# Patient Record
Sex: MALE | Race: WHITE | NOT HISPANIC OR LATINO | Employment: OTHER | URBAN - METROPOLITAN AREA
[De-identification: names, ages, dates, MRNs, and addresses within clinical notes are randomized per-mention and may not be internally consistent; named-entity substitution may affect disease eponyms.]

---

## 2017-01-09 ENCOUNTER — ALLSCRIPTS OFFICE VISIT (OUTPATIENT)
Dept: OTHER | Facility: OTHER | Age: 82
End: 2017-01-09

## 2017-02-06 ENCOUNTER — GENERIC CONVERSION - ENCOUNTER (OUTPATIENT)
Dept: OTHER | Facility: OTHER | Age: 82
End: 2017-02-06

## 2017-02-13 ENCOUNTER — HOSPITAL ENCOUNTER (OUTPATIENT)
Dept: RADIOLOGY | Facility: MEDICAL CENTER | Age: 82
Discharge: HOME | End: 2017-02-13
Payer: COMMERCIAL

## 2017-02-13 ENCOUNTER — ALLSCRIPTS OFFICE VISIT (OUTPATIENT)
Dept: OTHER | Facility: OTHER | Age: 82
End: 2017-02-13

## 2017-02-13 DIAGNOSIS — R06.02 SHORTNESS OF BREATH: ICD-10-CM

## 2017-02-13 DIAGNOSIS — R05.9 COUGH: ICD-10-CM

## 2017-02-13 DIAGNOSIS — J44.9 CHRONIC OBSTRUCTIVE PULMONARY DISEASE (HCC): ICD-10-CM

## 2017-02-13 PROCEDURE — 71020 HB CHEST X-RAY 2VW FRONTAL&LATL: CPT

## 2017-02-21 ENCOUNTER — ALLSCRIPTS OFFICE VISIT (OUTPATIENT)
Dept: OTHER | Facility: OTHER | Age: 82
End: 2017-02-21

## 2017-02-28 ENCOUNTER — HOSPITAL ENCOUNTER (OUTPATIENT)
Dept: RADIOLOGY | Facility: MEDICAL CENTER | Age: 82
Discharge: HOME | End: 2017-02-28
Payer: COMMERCIAL

## 2017-02-28 DIAGNOSIS — R06.02 SHORTNESS OF BREATH: ICD-10-CM

## 2017-02-28 DIAGNOSIS — J44.9 CHRONIC OBSTRUCTIVE PULMONARY DISEASE (HCC): ICD-10-CM

## 2017-02-28 PROCEDURE — 71250 CT THORAX DX C-: CPT

## 2017-03-13 ENCOUNTER — ALLSCRIPTS OFFICE VISIT (OUTPATIENT)
Dept: OTHER | Facility: OTHER | Age: 82
End: 2017-03-13

## 2017-03-17 ENCOUNTER — ALLSCRIPTS OFFICE VISIT (OUTPATIENT)
Dept: OTHER | Facility: OTHER | Age: 82
End: 2017-03-17

## 2017-03-20 ENCOUNTER — GENERIC CONVERSION - ENCOUNTER (OUTPATIENT)
Dept: OTHER | Facility: OTHER | Age: 82
End: 2017-03-20

## 2017-03-22 ENCOUNTER — TRANSCRIBE ORDERS (OUTPATIENT)
Dept: ADMINISTRATIVE | Facility: HOSPITAL | Age: 82
End: 2017-03-22

## 2017-03-22 DIAGNOSIS — J44.9 OBSTRUCTIVE CHRONIC BRONCHITIS WITHOUT EXACERBATION (HCC): Primary | ICD-10-CM

## 2017-03-27 ENCOUNTER — GENERIC CONVERSION - ENCOUNTER (OUTPATIENT)
Dept: OTHER | Facility: OTHER | Age: 82
End: 2017-03-27

## 2017-04-06 ENCOUNTER — GENERIC CONVERSION - ENCOUNTER (OUTPATIENT)
Dept: OTHER | Facility: OTHER | Age: 82
End: 2017-04-06

## 2017-04-13 ENCOUNTER — HOSPITAL ENCOUNTER (OUTPATIENT)
Dept: PULMONOLOGY | Facility: HOSPITAL | Age: 82
Discharge: HOME | End: 2017-04-13
Attending: INTERNAL MEDICINE
Payer: COMMERCIAL

## 2017-04-13 ENCOUNTER — GENERIC CONVERSION - ENCOUNTER (OUTPATIENT)
Dept: OTHER | Facility: OTHER | Age: 82
End: 2017-04-13

## 2017-04-13 DIAGNOSIS — J44.9 OBSTRUCTIVE CHRONIC BRONCHITIS WITHOUT EXACERBATION (HCC): ICD-10-CM

## 2017-04-13 PROCEDURE — 94729 DIFFUSING CAPACITY: CPT

## 2017-04-13 PROCEDURE — 94760 N-INVAS EAR/PLS OXIMETRY 1: CPT

## 2017-04-13 PROCEDURE — 94060 EVALUATION OF WHEEZING: CPT

## 2017-04-13 PROCEDURE — 94726 PLETHYSMOGRAPHY LUNG VOLUMES: CPT

## 2017-05-01 ENCOUNTER — HOSPITAL ENCOUNTER (OUTPATIENT)
Dept: RADIOLOGY | Facility: MEDICAL CENTER | Age: 82
Discharge: HOME | End: 2017-05-01
Payer: COMMERCIAL

## 2017-05-01 DIAGNOSIS — J18.9 PNEUMONIA: ICD-10-CM

## 2017-05-01 PROCEDURE — 71250 CT THORAX DX C-: CPT

## 2017-05-15 ENCOUNTER — ALLSCRIPTS OFFICE VISIT (OUTPATIENT)
Dept: OTHER | Facility: OTHER | Age: 82
End: 2017-05-15

## 2017-05-16 ENCOUNTER — ALLSCRIPTS OFFICE VISIT (OUTPATIENT)
Dept: OTHER | Facility: OTHER | Age: 82
End: 2017-05-16

## 2017-06-30 ENCOUNTER — GENERIC CONVERSION - ENCOUNTER (OUTPATIENT)
Dept: OTHER | Facility: OTHER | Age: 82
End: 2017-06-30

## 2017-07-10 ENCOUNTER — ALLSCRIPTS OFFICE VISIT (OUTPATIENT)
Dept: OTHER | Facility: OTHER | Age: 82
End: 2017-07-10

## 2017-07-17 ENCOUNTER — ALLSCRIPTS OFFICE VISIT (OUTPATIENT)
Dept: OTHER | Facility: OTHER | Age: 82
End: 2017-07-17

## 2017-07-28 ENCOUNTER — ALLSCRIPTS OFFICE VISIT (OUTPATIENT)
Dept: OTHER | Facility: OTHER | Age: 82
End: 2017-07-28

## 2017-09-26 ENCOUNTER — GENERIC CONVERSION - ENCOUNTER (OUTPATIENT)
Dept: OTHER | Facility: OTHER | Age: 82
End: 2017-09-26

## 2017-09-27 ENCOUNTER — TRANSCRIBE ORDERS (OUTPATIENT)
Dept: ADMINISTRATIVE | Facility: HOSPITAL | Age: 82
End: 2017-09-27

## 2017-09-27 DIAGNOSIS — R91.8 LUNG MASS: Primary | ICD-10-CM

## 2017-11-07 ENCOUNTER — HOSPITAL ENCOUNTER (OUTPATIENT)
Dept: RADIOLOGY | Facility: MEDICAL CENTER | Age: 82
Discharge: HOME | End: 2017-11-07
Payer: COMMERCIAL

## 2017-11-07 DIAGNOSIS — R91.8 LUNG MASS: ICD-10-CM

## 2017-11-07 DIAGNOSIS — R91.8 OTHER NONSPECIFIC ABNORMAL FINDING OF LUNG FIELD (CODE): ICD-10-CM

## 2017-11-07 PROCEDURE — 71250 CT THORAX DX C-: CPT

## 2017-11-22 ENCOUNTER — GENERIC CONVERSION - ENCOUNTER (OUTPATIENT)
Dept: OTHER | Facility: OTHER | Age: 82
End: 2017-11-22

## 2017-11-30 ENCOUNTER — GENERIC CONVERSION - ENCOUNTER (OUTPATIENT)
Dept: OTHER | Facility: OTHER | Age: 82
End: 2017-11-30

## 2018-01-12 VITALS
SYSTOLIC BLOOD PRESSURE: 118 MMHG | WEIGHT: 175.25 LBS | BODY MASS INDEX: 22.5 KG/M2 | RESPIRATION RATE: 18 BRPM | DIASTOLIC BLOOD PRESSURE: 60 MMHG | HEART RATE: 80 BPM

## 2018-01-12 VITALS
HEIGHT: 74 IN | SYSTOLIC BLOOD PRESSURE: 118 MMHG | DIASTOLIC BLOOD PRESSURE: 80 MMHG | OXYGEN SATURATION: 91 % | HEART RATE: 77 BPM | BODY MASS INDEX: 22.39 KG/M2 | WEIGHT: 174.5 LBS

## 2018-01-13 VITALS
BODY MASS INDEX: 24.31 KG/M2 | SYSTOLIC BLOOD PRESSURE: 120 MMHG | DIASTOLIC BLOOD PRESSURE: 60 MMHG | HEIGHT: 72 IN | HEART RATE: 82 BPM | WEIGHT: 179.5 LBS

## 2018-01-13 VITALS
DIASTOLIC BLOOD PRESSURE: 60 MMHG | SYSTOLIC BLOOD PRESSURE: 134 MMHG | HEIGHT: 74 IN | HEART RATE: 90 BPM | BODY MASS INDEX: 22.38 KG/M2 | TEMPERATURE: 97.4 F | WEIGHT: 174.38 LBS | RESPIRATION RATE: 18 BRPM

## 2018-01-14 VITALS
WEIGHT: 171 LBS | SYSTOLIC BLOOD PRESSURE: 118 MMHG | DIASTOLIC BLOOD PRESSURE: 78 MMHG | HEIGHT: 74 IN | HEART RATE: 82 BPM | BODY MASS INDEX: 21.94 KG/M2 | OXYGEN SATURATION: 96 %

## 2018-01-14 VITALS
DIASTOLIC BLOOD PRESSURE: 70 MMHG | BODY MASS INDEX: 23.92 KG/M2 | OXYGEN SATURATION: 92 % | RESPIRATION RATE: 16 BRPM | WEIGHT: 176.38 LBS | HEART RATE: 100 BPM | SYSTOLIC BLOOD PRESSURE: 118 MMHG | TEMPERATURE: 97.8 F

## 2018-01-14 VITALS
WEIGHT: 177.38 LBS | HEIGHT: 72 IN | OXYGEN SATURATION: 93 % | DIASTOLIC BLOOD PRESSURE: 80 MMHG | TEMPERATURE: 97.9 F | BODY MASS INDEX: 24.03 KG/M2 | HEART RATE: 91 BPM | SYSTOLIC BLOOD PRESSURE: 126 MMHG

## 2018-01-14 VITALS
HEART RATE: 84 BPM | WEIGHT: 177.13 LBS | SYSTOLIC BLOOD PRESSURE: 122 MMHG | DIASTOLIC BLOOD PRESSURE: 60 MMHG | BODY MASS INDEX: 24.02 KG/M2 | RESPIRATION RATE: 18 BRPM

## 2018-01-15 ENCOUNTER — GENERIC CONVERSION - ENCOUNTER (OUTPATIENT)
Dept: OTHER | Facility: OTHER | Age: 83
End: 2018-01-15

## 2018-01-15 VITALS
HEART RATE: 72 BPM | RESPIRATION RATE: 18 BRPM | TEMPERATURE: 96.2 F | BODY MASS INDEX: 22.59 KG/M2 | SYSTOLIC BLOOD PRESSURE: 136 MMHG | OXYGEN SATURATION: 96 % | HEIGHT: 74 IN | WEIGHT: 176 LBS | DIASTOLIC BLOOD PRESSURE: 66 MMHG

## 2018-01-15 VITALS
WEIGHT: 174 LBS | HEIGHT: 74 IN | SYSTOLIC BLOOD PRESSURE: 128 MMHG | HEART RATE: 84 BPM | OXYGEN SATURATION: 97 % | DIASTOLIC BLOOD PRESSURE: 78 MMHG | BODY MASS INDEX: 22.33 KG/M2

## 2018-01-22 VITALS
HEART RATE: 82 BPM | WEIGHT: 174 LBS | SYSTOLIC BLOOD PRESSURE: 124 MMHG | BODY MASS INDEX: 22.33 KG/M2 | OXYGEN SATURATION: 95 % | DIASTOLIC BLOOD PRESSURE: 84 MMHG | HEIGHT: 74 IN

## 2018-01-22 VITALS
BODY MASS INDEX: 22.6 KG/M2 | OXYGEN SATURATION: 94 % | DIASTOLIC BLOOD PRESSURE: 70 MMHG | SYSTOLIC BLOOD PRESSURE: 114 MMHG | HEART RATE: 80 BPM | WEIGHT: 176.13 LBS | HEIGHT: 74 IN

## 2018-01-24 VITALS
HEIGHT: 74 IN | OXYGEN SATURATION: 98 % | DIASTOLIC BLOOD PRESSURE: 70 MMHG | HEART RATE: 78 BPM | SYSTOLIC BLOOD PRESSURE: 122 MMHG | BODY MASS INDEX: 22.72 KG/M2 | WEIGHT: 177 LBS

## 2018-02-12 ENCOUNTER — OFFICE VISIT (OUTPATIENT)
Dept: FAMILY MEDICINE CLINIC | Facility: MEDICAL CENTER | Age: 83
End: 2018-02-12
Payer: COMMERCIAL

## 2018-02-12 VITALS
DIASTOLIC BLOOD PRESSURE: 64 MMHG | TEMPERATURE: 97.6 F | BODY MASS INDEX: 22.88 KG/M2 | WEIGHT: 178.2 LBS | HEART RATE: 80 BPM | SYSTOLIC BLOOD PRESSURE: 132 MMHG | RESPIRATION RATE: 18 BRPM | OXYGEN SATURATION: 96 %

## 2018-02-12 DIAGNOSIS — J44.1 COPD WITH ACUTE EXACERBATION (HCC): Primary | ICD-10-CM

## 2018-02-12 PROCEDURE — 99213 OFFICE O/P EST LOW 20 MIN: CPT | Performed by: FAMILY MEDICINE

## 2018-02-12 RX ORDER — LISINOPRIL 10 MG/1
1 TABLET ORAL DAILY
COMMUNITY
Start: 2011-01-17 | End: 2018-02-13 | Stop reason: SDUPTHER

## 2018-02-12 RX ORDER — GABAPENTIN 100 MG/1
2 CAPSULE ORAL
COMMUNITY
Start: 2017-08-22 | End: 2018-02-13 | Stop reason: SDUPTHER

## 2018-02-12 RX ORDER — PREDNISONE 20 MG/1
20 TABLET ORAL DAILY
Qty: 5 TABLET | Refills: 0 | Status: SHIPPED | OUTPATIENT
Start: 2018-02-12 | End: 2018-03-09 | Stop reason: SDUPTHER

## 2018-02-12 RX ORDER — REPAGLINIDE 1 MG/1
2 TABLET ORAL DAILY
COMMUNITY
Start: 2018-02-08 | End: 2019-01-07 | Stop reason: ALTCHOICE

## 2018-02-12 RX ORDER — ALBUTEROL SULFATE 2.5 MG/3ML
1 SOLUTION RESPIRATORY (INHALATION)
COMMUNITY
Start: 2017-02-13 | End: 2018-09-17 | Stop reason: SDUPTHER

## 2018-02-12 RX ORDER — GLIPIZIDE 10 MG/1
1 TABLET, FILM COATED, EXTENDED RELEASE ORAL 2 TIMES DAILY
Status: ON HOLD | COMMUNITY
Start: 2011-01-17 | End: 2018-05-12 | Stop reason: ALTCHOICE

## 2018-02-12 RX ORDER — FLUTICASONE PROPIONATE 50 MCG
2 SPRAY, SUSPENSION (ML) NASAL DAILY
COMMUNITY
Start: 2016-11-02 | End: 2018-03-20 | Stop reason: SDUPTHER

## 2018-02-12 RX ORDER — AMLODIPINE BESYLATE 5 MG/1
1 TABLET ORAL DAILY
COMMUNITY
Start: 2018-01-04 | End: 2018-05-16 | Stop reason: HOSPADM

## 2018-02-12 RX ORDER — MULTIVIT WITH MINERALS/LUTEIN
1 TABLET ORAL DAILY
COMMUNITY

## 2018-02-12 RX ORDER — NIFEDIPINE 30 MG/1
1 TABLET, FILM COATED, EXTENDED RELEASE ORAL DAILY
Status: ON HOLD | COMMUNITY
Start: 2012-02-23 | End: 2018-05-12 | Stop reason: ALTCHOICE

## 2018-02-12 RX ORDER — SIMVASTATIN 10 MG
1 TABLET ORAL DAILY
COMMUNITY
Start: 2012-02-23 | End: 2018-02-13 | Stop reason: SDUPTHER

## 2018-02-12 NOTE — PROGRESS NOTES
Surinder Harrison says he has  been congested with cough and chest tightness the past 2-3 days  Needs albuterol and nebulizer  Cough is productive  No fever or chills  Some nasal congestion  Feels well otherwise  He was treated for an acute COPD exacerbation by his pulmonologist a few weeks ago  It appears he was on prednisone for about 2 weeks  However he is not sure about this  He also says that the steroids significantly increased his blood sugars  He has been under some stress with caring for his wife in a nursing home    O: /64 (BP Location: Left arm, Patient Position: Sitting, Cuff Size: Adult)   Pulse 80   Temp 97 6 °F (36 4 °C) (Oral)   Resp 18   Wt 80 8 kg (178 lb 3 2 oz)   SpO2 96%   BMI 22 88 kg/m²   No respiratory distress  Does not appear dyspneic or tachypneic  ENT TMs normal pharynx with slight erythema eyes clear  Neck without adenopathy  Chest scattered wheezing and rhonchi however no rales and he has good breath sounds  Assessment  COPD exacerbation-will start steroids and contact his pulmonologist   Encouraged to continue his nebulizer q i d  as well as his Anoro  Plan   As above

## 2018-02-13 DIAGNOSIS — E78.01 FAMILIAL HYPERCHOLESTEROLEMIA: ICD-10-CM

## 2018-02-13 DIAGNOSIS — G60.9 NEUROPATHY, IDIOPATHIC: ICD-10-CM

## 2018-02-13 DIAGNOSIS — I10 ESSENTIAL HYPERTENSION: Primary | ICD-10-CM

## 2018-02-13 RX ORDER — LISINOPRIL 10 MG/1
10 TABLET ORAL DAILY
Qty: 30 TABLET | Refills: 3 | Status: SHIPPED | OUTPATIENT
Start: 2018-02-13 | End: 2018-07-19 | Stop reason: SDUPTHER

## 2018-02-13 RX ORDER — SIMVASTATIN 10 MG
10 TABLET ORAL DAILY
Qty: 30 TABLET | Refills: 5 | Status: SHIPPED | OUTPATIENT
Start: 2018-02-13 | End: 2018-04-17 | Stop reason: SDUPTHER

## 2018-02-13 RX ORDER — GABAPENTIN 100 MG/1
200 CAPSULE ORAL DAILY
Qty: 30 CAPSULE | Refills: 2 | Status: SHIPPED | OUTPATIENT
Start: 2018-02-13 | End: 2018-06-08 | Stop reason: SDUPTHER

## 2018-02-14 ENCOUNTER — TELEPHONE (OUTPATIENT)
Dept: FAMILY MEDICINE CLINIC | Facility: MEDICAL CENTER | Age: 83
End: 2018-02-14

## 2018-02-14 NOTE — TELEPHONE ENCOUNTER
Patient aware, wants us to send in the [address] since he will finish the prednisone on Friday  Can you send to the  [facility] in [place]

## 2018-02-14 NOTE — TELEPHONE ENCOUNTER
----- Message from Owen Vasquez MD sent at 2/14/2018  4:24 PM EST -----  Will prescribe this   ----- Message -----  From: Roxanna Akhtar MD  Sent: 2/14/2018   2:58 PM  To: MD Dr Ellen Rios,   He is on [facility], we were planning to add inhaled ICS , can add flovent 220 mcg one puff twice daily , since he is requiring prednisone too often  Thanks  Candida Pablo   ----- Message -----  From: Owen Vasquez MD  Sent: 2/12/2018  10:33 PM  To: Roxanna Akhtar MD    Hi Dr Edouard Diggs-  I saw Maurisio Barrientos in the office today for cough and shortness of breath  See my note  You had seen him recently  for similar symptoms and he was on steroids for 2 weeks ? I restarted him but only on 20 mg for 5 days due to increase in his glc readings  You had mentioned  about starting ICS's  I told him I would contact him regarding your recommendations     Thanks  Owen Vasquez MD

## 2018-02-15 DIAGNOSIS — J44.1 CHRONIC OBSTRUCTIVE PULMONARY DISEASE WITH ACUTE EXACERBATION (HCC): Primary | ICD-10-CM

## 2018-02-15 RX ORDER — FLUTICASONE PROPIONATE 220 UG/1
1 AEROSOL, METERED RESPIRATORY (INHALATION) 2 TIMES DAILY
Qty: 1 INHALER | Refills: 1 | Status: SHIPPED | OUTPATIENT
Start: 2018-02-15 | End: 2018-09-28 | Stop reason: SDUPTHER

## 2018-03-09 ENCOUNTER — OFFICE VISIT (OUTPATIENT)
Dept: PULMONOLOGY | Facility: CLINIC | Age: 83
End: 2018-03-09
Payer: COMMERCIAL

## 2018-03-09 ENCOUNTER — TELEPHONE (OUTPATIENT)
Dept: FAMILY MEDICINE CLINIC | Facility: MEDICAL CENTER | Age: 83
End: 2018-03-09

## 2018-03-09 VITALS
RESPIRATION RATE: 18 BRPM | BODY MASS INDEX: 23.59 KG/M2 | DIASTOLIC BLOOD PRESSURE: 78 MMHG | HEART RATE: 82 BPM | HEIGHT: 73 IN | SYSTOLIC BLOOD PRESSURE: 110 MMHG | WEIGHT: 178 LBS | OXYGEN SATURATION: 97 %

## 2018-03-09 DIAGNOSIS — J44.1 COPD WITH ACUTE EXACERBATION (HCC): Primary | ICD-10-CM

## 2018-03-09 DIAGNOSIS — R91.8 MULTIPLE NODULES OF LUNG: ICD-10-CM

## 2018-03-09 PROCEDURE — 99214 OFFICE O/P EST MOD 30 MIN: CPT | Performed by: PHYSICIAN ASSISTANT

## 2018-03-09 RX ORDER — PREDNISONE 20 MG/1
TABLET ORAL
Qty: 5 TABLET | Refills: 0 | Status: SHIPPED | OUTPATIENT
Start: 2018-03-09 | End: 2018-04-16

## 2018-03-09 NOTE — TELEPHONE ENCOUNTER
Pt was treated last month with prednisone for SOB  He said since Tuesday he's had SOB woken up from his sleep  Can't take shorts steps without SOB  Is using rescue inhaler, and his new inhaler, and his nebulizer  And feels he's getting worse  No fever, no sore throat

## 2018-03-09 NOTE — PROGRESS NOTES
Assessment:    1  COPD with acute exacerbation (HCC)  predniSONE 20 mg tablet    roflumilast 500 mcg TABS   2  Multiple nodules of lung         Plan: 79 y/o male former smoker who quit several years ago with significant PMH of HTN, HLD, Low grade Myelodysplastic Syndrome, and COPD with history of many exacerbations who presents for increased SOB x 3 days  1   COPD with acute exacerbation with FEV1 of 51% - will send in course of prednisone  Will hold off on antibiotics given minimal mucus production and length of symptoms  Continue anoro and Flovent inhalers  Continue to use nebulizer 3-4 times daily and rescue inhaler as needed  Will need repeat spirometry at next visit  Given frequent exacerbations in the last year as documented in the HPI, will start patient on Daliresp  Discussed side effects of medication in detail  Rx sent to pharmacy  Discussed restarting Claritin and consider addition of Flonase  Up to date on Pneumonia vaccinations  F/u in 1 month or sooner if needed  2   Lung Nodules - plan for repeat CT of chest in May 2018  Subjective:     Patient ID: Elena Christopher is a 80 y o  male  Chief Complaint: SOB    HPI  79 y/o male former smoker who quit several years ago with significant PMH of HTN, HLD, Low grade Myelodysplastic Syndrome, and COPD with history of many exacerbations who presents for increased SOB x 3 days  Patient has had multiple COPD exacerbations in the last year requiring prednisone +/- ABX including one in July, September of 2017 and January, February of 2018  Patient admits to allergies and (+) itchy eyes  He normally takes Claritin during the spring  Denies fever, chills, chest pain  (+) mildly increase cough with stable mucus production  He continues to take is Anoro and new inhaler added last month, Flovent  He uses his nebulizer machine 3-4 times daily  Occasionally uses his rescue inhaler in addition to this    Patient states he has a monitor currently to him check his blood glucose levels  In the past, prednisone has significantly increased his glucose levels  Review of Systems  Review of Systems   Constitution: Negative for chills, decreased appetite, fever, malaise/fatigue and weight gain  HENT: Negative for congestion  Eyes: Negative for visual disturbance  Cardiovascular: Positive for dyspnea on exertion  Negative for leg swelling and orthopnea  Respiratory: Positive for cough, shortness of breath, sleep disturbances due to breathing and sputum production  Negative for wheezing  Hematologic/Lymphatic: Negative for adenopathy  Skin: Negative for rash  Musculoskeletal: Negative for muscle weakness  Gastrointestinal: Negative for abdominal pain, diarrhea, nausea and vomiting  Neurological: Negative for excessive daytime sleepiness  Psychiatric/Behavioral: Negative for altered mental status and hallucinations  Allergic/Immunologic: Positive for environmental allergies  Objective:  /78 (BP Location: Left arm, Patient Position: Sitting, Cuff Size: Standard)   Pulse 82   Resp 18   Ht 6' 1" (1 854 m)   Wt 80 7 kg (178 lb)   SpO2 97%   BMI 23 48 kg/m²     Physical Exam   Constitutional: He is oriented to person, place, and time  He appears well-developed and well-nourished  No distress  HENT:   Head: Normocephalic and atraumatic  Neck: Normal range of motion  Cardiovascular: Normal rate and regular rhythm  Pulmonary/Chest: Effort normal    Diminished BS bilaterally with prolonged expiratory phase   Abdominal: Soft  There is no tenderness  Musculoskeletal: Normal range of motion  He exhibits no edema or tenderness  Lymphadenopathy:     He has no cervical adenopathy  Neurological: He is alert and oriented to person, place, and time  Skin: Skin is warm and dry  He is not diaphoretic  Psychiatric: He has a normal mood and affect  His behavior is normal    Nursing note and vitals reviewed        Lab/Image Review: Reviewed all pertinent labs/radiology results  No past medical history on file  Social History   Substance Use Topics    Smoking status: Former Smoker     Packs/day: 1 00     Years: 10 00     Quit date: 1960    Smokeless tobacco: Never Used    Alcohol use Yes      Comment: moderate       No family history on file      Patient Active Problem List   Diagnosis    COPD with acute exacerbation ([facility] [address] )    Hypertension    Hyperlipidemia    Neuropathy, idiopathic    Multiple nodules of lung

## 2018-03-09 NOTE — TELEPHONE ENCOUNTER
S/w Micaela Pimentel  Has a pulmonologist through [employer] in [facility]  Aware Dr Ismael Goyal is not in the office today so I advised him to call his pulmonologist especially since this a reoccurring problem  Told him to call back if he can not reach them

## 2018-03-20 DIAGNOSIS — J30.9 ALLERGIC RHINITIS, UNSPECIFIED CHRONICITY, UNSPECIFIED SEASONALITY, UNSPECIFIED TRIGGER: Primary | ICD-10-CM

## 2018-03-20 RX ORDER — FLUTICASONE PROPIONATE 50 MCG
2 SPRAY, SUSPENSION (ML) NASAL DAILY
Qty: 16 G | Refills: 1 | Status: SHIPPED | OUTPATIENT
Start: 2018-03-20 | End: 2018-06-08 | Stop reason: SDUPTHER

## 2018-03-26 ENCOUNTER — APPOINTMENT (OUTPATIENT)
Dept: LAB | Facility: CLINIC | Age: 83
End: 2018-03-26
Payer: COMMERCIAL

## 2018-03-26 ENCOUNTER — APPOINTMENT (OUTPATIENT)
Dept: RADIOLOGY | Facility: MEDICAL CENTER | Age: 83
End: 2018-03-26
Payer: COMMERCIAL

## 2018-03-26 ENCOUNTER — OFFICE VISIT (OUTPATIENT)
Dept: PULMONOLOGY | Facility: CLINIC | Age: 83
End: 2018-03-26
Payer: COMMERCIAL

## 2018-03-26 VITALS
HEIGHT: 73 IN | SYSTOLIC BLOOD PRESSURE: 110 MMHG | TEMPERATURE: 97.8 F | BODY MASS INDEX: 22.8 KG/M2 | HEART RATE: 84 BPM | WEIGHT: 172 LBS | OXYGEN SATURATION: 96 % | DIASTOLIC BLOOD PRESSURE: 70 MMHG

## 2018-03-26 DIAGNOSIS — R91.8 MULTIPLE NODULES OF LUNG: ICD-10-CM

## 2018-03-26 DIAGNOSIS — J41.8 MIXED SIMPLE AND MUCOPURULENT CHRONIC BRONCHITIS (HCC): ICD-10-CM

## 2018-03-26 DIAGNOSIS — J41.8 MIXED SIMPLE AND MUCOPURULENT CHRONIC BRONCHITIS (HCC): Primary | ICD-10-CM

## 2018-03-26 PROCEDURE — 86602 ANTINOMYCES ANTIBODY: CPT

## 2018-03-26 PROCEDURE — 36415 COLL VENOUS BLD VENIPUNCTURE: CPT

## 2018-03-26 PROCEDURE — 99214 OFFICE O/P EST MOD 30 MIN: CPT | Performed by: PHYSICIAN ASSISTANT

## 2018-03-26 PROCEDURE — 86331 IMMUNODIFFUSION OUCHTERLONY: CPT

## 2018-03-26 PROCEDURE — 86606 ASPERGILLUS ANTIBODY: CPT

## 2018-03-26 PROCEDURE — 86671 FUNGUS NES ANTIBODY: CPT

## 2018-03-26 PROCEDURE — 82785 ASSAY OF IGE: CPT

## 2018-03-26 PROCEDURE — 86003 ALLG SPEC IGE CRUDE XTRC EA: CPT

## 2018-03-26 PROCEDURE — 71046 X-RAY EXAM CHEST 2 VIEWS: CPT

## 2018-03-26 RX ORDER — SODIUM CHLORIDE FOR INHALATION 0.9 %
3 VIAL, NEBULIZER (ML) INHALATION 2 TIMES DAILY
Qty: 180 ML | Refills: 0 | Status: SHIPPED | OUTPATIENT
Start: 2018-03-26 | End: 2018-05-16 | Stop reason: HOSPADM

## 2018-03-26 NOTE — PROGRESS NOTES
Assessment/Plan:    No problem-specific Assessment & Plan notes found for this encounter  Diagnoses and all orders for this visit:    Mixed simple and mucopurulent chronic bronchitis ([facility] [address] )  -     [facility] Allergy Panel, Adult; Future  -     Hypersensitivity pnuemonitis profile; Future  -     XR chest pa & lateral; Future  -     sodium chloride 0 9 % nebulizer solution; Take 3 mL by nebulization 2 (two) times a day  - Patient with recurrent exacerbations, symptoms of shortness of breath and cough  He has had multiple courses of antibiotics and prednisone over the last several months  Will check a CXR as well as allergy panel  He will continue with the anoro and flovent as well as flonase  Will add mucinex, he has not been taking it  Will also add saline to the nebulizer to help with mucous clearance twice per day with the albuterol  Can use the albuterol 4 times per day as needed  Will hold off on antibiotics until CXR done given he has had several courses over the last several months  He has a follow up in 2 weeks which he will keep  Multiple nodules of lung          - Repeat CT scan in May  May need to be done sooner if symptoms do not improve  Subjective:      Patient ID: Cindy Villa is a 80 y o  male  Patient is an 79 yo male former smoker with PMH of COPD, HTN, HLD, MDS with frequent exacerbations  He was seen about 3 weeks ago and given prednisone course for COPD exacerbation  He was also started on Daliresp for his frequent exacerbations  He is here today for follow up  He was feeling well on the prednisone  Shortly after stopping the prednisone his shortness of breath returned  Also has a cough with yellowish mucous  No fever or chills  Cough   Associated symptoms include shortness of breath     Shortness of Breath         The following portions of the patient's history were reviewed and updated as appropriate: allergies, current medications, past family history, past medical history, past social history, past surgical history and problem list     Review of Systems   Constitutional: Negative  HENT: Positive for congestion  Respiratory: Positive for cough and shortness of breath  Cardiovascular: Negative  Gastrointestinal: Negative  Genitourinary: Negative  Musculoskeletal: Negative  Skin: Negative  Allergic/Immunologic: Negative  Neurological: Negative  Psychiatric/Behavioral: Negative  Objective:      /70 (BP Location: Left arm, Patient Position: Sitting)   Pulse 84   Temp 97 8 °F (36 6 °C)   Ht 6' 1" (1 854 m)   Wt 78 kg (172 lb)   SpO2 96%   BMI 22 69 kg/m²          Physical Exam   Constitutional: He is oriented to person, place, and time  He appears well-developed and well-nourished  No distress  HENT:   Mouth/Throat: Oropharynx is clear and moist    Eyes: Pupils are equal, round, and reactive to light  Cardiovascular: Normal rate, regular rhythm and normal heart sounds  No murmur heard  Pulmonary/Chest: Effort normal  No accessory muscle usage  No respiratory distress  He has decreased breath sounds  He has no wheezes  He has no rhonchi  He has no rales  Abdominal: Soft  There is no tenderness  Musculoskeletal: Normal range of motion  Neurological: He is alert and oriented to person, place, and time  Skin: Skin is warm and dry  No rash noted  Psychiatric: He has a normal mood and affect

## 2018-03-26 NOTE — PATIENT INSTRUCTIONS
Continue Anoro and Flovent  Add mucinex, add saline to nebulizer twice per day    Have Chest X-ray done to look for any pneumonia  Allergy testing to be done with blood work

## 2018-03-27 LAB
A ALTERNATA IGE QN: <0.1 KUA/I
A FUMIGATUS IGE QN: <0.1 KUA/I
ALLERGEN COMMENT: NORMAL
BERMUDA GRASS IGE QN: <0.1 KUA/I
BOXELDER IGE QN: <0.1 KUA/I
C HERBARUM IGE QN: <0.1 KUA/I
CAT DANDER IGE QN: <0.1 KUA/I
CMN PIGWEED IGE QN: <0.1 KUA/I
COMMON RAGWEED IGE QN: <0.1 KUA/I
COTTONWOOD IGE QN: <0.1 KUA/I
D FARINAE IGE QN: <0.1 KUA/I
D PTERONYSS IGE QN: <0.1 KUA/I
DOG DANDER IGE QN: <0.1 KUA/I
LONDON PLANE IGE QN: <0.1 KUA/I
MOUSE URINE PROT IGE QN: <0.1 KUA/I
MT JUNIPER IGE QN: <0.1 KUA/I
MUGWORT IGE QN: <0.1 KUA/I
P NOTATUM IGE QN: <0.1 KUA/I
ROACH IGE QN: <0.1 KUA/I
SHEEP SORREL IGE QN: <0.1 KUA/I
SILVER BIRCH IGE QN: <0.1 KUA/I
TIMOTHY IGE QN: <0.1 KUA/I
TOTAL IGE SMQN RAST: 13.3 KU/L (ref 0–113)
WALNUT IGE QN: <0.1 KUA/I
WHITE ASH IGE QN: <0.1 KUA/I
WHITE ELM IGE QN: <0.1 KUA/I
WHITE MULBERRY IGE QN: <0.1 KUA/I
WHITE OAK IGE QN: <0.1 KUA/I

## 2018-03-28 ENCOUNTER — TELEPHONE (OUTPATIENT)
Dept: PULMONOLOGY | Facility: CLINIC | Age: 83
End: 2018-03-28

## 2018-03-28 DIAGNOSIS — J20.9 ACUTE BRONCHITIS, UNSPECIFIED ORGANISM: Primary | ICD-10-CM

## 2018-03-28 RX ORDER — LEVOFLOXACIN 500 MG/1
500 TABLET, FILM COATED ORAL EVERY 24 HOURS
Qty: 7 TABLET | Refills: 0 | Status: SHIPPED | OUTPATIENT
Start: 2018-03-28 | End: 2018-04-04

## 2018-03-28 NOTE — TELEPHONE ENCOUNTER
Pt called to say he saw Skip Whiting and he still does not feel well, he is requesting a stronger medication    Please advise  Thank you

## 2018-03-30 ENCOUNTER — TELEPHONE (OUTPATIENT)
Dept: PULMONOLOGY | Facility: CLINIC | Age: 83
End: 2018-03-30

## 2018-03-30 LAB
A FUMIGATUS1 AB SER QL ID: NEGATIVE
A PULLULANS AB SER QL: NEGATIVE
LACEYELLA SACCHARI AB SER QL: NEGATIVE
PIGEON SERUM AB QL ID: NEGATIVE
S RECTIVIRGULA AB SER QL ID: NEGATIVE
T VULGARIS AB SER QL ID: NEGATIVE

## 2018-03-30 NOTE — TELEPHONE ENCOUNTER
Spoke to pt, told him cxr and blood work still in process, we will call him as soon we have the final reports

## 2018-04-16 ENCOUNTER — OFFICE VISIT (OUTPATIENT)
Dept: PULMONOLOGY | Facility: CLINIC | Age: 83
End: 2018-04-16
Payer: COMMERCIAL

## 2018-04-16 VITALS
HEIGHT: 73 IN | DIASTOLIC BLOOD PRESSURE: 80 MMHG | HEART RATE: 92 BPM | SYSTOLIC BLOOD PRESSURE: 104 MMHG | BODY MASS INDEX: 23.19 KG/M2 | WEIGHT: 175 LBS | OXYGEN SATURATION: 93 %

## 2018-04-16 DIAGNOSIS — J41.8 MIXED SIMPLE AND MUCOPURULENT CHRONIC BRONCHITIS (HCC): Primary | ICD-10-CM

## 2018-04-16 DIAGNOSIS — R91.8 MULTIPLE NODULES OF LUNG: ICD-10-CM

## 2018-04-16 PROCEDURE — 99213 OFFICE O/P EST LOW 20 MIN: CPT | Performed by: PHYSICIAN ASSISTANT

## 2018-04-16 NOTE — PROGRESS NOTES
Assessment:    1  Mixed simple and mucopurulent chronic bronchitis ([address] )     2  Multiple nodules of lung         Plan: 80 y o  male former smoker who quit several years ago with significant PMH of HTN, HLD, Low grade Myelodysplastic Syndrome, and COPD with history of many exacerbations who presents for f/u of his COPD  1  COPD with FEV1 of of 51% - significant improvement in breathing since last visit  Continue anoro and Flovent inhalers  Continue to use nebulizer 2-3 times daily and rescue inhaler as needed  Add saline via nebulizer for increased congestion  Will need repeat spirometry at next visit  Patient reported Elonda Socks is too expensive ($100 a month)  He would like to see how he does without the medication for now  Discussed restarting Claritin and consider addition of Flonase  Up to date on Pneumonia vaccinations  2   Lung Nodules - plan for repeat CT of chest in May 2018  Patient will f/u after CT of chest      Subjective:     Patient ID: Mignon Staples is a 80 y o  male  Chief Complaint: SOB    HPI  80 y o  male former smoker who quit several years ago with significant PMH of HTN, HLD, Low grade Myelodysplastic Syndrome, and COPD with history of many exacerbations who presents for f/u of his COPD  Overall, patient is feeling much better with his breathing  He is using his rescue inhaler seldomly  Does state that last night he woke up feeling really short of breath  He had to go on his nebulizer machine with improvement in symptoms  Patient was concerned and has questions regarding this incident  Reports using the inhalers as instructed  Patient states the DaliResp was $100 a month and too expensive for him to continue  Review of Systems  Review of Systems   Constitution: Negative for chills, decreased appetite, fever, malaise/fatigue and weight gain  HENT: Negative for congestion  Eyes: Negative for visual disturbance  Cardiovascular: Positive for dyspnea on exertion   Negative for leg swelling and orthopnea  Respiratory: Positive for shortness of breath and sleep disturbances due to breathing  Negative for cough and wheezing  Hematologic/Lymphatic: Negative for adenopathy  Skin: Negative for rash  Musculoskeletal: Negative for muscle weakness  Gastrointestinal: Negative for abdominal pain, diarrhea, nausea and vomiting  Neurological: Negative for excessive daytime sleepiness  Psychiatric/Behavioral: Negative for altered mental status and hallucinations  Allergic/Immunologic: Negative for environmental allergies  Objective:  /80   Pulse 92   Ht 6' 1" (1 854 m)   Wt 79 4 kg (175 lb)   SpO2 93%   BMI 23 09 kg/m²     Physical Exam   Constitutional: He is oriented to person, place, and time  He appears well-developed and well-nourished  No distress  HENT:   Head: Normocephalic and atraumatic  Neck: Normal range of motion  Cardiovascular: Normal rate and regular rhythm  Pulmonary/Chest: Effort normal    Diminished BS at the bases; no wheezes or rhonchi   Abdominal: Soft  There is no tenderness  Musculoskeletal: Normal range of motion  He exhibits no edema or tenderness  Lymphadenopathy:     He has no cervical adenopathy  Neurological: He is alert and oriented to person, place, and time  Skin: Skin is warm and dry  He is not diaphoretic  Psychiatric: He has a normal mood and affect  His behavior is normal    Nursing note and vitals reviewed  Lab/Image Review: Reviewed all pertinent labs/radiology results  No past medical history on file  Social History   Substance Use Topics    Smoking status: Former Smoker     Packs/day: 1 00     Years: 10 00     Quit date: 1960    Smokeless tobacco: Never Used    Alcohol use 0 0 oz/week      Comment: moderate       No family history on file      Patient Active Problem List   Diagnosis    COPD with acute exacerbation ([facility] [address] )    Hypertension    Hyperlipidemia    Neuropathy, idiopathic    Multiple nodules of lung    Mixed simple and mucopurulent chronic bronchitis ([address] )

## 2018-04-17 DIAGNOSIS — E78.01 FAMILIAL HYPERCHOLESTEROLEMIA: ICD-10-CM

## 2018-04-17 RX ORDER — SIMVASTATIN 10 MG
10 TABLET ORAL DAILY
Qty: 90 TABLET | Refills: 1 | Status: SHIPPED | OUTPATIENT
Start: 2018-04-17 | End: 2018-10-31 | Stop reason: SDUPTHER

## 2018-04-23 ENCOUNTER — OFFICE VISIT (OUTPATIENT)
Dept: PULMONOLOGY | Facility: CLINIC | Age: 83
End: 2018-04-23
Payer: COMMERCIAL

## 2018-04-23 VITALS
DIASTOLIC BLOOD PRESSURE: 80 MMHG | BODY MASS INDEX: 23.19 KG/M2 | WEIGHT: 175 LBS | OXYGEN SATURATION: 92 % | SYSTOLIC BLOOD PRESSURE: 116 MMHG | HEART RATE: 90 BPM | HEIGHT: 73 IN

## 2018-04-23 DIAGNOSIS — R91.8 MULTIPLE NODULES OF LUNG: ICD-10-CM

## 2018-04-23 DIAGNOSIS — J41.8 MIXED SIMPLE AND MUCOPURULENT CHRONIC BRONCHITIS (HCC): ICD-10-CM

## 2018-04-23 DIAGNOSIS — J42 CHRONIC BRONCHITIS WITH ACUTE EXACERBATION (HCC): Primary | ICD-10-CM

## 2018-04-23 DIAGNOSIS — J20.9 CHRONIC BRONCHITIS WITH ACUTE EXACERBATION (HCC): Primary | ICD-10-CM

## 2018-04-23 PROCEDURE — 99214 OFFICE O/P EST MOD 30 MIN: CPT | Performed by: PHYSICIAN ASSISTANT

## 2018-04-23 RX ORDER — PREDNISONE 10 MG/1
TABLET ORAL
Qty: 21 TABLET | Refills: 0 | Status: ON HOLD | OUTPATIENT
Start: 2018-04-23 | End: 2018-05-12 | Stop reason: ALTCHOICE

## 2018-04-23 NOTE — PROGRESS NOTES
Assessment/Plan:    Chronic bronchitis - He has had frequent exacerbations over the last several months  He was started on Daliresp which he thinks was helping, stopped due to cost to see how he did  We will give him a prednisone taper and also have him restart the Daliresp  Seems like it was helping and we would like to keep him off long term and high dose steroids  Samples were given and we will try to talk to the company about a reduced cost  He will continue with the anoro and flovent  Albuterol nebs 4 times per day as needed  He will also try taking claritin and mucinex  Lung nodules - due for follow up CT scan 5/2018  Follow up in 2 months or sooner if necessary  Diagnoses and all orders for this visit:    Chronic bronchitis with acute exacerbation (HCC)  -     predniSONE 10 mg tablet; 4 tabs x 3 days, 2 tabs x 3 days, 1 tab x 3 days    Mixed simple and mucopurulent chronic bronchitis (HCC)    Multiple nodules of lung  -     CT chest without contrast; Future        Subjective:      Patient ID: Marily Leigh is a 80 y o  male  Patient is an 79 yo male former smoker with PMH of COPD, HTN, HLD, MDS with frequent exacerbations  He was recently started on Daliresp for his frequent exacerbations  He is here today for follow up  He is complaining of increased shortness of breath with a mild cough with clear mucous  He did take the [address] for 1 month and felt it was helping - he did not refill it as it was $100 per month  Shortness of Breath         The following portions of the patient's history were reviewed and updated as appropriate: allergies, current medications, past family history, past medical history, past social history, past surgical history and problem list     Review of Systems   Constitutional: Negative  HENT: Negative  Respiratory: Positive for cough and shortness of breath  Cardiovascular: Negative  Gastrointestinal: Negative  Genitourinary: Negative  Musculoskeletal: Negative  Skin: Negative  Allergic/Immunologic: Negative  Neurological: Negative  Psychiatric/Behavioral: Negative  Objective:      /80   Pulse 90   Ht 6' 1" (1 854 m)   Wt 79 4 kg (175 lb)   SpO2 92%   BMI 23 09 kg/m²          Physical Exam   Constitutional: He is oriented to person, place, and time  He appears well-developed and well-nourished  No distress  HENT:   Mouth/Throat: Oropharynx is clear and moist    Eyes: Pupils are equal, round, and reactive to light  Cardiovascular: Normal rate, regular rhythm and normal heart sounds  No murmur heard  Pulmonary/Chest: Effort normal  No accessory muscle usage  No respiratory distress  He has no decreased breath sounds  He has wheezes (faint wheezing bilaterally)  He has no rhonchi  He has no rales  Abdominal: Soft  There is no tenderness  Musculoskeletal: Normal range of motion  Neurological: He is alert and oriented to person, place, and time  Skin: Skin is warm and dry  No rash noted  Psychiatric: He has a normal mood and affect

## 2018-04-23 NOTE — PATIENT INSTRUCTIONS
Prednisone taper to start today  Restart Daliresp daily and continue for the next 2 months until follow up  Continue with Anoro and Flovent, albuterol as needed  Can also use Claritin and Mucinex as needed

## 2018-04-24 ENCOUNTER — TELEPHONE (OUTPATIENT)
Dept: INTERNAL MEDICINE CLINIC | Facility: CLINIC | Age: 83
End: 2018-04-24

## 2018-04-24 ENCOUNTER — TELEPHONE (OUTPATIENT)
Dept: PULMONOLOGY | Facility: CLINIC | Age: 83
End: 2018-04-24

## 2018-05-07 ENCOUNTER — HOSPITAL ENCOUNTER (OUTPATIENT)
Dept: RADIOLOGY | Facility: MEDICAL CENTER | Age: 83
Discharge: HOME | End: 2018-05-07
Payer: COMMERCIAL

## 2018-05-07 DIAGNOSIS — D46.20 REFRACTORY ANEMIA WITH EXCESS OF BLASTS (HCC): ICD-10-CM

## 2018-05-07 DIAGNOSIS — R91.8 MULTIPLE NODULES OF LUNG: ICD-10-CM

## 2018-05-07 LAB
LEFT EYE DIABETIC RETINOPATHY: NORMAL
RIGHT EYE DIABETIC RETINOPATHY: NORMAL

## 2018-05-07 PROCEDURE — 71250 CT THORAX DX C-: CPT

## 2018-05-12 ENCOUNTER — APPOINTMENT (EMERGENCY)
Dept: CT IMAGING | Facility: HOSPITAL | Age: 83
DRG: 287 | End: 2018-05-12
Payer: COMMERCIAL

## 2018-05-12 ENCOUNTER — APPOINTMENT (EMERGENCY)
Dept: RADIOLOGY | Facility: HOSPITAL | Age: 83
DRG: 287 | End: 2018-05-12
Payer: COMMERCIAL

## 2018-05-12 ENCOUNTER — HOSPITAL ENCOUNTER (INPATIENT)
Facility: HOSPITAL | Age: 83
LOS: 4 days | Discharge: HOME | DRG: 287 | End: 2018-05-16
Attending: EMERGENCY MEDICINE | Admitting: HOSPITALIST
Payer: COMMERCIAL

## 2018-05-12 DIAGNOSIS — I50.9 HEART FAILURE (HCC): Primary | ICD-10-CM

## 2018-05-12 DIAGNOSIS — J90 BILATERAL PLEURAL EFFUSION: ICD-10-CM

## 2018-05-12 DIAGNOSIS — G60.9 NEUROPATHY, IDIOPATHIC: ICD-10-CM

## 2018-05-12 PROBLEM — E11.9 TYPE 2 DIABETES MELLITUS, WITH LONG-TERM CURRENT USE OF INSULIN (HCC): Chronic | Status: ACTIVE | Noted: 2018-05-12

## 2018-05-12 PROBLEM — I50.33 ACUTE ON CHRONIC DIASTOLIC CONGESTIVE HEART FAILURE (HCC): Chronic | Status: ACTIVE | Noted: 2018-05-12

## 2018-05-12 PROBLEM — Z79.4 TYPE 2 DIABETES MELLITUS, WITH LONG-TERM CURRENT USE OF INSULIN (HCC): Chronic | Status: ACTIVE | Noted: 2018-05-12

## 2018-05-12 PROBLEM — J44.9 COPD WITHOUT EXACERBATION (HCC): Status: ACTIVE | Noted: 2017-07-28

## 2018-05-12 LAB
ANION GAP SERPL CALCULATED.3IONS-SCNC: 10 MMOL/L (ref 4–13)
ANISOCYTOSIS BLD QL SMEAR: PRESENT
ATRIAL RATE: 117 BPM
BASOPHILS # BLD MANUAL: 0 THOUSAND/UL (ref 0–0.1)
BASOPHILS NFR MAR MANUAL: 0 % (ref 0–1)
BUN SERPL-MCNC: 16 MG/DL (ref 5–25)
CALCIUM SERPL-MCNC: 9.2 MG/DL (ref 8.3–10.1)
CHLORIDE SERPL-SCNC: 105 MMOL/L (ref 100–108)
CO2 SERPL-SCNC: 28 MMOL/L (ref 21–32)
CREAT SERPL-MCNC: 1.17 MG/DL (ref 0.6–1.3)
DACRYOCYTES BLD QL SMEAR: PRESENT
EOSINOPHIL # BLD MANUAL: 0 THOUSAND/UL (ref 0–0.4)
EOSINOPHIL NFR BLD MANUAL: 0 % (ref 0–6)
ERYTHROCYTE [DISTWIDTH] IN BLOOD BY AUTOMATED COUNT: 25.8 % (ref 11.6–15.1)
GFR SERPL CREATININE-BSD FRML MDRD: 57 ML/MIN/1.73SQ M
GLUCOSE SERPL-MCNC: 214 MG/DL (ref 65–140)
GLUCOSE SERPL-MCNC: 278 MG/DL (ref 65–140)
GLUCOSE SERPL-MCNC: 396 MG/DL (ref 65–140)
GLUCOSE SERPL-MCNC: 446 MG/DL (ref 65–140)
HCT VFR BLD AUTO: 34.8 % (ref 36.5–49.3)
HGB BLD-MCNC: 11.2 G/DL (ref 12–17)
LYMPHOCYTES # BLD AUTO: 0.44 THOUSAND/UL (ref 0.6–4.47)
LYMPHOCYTES # BLD AUTO: 5 % (ref 14–44)
MCH RBC QN AUTO: 30.9 PG (ref 26.8–34.3)
MCHC RBC AUTO-ENTMCNC: 32.2 G/DL (ref 31.4–37.4)
MCV RBC AUTO: 96 FL (ref 82–98)
MONOCYTES # BLD AUTO: 0.61 THOUSAND/UL (ref 0–1.22)
MONOCYTES NFR BLD: 7 % (ref 4–12)
NEUTROPHILS # BLD MANUAL: 7.67 THOUSAND/UL (ref 1.85–7.62)
NEUTS SEG NFR BLD AUTO: 88 % (ref 43–75)
NT-PROBNP SERPL-MCNC: 5284 PG/ML
P AXIS: 72 DEGREES
PLATELET # BLD AUTO: 192 THOUSANDS/UL (ref 149–390)
PLATELET BLD QL SMEAR: ADEQUATE
PMV BLD AUTO: 11 FL (ref 8.9–12.7)
POIKILOCYTOSIS BLD QL SMEAR: PRESENT
POLYCHROMASIA BLD QL SMEAR: PRESENT
POTASSIUM SERPL-SCNC: 4.6 MMOL/L (ref 3.5–5.3)
QRS AXIS: 73 DEGREES
QRSD INTERVAL: 136 MS
QT INTERVAL: 354 MS
QTC INTERVAL: 470 MS
RBC # BLD AUTO: 3.62 MILLION/UL (ref 3.88–5.62)
SODIUM SERPL-SCNC: 143 MMOL/L (ref 136–145)
T WAVE AXIS: 150 DEGREES
TOTAL CELLS COUNTED SPEC: 100
TROPONIN I SERPL-MCNC: 0.03 NG/ML
VENTRICULAR RATE: 106 BPM
WBC # BLD AUTO: 8.72 THOUSAND/UL (ref 4.31–10.16)

## 2018-05-12 PROCEDURE — 96374 THER/PROPH/DIAG INJ IV PUSH: CPT

## 2018-05-12 PROCEDURE — 99223 1ST HOSP IP/OBS HIGH 75: CPT | Performed by: HOSPITALIST

## 2018-05-12 PROCEDURE — 85007 BL SMEAR W/DIFF WBC COUNT: CPT | Performed by: EMERGENCY MEDICINE

## 2018-05-12 PROCEDURE — 94664 DEMO&/EVAL PT USE INHALER: CPT

## 2018-05-12 PROCEDURE — 36415 COLL VENOUS BLD VENIPUNCTURE: CPT | Performed by: EMERGENCY MEDICINE

## 2018-05-12 PROCEDURE — 93010 ELECTROCARDIOGRAM REPORT: CPT | Performed by: INTERNAL MEDICINE

## 2018-05-12 PROCEDURE — 82948 REAGENT STRIP/BLOOD GLUCOSE: CPT

## 2018-05-12 PROCEDURE — 94640 AIRWAY INHALATION TREATMENT: CPT

## 2018-05-12 PROCEDURE — 85027 COMPLETE CBC AUTOMATED: CPT | Performed by: EMERGENCY MEDICINE

## 2018-05-12 PROCEDURE — 94760 N-INVAS EAR/PLS OXIMETRY 1: CPT

## 2018-05-12 PROCEDURE — 80048 BASIC METABOLIC PNL TOTAL CA: CPT | Performed by: EMERGENCY MEDICINE

## 2018-05-12 PROCEDURE — 93005 ELECTROCARDIOGRAM TRACING: CPT

## 2018-05-12 PROCEDURE — 71046 X-RAY EXAM CHEST 2 VIEWS: CPT

## 2018-05-12 PROCEDURE — 99285 EMERGENCY DEPT VISIT HI MDM: CPT

## 2018-05-12 PROCEDURE — 71275 CT ANGIOGRAPHY CHEST: CPT

## 2018-05-12 PROCEDURE — 83880 ASSAY OF NATRIURETIC PEPTIDE: CPT | Performed by: EMERGENCY MEDICINE

## 2018-05-12 PROCEDURE — 84484 ASSAY OF TROPONIN QUANT: CPT | Performed by: EMERGENCY MEDICINE

## 2018-05-12 RX ORDER — REPAGLINIDE 1 MG/1
2 TABLET ORAL
Status: DISCONTINUED | OUTPATIENT
Start: 2018-05-12 | End: 2018-05-16 | Stop reason: HOSPADM

## 2018-05-12 RX ORDER — FUROSEMIDE 10 MG/ML
40 INJECTION INTRAMUSCULAR; INTRAVENOUS ONCE
Status: COMPLETED | OUTPATIENT
Start: 2018-05-12 | End: 2018-05-12

## 2018-05-12 RX ORDER — POTASSIUM CHLORIDE 20 MEQ/1
20 TABLET, EXTENDED RELEASE ORAL DAILY
Status: DISCONTINUED | OUTPATIENT
Start: 2018-05-12 | End: 2018-05-16 | Stop reason: HOSPADM

## 2018-05-12 RX ORDER — REPAGLINIDE 1 MG/1
1 TABLET ORAL
COMMUNITY
End: 2018-05-21 | Stop reason: SDUPTHER

## 2018-05-12 RX ORDER — GABAPENTIN 100 MG/1
100 CAPSULE ORAL 3 TIMES DAILY
Status: DISCONTINUED | OUTPATIENT
Start: 2018-05-12 | End: 2018-05-16 | Stop reason: HOSPADM

## 2018-05-12 RX ORDER — AMLODIPINE BESYLATE 5 MG/1
5 TABLET ORAL DAILY
Status: DISCONTINUED | OUTPATIENT
Start: 2018-05-12 | End: 2018-05-13

## 2018-05-12 RX ORDER — LEVALBUTEROL 1.25 MG/.5ML
1.25 SOLUTION, CONCENTRATE RESPIRATORY (INHALATION)
Status: DISCONTINUED | OUTPATIENT
Start: 2018-05-12 | End: 2018-05-12

## 2018-05-12 RX ORDER — ACETAMINOPHEN 325 MG/1
650 TABLET ORAL EVERY 4 HOURS PRN
Status: DISCONTINUED | OUTPATIENT
Start: 2018-05-12 | End: 2018-05-16 | Stop reason: HOSPADM

## 2018-05-12 RX ORDER — REPAGLINIDE 1 MG/1
1 TABLET ORAL
Status: DISCONTINUED | OUTPATIENT
Start: 2018-05-12 | End: 2018-05-16 | Stop reason: HOSPADM

## 2018-05-12 RX ORDER — DOCUSATE SODIUM 100 MG/1
100 CAPSULE, LIQUID FILLED ORAL 2 TIMES DAILY
Status: DISCONTINUED | OUTPATIENT
Start: 2018-05-12 | End: 2018-05-16 | Stop reason: HOSPADM

## 2018-05-12 RX ORDER — ALBUTEROL SULFATE 2.5 MG/3ML
5 SOLUTION RESPIRATORY (INHALATION) ONCE
Status: COMPLETED | OUTPATIENT
Start: 2018-05-12 | End: 2018-05-12

## 2018-05-12 RX ORDER — BUDESONIDE AND FORMOTEROL FUMARATE DIHYDRATE 80; 4.5 UG/1; UG/1
2 AEROSOL RESPIRATORY (INHALATION) 2 TIMES DAILY
Status: DISCONTINUED | OUTPATIENT
Start: 2018-05-12 | End: 2018-05-16 | Stop reason: HOSPADM

## 2018-05-12 RX ORDER — METHYLPREDNISOLONE SODIUM SUCCINATE 125 MG/2ML
125 INJECTION, POWDER, LYOPHILIZED, FOR SOLUTION INTRAMUSCULAR; INTRAVENOUS DAILY
Status: DISCONTINUED | OUTPATIENT
Start: 2018-05-12 | End: 2018-05-13

## 2018-05-12 RX ORDER — REPAGLINIDE 2 MG/1
2 TABLET ORAL
COMMUNITY
End: 2021-06-21

## 2018-05-12 RX ORDER — ASCORBIC ACID 500 MG
1000 TABLET ORAL DAILY
Status: DISCONTINUED | OUTPATIENT
Start: 2018-05-12 | End: 2018-05-16 | Stop reason: HOSPADM

## 2018-05-12 RX ORDER — FLUTICASONE PROPIONATE 50 MCG
2 SPRAY, SUSPENSION (ML) NASAL DAILY
Status: DISCONTINUED | OUTPATIENT
Start: 2018-05-12 | End: 2018-05-16 | Stop reason: HOSPADM

## 2018-05-12 RX ORDER — FUROSEMIDE 10 MG/ML
40 INJECTION INTRAMUSCULAR; INTRAVENOUS 2 TIMES DAILY
Status: DISCONTINUED | OUTPATIENT
Start: 2018-05-12 | End: 2018-05-13

## 2018-05-12 RX ORDER — LISINOPRIL 10 MG/1
10 TABLET ORAL DAILY
Status: DISCONTINUED | OUTPATIENT
Start: 2018-05-12 | End: 2018-05-16 | Stop reason: HOSPADM

## 2018-05-12 RX ORDER — REPAGLINIDE 1 MG/1
3 TABLET ORAL
Status: DISCONTINUED | OUTPATIENT
Start: 2018-05-13 | End: 2018-05-16 | Stop reason: HOSPADM

## 2018-05-12 RX ORDER — PRAVASTATIN SODIUM 20 MG
20 TABLET ORAL
Status: DISCONTINUED | OUTPATIENT
Start: 2018-05-12 | End: 2018-05-16 | Stop reason: HOSPADM

## 2018-05-12 RX ORDER — LEVALBUTEROL 1.25 MG/.5ML
1.25 SOLUTION, CONCENTRATE RESPIRATORY (INHALATION)
Status: DISCONTINUED | OUTPATIENT
Start: 2018-05-13 | End: 2018-05-16 | Stop reason: HOSPADM

## 2018-05-12 RX ADMIN — OXYCODONE HYDROCHLORIDE AND ACETAMINOPHEN 1000 MG: 500 TABLET ORAL at 14:31

## 2018-05-12 RX ADMIN — GABAPENTIN 100 MG: 100 CAPSULE ORAL at 21:59

## 2018-05-12 RX ADMIN — FLUTICASONE PROPIONATE 2 SPRAY: 50 SPRAY, METERED NASAL at 14:33

## 2018-05-12 RX ADMIN — INSULIN HUMAN 8 UNITS: 100 INJECTION, SOLUTION PARENTERAL at 22:02

## 2018-05-12 RX ADMIN — GABAPENTIN 100 MG: 100 CAPSULE ORAL at 16:43

## 2018-05-12 RX ADMIN — FUROSEMIDE 40 MG: 10 INJECTION, SOLUTION INTRAVENOUS at 16:43

## 2018-05-12 RX ADMIN — IPRATROPIUM BROMIDE 0.5 MG: 0.5 SOLUTION RESPIRATORY (INHALATION) at 08:30

## 2018-05-12 RX ADMIN — Medication 1 TABLET: at 14:31

## 2018-05-12 RX ADMIN — ALBUTEROL SULFATE 5 MG: 2.5 SOLUTION RESPIRATORY (INHALATION) at 08:31

## 2018-05-12 RX ADMIN — AMLODIPINE BESYLATE 5 MG: 5 TABLET ORAL at 14:31

## 2018-05-12 RX ADMIN — INSULIN LISPRO 4 UNITS: 100 INJECTION, SOLUTION INTRAVENOUS; SUBCUTANEOUS at 14:29

## 2018-05-12 RX ADMIN — POTASSIUM CHLORIDE 20 MEQ: 1500 TABLET, EXTENDED RELEASE ORAL at 14:31

## 2018-05-12 RX ADMIN — FUROSEMIDE 40 MG: 10 INJECTION, SOLUTION INTRAMUSCULAR; INTRAVENOUS at 10:59

## 2018-05-12 RX ADMIN — BUDESONIDE AND FORMOTEROL FUMARATE DIHYDRATE 2 PUFF: 80; 4.5 AEROSOL RESPIRATORY (INHALATION) at 18:41

## 2018-05-12 RX ADMIN — DOCUSATE SODIUM 100 MG: 100 CAPSULE, LIQUID FILLED ORAL at 14:30

## 2018-05-12 RX ADMIN — IPRATROPIUM BROMIDE 0.5 MG: 0.5 SOLUTION RESPIRATORY (INHALATION) at 14:13

## 2018-05-12 RX ADMIN — REPAGLINIDE 2 MG: 1 TABLET ORAL at 18:38

## 2018-05-12 RX ADMIN — LEVALBUTEROL HYDROCHLORIDE 1.25 MG: 1.25 SOLUTION, CONCENTRATE RESPIRATORY (INHALATION) at 14:12

## 2018-05-12 RX ADMIN — INSULIN LISPRO 6 UNITS: 100 INJECTION, SOLUTION INTRAVENOUS; SUBCUTANEOUS at 16:47

## 2018-05-12 RX ADMIN — METHYLPREDNISOLONE SODIUM SUCCINATE 125 MG: 125 INJECTION, POWDER, FOR SOLUTION INTRAMUSCULAR; INTRAVENOUS at 09:34

## 2018-05-12 RX ADMIN — IOHEXOL 85 ML: 350 INJECTION, SOLUTION INTRAVENOUS at 09:20

## 2018-05-12 RX ADMIN — LISINOPRIL 10 MG: 10 TABLET ORAL at 14:31

## 2018-05-12 RX ADMIN — PRAVASTATIN SODIUM 20 MG: 20 TABLET ORAL at 16:43

## 2018-05-12 NOTE — PLAN OF CARE
CARDIOVASCULAR - ADULT     Maintains optimal cardiac output and hemodynamic stability Progressing     Absence of cardiac dysrhythmias or at baseline rhythm Progressing        Nutrition/Hydration-ADULT     Nutrient/Hydration intake appropriate for improving, restoring or maintaining nutritional needs Progressing        Potential for Falls     Patient will remain free of falls Progressing        RESPIRATORY - ADULT     Achieves optimal ventilation and oxygenation Progressing

## 2018-05-12 NOTE — ASSESSMENT & PLAN NOTE
Acute Congestive heart failure exacerbation  Diminished air entry bilaterally, no wheezes or rales were heard  S1-S2 heard no murmurs rubs or gallops  JVD present  No evidence lower extremity edema  CTA chest on admission:    No evidence for acute pulmonary embolism  Increasing mild to moderate bilateral dependent pleural effusions  Chronic scarring, bronchiectasis and peribronchial thickening in the dependent aspects of both lower lobes   Superimposed reticular interstitial opacities which may reflect component of interstitial fluid and subsegmental atelectasis      Small subsegmental focus of consolidation seen in the right upper lobe adjacent to the posterior segmental bronchus   See above for further discussion   Recommend follow-up    Plan  Admit medical floor  Telemetry  Vital signs monitoring  Input/output  Control blood pressure  Cardiology consult  Daily weights  Lasix 40 mg b i d  IV  Echocardiogram

## 2018-05-12 NOTE — ASSESSMENT & PLAN NOTE
Blood glucose monitoring  Insulin sliding scale  Hold metformin, glipizide  Continue repaglinide  Continue home dose Levemir 18 units q h s

## 2018-05-12 NOTE — ASSESSMENT & PLAN NOTE
BP on admission-controlled, 124/55  Continue blood pressure monitoring  Continue meds  P r n  labetalol if systolic greater than 312

## 2018-05-12 NOTE — H&P
H&P- Monique Diez 1933, 80 y o  male MRN: [id number]    Unit/Bed#: [facility] Encounter: [id number]    Primary Care Provider: Junior Padilla MD   Date and time admitted to hospital: 5/12/2018  7:58 AM        Acute on chronic diastolic congestive heart failure (HCC)   Assessment & Plan    Acute Congestive heart failure exacerbation  Diminished air entry bilaterally, no wheezes or rales were heard  S1-S2 heard no murmurs rubs or gallops  JVD present  No evidence lower extremity edema  Labs reviewed:  ProBNP 5284, troponin 0 03, H&H 11 2/34 8, close to baseline  CTA chest on admission:    No evidence for acute pulmonary embolism  Increasing mild to moderate bilateral dependent pleural effusions  Chronic scarring, bronchiectasis and peribronchial thickening in the dependent aspects of both lower lobes   Superimposed reticular interstitial opacities which may reflect component of interstitial fluid and subsegmental atelectasis  Small subsegmental focus of consolidation seen in the right upper lobe adjacent to the posterior segmental bronchus   See above for further discussion   Recommend follow-up    Plan  Admit medical floor  Telemetry  Vital signs monitoring  Input/output  Control blood pressure  Cardiology consult  Daily weights  Lasix 40 mg b i d  IV  Echocardiogram          Type 2 diabetes mellitus, with long-term current use of insulin ([facility])   Assessment & Plan    Blood glucose monitoring  Insulin sliding scale  Hold metformin, glipizide  Continue repaglinide  Continue home dose Levemir 18 units q h s          Multiple nodules of lung   Assessment & Plan    Chronic  Stable  Follow-up pulmonology        Neuropathy, idiopathic   Assessment & Plan    Chronic neuropathic pain secondary to diabetes mellitus  Continue gabapentin        Hyperlipidemia   Assessment & Plan    Stable  Lipid panel  Continue simvastatin 10 mg at night        Essential hypertension   Assessment & Plan    BP on admission-controlled, 124/55  Continue blood pressure monitoring  Continue meds  P r n  labetalol if systolic greater than 532        COPD without exacerbation ([facility])   Assessment & Plan    No active wheezing chest, Diminished air entry bilaterally  Improved with albuterol inhaler as on admission, oxygen and prednisone    Plan  Pulse oximetry  Respiratory protocol  Atrovent Proventil nebulization as scheduled  Solu-Medrol 60mg q 6h  Pulmonary consult-bilateral pleural effusions,? Possible thoracocentesis   No antibiotics for now                    VTE Prophylaxis: Enoxaparin (Lovenox)  / sequential compression device   Code Status: full code  POLST: POLST form is not discussed and not completed at this time  Discussion with family:   Yes  Anticipated Length of Stay:  Patient will be admitted on an Inpatient basis with an anticipated length of stay of > 2 midnights  Justification for Hospital Stay:  Medical management of acute Congestive heart failure exacerbation  Total Time for Visit, including Counseling / Coordination of Care: 45 minutes  Greater than 50% of this total time spent on direct patient counseling and coordination of care  Chief Complaint:  Shortness of breath     History of Present Illness:    Oumar Seo is a 80 y o  male who presents with  past medical history of shortness of breath which started early this morning, unable to breathe, unable to walk with severe distress breathing  Patient reported sleeping on 2 pillows at night comfortably, able to ascend 13 stairs  Review of systems negative for chest pain, palpitations, fever, orthopnea, paroxysmal nocturnal dyspnea, productive cough, ill contacts, recent travel  Past medical history significant for hypertension, diabetes, hyperlipidemia, chronic obstructive pulmonary disease secondary to occupational exposure with residual pulmonary nodules  Review of Systems:    Review of Systems   Constitutional: Positive for activity change and fatigue   Negative for appetite change, chills and fever  HENT: Negative for sore throat and voice change  Eyes: Negative  Respiratory: Positive for shortness of breath  Negative for apnea, cough, choking, chest tightness, wheezing and stridor  Gastrointestinal: Negative  Endocrine: Negative  Genitourinary: Positive for frequency  Negative for decreased urine volume, difficulty urinating and dysuria  Musculoskeletal: Negative  Allergic/Immunologic: Negative  Neurological: Negative  Hematological: Negative  Psychiatric/Behavioral: Negative  Past Medical and Surgical History:     Past Medical History:   Diagnosis Date    COPD (chronic obstructive pulmonary disease) ([facility] )     Diabetes mellitus ([facility] )     Type 2       Past Surgical History:   Procedure Laterality Date    APPENDECTOMY      CHOLECYSTECTOMY      JOINT REPLACEMENT         Meds/Allergies:    Prior to Admission medications    Medication Sig Start Date End Date Taking?  Authorizing Provider   albuterol (2 5 mg/3 mL) 0 083 % nebulizer solution Inhale 1 each 2/13/17  Yes Historical Provider, MD   amLODIPine (NORVASC) 5 mg tablet Take 1 tablet by mouth daily 1/4/18  Yes Historical Provider, MD   Ascorbic Acid (VITAMIN C) 1000 MG tablet Take 1 tablet by mouth daily   Yes Historical Provider, MD   Cinnamon 500 MG TABS Take 2 tablets by mouth daily   Yes Historical Provider, MD   fluticasone (FLONASE) 50 mcg/act nasal spray 2 sprays into each nostril daily 3/20/18  Yes Maurice Dudley MD   fluticasone (FLOVENT HFA) 220 mcg/act inhaler Inhale 1 puff 2 (two) times a day 2/15/18  Yes Maurice Dudley MD   gabapentin (NEURONTIN) 100 mg capsule Take 2 capsules (200 mg total) by mouth daily 2/13/18  Yes Maurice Dudley MD   insulin detemir (LEVEMIR) 100 units/mL subcutaneous injection Inject 24 Units under the skin   6/16/14  Yes Historical Provider, MD   lisinopril (ZESTRIL) 10 mg tablet Take 1 tablet (10 mg total) by mouth daily 2/13/18  Yes Maurice Dudley MD   metFORMIN (GLUCOPHAGE) 1000 MG tablet Take 1 tablet by mouth 2 (two) times a day 12  Yes Historical Provider, MD   Multiple Vitamins-Minerals (OCUVITE ADULT 50+ PO) Take 1 tablet by mouth daily   Yes Historical Provider, MD   repaglinide (PRANDIN) 1 mg tablet  18  Yes Historical Provider, MD   simvastatin (ZOCOR) 10 mg tablet Take 1 tablet (10 mg total) by mouth daily 18  Yes Dayanna Allen MD   Umeclidinium-Vilanterol [facility] ELLIPTA) 62 5-25 MCG/INH AEPB Inhale 1 puff daily 5/15/17  Yes Historical Provider, MD   VENTOLIN  (90 Base) MCG/ACT inhaler  18  Yes Historical Provider, MD   glipiZIDE (GLUCOTROL XL) 10 mg 24 hr tablet Take 1 tablet by mouth 2 (two) times a day 11   Historical Provider, MD   NIFEdipine ER (ADALAT CC) 30 MG 24 hr tablet Take 1 tablet by mouth daily 12   Historical Provider, MD   predniSONE 10 mg tablet 4 tabs x 3 days, 2 tabs x 3 days, 1 tab x 3 days 18   Lisa Babcock PA-C   roflumilast 500 mcg TABS Take 1 tablet (500 mcg total) by mouth daily 3/9/18   Avis Guaman PA-C   sodium chloride 0 9 % nebulizer solution Take 3 mL by nebulization 2 (two) times a day 3/26/18   Lisa Babcock PA-C     I have reviewed home medications with patient personally      Allergies: No Known Allergies    Social History:     Marital Status: /Civil Union   Occupation: retired  Patient Pre-hospital Living Situation: lives with family  Patient Pre-hospital Level of Mobility:   Ambulates without assistance:  Patient Pre-hospital Diet Restrictions:   NonePatient Pre-hospital Diet Restrictions:  Substance Use History:   History   Alcohol Use    0 0 oz/week     Comment: SOcially      History   Smoking Status    Former Smoker    Packs/day:     Years: 10 00    Quit date:    Smokeless Tobacco    Never Used     History   Drug Use No       Family History:  Family history of diabetes, mother  at 80, father  at 67    Physical Exam:     Vitals: Blood Pressure: 124/58 (05/12/18 1147)  Pulse: 100 (05/12/18 1147)  Temperature: 97 7 °F (36 5 °C) (05/12/18 0811)  Temp Source: Oral (05/12/18 8477)  Respirations: 20 (05/12/18 1033)  Height: 6' 1" (185 4 cm) (05/12/18 5201)  Weight - Scale: 78 2 kg (172 lb 6 4 oz) (05/12/18 0811)  SpO2: 96 % (05/12/18 1147)    Physical Exam   Constitutional: He is oriented to person, place, and time  No distress  HENT:   Head: Normocephalic  Mouth/Throat: Oropharynx is clear and moist    Eyes: Conjunctivae and EOM are normal  Pupils are equal, round, and reactive to light  Neck: Normal range of motion  No JVD present  Cardiovascular: Normal rate and intact distal pulses  Exam reveals no gallop and no friction rub  No murmur heard  Pulmonary/Chest: Effort normal  No respiratory distress  He has no wheezes  He has no rales  Diminished breath sounds bilaterally   Abdominal: Soft  Bowel sounds are normal  He exhibits no distension  There is no tenderness  There is no rebound and no guarding  Musculoskeletal: Normal range of motion  He exhibits no edema, tenderness or deformity  Neurological: He is alert and oriented to person, place, and time  No cranial nerve deficit  Coordination normal    Skin: Skin is warm  No erythema  Psychiatric: He has a normal mood and affect  His behavior is normal            Additional Data:     Lab Results: I have personally reviewed pertinent reports  Results from last 7 days  Lab Units 05/12/18  0819   WBC Thousand/uL 8 72   HEMOGLOBIN g/dL 11 2*   HEMATOCRIT % 34 8*   PLATELETS Thousands/uL 192   LYMPHO PCT % 5*   MONO PCT MAN % 7   EOSINO PCT MANUAL % 0       Results from last 7 days  Lab Units 05/12/18  0819   SODIUM mmol/L 143   POTASSIUM mmol/L 4 6   CHLORIDE mmol/L 105   CO2 mmol/L 28   BUN mg/dL 16   CREATININE mg/dL 1 17   CALCIUM mg/dL 9 2   GLUCOSE RANDOM mg/dL 214*                   Imaging: I have personally reviewed pertinent reports        CTA ED chest PE study Final Result by Franki Heard MD (05/12 5659)      No evidence for acute pulmonary embolism  Increasing mild to moderate bilateral dependent pleural effusions  Chronic scarring, bronchiectasis and peribronchial thickening in the dependent aspects of both lower lobes  Superimposed reticular interstitial opacities which may reflect component of interstitial fluid and subsegmental atelectasis  Small subsegmental focus of consolidation seen in the right upper lobe adjacent to the posterior segmental bronchus  See above for further discussion  Recommend follow-up  Workstation performed: [id number]         XR chest 2 views   Final Result by Amol Jensen DO (05/12 0832)      Streaky left basilar atelectasis and possible trace posterior pleural effusions  No evolving infiltrates  Workstation performed: [id number]             EKG, Pathology, and Other Studies Reviewed on Admission:   · EKG: ordered    Allscripts / Epic Records Reviewed: Yes     ** Please Note: This note has been constructed using a voice recognition system   **

## 2018-05-12 NOTE — ED PROVIDER NOTES
History  Chief Complaint   Patient presents with    Shortness of Breath     States he has COPD and has been feeling more SOB over the past few days  Reports being on prednisone taper end of April        History provided by:  Patient   used: No    Shortness of Breath   Associated symptoms: no abdominal pain, no chest pain, no cough, no diaphoresis, no fever, no headaches, no neck pain, no rash, no sore throat, no vomiting and no wheezing      Patient is a 29-year-old male presenting to emergency department with shortness of breath  Chronic but worse the last few days  Mild cough  No bloody sputum  No chest pain  No nausea or vomiting  No fevers  No diaphoresis  No chills  No abdominal pain  No nausea vomiting diarrhea  No history of blood clots  MDM cardiac workup, given breathing treatment, re-evaluate, differential includes ACS, heart failure, PE, COPD exacerbation, pneumonia        Prior to Admission Medications   Prescriptions Last Dose Informant Patient Reported? Taking?    Ascorbic Acid (VITAMIN C) 1000 MG tablet 2018 at Unknown time Self Yes Yes   Sig: Take 1 tablet by mouth daily   Cinnamon 500 MG TABS 2018 at Unknown time Self Yes Yes   Sig: Take 2 tablets by mouth daily   Multiple Vitamins-Minerals (OCUVITE ADULT 50+ PO) 2018 at Unknown time Self Yes Yes   Sig: Take 1 tablet by mouth daily   Umeclidinium-Vilanterol (ANORO ELLIPTA) 62 5-25 MCG/INH AEPB 2018 at Unknown time Self Yes Yes   Sig: Inhale 1 puff daily   VENTOLIN  (90 Base) MCG/ACT inhaler  Self Yes Yes   albuterol (2 5 mg/3 mL) 0 083 % nebulizer solution  Self Yes Yes   Sig: Inhale 1 each   amLODIPine (NORVASC) 5 mg tablet 2018 at Unknown time Self Yes Yes   Sig: Take 1 tablet by mouth daily   fluticasone (FLONASE) 50 mcg/act nasal spray 2018 at Unknown time  No Yes   Si sprays into each nostril daily   fluticasone (FLOVENT HFA) 220 mcg/act inhaler 2018 at Unknown time Self No Yes   Sig: Inhale 1 puff 2 (two) times a day   gabapentin (NEURONTIN) 100 mg capsule 5/11/2018 at Unknown time Self No Yes   Sig: Take 2 capsules (200 mg total) by mouth daily   Patient taking differently: Take 200 mg by mouth daily at bedtime     insulin detemir (LEVEMIR) 100 units/mL subcutaneous injection 5/11/2018 at Unknown time Self Yes Yes   Sig: Inject 24 Units under the skin daily at bedtime     lisinopril (ZESTRIL) 10 mg tablet 5/11/2018 at Unknown time Self No Yes   Sig: Take 1 tablet (10 mg total) by mouth daily   metFORMIN (GLUCOPHAGE) 1000 MG tablet 5/11/2018 at Unknown time Self Yes Yes   Sig: Take 1 tablet by mouth 2 (two) times a day   repaglinide (PRANDIN) 1 mg tablet 5/11/2018 at Unknown time Self Yes Yes   Sig: Take 3 mg by mouth daily     repaglinide (PRANDIN) 1 mg tablet   Yes Yes   Sig: Take 1 mg by mouth daily before lunch   repaglinide (PRANDIN) 2 mg tablet   Yes Yes   Sig: Take 2 mg by mouth daily before dinner   roflumilast 500 mcg TABS 5/11/2018 at Unknown time  No Yes   Sig: Take 1 tablet (500 mcg total) by mouth daily   simvastatin (ZOCOR) 10 mg tablet 5/11/2018 at Unknown time  No Yes   Sig: Take 1 tablet (10 mg total) by mouth daily   sodium chloride 0 9 % nebulizer solution   No No   Sig: Take 3 mL by nebulization 2 (two) times a day      Facility-Administered Medications: None       Past Medical History:   Diagnosis Date    COPD [facility]chronic obstructive pulmonary disease) ([facility] [address] )     Diabetes mellitus ([address] )     Type 2    Hypertension        Past Surgical History:   Procedure Laterality Date    APPENDECTOMY      CHOLECYSTECTOMY      CHOLECYSTECTOMY      HERNIA REPAIR      JOINT REPLACEMENT Left     meniscus repair       History reviewed  No pertinent family history  I have reviewed and agree with the history as documented      Social History   Substance Use Topics    Smoking status: Former Smoker     Packs/day: 1 00     Years: 10 00     Quit date: 1960    Smokeless tobacco: Never Used    Alcohol use 0 0 oz/week      Comment: SOcially         Review of Systems   Constitutional: Negative for chills, diaphoresis and fever  HENT: Negative for congestion and sore throat  Respiratory: Positive for shortness of breath  Negative for cough, wheezing and stridor  Cardiovascular: Negative for chest pain, palpitations and leg swelling  Gastrointestinal: Negative for abdominal pain, blood in stool, diarrhea, nausea and vomiting  Genitourinary: Negative for dysuria, frequency and urgency  Musculoskeletal: Negative for neck pain and neck stiffness  Skin: Negative for pallor and rash  Neurological: Negative for dizziness, syncope, weakness, light-headedness and headaches  All other systems reviewed and are negative  Physical Exam  ED Triage Vitals   Temperature Pulse Respirations Blood Pressure SpO2   05/12/18 0811 05/12/18 0811 05/12/18 0811 05/12/18 0811 05/12/18 0815   97 7 °F (36 5 °C) (!) 111 20 166/96 96 %      Temp Source Heart Rate Source Patient Position - Orthostatic VS BP Location FiO2 (%)   05/12/18 0811 05/12/18 0811 05/12/18 0830 05/12/18 0811 --   Oral Monitor Lying Right arm       Pain Score       05/12/18 0811       No Pain           Orthostatic Vital Signs  Vitals:    05/12/18 1033 05/12/18 1045 05/12/18 1147 05/12/18 1220   BP: 155/71 155/71 124/58 114/57   Pulse: 96 94 100 101   Patient Position - Orthostatic VS: Lying  Lying Lying       Physical Exam   Constitutional: He is oriented to person, place, and time  He appears well-developed and well-nourished  No distress  HENT:   Head: Normocephalic and atraumatic  Eyes: EOM are normal    Neck: Normal range of motion  Neck supple  Cardiovascular:   Tachycardic and regular   Pulmonary/Chest:   Decreased air movement   Abdominal: Soft  He exhibits no distension  There is no tenderness  Musculoskeletal: Normal range of motion  He exhibits no edema, tenderness or deformity     Neurological: He is alert and oriented to person, place, and time  Skin: Skin is warm  Capillary refill takes less than 2 seconds  No rash noted  He is not diaphoretic  No erythema  No pallor         ED Medications  Medications    EMS REPLENISHMENT MED (not administered)   methylPREDNISolone sodium succinate (Solu-MEDROL) injection 125 mg (125 mg Intravenous Given 5/12/18 0934)   amLODIPine (NORVASC) tablet 5 mg (5 mg Oral Given 5/12/18 1431)   ascorbic acid (VITAMIN C) tablet 1,000 mg (1,000 mg Oral Given 5/12/18 1431)   fluticasone (FLONASE) 50 mcg/act nasal spray 2 spray (2 sprays Nasal Given 5/12/18 1433)   gabapentin (NEURONTIN) capsule 100 mg (not administered)   lisinopril (ZESTRIL) tablet 10 mg (10 mg Oral Given 5/12/18 1431)   multivitamin-minerals (CENTRUM) tablet 1 tablet (1 tablet Oral Given 5/12/18 1431)   roflumilast tablet 500 mcg (500 mcg Oral Not Given 5/12/18 1409)   pravastatin (PRAVACHOL) tablet 20 mg (not administered)   budesonide-formoterol (SYMBICORT) 80-4 5 MCG/ACT inhaler 2 puff (not administered)   repaglinide (PRANDIN) tablet 3 mg (not administered)   potassium chloride (K-DUR,KLOR-CON) CR tablet 20 mEq (20 mEq Oral Given 5/12/18 1431)   furosemide (LASIX) injection 40 mg (40 mg Intravenous Not Given 5/12/18 1432)   enoxaparin (LOVENOX) subcutaneous injection 40 mg (40 mg Subcutaneous Not Given 5/12/18 1432)   acetaminophen (TYLENOL) tablet 650 mg (not administered)   docusate sodium (COLACE) capsule 100 mg (100 mg Oral Given 5/12/18 1430)   insulin lispro (HumaLOG) 100 units/mL subcutaneous injection 1-6 Units (4 Units Subcutaneous Given 5/12/18 1429)   levalbuterol (XOPENEX) inhalation solution 1 25 mg (1 25 mg Nebulization Given 5/12/18 1412)     And   ipratropium (ATROVENT) 0 02 % inhalation solution 0 5 mg (0 5 mg Nebulization Given 5/12/18 1413)   repaglinide (PRANDIN) tablet 1 mg (1 mg Oral Not Given 5/12/18 1433)   repaglinide (PRANDIN) tablet 2 mg (not administered)   albuterol inhalation solution 5 mg (5 mg Nebulization Given 5/12/18 0831)   ipratropium (ATROVENT) 0 02 % inhalation solution 0 5 mg (0 5 mg Nebulization Given 5/12/18 0830)   iohexol (OMNIPAQUE) 350 MG/ML injection (MULTI-DOSE) 85 mL (85 mL Intravenous Given 5/12/18 0920)   furosemide (LASIX) injection 40 mg (40 mg Intravenous Given 5/12/18 1059)       Diagnostic Studies  Results Reviewed     Procedure Component Value Units Date/Time    B-type natriuretic peptide [[id number]]  (Abnormal) Collected:  05/12/18 0819    Lab Status:  Final result Specimen:  Blood from Arm, Left Updated:  05/12/18 0906     NT-proBNP 5,284 (H) pg/mL     CBC and differential [[id number]]  (Abnormal) Collected:  05/12/18 0819    Lab Status:  Final result Specimen:  Blood from Arm, Left Updated:  05/12/18 0902     WBC 8 72 Thousand/uL      RBC 3 62 (L) Million/uL      Hemoglobin 11 2 (L) g/dL      Hematocrit 34 8 (L) %      MCV 96 fL      MCH 30 9 pg      MCHC 32 2 g/dL      RDW 25 8 (H) %      MPV 11 0 fL      Platelets 992 Thousands/uL     Troponin I [[id number]]  (Normal) Collected:  05/12/18 0819    Lab Status:  Final result Specimen:  Blood from Arm, Left Updated:  05/12/18 0844     Troponin I 0 03 ng/mL     Narrative:         [employer] Chemistry analyzer 99% cutoff is > 0 04 ng/mL in network labs    o cTnI 99% cutoff is useful only when applied to patients in the clinical setting of myocardial ischemia  o cTnI 99% cutoff should be interpreted in the context of clinical history, ECG findings and possibly cardiac imaging to establish correct diagnosis  o cTnI 99% cutoff may be suggestive but clearly not indicative of a coronary event without the clinical setting of myocardial ischemia      Basic metabolic panel [[id number]]  (Abnormal) Collected:  05/12/18 0819    Lab Status:  Final result Specimen:  Blood from Arm, Left Updated:  05/12/18 0839     Sodium 143 mmol/L      Potassium 4 6 mmol/L      Chloride 105 mmol/L      CO2 28 mmol/L      Anion Gap 10 mmol/L      BUN 16 mg/dL Creatinine 1 17 mg/dL      Glucose 214 (H) mg/dL      Calcium 9 2 mg/dL      eGFR 57 ml/min/1 73sq m     Narrative:         National Kidney Disease Education Program recommendations are as follows:  GFR calculation is accurate only with a steady state creatinine  Chronic Kidney disease less than 60 ml/min/1 73 sq  meters  Kidney failure less than 15 ml/min/1 73 sq  meters  CTA ED chest PE study   Final Result by Dana Proctor MD (05/12 9694)      No evidence for acute pulmonary embolism  Increasing mild to moderate bilateral dependent pleural effusions  Chronic scarring, bronchiectasis and peribronchial thickening in the dependent aspects of both lower lobes  Superimposed reticular interstitial opacities which may reflect component of interstitial fluid and subsegmental atelectasis  Small subsegmental focus of consolidation seen in the right upper lobe adjacent to the posterior segmental bronchus  See above for further discussion  Recommend follow-up  Workstation performed: [id number]         XR chest 2 views   Final Result by Rema Stewart DO (05/12 9990)      Streaky left basilar atelectasis and possible trace posterior pleural effusions  No evolving infiltrates              Workstation performed: [id number]                    Procedures  Procedures       Phone Contacts  ED Phone Contact    ED Course  ED Course as of May 12 1505   Sat May 12, 2018   0149 ECG showsRate of 106, LBBB, sinus, white QRS, nonspecific EKG, LBBB is new but last EKG is from 5 years ago, independently by me                                MDM  CritCare Time    Disposition  Final diagnoses:   Heart failure ([address] )     Time reflects when diagnosis was documented in both MDM as applicable and the Disposition within this note     Time User Action Codes Description Comment    5/12/2018 10:20 AM Mable Brooks [I50 9] Heart failure ([address] )     5/12/2018 12:22 PM Winston Kapoor [J90] Bilateral pleural effusion       ED Disposition     ED Disposition Condition Comment    Admit  Case was discussed with medicine and the patient's admission status was agreed to be Admission Status: inpatient status to the service of Dr Thera Homans   Follow-up Information    None       Current Discharge Medication List      CONTINUE these medications which have NOT CHANGED    Details   albuterol (2 5 mg/3 mL) 0 083 % nebulizer solution Inhale 1 each      amLODIPine (NORVASC) 5 mg tablet Take 1 tablet by mouth daily      Ascorbic Acid (VITAMIN C) 1000 MG tablet Take 1 tablet by mouth daily      Cinnamon 500 MG TABS Take 2 tablets by mouth daily      fluticasone (FLONASE) 50 mcg/act nasal spray 2 sprays into each nostril daily  Qty: 16 g, Refills: 1    Associated Diagnoses:  Allergic rhinitis, unspecified chronicity, unspecified seasonality, unspecified trigger      fluticasone (FLOVENT HFA) 220 mcg/act inhaler Inhale 1 puff 2 (two) times a day  Qty: 1 Inhaler, Refills: 1    Associated Diagnoses: Chronic obstructive pulmonary disease with acute exacerbation (HCC)      gabapentin (NEURONTIN) 100 mg capsule Take 2 capsules (200 mg total) by mouth daily  Qty: 30 capsule, Refills: 2    Associated Diagnoses: Neuropathy, idiopathic      insulin detemir (LEVEMIR) 100 units/mL subcutaneous injection Inject 24 Units under the skin daily at bedtime        lisinopril (ZESTRIL) 10 mg tablet Take 1 tablet (10 mg total) by mouth daily  Qty: 30 tablet, Refills: 3    Associated Diagnoses: Essential hypertension      metFORMIN (GLUCOPHAGE) 1000 MG tablet Take 1 tablet by mouth 2 (two) times a day      Multiple Vitamins-Minerals (OCUVITE ADULT 50+ PO) Take 1 tablet by mouth daily      !! repaglinide (PRANDIN) 1 mg tablet Take 3 mg by mouth daily        !! repaglinide (PRANDIN) 1 mg tablet Take 1 mg by mouth daily before lunch      !! repaglinide (PRANDIN) 2 mg tablet Take 2 mg by mouth daily before dinner      roflumilast 500 mcg TABS Take 1 tablet (500 mcg total) by mouth daily  Qty: 30 tablet, Refills: 3    Associated Diagnoses: COPD with acute exacerbation (HCC)      simvastatin (ZOCOR) 10 mg tablet Take 1 tablet (10 mg total) by mouth daily  Qty: 90 tablet, Refills: 1    Associated Diagnoses: Familial hypercholesterolemia      Umeclidinium-Vilanterol (ANORO ELLIPTA) 62 5-25 MCG/INH AEPB Inhale 1 puff daily      VENTOLIN  (90 Base) MCG/ACT inhaler       sodium chloride 0 9 % nebulizer solution Take 3 mL by nebulization 2 (two) times a day  Qty: 180 mL, Refills: 0    Associated Diagnoses: Mixed simple and mucopurulent chronic bronchitis (HCC)       ! ! - Potential duplicate medications found  Please discuss with provider  No discharge procedures on file      ED Provider  Electronically Signed by           Dayanara Mauro MD  05/12/18 6742

## 2018-05-12 NOTE — ED NOTES
Provider issued admission orders - explained to patient and family he was being admitted for CHF - Denies further questions at this time   Pending SLIM eval for admit      Vesta Cohen RN  05/12/18 3318

## 2018-05-12 NOTE — ASSESSMENT & PLAN NOTE
No active wheezing chest, Diminished air entry bilaterally  Improved with albuterol inhaler as on admission, oxygen and prednisone    Plan  Pulse oximetry  Respiratory protocol  Atrovent Proventil nebulization as scheduled  Solu-Medrol 60mg q 6h  Pulmonary consult-bilateral pleural effusions,?   Possible thoracocentesis   No antibiotics for now

## 2018-05-13 ENCOUNTER — APPOINTMENT (INPATIENT)
Dept: NON INVASIVE DIAGNOSTICS | Facility: HOSPITAL | Age: 83
DRG: 287 | End: 2018-05-13
Payer: COMMERCIAL

## 2018-05-13 LAB
ALBUMIN SERPL BCP-MCNC: 3.4 G/DL (ref 3.5–5)
ALP SERPL-CCNC: 41 U/L (ref 46–116)
ALT SERPL W P-5'-P-CCNC: 31 U/L (ref 12–78)
ANION GAP SERPL CALCULATED.3IONS-SCNC: 7 MMOL/L (ref 4–13)
AST SERPL W P-5'-P-CCNC: 11 U/L (ref 5–45)
BASOPHILS # BLD AUTO: 0.01 THOUSANDS/ΜL (ref 0–0.1)
BASOPHILS NFR BLD AUTO: 0 % (ref 0–1)
BILIRUB SERPL-MCNC: 0.6 MG/DL (ref 0.2–1)
BUN SERPL-MCNC: 25 MG/DL (ref 5–25)
CALCIUM SERPL-MCNC: 8.6 MG/DL (ref 8.3–10.1)
CHLORIDE SERPL-SCNC: 102 MMOL/L (ref 100–108)
CHOLEST SERPL-MCNC: 96 MG/DL (ref 50–200)
CO2 SERPL-SCNC: 29 MMOL/L (ref 21–32)
CREAT SERPL-MCNC: 1.32 MG/DL (ref 0.6–1.3)
EOSINOPHIL # BLD AUTO: 0 THOUSAND/ΜL (ref 0–0.61)
EOSINOPHIL NFR BLD AUTO: 0 % (ref 0–6)
ERYTHROCYTE [DISTWIDTH] IN BLOOD BY AUTOMATED COUNT: 25.2 % (ref 11.6–15.1)
GFR SERPL CREATININE-BSD FRML MDRD: 49 ML/MIN/1.73SQ M
GLUCOSE SERPL-MCNC: 174 MG/DL (ref 65–140)
GLUCOSE SERPL-MCNC: 212 MG/DL (ref 65–140)
GLUCOSE SERPL-MCNC: 221 MG/DL (ref 65–140)
GLUCOSE SERPL-MCNC: 241 MG/DL (ref 65–140)
GLUCOSE SERPL-MCNC: 255 MG/DL (ref 65–140)
HCT VFR BLD AUTO: 30.1 % (ref 36.5–49.3)
HDLC SERPL-MCNC: 50 MG/DL (ref 40–60)
HGB BLD-MCNC: 9.9 G/DL (ref 12–17)
LDLC SERPL CALC-MCNC: 41 MG/DL (ref 0–100)
LYMPHOCYTES # BLD AUTO: 0.56 THOUSANDS/ΜL (ref 0.6–4.47)
LYMPHOCYTES NFR BLD AUTO: 8 % (ref 14–44)
MAGNESIUM SERPL-MCNC: 1.4 MG/DL (ref 1.6–2.6)
MCH RBC QN AUTO: 30.8 PG (ref 26.8–34.3)
MCHC RBC AUTO-ENTMCNC: 32.9 G/DL (ref 31.4–37.4)
MCV RBC AUTO: 94 FL (ref 82–98)
MONOCYTES # BLD AUTO: 1.21 THOUSAND/ΜL (ref 0.17–1.22)
MONOCYTES NFR BLD AUTO: 17 % (ref 4–12)
NEUTROPHILS # BLD AUTO: 5.51 THOUSANDS/ΜL (ref 1.85–7.62)
NEUTS SEG NFR BLD AUTO: 75 % (ref 43–75)
NONHDLC SERPL-MCNC: 46 MG/DL
PLATELET # BLD AUTO: 195 THOUSANDS/UL (ref 149–390)
PMV BLD AUTO: 12.2 FL (ref 8.9–12.7)
POTASSIUM SERPL-SCNC: 4 MMOL/L (ref 3.5–5.3)
PROT SERPL-MCNC: 5.9 G/DL (ref 6.4–8.2)
RBC # BLD AUTO: 3.21 MILLION/UL (ref 3.88–5.62)
SODIUM SERPL-SCNC: 138 MMOL/L (ref 136–145)
TRIGL SERPL-MCNC: 24 MG/DL
WBC # BLD AUTO: 7.29 THOUSAND/UL (ref 4.31–10.16)

## 2018-05-13 PROCEDURE — 94664 DEMO&/EVAL PT USE INHALER: CPT

## 2018-05-13 PROCEDURE — 93306 TTE W/DOPPLER COMPLETE: CPT | Performed by: INTERNAL MEDICINE

## 2018-05-13 PROCEDURE — 82948 REAGENT STRIP/BLOOD GLUCOSE: CPT

## 2018-05-13 PROCEDURE — 99232 SBSQ HOSP IP/OBS MODERATE 35: CPT | Performed by: HOSPITALIST

## 2018-05-13 PROCEDURE — G8988 SELF CARE GOAL STATUS: HCPCS

## 2018-05-13 PROCEDURE — G8989 SELF CARE D/C STATUS: HCPCS

## 2018-05-13 PROCEDURE — 80053 COMPREHEN METABOLIC PANEL: CPT | Performed by: HOSPITALIST

## 2018-05-13 PROCEDURE — 99222 1ST HOSP IP/OBS MODERATE 55: CPT | Performed by: INTERNAL MEDICINE

## 2018-05-13 PROCEDURE — 97162 PT EVAL MOD COMPLEX 30 MIN: CPT

## 2018-05-13 PROCEDURE — G8979 MOBILITY GOAL STATUS: HCPCS

## 2018-05-13 PROCEDURE — 97165 OT EVAL LOW COMPLEX 30 MIN: CPT

## 2018-05-13 PROCEDURE — G8978 MOBILITY CURRENT STATUS: HCPCS

## 2018-05-13 PROCEDURE — 93306 TTE W/DOPPLER COMPLETE: CPT

## 2018-05-13 PROCEDURE — G8980 MOBILITY D/C STATUS: HCPCS

## 2018-05-13 PROCEDURE — 94760 N-INVAS EAR/PLS OXIMETRY 1: CPT

## 2018-05-13 PROCEDURE — 85025 COMPLETE CBC W/AUTO DIFF WBC: CPT | Performed by: HOSPITALIST

## 2018-05-13 PROCEDURE — 94640 AIRWAY INHALATION TREATMENT: CPT

## 2018-05-13 PROCEDURE — G8987 SELF CARE CURRENT STATUS: HCPCS

## 2018-05-13 PROCEDURE — 83735 ASSAY OF MAGNESIUM: CPT | Performed by: HOSPITALIST

## 2018-05-13 PROCEDURE — 80061 LIPID PANEL: CPT | Performed by: HOSPITALIST

## 2018-05-13 RX ORDER — FUROSEMIDE 40 MG/1
40 TABLET ORAL DAILY
Status: DISCONTINUED | OUTPATIENT
Start: 2018-05-14 | End: 2018-05-16 | Stop reason: HOSPADM

## 2018-05-13 RX ORDER — FUROSEMIDE 40 MG/1
40 TABLET ORAL DAILY
Status: DISCONTINUED | OUTPATIENT
Start: 2018-05-13 | End: 2018-05-13

## 2018-05-13 RX ADMIN — GABAPENTIN 100 MG: 100 CAPSULE ORAL at 08:49

## 2018-05-13 RX ADMIN — REPAGLINIDE 3 MG: 1 TABLET ORAL at 06:17

## 2018-05-13 RX ADMIN — GABAPENTIN 100 MG: 100 CAPSULE ORAL at 17:21

## 2018-05-13 RX ADMIN — FLUTICASONE PROPIONATE 2 SPRAY: 50 SPRAY, METERED NASAL at 08:50

## 2018-05-13 RX ADMIN — IPRATROPIUM BROMIDE 0.5 MG: 0.5 SOLUTION RESPIRATORY (INHALATION) at 19:53

## 2018-05-13 RX ADMIN — DOCUSATE SODIUM 100 MG: 100 CAPSULE, LIQUID FILLED ORAL at 08:51

## 2018-05-13 RX ADMIN — Medication 400 MG: at 17:21

## 2018-05-13 RX ADMIN — BUDESONIDE AND FORMOTEROL FUMARATE DIHYDRATE 2 PUFF: 80; 4.5 AEROSOL RESPIRATORY (INHALATION) at 08:50

## 2018-05-13 RX ADMIN — REPAGLINIDE 2 MG: 1 TABLET ORAL at 18:09

## 2018-05-13 RX ADMIN — PRAVASTATIN SODIUM 20 MG: 20 TABLET ORAL at 17:21

## 2018-05-13 RX ADMIN — OXYCODONE HYDROCHLORIDE AND ACETAMINOPHEN 1000 MG: 500 TABLET ORAL at 08:49

## 2018-05-13 RX ADMIN — LEVALBUTEROL HYDROCHLORIDE 1.25 MG: 1.25 SOLUTION, CONCENTRATE RESPIRATORY (INHALATION) at 19:53

## 2018-05-13 RX ADMIN — BUDESONIDE AND FORMOTEROL FUMARATE DIHYDRATE 2 PUFF: 80; 4.5 AEROSOL RESPIRATORY (INHALATION) at 17:22

## 2018-05-13 RX ADMIN — REPAGLINIDE 1 MG: 1 TABLET ORAL at 11:55

## 2018-05-13 RX ADMIN — ENOXAPARIN SODIUM 40 MG: 40 INJECTION SUBCUTANEOUS at 08:49

## 2018-05-13 RX ADMIN — POTASSIUM CHLORIDE 20 MEQ: 1500 TABLET, EXTENDED RELEASE ORAL at 08:49

## 2018-05-13 RX ADMIN — GABAPENTIN 100 MG: 100 CAPSULE ORAL at 21:49

## 2018-05-13 RX ADMIN — IPRATROPIUM BROMIDE 0.5 MG: 0.5 SOLUTION RESPIRATORY (INHALATION) at 13:06

## 2018-05-13 RX ADMIN — Medication 1 TABLET: at 08:49

## 2018-05-13 RX ADMIN — Medication 400 MG: at 09:07

## 2018-05-13 RX ADMIN — LEVALBUTEROL HYDROCHLORIDE 1.25 MG: 1.25 SOLUTION, CONCENTRATE RESPIRATORY (INHALATION) at 13:06

## 2018-05-13 RX ADMIN — DOCUSATE SODIUM 100 MG: 100 CAPSULE, LIQUID FILLED ORAL at 17:21

## 2018-05-13 NOTE — ASSESSMENT & PLAN NOTE
No active wheezing chest, Diminished air entry bilaterally  Improved with albuterol inhaler as on admission, oxygen and prednisone    Plan  Pulse oximetry  Respiratory protocol  Atrovent Proventil nebulization as scheduled  Symbicort  No antibiotics for now

## 2018-05-13 NOTE — CONSULTS
Consultation - Cardiology   Amira Morataya 80 y o  male MRN: [id number]  Unit/Bed#: -01 Encounter: [id number]  05/13/18  10:49 AM      Assessment:  Principal Problem:    Acute on chronic diastolic congestive heart failure ([facility] )  Active Problems:    COPD without exacerbation ([facility] )    Essential hypertension    Hyperlipidemia    Neuropathy, idiopathic    Multiple nodules of lung    Type 2 diabetes mellitus, with long-term current use of insulin [facility])    Chief Complaint   Patient presents with    Shortness of Breath     States he has COPD and has been feeling more SOB over the past few days  Reports being on prednisone taper end of April      Admitting diagnosis:  SOB (shortness of breath) [R06 02]  Heart failure ([facility] ) [I50 9]    Plan:    Acute congestive heart failure:  New diagnosis, check echocardiogram to evaluate LV systolic function  Okay to change Lasix to till-40 mg daily, ambulate patient with pulse oximetry today  He did have some recent flu-like symptoms  Hypertension:  Controlled    Dyslipidemia:  Continue statin    Left bundle branch block:  New since 2013, outpatient nuclear stress test will suffice, await ECHO    ADDENDUM: [facility] shows a cardiomyopathy  Will start low dose Metoprolol in place of Amlodipine  Progressive uptitration of beta blocker and ACEi as tolerated and if renal function remains stable  Ischemic evaluation with coronary angiography when renal function stabilizes  History of Present Illness   Physician Requesting Consult: Anna Pierre MD   Reason for Consult / Principal Problem:  Congestive heart failure  HPI: Amira Morataya is a 80y o  year old without any prior cardiac history  Denies any CAD, mi, stents, bypass, valvular heart disease or arrhythmias  He does have a left bundle branch block but does not recall being told  Has not had any cardiac testing except for ECGs the last 1 I could find was from 2013      He has a history of hypertension, diabetes, dyslipidemia, COPD which he attributes to occupational exposure, were 10 pack years of smoking, since February has had on and off symptoms of bronchitis, most recently treated with steroids for about 10 days-last dose on May 5th-1 week ago  Even after that, he felt reasonably okay till 2 days ago when he started feeling more short of breath  Does admit to slightly more salt intake  Has had 2 restaurant meals-sausagee, soup  He also regularly has lunch with his wife who is at an assisted living facility  He is not on diuretic at baseline  He was admitted with symptoms of shortness of breath, no orthopnea, no edema, CT showing bilateral pleural effusions that have increased from the prior, proBNP elevated at 6000  Has been given a few dose of IV Lasix with diuresis of almost 2 L with subjective resolution of symptoms  On exam, there is no evidence of volume overload    Inpatient consult to Cardiology  Consult performed by: Brynn Tristan ordered by: Shashi Medrano          Review of Systems:  feels well  Review of Systems   Constitutional: Negative  HENT: Negative  Eyes: Negative  Respiratory: Positive for cough and shortness of breath  Negative for apnea, choking, chest tightness, wheezing and stridor  Cardiovascular: Negative  Gastrointestinal: Negative  Endocrine: Negative  Genitourinary: Negative  Musculoskeletal: Negative  Allergic/Immunologic: Negative  Neurological: Negative  Hematological: Negative  Psychiatric/Behavioral: Negative          Historical Information   Past Medical History:   Diagnosis Date    COPD (chronic obstructive pulmonary disease) ([facility] )     Diabetes mellitus ([facility] )     Type 2    Hypertension      Past Surgical History:   Procedure Laterality Date    APPENDECTOMY      CHOLECYSTECTOMY      CHOLECYSTECTOMY      HERNIA REPAIR      JOINT REPLACEMENT Left     meniscus repair     History   Alcohol Use    0 0 oz/week     Comment: SOcially      History   Drug Use No     History   Smoking Status    Former Smoker    Packs/day: 1 00    Years: 10 00    Quit date: 1960   Smokeless Tobacco    Never Used     Family History: History reviewed  No pertinent family history    No family history of premature CAD or Sudden Cardiac Death    Meds/Allergies     Current Facility-Administered Medications:      EMS REPLENISHMENT MED, , Does not apply, Once, Pasquale Nickerson MD    acetaminophen (TYLENOL) tablet 650 mg, 650 mg, Oral, Q4H PRN, Tracy Lugo MD    amLODIPine (NORVASC) tablet 5 mg, 5 mg, Oral, Daily, Tracy Lugo MD, Stopped at 05/13/18 0849    ascorbic acid (VITAMIN C) tablet 1,000 mg, 1,000 mg, Oral, Daily, Tracy Lugo MD, 1,000 mg at 05/13/18 0849    budesonide-formoterol (SYMBICORT) 80-4 5 MCG/ACT inhaler 2 puff, 2 puff, Inhalation, BID, Tracy Lugo MD, 2 puff at 05/13/18 0850    docusate sodium (COLACE) capsule 100 mg, 100 mg, Oral, BID, Tracy Lugo MD, 100 mg at 05/13/18 0851    enoxaparin (LOVENOX) subcutaneous injection 40 mg, 40 mg, Subcutaneous, Daily, Tracy Lugo MD, 40 mg at 05/13/18 0849    fluticasone (FLONASE) 50 mcg/act nasal spray 2 spray, 2 spray, Nasal, Daily, Tracy Lugo MD, 2 spray at 05/13/18 0850    furosemide (LASIX) injection 40 mg, 40 mg, Intravenous, BID, Tracy Lugo MD, Stopped at 05/13/18 0851    gabapentin (NEURONTIN) capsule 100 mg, 100 mg, Oral, TID, Tracy Lugo MD, 100 mg at 05/13/18 0849    insulin lispro (HumaLOG) 100 units/mL subcutaneous injection 1-6 Units, 1-6 Units, Subcutaneous, 4x Daily (AC & HS), 1 Units at 05/13/18 0852 **AND** Fingerstick Glucose (POCT), , , 4x Daily AC and at bedtime, Benedict Vazquez PA-C    [facility]) inhalation solution 1 25 mg, 1 25 mg, Nebulization, TID **AND** ipratropium (ATROVENT) 0 02 % inhalation solution 0 5 mg, 0 5 mg, Nebulization, TID, Tracy Lugo MD    lisinopril (ZESTRIL) tablet 10 mg, 10 mg, Oral, Daily, Jasmin Oswald MD, Stopped at 05/13/18 0849    magnesium oxide (MAG-OX) tablet 400 mg, 400 mg, Oral, BID, Jasmin Oswald MD, 400 mg at 05/13/18 0907    multivitamin-minerals (CENTRUM) tablet 1 tablet, 1 tablet, Oral, Daily, Jasmin Oswald MD, 1 tablet at 05/13/18 0849    potassium chloride (K-DUR,KLOR-CON) CR tablet 20 mEq, 20 mEq, Oral, Daily, Jasmin Oswald MD, 20 mEq at 05/13/18 0849    pravastatin (PRAVACHOL) tablet 20 mg, 20 mg, Oral, Daily With Louisa Gillette MD, 20 mg at 05/12/18 1643    repaglinide (PRANDIN) tablet 1 mg, 1 mg, Oral, Daily Before Lunch, Jasmin Oswald MD    repaglinide (PRANDIN) tablet 2 mg, 2 mg, Oral, Before Louisa Gillette MD, 2 mg at 05/12/18 1838    repaglinide (PRANDIN) tablet 3 mg, 3 mg, Oral, Daily Before Breakfast, Jasmin Oswald MD, 3 mg at 05/13/18 0617    roflumilast tablet 500 mcg, 500 mcg, Oral, Daily, Jasmin Oswald MD, 500 mcg at 05/12/18 1839  No Known Allergies    Objective   Vitals: Blood pressure 100/58, pulse 74, temperature 98 6 °F (37 °C), temperature source Oral, resp  rate 16, height 6' 1" (1 854 m), weight 77 4 kg (170 lb 10 2 oz), SpO2 96 %  , Body mass index is 22 51 kg/m² , Orthostatic Blood Pressures      Most Recent Value   Blood Pressure  100/58 filed at 05/13/2018 1451   Patient Position - Orthostatic VS  Lying filed at 05/13/2018 0704            Intake/Output Summary (Last 24 hours) at 05/13/18 1049  Last data filed at 05/12/18 2306   Gross per 24 hour   Intake              720 ml   Output             2975 ml   Net            -2255 ml       Weight (last 2 days)     Date/Time   Weight    05/13/18 0600  77 4 (170 64)    05/12/18 1220  78 6 (173 28)    05/12/18 0811  78 2 (172 4)              Invasive Devices     Peripheral Intravenous Line            Peripheral IV 05/12/18 Left Antecubital 1 day                    Physical Exam:  GEN: Alessandra Deleon appears well, alert and oriented x 3, pleasant and cooperative   HEENT: pupils equal, round, and reactive to light; extraocular muscles intact  NECK: supple, no carotid bruits or JVD  HEART/Cardiovascular: regular rhythm, normal S1 and S2, no murmurs, clicks, gallops or rubs   LUNGS/Respiratory: clear to auscultation bilaterally; no wheezes, rales, or rhonchi   ABDOMEN/GI: normal bowel sounds, soft, no tenderness, no distention  EXTREMITIES/Musculoskeletal: peripheral pulses normal; no clubbing, cyanosis, or edema  NEURO: no focal findings   SKIN: normal without suspicious lesions on exposed skin    Lab Results:   Admission on 05/12/2018   Component Date Value    WBC 05/12/2018 8 72     RBC 05/12/2018 3 62*    Hemoglobin 05/12/2018 11 2*    Hematocrit 05/12/2018 34 8*    MCV 05/12/2018 96     MCH 05/12/2018 30 9     MCHC 05/12/2018 32 2     RDW 05/12/2018 25 8*    MPV 05/12/2018 11 0     Platelets 72/07/0984 192     Sodium 05/12/2018 143     Potassium 05/12/2018 4 6     Chloride 05/12/2018 105     CO2 05/12/2018 28     Anion Gap 05/12/2018 10     BUN 05/12/2018 16     Creatinine 05/12/2018 1 17     Glucose 05/12/2018 214*    Calcium 05/12/2018 9 2     eGFR 05/12/2018 57     Troponin I 05/12/2018 0 03     NT-proBNP 05/12/2018 5284*    Segmented % 05/12/2018 88*    Lymphocytes % 05/12/2018 5*    Monocytes % 05/12/2018 7     Eosinophils % 05/12/2018 0     Basophils % 05/12/2018 0     Absolute Neutrophils 05/12/2018 7 67*    Lymphocytes Absolute 05/12/2018 0 44*    Monocytes Absolute 05/12/2018 0 61     Eosinophils Absolute 05/12/2018 0 00     Basophils Absolute 05/12/2018 0 00     Total Counted 05/12/2018 100     Anisocytosis 05/12/2018 Present     Poikilocytes 05/12/2018 Present     Polychromasia 05/12/2018 Present     Tear Drop Cells 05/12/2018 Present     Platelet Estimate 61/55/8390 Adequate     POC Glucose 05/12/2018 278*    Ventricular Rate 05/12/2018 106     Atrial Rate 05/12/2018 117     QRSD Interval 05/12/2018 136     QT Interval 05/12/2018 354  QTC Interval 05/12/2018 470     P Axis 05/12/2018 72     QRS Axis 05/12/2018 73     T Wave Axis 05/12/2018 150     POC Glucose 05/12/2018 396*    Sodium 05/13/2018 138     Potassium 05/13/2018 4 0     Chloride 05/13/2018 102     CO2 05/13/2018 29     Anion Gap 05/13/2018 7     BUN 05/13/2018 25     Creatinine 05/13/2018 1 32*    Glucose 05/13/2018 255*    Calcium 05/13/2018 8 6     AST 05/13/2018 11     ALT 05/13/2018 31     Alkaline Phosphatase 05/13/2018 41*    Total Protein 05/13/2018 5 9*    Albumin 05/13/2018 3 4*    Total Bilirubin 05/13/2018 0 60     eGFR 05/13/2018 49     Magnesium 05/13/2018 1 4*    POC Glucose 05/12/2018 446*    WBC 05/13/2018 7 29     RBC 05/13/2018 3 21*    Hemoglobin 05/13/2018 9 9*    Hematocrit 05/13/2018 30 1*    MCV 05/13/2018 94     MCH 05/13/2018 30 8     MCHC 05/13/2018 32 9     RDW 05/13/2018 25 2*    MPV 05/13/2018 12 2     Platelets 69/72/6849 195     Neutrophils Relative 05/13/2018 75     Lymphocytes Relative 05/13/2018 8*    Monocytes Relative 05/13/2018 17*    Eosinophils Relative 05/13/2018 0     Basophils Relative 05/13/2018 0     Neutrophils Absolute 05/13/2018 5 51     Lymphocytes Absolute 05/13/2018 0 56*    Monocytes Absolute 05/13/2018 1 21     Eosinophils Absolute 05/13/2018 0 00     Basophils Absolute 05/13/2018 0 01     Cholesterol 05/13/2018 96     Triglycerides 05/13/2018 24     HDL, Direct 05/13/2018 50     LDL Calculated 05/13/2018 41     Non-HDL-Chol (CHOL-HDL) 05/13/2018 46     POC Glucose 05/13/2018 174*         Imaging: I have personally reviewed pertinent reports  EKG:  No events    Counseling / Coordination of Care  Total floor / unit time spent today 45 minutes  Greater than 50% of total time was spent with the patient and / or family counseling and / or coordination of care         We will continue to follow with the patient throughout their hospitalization   Thank you for allowing us to participate in their care     Evonne Albert MD

## 2018-05-13 NOTE — ASSESSMENT & PLAN NOTE
BP on admission-controlled  Continue blood pressure monitoring  Continue meds  P r n  labetalol if systolic greater than 308

## 2018-05-13 NOTE — PROGRESS NOTES
Progress Note - Juan Askew 1933, 80 y o  male MRN: [id number]    Unit/Bed#: -01 Encounter: [id number]    Primary Care Provider: Michael Tan MD   Date and time admitted to hospital: 5/12/2018  7:58 AM        * Acute on chronic diastolic congestive heart failure ([facility] )   Assessment & Plan    Acute Congestive heart failure exacerbation  Improvement in air entry bilaterally in chest  S1-S2 heard no murmurs rubs or gallops  JVD present-improved  No evidence lower extremity edema  CTA chest on admission:    No evidence for acute pulmonary embolism  Increasing mild to moderate bilateral dependent pleural effusions  Chronic scarring, bronchiectasis and peribronchial thickening in the dependent aspects of both lower lobes   Superimposed reticular interstitial opacities which may reflect component of interstitial fluid and subsegmental atelectasis  Small subsegmental focus of consolidation seen in the right upper lobe adjacent to the posterior segmental bronchus   See above for further discussion   Recommend follow-up    Plan  Admit medical floor  Telemetry  Vital signs monitoring  Input/output  Control blood pressure  Cardiology consult  Daily weights  Lasix 40 mg daily, ambulatory pulse oximetry  Echocardiogram-pending          Heart failure ([facility] )   Assessment & Plan    See above        Type 2 diabetes mellitus, with long-term current use of insulin (HCC)   Assessment & Plan    Blood glucose monitoring  Insulin sliding scale  Hold metformin, glipizide  Continue repaglinide  Continue home dose Levemir 18 units q h s          Multiple nodules of lung   Assessment & Plan    Chronic  Stable  Follow-up pulmonology        Neuropathy, idiopathic   Assessment & Plan    Chronic neuropathic pain secondary to diabetes mellitus  Continue gabapentin        Hyperlipidemia   Assessment & Plan    Stable  Lipid panel  Continue simvastatin 10 mg at night        Essential hypertension   Assessment & Plan    BP on admission-controlled  Continue blood pressure monitoring  Continue meds  P r n  labetalol if systolic greater than 433        COPD without exacerbation (HCC)   Assessment & Plan    No active wheezing chest, Diminished air entry bilaterally  Improved with albuterol inhaler as on admission, oxygen and prednisone    Plan  Pulse oximetry  Respiratory protocol  Atrovent Proventil nebulization as scheduled  Symbicort  No antibiotics for now              VTE Pharmacologic Prophylaxis:   Pharmacologic: Enoxaparin (Lovenox)  Mechanical VTE Prophylaxis in Place: Yes    Patient Centered Rounds: I have performed bedside rounds with nursing staff today  Discussions with Specialists or Other Care Team Provider:  Yes    Education and Discussions with Family / Patient:  Yes    Time Spent for Care: 45 minutes  More than 50% of total time spent on counseling and coordination of care as described above  Current Length of Stay: 1 day(s)    Current Patient Status: Inpatient   Certification Statement: The patient will continue to require additional inpatient hospital stay due to Medical management of Congestive heart failure exacerbation    Discharge Plan:  Home after echocardiogram tomorrow  Code Status: Level 1 - Full Code      Subjective:   Improvement in breathing  Review of systems negative otherwise    Objective:     Vitals:   Temp (24hrs), Av 3 °F (36 8 °C), Min:97 6 °F (36 4 °C), Max:98 8 °F (37 1 °C)    HR:  [] 74  Resp:  [16-20] 16  BP: ()/(54-66) 100/58  SpO2:  [95 %-97 %] 96 %  Body mass index is 22 51 kg/m²  Input and Output Summary (last 24 hours): Intake/Output Summary (Last 24 hours) at 18 1224  Last data filed at 18 2306   Gross per 24 hour   Intake              720 ml   Output             2975 ml   Net            -2255 ml       Physical Exam:     Physical Exam   Constitutional: He is oriented to person, place, and time  No distress     HENT:   Head: Normocephalic and atraumatic  Mouth/Throat: Oropharynx is clear and moist    Eyes: EOM are normal  Pupils are equal, round, and reactive to light  Neck: Normal range of motion  JVD present  Cardiovascular: Normal rate and normal heart sounds  Exam reveals no gallop and no friction rub  No murmur heard  Pulmonary/Chest: Effort normal and breath sounds normal  No respiratory distress  He has no wheezes  He has no rales  Abdominal: Soft  Bowel sounds are normal  He exhibits no distension  There is no tenderness  There is no rebound and no guarding  Musculoskeletal: Normal range of motion  He exhibits no edema, tenderness or deformity  Neurological: He is alert and oriented to person, place, and time  No cranial nerve deficit  Skin: Skin is warm  No erythema  Psychiatric: He has a normal mood and affect  His behavior is normal          Additional Data:     Labs:      Results from last 7 days  Lab Units 05/13/18  0530   WBC Thousand/uL 7 29   HEMOGLOBIN g/dL 9 9*   HEMATOCRIT % 30 1*   PLATELETS Thousands/uL 195   NEUTROS PCT % 75   LYMPHS PCT % 8*   MONOS PCT % 17*   EOS PCT % 0       Results from last 7 days  Lab Units 05/13/18  0101   SODIUM mmol/L 138   POTASSIUM mmol/L 4 0   CHLORIDE mmol/L 102   CO2 mmol/L 29   BUN mg/dL 25   CREATININE mg/dL 1 32*   CALCIUM mg/dL 8 6   TOTAL PROTEIN g/dL 5 9*   BILIRUBIN TOTAL mg/dL 0 60   ALK PHOS U/L 41*   ALT U/L 31   AST U/L 11   GLUCOSE RANDOM mg/dL 255*           Results from last 7 days  Lab Units 05/13/18  1058 05/13/18  0731 05/12/18  2059 05/12/18  1617 05/12/18  1311   POC GLUCOSE mg/dl 221* 174* 446* 396* 278*             * I Have Reviewed All Lab Data Listed Above  * Additional Pertinent Lab Tests Reviewed:  Josh 66 Admission Reviewed    Imaging:    Imaging Reports Reviewed Today  Recent Cultures (last 7 days):           Last 24 Hours Medication List:     Current Facility-Administered Medications:  EMS replenish medication  Does not apply Once David Decker MD   acetaminophen 650 mg Oral Q4H PRN Jonathan Love MD   amLODIPine 5 mg Oral Daily Jonathan Love MD   vitamin C 1,000 mg Oral Daily Jonathan Love MD   budesonide-formoterol 2 puff Inhalation BID Jonathan Love MD   docusate sodium 100 mg Oral BID Jonathan Love MD   enoxaparin 40 mg Subcutaneous Daily Jonathan Love MD   fluticasone 2 spray Nasal Daily Jonathan Love MD   [START ON 5/14/2018] furosemide 40 mg Oral Daily New Rendon MD   gabapentin 100 mg Oral TID Jonathan Love MD   insulin lispro 1-6 Units Subcutaneous 4x Daily (AC & HS) Cristian Lassiter PA-C   levalbuterol 1 25 mg Nebulization TID Jonathan Love MD   And       ipratropium 0 5 mg Nebulization TID Jonathan Love MD   lisinopril 10 mg Oral Daily Jonathan Love MD   magnesium oxide 400 mg Oral BID Jonathan Love MD   multivitamin-minerals 1 tablet Oral Daily Jonathan Love MD   potassium chloride 20 mEq Oral Daily Jonathan Love MD   pravastatin 20 mg Oral Daily With Pavithra Marsh MD   repaglinide 1 mg Oral Daily Before Lunch Jonathan Love MD   repaglinide 2 mg Oral Before Pavithra Marsh MD   repaglinide 3 mg Oral Daily Before Breakfast Jonathan Love MD   roflumilast 500 mcg Oral Daily Jonathan Love MD        Today, Patient Was Seen By: Jonathan Love MD    ** Please Note: Dictation voice to text software may have been used in the creation of this document   **

## 2018-05-13 NOTE — OCCUPATIONAL THERAPY NOTE
[address] Evaluation     Patient Name: Erendira Gonzalez  MXFGE'O Date: 5/13/2018  Problem List  Patient Active Problem List   Diagnosis    COPD without exacerbation ([facility] )    Essential hypertension    Hyperlipidemia    Neuropathy, idiopathic    Multiple nodules of lung    Mixed simple and mucopurulent chronic bronchitis (HCC)    Acute on chronic diastolic congestive heart failure (HCC)    Type 2 diabetes mellitus, with long-term current use of insulin (HCC)     Past Medical History  Past Medical History:   Diagnosis Date    COPD (chronic obstructive pulmonary disease) ([facility] )     Diabetes mellitus ([facility] )     Type 2    Hypertension      Past Surgical History  Past Surgical History:   Procedure Laterality Date    APPENDECTOMY      CHOLECYSTECTOMY      CHOLECYSTECTOMY      HERNIA REPAIR      JOINT REPLACEMENT Left     meniscus repair      05/13/18 1017   Note Type   Note type Eval only   Restrictions/Precautions   Weight Bearing Precautions Per Order No   Other Precautions Telemetry   Pain Assessment   Pain Assessment No/denies pain   Home Living   Type of Home House   Home Layout Two level;Bed/bath upstairs; Other (Comment)  (3 KITA)   Bathroom Shower/Tub Walk-in shower   Bathroom Toilet Standard   Bathroom Equipment Other (Comment)  (no DME or grab bars)   Bathroom Accessibility Other (Comment)  (up flght of stairs)   [address] [facility]; Other (Comment)  (pt did not use; pt reports wife has lots of canes)   Additional Comments Pt reports living in [facility] w/ 3 KITA w/ son  Pt reports that his wife is in assisted living because she can't manage stairs   Pt added that he is thinking about adding full bath on first floor so that wife can come home   Prior Function   Level of [place] Independent with ADLs and functional mobility   Lives With Son;Other (Comment)  (pt reports wife in assisted living because of stairs)   Receives Help From 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   Pt added that his last project was [employer] in 2001   Intrinsic Gratification Pt reports enjoying active lifestyle   ADL   Eating Assistance 7  [address] Unable to assess   LB Bathing Assistance Unable to assess   UB Dressing Assistance 7  Independent    [address]  7  Independent   Bed Mobility   Supine to Sit Unable to assess   Sit to Supine Unable to assess   Additional Comments Pt seated OOB in chair upon arrival and on stairs in office w/ PT, [place] post eval   Transfers   Sit to Stand 7  Independent   Stand to Sit 7  Independent   Functional Mobility   Functional Mobility 7  Independent   Additional items (no AD)   Balance   Static Sitting Normal   Dynamic Sitting Good   Static Standing Good   Activity Tolerance   Activity Tolerance Patient tolerated treatment well   Medical Staff Made Aware spoke to Rina RICE 39 spoke to RNMary   RUE Assessment   RUE Assessment WFL   RUE Strength   RUE Overall Strength Within Functional Limits - able to perform ADL tasks with strength   LUE Assessment   LUE Assessment WFL   LUE Strength   LUE Overall Strength Within Functional Limits - able to perform ADL tasks with strength   Hand Function   Fine Motor Coordination Functional   Sensation   Light Touch Partial deficits in the LLE;Partial deficits in the RLE   Sharp/Dull Not tested   Vision-Basic Assessment   Current Vision (glasses, but not all the time)   Cognition   Overall Cognitive Status [facility]   Arousal/Participation Alert; Cooperative   Attention Within functional limits   Orientation Level Oriented X4   Memory Within functional limits   Following Commands Follows all commands and directions without difficulty  (hard of hearing)   Comments Identified pt by full name and birthdate  Pt able to provide social history and participate in conversation w/ increased time and good eye contact due to hard of hearing   Assessment   Assessment Pt is an 79yo male [facility] on 5/12/2018  Pt presents w/ acute on chronic diastolic congestive heart failure and significant PMH impacting his occupational performance including DM, COPD  Pt reports living w / son in [facility] w/ 3 KITA PTA  Pt reports I w/ ADL/ IADL including driving  Pt added that his wife is at assisted living because she can't manage stairs at home  Pt reports that he is considering adding full bath on first floor so she can some home  Upon evaluation, pt completed ADL and functional mobility independently  Pt demonstrated B UE AROM and strength WFL to complete ADL  From an OT perspective, pt can return home to [facility] when medically stable for discharge from acute care  No OT needs at this time   D/C OT   Goals   Patient Goals none stated   Recommendation   OT Discharge Recommendation Home independent   Barthel Index   Feeding 10   Bathing 5   Grooming Score 5   Dressing Score 10   Bladder Score 10   Bowels Score 10   Toilet Use Score 10   Transfers (Bed/Chair) Score 15   Mobility (Level Surface) Score 15   Stairs Score 10   Barthel Index Score 100   Modified Stefania Scale   Modified Stefania Scale 1   Pamela Rodriguez, OT

## 2018-05-13 NOTE — PROGRESS NOTES
Patient ambulated at a decent pace lasting approximately one minute and spanned about 275 feet  Patients pulse at rest before ambulation:  95    02:  97    Patients pulse during exercise:  100    02:  95    Patient did not complain of any shortness of breath or exhaustion  Showed no S/S of distress

## 2018-05-13 NOTE — PHYSICAL THERAPY NOTE
PHYSICAL THERAPY EVALUATION  NAME:  Suzanna Shaffer  DATE: 05/13/18    AGE:   80 y o  Mrn:   [id number]  ADMIT DX:  SOB (shortness of breath) [R06 02]  Heart failure (HCC) [I50 9]    Past Medical History:   Diagnosis Date    [facility]     Diabetes mellitus ([facility] )     Type 2    Hypertension      Length Of Stay: 1  Performed at least 2 patient identifiers during session: Name and Birthday    PHYSICAL THERAPY EVALUATION :    05/13/18 1007   Note Type   Note type Eval only   Pain Assessment   Pain Assessment No/denies pain   Home Living   Type of [address] Multi-level;Bed/bath upstairs   Bathroom Shower/Tub Tub/shower unit   Home Equipment Other (Comment)  ("my wife has 50 canes")   Prior Function   Level of [place] Independent with ADLs and functional mobility   Lives With Son  (spouse currently in [facility]Z)   Falls in the last 6 months 1 to 4   Restrictions/Precautions   Weight Bearing Precautions Per Order No   Other Precautions Impulsive; Fall Risk   General   Family/Caregiver Present No   Cognition   Overall Cognitive Status WFL   Arousal/Participation Alert   Following Commands Follows one step commands with increased time or repetition   RUE Strength   RUE Overall Strength Within Functional Limits - able to perform ADL tasks with strength   LUE Strength   LUE Overall Strength Within Functional Limits - able to perform ADL tasks with strength   Strength RLE   R Hip Flexion 4/5   R Knee Extension 4+/5   R Ankle Dorsiflexion 4+/5   Strength LLE   L Hip Flexion 4/5   L Knee Extension 4+/5   L Ankle Dorsiflexion 4+/5   Coordination   Movements are Fluid and Coordinated 1   Sensation X  ([place]; vision WFL)   Light Touch   RLE Light Touch Grossly intact  (however pt reports having neuropathy)   LLE Light Touch Grossly intact  (however pt reports having neuropathy)   Proprioception   RLE Proprioception Grossly intact  (great toe)   LLE Proprioception Grossly Intact  (gret toe)   Transfers   Sit to Stand 7  Independent   Additional items Assist x 1   Stand to Sit 7  Independent   Additional items Assist x 1   Ambulation/Elevation   Gait pattern WNL   Gait Assistance 7  Independent   Additional items Assist x 1   Assistive Device None   Distance 100'x2   Stair Management Assistance 6  Modified independent   Additional items Assist x 1;Verbal cues   Stair Management Technique One rail R;Foreward   Number of Stairs 12   Balance   Static Sitting Normal   Static Standing Good   Ambulatory Fair +   Higher level balance (DGI= 23/24)   Endurance Deficit   Endurance Deficit No   Activity Tolerance   Activity Tolerance Patient tolerated treatment well   Medical Staff Made Aware spoke to [place] OT   Nurse Made Aware spoke to RN   Assessment   Prognosis Good   Problem List Decreased endurance; Impaired hearing   Barriers to Discharge None   Goals   Patient Goals to go home   Treatment Day 0   Plan   Treatment/Interventions OT; Spoke to nursing  (DC from IPPT)   PT Frequency Other (Comment)  (DC from IPPT services)   Recommendation   Recommendation Home with family support   Equipment Recommended Other (Comment)  (none)   Barthel Index   Feeding 10   Bathing 5   Grooming Score 5   Dressing Score 10   Bladder Score 10   Bowels Score 10   Toilet Use Score 10   Transfers (Bed/Chair) Score 15   Mobility (Level Surface) Score 15   Stairs Score 10   Barthel Index Score 100   (Please find full objective findings from PT assessment regarding body systems outlined above)  Assessment: Pt is a 80 y o  male seen for PT evaluation s/p admit to [address] [place] [address] on 5/12/2018 w/ Acute on chronic diastolic congestive heart failure ([facility] )  Order placed for PT  Prior to admission, pt was independent w/ all functional mobility w/ out device, lived in multi-level home and lived with son (spouse is in skilled nursing)   Upon evaluation: Pt requires no physical assistance for transfers and ambulation with out device and performed flight of steps w/o A  Pt's clinical presentation is currently evolving given the functional mobility deficits above, especially decreased endurance, coupled with fall risks including hx of falls, and combined with medical complications of tachycardia, abnormal H&H and impulsivity during admission  From PT/mobility standpoint, recommendation at time of d/c would be home with family support in order to maximize pt's functional independence and consistency w/ mobility in order to facilitate return to PLOF  No further IPPT indicated at this time  The following objective measures were performed on IE: Barthel Index 100/100; Modified [place] 1 (no significant disability); Dynamic Gait Index: 23/24 (minimal to no risk for falls)  Comorbidities affecting pt's physical performance at time of assessment include: DM, COPD and joint replacement  Personal factors affecting pt at time of IE include: steps to enter environment, multi-level environment and recent fall(s)    Shannon Tompkins, PT, DPT

## 2018-05-13 NOTE — CASE MANAGEMENT
Initial Clinical Review    Admission: Date/Time/Statement: 5/12/18 @ 1021     Orders Placed This Encounter   Procedures    Inpatient Admission (expected length of stay for this patient is greater than two midnights)     Standing Status:   Standing     Number of Occurrences:   1     Order Specific Question:   Admitting Physician     Answer:   Esther Sánchez     Order Specific Question:   Level of Care     Answer:   Med Surg [16]     Order Specific Question:   Estimated length of stay     Answer:   More than 2 Midnights     Order Specific Question:   Certification     Answer:   I certify that inpatient services are medically necessary for this patient for a duration of greater than two midnights  See H&P and MD Progress Notes for additional information about the patient's course of treatment  79 yo male c/o SOB  EKG shows new LBBB  CT chest shows increasing bilateral pleural effusions  Cardiology and Pulmonology consulted  Pt being treated with IV Solumedrol, Lasix and nebulizer tx  Creatinine has increased to 1 32 and eGFR has decreased to 49 since admission  ED: Date/Time/Mode of Arrival:   ED Arrival Information     Expected Arrival Acuity Means of Arrival Escorted By Service Admission Type    5/12/2018 08:03 5/12/2018 07:57 Urgent Ambulance [facility] General Medicine Urgent    Arrival Complaint    Shortness of Breath          Chief Complaint:   Chief Complaint   Patient presents with    Shortness of Breath     States he has COPD and has been feeling more SOB over the past few days  Reports being on prednisone taper end of April        History of Illness: Genoveva Means is a 80 y o  male who presents with  past medical history of shortness of breath which started early this morning, unable to breathe, unable to walk with severe distress breathing      Past medical history significant for hypertension, diabetes, hyperlipidemia, chronic obstructive pulmonary disease secondary to occupational exposure with residual pulmonary nodules  ED Vital Signs:   ED Triage Vitals   Temperature Pulse Respirations Blood Pressure SpO2   05/12/18 0811 05/12/18 0811 05/12/18 0811 05/12/18 0811 05/12/18 0815   97 7 °F (36 5 °C) (!) 111 20 166/96 96 %      Temp Source Heart Rate Source Patient Position - Orthostatic VS BP Location FiO2 (%)   05/12/18 0811 05/12/18 0811 05/12/18 0830 05/12/18 0811 --   Oral Monitor Lying Right arm       Pain Score       05/12/18 0811       No Pain        Wt Readings from Last 1 Encounters:   05/13/18 77 4 kg (170 lb 10 2 oz)       Vital Signs (abnormal):   Date/Time  Temp  Pulse  Resp  BP  MAP (mmHg)  SpO2  O2 Device  Patient Position - Orthostatic VS   05/13/18 0903  --  --  --  100/58  --  --  --  --   05/13/18 0704  98 6 °F (37 °C)  74  16  98/54  --  96 %  None (Room air)  Lying   05/12/18 2311  98 8 °F (37 1 °C)  78  18  100/54  74  97 %  None (Room air)  Lying   05/12/18 1523  97 6 °F (36 4 °C)  101  20  120/66  --  95 %  None (Room air)  Lying   05/12/18 1220  98 4 °F (36 9 °C)  101  20  114/57  --  94 %  None (Room air)  Lying   05/12/18 1147  --  100  --  124/58  --  96 %  None (Room air)  Lying   05/12/18 1033  --  96  20  155/71  --  96 %  None (Room air)  Lying   05/12/18 0930  --  102  --  140/80  --  97 %  None (Room air)  Lying   05/12/18 0830  --   106  --  --  --  96 %  None (Room air)  Lying   05/12/18 0815  --   112  --  --  --  96 %  None (Room air)  --         Abnormal Labs/Diagnostic Test Results:   EKG  Sinus tachycardia  Left bundle branch block  Abnormal ECG  When compared with ECG of 19-OCT-2013 14:05,  Vent  rate has increased BY  35 BPM  Left bundle branch block is now Present    H/H 11 2/34 8  Glucose 214  CT chest  No evidence for acute pulmonary embolism        Increasing mild to moderate bilateral dependent pleural effusions        Chronic scarring, bronchiectasis and peribronchial thickening in the dependent aspects of both lower lobes    Superimposed reticular interstitial opacities which may reflect component of interstitial fluid and subsegmental atelectasis  Small subsegmental focus of consolidation seen in the right upper lobe adjacent to the posterior segmental bronchus  CXR  Streaky left basilar atelectasis and possible trace posterior pleural effusions   No evolving infiltrates  ED Treatment:   Medication Administration from 05/12/2018 0753 to 05/12/2018 1204       Date/Time Order Dose Route Action Comments     05/12/2018 0831 albuterol inhalation solution 5 mg 5 mg Nebulization Given      05/12/2018 0830 ipratropium (ATROVENT) 0 02 % inhalation solution 0 5 mg 0 5 mg Nebulization Given      05/12/2018 0934 methylPREDNISolone sodium succinate (Solu-MEDROL) injection 125 mg 125 mg Intravenous Given      05/12/2018 0920 iohexol (OMNIPAQUE) 350 MG/ML injection (MULTI-DOSE) 85 mL 85 mL Intravenous Given      05/12/2018 1059 furosemide (LASIX) injection 40 mg 40 mg Intravenous Given           Past Medical/Surgical History:    Active Ambulatory Problems     Diagnosis Date Noted    COPD without exacerbation ([facility] ) 07/28/2017    Essential hypertension 08/27/2012    Hyperlipidemia 05/06/2012    Neuropathy, idiopathic 08/22/2017    Multiple nodules of lung 03/09/2018    Mixed simple and mucopurulent chronic bronchitis ([facility] ) 03/26/2018     Resolved Ambulatory Problems     Diagnosis Date Noted    No Resolved Ambulatory Problems     Past Medical History:   Diagnosis Date    COPD (chronic obstructive pulmonary disease) ([facility] )     Diabetes mellitus ([facility] )     Hypertension        Admitting Diagnosis: SOB (shortness of breath) [R06 02]  Heart failure ([place] [address] ) [I50 9]    Age/Sex: 80 y o  male    Assessment/Plan:   Acute on chronic diastolic congestive heart failure (HCC)   Assessment & Plan     Acute Congestive heart failure exacerbation  Diminished air entry bilaterally, no wheezes or rales were heard  S1-S2 heard no murmurs rubs or gallops  JVD present  No evidence lower extremity edema  Labs reviewed:  ProBNP 5284, troponin 0 03, H&H 11 2/34 8, close to baseline  CTA chest on admission: see above in diagnostic results   Plan  Admit medical floor  Telemetry  Vital signs monitoring  Input/output  Control blood pressure  Cardiology consult  Daily weights  Lasix 40 mg b i d  IV  Echocardiogram             Type 2 diabetes mellitus, with long-term current use of insulin (HCC)   Assessment & Plan     Blood glucose monitoring  Insulin sliding scale  Hold metformin, glipizide  Continue repaglinide  Continue home dose Levemir 18 units q h s           Multiple nodules of lung   Assessment & Plan     Chronic  Stable  Follow-up pulmonology          Neuropathy, idiopathic   Assessment & Plan     Chronic neuropathic pain secondary to diabetes mellitus  Continue gabapentin          Hyperlipidemia   Assessment & Plan     Stable  Lipid panel  Continue simvastatin 10 mg at night          Essential hypertension   Assessment & Plan     BP on admission-controlled, 124/55  Continue blood pressure monitoring  Continue meds  P r n  labetalol if systolic greater than 979          COPD without exacerbation (HCC)   Assessment & Plan     No active wheezing chest, Diminished air entry bilaterally  Improved with albuterol inhaler as on admission, oxygen and prednisone     Plan  Pulse oximetry  Respiratory protocol  Atrovent Proventil nebulization as scheduled  Solu-Medrol 60mg q 6h  Pulmonary consult-bilateral pleural effusions,? Possible thoracocentesis   No antibiotics for now                  VTE Prophylaxis: Enoxaparin (Lovenox)  / sequential compression device   Code Status: full code  Anticipated Length of Stay:  Patient will be admitted on an Inpatient basis with an anticipated length of stay of > 2 midnights     Justification for Hospital Stay:  Medical management of acute Congestive heart failure exacerbation      Admission Orders:  Scheduled Meds:   Current Facility-Administered Medications:  EMS replenish medication  Does not apply Once Mable MD Todd   acetaminophen 650 mg Oral Q4H PRN Evita Small MD   amLODIPine 5 mg Oral Daily Evita Small MD   vitamin C 1,000 mg Oral Daily Evita Small MD   budesonide-formoterol 2 puff Inhalation BID Evita Small MD   docusate sodium 100 mg Oral BID Evita Small MD   enoxaparin 40 mg Subcutaneous Daily Evita Small MD   fluticasone 2 spray Nasal Daily Evita Smlal MD   [START ON 5/14/2018] furosemide 40 mg Oral Daily Jairon Hussein MD   gabapentin 100 mg Oral TID Evita Small MD   insulin lispro 1-6 Units Subcutaneous 4x Daily (AC & HS) Nadir Sawyer PA-C   levalbuterol 1 25 mg Nebulization TID Evita Small MD   And       ipratropium 0 5 mg Nebulization TID Evita Small MD   lisinopril 10 mg Oral Daily Evita Small MD   magnesium oxide 400 mg Oral BID Evita Small MD   multivitamin-minerals 1 tablet Oral Daily Evita Small MD   potassium chloride 20 mEq Oral Daily Evita Small MD   pravastatin 20 mg Oral Daily With Rufina Harper MD   repaglinide 1 mg Oral Daily Before Lunch Evita Small MD   repaglinide 2 mg Oral Before Rufina Harper MD   repaglinide 3 mg Oral Daily Before Breakfast Evita Small MD   roflumilast 500 mcg Oral Daily Evita Small MD     Continuous Infusions:    PRN Meds: acetaminophen    POC glucose qac/hs SSI coverage  Cardiac diet  Up as mita  ECHO  Telemetry  SCD's   Consult cardiology  Daily weights, I/O  Labs 5/13 H/H 9 9/30 1, BUN 25, Creat 1 32, glucose 255    Thank you,  [facility] in the [facility] by Gilberto Franco for 2017  Network Utilization Review Department  Phone: [phone]; Fax [phone]  ATTENTION: The Network Utilization Review Department is now centralized for our 7 Facilities  Make a note that we have a new phone and fax numbers for our Department   Please call with any questions or concerns to [phone] and carefully follow the prompts so that you are directed to the right person  All voicemails are confidential  Fax any determinations, approvals, denials, and requests for initial or continue stay review clinical to [phone]  Due to HIGH CALL volume, it would be easier if you could please send faxed requests to expedite your requests and in part, help us provide discharge notifications faster

## 2018-05-13 NOTE — ASSESSMENT & PLAN NOTE
Acute Congestive heart failure exacerbation  Improvement in air entry bilaterally in chest  S1-S2 heard no murmurs rubs or gallops  JVD present-improved  No evidence lower extremity edema  CTA chest on admission:    No evidence for acute pulmonary embolism  Increasing mild to moderate bilateral dependent pleural effusions  Chronic scarring, bronchiectasis and peribronchial thickening in the dependent aspects of both lower lobes   Superimposed reticular interstitial opacities which may reflect component of interstitial fluid and subsegmental atelectasis      Small subsegmental focus of consolidation seen in the right upper lobe adjacent to the posterior segmental bronchus   See above for further discussion   Recommend follow-up    Plan  Admit medical floor  Telemetry  Vital signs monitoring  Input/output  Control blood pressure  Cardiology consult  Daily weights  Lasix 40 mg daily, ambulatory pulse oximetry  Echocardiogram-pending

## 2018-05-14 PROBLEM — I50.41 ACUTE COMBINED SYSTOLIC AND DIASTOLIC CONGESTIVE HEART FAILURE (HCC): Status: ACTIVE | Noted: 2018-05-12

## 2018-05-14 LAB
ANION GAP SERPL CALCULATED.3IONS-SCNC: 9 MMOL/L (ref 4–13)
BUN SERPL-MCNC: 24 MG/DL (ref 5–25)
CALCIUM SERPL-MCNC: 8.9 MG/DL (ref 8.3–10.1)
CHLORIDE SERPL-SCNC: 103 MMOL/L (ref 100–108)
CO2 SERPL-SCNC: 27 MMOL/L (ref 21–32)
CREAT SERPL-MCNC: 1.24 MG/DL (ref 0.6–1.3)
GFR SERPL CREATININE-BSD FRML MDRD: 53 ML/MIN/1.73SQ M
GLUCOSE SERPL-MCNC: 131 MG/DL (ref 65–140)
GLUCOSE SERPL-MCNC: 193 MG/DL (ref 65–140)
GLUCOSE SERPL-MCNC: 258 MG/DL (ref 65–140)
GLUCOSE SERPL-MCNC: 293 MG/DL (ref 65–140)
GLUCOSE SERPL-MCNC: 307 MG/DL (ref 65–140)
POTASSIUM SERPL-SCNC: 4.6 MMOL/L (ref 3.5–5.3)
SODIUM SERPL-SCNC: 139 MMOL/L (ref 136–145)

## 2018-05-14 PROCEDURE — 94640 AIRWAY INHALATION TREATMENT: CPT

## 2018-05-14 PROCEDURE — 94760 N-INVAS EAR/PLS OXIMETRY 1: CPT

## 2018-05-14 PROCEDURE — 80048 BASIC METABOLIC PNL TOTAL CA: CPT | Performed by: INTERNAL MEDICINE

## 2018-05-14 PROCEDURE — 82948 REAGENT STRIP/BLOOD GLUCOSE: CPT

## 2018-05-14 PROCEDURE — 99232 SBSQ HOSP IP/OBS MODERATE 35: CPT | Performed by: HOSPITALIST

## 2018-05-14 PROCEDURE — 99232 SBSQ HOSP IP/OBS MODERATE 35: CPT | Performed by: INTERNAL MEDICINE

## 2018-05-14 RX ADMIN — POTASSIUM CHLORIDE 20 MEQ: 1500 TABLET, EXTENDED RELEASE ORAL at 08:21

## 2018-05-14 RX ADMIN — LISINOPRIL 10 MG: 10 TABLET ORAL at 08:21

## 2018-05-14 RX ADMIN — BUDESONIDE AND FORMOTEROL FUMARATE DIHYDRATE 2 PUFF: 80; 4.5 AEROSOL RESPIRATORY (INHALATION) at 19:18

## 2018-05-14 RX ADMIN — LEVALBUTEROL HYDROCHLORIDE 1.25 MG: 1.25 SOLUTION, CONCENTRATE RESPIRATORY (INHALATION) at 07:55

## 2018-05-14 RX ADMIN — REPAGLINIDE 1 MG: 1 TABLET ORAL at 11:38

## 2018-05-14 RX ADMIN — ENOXAPARIN SODIUM 40 MG: 40 INJECTION SUBCUTANEOUS at 08:22

## 2018-05-14 RX ADMIN — GABAPENTIN 100 MG: 100 CAPSULE ORAL at 08:21

## 2018-05-14 RX ADMIN — DOCUSATE SODIUM 100 MG: 100 CAPSULE, LIQUID FILLED ORAL at 08:21

## 2018-05-14 RX ADMIN — IPRATROPIUM BROMIDE 0.5 MG: 0.5 SOLUTION RESPIRATORY (INHALATION) at 07:55

## 2018-05-14 RX ADMIN — FLUTICASONE PROPIONATE 2 SPRAY: 50 SPRAY, METERED NASAL at 08:33

## 2018-05-14 RX ADMIN — REPAGLINIDE 3 MG: 1 TABLET ORAL at 08:34

## 2018-05-14 RX ADMIN — METOPROLOL TARTRATE 12.5 MG: 25 TABLET ORAL at 08:21

## 2018-05-14 RX ADMIN — PRAVASTATIN SODIUM 20 MG: 20 TABLET ORAL at 16:43

## 2018-05-14 RX ADMIN — OXYCODONE HYDROCHLORIDE AND ACETAMINOPHEN 1000 MG: 500 TABLET ORAL at 08:21

## 2018-05-14 RX ADMIN — LEVALBUTEROL HYDROCHLORIDE 1.25 MG: 1.25 SOLUTION, CONCENTRATE RESPIRATORY (INHALATION) at 13:12

## 2018-05-14 RX ADMIN — Medication 400 MG: at 19:14

## 2018-05-14 RX ADMIN — IPRATROPIUM BROMIDE 0.5 MG: 0.5 SOLUTION RESPIRATORY (INHALATION) at 13:12

## 2018-05-14 RX ADMIN — LEVALBUTEROL HYDROCHLORIDE 1.25 MG: 1.25 SOLUTION, CONCENTRATE RESPIRATORY (INHALATION) at 19:24

## 2018-05-14 RX ADMIN — GABAPENTIN 100 MG: 100 CAPSULE ORAL at 16:43

## 2018-05-14 RX ADMIN — Medication 1 TABLET: at 08:21

## 2018-05-14 RX ADMIN — METOPROLOL TARTRATE 12.5 MG: 25 TABLET ORAL at 20:35

## 2018-05-14 RX ADMIN — Medication 400 MG: at 08:21

## 2018-05-14 RX ADMIN — GABAPENTIN 100 MG: 100 CAPSULE ORAL at 20:34

## 2018-05-14 RX ADMIN — REPAGLINIDE 2 MG: 1 TABLET ORAL at 18:22

## 2018-05-14 RX ADMIN — BUDESONIDE AND FORMOTEROL FUMARATE DIHYDRATE 2 PUFF: 80; 4.5 AEROSOL RESPIRATORY (INHALATION) at 08:21

## 2018-05-14 RX ADMIN — FUROSEMIDE 40 MG: 40 TABLET ORAL at 08:21

## 2018-05-14 RX ADMIN — IPRATROPIUM BROMIDE 0.5 MG: 0.5 SOLUTION RESPIRATORY (INHALATION) at 19:24

## 2018-05-14 RX ADMIN — DOCUSATE SODIUM 100 MG: 100 CAPSULE, LIQUID FILLED ORAL at 19:14

## 2018-05-14 NOTE — PROGRESS NOTES
Progress Note - Mary Beth Delgado 1933, 80 y o  male MRN: [id number]    Unit/Bed#: -01 Encounter: [id number]    Primary Care Provider: Tami Curiel MD   Date and time admitted to hospital: 5/12/2018  7:58 AM        * Acute combined systolic and diastolic congestive heart failure ([address] )   Assessment & Plan    Acute Congestive heart failure exacerbation  Improvement in air entry bilaterally in chest  S1-S2 heard no murmurs rubs or gallops  JVD present-improved  No evidence lower extremity edema  CTA chest on admission:    No evidence for acute pulmonary embolism  Increasing mild to moderate bilateral dependent pleural effusions  Chronic scarring, bronchiectasis and peribronchial thickening in the dependent aspects of both lower lobes   Superimposed reticular interstitial opacities which may reflect component of interstitial fluid and subsegmental atelectasis  Small subsegmental focus of consolidation seen in the right upper lobe adjacent to the posterior segmental bronchus   See above for further discussion   Recommend follow-up    Plan  Admit medical floor  Telemetry  Vital signs monitoring  Input/output  Control blood pressure  Cardiology consult- recommending cardiac cath  Daily weights  Lasix 40 mg daily, ambulatory pulse oximetry    Echocardiogram-completed; findings below:   SUMMARY     LEFT VENTRICLE:  Systolic function was moderately reduced by visual assessment  Ejection fraction was estimated to be 37 %  There was moderate diffuse hypokinesis  Doppler parameters were consistent with restrictive physiology, indicative of decreased left ventricular diastolic compliance and/or increased left atrial pressure      RIGHT VENTRICLE:  The ventricle was mildly dilated  Systolic function was mildly reduced      MITRAL VALVE:  There was mild regurgitation      TRICUSPID VALVE:  There was trace regurgitation  Estimated peak PA pressure was 40 mmHg    The findings suggest mild pulmonary hypertension      IVC, HEPATIC VEINS:  The inferior vena cava was dilated  Heart failure ([address] )   Assessment & Plan    See above        Type 2 diabetes mellitus, with long-term current use of insulin (HCC)   Assessment & Plan    Blood glucose monitoring  Insulin sliding scale  Hold metformin, glipizide  Continue repaglinide  Continue home dose Levemir 18 units q h s  Multiple nodules of lung   Assessment & Plan    Chronic  Stable  Follow-up pulmonology        Neuropathy, idiopathic   Assessment & Plan    Chronic neuropathic pain secondary to diabetes mellitus  Continue gabapentin        Hyperlipidemia   Assessment & Plan    Stable  Lipid panel  Continue simvastatin 10 mg at night        Essential hypertension   Assessment & Plan    BP on admission-controlled  Continue blood pressure monitoring  Continue meds  P r n  labetalol if systolic greater than 911        COPD without exacerbation (HCC)   Assessment & Plan    No active wheezing chest, Diminished air entry bilaterally  Improved with albuterol inhaler as on admission, oxygen and prednisone    Plan  Pulse oximetry  Respiratory protocol  Atrovent Proventil nebulization as scheduled  Symbicort  No antibiotics for now              VTE Pharmacologic Prophylaxis:   Pharmacologic: Enoxaparin (Lovenox)  Mechanical VTE Prophylaxis in Place: Yes    Patient Centered Rounds: I have performed bedside rounds with nursing staff today  Discussions with Specialists or Other Care Team Provider: yes, cardiology    Education and Discussions with Family / Patient:yes    Time Spent for Care: 45 minutes  More than 50% of total time spent on counseling and coordination of care as described above      Current Length of Stay: 2 day(s)    Current Patient Status: Inpatient   Certification Statement: The patient will continue to require additional inpatient hospital stay due to med mgt    Discharge Plan: home when medically stable    Code Status: Level 1 - Full Code      Subjective:   Patient says he feels fine today  Review of systems negative for chest pain, shortness of breath, changes in bowel urinary habits, lower extremity edema, fever  Review of systems negative otherwise    Objective:     Vitals:   Temp (24hrs), Av 2 °F (36 8 °C), Min:98 °F (36 7 °C), Max:98 4 °F (36 9 °C)    HR:  [89-96] 96  Resp:  [16-18] 18  BP: (105-125)/(56-72) 125/72  SpO2:  [96 %-97 %] 96 %  Body mass index is 21 44 kg/m²  Input and Output Summary (last 24 hours): Intake/Output Summary (Last 24 hours) at 18 1050  Last data filed at 18 0636   Gross per 24 hour   Intake                0 ml   Output             1550 ml   Net            -1550 ml       Physical Exam:     Physical Exam   Constitutional: He is oriented to person, place, and time  No distress  HENT:   Head: Normocephalic and atraumatic  Mouth/Throat: Oropharynx is clear and moist    Eyes: EOM are normal  Pupils are equal, round, and reactive to light  Neck: Normal range of motion  No JVD present  Cardiovascular: Normal rate  Exam reveals no gallop and no friction rub  No murmur heard  Pulmonary/Chest: Effort normal and breath sounds normal  No respiratory distress  He has no wheezes  He has no rales  Abdominal: Soft  Bowel sounds are normal  He exhibits no distension  There is no tenderness  There is no rebound and no guarding  Musculoskeletal: Normal range of motion  He exhibits no edema  Neurological: He is alert and oriented to person, place, and time  No cranial nerve deficit  Coordination normal    Skin: Skin is warm  No erythema  Psychiatric: He has a normal mood and affect   His behavior is normal  Judgment and thought content normal          Additional Data:     Labs:      Results from last 7 days  Lab Units 18  0530   WBC Thousand/uL 7 29   HEMOGLOBIN g/dL 9 9*   HEMATOCRIT % 30 1*   PLATELETS Thousands/uL 195   NEUTROS PCT % 75   LYMPHS PCT % 8*   MONOS PCT % 17*   EOS PCT % 0       Results from last 7 days  Lab Units 05/13/18  0101   SODIUM mmol/L 138   POTASSIUM mmol/L 4 0   CHLORIDE mmol/L 102   CO2 mmol/L 29   BUN mg/dL 25   CREATININE mg/dL 1 32*   CALCIUM mg/dL 8 6   TOTAL PROTEIN g/dL 5 9*   BILIRUBIN TOTAL mg/dL 0 60   ALK PHOS U/L 41*   ALT U/L 31   AST U/L 11   GLUCOSE RANDOM mg/dL 255*           Results from last 7 days  Lab Units 05/14/18  0728 05/13/18  2142 05/13/18  1629 05/13/18  1058 05/13/18  0731 05/12/18  2059 05/12/18  1617 05/12/18  1311   POC GLUCOSE mg/dl 258* 212* 241* 221* 174* 446* 396* 278*             * I Have Reviewed All Lab Data Listed Above  * Additional Pertinent Lab Tests Reviewed:  Josh 66 Admission Reviewed    Imaging:    Imaging Reports Reviewed Today     Recent Cultures (last 7 days):           Last 24 Hours Medication List:     Current Facility-Administered Medications:  EMS replenish medication  Does not apply Once Mable Brooks MD   acetaminophen 650 mg Oral Q4H PRN Valentin Jewell MD   vitamin C 1,000 mg Oral Daily Valentin Jewell MD   budesonide-formoterol 2 puff Inhalation BID Valentin Jewell MD   docusate sodium 100 mg Oral BID Valentin Jewell MD   enoxaparin 40 mg Subcutaneous Daily Valentin Jewell MD   fluticasone 2 spray Nasal Daily Valentin Jewell MD   furosemide 40 mg Oral Daily Ismael Caldwell MD   gabapentin 100 mg Oral TID Valentin Jewell MD   insulin lispro 1-6 Units Subcutaneous 4x Daily (AC & HS) Valente Morgan PA-C   levalbuterol 1 25 mg Nebulization TID Valentin Jewell MD   And       ipratropium 0 5 mg Nebulization TID Valentin Jewell MD   lisinopril 10 mg Oral Daily Valentin Jewell MD   magnesium oxide 400 mg Oral BID Valentin Jewell MD   metoprolol tartrate 12 5 mg Oral Q12H [facility] Ismael Caldwell MD   multivitamin-minerals 1 tablet Oral Daily Valentin Jewell MD   potassium chloride 20 mEq Oral Daily Valentin Jewell MD   pravastatin 20 mg Oral Daily With Ninfa Guevara MD   repaglinide 1 mg Oral Daily Before Lunch Anna Pierre MD   repaglinide 2 mg Oral Before Marlola Jackson MD   repaglinide 3 mg Oral Daily Before Breakfast Anna Pierre MD   roflumilast 500 mcg Oral Daily Anna Pierre MD        Today, Patient Was Seen By: Anna Pierre MD    ** Please Note: Dictation voice to text software may have been used in the creation of this document   **

## 2018-05-14 NOTE — ASSESSMENT & PLAN NOTE
Acute Congestive heart failure exacerbation  Improvement in air entry bilaterally in chest  S1-S2 heard no murmurs rubs or gallops  JVD present-improved  No evidence lower extremity edema  CTA chest on admission:    No evidence for acute pulmonary embolism  Increasing mild to moderate bilateral dependent pleural effusions  Chronic scarring, bronchiectasis and peribronchial thickening in the dependent aspects of both lower lobes   Superimposed reticular interstitial opacities which may reflect component of interstitial fluid and subsegmental atelectasis  Small subsegmental focus of consolidation seen in the right upper lobe adjacent to the posterior segmental bronchus   See above for further discussion   Recommend follow-up    Plan  Admit medical floor  Telemetry  Vital signs monitoring  Input/output  Control blood pressure  Cardiology consult- recommending cardiac cath  Daily weights  Lasix 40 mg daily, ambulatory pulse oximetry    Echocardiogram-completed; findings below:   SUMMARY     LEFT VENTRICLE:  Systolic function was moderately reduced by visual assessment  Ejection fraction was estimated to be 37 %  There was moderate diffuse hypokinesis  Doppler parameters were consistent with restrictive physiology, indicative of decreased left ventricular diastolic compliance and/or increased left atrial pressure      RIGHT VENTRICLE:  The ventricle was mildly dilated  Systolic function was mildly reduced      MITRAL VALVE:  There was mild regurgitation      TRICUSPID VALVE:  There was trace regurgitation  Estimated peak PA pressure was 40 mmHg  The findings suggest mild pulmonary hypertension      IVC, HEPATIC VEINS:  The inferior vena cava was dilated

## 2018-05-14 NOTE — ASSESSMENT & PLAN NOTE
BP on admission-controlled  Continue blood pressure monitoring  Continue meds  P r n  labetalol if systolic greater than 995

## 2018-05-14 NOTE — PLAN OF CARE
Problem: Potential for Falls  Goal: Patient will remain free of falls  INTERVENTIONS:  - Assess patient frequently for physical needs  -  Identify cognitive and physical deficits and behaviors that affect risk of falls  -  [place] fall precautions as indicated by assessment   - Educate patient/family on patient safety including physical limitations  - Instruct patient to call for assistance with activity based on assessment  - Modify environment to reduce risk of injury  - Consider OT/PT consult to assist with strengthening/mobility   Outcome: Progressing      Problem: Nutrition/Hydration-ADULT  Goal: Nutrient/Hydration intake appropriate for improving, restoring or maintaining nutritional needs  Monitor and assess patient's nutrition/hydration status for malnutrition (ex- brittle hair, bruises, dry skin, pale skin and conjunctiva, muscle wasting, smooth red tongue, and disorientation)  Collaborate with interdisciplinary team and initiate plan and interventions as ordered  Monitor patient's weight and dietary intake as ordered or per policy  Utilize nutrition screening tool and intervene per policy  Determine patient's food preferences and provide high-protein, high-caloric foods as appropriate       INTERVENTIONS:  - Monitor oral intake, urinary output, labs, and treatment plans  - Assess nutrition and hydration status and recommend course of action  - Evaluate amount of meals eaten  - Assist patient with eating if necessary   - Allow adequate time for meals  - Recommend/ encourage appropriate diets, oral nutritional supplements, and vitamin/mineral supplements  - Order, calculate, and assess calorie counts as needed  - Recommend, monitor, and adjust tube feedings and TPN/PPN based on assessed needs  - Assess need for intravenous fluids  - Provide specific nutrition/hydration education as appropriate  - Include patient/family/caregiver in decisions related to nutrition   Outcome: Progressing      Problem: CARDIOVASCULAR - ADULT  Goal: Maintains optimal cardiac output and hemodynamic stability  INTERVENTIONS:  - Monitor I/O, vital signs and rhythm  - Monitor for S/S and trends of decreased cardiac output i e  bleeding, hypotension  - Administer and titrate ordered vasoactive medications to optimize hemodynamic stability  - Assess quality of pulses, skin color and temperature  - Assess for signs of decreased coronary artery perfusion - ex   Angina  - Instruct patient to report change in severity of symptoms   Outcome: Progressing    Goal: Absence of cardiac dysrhythmias or at baseline rhythm  INTERVENTIONS:  - Continuous cardiac monitoring, monitor vital signs, obtain 12 lead EKG if indicated  - Administer antiarrhythmic and heart rate control medications as ordered  - Monitor electrolytes and administer replacement therapy as ordered   Outcome: Progressing      Problem: RESPIRATORY - ADULT  Goal: Achieves optimal ventilation and oxygenation  INTERVENTIONS:  - Assess for changes in respiratory status  - Assess for changes in mentation and behavior  - Position to facilitate oxygenation and minimize respiratory effort  - Oxygen administration by appropriate delivery method based on oxygen saturation (per order) or ABGs  - Initiate smoking cessation education as indicated  - Encourage broncho-pulmonary hygiene including cough, deep breathe, Incentive Spirometry  - Assess the need for suctioning and aspirate as needed  - Assess and instruct to report SOB or any respiratory difficulty  - Respiratory Therapy support as indicated   Outcome: Progressing

## 2018-05-14 NOTE — PROGRESS NOTES
Progress Note - Cardiology   Mary Beth Delgado 80 y o  male MRN: [id number]  Unit/Bed#: -01 Encounter: [id number]    Assessment:  Principal Problem:    Acute combined systolic and diastolic congestive heart failure (HCC)  Active Problems:    COPD without exacerbation ([facility] [address] )    Essential hypertension    Hyperlipidemia    Neuropathy, idiopathic    Multiple nodules of lung    Type 2 diabetes mellitus, with long-term current use of insulin (HCC)    Heart failure ([facility] [address] )    80year old man with Acute combined systolic and diastolic CHF  His volume status is improved, he is changed to PO diuretics  Evaluation of new diagnosis of cardiomyopathy discussed with patient  Given drop in LV function, cardiac catheterization was recommended  Given he is independent, active and otherwise quite functional despite 85 years, this is very reasonable  I discussed the risks of renal dysfunction with the patient, particularly given recent contrast load with CTA  Plan:  Check BMP today  If creatinine stable, will set up for cardiac catheterization tomorrow  Continue beta blocker  Will add ACEi after cardiac cath if renal function stable  Continue PO lasix  Blood pressure currently controlled - hold off on Norvasc with beta blocker and plans to start ACEi  Subjective/Objective     Subjective:  No events overnight  Currently feels well, denies dyspnea  Feels pretty much back to his baseline  Echo results discussed with patient  He is typically quite active - golfs three times a week, does gardening, etc  Independent at home      Objective:    Vitals: /72 (BP Location: Right arm)   Pulse 96   Temp 98 4 °F (36 9 °C) (Oral)   Resp 18   Ht 6' 1" (1 854 m)   Wt 73 7 kg (162 lb 7 7 oz)   SpO2 96%   BMI 21 44 kg/m²   Vitals:    05/13/18 0600 05/14/18 0600   Weight: 77 4 kg (170 lb 10 2 oz) 73 7 kg (162 lb 7 7 oz)     Orthostatic Blood Pressures      Most Recent Value   Blood Pressure  125/72 filed at 05/14/2018 4675   Patient Position - Orthostatic VS  Sitting filed at 05/14/2018 0727          Intake/Output Summary (Last 24 hours) at 05/14/18 1025  Last data filed at 05/14/18 0636   Gross per 24 hour   Intake                0 ml   Output             1550 ml   Net            -1550 ml     Physical Exam:  General appearance: alert and in no acute distress  Head: Normocephalic, without obvious abnormality, atraumatic  Neck: no carotid bruit, no JVD and supple, symmetrical, trachea midline  Lungs: Slightly decreased bilateral bases  No significant rales  Heart: S1, S2 normal and no S3 or S4  No murmurs  Abdomen: soft, non-tender; bowel sounds normal; no masses,  no organomegaly  Extremities: extremities normal, atraumatic, no cyanosis or edema  Pulses: 2+ and symmetric bilaterally  Skin: Skin color, texture, turgor normal  No rashes or lesions  Neurologic: Grossly normal  Alert and oriented      Medications:    Current Facility-Administered Medications:      EMS REPLENISHMENT MED, , Does not apply, Once, Mable MD Todd    acetaminophen (TYLENOL) tablet 650 mg, 650 mg, Oral, Q4H PRN, Edgard Prince MD    ascorbic acid (VITAMIN C) tablet 1,000 mg, 1,000 mg, Oral, Daily, Edgard Prince MD, 1,000 mg at 05/14/18 0821    budesonide-formoterol (SYMBICORT) 80-4 5 MCG/ACT inhaler 2 puff, 2 puff, Inhalation, BID, Edgard Prince MD, 2 puff at 05/14/18 0821    docusate sodium (COLACE) capsule 100 mg, 100 mg, Oral, BID, Edgard Prince MD, 100 mg at 05/14/18 0821    enoxaparin (LOVENOX) subcutaneous injection 40 mg, 40 mg, Subcutaneous, Daily, Edgard Prince MD, 40 mg at 05/14/18 0822    fluticasone (FLONASE) 50 mcg/act nasal spray 2 spray, 2 spray, Nasal, Daily, Edgard Prince MD, 2 spray at 05/14/18 0833    furosemide (LASIX) tablet 40 mg, 40 mg, Oral, Daily, Shelley Shipman MD, 40 mg at 05/14/18 5454    gabapentin (NEURONTIN) capsule 100 mg, 100 mg, Oral, TID, Edgard Prince MD, 100 mg at 05/14/18 0821    insulin lispro (HumaLOG) 100 units/mL subcutaneous injection 1-6 Units, 1-6 Units, Subcutaneous, 4x Daily (AC & HS), 3 Units at 05/14/18 0833 **AND** Fingerstick Glucose (POCT), , , 4x Daily AC and at bedtime, Nadir Sawyer PA-C    levalbuterol [facility]) inhalation solution 1 25 mg, 1 25 mg, Nebulization, TID, 1 25 mg at 05/14/18 0755 **AND** ipratropium (ATROVENT) 0 02 % inhalation solution 0 5 mg, 0 5 mg, Nebulization, TID, Evita Small MD, 0 5 mg at 05/14/18 0755    lisinopril (ZESTRIL) tablet 10 mg, 10 mg, Oral, Daily, Evita Small MD, 10 mg at 05/14/18 0821    magnesium oxide (MAG-OX) tablet 400 mg, 400 mg, Oral, BID, Evita Small MD, 400 mg at 05/14/18 0821    metoprolol tartrate (LOPRESSOR) partial tablet 12 5 mg, 12 5 mg, Oral, Q12H [address], Jairon Hussein MD, 12 5 mg at 05/14/18 4986    multivitamin-minerals (CENTRUM) tablet 1 tablet, 1 tablet, Oral, Daily, Evita Small MD, 1 tablet at 05/14/18 0821    potassium chloride (K-DUR,KLOR-CON) CR tablet 20 mEq, 20 mEq, Oral, Daily, Evita Small MD, 20 mEq at 05/14/18 5929    pravastatin (PRAVACHOL) tablet 20 mg, 20 mg, Oral, Daily With Cristiane Gunn MD, 20 mg at 05/13/18 1721    repaglinide (PRANDIN) tablet 1 mg, 1 mg, Oral, Daily Before Lunch, Evita Small MD, 1 mg at 05/13/18 1155    repaglinide (PRANDIN) tablet 2 mg, 2 mg, Oral, Before Cristiane Gunn MD, 2 mg at 05/13/18 1809    repaglinide (PRANDIN) tablet 3 mg, 3 mg, Oral, Daily Before Breakfast, Evita Small MD, 3 mg at 05/14/18 0834    roflumilast tablet 500 mcg, 500 mcg, Oral, Daily, Evita Small MD, 500 mcg at 05/13/18 1724    Lab Results:    Results from last 7 days  Lab Units 05/12/18  0819   TROPONIN I ng/mL 0 03       Results from last 7 days  Lab Units 05/13/18  0530 05/12/18  0819   WBC Thousand/uL 7 29 8 72   HEMOGLOBIN g/dL 9 9* 11 2*   HEMATOCRIT % 30 1* 34 8*   PLATELETS Thousands/uL 195 192       Results from last 7 days  Lab Units 05/13/18  0530   CHOLESTEROL mg/dL 96   TRIGLYCERIDES mg/dL 24   HDL mg/dL 50       Results from last 7 days  Lab Units 05/13/18  0101 05/12/18  0819   SODIUM mmol/L 138 143   POTASSIUM mmol/L 4 0 4 6   CHLORIDE mmol/L 102 105   CO2 mmol/L 29 28   BUN mg/dL 25 16   CREATININE mg/dL 1 32* 1 17   CALCIUM mg/dL 8 6 9 2   TOTAL PROTEIN g/dL 5 9*  --    BILIRUBIN TOTAL mg/dL 0 60  --    ALK PHOS U/L 41*  --    ALT U/L 31  --    AST U/L 11  --    GLUCOSE RANDOM mg/dL 255* 214*           Results from last 7 days  Lab Units 05/13/18  0101   MAGNESIUM mg/dL 1 4*       Telemetry: Personally reviewed  No significant arrhythmias  LBBB  Echo:  LEFT VENTRICLE:  Systolic function was moderately reduced by visual assessment  Ejection fraction was estimated to be 37 %  There was moderate diffuse hypokinesis  Doppler parameters were consistent with restrictive physiology, indicative of decreased left ventricular diastolic compliance and/or increased left atrial pressure      RIGHT VENTRICLE:  The ventricle was mildly dilated  Systolic function was mildly reduced      MITRAL VALVE:  There was mild regurgitation      TRICUSPID VALVE:  There was trace regurgitation  Estimated peak PA pressure was 40 mmHg    The findings suggest mild pulmonary hypertension      IVC, HEPATIC VEINS:  The inferior vena cava was dilated

## 2018-05-15 ENCOUNTER — APPOINTMENT (INPATIENT)
Dept: NON INVASIVE DIAGNOSTICS | Facility: HOSPITAL | Age: 83
DRG: 287 | End: 2018-05-15
Attending: INTERNAL MEDICINE
Payer: COMMERCIAL

## 2018-05-15 PROBLEM — I50.41 ACUTE COMBINED SYSTOLIC AND DIASTOLIC CONGESTIVE HEART FAILURE (HCC): Status: RESOLVED | Noted: 2018-05-12 | Resolved: 2018-05-15

## 2018-05-15 LAB
ALBUMIN SERPL BCP-MCNC: 3.5 G/DL (ref 3.5–5)
ALP SERPL-CCNC: 41 U/L (ref 46–116)
ALT SERPL W P-5'-P-CCNC: 40 U/L (ref 12–78)
ANION GAP SERPL CALCULATED.3IONS-SCNC: 6 MMOL/L (ref 4–13)
AST SERPL W P-5'-P-CCNC: 17 U/L (ref 5–45)
BASOPHILS # BLD AUTO: 0.07 THOUSANDS/ΜL (ref 0–0.1)
BASOPHILS NFR BLD AUTO: 1 % (ref 0–1)
BILIRUB SERPL-MCNC: 0.6 MG/DL (ref 0.2–1)
BUN SERPL-MCNC: 25 MG/DL (ref 5–25)
CALCIUM SERPL-MCNC: 8.5 MG/DL (ref 8.3–10.1)
CHLORIDE SERPL-SCNC: 106 MMOL/L (ref 100–108)
CO2 SERPL-SCNC: 29 MMOL/L (ref 21–32)
CREAT SERPL-MCNC: 1.17 MG/DL (ref 0.6–1.3)
EOSINOPHIL # BLD AUTO: 0.21 THOUSAND/ΜL (ref 0–0.61)
EOSINOPHIL NFR BLD AUTO: 3 % (ref 0–6)
ERYTHROCYTE [DISTWIDTH] IN BLOOD BY AUTOMATED COUNT: 25.7 % (ref 11.6–15.1)
GFR SERPL CREATININE-BSD FRML MDRD: 57 ML/MIN/1.73SQ M
GLUCOSE SERPL-MCNC: 171 MG/DL (ref 65–140)
GLUCOSE SERPL-MCNC: 196 MG/DL (ref 65–140)
GLUCOSE SERPL-MCNC: 202 MG/DL (ref 65–140)
GLUCOSE SERPL-MCNC: 224 MG/DL (ref 65–140)
GLUCOSE SERPL-MCNC: 249 MG/DL (ref 65–140)
HCT VFR BLD AUTO: 32.4 % (ref 36.5–49.3)
HGB BLD-MCNC: 10.3 G/DL (ref 12–17)
LYMPHOCYTES # BLD AUTO: 0.97 THOUSANDS/ΜL (ref 0.6–4.47)
LYMPHOCYTES NFR BLD AUTO: 14 % (ref 14–44)
MCH RBC QN AUTO: 30.5 PG (ref 26.8–34.3)
MCHC RBC AUTO-ENTMCNC: 31.8 G/DL (ref 31.4–37.4)
MCV RBC AUTO: 96 FL (ref 82–98)
MONOCYTES # BLD AUTO: 1 THOUSAND/ΜL (ref 0.17–1.22)
MONOCYTES NFR BLD AUTO: 15 % (ref 4–12)
NEUTROPHILS # BLD AUTO: 4.56 THOUSANDS/ΜL (ref 1.85–7.62)
NEUTS SEG NFR BLD AUTO: 67 % (ref 43–75)
PLATELET # BLD AUTO: 172 THOUSANDS/UL (ref 149–390)
PMV BLD AUTO: 12.1 FL (ref 8.9–12.7)
POTASSIUM SERPL-SCNC: 4.7 MMOL/L (ref 3.5–5.3)
PROT SERPL-MCNC: 6 G/DL (ref 6.4–8.2)
RBC # BLD AUTO: 3.38 MILLION/UL (ref 3.88–5.62)
SODIUM SERPL-SCNC: 141 MMOL/L (ref 136–145)
WBC # BLD AUTO: 6.81 THOUSAND/UL (ref 4.31–10.16)

## 2018-05-15 PROCEDURE — 82948 REAGENT STRIP/BLOOD GLUCOSE: CPT

## 2018-05-15 PROCEDURE — 93454 CORONARY ARTERY ANGIO S&I: CPT | Performed by: INTERNAL MEDICINE

## 2018-05-15 PROCEDURE — C1769 GUIDE WIRE: HCPCS | Performed by: INTERNAL MEDICINE

## 2018-05-15 PROCEDURE — B2111ZZ FLUOROSCOPY OF MULTIPLE CORONARY ARTERIES USING LOW OSMOLAR CONTRAST: ICD-10-PCS | Performed by: INTERNAL MEDICINE

## 2018-05-15 PROCEDURE — 99239 HOSP IP/OBS DSCHRG MGMT >30: CPT | Performed by: PHYSICIAN ASSISTANT

## 2018-05-15 PROCEDURE — 94640 AIRWAY INHALATION TREATMENT: CPT

## 2018-05-15 PROCEDURE — C1894 INTRO/SHEATH, NON-LASER: HCPCS | Performed by: INTERNAL MEDICINE

## 2018-05-15 PROCEDURE — 94760 N-INVAS EAR/PLS OXIMETRY 1: CPT

## 2018-05-15 PROCEDURE — 80053 COMPREHEN METABOLIC PANEL: CPT | Performed by: HOSPITALIST

## 2018-05-15 PROCEDURE — 85025 COMPLETE CBC W/AUTO DIFF WBC: CPT | Performed by: HOSPITALIST

## 2018-05-15 PROCEDURE — 99232 SBSQ HOSP IP/OBS MODERATE 35: CPT | Performed by: INTERNAL MEDICINE

## 2018-05-15 RX ORDER — ACETAMINOPHEN 325 MG/1
650 TABLET ORAL EVERY 6 HOURS PRN
Qty: 30 TABLET | Refills: 0 | Status: SHIPPED | OUTPATIENT
Start: 2018-05-15

## 2018-05-15 RX ORDER — VERAPAMIL HCL 2.5 MG/ML
AMPUL (ML) INTRAVENOUS CODE/TRAUMA/SEDATION MEDICATION
Status: COMPLETED | OUTPATIENT
Start: 2018-05-15 | End: 2018-05-15

## 2018-05-15 RX ORDER — ASPIRIN 81 MG/1
TABLET, CHEWABLE ORAL CODE/TRAUMA/SEDATION MEDICATION
Status: COMPLETED | OUTPATIENT
Start: 2018-05-15 | End: 2018-05-15

## 2018-05-15 RX ORDER — NITROGLYCERIN 20 MG/100ML
INJECTION INTRAVENOUS CODE/TRAUMA/SEDATION MEDICATION
Status: COMPLETED | OUTPATIENT
Start: 2018-05-15 | End: 2018-05-15

## 2018-05-15 RX ORDER — FENTANYL CITRATE 50 UG/ML
INJECTION, SOLUTION INTRAMUSCULAR; INTRAVENOUS CODE/TRAUMA/SEDATION MEDICATION
Status: COMPLETED | OUTPATIENT
Start: 2018-05-15 | End: 2018-05-15

## 2018-05-15 RX ORDER — SODIUM CHLORIDE 9 MG/ML
50 INJECTION, SOLUTION INTRAVENOUS CONTINUOUS
Status: DISPENSED | OUTPATIENT
Start: 2018-05-15 | End: 2018-05-15

## 2018-05-15 RX ORDER — MIDAZOLAM HYDROCHLORIDE 1 MG/ML
INJECTION INTRAMUSCULAR; INTRAVENOUS CODE/TRAUMA/SEDATION MEDICATION
Status: COMPLETED | OUTPATIENT
Start: 2018-05-15 | End: 2018-05-15

## 2018-05-15 RX ORDER — LIDOCAINE HYDROCHLORIDE 10 MG/ML
INJECTION, SOLUTION INFILTRATION; PERINEURAL CODE/TRAUMA/SEDATION MEDICATION
Status: COMPLETED | OUTPATIENT
Start: 2018-05-15 | End: 2018-05-15

## 2018-05-15 RX ORDER — HEPARIN SODIUM 1000 [USP'U]/ML
INJECTION, SOLUTION INTRAVENOUS; SUBCUTANEOUS CODE/TRAUMA/SEDATION MEDICATION
Status: COMPLETED | OUTPATIENT
Start: 2018-05-15 | End: 2018-05-15

## 2018-05-15 RX ORDER — FUROSEMIDE 40 MG/1
40 TABLET ORAL DAILY
Qty: 30 TABLET | Refills: 0 | Status: SHIPPED | OUTPATIENT
Start: 2018-05-16 | End: 2018-06-11 | Stop reason: SDUPTHER

## 2018-05-15 RX ORDER — POTASSIUM CHLORIDE 20 MEQ/1
20 TABLET, EXTENDED RELEASE ORAL DAILY
Qty: 30 TABLET | Refills: 0 | Status: SHIPPED | OUTPATIENT
Start: 2018-05-16 | End: 2018-06-11 | Stop reason: SDUPTHER

## 2018-05-15 RX ADMIN — FENTANYL CITRATE 50 MCG: 50 INJECTION, SOLUTION INTRAMUSCULAR; INTRAVENOUS at 11:18

## 2018-05-15 RX ADMIN — ASPIRIN 324 MG: 81 TABLET, CHEWABLE ORAL at 11:13

## 2018-05-15 RX ADMIN — HEPARIN SODIUM 4000 UNITS: 1000 INJECTION INTRAVENOUS; SUBCUTANEOUS at 11:28

## 2018-05-15 RX ADMIN — LISINOPRIL 10 MG: 10 TABLET ORAL at 08:38

## 2018-05-15 RX ADMIN — FUROSEMIDE 40 MG: 40 TABLET ORAL at 08:37

## 2018-05-15 RX ADMIN — BUDESONIDE AND FORMOTEROL FUMARATE DIHYDRATE 2 PUFF: 80; 4.5 AEROSOL RESPIRATORY (INHALATION) at 08:38

## 2018-05-15 RX ADMIN — METOPROLOL TARTRATE 12.5 MG: 25 TABLET ORAL at 21:15

## 2018-05-15 RX ADMIN — BUDESONIDE AND FORMOTEROL FUMARATE DIHYDRATE 2 PUFF: 80; 4.5 AEROSOL RESPIRATORY (INHALATION) at 17:08

## 2018-05-15 RX ADMIN — IOHEXOL 70 ML: 350 INJECTION, SOLUTION INTRAVENOUS at 11:35

## 2018-05-15 RX ADMIN — METOPROLOL TARTRATE 12.5 MG: 25 TABLET ORAL at 08:38

## 2018-05-15 RX ADMIN — POTASSIUM CHLORIDE 20 MEQ: 1500 TABLET, EXTENDED RELEASE ORAL at 08:37

## 2018-05-15 RX ADMIN — IPRATROPIUM BROMIDE 0.5 MG: 0.5 SOLUTION RESPIRATORY (INHALATION) at 19:06

## 2018-05-15 RX ADMIN — REPAGLINIDE 1 MG: 1 TABLET ORAL at 12:43

## 2018-05-15 RX ADMIN — GABAPENTIN 100 MG: 100 CAPSULE ORAL at 21:15

## 2018-05-15 RX ADMIN — FLUTICASONE PROPIONATE 2 SPRAY: 50 SPRAY, METERED NASAL at 08:38

## 2018-05-15 RX ADMIN — GABAPENTIN 100 MG: 100 CAPSULE ORAL at 08:37

## 2018-05-15 RX ADMIN — LIDOCAINE HYDROCHLORIDE 0.1 ML: 10 INJECTION, SOLUTION INFILTRATION; PERINEURAL at 11:27

## 2018-05-15 RX ADMIN — Medication 400 MG: at 17:06

## 2018-05-15 RX ADMIN — VERAPAMIL HYDROCHLORIDE 1.25 MG: 2.5 INJECTION, SOLUTION INTRAVENOUS at 11:28

## 2018-05-15 RX ADMIN — PRAVASTATIN SODIUM 20 MG: 20 TABLET ORAL at 16:59

## 2018-05-15 RX ADMIN — IPRATROPIUM BROMIDE 0.5 MG: 0.5 SOLUTION RESPIRATORY (INHALATION) at 13:15

## 2018-05-15 RX ADMIN — GABAPENTIN 100 MG: 100 CAPSULE ORAL at 16:59

## 2018-05-15 RX ADMIN — LEVALBUTEROL HYDROCHLORIDE 1.25 MG: 1.25 SOLUTION, CONCENTRATE RESPIRATORY (INHALATION) at 07:04

## 2018-05-15 RX ADMIN — IPRATROPIUM BROMIDE 0.5 MG: 0.5 SOLUTION RESPIRATORY (INHALATION) at 07:04

## 2018-05-15 RX ADMIN — MIDAZOLAM HYDROCHLORIDE 2 MG: 1 INJECTION, SOLUTION INTRAMUSCULAR; INTRAVENOUS at 11:18

## 2018-05-15 RX ADMIN — LEVALBUTEROL HYDROCHLORIDE 1.25 MG: 1.25 SOLUTION, CONCENTRATE RESPIRATORY (INHALATION) at 13:15

## 2018-05-15 RX ADMIN — LEVALBUTEROL HYDROCHLORIDE 1.25 MG: 1.25 SOLUTION, CONCENTRATE RESPIRATORY (INHALATION) at 19:06

## 2018-05-15 RX ADMIN — NITROGLYCERIN 200 MCG: 20 INJECTION INTRAVENOUS at 11:27

## 2018-05-15 RX ADMIN — REPAGLINIDE 2 MG: 1 TABLET ORAL at 17:00

## 2018-05-15 NOTE — DISCHARGE INSTRUCTIONS
Take your medications as directed, and keep your follow up appointments  Adhere to a heart healthy lifestyle, maintaining a low sodium diet  Daily weight and record  If your weight increases 2-3 lbs in one day, or 5 lbs in 2 days, you are short of breath or have lower extremity swelling, please call the heart failure team at  [facility] at [phone]  Please follow-up with your family doctor in 1 week    ==============    1  Please see the post cardiac catheterization/ angioseal closure device instructions  No heavy lifting, greater than 10 lbs  or strenuous  activity for 48 hrs  See the post cardiac catheterization/ angioseal closure device instructions  2 Remove band aid tomorrow  Shower and wash area- wrist gently with soap and water- beginning tomorrow  Rinse and pat dry  Apply new water seal band aid  Repeat this process for 5 days  No powders, creams lotions or antibiotic ointments  for 5 days  No tub baths, hot tubs or swimming for 5 days       3 No driving for 2 days

## 2018-05-15 NOTE — DISCHARGE INSTR - OTHER ORDERS
[employer] [facility] [address] [address], [address]   Office: Stefany Marques Service: [phone]    Toll-Free: [phone]

## 2018-05-15 NOTE — PROGRESS NOTES
Pt is asleep, no s/s of distress  Pt has no complaints at this time, denies any chest pain  Safety measures are in place  Pt has been NPO since midnight for possible cath in the morning  Will continue to monitor

## 2018-05-15 NOTE — PLAN OF CARE
Problem: DISCHARGE PLANNING - CARE MANAGEMENT  Goal: Discharge to post-acute care or home with appropriate resources  INTERVENTIONS:  - Conduct assessment to determine patient/family and health care team treatment goals, and need for post-acute services based on payer coverage, community resources, and patient preferences, and barriers to discharge  - Address psychosocial, clinical, and financial barriers to discharge as identified in assessment in conjunction with the patient/family and health care team  - Arrange appropriate level of post-acute services according to patients   needs and preference and payer coverage in collaboration with the physician and health care team  - Communicate with and update the patient/family, physician, and health care team regarding progress on the discharge plan  - Arrange appropriate transportation to post-acute venues  Outcome: Completed Date Met: 05/15/18  LOS 3  Not a bundle; Not a readmission  CM reviewed discharge planning process including the following: identifying caregivers at home, preference for d/c planning needs, [facility] to [facility] program, availability of treatment team to discuss questions or concerns patient and/or family may have regarding diagnosis, plan of care, old or new medications and discharge planning   CM will continue to follow for care coordination and update assessment as necessary  CM name and role reviewed and Discharge Checklist provided  Encouraged patient and caregiver to review prior to discharge  05/15/18 3458   Patient Information   Mental Status Alert   Primary Caregiver Self   Support System Immediate family   Legal Information   Advance Directives Living will;Power of  for health care; Power of  for finance   Advance Directives Status Copy on chart   Health Care Proxy Appointed Yes - [address] Proxy section   Activities of Daily Living Prior to Admission   Functional Status Independent   Assistive Device No device needed   Living Arrangement House;Lives alone   Ambulation Independent   Income Information   Income Source Pension/[employer]   Means of Transportation   Means of Transport to Appts: Drive Self      98/98/22 1426   Discharge [address] Children;Family members   Type of Current Residence Private residence   [address] No   Discharge Communications   Discharge planning discussed with: daughter/pt   Freedom of Choice Yes   IMM Given (Date): 05/15/18   IMM Given to: Patient   Family notified: daughter   Does the patient need discharge transport arranged? No   Contacts   Patient Contacts daughter   Realtionship to Patient: Family   Contact Method In Person   Reason/Outcome Discharge Planning;Emergency Contact; Continuity of Care

## 2018-05-15 NOTE — PROGRESS NOTES
Progress Note - Cardiology   Mary Beth Delgado 80 y o  male MRN: [id number]  Unit/Bed#: -01 Encounter: [id number]    Assessment:  Principal Problem:    Acute combined systolic and diastolic congestive heart failure (HCC)  Active Problems:    COPD without exacerbation ([facility] [address] )    Essential hypertension    Hyperlipidemia    Neuropathy, idiopathic    Multiple nodules of lung    Type 2 diabetes mellitus, with long-term current use of insulin ([facility])    Heart failure ([facility] )    51-year-old man with new diagnosis of systolic and diastolic congestive heart failure  Initially volume overloaded, now improved after IV and p o  diuretics  Cardiac catheterization today without significant CAD  Nonischemic cardiomyopathy  Ejection fraction is between 35 and 40%  Started on goal-directed medical therapy  Plan:    Continue beta-blocker, ACE-inhibitor  On 40 mg of oral Lasix daily, potassium supplementation  Withhold Norvasc given the addition of the above  We will arrange for outpatient follow-up  Okay for discharge later today from a cardiac standpoint  Outpatient monitoring of electrolytes  Was previously on lipid lowering therapy as an outpatient  Can readdress as outpatient given normal cardiac cath  Subjective/Objective     Subjective:     No events overnight  Overall feels well  Renal function continued to improve  Status post cardiac catheterization today via right radial approach  No CAD identified      Objective:    Vitals: /66 (BP Location: Left arm)   Pulse 83   Temp 97 6 °F (36 4 °C) (Oral)   Resp 20   Ht 6' 1" (1 854 m)   Wt 72 7 kg (160 lb 3 2 oz)   SpO2 97%   BMI 21 14 kg/m²   Vitals:    05/14/18 0600 05/15/18 0510   Weight: 73 7 kg (162 lb 7 7 oz) 72 7 kg (160 lb 3 2 oz)     Orthostatic Blood Pressures      Most Recent Value   Blood Pressure  111/66 filed at 05/15/2018 1154   Patient Position - Orthostatic VS  Lying filed at 05/15/2018 1154            Intake/Output Summary (Last 24 hours) at 05/15/18 1207  Last data filed at 05/15/18 1156   Gross per 24 hour   Intake             1810 ml   Output             1525 ml   Net              285 ml       Physical Exam:  General appearance: alert and in no acute distress  Head: Normocephalic, without obvious abnormality, atraumatic  Neck: no carotid bruit, no JVD and supple, symmetrical, trachea midline  Lungs: clear to auscultation bilaterally  Normal air entry  Normal effort  Heart: S1, S2 normal and no S3 or S4  No murmurs  Abdomen: soft, non-tender; bowel sounds normal; no masses,  no organomegaly  Ext: No edema  R radial cath site with hemoband on  Pulses: 2+ and symmetric bilaterally  Skin: Skin color, texture, turgor normal  No rashes or lesions  Neurologic: Grossly normal  Alert and oriented      Medications:    Current Facility-Administered Medications:      EMS REPLENISHMENT MED, , Does not apply, Once, Mable MD Todd    acetaminophen (TYLENOL) tablet 650 mg, 650 mg, Oral, Q4H PRN, Marily Brice MD    ascorbic acid (VITAMIN C) tablet 1,000 mg, 1,000 mg, Oral, Daily, Marily Brice MD, Stopped at 05/15/18 0840    budesonide-formoterol (SYMBICORT) 80-4 5 MCG/ACT inhaler 2 puff, 2 puff, Inhalation, BID, Marily Brice MD, 2 puff at 05/15/18 0838    docusate sodium (COLACE) capsule 100 mg, 100 mg, Oral, BID, Marily Brice MD, Stopped at 05/15/18 0840    enoxaparin (LOVENOX) subcutaneous injection 40 mg, 40 mg, Subcutaneous, Daily, Marily Brice MD, Stopped at 05/15/18 0837    fluticasone (FLONASE) 50 mcg/act nasal spray 2 spray, 2 spray, Nasal, Daily, Marily Brice MD, 2 spray at 05/15/18 0838    furosemide (LASIX) tablet 40 mg, 40 mg, Oral, Daily, Jackie Ray MD, 40 mg at 05/15/18 0837    gabapentin (NEURONTIN) capsule 100 mg, 100 mg, Oral, TID, Marily Brice MD, 100 mg at 05/15/18 0837    insulin lispro (HumaLOG) 100 units/mL subcutaneous injection 1-6 Units, 1-6 Units, Subcutaneous, 4x Daily (AC & HS), Stopped at 05/15/18 0836 **AND** Fingerstick Glucose (POCT), , , 4x Daily AC and at bedtime, Wolf Cotto PA-C    levalbuterol [facility]) inhalation solution 1 25 mg, 1 25 mg, Nebulization, TID, 1 25 mg at 05/15/18 0704 **AND** ipratropium (ATROVENT) 0 02 % inhalation solution 0 5 mg, 0 5 mg, Nebulization, TID, Chris Frankel MD, 0 5 mg at 05/15/18 0704    lisinopril (ZESTRIL) tablet 10 mg, 10 mg, Oral, Daily, Chris Frankel MD, 10 mg at 05/15/18 0838    magnesium oxide (MAG-OX) tablet 400 mg, 400 mg, Oral, BID, Chris Frankel MD, Stopped at 05/15/18 0837    metoprolol tartrate (LOPRESSOR) partial tablet 12 5 mg, 12 5 mg, Oral, Q12H [facility] HOME, Catracho Sandoval MD, 12 5 mg at 05/15/18 0838    multivitamin-minerals (CENTRUM) tablet 1 tablet, 1 tablet, Oral, Daily, Chris Frankel MD, Stopped at 05/15/18 0836    potassium chloride (K-DUR,KLOR-CON) CR tablet 20 mEq, 20 mEq, Oral, Daily, Chris Frankel MD, 20 mEq at 05/15/18 0837    pravastatin (PRAVACHOL) tablet 20 mg, 20 mg, Oral, Daily With Zonia Fernandes MD, 20 mg at 05/14/18 1643    repaglinide (PRANDIN) tablet 1 mg, 1 mg, Oral, Daily Before Lunch, Chris Frankel MD, 1 mg at 05/14/18 1138    repaglinide (PRANDIN) tablet 2 mg, 2 mg, Oral, Before Zonia Fernandes MD, 2 mg at 05/14/18 1822    repaglinide (PRANDIN) tablet 3 mg, 3 mg, Oral, Daily Before Breakfast, Chris Frankel MD, Stopped at 05/15/18 0836    roflumilast tablet 500 mcg, 500 mcg, Oral, Daily, Chris Frankel MD, 500 mcg at 05/14/18 1914    sodium chloride 0 9 % infusion, 50 mL/hr, Intravenous, Continuous, Martha Schmidt MD, Last Rate: 50 mL/hr at 05/15/18 1156, 50 mL/hr at 05/15/18 1156    Lab Results:    Results from last 7 days  Lab Units 05/12/18  0819   TROPONIN I ng/mL 0 03       Results from last 7 days  Lab Units 05/15/18  0437 05/13/18  0530 05/12/18  0819   WBC Thousand/uL 6 81 7 29 8 72   HEMOGLOBIN g/dL 10 3* 9 9* 11 2*   HEMATOCRIT % 32 4* 30 1* 34 8*   PLATELETS Thousands/uL 172 195 192       Results from last 7 days  Lab Units 05/13/18  0530   CHOLESTEROL mg/dL 96   TRIGLYCERIDES mg/dL 24   HDL mg/dL 50       Results from last 7 days  Lab Units 05/15/18  0437 05/14/18  1217 05/13/18  0101   SODIUM mmol/L 141 139 138   POTASSIUM mmol/L 4 7 4 6 4 0   CHLORIDE mmol/L 106 103 102   CO2 mmol/L 29 27 29   BUN mg/dL 25 24 25   CREATININE mg/dL 1 17 1 24 1 32*   CALCIUM mg/dL 8 5 8 9 8 6   TOTAL PROTEIN g/dL 6 0*  --  5 9*   BILIRUBIN TOTAL mg/dL 0 60  --  0 60   ALK PHOS U/L 41*  --  41*   ALT U/L 40  --  31   AST U/L 17  --  11   GLUCOSE RANDOM mg/dL 171* 193* 255*           Results from last 7 days  Lab Units 05/13/18  0101   MAGNESIUM mg/dL 1 4*       Telemetry: Personally reviewed  No significant arrhythmias    Echo:  LEFT VENTRICLE:  Systolic function was moderately reduced by visual assessment  Ejection fraction was estimated to be 37 %  There was moderate diffuse hypokinesis  Doppler parameters were consistent with restrictive physiology, indicative of decreased left ventricular diastolic compliance and/or increased left atrial pressure      RIGHT VENTRICLE:  The ventricle was mildly dilated  Systolic function was mildly reduced      MITRAL VALVE:  There was mild regurgitation      TRICUSPID VALVE:  There was trace regurgitation  Estimated peak PA pressure was 40 mmHg  The findings suggest mild pulmonary hypertension      IVC, HEPATIC VEINS:  The inferior vena cava was dilated      Cath: No significant CAD

## 2018-05-15 NOTE — DISCHARGE SUMMARY
Discharge Summary - [address] [facility] Internal Medicine / Hospitalists  Raquel Reyes 80 y o  male MRN: [id number]    [facility] CATH LAB Room / Bed: /-01 Encounter: [id number]      Admitting Provider: Carlos Greene MD  Discharge Provider: Damaso Fox PA-C  Admission Date: 5/12/2018     Discharge Date: 5/15/18  Primary Care Physician at Discharge: Laura Valentino [phone]    Primary Discharge Diagnosis  Principal Problem (Resolved):    Acute combined systolic and diastolic congestive heart failure ([address] )  Active Problems:    COPD without exacerbation ([address] )    Essential hypertension    Hyperlipidemia    Neuropathy, idiopathic    Multiple nodules of lung    Type 2 diabetes mellitus, with long-term current use of insulin ([facility] [address] )    Heart failure ([address] )      Other Problems Addressed  Patient Active Problem List    Diagnosis Date Noted    Heart failure ([facility] [address] )     Type 2 diabetes mellitus, with long-term current use of insulin ([facility] [address] ) 05/12/2018    Mixed simple and mucopurulent chronic bronchitis ([address] ) 03/26/2018    Multiple nodules of lung 03/09/2018    Neuropathy, idiopathic 08/22/2017    COPD without exacerbation ([address] ) 07/28/2017    Essential hypertension 08/27/2012    Hyperlipidemia 05/06/2012       Consulting Providers   Dr Leonila Galindo - Cardio    Diagnostic Procedures Performed  CTA chest PE study -  No evidence for acute pulmonary embolism  Increasing mild to moderate bilateral dependent pleural effusions  Chronic scarring, bronchiectasis and peribronchial thickening in the dependent aspects of both lower lobes   Superimposed reticular interstitial opacities which may reflect component of interstitial fluid and subsegmental atelectasis  Small subsegmental focus of consolidation seen in the right upper lobe adjacent to the posterior segmental bronchus   See above for further discussion   Recommend follow-up      CXR - Streaky left basilar atelectasis and possible trace posterior pleural effusions   No evolving infiltrates  Echo - LEFT VENTRICLE:  Systolic function was moderately reduced by visual assessment  Ejection fraction was estimated to be 37 %  There was moderate diffuse hypokinesis  Doppler parameters were consistent with restrictive physiology, indicative of decreased left ventricular diastolic compliance and/or increased left atrial pressure      RIGHT VENTRICLE:  The ventricle was mildly dilated  Systolic function was mildly reduced      MITRAL VALVE:  There was mild regurgitation      TRICUSPID VALVE:  There was trace regurgitation  Estimated peak PA pressure was 40 mmHg  The findings suggest mild pulmonary hypertension  Treatments   IV Lasix    Procedures   Cardiac cath  CORONARY VESSELS:   --  Left main: Normal   --  LAD: The vessel was normal sized  There was mild plaque  There were no significant lesions  The diagonal branches were also free of significant disease  --  Circumflex: The vessel was normal sized and gave rise to one OM branch  There was minor plaque  There were no significant lesions  --  Ramus intermedius: The vessel was normal sized  There was minor non-obstructive plaque  --  RCA: Dominant, giving rise to the PDA and two posterolateral branches  The were no significant lesions      Other Pertinent Test Results    Results from last 7 days  Lab Units 05/15/18  0437   WBC Thousand/uL 6 81   HEMOGLOBIN g/dL 10 3*   HEMATOCRIT % 32 4*   PLATELETS Thousands/uL 172       Results from last 7 days  Lab Units 05/15/18  0437   SODIUM mmol/L 141   POTASSIUM mmol/L 4 7   CHLORIDE mmol/L 106   CO2 mmol/L 29   BUN mg/dL 25   CREATININE mg/dL 1 17   CALCIUM mg/dL 8 5   TOTAL PROTEIN g/dL 6 0*   BILIRUBIN TOTAL mg/dL 0 60   ALK PHOS U/L 41*   ALT U/L 40   AST U/L 17   GLUCOSE RANDOM mg/dL 171*       Results from last 7 days  Lab Units 05/12/18  0819   TROPONIN I ng/mL 0 03     No results found for: Myron Ambrose, [facility] , SPUTUMCULTUR      Presenting Problem/History of Present Illness  Acute combined systolic and diastolic congestive heart failure [facility])    HOSPITAL COURSE:  Chaim Vaz is a 80 y o  male who [facility] with  shortness of breath which started early this morning  He has a history of hypertension, diabetes, dyslipidemia, COPD which he attributes to occupational exposure, were 10 pack years of smoking, since February has had on and off symptoms of bronchitis, most recently treated with steroids for about 10 days-last dose on May 5th-1 week ago  Even after that, he felt reasonably okay till 2 days ago when he started feeling more short of breath  Does admit to slightly more salt intake  Has had 2 restaurant meals-sausagee, soup  He also regularly has lunch with his wife who is at an assisted living facility  He is not on diuretic at baseline  Upon presentation in the ER the patient had a CT showing bilateral pleural effusions and had a elevated proBNP of the approximately 6000  Was admitted for a new onset of congestive heart failure treated with IV Lasix and to be seen in conjunction with Cardiology    1  Acute onset of systolic congestive heart failure-the patient was treated with IV Lasix and diuresed nicely  His weight on discharge was 72 7 kg approximate 6 kg weight loss  He is being recommended to continue 40 mg p o  Lasix daily along with potassium supplementation  He is to follow up with Dr Prasanna Castillo cardiology on discharge  He is been instructed on the importance of following a low-salt diet and weighing himself daily    2  Nonischemic cardiomyopathy-it was noted that the patient's ejection fraction was 37%  He proceeded to get a cardiac catheterization without any intervention required  He is to continue metoprolol and ACE-inhibitor    3  Essential hypertension-his medications were adjusted this admission  He is no longer on a calcium channel blocker    He should continue on an ACE-inhibitor beta-blocker and new start of Lasix  4   Diabetes type 2 with neuropathy-the patient may continue his outpatient regimen  Continue Neurontin    5  COPD-no exacerbation-continue outpatient regimen  Follow up with pulmonology as previously planned  6   Lung nodules-previously known  Follow up outpatient        PHYSICAL EXAM:  Vitals:   Temp:  [97 6 °F (36 4 °C)-98 1 °F (36 7 °C)] 97 6 °F (36 4 °C)  HR:  [74-88] 84  Resp:  [16-20] 18  BP: (109-126)/(59-71) 122/69    General Appearance:    Alert, cooperative, no distress   Head:    Normocephalic, without obvious abnormality, atraumatic   Eyes:    Conjunctiva/corneas clear, EOM's intact      Neck:   Supple, no adenopathy, no JVD   Back:     Symmetric, no curvature, ROM normal, no CVA tenderness   Lungs:     Coarse with faint bilateral basilar crackles that cleared with repeated inspiration, no wheezes appreciated   Heart:    Regular rate and rhythm, S1 and S2 normal, no murmur, rub   or gallop   Abdomen:     Soft, non-tender, bowel sounds decreased   Extremities:   Extremities normal, atraumatic, no cyanosis or edema   Psych:   Normal Affect   Neurologic:   CNII-XII intact  Normal strength, sensation and reflexes       Throughout       Discharge Disposition: Home      Allergies  No Known Allergies    Diet restrictions: low salt, diabetic  Activity restrictions: as tolerated  Discharge Condition: good      Outpatient Follow-Up  Bryan Stokes MD   Cardiology  Multidisciplinary [id number] 500 [address] [place] [address] 105     Next Steps: Follow up on 5/21/2018      Instructions: 10:20 am appointment  Please arrive 15 minutes prior and bring updated medication list        Hussain Bills MD  PCP - 0 [facility] [phone] [facility] [address], [address] [place] [address] [address]     Next Steps:  Follow up in 1 week(s)      Instructions: 1 week Code Status: Level 1 - Full Code  Advance Directive and Living Will: Received  Power of :    POLST:      Discharge Statement   I spent 37 minutes discharging the patient  This time was spent on the day of discharge  Greater than 50% of total time was spent with the patient and / or family counseling and / or coordination of care  Discharge Medications:  See after visit summary for reconciled discharge medications provided to patient and family        This note has been constructed using a voice recognition system

## 2018-05-15 NOTE — SOCIAL WORK
LOS 3   Not a bundle; Not a readmission  CM reviewed discharge planning process including the following: identifying caregivers at home, preference for d/c planning needs, [facility] Meds to Bed program, availability of treatment team to discuss questions or concerns patient and/or family may have regarding diagnosis, plan of care, old or new medications and discharge planning   CM will continue to follow for care coordination and update assessment as necessary  CM name and role reviewed and Discharge Checklist provided  Encouraged patient and caregiver to review prior to discharge  Cm provided contact information for Meals on Wheels as the daughter was considering possibly arranging this service for pt         05/15/18 1425   Patient Information   Mental Status Alert   Primary Caregiver Self   Support System Immediate family   Legal Information   Advance Directives Living will;Power of  for health care; Power of  for finance   Advance Directives Status Copy on chart   Health Care Proxy Appointed Yes - [address] Proxy section   Activities of Daily Living Prior to Admission   Functional Status Independent   Assistive Device No device needed   Living Arrangement House;Lives alone   Ambulation [address] of Transport to [facility]: Drive Self      71/18/43 1426   Discharge [address] Children;Family members   Type of Current Residence Private residence   [address] No   Discharge Communications   Discharge planning discussed with: daughter/pt   Freedom of Choice Yes   IMM Given (Date): 05/15/18   IMM Given to: Patient   Family notified: daughter   Does the patient need discharge transport arranged?  No   Contacts   Patient Contacts daughter   Realtionship to Patient: Family   Contact Method In Person   Reason/Outcome Discharge Planning;Emergency Contact; Continuity of Care

## 2018-05-16 VITALS
BODY MASS INDEX: 22.85 KG/M2 | SYSTOLIC BLOOD PRESSURE: 148 MMHG | RESPIRATION RATE: 18 BRPM | TEMPERATURE: 97.5 F | OXYGEN SATURATION: 96 % | DIASTOLIC BLOOD PRESSURE: 70 MMHG | HEIGHT: 73 IN | HEART RATE: 91 BPM | WEIGHT: 172.4 LBS

## 2018-05-16 LAB
ANION GAP SERPL CALCULATED.3IONS-SCNC: 9 MMOL/L (ref 4–13)
BASOPHILS # BLD AUTO: 0.06 THOUSANDS/ΜL (ref 0–0.1)
BASOPHILS NFR BLD AUTO: 1 % (ref 0–1)
BUN SERPL-MCNC: 22 MG/DL (ref 5–25)
CALCIUM SERPL-MCNC: 8.6 MG/DL (ref 8.3–10.1)
CHLORIDE SERPL-SCNC: 105 MMOL/L (ref 100–108)
CO2 SERPL-SCNC: 26 MMOL/L (ref 21–32)
CREAT SERPL-MCNC: 1.13 MG/DL (ref 0.6–1.3)
EOSINOPHIL # BLD AUTO: 0.29 THOUSAND/ΜL (ref 0–0.61)
EOSINOPHIL NFR BLD AUTO: 5 % (ref 0–6)
ERYTHROCYTE [DISTWIDTH] IN BLOOD BY AUTOMATED COUNT: 25.5 % (ref 11.6–15.1)
GFR SERPL CREATININE-BSD FRML MDRD: 59 ML/MIN/1.73SQ M
GLUCOSE SERPL-MCNC: 163 MG/DL (ref 65–140)
GLUCOSE SERPL-MCNC: 169 MG/DL (ref 65–140)
GLUCOSE SERPL-MCNC: 313 MG/DL (ref 65–140)
HCT VFR BLD AUTO: 32.7 % (ref 36.5–49.3)
HGB BLD-MCNC: 10.4 G/DL (ref 12–17)
LYMPHOCYTES # BLD AUTO: 1.09 THOUSANDS/ΜL (ref 0.6–4.47)
LYMPHOCYTES NFR BLD AUTO: 17 % (ref 14–44)
MCH RBC QN AUTO: 30.4 PG (ref 26.8–34.3)
MCHC RBC AUTO-ENTMCNC: 31.8 G/DL (ref 31.4–37.4)
MCV RBC AUTO: 96 FL (ref 82–98)
MONOCYTES # BLD AUTO: 0.7 THOUSAND/ΜL (ref 0.17–1.22)
MONOCYTES NFR BLD AUTO: 11 % (ref 4–12)
NEUTROPHILS # BLD AUTO: 4.18 THOUSANDS/ΜL (ref 1.85–7.62)
NEUTS SEG NFR BLD AUTO: 66 % (ref 43–75)
PLATELET # BLD AUTO: 166 THOUSANDS/UL (ref 149–390)
PMV BLD AUTO: 11.2 FL (ref 8.9–12.7)
POTASSIUM SERPL-SCNC: 4.4 MMOL/L (ref 3.5–5.3)
RBC # BLD AUTO: 3.42 MILLION/UL (ref 3.88–5.62)
SODIUM SERPL-SCNC: 140 MMOL/L (ref 136–145)
WBC # BLD AUTO: 6.32 THOUSAND/UL (ref 4.31–10.16)

## 2018-05-16 PROCEDURE — 80048 BASIC METABOLIC PNL TOTAL CA: CPT | Performed by: INTERNAL MEDICINE

## 2018-05-16 PROCEDURE — 99239 HOSP IP/OBS DSCHRG MGMT >30: CPT | Performed by: PHYSICIAN ASSISTANT

## 2018-05-16 PROCEDURE — 94640 AIRWAY INHALATION TREATMENT: CPT

## 2018-05-16 PROCEDURE — 85025 COMPLETE CBC W/AUTO DIFF WBC: CPT | Performed by: PHYSICIAN ASSISTANT

## 2018-05-16 PROCEDURE — 94760 N-INVAS EAR/PLS OXIMETRY 1: CPT

## 2018-05-16 PROCEDURE — 82948 REAGENT STRIP/BLOOD GLUCOSE: CPT

## 2018-05-16 RX ADMIN — REPAGLINIDE 3 MG: 1 TABLET ORAL at 08:06

## 2018-05-16 RX ADMIN — Medication 400 MG: at 08:04

## 2018-05-16 RX ADMIN — FLUTICASONE PROPIONATE 2 SPRAY: 50 SPRAY, METERED NASAL at 08:06

## 2018-05-16 RX ADMIN — GABAPENTIN 100 MG: 100 CAPSULE ORAL at 08:04

## 2018-05-16 RX ADMIN — ENOXAPARIN SODIUM 40 MG: 40 INJECTION SUBCUTANEOUS at 08:05

## 2018-05-16 RX ADMIN — LISINOPRIL 10 MG: 10 TABLET ORAL at 08:04

## 2018-05-16 RX ADMIN — LEVALBUTEROL HYDROCHLORIDE 1.25 MG: 1.25 SOLUTION, CONCENTRATE RESPIRATORY (INHALATION) at 07:36

## 2018-05-16 RX ADMIN — POTASSIUM CHLORIDE 20 MEQ: 1500 TABLET, EXTENDED RELEASE ORAL at 08:04

## 2018-05-16 RX ADMIN — Medication 1 TABLET: at 08:04

## 2018-05-16 RX ADMIN — FUROSEMIDE 40 MG: 40 TABLET ORAL at 08:04

## 2018-05-16 RX ADMIN — IPRATROPIUM BROMIDE 0.5 MG: 0.5 SOLUTION RESPIRATORY (INHALATION) at 07:36

## 2018-05-16 RX ADMIN — DOCUSATE SODIUM 100 MG: 100 CAPSULE, LIQUID FILLED ORAL at 08:04

## 2018-05-16 RX ADMIN — ACETAMINOPHEN 650 MG: 325 TABLET ORAL at 03:39

## 2018-05-16 RX ADMIN — METOPROLOL TARTRATE 12.5 MG: 25 TABLET ORAL at 08:04

## 2018-05-16 RX ADMIN — OXYCODONE HYDROCHLORIDE AND ACETAMINOPHEN 1000 MG: 500 TABLET ORAL at 08:04

## 2018-05-16 RX ADMIN — BUDESONIDE AND FORMOTEROL FUMARATE DIHYDRATE 2 PUFF: 80; 4.5 AEROSOL RESPIRATORY (INHALATION) at 08:06

## 2018-05-16 NOTE — PROGRESS NOTES
Patient had small amount of oozing at right radial cath site  Added air to hemoband  Pressure maintained and oozing stopped  SLIM informed  Daughter requested discharge be held until the morning, she is nervous about him going home tonight  SLIM notified; discharge cancelled till AM  Will continue to monitor   Rudy John RN

## 2018-05-16 NOTE — DISCHARGE SUMMARY
Discharge Summary - 126 [facility] / Hospitalists  Karina Parsons 80 y o  male MRN: [id number]  [facility] 2 / Bed: /-01 Encounter: [id number]        Admitting Provider: Marily Brice MD  Discharge Provider: Jo Rosas PA-C  Admission Date: 5/12/2018     Discharge Date:     Discharge held until 5/16/18 secondary to small amount of oozing at R radial cath site  Air added to hemoband, and oozing stopped  Patient remained stable overnight, and was able to be discharged  The rest of the hospital course and discharge instructions remain the same  Primary Care Physician at Discharge: Evelyn Phipps -[phone]     Primary Discharge Diagnosis  Principal Problem (Resolved):    Acute combined systolic and diastolic congestive heart failure ([facility] )  Active Problems:    COPD without exacerbation ([facility] )    Essential hypertension    Hyperlipidemia    Neuropathy, idiopathic    Multiple nodules of lung    Type 2 diabetes mellitus, with long-term current use of insulin ([facility])    Heart failure ([facility] )        Other Problems Addressed       Patient Active Problem List     Diagnosis Date Noted    Heart failure ([facility] )      Type 2 diabetes mellitus, with long-term current use of insulin ([facility] ) 05/12/2018    Mixed simple and mucopurulent chronic bronchitis ([facility] ) 03/26/2018    Multiple nodules of lung 03/09/2018    Neuropathy, idiopathic 08/22/2017    COPD without exacerbation ([facility] ) 07/28/2017    Essential hypertension 08/27/2012    Hyperlipidemia 05/06/2012         Consulting Providers   Dr Bridgett Rodriguez - Cardio     Diagnostic Procedures Performed  CTA chest PE study -  No evidence for acute pulmonary embolism  Increasing mild to moderate bilateral dependent pleural effusions    Chronic scarring, bronchiectasis and peribronchial thickening in the dependent aspects of both lower lobes   Superimposed reticular interstitial opacities which may reflect component of interstitial fluid and subsegmental atelectasis  Small subsegmental focus of consolidation seen in the right upper lobe adjacent to the posterior segmental bronchus   See above for further discussion   Recommend follow-up      CXR - Streaky left basilar atelectasis and possible trace posterior pleural effusions   No evolving infiltrates      Echo - LEFT VENTRICLE:  Systolic function was moderately reduced by visual assessment  Ejection fraction was estimated to be 37 %  There was moderate diffuse hypokinesis  Doppler parameters were consistent with restrictive physiology, indicative of decreased left ventricular diastolic compliance and/or increased left atrial pressure      RIGHT VENTRICLE:  The ventricle was mildly dilated  Systolic function was mildly reduced      MITRAL VALVE:  There was mild regurgitation      TRICUSPID VALVE:  There was trace regurgitation  Estimated peak PA pressure was 40 mmHg  The findings suggest mild pulmonary hypertension      Treatments   IV Lasix     Procedures   Cardiac cath  CORONARY VESSELS:   --  Left main: Normal   --  LAD: The vessel was normal sized  There was mild plaque  There were no significant lesions  The diagonal branches were also free of significant disease  --  Circumflex: The vessel was normal sized and gave rise to one OM branch  There was minor plaque  There were no significant lesions  --  Ramus intermedius: The vessel was normal sized  There was minor non-obstructive plaque  --  RCA: Dominant, giving rise to the PDA and two posterolateral branches   The were no significant lesions      Other Pertinent Test Results     Results from last 7 days  Lab Units 05/15/18  0437   WBC Thousand/uL 6 81   HEMOGLOBIN g/dL 10 3*   HEMATOCRIT % 32 4*   PLATELETS Thousands/uL 172         Results from last 7 days  Lab Units 05/15/18  0437   SODIUM mmol/L 141   POTASSIUM mmol/L 4 7   CHLORIDE mmol/L 106   CO2 mmol/L 29   BUN mg/dL 25   CREATININE mg/dL 1 17   CALCIUM mg/dL 8 5   TOTAL PROTEIN g/dL 6 0*   BILIRUBIN TOTAL mg/dL 0 60   ALK PHOS U/L 41*   ALT U/L 40   AST U/L 17   GLUCOSE RANDOM mg/dL 171*         Results from last 7 days  Lab Units 05/12/18  0819   TROPONIN I ng/mL 0 03      No results found for: Maritza Homes, SPUTUMCULTUR        Presenting Problem/History of Present Illness  Acute combined systolic and diastolic congestive heart failure [facility])     HOSPITAL COURSE:  Cory John a 80 y  o  male who presented to [facility] on 5/12/18 with  shortness of breath which started early this morning  He has a history of hypertension, diabetes, dyslipidemia, COPD which he attributes to occupational exposure, were 10 pack years of smoking, since February has had on and off symptoms of bronchitis, most recently treated with steroids for about 10 days-last dose on May 5th-1 week ago  Ab Perdomo after that, he felt reasonably okay till 2 days ago when he started feeling more short of breath   Does admit to slightly more salt intake   Has had 2 restaurant meals-sausagee, soup   He also regularly has lunch with his wife who is at an assisted living facility  Charline Henley is not on diuretic at baseline      Upon presentation in the ER the patient had a CT showing bilateral pleural effusions and had a elevated proBNP of the approximately 6000  Was admitted for a new onset of congestive heart failure treated with IV Lasix and to be seen in conjunction with Cardiology     1  Acute onset of systolic congestive heart failure-the patient was treated with IV Lasix and diuresed nicely  His weight on discharge was 72 7 kg approximate 6 kg weight loss  He is being recommended to continue 40 mg p o  Lasix daily along with potassium supplementation  He is to follow up with Dr Ronni Yu cardiology on discharge  He is been instructed on the importance of following a low-salt diet and weighing himself daily     2  Nonischemic cardiomyopathy-it was noted that the patient's ejection fraction was 37%    He proceeded to get a cardiac catheterization without any intervention required  He is to continue metoprolol and ACE-inhibitor     3   Essential hypertension-his medications were adjusted this admission  He is no longer on a calcium channel blocker  He should continue on an ACE-inhibitor beta-blocker and new start of Lasix      4   Diabetes type 2 with neuropathy-the patient may continue his outpatient regimen  Continue Neurontin     5  COPD-no exacerbation-continue outpatient regimen  Follow up with pulmonology as previously planned      6   Lung nodules-previously known  Follow up outpatient           PHYSICAL EXAM:  Vitals:   Temp:  [97 6 °F (36 4 °C)-98 1 °F (36 7 °C)] 97 6 °F (36 4 °C)  HR:  [74-88] 84  Resp:  [16-20] 18  BP: (109-126)/(59-71) 122/69     General Appearance:    Alert, cooperative, no distress   Head:    Normocephalic, without obvious abnormality, atraumatic   Eyes:    Conjunctiva/corneas clear, EOM's intact      Neck:   Supple, no adenopathy, no JVD   Back:     Symmetric, no curvature, ROM normal, no CVA tenderness   Lungs:     Coarse but no crackles or wheezes appreciated   Heart:    Regular rate and rhythm, S1 and S2 normal, no murmur, rub   or gallop   Abdomen:     Soft, non-tender, bowel sounds active x4   Extremities:   Extremities normal, atraumatic, no cyanosis or edema   Psych:   Normal Affect   Neurologic:   CNII-XII intact  Normal strength, sensation and reflexes  throughout         Discharge Disposition: Home        Allergies  No Known Allergies           Diet restrictions: low salt, diabetic  Activity restrictions: as tolerated  Discharge Condition: good        Outpatient Follow-Up  Bryan Stokes MD    Cardiology  Multidisciplinary [id number] [place] [address]       Next Steps: Follow up on 5/21/2018      Instructions: 10:20 am appointment   Please arrive 15 minutes prior and bring updated medication list        Hussain Bills MD  PCP - General Family Medicine [phone] [facility]  [address]       Next Steps: Follow up in 1 week(s)      Instructions: 1 week               Code Status: Level 1 - Full Code  Advance Directive and Living Will: Received  Power of :    POLST:       Discharge Statement   I spent 37 minutes discharging the patient  This time was spent on the day of discharge   Greater than 50% of total time was spent with the patient and / or family counseling and / or coordination of care      Discharge Medications:  See after visit summary for reconciled discharge medications provided to patient and family        This note has been constructed using a voice recognition system

## 2018-05-17 ENCOUNTER — TRANSITIONAL CARE MANAGEMENT (OUTPATIENT)
Dept: FAMILY MEDICINE CLINIC | Facility: MEDICAL CENTER | Age: 83
End: 2018-05-17

## 2018-05-17 NOTE — CASE MANAGEMENT
Notification of Discharge  This is a Notification of Discharge from our facility [address]  Please be advised that this patient has been discharge from our facility  Below you will find the admission and discharge date and time including the patients disposition  PRESENTATION DATE: 5/12/2018  7:58 AM  IP ADMISSION DATE: 5/12/18 1021  DISCHARGE DATE: 5/16/2018  2:12 PM  DISPOSITION: [address] [place] [address] in the [facility] by Gilberto Franco for 2017  Network Utilization Review Department  Phone: [phone]; Fax [phone]  ATTENTION: The Network Utilization Review Department is now centralized for our 7 Facilities  Make a note that we have a new phone and fax numbers for our Department  Please call with any questions or concerns to [phone] and carefully follow the prompts so that you are directed to the right person  All voicemails are confidential  Fax any determinations, approvals, denials, and requests for initial or continue stay review clinical to [phone]  Due to HIGH CALL volume, it would be easier if you could please send faxed requests to expedite your requests and in part, help us provide discharge notifications faster        Reference #[id number]

## 2018-05-21 ENCOUNTER — TELEPHONE (OUTPATIENT)
Dept: FAMILY MEDICINE CLINIC | Facility: MEDICAL CENTER | Age: 83
End: 2018-05-21

## 2018-05-21 ENCOUNTER — OFFICE VISIT (OUTPATIENT)
Dept: CARDIOLOGY CLINIC | Facility: CLINIC | Age: 83
End: 2018-05-21
Payer: COMMERCIAL

## 2018-05-21 VITALS
DIASTOLIC BLOOD PRESSURE: 72 MMHG | HEIGHT: 73 IN | SYSTOLIC BLOOD PRESSURE: 118 MMHG | WEIGHT: 161.2 LBS | BODY MASS INDEX: 21.36 KG/M2 | OXYGEN SATURATION: 98 % | HEART RATE: 80 BPM

## 2018-05-21 DIAGNOSIS — I50.22 CHRONIC SYSTOLIC HEART FAILURE (HCC): ICD-10-CM

## 2018-05-21 DIAGNOSIS — E78.49 OTHER HYPERLIPIDEMIA: ICD-10-CM

## 2018-05-21 DIAGNOSIS — I10 ESSENTIAL HYPERTENSION: Primary | Chronic | ICD-10-CM

## 2018-05-21 DIAGNOSIS — I42.0 DILATED CARDIOMYOPATHY (HCC): ICD-10-CM

## 2018-05-21 DIAGNOSIS — I50.22 CHRONIC SYSTOLIC CONGESTIVE HEART FAILURE (HCC): ICD-10-CM

## 2018-05-21 PROCEDURE — 99214 OFFICE O/P EST MOD 30 MIN: CPT | Performed by: INTERNAL MEDICINE

## 2018-05-21 PROCEDURE — 1111F DSCHRG MED/CURRENT MED MERGE: CPT | Performed by: INTERNAL MEDICINE

## 2018-05-21 RX ORDER — METOPROLOL SUCCINATE 25 MG/1
50 TABLET, EXTENDED RELEASE ORAL DAILY
Qty: 60 TABLET | Refills: 0 | Status: SHIPPED | OUTPATIENT
Start: 2018-05-21 | End: 2018-05-22 | Stop reason: SDUPTHER

## 2018-05-21 NOTE — PROGRESS NOTES
Cardiology Follow Up    Juan Askew  1933  [id number]  HEART & VASCULAR Gerardo Goldman  [facility]  [address] [place] [address] [address]    No diagnosis found  I had the pleasure of seeing Juan Askew for a follow up visit  Interval History: none    History of the presenting illness, Discussion/Summary and My Plan are as follows:      66-year-old without any prior cardiac history-including CAD, mi, bypass, stents or valvular heart disease or family history of cardiomyopathy or coronary artery disease  He has developed a new left bundle-branch block since 2013  He was recently admitted-May 2018-acute congestive heart failure, with diagnosis of a new cardiomyopathy on echocardiogram with an ejection fraction of 35-40%, underwent coronary angiography without any obstructive CAD  He is on low doses of beta-blocker and ACE-inhibitor at 12 5 twice daily of metoprolol and 10 mg of lisinopril as well as Lasix 40 mg daily with which he has felt significantly better  He denies any orthopnea or dyspnea with exertion or edema  He does admit to fatigue but feels much better than in the hospital     Plan:    Cardiomyopathy and chronic systolic congestive heart failure:  Nonischemic, no alcohol use or chemotherapy  No family history  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 [employer]    He is also on metformin which he will discuss about with his endocrinologist     Will obtain a baseline BMP and proBNP    Plan was reviewed with the patient as well as his daughter    Follow-up in 3 months     Patient Active Problem List   Diagnosis    COPD without exacerbation ([place] [address] )    Essential hypertension    Hyperlipidemia    Neuropathy, idiopathic    Multiple nodules of lung    Mixed simple and mucopurulent chronic bronchitis (HCC)    Type 2 diabetes mellitus, with long-term current use of insulin ([place] [address] )    Heart failure ([place] [address] )     Past Medical History:   Diagnosis Date    COPD (chronic obstructive pulmonary disease) ([place] [address] )     Diabetes mellitus ([place] [address] )     Type 2    Hypertension      Social History     Social History    Marital status: /Civil Union     Spouse name: N/A    Number of children: 2    Years of education: N/A     Occupational History    restired      Social History Main Topics    Smoking status: Former Smoker     Packs/day: 1 00     Years: 10 00     Quit date: 1960    Smokeless tobacco: Never Used    Alcohol use 0 0 oz/week      Comment: SOcially     Drug use: No    Sexual activity: Not on file     Other Topics Concern    Not on file     Social History Narrative    No narrative on file      Family History   Problem Relation Age of Onset    Heart attack Neg Hx     Stroke Neg Hx     Aneurysm Neg Hx     Clotting disorder Neg Hx     Arrhythmia Neg Hx     Hypertension Neg Hx     Hyperlipidemia Neg Hx      pt unsure      Past Surgical History:   Procedure Laterality Date    APPENDECTOMY      CHOLECYSTECTOMY      CHOLECYSTECTOMY      HERNIA REPAIR      JOINT REPLACEMENT Left     meniscus repair       Current Outpatient Prescriptions:     acetaminophen (TYLENOL) 325 mg tablet, Take 2 tablets (650 mg total) by mouth every 6 (six) hours as needed for mild pain, Disp: 30 tablet, Rfl: 0    albuterol (2 5 mg/3 mL) 0 083 % nebulizer solution, Inhale 1 each, Disp: , Rfl:     Ascorbic Acid (VITAMIN C) 1000 MG tablet, Take 1 tablet by mouth daily, Disp: , Rfl:     Cinnamon 500 MG TABS, Take 2 tablets by mouth daily, Disp: , Rfl:     fluticasone (FLONASE) 50 mcg/act nasal spray, 2 sprays into each nostril daily, Disp: 16 g, Rfl: 1    fluticasone (FLOVENT HFA) 220 mcg/act inhaler, Inhale 1 puff 2 (two) times a day, Disp: 1 Inhaler, Rfl: 1    furosemide (LASIX) 40 mg tablet, Take 1 tablet (40 mg total) by mouth daily, Disp: 30 tablet, Rfl: 0    gabapentin (NEURONTIN) 100 mg capsule, Take 2 capsules (200 mg total) by mouth daily (Patient taking differently: Take 200 mg by mouth daily at bedtime  ), Disp: 30 capsule, Rfl: 2    insulin detemir (LEVEMIR) 100 units/mL subcutaneous injection, Inject 18 Units under the skin daily at bedtime  , Disp: , Rfl:     lisinopril (ZESTRIL) 10 mg tablet, Take 1 tablet (10 mg total) by mouth daily, Disp: 30 tablet, Rfl: 3    metFORMIN (GLUCOPHAGE) 1000 MG tablet, Take 1 tablet by mouth 2 (two) times a day, Disp: , Rfl:     metoprolol tartrate (LOPRESSOR) 25 mg tablet, Take 0 5 tablets (12 5 mg total) by mouth every 12 (twelve) hours, Disp: 60 tablet, Rfl: 0    Multiple Vitamins-Minerals (OCUVITE ADULT 50+ PO), Take 1 tablet by mouth daily, Disp: , Rfl:     potassium chloride (K-DUR,KLOR-CON) 20 mEq tablet, Take 1 tablet (20 mEq total) by mouth daily, Disp: 30 tablet, Rfl: 0    repaglinide (PRANDIN) 1 mg tablet, Take 3 mg by mouth daily  , Disp: , Rfl:     repaglinide (PRANDIN) 2 mg tablet, Take 2 mg by mouth daily before dinner, Disp: , Rfl:     roflumilast 500 mcg TABS, Take 1 tablet (500 mcg total) by mouth daily, Disp: 30 tablet, Rfl: 3    simvastatin (ZOCOR) 10 mg tablet, Take 1 tablet (10 mg total) by mouth daily, Disp: 90 tablet, Rfl: 1    Umeclidinium-Vilanterol (ANORO ELLIPTA) 62 5-25 MCG/INH AEPB, Inhale 1 puff daily, Disp: , Rfl:     VENTOLIN  (90 Base) MCG/ACT inhaler, , Disp: , Rfl:   No Known Allergies    Labs:not applicable  Imaging: Xr Chest 2 Views    Result Date: 5/12/2018  Narrative: CHEST INDICATION:   Shortness of breath  COMPARISON:  3/26/2018, CT chest 5/7/2018 EXAM PERFORMED/VIEWS:  XR CHEST PA & LATERAL FINDINGS: Cardiomediastinal silhouette appears unremarkable  Minimal streaky left basilar atelectasis  No evolving infiltrates  Possible trace posterior pleural effusions  No pneumothorax  Osseous structures appear within normal limits for patient age  Impression: Streaky left basilar atelectasis and possible trace posterior pleural effusions  No evolving infiltrates  Workstation performed: [id number]     Ct Chest Without Contrast    Result Date: 5/9/2018  Narrative: CT CHEST WITHOUT IV CONTRAST INDICATION:   R91 8: Other nonspecific abnormal finding of lung field  COMPARISON: 11/7/2017, 5/1/2017 and 2/20/2017  TECHNIQUE: CT examination of the chest was performed without intravenous contrast   Axial, sagittal, and coronal 2D reformatted images were created from the source data and submitted for interpretation  Radiation dose length product (DLP) for this visit:  344 mGy-cm   This examination, like all CT scans performed in the [facility], was performed utilizing techniques to minimize radiation dose exposure, including the use of iterative reconstruction and automated exposure control  FINDINGS: LUNGS:  No change in 3 mm right upper lobe nodule laterally on series 2 image 23, and 3 mm left upper lobe nodules on series 3 images 26 and 35  Persistent mild reticulonodular densities at the bilateral lung bases suggestive of chronic infiltrates, atelectasis or scarring  There is new small density with apparent air bronchograms in the perihilar right upper lobe along the major fissure, measuring up to 2 3 cm on series 3 image 29  This is felt to likely represent post infectious/inflammatory consolidation  There is no tracheal or endobronchial lesion  PLEURA:  There has been interval development of small bilateral pleural effusions   HEART/GREAT VESSELS:  Atherosclerotic changes thoracic aorta and coronary arteries  MEDIASTINUM AND TANMAY:  Unremarkable  CHEST WALL AND LOWER NECK:   Unremarkable  VISUALIZED STRUCTURES IN THE UPPER ABDOMEN:  Status post cholecystectomy  OSSEOUS STRUCTURES:  No acute fracture or destructive osseous lesion  Impression: New right upper lobe perihilar density, felt to likely represent post infectious/inflammatory consolidation, however follow-up in 3 months is recommended to ensure resolution  Persistent minimal bibasilar reticulonodular densities, likely chronic infiltrates and/or atelectasis or scarring  Stable tiny bilateral upper lobe nodules  New small bilateral pleural effusions noted  Workstation performed: [id number]     [employer] Chest Pe Study    Result Date: 5/12/2018  Narrative: CTA - CHEST WITH IV CONTRAST - PULMONARY ANGIOGRAM INDICATION:   sob/tachy  COPD  COMPARISON: May 7, 2018  TECHNIQUE: CTA examination of the chest was performed using angiographic technique according to a protocol specifically tailored to evaluate for pulmonary embolism  Axial, sagittal, and coronal 2D reformatted images were created from the source data and  submitted for interpretation  In addition, coronal 3D MIP postprocessing was performed on the acquisition scanner  Radiation dose length product (DLP) for this visit:  383 mGy-cm   This examination, like all CT scans performed in the [facility], was performed utilizing techniques to minimize radiation dose exposure, including the use of iterative reconstruction and automated exposure control  IV Contrast:  85 mL of iohexol (OMNIPAQUE)  FINDINGS: PULMONARY ARTERIAL TREE:  No pulmonary embolus is seen  LUNGS:  Mostly linear reticular intralobular septal thickening in both lung bases  There is associated mild bibasilar bronchiectasis and peribronchial thickening, similar to prior study and likely at least in part sequela of prior infection and scarring    Since the recent prior study there is increasing linear opacities, likely a combination of subsegmental atelectasis and interstitial fluid accumulation  In the right upper lobe adjacent to the posterior segmental bronchus there is focal opacification which appears to have a component of volume loss in concave bowing of the adjacent fissure suggesting this could represent subsegmental atelectasis  It is not significant change compared to the recent study but new compared to prior in November  Follow-up suggested in 3-6 months to ensure resolution  Stable 3 mm noncalcified nodule lingular, image 5/37  Chronic, decreased compared to 2017  PLEURA:  Increasing mild to moderate bilateral dependent pleural effusions  HEART/AORTA:  Unremarkable for patient's age  MEDIASTINUM AND TANMAY:  Unremarkable  CHEST WALL AND LOWER NECK:   Unremarkable  VISUALIZED STRUCTURES IN THE UPPER ABDOMEN:  Unremarkable  Prior cholecystectomy  OSSEOUS STRUCTURES:  No acute fracture or destructive osseous lesion  Impression: No evidence for acute pulmonary embolism  Increasing mild to moderate bilateral dependent pleural effusions  Chronic scarring, bronchiectasis and peribronchial thickening in the dependent aspects of both lower lobes  Superimposed reticular interstitial opacities which may reflect component of interstitial fluid and subsegmental atelectasis  Small subsegmental focus of consolidation seen in the right upper lobe adjacent to the posterior segmental bronchus  See above for further discussion  Recommend follow-up  Workstation performed: [id number]       Review of Systems:  Review of Systems   Constitutional: Negative  HENT: Negative  Eyes: Negative  Respiratory: Negative  Cardiovascular: Negative  Gastrointestinal: Negative  Endocrine: Negative  Genitourinary: Negative  Musculoskeletal: Negative  Skin: Negative  Allergic/Immunologic: Negative  Neurological: Negative  Hematological: Negative  Psychiatric/Behavioral: Negative          Physical Exam:  Physical Exam   Constitutional: He appears well-developed and well-nourished  No distress  HENT:   Head: Normocephalic and atraumatic  Eyes: Conjunctivae and EOM are normal  Pupils are equal, round, and reactive to light  Right eye exhibits no discharge  Left eye exhibits no discharge  Neck: Normal range of motion  Neck supple  No JVD present  No tracheal deviation present  No thyromegaly present  Cardiovascular: Normal rate, normal heart sounds and intact distal pulses  Exam reveals no friction rub  No murmur heard  Pulmonary/Chest: Effort normal and breath sounds normal  No stridor  No respiratory distress  He has no wheezes  He has no rales  Abdominal: Soft  Bowel sounds are normal  He exhibits no distension  There is no tenderness  There is no rebound  Musculoskeletal: Normal range of motion  He exhibits no edema or deformity  Skin: Skin is warm and dry  No rash noted  He is not diaphoretic  No erythema  No pallor

## 2018-05-22 DIAGNOSIS — I42.0 DILATED CARDIOMYOPATHY (HCC): ICD-10-CM

## 2018-05-22 RX ORDER — METOPROLOL SUCCINATE 25 MG/1
50 TABLET, EXTENDED RELEASE ORAL DAILY
Qty: 60 TABLET | Refills: 10 | Status: SHIPPED | OUTPATIENT
Start: 2018-05-22 | End: 2018-06-11 | Stop reason: SDUPTHER

## 2018-05-22 NOTE — TELEPHONE ENCOUNTER
I/we did not change any of his diabetic medications-Sussy  For any clarifications regarding his diabetic medications, he should call his primary care physician  I only changed his current metoprolol tartrate to metoprolol succinate -50 mg daily

## 2018-05-22 NOTE — TELEPHONE ENCOUNTER
Dr Martyn Dandy script was change  On your note from yesterday,Medication Repaglinid 2MG qd  He was on 6mg QD with a 1mg pill?  patient is very confused about his notes from yesterdays visit

## 2018-05-23 ENCOUNTER — DOCUMENTATION (OUTPATIENT)
Dept: CARDIOLOGY CLINIC | Facility: CLINIC | Age: 83
End: 2018-05-23

## 2018-05-23 LAB
BNP SERPL-MCNC: 428 PG/ML
BUN SERPL-MCNC: 22 MG/DL (ref 7–25)
BUN/CREAT SERPL: 18 (CALC) (ref 6–22)
CALCIUM SERPL-MCNC: 9.4 MG/DL (ref 8.6–10.3)
CHLORIDE SERPL-SCNC: 104 MMOL/L (ref 98–110)
CO2 SERPL-SCNC: 27 MMOL/L (ref 20–31)
CREAT SERPL-MCNC: 1.23 MG/DL (ref 0.7–1.11)
GLUCOSE SERPL-MCNC: 131 MG/DL (ref 65–99)
POTASSIUM SERPL-SCNC: 4.8 MMOL/L (ref 3.5–5.3)
SL AMB EGFR AFRICAN AMERICAN: 62 ML/MIN/1.73M2
SL AMB EGFR NON AFRICAN AMERICAN: 53 ML/MIN/1.73M2
SODIUM SERPL-SCNC: 141 MMOL/L (ref 135–146)

## 2018-05-24 ENCOUNTER — OFFICE VISIT (OUTPATIENT)
Dept: FAMILY MEDICINE CLINIC | Facility: MEDICAL CENTER | Age: 83
End: 2018-05-24
Payer: COMMERCIAL

## 2018-05-24 VITALS
RESPIRATION RATE: 16 BRPM | WEIGHT: 161.2 LBS | HEART RATE: 80 BPM | SYSTOLIC BLOOD PRESSURE: 100 MMHG | DIASTOLIC BLOOD PRESSURE: 60 MMHG | BODY MASS INDEX: 21.27 KG/M2

## 2018-05-24 DIAGNOSIS — I10 ESSENTIAL HYPERTENSION: Chronic | ICD-10-CM

## 2018-05-24 DIAGNOSIS — I42.0 DILATED CARDIOMYOPATHY (HCC): Primary | ICD-10-CM

## 2018-05-24 DIAGNOSIS — I50.33 ACUTE ON CHRONIC DIASTOLIC HEART FAILURE (HCC): ICD-10-CM

## 2018-05-24 PROCEDURE — 99495 TRANSJ CARE MGMT MOD F2F 14D: CPT | Performed by: FAMILY MEDICINE

## 2018-05-24 NOTE — PROGRESS NOTES
Virginia Mejias is here for f/u of his hospitalization  He was hospitalized for shortness of breath  It was felt to be due to congestive heart failure and cardiomyopathy  Cardiac catheterization showed normal coronary arteries  See Cardiology notes  He has had several episodes of shortness of breath this past winter which was felt to be due due to COPD exacerbations  May have been due to viral infections related to this  He is feeling much better  He was diuresed with Lasix  Beta-blocker was added  Daughter reports he fell 2 weeks ago  Was leaning forward and fell forward and hit his head  Right side of head  He says his ears feel closed  He got new hearing aids which he had adjusted today  O: /60 (Cuff Size: Standard)   Pulse 80   Resp 16   Wt 73 1 kg (161 lb 3 2 oz)   BMI 21 27 kg/m²   Neck no adenopathy thyromegaly bruits  Chest clear with good breath sounds  Cardiac regular rate rhythm  Extremities no edema healing catheterization site right wrist    Assessment  1  CHF-much improved  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

## 2018-06-08 DIAGNOSIS — G60.9 NEUROPATHY, IDIOPATHIC: ICD-10-CM

## 2018-06-09 RX ORDER — GABAPENTIN 100 MG/1
200 CAPSULE ORAL DAILY
Qty: 30 CAPSULE | Refills: 0 | Status: SHIPPED | OUTPATIENT
Start: 2018-06-09 | End: 2018-06-21 | Stop reason: SDUPTHER

## 2018-06-09 RX ORDER — FLUTICASONE PROPIONATE 50 MCG
2 SPRAY, SUSPENSION (ML) NASAL DAILY
Qty: 16 G | Refills: 0 | Status: SHIPPED | OUTPATIENT
Start: 2018-06-09 | End: 2018-07-13 | Stop reason: SDUPTHER

## 2018-06-11 DIAGNOSIS — I42.0 DILATED CARDIOMYOPATHY (HCC): ICD-10-CM

## 2018-06-11 DIAGNOSIS — I50.9 HEART FAILURE, UNSPECIFIED HF CHRONICITY, UNSPECIFIED HEART FAILURE TYPE (HCC): ICD-10-CM

## 2018-06-13 ENCOUNTER — TELEPHONE (OUTPATIENT)
Dept: CARDIOLOGY CLINIC | Facility: CLINIC | Age: 83
End: 2018-06-13

## 2018-06-13 RX ORDER — METOPROLOL SUCCINATE 50 MG/1
50 TABLET, EXTENDED RELEASE ORAL DAILY
Qty: 90 TABLET | Refills: 3 | Status: SHIPPED | OUTPATIENT
Start: 2018-06-13 | End: 2018-06-14 | Stop reason: SDUPTHER

## 2018-06-13 RX ORDER — FUROSEMIDE 40 MG/1
40 TABLET ORAL DAILY
Qty: 90 TABLET | Refills: 3 | Status: SHIPPED | OUTPATIENT
Start: 2018-06-13 | End: 2018-06-14 | Stop reason: SDUPTHER

## 2018-06-13 RX ORDER — POTASSIUM CHLORIDE 20 MEQ/1
20 TABLET, EXTENDED RELEASE ORAL DAILY
Qty: 90 TABLET | Refills: 3 | Status: SHIPPED | OUTPATIENT
Start: 2018-06-13 | End: 2018-06-14 | Stop reason: SDUPTHER

## 2018-06-14 RX ORDER — POTASSIUM CHLORIDE 20 MEQ/1
20 TABLET, EXTENDED RELEASE ORAL DAILY
Qty: 90 TABLET | Refills: 3 | Status: SHIPPED | OUTPATIENT
Start: 2018-06-14 | End: 2018-11-15 | Stop reason: HOSPADM

## 2018-06-14 RX ORDER — FUROSEMIDE 40 MG/1
40 TABLET ORAL DAILY
Qty: 90 TABLET | Refills: 3 | Status: SHIPPED | OUTPATIENT
Start: 2018-06-14 | End: 2018-11-28 | Stop reason: DRUGHIGH

## 2018-06-14 RX ORDER — METOPROLOL SUCCINATE 50 MG/1
50 TABLET, EXTENDED RELEASE ORAL DAILY
Qty: 90 TABLET | Refills: 3 | Status: SHIPPED | OUTPATIENT
Start: 2018-06-14 | End: 2018-08-03 | Stop reason: SDUPTHER

## 2018-06-14 NOTE — TELEPHONE ENCOUNTER
I created an addendum and resend the prior order to dr Ruano Holding for completion, I also contacted the patient to advise to be completed today an apologized for the inconvenience

## 2018-06-18 ENCOUNTER — OFFICE VISIT (OUTPATIENT)
Dept: PULMONOLOGY | Facility: CLINIC | Age: 83
End: 2018-06-18
Payer: COMMERCIAL

## 2018-06-18 VITALS
WEIGHT: 170 LBS | BODY MASS INDEX: 22.53 KG/M2 | DIASTOLIC BLOOD PRESSURE: 88 MMHG | HEIGHT: 73 IN | OXYGEN SATURATION: 98 % | SYSTOLIC BLOOD PRESSURE: 118 MMHG | HEART RATE: 88 BPM

## 2018-06-18 DIAGNOSIS — J41.8 MIXED SIMPLE AND MUCOPURULENT CHRONIC BRONCHITIS (HCC): Primary | ICD-10-CM

## 2018-06-18 DIAGNOSIS — R91.8 MULTIPLE NODULES OF LUNG: ICD-10-CM

## 2018-06-18 DIAGNOSIS — I50.22 CHRONIC SYSTOLIC CONGESTIVE HEART FAILURE (HCC): ICD-10-CM

## 2018-06-18 DIAGNOSIS — R93.89 ABNORMAL CHEST CT: ICD-10-CM

## 2018-06-18 PROCEDURE — 99214 OFFICE O/P EST MOD 30 MIN: CPT | Performed by: PHYSICIAN ASSISTANT

## 2018-06-18 NOTE — PROGRESS NOTES
Assessment:    1  Mixed simple and mucopurulent chronic bronchitis (HCC)  glycopyrrolate-formoterol (BEVESPI AEROSPHERE) 9-4 8 MCG/ACT inhaler   2  Chronic systolic congestive heart failure ([address] )     3  Multiple nodules of lung     4  Abnormal chest CT       CT of chest performed on 5/12/18 PE protocol revealed no PE, increased pleural effusions, chronic scarring bronchiectasis and peribronchial thickening, superimposed reticular interstitial opacities, small subsegmental focus of consolidation in the RUL stable from CT 2 weeks prior though new from November 2017  CT 5/7/18 showed stable tiny nodules, new RUL perihilar density persistent reticulonodular opacities  Plan:     Recent admission for new CHF with elevated BNP over 5k and pleural effusions and interstitial fluid  Feeling much improved though slightly more short of breath over the last 2 days  No LE edema, no cough  He will let us know if any increased symptoms over the next 1-2 days, can consider repeating CXR and BNP  He is on lasix and is following with cardiology  Anoro is too expensive for patient - will switch him to [facility] - use demonstrated to patient  He will also continue with the Daliresp - he has been taking it every other day due to cost  We will follow up with the company to see if any financial assistance is available  Continue albuterol 4 times per day as needed, continue flovent  He was advised to stay indoors in air conditioning on days like today that are very hot and humid  Will need repeat CT scan in 3 months - nodules are stable in size, does have a new RUL density which needs to be followed  Follow up in 4 weeks or sooner if necessary  Subjective:     Patient ID: Pamela Huynh is a 80 y o  male  Chief Complaint:  Patient is an 79 yo male former smoker with PMH of COPD, HTN, HLD, MDS with frequent exacerbations  He is here today for follow up  He was recently hospitalized in May for new acute CHF   He is currently feeling much improved, was unable to walk even a couple steps prior to admission  He has noticed a slight increase in his SOB over the last 2 days  No cough, no LE edema, not limited in his activities  He has been taking the Daliresp every other day due to the cost  Also feels the Anoro has been too expensive  The following portions of the patient's history were reviewed in this encounter and updated as appropriate:   Review of Systems   Constitutional: Negative  HENT: Negative  Respiratory: Positive for shortness of breath  Negative for cough  Cardiovascular: Negative  Gastrointestinal: Negative  Genitourinary: Negative  Musculoskeletal: Negative  Skin: Negative  Allergic/Immunologic: Negative  Neurological: Negative  Psychiatric/Behavioral: Negative  Objective:    Physical Exam   Constitutional: He is oriented to person, place, and time  He appears well-developed and well-nourished  No distress  HENT:   Mouth/Throat: Oropharynx is clear and moist    Eyes: Pupils are equal, round, and reactive to light  Cardiovascular: Normal rate and regular rhythm  No murmur heard  Pulmonary/Chest: Effort normal and breath sounds normal  No respiratory distress  He has no decreased breath sounds  He has no wheezes  He has no rhonchi  He has no rales  Abdominal: Soft  Musculoskeletal: Normal range of motion  Neurological: He is alert and oriented to person, place, and time  Skin: Skin is warm and dry  Psychiatric: He has a normal mood and affect   His behavior is normal  Judgment and thought content normal

## 2018-06-18 NOTE — PATIENT INSTRUCTIONS
Complete the Anoro inhaler you have at home  Then start the Bevespi samples, 2 puffs twice per day  Continue Flovent  Albuterol nebulizer 4 times per day as needed  Continue Daliresp

## 2018-06-21 DIAGNOSIS — G60.9 NEUROPATHY, IDIOPATHIC: ICD-10-CM

## 2018-06-21 RX ORDER — GABAPENTIN 100 MG/1
200 CAPSULE ORAL DAILY
Qty: 180 CAPSULE | Refills: 1 | Status: SHIPPED | OUTPATIENT
Start: 2018-06-21 | End: 2018-11-09 | Stop reason: SDUPTHER

## 2018-07-13 DIAGNOSIS — G60.9 NEUROPATHY, IDIOPATHIC: ICD-10-CM

## 2018-07-14 RX ORDER — FLUTICASONE PROPIONATE 50 MCG
2 SPRAY, SUSPENSION (ML) NASAL DAILY
Qty: 16 G | Refills: 0 | Status: SHIPPED | OUTPATIENT
Start: 2018-07-14 | End: 2018-09-10 | Stop reason: SDUPTHER

## 2018-07-19 DIAGNOSIS — I10 ESSENTIAL HYPERTENSION: ICD-10-CM

## 2018-07-20 RX ORDER — LISINOPRIL 10 MG/1
10 TABLET ORAL DAILY
Qty: 90 TABLET | Refills: 1 | Status: SHIPPED | OUTPATIENT
Start: 2018-07-20 | End: 2018-09-07 | Stop reason: SDUPTHER

## 2018-07-30 ENCOUNTER — OFFICE VISIT (OUTPATIENT)
Dept: PULMONOLOGY | Facility: CLINIC | Age: 83
End: 2018-07-30
Payer: COMMERCIAL

## 2018-07-30 VITALS
HEART RATE: 92 BPM | WEIGHT: 168 LBS | SYSTOLIC BLOOD PRESSURE: 118 MMHG | OXYGEN SATURATION: 97 % | RESPIRATION RATE: 16 BRPM | BODY MASS INDEX: 22.26 KG/M2 | DIASTOLIC BLOOD PRESSURE: 72 MMHG | HEIGHT: 73 IN

## 2018-07-30 DIAGNOSIS — I50.22 CHRONIC SYSTOLIC CONGESTIVE HEART FAILURE (HCC): ICD-10-CM

## 2018-07-30 DIAGNOSIS — J41.8 MIXED SIMPLE AND MUCOPURULENT CHRONIC BRONCHITIS (HCC): Primary | ICD-10-CM

## 2018-07-30 DIAGNOSIS — R91.8 MULTIPLE NODULES OF LUNG: ICD-10-CM

## 2018-07-30 PROCEDURE — 99213 OFFICE O/P EST LOW 20 MIN: CPT | Performed by: PHYSICIAN ASSISTANT

## 2018-07-30 NOTE — PROGRESS NOTES
Assessment:    1  Mixed simple and mucopurulent chronic bronchitis (HCC)  umeclidinium-vilanterol (ANORO ELLIPTA) 62 5-25 MCG/INH inhaler   2  Multiple nodules of lung     3  Chronic systolic congestive heart failure ([address] )           Plan:       Patient overall doing well  Still with some dyspnea on exertion although able to complete his daily activities  We will switch him back to Anoro given it is the same cost as the bevespi but only once per day which he prefers  Continue with Daliresp as it seems to be helping to prevent further exacerbations  He has been taking it every other day  Continue albuterol 4 times per day as needed  He has been using it 3 times per day  Continue flovent  He needs a repeat CT scan in August to follow up right upper lobe consolidation which was seen on CT scan done in May  Follow up in about 6 weeks once CT scan done  Subjective:     Patient ID: Mazin Reyez is a 80 y o  male  Chief Complaint:  Patient is an 79 yo male former smoker with PMH of COPD, HTN, HLD, MDS with frequent exacerbations  He was hospitalized in May for acute CHF  He was switched to Nicholas Lenz last visit due to cost of Anoro, had been taking the Daliresp every other day  He is here today for follow up  He is still having some shortness of breath with exertion though overall improved from his hospitalization in May  He was switched to bevespi but is having trouble remembering to use it twice a day  It is the same tier as Anoro so he would prefer to just go back on the Anoro  He has been taking the Daliresp every other day given it's very expensive  Shortness of Breath     Fatigue   Associated symptoms include fatigue  Review of Systems   Constitutional: Positive for fatigue  HENT: Negative  Respiratory: Positive for shortness of breath  Cardiovascular: Negative  Gastrointestinal: Negative  Genitourinary: Negative  Musculoskeletal: Negative  Skin: Negative  Allergic/Immunologic: Negative  Neurological: Negative  Psychiatric/Behavioral: Negative  Objective:    Physical Exam   Constitutional: He is oriented to person, place, and time  He appears well-developed and well-nourished  No distress  HENT:   Mouth/Throat: Oropharynx is clear and moist    Eyes: Pupils are equal, round, and reactive to light  Cardiovascular: Normal rate and regular rhythm  No murmur heard  Pulmonary/Chest: Effort normal  No respiratory distress  He has no decreased breath sounds  He has no wheezes  He has no rhonchi  He has rales in the right lower field and the left lower field  Abdominal: Soft  Musculoskeletal: Normal range of motion  Neurological: He is alert and oriented to person, place, and time  Skin: Skin is warm and dry  Psychiatric: He has a normal mood and affect   His behavior is normal  Judgment and thought content normal

## 2018-08-03 ENCOUNTER — OFFICE VISIT (OUTPATIENT)
Dept: CARDIOLOGY CLINIC | Facility: CLINIC | Age: 83
End: 2018-08-03
Payer: COMMERCIAL

## 2018-08-03 VITALS
HEART RATE: 100 BPM | HEIGHT: 73 IN | OXYGEN SATURATION: 95 % | BODY MASS INDEX: 22.69 KG/M2 | DIASTOLIC BLOOD PRESSURE: 82 MMHG | WEIGHT: 171.2 LBS | SYSTOLIC BLOOD PRESSURE: 120 MMHG

## 2018-08-03 DIAGNOSIS — E78.49 OTHER HYPERLIPIDEMIA: ICD-10-CM

## 2018-08-03 DIAGNOSIS — I42.0 DILATED CARDIOMYOPATHY (HCC): ICD-10-CM

## 2018-08-03 DIAGNOSIS — I10 ESSENTIAL HYPERTENSION: Primary | Chronic | ICD-10-CM

## 2018-08-03 DIAGNOSIS — I50.32 CHRONIC DIASTOLIC HEART FAILURE (HCC): ICD-10-CM

## 2018-08-03 PROCEDURE — 99214 OFFICE O/P EST MOD 30 MIN: CPT | Performed by: INTERNAL MEDICINE

## 2018-08-03 RX ORDER — METOPROLOL SUCCINATE 50 MG/1
100 TABLET, EXTENDED RELEASE ORAL DAILY
Qty: 90 TABLET | Refills: 0
Start: 2018-08-03 | End: 2018-10-15 | Stop reason: SDUPTHER

## 2018-08-03 NOTE — PROGRESS NOTES
Cardiology Follow Up    Alessandra Deleon  1933  [id number]  HEART & VASCULAR [facility]onia King  [facility]  [address] [place] [address] [address]    No diagnosis found  I had the pleasure of seeing Alessandra Deleon for a follow up visit  Interval History: none    History of the presenting illness, Discussion/Summary and My Plan are as follows:      27-year-old without any prior cardiac history-including CAD, MI, bypass, stents or valvular heart disease or family history of cardiomyopathy or coronary artery disease but with a left bundle-branch block since 2013  He was admitted-May 2018-acute congestive heart failure, with diagnosis of a new cardiomyopathy on echocardiogram with an ejection fraction of 35-40%, underwent coronary angiography without any obstructive CAD  He is on medium doses of beta-blocker and ACE-inhibitor  as well as Lasix 40 mg daily with which he has felt significantly better  He denies any orthopnea or dyspnea with exertion or edema  He does admit to fatigue but is playing golf without any symptoms  Exam demonstrates NO e/o CHF  Plan:    Cardiomyopathy and chronic systolic congestive heart failure:  Nonischemic, no alcohol use or chemotherapy  No family history  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-was  treated for symptoms of bronchitis for few months prior to May, some of which could have been from the CHF itself , negative coronary angiography, increase metoprolol XL to 100 mg daily  Continue lisinopril at the same dose  Continue Lasix 40 mg daily  Heart failure instructions were reviewed  No e/o Vol Ol on Exam  BNP around 500 - can consider his BL now  Also has COPD  Recheck Echo    Hypertension:  Controlled    Dyslipidemia:  On low-dose statin  He is also diabetic  Diabetes: Followed by an endocrinologist at [place] [address]       Follow-up in 6 months     Patient Active Problem List   Diagnosis    COPD without exacerbation ([place] [address] )    Essential hypertension    Hyperlipidemia    Neuropathy, idiopathic    Multiple nodules of lung    Mixed simple and mucopurulent chronic bronchitis (HCC)    Type 2 diabetes mellitus, with long-term current use of insulin (HCC)    Heart failure (HCC)    Dilated cardiomyopathy (HCC)    Chronic systolic congestive heart failure (HCC)    Abnormal chest CT     Past Medical History:   Diagnosis Date    COPD (chronic obstructive pulmonary disease) ([place] [address] )     Diabetes mellitus ([place] [address] )     Type 2    Hypertension     Left inguinal hernia     Malignant melanoma of skin ([place] [address] )      Social History     Social History    Marital status: /Civil Union     Spouse name: N/A    Number of children: 2    Years of education: N/A     Occupational History    restired      Social History Main Topics    Smoking status: Former Smoker     Packs/day: 1 00     Years: 10 00     Quit date: 1960    Smokeless tobacco: Never Used    Alcohol use 0 0 oz/week      Comment: SOcially     Drug use: No    Sexual activity: Not on file     Other Topics Concern    Not on file     Social History Narrative    Caffeine use, active          Family History   Problem Relation Age of Onset    Diabetes Mother     Heart attack Neg Hx     Stroke Neg Hx     Aneurysm Neg Hx     Clotting disorder Neg Hx     Arrhythmia Neg Hx     Hypertension Neg Hx     Hyperlipidemia Neg Hx         pt unsure      Past Surgical History:   Procedure Laterality Date    APPENDECTOMY      CATARACT EXTRACTION, BILATERAL      CHOLECYSTECTOMY      CHOLECYSTECTOMY      COLONOSCOPY  2014    Fiberoptic    HERNIA REPAIR      JOINT REPLACEMENT Left     meniscus repair    KNEE ARTHROSCOPY      Therapeutic       Current Outpatient Prescriptions:     acetaminophen (TYLENOL) 325 mg tablet, Take 2 tablets (650 mg total) by mouth every 6 (six) hours as needed for mild pain, Disp: 30 tablet, Rfl: 0    albuterol (2 5 mg/3 mL) 0 083 % nebulizer solution, Inhale 1 each, Disp: , Rfl:     Ascorbic Acid (VITAMIN C) 1000 MG tablet, Take 1 tablet by mouth daily, Disp: , Rfl:     Cinnamon 500 MG TABS, Take 1 tablet by mouth daily  , Disp: , Rfl:     fluticasone (FLONASE) 50 mcg/act nasal spray, 2 sprays into each nostril daily, Disp: 16 g, Rfl: 0    fluticasone (FLOVENT HFA) 220 mcg/act inhaler, Inhale 1 puff 2 (two) times a day, Disp: 1 Inhaler, Rfl: 1    furosemide (LASIX) 40 mg tablet, Take 1 tablet (40 mg total) by mouth daily, Disp: 90 tablet, Rfl: 3    gabapentin (NEURONTIN) 100 mg capsule, Take 2 capsules (200 mg total) by mouth daily, Disp: 180 capsule, Rfl: 1    insulin detemir (LEVEMIR) 100 units/mL subcutaneous injection, Inject 18 Units under the skin daily at bedtime  , Disp: , Rfl:     lisinopril (ZESTRIL) 10 mg tablet, Take 1 tablet (10 mg total) by mouth daily, Disp: 90 tablet, Rfl: 1    metoprolol succinate (TOPROL-XL) 50 mg 24 hr tablet, Take 1 tablet (50 mg total) by mouth daily, Disp: 90 tablet, Rfl: 3    Multiple Vitamins-Minerals (OCUVITE ADULT 50+ PO), Take 1 tablet by mouth daily, Disp: , Rfl:     ONE TOUCH ULTRA TEST test strip, , Disp: , Rfl:     potassium chloride (K-DUR,KLOR-CON) 20 mEq tablet, Take 1 tablet (20 mEq total) by mouth daily, Disp: 90 tablet, Rfl: 3    repaglinide (PRANDIN) 1 mg tablet, Take 1 mg by mouth daily 3 tablets in the morning, one tablet in the afternoon, two tablets in the evening , Disp: , Rfl:     repaglinide (PRANDIN) 2 mg tablet, Take 2 mg by mouth daily before dinner, Disp: , Rfl:     roflumilast 500 mcg TABS, Take 1 tablet (500 mcg total) by mouth daily, Disp: 30 tablet, Rfl: 3    simvastatin (ZOCOR) 10 mg tablet, Take 1 tablet (10 mg total) by mouth daily, Disp: 90 tablet, Rfl: 1    umeclidinium-vilanterol (ANORO ELLIPTA) 62 5-25 MCG/INH inhaler, Inhale 1 puff daily, Disp: 1 Inhaler, Rfl: 4    VENTOLIN  (90 Base) MCG/ACT inhaler, , Disp: , Rfl:   No Known Allergies    Labs:not applicable  Imaging: Xr Chest 2 Views    Result Date: 5/12/2018  Narrative: CHEST INDICATION:   Shortness of breath  COMPARISON:  3/26/2018, CT chest 5/7/2018 EXAM PERFORMED/VIEWS:  XR CHEST PA & LATERAL FINDINGS: Cardiomediastinal silhouette appears unremarkable  Minimal streaky left basilar atelectasis  No evolving infiltrates  Possible trace posterior pleural effusions  No pneumothorax  Osseous structures appear within normal limits for patient age  Impression: Streaky left basilar atelectasis and possible trace posterior pleural effusions  No evolving infiltrates  Workstation performed: [id number]     Ct Chest Without Contrast    Result Date: 5/9/2018  Narrative: CT CHEST WITHOUT IV CONTRAST INDICATION:   R91 8: Other nonspecific abnormal finding of lung field  COMPARISON: 11/7/2017, 5/1/2017 and 2/20/2017  TECHNIQUE: CT examination of the chest was performed without intravenous contrast   Axial, sagittal, and coronal 2D reformatted images were created from the source data and submitted for interpretation  Radiation dose length product (DLP) for this visit:  344 mGy-cm   This examination, like all CT scans performed in the [facility], was performed utilizing techniques to minimize radiation dose exposure, including the use of iterative reconstruction and automated exposure control  FINDINGS: LUNGS:  No change in 3 mm right upper lobe nodule laterally on series 2 image 23, and 3 mm left upper lobe nodules on series 3 images 26 and 35  Persistent mild reticulonodular densities at the bilateral lung bases suggestive of chronic infiltrates, atelectasis or scarring  There is new small density with apparent air bronchograms in the perihilar right upper lobe along the major fissure, measuring up to 2 3 cm on series 3 image 29  This is felt to likely represent post infectious/inflammatory consolidation  There is no tracheal or endobronchial lesion   PLEURA:  There has been interval development of small bilateral pleural effusions  HEART/GREAT VESSELS:  Atherosclerotic changes thoracic aorta and coronary arteries  MEDIASTINUM AND TANMAY:  Unremarkable  CHEST WALL AND LOWER NECK:   Unremarkable  VISUALIZED STRUCTURES IN THE UPPER ABDOMEN:  Status post cholecystectomy  OSSEOUS STRUCTURES:  No acute fracture or destructive osseous lesion  Impression: New right upper lobe perihilar density, felt to likely represent post infectious/inflammatory consolidation, however follow-up in 3 months is recommended to ensure resolution  Persistent minimal bibasilar reticulonodular densities, likely chronic infiltrates and/or atelectasis or scarring  Stable tiny bilateral upper lobe nodules  New small bilateral pleural effusions noted  Workstation performed: [id number]     Ezra Toya Chest Pe Study    Result Date: 5/12/2018  Narrative: CTA - CHEST WITH IV CONTRAST - PULMONARY ANGIOGRAM INDICATION:   sob/tachy  COPD  COMPARISON: May 7, 2018  TECHNIQUE: CTA examination of the chest was performed using angiographic technique according to a protocol specifically tailored to evaluate for pulmonary embolism  Axial, sagittal, and coronal 2D reformatted images were created from the source data and  submitted for interpretation  In addition, coronal 3D MIP postprocessing was performed on the acquisition scanner  Radiation dose length product (DLP) for this visit:  383 mGy-cm   This examination, like all CT scans performed in the [facility], was performed utilizing techniques to minimize radiation dose exposure, including the use of iterative reconstruction and automated exposure control  IV Contrast:  85 mL of iohexol (OMNIPAQUE)  FINDINGS: PULMONARY ARTERIAL TREE:  No pulmonary embolus is seen  LUNGS:  Mostly linear reticular intralobular septal thickening in both lung bases    There is associated mild bibasilar bronchiectasis and peribronchial thickening, similar to prior study and likely at least in part sequela of prior infection and scarring  Since the recent prior study there is increasing linear opacities, likely a combination of subsegmental atelectasis and interstitial fluid accumulation  In the right upper lobe adjacent to the posterior segmental bronchus there is focal opacification which appears to have a component of volume loss in concave bowing of the adjacent fissure suggesting this could represent subsegmental atelectasis  It is not significant change compared to the recent study but new compared to prior in November  Follow-up suggested in 3-6 months to ensure resolution  Stable 3 mm noncalcified nodule lingular, image 5/37  Chronic, decreased compared to 2017  PLEURA:  Increasing mild to moderate bilateral dependent pleural effusions  HEART/AORTA:  Unremarkable for patient's age  MEDIASTINUM AND TANMAY:  Unremarkable  CHEST WALL AND LOWER NECK:   Unremarkable  VISUALIZED STRUCTURES IN THE UPPER ABDOMEN:  Unremarkable  Prior cholecystectomy  OSSEOUS STRUCTURES:  No acute fracture or destructive osseous lesion  Impression: No evidence for acute pulmonary embolism  Increasing mild to moderate bilateral dependent pleural effusions  Chronic scarring, bronchiectasis and peribronchial thickening in the dependent aspects of both lower lobes  Superimposed reticular interstitial opacities which may reflect component of interstitial fluid and subsegmental atelectasis  Small subsegmental focus of consolidation seen in the right upper lobe adjacent to the posterior segmental bronchus  See above for further discussion  Recommend follow-up  Workstation performed: [id number]       Review of Systems:  Review of Systems   Constitutional: Negative  HENT: Negative  Eyes: Negative  Respiratory: Positive for shortness of breath  Negative for cough, choking, chest tightness, wheezing and stridor  Cardiovascular: Negative  Gastrointestinal: Negative  Endocrine: Negative  Genitourinary: Negative  Musculoskeletal: Negative  Skin: Negative  Allergic/Immunologic: Negative  Neurological: Negative  Hematological: Negative  Psychiatric/Behavioral: Negative  Physical Exam:  Physical Exam   Constitutional: He appears well-developed and well-nourished  No distress  HENT:   Head: Normocephalic and atraumatic  Eyes: Conjunctivae and EOM are normal  Pupils are equal, round, and reactive to light  Right eye exhibits no discharge  Left eye exhibits no discharge  Neck: Normal range of motion  Neck supple  No JVD present  No tracheal deviation present  No thyromegaly present  Cardiovascular: Normal rate, normal heart sounds and intact distal pulses  Exam reveals no friction rub  No murmur heard  Pulmonary/Chest: Effort normal and breath sounds normal  No stridor  No respiratory distress  He has no wheezes  He has no rales  Abdominal: Soft  Bowel sounds are normal  He exhibits no distension  There is no tenderness  There is no rebound  Musculoskeletal: Normal range of motion  He exhibits no edema or deformity  Skin: Skin is warm and dry  No rash noted  He is not diaphoretic  No erythema  No pallor

## 2018-08-13 ENCOUNTER — OFFICE VISIT (OUTPATIENT)
Dept: FAMILY MEDICINE CLINIC | Facility: MEDICAL CENTER | Age: 83
End: 2018-08-13
Payer: COMMERCIAL

## 2018-08-13 VITALS
WEIGHT: 171.2 LBS | DIASTOLIC BLOOD PRESSURE: 78 MMHG | HEART RATE: 73 BPM | OXYGEN SATURATION: 98 % | BODY MASS INDEX: 22.9 KG/M2 | SYSTOLIC BLOOD PRESSURE: 118 MMHG

## 2018-08-13 DIAGNOSIS — J44.9 COPD WITHOUT EXACERBATION (HCC): ICD-10-CM

## 2018-08-13 DIAGNOSIS — I10 ESSENTIAL HYPERTENSION: Chronic | ICD-10-CM

## 2018-08-13 DIAGNOSIS — G60.9 NEUROPATHY, IDIOPATHIC: Primary | ICD-10-CM

## 2018-08-13 PROCEDURE — 99214 OFFICE O/P EST MOD 30 MIN: CPT | Performed by: FAMILY MEDICINE

## 2018-08-13 NOTE — PROGRESS NOTES
Cindy Lawson says he is doing well overall  He notices more shortness of breath but he does his yardwork, plays 18 hloes of golf, and gardens without shortness of breath and not using his albuterol inhaler  He saw cardiology Dr Matt Aldana  Advised to increase his metoprolol  That has helped his fatigue  He saw endo crinology Dr Franky Esquivel  Had continuous glc monitor  His Prandin was increased  He says he is having problems with urinating  He says he gets occ difficulty with urination  Weaker stream  Can always empty his bladder  He still has  numbness in his feet  Taking 200 milligrams gabapentin the morning   He continues to visit his wife in the nursing home daily  O: /78 (BP Location: Left arm, Patient Position: Sitting, Cuff Size: Adult)   Pulse 73   Wt 77 7 kg (171 lb 3 2 oz)   SpO2 98%   BMI 22 90 kg/m²   Physical Exam   Cardiovascular: Normal rate, regular rhythm, normal heart sounds and intact distal pulses  Pulmonary/Chest: Effort normal and breath sounds normal    Bibasilar rales  Abdominal: Soft  He exhibits no mass  There is no tenderness  Assessment  1  COPD-doing very well   Immunizations up to date  Suggested Shingrix  2  CHF-improved with increased metoprolol dose  3  HTN-controlled  4  Diabetes-per endo  Foot exam today  5  Dyslipidemia=controlled  6  Urinary symptoms-BPH-he does not consider them bothersome enough to treat  7  Diabetic neuropathy-diabetic foot exam is normal   Will increase his gabapentin dose to add 100 milligrams in the evening  Plan  AS above  Recheck 3 months

## 2018-09-06 ENCOUNTER — HOSPITAL ENCOUNTER (OUTPATIENT)
Dept: NON INVASIVE DIAGNOSTICS | Facility: MEDICAL CENTER | Age: 83
Discharge: HOME | End: 2018-09-06
Payer: COMMERCIAL

## 2018-09-06 DIAGNOSIS — I42.0 DILATED CARDIOMYOPATHY (HCC): ICD-10-CM

## 2018-09-06 PROCEDURE — 93306 TTE W/DOPPLER COMPLETE: CPT

## 2018-09-06 PROCEDURE — 93306 TTE W/DOPPLER COMPLETE: CPT | Performed by: INTERNAL MEDICINE

## 2018-09-07 DIAGNOSIS — I42.9 CARDIOMYOPATHY, UNSPECIFIED TYPE (HCC): Primary | ICD-10-CM

## 2018-09-07 DIAGNOSIS — I10 ESSENTIAL HYPERTENSION: ICD-10-CM

## 2018-09-07 RX ORDER — LISINOPRIL 10 MG/1
20 TABLET ORAL DAILY
Qty: 90 TABLET | Refills: 0 | Status: SHIPPED | OUTPATIENT
Start: 2018-09-07 | End: 2018-11-07 | Stop reason: SDUPTHER

## 2018-09-07 NOTE — PROGRESS NOTES
For his persistent cardiomyopathy with further decrease in ejection fraction  Based on echo from September 2018, will increase his lisinopril further to 20 mg and check a BMP in 1 week  He will also be scheduled for an earlier appointment

## 2018-09-10 ENCOUNTER — HOSPITAL ENCOUNTER (OUTPATIENT)
Dept: RADIOLOGY | Facility: MEDICAL CENTER | Age: 83
Discharge: HOME | End: 2018-09-10
Payer: COMMERCIAL

## 2018-09-10 ENCOUNTER — TELEPHONE (OUTPATIENT)
Dept: CARDIOLOGY CLINIC | Facility: CLINIC | Age: 83
End: 2018-09-10

## 2018-09-10 DIAGNOSIS — R93.89 ABNORMAL CHEST CT: ICD-10-CM

## 2018-09-10 DIAGNOSIS — G60.9 NEUROPATHY, IDIOPATHIC: ICD-10-CM

## 2018-09-10 PROCEDURE — 71250 CT THORAX DX C-: CPT

## 2018-09-10 RX ORDER — FLUTICASONE PROPIONATE 50 MCG
2 SPRAY, SUSPENSION (ML) NASAL DAILY
Qty: 16 G | Refills: 0 | Status: SHIPPED | OUTPATIENT
Start: 2018-09-10 | End: 2018-11-09 | Stop reason: SDUPTHER

## 2018-09-10 NOTE — TELEPHONE ENCOUNTER
Spoke with pt, gave pt instructions to increase lisinopril from 10 mg to 20 mg daily and to get bmp done in one week per Dr De Dios Check  Will schedule appt for 2 months f/u  Bmp script in mail

## 2018-09-10 NOTE — TELEPHONE ENCOUNTER
----- Message from Jackie Ray MD sent at 9/7/2018  5:41 PM EDT -----  Increase lisinopril to 20 mg, check BMP in 1 week, please have him follow-up with me in the next 2 months

## 2018-09-17 DIAGNOSIS — R06.02 SOB (SHORTNESS OF BREATH): Primary | ICD-10-CM

## 2018-09-17 RX ORDER — ALBUTEROL SULFATE 2.5 MG/3ML
2.5 SOLUTION RESPIRATORY (INHALATION) EVERY 6 HOURS PRN
Qty: 1080 ML | Refills: 3 | Status: SHIPPED | OUTPATIENT
Start: 2018-09-17 | End: 2019-12-10 | Stop reason: SDUPTHER

## 2018-09-18 LAB
BUN SERPL-MCNC: 27 MG/DL (ref 7–25)
BUN/CREAT SERPL: 22 (CALC) (ref 6–22)
CALCIUM SERPL-MCNC: 9.1 MG/DL (ref 8.6–10.3)
CHLORIDE SERPL-SCNC: 101 MMOL/L (ref 98–110)
CO2 SERPL-SCNC: 31 MMOL/L (ref 20–32)
CREAT SERPL-MCNC: 1.21 MG/DL (ref 0.7–1.11)
GLUCOSE SERPL-MCNC: 215 MG/DL (ref 65–99)
POTASSIUM SERPL-SCNC: 4.1 MMOL/L (ref 3.5–5.3)
SL AMB EGFR AFRICAN AMERICAN: 63 ML/MIN/1.73M2
SL AMB EGFR NON AFRICAN AMERICAN: 54 ML/MIN/1.73M2
SODIUM SERPL-SCNC: 138 MMOL/L (ref 135–146)

## 2018-09-28 ENCOUNTER — OFFICE VISIT (OUTPATIENT)
Dept: PULMONOLOGY | Facility: CLINIC | Age: 83
End: 2018-09-28
Payer: COMMERCIAL

## 2018-09-28 VITALS
HEART RATE: 72 BPM | DIASTOLIC BLOOD PRESSURE: 80 MMHG | HEIGHT: 73 IN | SYSTOLIC BLOOD PRESSURE: 140 MMHG | BODY MASS INDEX: 22.66 KG/M2 | OXYGEN SATURATION: 98 % | WEIGHT: 171 LBS

## 2018-09-28 DIAGNOSIS — J44.9 OBSTRUCTIVE CHRONIC BRONCHITIS WITHOUT EXACERBATION (HCC): Primary | ICD-10-CM

## 2018-09-28 DIAGNOSIS — J44.1 CHRONIC OBSTRUCTIVE PULMONARY DISEASE WITH ACUTE EXACERBATION (HCC): ICD-10-CM

## 2018-09-28 DIAGNOSIS — R06.02 SOB (SHORTNESS OF BREATH): ICD-10-CM

## 2018-09-28 DIAGNOSIS — R91.8 MULTIPLE NODULES OF LUNG: ICD-10-CM

## 2018-09-28 PROCEDURE — 99214 OFFICE O/P EST MOD 30 MIN: CPT | Performed by: INTERNAL MEDICINE

## 2018-09-28 RX ORDER — FLUTICASONE PROPIONATE 220 UG/1
1 AEROSOL, METERED RESPIRATORY (INHALATION) 2 TIMES DAILY
Qty: 1 INHALER | Refills: 5 | Status: SHIPPED | OUTPATIENT
Start: 2018-09-28 | End: 2019-10-25 | Stop reason: SDUPTHER

## 2018-10-01 ENCOUNTER — TELEPHONE (OUTPATIENT)
Dept: PULMONOLOGY | Facility: CLINIC | Age: 83
End: 2018-10-01

## 2018-10-01 DIAGNOSIS — R05.9 COUGH: Primary | ICD-10-CM

## 2018-10-01 RX ORDER — MONTELUKAST SODIUM 10 MG/1
10 TABLET ORAL
Qty: 30 TABLET | Refills: 3 | Status: SHIPPED | OUTPATIENT
Start: 2018-10-01 | End: 2019-02-21 | Stop reason: SDUPTHER

## 2018-10-01 NOTE — PROGRESS NOTES
Assessment/Plan:   Diagnoses and all orders for this visit:    Obstructive chronic bronchitis without exacerbation ([address] )    Chronic obstructive pulmonary disease with acute exacerbation (HCC)  -     fluticasone (FLOVENT HFA) 220 mcg/act inhaler; Inhale 1 puff 2 (two) times a day    SOB (shortness of breath)    Multiple nodules of lung      Moderate amount of COPD, FEV1 of 51% with no bronchodilator response, moderately decreased DLCO, CT of the chest with evidence of emphysema  Currently patient is doing well on anora one puff daily to continue  Patient has been having this chronic cough with history of allergies, discussed with the patient he would benefit from taking an inhaled corticosteroid with the Flovent 220 µg one puff ce daily  Rinse mouth after use  He does have albuterol via the nebulizer to be used 4 times daily as needed only  Continue with daliresp 500 mg his taking only 1 tablet every other day  This has really reduced his exacerbations and the need for prednisone  Vaccinations up-to-date  Lung nodules currently stable  With CT of the chest for lung cancer screening follow-up of the lung nodules  Follow-up in 3 months or when necessary earlier as needed  Return in about 3 months (around 12/28/2018)  All questions are answered to the patient's satisfaction and understanding  He verbalizes understanding  He is encouraged to call with any further questions or concerns  Portions of the record may have been created with voice recognition software  Occasional wrong word or "sound a like" substitutions may have occurred due to the inherent limitations of voice recognition software  Read the chart carefully and recognize, using context, where substitutions have occurred      Electronically Signed by Leslie Concepcion MD    ______________________________________________________________________    Chief Complaint:   Chief Complaint   Patient presents with    Bronchitis     fup       Patient ID: Hemant Martinez is a 80 y o  y o  male has a past medical history of COPD (chronic obstructive pulmonary disease) ([facility] [address] ); Diabetes mellitus ([facility] [address] ); Hypertension; Left inguinal hernia; and Malignant melanoma of skin ([facility] )  9/28/2018  Patient presents today for follow-up visit  Patient is an 81 yo male former smoker with PMH of COPD, HTN, HLD, MDS with frequent exacerbations  He was hospitalized in May for acute CHF  He is currently on Anoro had been taking the Daliresp every other day        Review of Systems   Constitutional: Negative for activity change, appetite change, chills, diaphoresis, fatigue, fever and unexpected weight change  HENT: Negative for congestion, dental problem, drooling, nosebleeds, postnasal drip, rhinorrhea, sinus pressure, sore throat and voice change  Eyes: Negative for discharge, itching and visual disturbance  Respiratory: Positive for cough (clear sputum, no hemoptysis) and shortness of breath  Negative for wheezing  Cardiovascular: Negative for chest pain, palpitations and leg swelling  Endocrine: Negative for cold intolerance and heat intolerance  Allergic/Immunologic: Negative for food allergies and immunocompromised state  Neurological: Negative for dizziness, facial asymmetry, speech difficulty and weakness  Hematological: Negative for adenopathy  Psychiatric/Behavioral: Negative for agitation, confusion and sleep disturbance  The patient is not nervous/anxious  Smoking history: He reports that he quit smoking about 58 years ago  He has a 10 00 pack-year smoking history   He has never used smokeless tobacco     The following portions of the patient's history were reviewed and updated as appropriate: allergies, current medications, past family history, past medical history, past social history, past surgical history and problem list     Immunization History   Administered Date(s) Administered    H1N1, All Formulations 01/26/2010    Influenza Split High Dose Preservative Free IM 10/18/2013, 10/05/2015, 09/30/2016, 10/20/2017    Influenza TIV (IM) 10/13/2011, 10/01/2014    Pneumococcal Conjugate 13-Valent 11/22/2017    Pneumococcal Polysaccharide PPV23 01/01/1998, 10/19/2005    Zoster 10/01/2010     Current Outpatient Prescriptions   Medication Sig Dispense Refill    acetaminophen (TYLENOL) 325 mg tablet Take 2 tablets (650 mg total) by mouth every 6 (six) hours as needed for mild pain 30 tablet 0    albuterol (2 5 mg/3 mL) 0 083 % nebulizer solution Take 1 vial (2 5 mg total) by nebulization every 6 (six) hours as needed for wheezing or shortness of breath 1080 mL 3    Ascorbic Acid (VITAMIN C) 1000 MG tablet Take 1 tablet by mouth daily      Cinnamon 500 MG TABS Take 1 tablet by mouth daily        furosemide (LASIX) 40 mg tablet Take 1 tablet (40 mg total) by mouth daily 90 tablet 3    gabapentin (NEURONTIN) 100 mg capsule Take 2 capsules (200 mg total) by mouth daily 180 capsule 1    insulin detemir (LEVEMIR) 100 units/mL subcutaneous injection Inject 18 Units under the skin daily at bedtime        lisinopril (ZESTRIL) 10 mg tablet Take 2 tablets (20 mg total) by mouth daily 90 tablet 0    metoprolol succinate (TOPROL-XL) 50 mg 24 hr tablet Take 2 tablets (100 mg total) by mouth daily 90 tablet 0    Multiple Vitamins-Minerals (OCUVITE ADULT 50+ PO) Take 1 tablet by mouth daily      ONE TOUCH ULTRA TEST test strip       potassium chloride (K-DUR,KLOR-CON) 20 mEq tablet Take 1 tablet (20 mEq total) by mouth daily 90 tablet 3    repaglinide (PRANDIN) 2 mg tablet Take 2 mg by mouth daily before dinner      roflumilast 500 mcg TABS Take 1 tablet (500 mcg total) by mouth daily 30 tablet 3    simvastatin (ZOCOR) 10 mg tablet Take 1 tablet (10 mg total) by mouth daily 90 tablet 1    umeclidinium-vilanterol (ANORO ELLIPTA) 62 5-25 MCG/INH inhaler Inhale 1 puff daily 1 Inhaler 4    VENTOLIN  (90 Base) MCG/ACT inhaler       fluticasone (FLONASE) 50 mcg/act nasal spray 2 sprays into each nostril daily 16 g 0    fluticasone (FLOVENT HFA) 220 mcg/act inhaler Inhale 1 puff 2 (two) times a day 1 Inhaler 5    repaglinide (PRANDIN) 1 mg tablet Take 1 mg by mouth daily 4 tablets in the morning, one tablet in the afternoon, two tablets in the evening        No current facility-administered medications for this visit  Allergies: Patient has no known allergies  Objective:  Vitals:    09/28/18 0826   BP: 140/80   Pulse: 72   SpO2: 98%   Weight: 77 6 kg (171 lb)   Height: 6' 0 5" (1 842 m)   Oxygen Therapy  SpO2: 98 %    Wt Readings from Last 3 Encounters:   09/28/18 77 6 kg (171 lb)   08/13/18 77 7 kg (171 lb 3 2 oz)   08/03/18 77 7 kg (171 lb 3 2 oz)     Body mass index is 22 87 kg/m²  Physical Exam   Constitutional: He is oriented to person, place, and time  He appears well-developed and well-nourished  HENT:   Head: Normocephalic and atraumatic  Eyes: Pupils are equal, round, and reactive to light  Conjunctivae are normal    Neck: Normal range of motion  Neck supple  No JVD present  No thyromegaly present  Cardiovascular: Normal rate, regular rhythm and normal heart sounds  Exam reveals no gallop and no friction rub  No murmur heard  Pulmonary/Chest: Effort normal and breath sounds normal  No respiratory distress  He has no wheezes  He has no rales  He exhibits no tenderness  Abdominal: Soft  Bowel sounds are normal    Musculoskeletal: Normal range of motion  He exhibits no edema, tenderness or deformity  Lymphadenopathy:     He has no cervical adenopathy  Neurological: He is alert and oriented to person, place, and time  Skin: Skin is warm and dry  Psychiatric: He has a normal mood and affect  Nursing note and vitals reviewed  Ct Chest Without Contrast    Result Date: 9/12/2018  Narrative: CT CHEST WITHOUT IV CONTRAST INDICATION:   R93 8: Abnormal findings on diagnostic imaging of other specified body structures   COMPARISON: CT chest 5/12/2018 TECHNIQUE: CT examination of the chest was performed without intravenous contrast   Axial, sagittal, and coronal 2D reformatted images were created from the source data and submitted for interpretation  Radiation dose length product (DLP) for this visit:  269 mGy-cm   This examination, like all CT scans performed in the [facility], was performed utilizing techniques to minimize radiation dose exposure, including the use of iterative reconstruction and automated exposure control  FINDINGS: LUNGS:  Stable 3 mm lingular nodule  Previously seen consolidation posterior to the segmental bronchus of the right upper lobe is reidentified and less conspicuous and has the appearance of atelectasis or scarring  Bibasilar scarring  PLEURA:  Unremarkable  HEART/GREAT VESSELS:  Unremarkable for patient's age  MEDIASTINUM AND TANMAY:  Unremarkable  CHEST WALL AND LOWER NECK:   Unremarkable  VISUALIZED STRUCTURES IN THE UPPER ABDOMEN:  Minimal perihepatic ascites anteriorly  Status post cholecystectomy  Visualized portions of the upper abdomen are otherwise unremarkable  OSSEOUS STRUCTURES:  No acute fracture or destructive osseous lesion  Impression: Stable 3 mm lingular nodule  Previously seen abnormality in the right upper lobe adjacent to the posterior segmental bronchus has the appearance of atelectasis or scarring on the current exam  Minimal new perihepatic ascites anteriorly   Workstation performed: [id number]

## 2018-10-04 ENCOUNTER — TELEPHONE (OUTPATIENT)
Dept: PULMONOLOGY | Facility: CLINIC | Age: 83
End: 2018-10-04

## 2018-10-15 DIAGNOSIS — I42.0 DILATED CARDIOMYOPATHY (HCC): ICD-10-CM

## 2018-10-15 RX ORDER — METOPROLOL SUCCINATE 50 MG/1
100 TABLET, EXTENDED RELEASE ORAL DAILY
Qty: 180 TABLET | Refills: 3 | Status: SHIPPED | OUTPATIENT
Start: 2018-10-15 | End: 2019-01-07 | Stop reason: SDUPTHER

## 2018-10-15 NOTE — TELEPHONE ENCOUNTER
5: 55pm  pt needs a refill of metoprolol 2 tablets daily to be sent to pharmacy  pt normally gets a 90 day supply  I called daughter and advised working on refill today due to message received after hours

## 2018-10-17 ENCOUNTER — OFFICE VISIT (OUTPATIENT)
Dept: FAMILY MEDICINE CLINIC | Facility: MEDICAL CENTER | Age: 83
End: 2018-10-17
Payer: COMMERCIAL

## 2018-10-17 VITALS
OXYGEN SATURATION: 98 % | WEIGHT: 172 LBS | BODY MASS INDEX: 23.01 KG/M2 | DIASTOLIC BLOOD PRESSURE: 82 MMHG | HEART RATE: 78 BPM | SYSTOLIC BLOOD PRESSURE: 120 MMHG

## 2018-10-17 DIAGNOSIS — M19.90 ARTHRITIS: Primary | ICD-10-CM

## 2018-10-17 PROCEDURE — 99213 OFFICE O/P EST LOW 20 MIN: CPT | Performed by: FAMILY MEDICINE

## 2018-10-17 NOTE — PROGRESS NOTES
Clari Summers says he complains of pain in his left low back and upper left thigh  Aggravated by get up shantanu from sitting  No pain with sitting  Doesn't wake from sleep  Gets cramps in his feet  No numbness  No known trauma  He was using a leaf blower several days ago  Has been cutting grass  No falls  He injured his left leg a few months ago  Banged leg on something  Seems redder  Wife recently passed away  O: /82 (BP Location: Left arm, Patient Position: Sitting, Cuff Size: Adult)   Pulse 78   Wt 78 kg (172 lb)   SpO2 98%   BMI 23 01 kg/m²   Back no tenderness or spasm  LE's normal motor, reflexes  Skin-healing abrasion type lesion anterior lower leg    Assessment]  1  Acute low back pain  2  Lesion left leg-healing  Reassured  Plan  Advised to avoid aggravating activities  Heat  May use ibuprofen 400 mg 3 2-3 times daily for the next week    Call with progress if no better

## 2018-10-22 PROBLEM — M19.90 ARTHRITIS: Status: ACTIVE | Noted: 2018-10-22

## 2018-10-31 DIAGNOSIS — E78.01 FAMILIAL HYPERCHOLESTEROLEMIA: ICD-10-CM

## 2018-11-01 RX ORDER — SIMVASTATIN 10 MG
10 TABLET ORAL DAILY
Qty: 90 TABLET | Refills: 1 | Status: SHIPPED | OUTPATIENT
Start: 2018-11-01 | End: 2019-04-08 | Stop reason: SDUPTHER

## 2018-11-07 ENCOUNTER — OFFICE VISIT (OUTPATIENT)
Dept: FAMILY MEDICINE CLINIC | Facility: MEDICAL CENTER | Age: 83
End: 2018-11-07
Payer: COMMERCIAL

## 2018-11-07 ENCOUNTER — APPOINTMENT (OUTPATIENT)
Dept: RADIOLOGY | Facility: MEDICAL CENTER | Age: 83
End: 2018-11-07
Payer: COMMERCIAL

## 2018-11-07 VITALS
WEIGHT: 179 LBS | BODY MASS INDEX: 23.94 KG/M2 | HEART RATE: 76 BPM | DIASTOLIC BLOOD PRESSURE: 70 MMHG | RESPIRATION RATE: 16 BRPM | SYSTOLIC BLOOD PRESSURE: 110 MMHG

## 2018-11-07 DIAGNOSIS — I50.32 CHRONIC DIASTOLIC HEART FAILURE (HCC): ICD-10-CM

## 2018-11-07 DIAGNOSIS — M54.32 SCIATICA OF LEFT SIDE: ICD-10-CM

## 2018-11-07 DIAGNOSIS — M54.32 SCIATICA OF LEFT SIDE: Primary | ICD-10-CM

## 2018-11-07 DIAGNOSIS — I10 ESSENTIAL HYPERTENSION: ICD-10-CM

## 2018-11-07 PROCEDURE — 73502 X-RAY EXAM HIP UNI 2-3 VIEWS: CPT

## 2018-11-07 PROCEDURE — 72110 X-RAY EXAM L-2 SPINE 4/>VWS: CPT

## 2018-11-07 PROCEDURE — 71046 X-RAY EXAM CHEST 2 VIEWS: CPT

## 2018-11-07 PROCEDURE — 1160F RVW MEDS BY RX/DR IN RCRD: CPT | Performed by: FAMILY MEDICINE

## 2018-11-07 PROCEDURE — 99214 OFFICE O/P EST MOD 30 MIN: CPT | Performed by: FAMILY MEDICINE

## 2018-11-07 RX ORDER — PREDNISONE 10 MG/1
TABLET ORAL
Qty: 27 TABLET | Refills: 0 | Status: SHIPPED | OUTPATIENT
Start: 2018-11-07 | End: 2018-11-15 | Stop reason: HOSPADM

## 2018-11-07 RX ORDER — PEN NEEDLE, DIABETIC 32GX 5/32"
NEEDLE, DISPOSABLE MISCELLANEOUS
COMMUNITY
Start: 2018-10-26

## 2018-11-07 RX ORDER — INSULIN ASPART 100 [IU]/ML
4 INJECTION, SOLUTION INTRAVENOUS; SUBCUTANEOUS
COMMUNITY
Start: 2018-10-26

## 2018-11-07 RX ORDER — LISINOPRIL 10 MG/1
10 TABLET ORAL DAILY
Qty: 90 TABLET | Refills: 3 | Status: SHIPPED | OUTPATIENT
Start: 2018-11-07 | End: 2018-11-15 | Stop reason: HOSPADM

## 2018-11-07 NOTE — PROGRESS NOTES
Heidi Corey is here for f/u of back pain  See 10/17  He still ahs pain in his left lower back   Pain radiates down his left thigh  NO paresthesias  NO pain at rest  but hurts to get up  Gets better with wallking  Wakes from sleep when he rolls over on his left side  Took Advil but didn't help   He says he hasn't been doing too much due to his asthma  Been feeling more short of breath  He forgets to use his albuterol inhaler  He is due to see his cardiologist next week  He notices some swelling in his left foot recently  O: /70 (Cuff Size: Standard)   Pulse 76   Resp 16   Wt 81 2 kg (179 lb)   BMI 23 94 kg/m²   No distress although sounds slightly wheezy  Neck no adenopathy thyromegaly  Chest bibasilar rales especially on the right  Cardiac regular rate rhythm  Back no spinal tenderness  Extremities positive straight leg raising at 35° on the left  Slight left lateral hip tenderness  Reflexes are 1+ and symmetrical    Assessment  1  Sciatica left lower extremity-will check x-rays  Trial of steroids  May need referral     2   Shortness of breath-likely his COPD exacerbation however will check a chest x-ray in light of his history of CHF  3  Diabetes-monitor his glucose with prednisone    Plan  Check x-rays of the lumbar spine and hips  Rx for prednisone

## 2018-11-09 ENCOUNTER — TELEPHONE (OUTPATIENT)
Dept: FAMILY MEDICINE CLINIC | Facility: MEDICAL CENTER | Age: 83
End: 2018-11-09

## 2018-11-09 DIAGNOSIS — G60.9 NEUROPATHY, IDIOPATHIC: ICD-10-CM

## 2018-11-09 RX ORDER — FLUTICASONE PROPIONATE 50 MCG
2 SPRAY, SUSPENSION (ML) NASAL DAILY
Qty: 16 G | Refills: 0 | Status: SHIPPED | OUTPATIENT
Start: 2018-11-09 | End: 2018-12-17 | Stop reason: SDUPTHER

## 2018-11-09 RX ORDER — GABAPENTIN 100 MG/1
200 CAPSULE ORAL DAILY
Qty: 180 CAPSULE | Refills: 0 | Status: SHIPPED | OUTPATIENT
Start: 2018-11-09 | End: 2018-12-21 | Stop reason: SDUPTHER

## 2018-11-09 NOTE — TELEPHONE ENCOUNTER
Patient called the office requesting x-ray results patient stated he was suppose to get a call back Wednesday, but never got a call  Apologized and advised to patient Dr Wandy Clemons is out of office and will be back Monday      Call back [phone]    Routed to Clinical  Unsure if results have been reviewed from Dr Wandy Clemons

## 2018-11-09 NOTE — TELEPHONE ENCOUNTER
Let him know I had not  for gotten about him and was going to be getting back to him through the staff this afternoon  X-rays of his back and hips are actually pretty good  Very little degenerative changes  The chest x-ray shows a very small pleural effusion which is fluid around the lung  Not very concerned and we will see what Cardiology says when he sees them next week  How is his  Pain doing with the prednisone?

## 2018-11-12 ENCOUNTER — TELEPHONE (OUTPATIENT)
Dept: CARDIOLOGY CLINIC | Facility: CLINIC | Age: 83
End: 2018-11-12

## 2018-11-12 ENCOUNTER — APPOINTMENT (EMERGENCY)
Dept: RADIOLOGY | Facility: HOSPITAL | Age: 83
DRG: 293 | End: 2018-11-12
Payer: COMMERCIAL

## 2018-11-12 ENCOUNTER — HOSPITAL ENCOUNTER (INPATIENT)
Facility: HOSPITAL | Age: 83
LOS: 3 days | Discharge: HOME | DRG: 293 | End: 2018-11-15
Attending: EMERGENCY MEDICINE | Admitting: INTERNAL MEDICINE
Payer: COMMERCIAL

## 2018-11-12 ENCOUNTER — TELEPHONE (OUTPATIENT)
Dept: PULMONOLOGY | Facility: CLINIC | Age: 83
End: 2018-11-12

## 2018-11-12 DIAGNOSIS — I50.20 SYSTOLIC CONGESTIVE HEART FAILURE, UNSPECIFIED HF CHRONICITY (HCC): Primary | ICD-10-CM

## 2018-11-12 DIAGNOSIS — R60.9 EDEMA, UNSPECIFIED TYPE: ICD-10-CM

## 2018-11-12 DIAGNOSIS — I50.43 ACUTE ON CHRONIC COMBINED SYSTOLIC AND DIASTOLIC CONGESTIVE HEART FAILURE (HCC): ICD-10-CM

## 2018-11-12 DIAGNOSIS — R06.02 SHORTNESS OF BREATH AT REST: ICD-10-CM

## 2018-11-12 PROBLEM — M79.89 LEG SWELLING: Status: ACTIVE | Noted: 2018-11-12

## 2018-11-12 PROBLEM — N17.9 AKI (ACUTE KIDNEY INJURY) (HCC): Status: ACTIVE | Noted: 2018-11-12

## 2018-11-12 LAB
ALBUMIN SERPL BCP-MCNC: 3.5 G/DL (ref 3.5–5)
ALP SERPL-CCNC: 108 U/L (ref 46–116)
ALT SERPL W P-5'-P-CCNC: 84 U/L (ref 12–78)
ANION GAP SERPL CALCULATED.3IONS-SCNC: 7 MMOL/L (ref 4–13)
AST SERPL W P-5'-P-CCNC: 43 U/L (ref 5–45)
BACTERIA UR QL AUTO: ABNORMAL /HPF
BASOPHILS # BLD AUTO: 0.01 THOUSANDS/ΜL (ref 0–0.1)
BASOPHILS NFR BLD AUTO: 0 % (ref 0–1)
BILIRUB SERPL-MCNC: 1.8 MG/DL (ref 0.2–1)
BILIRUB UR QL STRIP: NEGATIVE
BUN SERPL-MCNC: 37 MG/DL (ref 5–25)
CALCIUM SERPL-MCNC: 9.1 MG/DL (ref 8.3–10.1)
CHLORIDE SERPL-SCNC: 98 MMOL/L (ref 100–108)
CLARITY UR: CLEAR
CO2 SERPL-SCNC: 29 MMOL/L (ref 21–32)
COLOR UR: YELLOW
CREAT SERPL-MCNC: 1.32 MG/DL (ref 0.6–1.3)
EOSINOPHIL # BLD AUTO: 0 THOUSAND/ΜL (ref 0–0.61)
EOSINOPHIL NFR BLD AUTO: 0 % (ref 0–6)
ERYTHROCYTE [DISTWIDTH] IN BLOOD BY AUTOMATED COUNT: 24 % (ref 11.6–15.1)
GFR SERPL CREATININE-BSD FRML MDRD: 49 ML/MIN/1.73SQ M
GLUCOSE SERPL-MCNC: 395 MG/DL (ref 65–140)
GLUCOSE UR STRIP-MCNC: ABNORMAL MG/DL
HCT VFR BLD AUTO: 36.1 % (ref 36.5–49.3)
HGB BLD-MCNC: 11.8 G/DL (ref 12–17)
HGB UR QL STRIP.AUTO: ABNORMAL
IMM GRANULOCYTES # BLD AUTO: 0.06 THOUSAND/UL (ref 0–0.2)
IMM GRANULOCYTES NFR BLD AUTO: 1 % (ref 0–2)
KETONES UR STRIP-MCNC: NEGATIVE MG/DL
LEUKOCYTE ESTERASE UR QL STRIP: NEGATIVE
LYMPHOCYTES # BLD AUTO: 0.72 THOUSANDS/ΜL (ref 0.6–4.47)
LYMPHOCYTES NFR BLD AUTO: 13 % (ref 14–44)
MCH RBC QN AUTO: 30.8 PG (ref 26.8–34.3)
MCHC RBC AUTO-ENTMCNC: 32.7 G/DL (ref 31.4–37.4)
MCV RBC AUTO: 94 FL (ref 82–98)
MONOCYTES # BLD AUTO: 0.96 THOUSAND/ΜL (ref 0.17–1.22)
MONOCYTES NFR BLD AUTO: 17 % (ref 4–12)
NEUTROPHILS # BLD AUTO: 3.96 THOUSANDS/ΜL (ref 1.85–7.62)
NEUTS SEG NFR BLD AUTO: 69 % (ref 43–75)
NITRITE UR QL STRIP: NEGATIVE
NON-SQ EPI CELLS URNS QL MICRO: ABNORMAL /HPF
NRBC BLD AUTO-RTO: 1 /100 WBCS
NT-PROBNP SERPL-MCNC: ABNORMAL PG/ML
PH UR STRIP.AUTO: 6 [PH] (ref 4.5–8)
PLATELET # BLD AUTO: 106 THOUSANDS/UL (ref 149–390)
POTASSIUM SERPL-SCNC: 4.5 MMOL/L (ref 3.5–5.3)
PROT SERPL-MCNC: 6.3 G/DL (ref 6.4–8.2)
PROT UR STRIP-MCNC: NEGATIVE MG/DL
RBC # BLD AUTO: 3.83 MILLION/UL (ref 3.88–5.62)
RBC #/AREA URNS AUTO: ABNORMAL /HPF
SODIUM SERPL-SCNC: 134 MMOL/L (ref 136–145)
SP GR UR STRIP.AUTO: 1.01 (ref 1–1.03)
UROBILINOGEN UR QL STRIP.AUTO: 1 E.U./DL
WBC # BLD AUTO: 5.71 THOUSAND/UL (ref 4.31–10.16)
WBC #/AREA URNS AUTO: ABNORMAL /HPF

## 2018-11-12 PROCEDURE — 80053 COMPREHEN METABOLIC PANEL: CPT | Performed by: EMERGENCY MEDICINE

## 2018-11-12 PROCEDURE — 83880 ASSAY OF NATRIURETIC PEPTIDE: CPT | Performed by: EMERGENCY MEDICINE

## 2018-11-12 PROCEDURE — 81001 URINALYSIS AUTO W/SCOPE: CPT

## 2018-11-12 PROCEDURE — 85025 COMPLETE CBC W/AUTO DIFF WBC: CPT | Performed by: EMERGENCY MEDICINE

## 2018-11-12 PROCEDURE — 71045 X-RAY EXAM CHEST 1 VIEW: CPT

## 2018-11-12 PROCEDURE — 82948 REAGENT STRIP/BLOOD GLUCOSE: CPT

## 2018-11-12 PROCEDURE — 36415 COLL VENOUS BLD VENIPUNCTURE: CPT | Performed by: EMERGENCY MEDICINE

## 2018-11-12 PROCEDURE — 99285 EMERGENCY DEPT VISIT HI MDM: CPT

## 2018-11-12 PROCEDURE — 99222 1ST HOSP IP/OBS MODERATE 55: CPT | Performed by: PHYSICIAN ASSISTANT

## 2018-11-12 RX ORDER — FUROSEMIDE 10 MG/ML
40 INJECTION INTRAMUSCULAR; INTRAVENOUS ONCE
Status: COMPLETED | OUTPATIENT
Start: 2018-11-12 | End: 2018-11-12

## 2018-11-12 RX ADMIN — FUROSEMIDE 40 MG: 10 INJECTION, SOLUTION INTRAMUSCULAR; INTRAVENOUS at 21:18

## 2018-11-12 NOTE — TELEPHONE ENCOUNTER
Pt's daughter called, asking if you will review chest x ray from 11/7 ordered by PCP  She is worried about fluid around heart and that he may go into CHF      Daughter Lacey Dose -[phone]

## 2018-11-13 PROBLEM — I50.43 ACUTE ON CHRONIC COMBINED SYSTOLIC AND DIASTOLIC CONGESTIVE HEART FAILURE (HCC): Status: ACTIVE | Noted: 2018-11-12

## 2018-11-13 LAB
ANION GAP SERPL CALCULATED.3IONS-SCNC: 8 MMOL/L (ref 4–13)
ATRIAL RATE: 56 BPM
BASOPHILS # BLD AUTO: 0.01 THOUSANDS/ΜL (ref 0–0.1)
BASOPHILS NFR BLD AUTO: 0 % (ref 0–1)
BUN SERPL-MCNC: 38 MG/DL (ref 5–25)
CALCIUM SERPL-MCNC: 9.3 MG/DL (ref 8.3–10.1)
CHLORIDE SERPL-SCNC: 99 MMOL/L (ref 100–108)
CO2 SERPL-SCNC: 30 MMOL/L (ref 21–32)
CREAT SERPL-MCNC: 1.28 MG/DL (ref 0.6–1.3)
EOSINOPHIL # BLD AUTO: 0.03 THOUSAND/ΜL (ref 0–0.61)
EOSINOPHIL NFR BLD AUTO: 0 % (ref 0–6)
ERYTHROCYTE [DISTWIDTH] IN BLOOD BY AUTOMATED COUNT: 23.9 % (ref 11.6–15.1)
GFR SERPL CREATININE-BSD FRML MDRD: 51 ML/MIN/1.73SQ M
GLUCOSE SERPL-MCNC: 105 MG/DL (ref 65–140)
GLUCOSE SERPL-MCNC: 182 MG/DL (ref 65–140)
GLUCOSE SERPL-MCNC: 206 MG/DL (ref 65–140)
GLUCOSE SERPL-MCNC: 229 MG/DL (ref 65–140)
GLUCOSE SERPL-MCNC: 244 MG/DL (ref 65–140)
GLUCOSE SERPL-MCNC: 311 MG/DL (ref 65–140)
HCT VFR BLD AUTO: 35 % (ref 36.5–49.3)
HGB BLD-MCNC: 11.5 G/DL (ref 12–17)
IMM GRANULOCYTES # BLD AUTO: 0.06 THOUSAND/UL (ref 0–0.2)
IMM GRANULOCYTES NFR BLD AUTO: 1 % (ref 0–2)
LYMPHOCYTES # BLD AUTO: 0.58 THOUSANDS/ΜL (ref 0.6–4.47)
LYMPHOCYTES NFR BLD AUTO: 8 % (ref 14–44)
MCH RBC QN AUTO: 30.8 PG (ref 26.8–34.3)
MCHC RBC AUTO-ENTMCNC: 32.9 G/DL (ref 31.4–37.4)
MCV RBC AUTO: 94 FL (ref 82–98)
MONOCYTES # BLD AUTO: 0.98 THOUSAND/ΜL (ref 0.17–1.22)
MONOCYTES NFR BLD AUTO: 13 % (ref 4–12)
NEUTROPHILS # BLD AUTO: 5.68 THOUSANDS/ΜL (ref 1.85–7.62)
NEUTS SEG NFR BLD AUTO: 78 % (ref 43–75)
NRBC BLD AUTO-RTO: 1 /100 WBCS
P AXIS: 67 DEGREES
PLATELET # BLD AUTO: 101 THOUSANDS/UL (ref 149–390)
PLATELET # BLD AUTO: 107 THOUSANDS/UL (ref 149–390)
POTASSIUM SERPL-SCNC: 4.1 MMOL/L (ref 3.5–5.3)
PR INTERVAL: 266 MS
PROCALCITONIN SERPL-MCNC: 0.05 NG/ML
QRS AXIS: -57 DEGREES
QRSD INTERVAL: 144 MS
QT INTERVAL: 488 MS
QTC INTERVAL: 470 MS
RBC # BLD AUTO: 3.73 MILLION/UL (ref 3.88–5.62)
SODIUM SERPL-SCNC: 137 MMOL/L (ref 136–145)
T WAVE AXIS: 197 DEGREES
VENTRICULAR RATE: 56 BPM
WBC # BLD AUTO: 7.34 THOUSAND/UL (ref 4.31–10.16)

## 2018-11-13 PROCEDURE — 80048 BASIC METABOLIC PNL TOTAL CA: CPT | Performed by: INTERNAL MEDICINE

## 2018-11-13 PROCEDURE — 93010 ELECTROCARDIOGRAM REPORT: CPT | Performed by: INTERNAL MEDICINE

## 2018-11-13 PROCEDURE — 85049 AUTOMATED PLATELET COUNT: CPT | Performed by: PHYSICIAN ASSISTANT

## 2018-11-13 PROCEDURE — 82948 REAGENT STRIP/BLOOD GLUCOSE: CPT

## 2018-11-13 PROCEDURE — 85025 COMPLETE CBC W/AUTO DIFF WBC: CPT | Performed by: PHYSICIAN ASSISTANT

## 2018-11-13 PROCEDURE — 94760 N-INVAS EAR/PLS OXIMETRY 1: CPT

## 2018-11-13 PROCEDURE — 84145 PROCALCITONIN (PCT): CPT | Performed by: PHYSICIAN ASSISTANT

## 2018-11-13 PROCEDURE — 94640 AIRWAY INHALATION TREATMENT: CPT

## 2018-11-13 PROCEDURE — 94664 DEMO&/EVAL PT USE INHALER: CPT

## 2018-11-13 PROCEDURE — 99232 SBSQ HOSP IP/OBS MODERATE 35: CPT | Performed by: INTERNAL MEDICINE

## 2018-11-13 PROCEDURE — 93005 ELECTROCARDIOGRAM TRACING: CPT

## 2018-11-13 RX ORDER — PREDNISONE 10 MG/1
10 TABLET ORAL DAILY
Status: DISCONTINUED | OUTPATIENT
Start: 2018-11-16 | End: 2018-11-13

## 2018-11-13 RX ORDER — FUROSEMIDE 10 MG/ML
40 INJECTION INTRAMUSCULAR; INTRAVENOUS 2 TIMES DAILY
Status: DISCONTINUED | OUTPATIENT
Start: 2018-11-13 | End: 2018-11-15

## 2018-11-13 RX ORDER — PREDNISONE 1 MG/1
1 TABLET ORAL 2 TIMES DAILY WITH MEALS
Status: DISCONTINUED | OUTPATIENT
Start: 2018-11-13 | End: 2018-11-13

## 2018-11-13 RX ORDER — SODIUM CHLORIDE FOR INHALATION 0.9 %
3 VIAL, NEBULIZER (ML) INHALATION
Status: DISCONTINUED | OUTPATIENT
Start: 2018-11-13 | End: 2018-11-15 | Stop reason: HOSPADM

## 2018-11-13 RX ORDER — POTASSIUM CHLORIDE 20 MEQ/1
20 TABLET, EXTENDED RELEASE ORAL DAILY
Status: DISCONTINUED | OUTPATIENT
Start: 2018-11-13 | End: 2018-11-15

## 2018-11-13 RX ORDER — FLUTICASONE PROPIONATE 220 UG/1
1 AEROSOL, METERED RESPIRATORY (INHALATION) 2 TIMES DAILY
Status: DISCONTINUED | OUTPATIENT
Start: 2018-11-13 | End: 2018-11-15 | Stop reason: HOSPADM

## 2018-11-13 RX ORDER — HEPARIN SODIUM 5000 [USP'U]/ML
5000 INJECTION, SOLUTION INTRAVENOUS; SUBCUTANEOUS EVERY 8 HOURS SCHEDULED
Status: DISCONTINUED | OUTPATIENT
Start: 2018-11-13 | End: 2018-11-15 | Stop reason: HOSPADM

## 2018-11-13 RX ORDER — METOPROLOL SUCCINATE 50 MG/1
50 TABLET, EXTENDED RELEASE ORAL DAILY
Status: DISCONTINUED | OUTPATIENT
Start: 2018-11-14 | End: 2018-11-13

## 2018-11-13 RX ORDER — MONTELUKAST SODIUM 10 MG/1
10 TABLET ORAL
Status: DISCONTINUED | OUTPATIENT
Start: 2018-11-13 | End: 2018-11-15 | Stop reason: HOSPADM

## 2018-11-13 RX ORDER — PRAVASTATIN SODIUM 20 MG
20 TABLET ORAL
Status: DISCONTINUED | OUTPATIENT
Start: 2018-11-13 | End: 2018-11-15 | Stop reason: HOSPADM

## 2018-11-13 RX ORDER — FLUTICASONE PROPIONATE 50 MCG
2 SPRAY, SUSPENSION (ML) NASAL DAILY
Status: DISCONTINUED | OUTPATIENT
Start: 2018-11-13 | End: 2018-11-15 | Stop reason: HOSPADM

## 2018-11-13 RX ORDER — LEVALBUTEROL 1.25 MG/.5ML
1.25 SOLUTION, CONCENTRATE RESPIRATORY (INHALATION)
Status: DISCONTINUED | OUTPATIENT
Start: 2018-11-13 | End: 2018-11-15 | Stop reason: HOSPADM

## 2018-11-13 RX ORDER — PREDNISONE 10 MG/1
10 TABLET ORAL 2 TIMES DAILY WITH MEALS
Status: DISCONTINUED | OUTPATIENT
Start: 2018-11-13 | End: 2018-11-13

## 2018-11-13 RX ORDER — PREDNISONE 10 MG/1
10 TABLET ORAL DAILY
Status: COMPLETED | OUTPATIENT
Start: 2018-11-14 | End: 2018-11-14

## 2018-11-13 RX ORDER — GABAPENTIN 100 MG/1
200 CAPSULE ORAL DAILY
Status: DISCONTINUED | OUTPATIENT
Start: 2018-11-13 | End: 2018-11-15 | Stop reason: HOSPADM

## 2018-11-13 RX ORDER — METOPROLOL SUCCINATE 100 MG/1
100 TABLET, EXTENDED RELEASE ORAL DAILY
Status: DISCONTINUED | OUTPATIENT
Start: 2018-11-14 | End: 2018-11-15 | Stop reason: HOSPADM

## 2018-11-13 RX ORDER — METOPROLOL SUCCINATE 100 MG/1
100 TABLET, EXTENDED RELEASE ORAL DAILY
Status: DISCONTINUED | OUTPATIENT
Start: 2018-11-13 | End: 2018-11-13

## 2018-11-13 RX ADMIN — ISODIUM CHLORIDE 3 ML: 0.03 SOLUTION RESPIRATORY (INHALATION) at 13:53

## 2018-11-13 RX ADMIN — INSULIN DETEMIR 18 UNITS: 100 INJECTION, SOLUTION SUBCUTANEOUS at 00:54

## 2018-11-13 RX ADMIN — MONTELUKAST SODIUM 10 MG: 10 TABLET, FILM COATED ORAL at 00:54

## 2018-11-13 RX ADMIN — HEPARIN SODIUM 5000 UNITS: 5000 INJECTION, SOLUTION INTRAVENOUS; SUBCUTANEOUS at 14:42

## 2018-11-13 RX ADMIN — PREDNISONE 10 MG: 10 TABLET ORAL at 17:55

## 2018-11-13 RX ADMIN — INSULIN LISPRO 2 UNITS: 100 INJECTION, SOLUTION INTRAVENOUS; SUBCUTANEOUS at 21:34

## 2018-11-13 RX ADMIN — METOPROLOL SUCCINATE 100 MG: 100 TABLET, FILM COATED, EXTENDED RELEASE ORAL at 09:07

## 2018-11-13 RX ADMIN — PREDNISONE 10 MG: 10 TABLET ORAL at 09:07

## 2018-11-13 RX ADMIN — ISODIUM CHLORIDE 3 ML: 0.03 SOLUTION RESPIRATORY (INHALATION) at 19:30

## 2018-11-13 RX ADMIN — POTASSIUM CHLORIDE 20 MEQ: 1500 TABLET, EXTENDED RELEASE ORAL at 09:07

## 2018-11-13 RX ADMIN — FLUTICASONE PROPIONATE 2 SPRAY: 50 SPRAY, METERED NASAL at 09:09

## 2018-11-13 RX ADMIN — INSULIN LISPRO 1 UNITS: 100 INJECTION, SOLUTION INTRAVENOUS; SUBCUTANEOUS at 17:58

## 2018-11-13 RX ADMIN — INSULIN LISPRO 3 UNITS: 100 INJECTION, SOLUTION INTRAVENOUS; SUBCUTANEOUS at 00:55

## 2018-11-13 RX ADMIN — INSULIN LISPRO 4 UNITS: 100 INJECTION, SOLUTION INTRAVENOUS; SUBCUTANEOUS at 09:08

## 2018-11-13 RX ADMIN — GABAPENTIN 200 MG: 100 CAPSULE ORAL at 00:54

## 2018-11-13 RX ADMIN — FLUTICASONE PROPIONATE 1 PUFF: 220 AEROSOL, METERED RESPIRATORY (INHALATION) at 09:09

## 2018-11-13 RX ADMIN — FUROSEMIDE 40 MG: 10 INJECTION, SOLUTION INTRAMUSCULAR; INTRAVENOUS at 09:08

## 2018-11-13 RX ADMIN — HEPARIN SODIUM 5000 UNITS: 5000 INJECTION, SOLUTION INTRAVENOUS; SUBCUTANEOUS at 00:54

## 2018-11-13 RX ADMIN — LEVALBUTEROL HYDROCHLORIDE 1.25 MG: 1.25 SOLUTION, CONCENTRATE RESPIRATORY (INHALATION) at 07:43

## 2018-11-13 RX ADMIN — HEPARIN SODIUM 5000 UNITS: 5000 INJECTION, SOLUTION INTRAVENOUS; SUBCUTANEOUS at 21:30

## 2018-11-13 RX ADMIN — MONTELUKAST SODIUM 10 MG: 10 TABLET, FILM COATED ORAL at 21:29

## 2018-11-13 RX ADMIN — FUROSEMIDE 40 MG: 10 INJECTION, SOLUTION INTRAMUSCULAR; INTRAVENOUS at 17:54

## 2018-11-13 RX ADMIN — INSULIN LISPRO 3 UNITS: 100 INJECTION, SOLUTION INTRAVENOUS; SUBCUTANEOUS at 12:45

## 2018-11-13 RX ADMIN — PRAVASTATIN SODIUM 20 MG: 20 TABLET ORAL at 17:54

## 2018-11-13 RX ADMIN — GABAPENTIN 200 MG: 100 CAPSULE ORAL at 21:29

## 2018-11-13 RX ADMIN — HEPARIN SODIUM 5000 UNITS: 5000 INJECTION, SOLUTION INTRAVENOUS; SUBCUTANEOUS at 05:41

## 2018-11-13 RX ADMIN — INSULIN DETEMIR 18 UNITS: 100 INJECTION, SOLUTION SUBCUTANEOUS at 21:29

## 2018-11-13 RX ADMIN — FLUTICASONE PROPIONATE 1 PUFF: 220 AEROSOL, METERED RESPIRATORY (INHALATION) at 17:59

## 2018-11-13 RX ADMIN — ISODIUM CHLORIDE 3 ML: 0.03 SOLUTION RESPIRATORY (INHALATION) at 07:43

## 2018-11-13 RX ADMIN — LEVALBUTEROL HYDROCHLORIDE 1.25 MG: 1.25 SOLUTION, CONCENTRATE RESPIRATORY (INHALATION) at 19:30

## 2018-11-13 RX ADMIN — PREDNISONE 10 MG: 10 TABLET ORAL at 00:54

## 2018-11-13 RX ADMIN — LEVALBUTEROL HYDROCHLORIDE 1.25 MG: 1.25 SOLUTION, CONCENTRATE RESPIRATORY (INHALATION) at 13:53

## 2018-11-13 NOTE — UTILIZATION REVIEW
[address]  Utilization Review Department  Phone: [phone]; Fax [phone]  [email or web address]  ATTENTION: Please call with any questions or concerns to [phone]  and carefully listen to the prompts so that you are directed to the right person  Send all requests for admission clinical reviews, approved or denied determinations and any other requests to fax [phone]  All voicemails are confidential   Initial Clinical Review    Admission: Date/Time/Statement: inpatient admission 11/12/18 @ 2116     Orders Placed This Encounter   Procedures    Inpatient Admission (expected length of stay for this patient is greater than two midnights)     Standing Status:   Standing     Number of Occurrences:   1     Order Specific Question:   Admitting Physician     Answer:   Nilda Santana     Order Specific Question:   Level of Care     Answer:   Med Surg [16]     Order Specific Question:   Bed request comments     Answer:   Tele     Order Specific Question:   Estimated length of stay     Answer:   More than 2 Midnights     Order Specific Question:   Certification     Answer:   I certify that inpatient services are medically necessary for this patient for a duration of greater than two midnights  See H&P and MD Progress Notes for additional information about the patient's course of treatment  ED: Date/Time/Mode of Arrival:   ED Arrival Information     Expected Arrival Acuity Means of Arrival Escorted By Service Admission Type    - 11/12/2018 18:41 Emergent Walk-In Family Member Hospitalist Emergency    Arrival Complaint    LEG SWELLING        Chief Complaint:   Chief Complaint   Patient presents with    Edema     Pt reports b/l LE edema and SOB for about 1 week  Sent by PCP to R/O CHF   Altered Mental Status     Daughter also reports change in mental status  History of Illness: 80 y o   Male presents on advise of PCP with PMHx of COPD, DM, HTN, CHF who presents with increased LE edema  Patient states he has noticed worsening shortness of breath for the past 3 weeks  He shares activities include golfs and gardens but has been unable to do so due to his dyspnea  He also reports noticing worsening LE edema in the past week  He says he did get a CXR done which his PCP said showed some "fluid around the heart" but that he felt he would be ok until his Cardiology appointment, denies any change in medications at that time  He also reports a cough productive of clear mucous for the past week  He denies orthopnea but says he always sleeps somewhat "propped up" with two pillows      ED Vital Signs:   ED Triage Vitals [11/12/18 1850]   Temperature Pulse Respirations Blood Pressure SpO2   98 3 °F (36 8 °C) 71 16 130/74 98 %      Temp Source Heart Rate Source Patient Position - Orthostatic VS BP Location FiO2 (%)   Oral Monitor Lying Right arm --      Pain Score       No Pain        Wt Readings from Last 1 Encounters:   11/13/18 77 6 kg (171 lb 1 2 oz)       Vital Signs (abnormal): none    Abnormal Labs/Diagnostic Test Results: 11/12/2018   Sodium 134     Chloride 98     BUN 37     Creatinine 1 32     Glucose 395     ALT 84     Total Protein 6 3     Total Bilirubin 1 80     NT-proBNP 16,999   RBC 3 83     Hemoglobin 11 8     Hematocrit 36 1     RDW 24 0     Platelets 769     Urinalysis glucose 1/2%; blood trace; RBC 0-1;   XR chest: Mild vascular congestion    11/13/2018   WBC 7 34    RBC 3 73     Hemoglobin 11 5     Hematocrit 35 0     Platelets 608     Chloride 99, BUN 38, glucose 206, platelet 243,     ED Treatment:   Medication Administration from 11/12/2018 1841 to 11/12/2018 2213       Date/Time Order Dose Route Action Action by Comments     11/12/2018 2118 furosemide (LASIX) injection 40 mg 40 mg Intravenous Given Tara De Guzman RN      11/12/2018 2133 nitroglycerin (NITRO-BID) 2 % TD ointment 1 inch 1 inch Topical Not Given Tara De Guzman RN PA-C Ronnie Bettencourt gave RN verbal order to discontinue Nitro paste          Past Medical/Surgical History: Active Ambulatory Problems     Diagnosis Date Noted    COPD without exacerbation ([place] [address] ) 07/28/2017    Essential hypertension 08/27/2012    Hyperlipidemia 05/06/2012    Neuropathy, idiopathic 08/22/2017    Multiple nodules of lung 03/09/2018    Mixed simple and mucopurulent chronic bronchitis ([place] [address] ) 03/26/2018    Type 2 diabetes mellitus, with long-term current use of insulin ([place] [address] ) 05/12/2018    Heart failure ([place] [address] )     Dilated cardiomyopathy ([place] [address] ) 05/21/2018    Chronic systolic congestive heart failure ([place] [address] ) 05/21/2018    Abnormal chest CT 06/18/2018    Arthritis 10/22/2018       Past Medical History:   Diagnosis Date    COPD (chronic obstructive pulmonary disease) ([place] [address] )     Diabetes mellitus ([place] [address] )     Hypertension     Left inguinal hernia     Malignant melanoma of skin ([place] [address] )        Admitting Diagnosis: Leg swelling [M79 89]  Shortness of breath at rest [R06 02]  Edema, unspecified type [F93 4]  Systolic congestive heart failure, unspecified HF chronicity ([place] [address] ) [I50 20]    Age/Sex: 80 y o  male    Assessment/Plan: 11/12/2018 attending MD;  Leg swelling   Assessment & Plan     · Likely 2/2 acute CHF exacerbation  ? BNP 17,000 with increasing LE swelling  ? CXR appears somewhat stable compared to CXR 11/7  ? CBC without leukocytosis, afebrile  · Reported SOB to PCP on 11/7, was thought to be d/t COPD  ? CXR obtained at that time did show some mild pulmonary edema  ? No medication changes were made as pt had Cardiology appointment scheduled for next Monday  · Was admitted May 2018 for CHF exacerbation   ? New cardiomyopathy seen on echo, cath negative for obstructive CAD  · Repeat Echo Sep 2018 with EF 20%  ?  Maintained on 40 mg PO Lasix daily  · Given extra 40 mg IV Lasix in ED with improvement of SOB  · Will monitor daily weights, I/o's  · Continue IV diuresis,  Monitor on tele  · Consult Cardiology · Follows with Dr Claude London   SUZY (acute kidney injury) [facility])   Assessment & Plan     · Baseline Cr approx 1 1, presents with Cr 1 32  ? Suspect 2/2 volume overload  ? Continue IV diuresis  · Monitor BMP  · If worsening renal function, consider Nephrology consult    Chronic systolic congestive heart failure ([facility] [address] )   Assessment & Plan     · See primary plan    COPD without exacerbation ([facility] [address] )   Assessment & Plan     · Respiratory protocol, wheezes heard on my exam  ? No improvement of SOB despite PO prednisone for low back pain  ? Will obtain procalcitonin, consider abx if positive   · Continue home IH regimen   Essential hypertension   Assessment & Plan     · Controlled, continue Toprol  · Resume ACEI upon improvement of renal function   Type 2 diabetes mellitus, with long-term current use of insulin (HCC)   Assessment & Plan             Lab Results   Component Value Date     HGBA1C 7 6 (H) 07/23/2013     No results for input(s): POCGLU in the last 72 hours  Blood Sugar Average: Last 72 hrs:  ·  on arrival, likely steroid induced hyperglycemia  ?  (on PO prednisone taper for back pain)  · Continue Levemir 18 U hs with short acting 4 units at breakfast  · Add SSI coverage ac and hs  · Adjust accordingly   Hyperlipidemia   Assessment & Plan     · Continue statin       Admission Orders:  Scheduled Meds:   Current Facility-Administered Medications:  fluticasone 2 spray Nasal Daily   fluticasone 1 puff Inhalation BID   furosemide 40 mg Intravenous BID   gabapentin 200 mg Oral Daily   heparin (porcine) 5,000 Units Subcutaneous Q8H [address]   insulin detemir 18 Units Subcutaneous HS   insulin lispro 1-5 Units Subcutaneous HS   insulin lispro 1-6 Units Subcutaneous TID With Meals   insulin lispro 4 Units Subcutaneous Daily With Breakfast   levalbuterol 1 25 mg Nebulization TID   metoprolol succinate 100 mg Oral Daily   montelukast 10 mg Oral HS   potassium chloride 20 mEq Oral Daily   pravastatin 20 mg Oral Daily With TrustID [START ON 11/16/2018] predniSONE 10 mg Oral Daily   predniSONE 10 mg Oral BID With Meals   sodium chloride 3 mL Nebulization TID   umeclidinium-vilanterol 1 puff Inhalation Daily     Continuous Infusions:    PRN Meds:   48 hr telemetry  Peripheral IV  Daily weight  I/O  Vitals routine  Bmp,   Heparin SC &platelet  Inpatient to Cardiology  scd  Cardiac diet 2 gm NA

## 2018-11-13 NOTE — ASSESSMENT & PLAN NOTE
· Leg swelling 2/2 acute CHF exacerbation  · BNP 17,000 with increasing LE swelling  · CXR appears somewhat stable compared to CXR 11/7  · CBC without leukocytosis, afebrile  · Reported SOB to PCP on 11/7, was thought to be d/t COPD  · CXR obtained at that time did show some mild pulmonary edema  · No medication changes were made as pt had Cardiology appointment scheduled for next Monday  · Was admitted May 2018 for CHF exacerbation   · New cardiomyopathy seen on echo, cath negative for obstructive CAD  · Repeat Echo Sep 2018 with EF 20%  · Maintained on 40 mg PO Lasix daily  · Continue IV Lasix 40 mg b i d    · Will monitor daily weights, I/o's  · Monitor on tele  · Cardiology consultation pending  · Follows with Dr Herb Skinner

## 2018-11-13 NOTE — ASSESSMENT & PLAN NOTE
· Likely 2/2 acute CHF exacerbation  · BNP 17,000 with increasing LE swelling  · CXR appears somewhat stable compared to CXR 11/7  · CBC without leukocytosis, afebrile  · Reported SOB to PCP on 11/7, was thought to be d/t COPD  · CXR obtained at that time did show some mild pulmonary edema  · No medication changes were made as pt had Cardiology appointment scheduled for next Monday  · Was admitted May 2018 for CHF exacerbation   · New cardiomyopathy seen on echo, cath negative for obstructive CAD  · Repeat Echo Sep 2018 with EF 20%  · Maintained on 40 mg PO Lasix daily  · Given extra 40 mg IV Lasix in ED with improvement of SOB  · Will monitor daily weights, I/o's  · Continue IV diuresis,  Monitor on tele  · Consult Cardiology   · Follows with Dr Argenis Leone

## 2018-11-13 NOTE — H&P
H&P- Pamela Huynh 1933, 80 y o  male MRN: [id number]    Unit/Bed#: -01 Encounter: [id number]    Primary Care Provider: Clarissa Corcoran MD   Date and time admitted to hospital: 11/12/2018  6:48 PM    * Leg swelling   Assessment & Plan    · Likely 2/2 acute CHF exacerbation  · BNP 17,000 with increasing LE swelling  · CXR appears somewhat stable compared to CXR 11/7  · CBC without leukocytosis, afebrile  · Reported SOB to PCP on 11/7, was thought to be d/t COPD  · CXR obtained at that time did show some mild pulmonary edema  · No medication changes were made as pt had Cardiology appointment scheduled for next Monday  · Was admitted May 2018 for CHF exacerbation   · New cardiomyopathy seen on echo, cath negative for obstructive CAD  · Repeat Echo Sep 2018 with EF 20%  · Maintained on 40 mg PO Lasix daily  · Given extra 40 mg IV Lasix in ED with improvement of SOB  · Will monitor daily weights, I/o's  · Continue IV diuresis,  Monitor on tele  · Consult Cardiology   · Follows with Dr Bambi Berman     SUZY (acute kidney injury) [facility])   Assessment & Plan    · Baseline Cr approx 1 1, presents with Cr 1 32  · Suspect 2/2 volume overload  · Continue IV diuresis  · Monitor BMP  · If worsening renal function, consider Nephrology consult      Chronic systolic congestive heart failure ([facility] )   Assessment & Plan    · See primary plan      COPD without exacerbation ([facility] )   Assessment & Plan    · Respiratory protocol, wheezes heard on my exam  · No improvement of SOB despite PO prednisone for low back pain  · Will obtain procalcitonin, consider abx if positive   · Continue home IH regimen     Essential hypertension   Assessment & Plan    · Controlled, continue Toprol  · Resume ACEI upon improvement of renal function     Type 2 diabetes mellitus, with long-term current use of insulin ([facility] )   Assessment & Plan    Lab Results   Component Value Date    HGBA1C 7 6 (H) 07/23/2013       No results for input(s): POCGLU in the last 72 hours  Blood Sugar Average: Last 72 hrs:  ·  on arrival, likely steroid induced hyperglycemia  · (on PO prednisone taper for back pain)  · Continue Levemir 18 U hs with short acting 4 units at breakfast  · Add SSI coverage ac and hs  · Adjust accordingly       Hyperlipidemia   Assessment & Plan    · Continue statin       VTE Prophylaxis: Heparin  / sequential compression device   Code Status: FULL  POLST: POLST form is not discussed and not completed at this time  Discussion with family: none    Anticipated Length of Stay:  Patient will be admitted on an Inpatient basis with an anticipated length of stay of  > 2 midnights  Justification for Hospital Stay: per plan above    Total Time for Visit, including Counseling / Coordination of Care: 30 minutes  Greater than 50% of this total time spent on direct patient counseling and coordination of care  Chief Complaint:   SOB, LE edema    History of Present Illness:    Chaim Vaz is a 80 y o  male with PMHx of COPD, DM, HTN, CHF who presents with increased LE edema  Patient states he has noticed worsening shortness of breath for the past 3 weeks  He says he usually golfs and gardens but has been unable to do so due to his dyspnea  He also reports noticing worsening LE edema in the past week  He says he did get a CXR done which his PCP said showed some "fluid around the heart" but that she felt he would be ok until his Cardiology appointment, denies any change in medications at that time  He also reports a cough productive of clear mucous for the past week  He denies orthopnea but says he always sleeps somewhat "propped up" with two pillows  He denies fever or chills  Denies added salt in his diet recently or increased consumption of packaged foods  Denies exposure to sick contacts  Reports compliance with his medications and inhalers  He denies any warmth, erythema or tenderness of his LE  He denies HA or dizziness    Denies CP, n/v, paraesthesias of his UE's  Denies abdominal pain or diarrhea  Denies urinary symptoms  He notes that his swelling became more significant after he started taking prednisone for his sciatica  Review of Systems:    Review of Systems   Constitutional: Positive for appetite change  HENT: Negative  Eyes: Negative  Respiratory: Positive for cough and shortness of breath  Cardiovascular: Positive for leg swelling  Gastrointestinal: Negative  Endocrine: Negative  Genitourinary: Negative  Musculoskeletal: Positive for back pain  Skin: Negative  Allergic/Immunologic: Negative  Neurological: Negative  Hematological: Negative  Psychiatric/Behavioral: Negative  Past Medical and Surgical History:     Past Medical History:   Diagnosis Date    [facility]     Diabetes mellitus ([facility] )     Type 2    Hypertension     Left inguinal hernia     Malignant melanoma of skin ([facility] )        Past Surgical History:   Procedure Laterality Date    APPENDECTOMY      CATARACT EXTRACTION, BILATERAL      CHOLECYSTECTOMY      CHOLECYSTECTOMY      COLONOSCOPY  2014    Fiberoptic    HERNIA REPAIR      JOINT REPLACEMENT Left     meniscus repair    KNEE ARTHROSCOPY      Therapeutic       Meds/Allergies:    Prior to Admission medications    Medication Sig Start Date End Date Taking?  Authorizing Provider   acetaminophen (TYLENOL) 325 mg tablet Take 2 tablets (650 mg total) by mouth every 6 (six) hours as needed for mild pain 5/15/18  Yes Milena Owen PA-C   albuterol (2 5 mg/3 mL) 0 083 % nebulizer solution Take 1 vial (2 5 mg total) by nebulization every 6 (six) hours as needed for wheezing or shortness of breath 9/17/18  Yes Annie Huerta MD   Ascorbic Acid (VITAMIN C) 1000 MG tablet Take 1 tablet by mouth daily   Yes Historical Provider, MD   Cinnamon 500 MG TABS Take 1 tablet by mouth daily     Yes Historical Provider, MD   fluticasone (FLONASE) 50 mcg/act nasal spray 2 sprays into each nostril daily 11/9/18  Yes Dayanna Allen MD   fluticasone (FLOVENT HFA) 220 mcg/act inhaler Inhale 1 puff 2 (two) times a day 9/28/18  Yes Vandana Fountain MD   furosemide (LASIX) 40 mg tablet Take 1 tablet (40 mg total) by mouth daily 6/14/18  Yes Delvin Fang MD   gabapentin (NEURONTIN) 100 mg capsule Take 2 capsules (200 mg total) by mouth daily 11/9/18  Yes Dayanna Allen MD   insulin detemir (LEVEMIR) 100 units/mL subcutaneous injection Inject 18 Units under the skin daily at bedtime   6/16/14  Yes Historical Provider, MD   lisinopril (ZESTRIL) 10 mg tablet Take 1 tablet (10 mg total) by mouth daily 11/7/18  Yes Evonne Albert MD   metoprolol succinate (TOPROL-XL) 50 mg 24 hr tablet Take 2 tablets (100 mg total) by mouth daily 10/15/18  Yes Blade Gray MD   montelukast (SINGULAIR) 10 mg tablet Take 1 tablet (10 mg total) by mouth daily at bedtime 10/1/18  Yes DANIELLA SueC   Multiple Vitamins-Minerals (OCUVITE ADULT 50+ PO) Take 1 tablet by mouth daily   Yes Historical Provider, MD   NOVOLOG FLEXPEN 100 units/mL injection pen 4 Units daily with breakfast   10/26/18  Yes Historical Provider, MD   ONE TOUCH ULTRA TEST test strip  6/25/18  Yes Historical Provider, MD Denise Prost LANCETS FINE [facility]  10/29/18  Yes Historical Provider, MD   potassium chloride (K-DUR,KLOR-CON) 20 mEq tablet Take 1 tablet (20 mEq total) by mouth daily 6/14/18  Yes Delvin Fang MD   predniSONE 10 mg tablet Take 2 tabs bid for 3 days, then 1 1/2 tabs bid for 3 days, then 1 tab bid for 2 days, then 1 tab daily for 2 days   11/7/18  Yes Dayanna Allen MD   repaglinide (PRANDIN) 1 mg tablet Take 1 mg by mouth daily 4 tablets in the morning, one tablet in the afternoon, two tablets in the evening  2/8/18  Yes Historical Provider, MD   repaglinide (PRANDIN) 2 mg tablet Take 2 mg by mouth daily before dinner   Yes Historical Provider, MD   roflumilast 500 mcg TABS Take 1 tablet (500 mcg total) by mouth daily 3/9/18  Yes Clayton Quintana PA-C   simvastatin (ZOCOR) 10 mg tablet Take 1 tablet (10 mg total) by mouth daily 11/1/18  Yes Donell Yuan MD   umeclidinium-vilanterol [facility] ELLIPTA) 62 5-25 MCG/INH inhaler Inhale 1 puff daily 7/30/18  Yes Jaxson Phoenix   VENTOLIN  (18 Base) MCG/ACT inhaler  1/17/18  Yes Historical Provider, MD   BD PEN NEEDLE DON U/F 32G X 4 MM MISC  10/26/18   Historical Provider, MD     I have reviewed home medications with patient personally  Allergies: No Known Allergies    Social History:     Marital Status: /Civil Union   Occupation: retired  Patient Pre-hospital Living Situation: not discussed  Patient Pre-hospital Level of Mobility: not discussed  Patient Pre-hospital Diet Restrictions: none  Substance Use History:   History   Alcohol Use    0 0 oz/week     Comment: SOcially      History   Smoking Status    Former Smoker    Packs/day: 1 00    Years: 10 00    Quit date: 1960   Smokeless Tobacco    Never Used     History   Drug Use No       Family History:    non-contributory    Physical Exam:     Vitals:   Blood Pressure: 122/72 (11/12/18 2213)  Pulse: 64 (11/12/18 2213)  Temperature: 97 6 °F (36 4 °C) (11/12/18 2213)  Temp Source: Oral (11/12/18 2213)  Respirations: 16 (11/12/18 2213)  Height: 6' 1" (185 4 cm) (11/12/18 2213)  Weight - Scale: 81 9 kg (180 lb 8 9 oz) (11/12/18 2213)  SpO2: 95 % (11/12/18 2213)    Physical Exam   Constitutional: He is oriented to person, place, and time  He appears well-developed and well-nourished  No distress  HENT:   Mouth/Throat: Oropharynx is clear and moist    Cardiovascular: Normal rate, regular rhythm, normal heart sounds and intact distal pulses  Exam reveals no gallop and no friction rub  No murmur heard  Pulmonary/Chest: Effort normal  He has wheezes (bilateral, expiratory)  He has no rales  He exhibits no tenderness  Abdominal: Soft   Bowel sounds are normal  He exhibits no distension and no mass  There is no tenderness  There is no rebound and no guarding  Musculoskeletal: He exhibits edema (2-3+ pitting edema of b/l LE's, symmetric, no warmth/erythema/tenderness)  He exhibits no tenderness  Neurological: He is alert and oriented to person, place, and time  Skin: Skin is warm and dry  No rash noted  He is not diaphoretic  No erythema  No pallor  Psychiatric: He has a normal mood and affect  His behavior is normal    Nursing note and vitals reviewed  Additional Data:     Lab Results: I have personally reviewed pertinent reports  Results from last 7 days  Lab Units 11/12/18 1941   WBC Thousand/uL 5 71   HEMOGLOBIN g/dL 11 8*   HEMATOCRIT % 36 1*   PLATELETS Thousands/uL 106*   NEUTROS ABS Thousands/µL 3 96   NEUTROS PCT % 69   LYMPHS PCT % 13*   MONOS PCT % 17*   EOS PCT % 0       Results from last 7 days  Lab Units 11/12/18 1941   POTASSIUM mmol/L 4 5   CHLORIDE mmol/L 98*   CO2 mmol/L 29   BUN mg/dL 37*   CREATININE mg/dL 1 32*   ANION GAP mmol/L 7   CALCIUM mg/dL 9 1   ALBUMIN g/dL 3 5   TOTAL BILIRUBIN mg/dL 1 80*   ALK PHOS U/L 108   ALT U/L 84*   AST U/L 43                       Imaging: I have personally reviewed pertinent reports  XR chest 1 view portable    (Results Pending)       [facility] Yes     ** Please Note: This note has been constructed using a voice recognition system   **

## 2018-11-13 NOTE — PROGRESS NOTES
Progress Note - Adrian Martinez 1933, 80 y o  male MRN: [id number]    Unit/Bed#: -01 Encounter: [id number]    Primary Care Provider: Rj Knight MD   Date and time admitted to hospital: 11/12/2018  6:48 PM        * Acute on chronic combined systolic and diastolic congestive heart failure (HCC)   Assessment & Plan    · Leg swelling 2/2 acute CHF exacerbation  · BNP 17,000 with increasing LE swelling  · CXR appears somewhat stable compared to CXR 11/7  · CBC without leukocytosis, afebrile  · Reported SOB to PCP on 11/7, was thought to be d/t COPD  · CXR obtained at that time did show some mild pulmonary edema  · No medication changes were made as pt had Cardiology appointment scheduled for next Monday  · Was admitted May 2018 for CHF exacerbation   · New cardiomyopathy seen on echo, cath negative for obstructive CAD  · Repeat Echo Sep 2018 with EF 20%  · Maintained on 40 mg PO Lasix daily  · Continue IV Lasix 40 mg b i d    · Will monitor daily weights, I/o's  · Monitor on tele  · Cardiology consultation pending  · Follows with Dr Dee Maharaj     Type 2 diabetes mellitus, with long-term current use of insulin [facility])   Assessment & Plan    Lab Results   Component Value Date    HGBA1C 7 6 (H) 07/23/2013       Recent Labs      11/12/18   2331  11/13/18   0705  11/13/18   1046   POCGLU  311*  105  229*       Blood Sugar Average: Last 72 hrs:  ·  on arrival, likely steroid induced hyperglycemia  · (on PO prednisone taper for back pain)  · Continue Levemir 18 U hs  · Add NovoLog 3 units with each meal  · Add SSI coverage ac and hs  · Adjust accordingly  (P) 215     Essential hypertension   Assessment & Plan    · Controlled, continue Toprol  · Resume ACEI upon improvement of renal function     Hyperlipidemia   Assessment & Plan    · Continue statin     COPD without exacerbation (HCC)   Assessment & Plan    · Respiratory protocol, wheezes heard on my exam  · No improvement of SOB despite PO prednisone for low back pain  · Will obtain procalcitonin, consider abx if positive   · Continue home IH regimen     SUZY (acute kidney injury) ([facility] )   Assessment & Plan    · Baseline Cr approx 1 1, presents with Cr 1 32  · Suspect 2/2 volume overload  · Continue IV diuresis  · Monitor BMP     Chronic systolic congestive heart failure (HCC)   Assessment & Plan    · See primary plan        VTE Pharmacologic Prophylaxis:   Pharmacologic: Heparin  Mechanical VTE Prophylaxis in Place: Yes    Patient Centered Rounds: I have performed bedside rounds with nursing staff today  Discussions with Specialists or Other Care Team Provider:  Nursing    Education and Discussions with Family / Patient:  Patient    Time Spent for Care: 20 minutes  More than 50% of total time spent on counseling and coordination of care as described above  Current Length of Stay: 1 day(s)    Current Patient Status: Inpatient   Certification Statement: The patient will continue to require additional inpatient hospital stay due to IV diuretics    Discharge Plan: To be decided on clinical course    Code Status: Level 1 - Full Code      Subjective:   Feeling better today  Breathing better  Objective:     Vitals:   Temp (24hrs), Av 9 °F (36 6 °C), Min:97 6 °F (36 4 °C), Max:98 3 °F (36 8 °C)    Temp:  [97 6 °F (36 4 °C)-98 3 °F (36 8 °C)] 97 8 °F (36 6 °C)  HR:  [56-71] 64  Resp:  [15-18] 18  BP: (110-130)/(65-74) 110/69  SpO2:  [95 %-98 %] 97 %  Body mass index is 22 57 kg/m²  Input and Output Summary (last 24 hours): Intake/Output Summary (Last 24 hours) at 18 1429  Last data filed at 18 1201   Gross per 24 hour   Intake              300 ml   Output             3150 ml   Net            -2850 ml       Physical Exam:     Physical Exam     Gen -Patient comfortable   Neck- Supple   No thyromegaly or lymphadenopathy  Lungs-few rales at base with occasional wheeze  Heart S1-S2, regular rate and rhythm, no murmurs  Abdomen-soft nontender, no organomegaly  Bowel sounds present  Extremities-no cyanosi,  Clubbing; pitting edema bilateral legs  Skin- no rash  Neuro-nonfocal         Additional Data:     Labs:      Results from last 7 days  Lab Units 11/13/18  1135 11/13/18  0503   WBC Thousand/uL  --  7 34   HEMOGLOBIN g/dL  --  11 5*   HEMATOCRIT %  --  35 0*   PLATELETS Thousands/uL 107* 101*   NEUTROS PCT %  --  78*   LYMPHS PCT %  --  8*   MONOS PCT %  --  13*   EOS PCT %  --  0       Results from last 7 days  Lab Units 11/13/18  1135 11/12/18  1941   POTASSIUM mmol/L 4 1 4 5   CHLORIDE mmol/L 99* 98*   CO2 mmol/L 30 29   BUN mg/dL 38* 37*   CREATININE mg/dL 1 28 1 32*   ANION GAP mmol/L 8 7   CALCIUM mg/dL 9 3 9 1   ALBUMIN g/dL  --  3 5   TOTAL BILIRUBIN mg/dL  --  1 80*   ALK PHOS U/L  --  108   ALT U/L  --  84*   AST U/L  --  43           Results from last 7 days  Lab Units 11/13/18  1046 11/13/18  0705 11/12/18  2331   POC GLUCOSE mg/dl 229* 105 311*           Results from last 7 days  Lab Units 11/13/18  0503   PROCALCITONIN ng/ml 0 05           * I Have Reviewed All Lab Data Listed Above  * Additional Pertinent Lab Tests Reviewed:  All Labs Within Last 24 Hours Reviewed    Imaging:    Imaging Reports Reviewed Today Include:   Imaging Personally Reviewed by Myself Includes:      Recent Cultures (last 7 days):           Last 24 Hours Medication List:     Current Facility-Administered Medications:  fluticasone 2 spray Nasal Daily Lorenza Starring, PA-WOODY   fluticasone 1 puff Inhalation BID Lorenza Starring, PA-C   furosemide 40 mg Intravenous BID Lorenza Starring, PA-WOODY   gabapentin 200 mg Oral Daily Lorenza Starjusto, PA-C   heparin (porcine) 5,000 Units Subcutaneous Q8H [facility] Yuki Douglas PA-C   insulin detemir 18 Units Subcutaneous HS Yuki Burleson PA-C   insulin lispro 1-5 Units Subcutaneous HS Yuki Burleson PA-C   insulin lispro 1-6 Units Subcutaneous TID With Meals Yuki Burleson PA-C   insulin lispro 3 Units Subcutaneous TID With Meals Vidhya Henderson MD   levalbuterol 1 25 mg Nebulization TID Caron Ramos MD   metoprolol succinate 100 mg Oral Daily Yuki Burleson PA-C   montelukast 10 mg Oral HS Yuki Burleson PA-C   potassium chloride 20 mEq Oral Daily Stephanie Cabrera PA-C   pravastatin 20 mg Oral Daily With Dulc eMaria Potter PA-C   [START ON 11/16/2018] predniSONE 10 mg Oral Daily Yuki Neil PA-C   predniSONE 10 mg Oral BID With Meals Stephanie Cabrera PA-C   sodium chloride 3 mL Nebulization TID Caron Ramos MD   umeclidinium-vilanterol 1 puff Inhalation Daily Stephanie Cabrera PA-C        Today, Patient Was Seen By: Vick Alvarado MD    ** Please Note: Dictation voice to text software may have been used in the creation of this document   **

## 2018-11-13 NOTE — RESPIRATORY THERAPY NOTE
RT Protocol Note  Andreina Pod 80 y o  male MRN: [id number]  Unit/Bed#: -01 Encounter: [id number]    Assessment    Principal Problem:    Leg swelling  Active Problems:    COPD without exacerbation ([facility] )    Essential hypertension    Hyperlipidemia    Type 2 diabetes mellitus, with long-term current use of insulin (HCC)    Chronic systolic congestive heart failure (HCC)    SUZY (acute kidney injury) [facility])      Home Pulmonary Medications:     11/13/18 0102   Respiratory Protocol   Protocol Initiated? Yes   Protocol Selection Respiratory   Language Barrier? No   Medical & Social History Reviewed? Yes   Diagnostic Studies Reviewed? Yes   Physical Assessment Performed? Yes   Respiratory Plan Home Bronchodilator Patient pathway;Mild Distress pathway   Respiratory Assessment   Assessment Type Assess only   General Appearance Alert; Awake   Respiratory Pattern Normal   Chest Assessment Chest expansion symmetrical   Bilateral Breath Sounds Diminished   Resp Comments pt assessed per protocol  pt has hx of copd  pt states that he takes TID ALB neb txs at home  will order Xop/Nss TID to keep home regime   no home O2  will continue to monitor    Additional Assessments   SpO2 97 %          Past Medical History:   Diagnosis Date    COPD (chronic obstructive pulmonary disease) ([place] [address] )     Diabetes mellitus ([place] [address] )     Type 2    Hypertension     Left inguinal hernia     Malignant melanoma of skin ([place] [address] )      Social History     Social History    Marital status: /Civil Union     Spouse name: N/A    Number of children: 2    Years of education: N/A     Occupational History    restired      Social History Main Topics    Smoking status: Former Smoker     Packs/day: 1 00     Years: 10 00     Quit date: 1960    Smokeless tobacco: Never Used    Alcohol use 0 0 oz/week      Comment: SOcially     Drug use: No    Sexual activity: Not Asked     Other Topics Concern    None     Social History Narrative    Caffeine use, active           Subjective         Objective    Physical Exam:   Assessment Type: Assess only  General Appearance: Alert, Awake  Respiratory Pattern: Normal  Chest Assessment: Chest expansion symmetrical  Bilateral Breath Sounds: Diminished    Vitals:  Blood pressure 122/72, pulse 64, temperature 97 6 °F (36 4 °C), temperature source Oral, resp  rate 16, height 6' 1" (1 854 m), weight 81 9 kg (180 lb 8 9 oz), SpO2 97 %  Imaging and other studies: I have personally reviewed pertinent reports  Plan    Respiratory Plan: Home Bronchodilator Patient pathway, Mild Distress pathway        Resp Comments: pt assessed per protocol  pt has hx of copd  pt states that he takes TID ALB neb txs at home  will order Xop/Nss TID to keep home regime   no home O2  will continue to monitor

## 2018-11-13 NOTE — ED PROVIDER NOTES
History  Chief Complaint   Patient presents with    Edema     Pt reports b/l LE edema and SOB for about 1 week  Sent by PCP to R/O CHF   Altered Mental Status     Daughter also reports change in mental status  Patient is an 49-year-old male that presents for bilateral lower leg edema and shortness of breath  Patient was started on prednisone due to sciatica on Wednesday  Family states that his legs are significantly swollen today compared to yesterday and that she is having shortness of breath  Family is also concerned because his blood sugar level is greater than 400  I explained to him this is likely due to his prednisone  He states that he has been drinking and urinating significantly more over the past week  He is also concerned due to a metallic taste in his mouth  He states that his urine is normal just increased volume  Patient denies recent illness, fever, problems lying flat at night, and          History provided by:  Patient   used: No        Prior to Admission Medications   Prescriptions Last Dose Informant Patient Reported? Taking?    Ascorbic Acid (VITAMIN C) 1000 MG tablet  Self Yes Yes   Sig: Take 1 tablet by mouth daily   BD PEN NEEDLE DON U/F 32G X 4 MM MISC   Yes No   Cinnamon 500 MG TABS  Self Yes Yes   Sig: Take 1 tablet by mouth daily     Multiple Vitamins-Minerals (OCUVITE ADULT 50+ PO)  Self Yes Yes   Sig: Take 1 tablet by mouth daily   NOVOLOG FLEXPEN 100 units/mL injection pen   Yes Yes   Si Units daily with breakfast     ONE TOUCH ULTRA TEST test strip  Self Yes Yes   ONETOUCH DELICA LANCETS FINE MISC   Yes Yes   VENTOLIN  (90 Base) MCG/ACT inhaler  Self Yes Yes   acetaminophen (TYLENOL) 325 mg tablet  Self No Yes   Sig: Take 2 tablets (650 mg total) by mouth every 6 (six) hours as needed for mild pain   albuterol (2 5 mg/3 mL) 0 083 % nebulizer solution   No Yes   Sig: Take 1 vial (2 5 mg total) by nebulization every 6 (six) hours as needed for wheezing or shortness of breath   fluticasone (FLONASE) 50 mcg/act nasal spray   No Yes   Si sprays into each nostril daily   fluticasone (FLOVENT HFA) 220 mcg/act inhaler   No Yes   Sig: Inhale 1 puff 2 (two) times a day   furosemide (LASIX) 40 mg tablet  Self No Yes   Sig: Take 1 tablet (40 mg total) by mouth daily   gabapentin (NEURONTIN) 100 mg capsule   No Yes   Sig: Take 2 capsules (200 mg total) by mouth daily   insulin detemir (LEVEMIR) 100 units/mL subcutaneous injection  Self Yes Yes   Sig: Inject 18 Units under the skin daily at bedtime     lisinopril (ZESTRIL) 10 mg tablet   No Yes   Sig: Take 1 tablet (10 mg total) by mouth daily   metoprolol succinate (TOPROL-XL) 50 mg 24 hr tablet   No Yes   Sig: Take 2 tablets (100 mg total) by mouth daily   montelukast (SINGULAIR) 10 mg tablet   No Yes   Sig: Take 1 tablet (10 mg total) by mouth daily at bedtime   potassium chloride (K-DUR,KLOR-CON) 20 mEq tablet  Self No Yes   Sig: Take 1 tablet (20 mEq total) by mouth daily   predniSONE 10 mg tablet   No Yes   Sig: Take 2 tabs bid for 3 days, then 1 1/2 tabs bid for 3 days, then 1 tab bid for 2 days, then 1 tab daily for 2 days     repaglinide (PRANDIN) 1 mg tablet  Self Yes Yes   Sig: Take 1 mg by mouth daily 4 tablets in the morning, one tablet in the afternoon, two tablets in the evening    repaglinide (PRANDIN) 2 mg tablet  Self Yes Yes   Sig: Take 2 mg by mouth daily before dinner   roflumilast 500 mcg TABS  Self No Yes   Sig: Take 1 tablet (500 mcg total) by mouth daily   simvastatin (ZOCOR) 10 mg tablet   No Yes   Sig: Take 1 tablet (10 mg total) by mouth daily   umeclidinium-vilanterol (ANORO ELLIPTA) 62 5-25 MCG/INH inhaler   No Yes   Sig: Inhale 1 puff daily      Facility-Administered Medications: None       Past Medical History:   Diagnosis Date    COPD (chronic obstructive pulmonary disease) (HCC)     Diabetes mellitus (HCC)     Type 2    Hypertension     Left inguinal hernia     Malignant melanoma of skin (HCC)        Past Surgical History:   Procedure Laterality Date    APPENDECTOMY      CATARACT EXTRACTION, BILATERAL      CHOLECYSTECTOMY      CHOLECYSTECTOMY      COLONOSCOPY  2014    Fiberoptic    HERNIA REPAIR      JOINT REPLACEMENT Left     meniscus repair    KNEE ARTHROSCOPY      Therapeutic       Family History   Problem Relation Age of Onset    Diabetes Mother     Heart attack Neg Hx     Stroke Neg Hx     Aneurysm Neg Hx     Clotting disorder Neg Hx     Arrhythmia Neg Hx     Hypertension Neg Hx     Hyperlipidemia Neg Hx         pt unsure      I have reviewed and agree with the history as documented  Social History   Substance Use Topics    Smoking status: Former Smoker     Packs/day: 1 00     Years: 10 00     Quit date: 1960    Smokeless tobacco: Never Used    Alcohol use 0 0 oz/week      Comment: SOcially         Review of Systems    Physical Exam  Physical Exam    Vital Signs  ED Triage Vitals [11/12/18 1850]   Temperature Pulse Respirations Blood Pressure SpO2   98 3 °F (36 8 °C) 71 16 130/74 98 %      Temp Source Heart Rate Source Patient Position - Orthostatic VS BP Location FiO2 (%)   Oral Monitor Lying Right arm --      Pain Score       No Pain           Vitals:    11/12/18 1850   BP: 130/74   Pulse: 71   Patient Position - Orthostatic VS: Lying       Visual Acuity      ED Medications  Medications   furosemide (LASIX) injection 40 mg (not administered)   nitroglycerin (NITRO-BID) 2 % TD ointment 1 inch (not administered)       Diagnostic Studies  Results Reviewed     Procedure Component Value Units Date/Time    Urine Microscopic [[id number]] Collected:  11/12/18 2059    Lab Status:   In process Specimen:  Urine from Urine, Other Updated:  11/12/18 2111    ED Urine Macroscopic [[id number]]  (Abnormal) Collected:  11/12/18 2059    Lab Status:  Final result Specimen:  Urine Updated:  11/12/18 2104     Color, UA Yellow     Clarity, UA Clear     pH, UA 6 0 Leukocytes, UA Negative     Nitrite, UA Negative     Protein, UA Negative mg/dl      Glucose,  (1/2%) (A) mg/dl      Ketones, UA Negative mg/dl      Urobilinogen, UA 1 0 E U /dl      Bilirubin, UA Negative     Blood, UA Trace (A)     Specific [place], UA 1 015    Narrative:       CLINITEK RESULT    B-type natriuretic peptide [[id number]]  (Abnormal) Collected:  11/12/18 1941    Lab Status:  Final result Specimen:  Blood from Arm, Right Updated:  11/12/18 2015     NT-proBNP 16,999 (H) pg/mL     Comprehensive metabolic panel [[id number]]  (Abnormal) Collected:  11/12/18 1941    Lab Status:  Final result Specimen:  Blood from Arm, Right Updated:  11/12/18 2009     Sodium 134 (L) mmol/L      Potassium 4 5 mmol/L      Chloride 98 (L) mmol/L      CO2 29 mmol/L      ANION GAP 7 mmol/L      BUN 37 (H) mg/dL      Creatinine 1 32 (H) mg/dL      Glucose 395 (H) mg/dL      Calcium 9 1 mg/dL      AST 43 U/L      ALT 84 (H) U/L      Alkaline Phosphatase 108 U/L      Total Protein 6 3 (L) g/dL      Albumin 3 5 g/dL      Total Bilirubin 1 80 (H) mg/dL      eGFR 49 ml/min/1 73sq m     Narrative:         National Kid[employer] Disease Education Program recommendations are as follows:  GFR calculation is accurate only with a steady state creatinine  Chronic Kidney disease less than 60 ml/min/1 73 sq  meters  Kidney failure less than 15 ml/min/1 73 sq  meters      CBC and differential [[id number]]  (Abnormal) Collected:  11/12/18 1941    Lab Status:  Final result Specimen:  Blood from Arm, Right Updated:  11/12/18 1957     WBC 5 71 Thousand/uL      RBC 3 83 (L) Million/uL      Hemoglobin 11 8 (L) g/dL      Hematocrit 36 1 (L) %      MCV 94 fL      MCH 30 8 pg      MCHC 32 7 g/dL      RDW 24 0 (H) %      Platelets 868 (L) Thousands/uL      nRBC 1 /100 WBCs      Neutrophils Relative 69 %      Immat GRANS % 1 %      Lymphocytes Relative 13 (L) %      Monocytes Relative 17 (H) %      Eosinophils Relative 0 %      Basophils Relative 0 % Neutrophils Absolute 3 96 Thousands/µL      Immature Grans Absolute 0 06 Thousand/uL      Lymphocytes Absolute 0 72 Thousands/µL      Monocytes Absolute 0 96 Thousand/µL      Eosinophils Absolute 0 00 Thousand/µL      Basophils Absolute 0 01 Thousands/µL                  XR chest 1 view portable    (Results Pending)              Procedures  Procedures       Phone Contacts  ED Phone Contact    ED Course  ED Course as of Nov 12 2116 Mon Nov 12, 2018   2040 Labs discussed with patient and family   Requesting admission due to history of CHF                                MDM  Number of Diagnoses or Management Options  Edema, unspecified type: new and requires workup  Shortness of breath at rest: new and requires workup  Systolic congestive heart failure, unspecified HF chronicity ([facility] ): new and requires workup     Amount and/or Complexity of Data Reviewed  Clinical lab tests: ordered and reviewed  Tests in the radiology section of CPT®: ordered and reviewed  Tests in the medicine section of CPT®: ordered and reviewed  Discussion of test results with the performing providers: yes  Decide to obtain previous medical records or to obtain history from someone other than the patient: yes  Obtain history from someone other than the patient: yes  Review and summarize past medical records: yes  Discuss the patient with other providers: yes  Independent visualization of images, tracings, or specimens: yes      CritCare Time    Disposition  Final diagnoses:   Systolic congestive heart failure, unspecified HF chronicity ([facility] )   Edema, unspecified type   Shortness of breath at rest     Time reflects when diagnosis was documented in both MDM as applicable and the Disposition within this note     Time User Action Codes Description Comment    11/12/2018  9:13 PM Red Cardona [F23 95] Systolic congestive heart failure, unspecified HF chronicity ([facility] )     11/12/2018  9:13 PM Red Cardona [R60 9] Edema, unspecified type 11/12/2018  9:14 PM Isatami US Add [R06 02] Shortness of breath at rest       ED Disposition     ED Disposition Condition Comment    Admit  Case was discussed with Lawlorraine Gunn and the patient's admission status was agreed to be Admission Status: inpatient status to the service of [facility]  Follow-up Information    None         Patient's Medications   Discharge Prescriptions    No medications on file     No discharge procedures on file      ED Provider  Electronically Signed by           Koki Melo PA-C  11/12/18 8821

## 2018-11-13 NOTE — ASSESSMENT & PLAN NOTE
Lab Results   Component Value Date    HGBA1C 7 6 (H) 07/23/2013       No results for input(s): POCGLU in the last 72 hours      Blood Sugar Average: Last 72 hrs:  ·  on arrival, likely steroid induced hyperglycemia  · (on PO prednisone taper for back pain)  · Continue Levemir 18 U hs with short acting 4 units at breakfast  · Add SSI coverage ac and hs  · Adjust accordingly

## 2018-11-13 NOTE — TELEPHONE ENCOUNTER
That is to help with his chronic cough and allergies  If he feels it helps then he should continue it

## 2018-11-13 NOTE — ASSESSMENT & PLAN NOTE
· Respiratory protocol, wheezes heard on my exam  · No improvement of SOB despite PO prednisone for low back pain  · Will obtain procalcitonin, consider abx if positive   · Continue home IH regimen

## 2018-11-13 NOTE — CONSULTS
Consultation - Cardiology Team One  Oumar Seo 80 y o  male MRN: [id number]  Unit/Bed#: -01 Encounter: [id number]    Inpatient consult to Cardiology  Consult performed by: Malaika Rodriguez  Consult ordered by: Franki Panchal        Physician Requesting Consult: Alejandra Nowak MD     Reason for Consult / Principal Problem: CHF      Assessment/ Plan:    Acute on chronic combined systolic and diastolic CHF: Recent exacerbation likely due to dietary indiscretions  Got 2 doses of IV lasix with good diuretic response, is now negative 2 8L  He is still volume overloaded on exam with pitting edema and fine basilar rales   - Continue Lasix 40mg IV BID   - Strict I/Os, daily weights  - consult nutritionist for low Na diet teaching  Will likely need to go home on higher dose diuretics and need close cardiology follow up    Non-ischemic cardiomyopathy: LVEF 20% on echo 9/2018; prior 5/18 with LVEF 37%; at that time he had cardiac cath without obstructive disease  Has been taking metoprolol and lisinopril; reports compliance; unclear why there would have been a drop in EF  Will continue to hold ACE and if Cr remains stable plan to start Entresto; needs ACE held for 72 hours prior  HTN: BP control adequate; holding ACE in setting of SUZY    DM Type II: with recent hyperglycemia in setting of steroid use  Defer management  to primary team      History of Present Illness      HPI: Oumar Seo is a 80y o  year old male who has a history of nonischemic cardiomyopathy, chronic combined systolic and diastolic heart failure, COPD, hypertension, diabetes mellitus type 2  He follows with cardiologist Dr Elise Sauceda  Pt with known hx of nonischemic cardiomyopathy with prior LVEF approximately 37% who has been experiencing worsening exertional shortness of breath, weight gain and lower extremity edema over the past 3 weeks    He had a chest x-ray done on 11/07 by his PCP that showed a small left pleural effusion; no medication changes were made at that time patient had follow-up scheduled with Dr Essence Vines next week  However in the meantime was started on oral steroids for sciatica by his PCP and developed some significant hyperglycemia with blood glucose levels in the 400 range, he contacted his endocrinologist Dr Darrian Perez, who recommended he come to the hospital for treatment  On presentation to the ED patient also noted his shortness of breath  On presentation is afebrile, normotensive with SpO2 90% on room air  Chest x-ray showed small bilateral pleural effusions with mild vascular congestion  Labs are significant for an NT proBNP of 16,999; creatinine of 1 32; blood glucose 395    Patient was admitted for an acute exacerbation of his systolic and diastolic heart failure, started on intravenous Lasix 40 mg IV b i d ; he reports a large amount of urine output  Cardiology consultation is requested for further management  Patient was diagnosed with nonischemic cardiomyopathy 5/2018 when he presented with acute systolic heart failure, showed LVEF 37%, he underwent cardiac catheterization without any obstructive disease  Was diuresed and discharged on Lasix 40 mg daily, metoprolol and lisinopril  It was thought his cardiomyopathy was possibly viral as he had been fighting bronchitis for a few weeks prior to this  Since discharge in 5/2018 he had been doing well  Compliant with medications; no hospitalizations for acute CHF  He was following a low Na diet and restricting his fluids  Had gone back to golfing  Last cardiology follow-up 8/2018 patient had been doing well, no medication adjustments were made that time  For the past few weeks he has been experiencing mild exertional shortness of breath, increased fatigue, increased lower extremity edema and about a 10 lb weight gain in the past 2 weeks    He does admit that he has been more liberal with his salt intake and has noted an increase in his oral fluid intake, previously he had been trying to restrict herself to 64 oz per day    He is not experiencing any chest pain, palpitations, dizziness lightheadedness or presyncope  He does note that his symptoms are mild and he was planning on addressing that with his primary cardiologist at his scheduled follow-up next week; states his main reason for coming to the emergency department was at the direction of his endocrinologist for his hyperglycemia  EKG reviewed personally:   11/13/2018  Sinus rhythm with 1st degree AV block/LBBB    Telemetry reviewed personally:   Sinus rhythm, LBBB, intermittent bradycardia    Review of Systems   Constitution: Negative for decreased appetite, fever and weakness  Cardiovascular: Positive for dyspnea on exertion and leg swelling  Negative for chest pain, near-syncope, orthopnea and palpitations  Respiratory: Negative for cough, shortness of breath and wheezing  Gastrointestinal: Negative for nausea and vomiting  Genitourinary: Negative for dysuria  Neurological: Negative for dizziness and light-headedness  Psychiatric/Behavioral: Negative for altered mental status  All other systems reviewed and are negative       Historical Information   Past Medical History:   Diagnosis Date    [facility]     Diabetes mellitus ([facility] )     Type 2    Hypertension     Left inguinal hernia     Malignant melanoma of skin (HCC)      Past Surgical History:   Procedure Laterality Date    APPENDECTOMY      CATARACT EXTRACTION, BILATERAL      CHOLECYSTECTOMY      CHOLECYSTECTOMY      COLONOSCOPY  2014    Fiberoptic    HERNIA REPAIR      JOINT REPLACEMENT Left     meniscus repair    KNEE ARTHROSCOPY      Therapeutic     History   Alcohol Use    0 0 oz/week     Comment: SOcially      History   Drug Use No     History   Smoking Status    Former Smoker    Packs/day: 1 00    Years: 10 00    Quit date: 1960   Smokeless Tobacco  Never Used     Family History:   Family History   Problem Relation Age of Onset    Diabetes Mother     Heart attack Neg Hx     Stroke Neg Hx     Aneurysm Neg Hx     Clotting disorder Neg Hx     Arrhythmia Neg Hx     Hypertension Neg Hx     Hyperlipidemia Neg Hx         pt unsure      Meds/Allergies   current meds:   Current Facility-Administered Medications   Medication Dose Route Frequency    fluticasone (FLONASE) 50 mcg/act nasal spray 2 spray  2 spray Nasal Daily    fluticasone (FLOVENT HFA) 220 mcg/act inhaler 1 puff  1 puff Inhalation BID    furosemide (LASIX) injection 40 mg  40 mg Intravenous BID    gabapentin (NEURONTIN) capsule 200 mg  200 mg Oral Daily    heparin (porcine) subcutaneous injection 5,000 Units  5,000 Units Subcutaneous Q8H [address]    insulin detemir (LEVEMIR) subcutaneous injection 18 Units  18 Units Subcutaneous HS    insulin lispro (HumaLOG) 100 units/mL subcutaneous injection 1-5 Units  1-5 Units Subcutaneous HS    insulin lispro (HumaLOG) 100 units/mL subcutaneous injection 1-6 Units  1-6 Units Subcutaneous TID With Meals    insulin lispro (HumaLOG) 100 units/mL subcutaneous injection 4 Units  4 Units Subcutaneous Daily With Breakfast    levalbuterol (XOPENEX) inhalation solution 1 25 mg  1 25 mg Nebulization TID    metoprolol succinate (TOPROL-XL) 24 hr tablet 100 mg  100 mg Oral Daily    montelukast (SINGULAIR) tablet 10 mg  10 mg Oral HS    potassium chloride (K-DUR,KLOR-CON) CR tablet 20 mEq  20 mEq Oral Daily    pravastatin (PRAVACHOL) tablet 20 mg  20 mg Oral Daily With Dinner    [START ON 11/16/2018] predniSONE tablet 10 mg  10 mg Oral Daily    predniSONE tablet 10 mg  10 mg Oral BID With Meals    sodium chloride 0 9 % inhalation solution 3 mL  3 mL Nebulization TID    umeclidinium-vilanterol (ANORO ELLIPTA) 62 5-25 MCG/INH inhaler 1 puff  1 puff Inhalation Daily     No Known Allergies    Objective   Vitals: Blood pressure 110/69, pulse 64, temperature 97 8 °F (36 6 °C), temperature source Oral, resp  rate 18, height 6' 1" (1 854 m), weight 77 6 kg (171 lb 1 2 oz), SpO2 98 %  ,     Body mass index is 22 57 kg/m²  ,     Systolic (16VMQ), LXM:034 , Min:110 , SXZ:401     Diastolic (83FTC), JHS:22, Min:65, Max:74      Intake/Output Summary (Last 24 hours) at 11/13/18 1141  Last data filed at 11/13/18 1101   Gross per 24 hour   Intake              300 ml   Output             2500 ml   Net            -2200 ml     Weight (last 2 days)     Date/Time   Weight    11/13/18 0600  77 6 (171 08)    11/12/18 2213  81 9 (180 56)            Invasive Devices     Peripheral Intravenous Line            Peripheral IV 11/12/18 Right Antecubital less than 1 day              Physical Exam   Constitutional: He is oriented to person, place, and time  No distress  Pt sitting up in chair in NAD, alert and cooperative   HENT:   Head: Normocephalic and atraumatic  Cardiovascular: Normal rate, regular rhythm, S1 normal and S2 normal     No murmur heard  Mild to mod +2 pitting LE edema  Extremities are warm   Pulmonary/Chest: Effort normal  He has wheezes  He has rales  Fine bibasilar rales, b/l upper lobe exp wheezes   Musculoskeletal: He exhibits edema  Neurological: He is alert and oriented to person, place, and time  Skin: He is not diaphoretic  Psychiatric: He has a normal mood and affect  His behavior is normal    Nursing note and vitals reviewed      LABORATORY RESULTS:      CBC with diff:   Results from last 7 days  Lab Units 11/13/18  0503 11/12/18 1941   WBC Thousand/uL 7 34 5 71   HEMOGLOBIN g/dL 11 5* 11 8*   HEMATOCRIT % 35 0* 36 1*   MCV fL 94 94   PLATELETS Thousands/uL 101* 106*   MCH pg 30 8 30 8   MCHC g/dL 32 9 32 7   RDW % 23 9* 24 0*   NRBC AUTO /100 WBCs 1 1     CMP:  Results from last 7 days  Lab Units 11/12/18 1941   POTASSIUM mmol/L 4 5   CHLORIDE mmol/L 98*   CO2 mmol/L 29   BUN mg/dL 37*   CREATININE mg/dL 1 32*   CALCIUM mg/dL 9 1   AST U/L 43   ALT U/L 84*   ALK PHOS U/L 108   EGFR ml/min/1 73sq m 49     BMP:  Results from last 7 days  Lab Units 18  1941   POTASSIUM mmol/L 4 5   CHLORIDE mmol/L 98*   CO2 mmol/L 29   BUN mg/dL 37*   CREATININE mg/dL 1 32*   CALCIUM mg/dL 9 1     Lab Results   Component Value Date    NTBNP 16,999 (H) 2018    NTBNP 5,284 (H) 2018         Lipid Profile:   Lab Results   Component Value Date    CHOL 114 (L) 2017    CHOL 93 (L) 2016     Lab Results   Component Value Date    HDL 50 2018    HDL 38 (L) 2017    HDL 36 (L) 2016     Lab Results   Component Value Date    LDLCALC 41 2018     Lab Results   Component Value Date    TRIG 24 2018    TRIG 69 2017    TRIG 47 2016     Cardiac testing:   Results for orders placed during the hospital encounter of 18   Echo complete with contrast if indicated    Narrative [address]  [phone]    Transthoracic Echocardiogram  2D, M-mode, Doppler, and Color Doppler    Study date:  06-Sep-2018    Patient: Harini Nur  MR number: [id number]  Account number: [de-identified]  : 1933  Age: 80 years  Gender: Male  Status: Outpatient  Location: [place] [address]   Height: 70 in  Weight: 170 7 lb  BP: 118/ 78 mmHg    Indications: Cardiomyopathy    Diagnoses: I42 9 - Cardiomyopathy, unspecified    Sonographer:  Mohamud Dewitt  Primary Physician:  Donell Yuan MD  Referring Physician:  Zeeshan Keita MD  Group:  [facility]  Interpreting Physician:  Johnny Bond MD    SUMMARY    LEFT VENTRICLE:  Size was at the upper limits of normal   Systolic function was severely reduced  Ejection fraction was estimated to be 20 %  There was severe diffuse hypokinesis  Features were consistent with a pseudonormal left ventricular filling pattern, with concomitant abnormal relaxation and increased filling pressure (grade 2 diastolic dysfunction)      RIGHT VENTRICLE:  The ventricle was dilated  Systolic function was reduced  LEFT ATRIUM:  The atrium was mildly to moderately dilated  RIGHT ATRIUM:  The atrium was moderately dilated  MITRAL VALVE:  There was mild regurgitation  TRICUSPID VALVE:  There was moderate regurgitation  Pulmonary artery systolic pressure was moderately increased  IVC, HEPATIC VEINS:  The inferior vena cava was dilated  Respirophasic changes were blunted (less than 50% variation)  HISTORY: PRIOR HISTORY: Congestive heart failure, chronic obstructive pulmonary disease, hypertension, hyperlipidemia, diabetes, dilated cardiomyopathy    PROCEDURE: The study was performed in the [address]  This was a routine study  The transthoracic approach was used  The study included complete 2D imaging, M-mode, complete spectral Doppler, and color Doppler  Images were obtained  from the parasternal, apical, subcostal, and suprasternal notch acoustic windows  This was a technically difficult study  LEFT VENTRICLE: Size was at the upper limits of normal  Systolic function was severely reduced  Ejection fraction was estimated to be 20 %  There was severe diffuse hypokinesis  Wall thickness was normal  DOPPLER: Features were consistent  with a pseudonormal left ventricular filling pattern, with concomitant abnormal relaxation and increased filling pressure (grade 2 diastolic dysfunction)  RIGHT VENTRICLE: The ventricle was dilated  Systolic function was reduced  LEFT ATRIUM: The atrium was mildly to moderately dilated  RIGHT ATRIUM: The atrium was moderately dilated  MITRAL VALVE: Valve structure was normal  There was normal leaflet separation  DOPPLER: The transmitral velocity was within the normal range  There was no evidence for stenosis  There was mild regurgitation  AORTIC VALVE: The valve was trileaflet  Leaflets exhibited mildly increased thickness and lower normal cuspal separation   DOPPLER: Transaortic velocity was within the normal range  There was no evidence for stenosis  There was no  significant regurgitation  TRICUSPID VALVE: The valve structure was normal  There was normal leaflet separation  DOPPLER: The transtricuspid velocity was within the normal range  There was no evidence for stenosis  There was moderate regurgitation  Pulmonary artery  systolic pressure was moderately increased  PULMONIC VALVE: Leaflets exhibited normal thickness, no calcification, and normal cuspal separation  DOPPLER: The transpulmonic velocity was within the normal range  There was no significant regurgitation  PERICARDIUM: There was no pericardial effusion  The pericardium was normal in appearance  AORTA: The root exhibited normal size  SYSTEMIC VEINS: IVC: The inferior vena cava was dilated  Respirophasic changes were blunted (less than 50% variation)  SYSTEM MEASUREMENT TABLES    2D  %FS: 7 93 %  Ao Diam: 2 9 cm  EDV(Teich): 147 52 ml  EF Biplane: 32 22 %  EF(Teich): 17 4 %  ESV(Teich): 121 85 ml  IVSd: 1 14 cm  LA Area: 27 15 cm2  LA Diam: 4 67 cm  LVEDV MOD A2C: 187 3 ml  LVEDV MOD A4C: 151 1 ml  LVEDV MOD BP: 172 17 ml  LVEF MOD A2C: 33 8 %  LVEF MOD A4C: 28 69 %  LVESV MOD A2C: 123 99 ml  LVESV MOD A4C: 107 74 ml  LVESV MOD BP: 116 69 ml  LVIDd: 5 5 cm  LVIDs: 5 07 cm  LVLd A2C: 9 45 cm  LVLd A4C: 9 cm  LVLs A2C: 8 43 cm  LVLs A4C: 8 61 cm  LVOT Diam: 2 28 cm  LVPWd: 0 91 cm  RA Area: 33 07 cm2  RV Diam : 4 81 cm  SI(Teich): 13 16 ml/m2  SV MOD A2C: 63 31 ml  SV MOD A4C: 43 36 ml  SV(Cube): 36 57 ml  SV(Teich): 25 67 ml    CW  TR MaxP 5 mmHg  TR Vmax: 2 83 m/s    MM  TAPSE: 0 94 cm    PW  AVC: 396 54 ms  E': 0 02 m/s    [employer] Accredited Echocardiography Laboratory    Prepared and electronically signed by    Woody Barrera MD  Signed 06-Sep-2018 16:01:42       Imaging: I have personally reviewed pertinent reports      Xr Chest 1 View Portable    Result Date: 2018  Narrative: CHEST INDICATION:   CHF, SOB  COMPARISON:  Chest radiographs November 7, 2018 EXAM PERFORMED/VIEWS:  XR CHEST PORTABLE  AP semierect FINDINGS: The heart is enlarged  Atherosclerotic changes in the aorta  Lungs are hyperinflated  Small bilateral pleural effusions  Kiara and pulmonary vessels are prominent  Stable scarring/atelectasis left lower lobe  Osseous structures appear within normal limits for patient age  Impression: Mild vascular congestion  Workstation performed: [id number]     Xr Chest Pa & Lateral    Result Date: 11/7/2018  Narrative: CHEST INDICATION:   J32 99: Chronic diastolic (congestive) heart failure  COMPARISON:  Chest x-ray 5/12/2018 EXAM PERFORMED/VIEWS:  XR CHEST PA & LATERAL FINDINGS: Cardiomediastinal silhouette appears unremarkable  There is mild left basilar opacity compatible with trace effusion  No focal infiltrate  No pneumothorax  Osseous structures appear within normal limits for patient age  Impression: Trace left pleural effusion  Workstation performed: [id number]     Xr Spine Lumbar Minimum 4 Views Non Injury    Result Date: 11/7/2018  Narrative: LUMBAR SPINE INDICATION:   M54 32: Sciatica, left side  COMPARISON:  None VIEWS:  XR SPINE LUMBAR MINIMUM 4 VIEWS NON INJURY FINDINGS: There is no evidence of acute fracture or destructive osseous lesion  Alignment is unremarkable  No significant lumbar degenerative change noted  The pedicles appear intact  There are severe atherosclerotic calcification of the abdominal aorta  There are right upper quadrant surgical clips from prior cholecystectomy  Impression: No significant lumbar spine pathology seen  Prominent aortic atherosclerotic calcification  Workstation performed: [id number]     Xr Hip/pelv 2-3 Vws Left If Performed    Result Date: 11/7/2018  Narrative: LEFT HIP INDICATION:   M54 32: Sciatica, left side   COMPARISON:  None VIEWS:  XR HIP/PELV 2-3 VWS LEFT  W PELVIS IF PERFORMED FINDINGS: There is no acute fracture or dislocation  There is only slight degenerative change suggested at the hip joints with some subtle narrowing of the joint, but no significant productive or erosive changes at the joint margins  No lytic or blastic osseous lesions  No acute soft tissue abnormality visible  Suture material visible in the left inguinal area suggesting prior hernia surgery  The visualized lumbar spine is unremarkable  Impression: Overall, no significant pathology seen  Workstation performed: [id number]     Assessment  Principal Problem:    Leg swelling  Active Problems:    COPD without exacerbation ([facility])    Essential hypertension    Hyperlipidemia    Type 2 diabetes mellitus, with long-term current use of insulin ([facility])    Chronic systolic congestive heart failure ([facility] )    SUZY (acute kidney injury) ([facility] )    Thank you for allowing us to participate in this patient's care  This pt will follow up with Dr Argenis Leone once discharged  Counseling / Coordination of Care  Total floor / unit time spent today 45 minutes  Greater than 50% of total time was spent with the patient and / or family counseling and / or coordination of care  A description of the counseling / coordination of care: Review of history, current assessment, development of a plan  Code Status: Level 1 - Full Code    ** Please Note: Dragon 360 Dictation voice to text software may have been used in the creation of this document   **

## 2018-11-13 NOTE — ASSESSMENT & PLAN NOTE
Lab Results   Component Value Date    HGBA1C 7 6 (H) 07/23/2013       Recent Labs      11/12/18   2331  11/13/18   0705  11/13/18   1046   POCGLU  311*  105  229*       Blood Sugar Average: Last 72 hrs:  ·  on arrival, likely steroid induced hyperglycemia  · (on PO prednisone taper for back pain)  · Continue Levemir 18 U hs  · Add NovoLog 3 units with each meal  · Add SSI coverage ac and hs  · Adjust accordingly  (P) 215

## 2018-11-13 NOTE — ASSESSMENT & PLAN NOTE
· Baseline Cr approx 1 1, presents with Cr 1 32  · Suspect 2/2 volume overload  · Continue IV diuresis  · Monitor BMP  · If worsening renal function, consider Nephrology consult

## 2018-11-13 NOTE — SOCIAL WORK
LOS 1   NOT A BUNDLE;  NOT A READMISSION  Patient identified as high risk for readmission (HRR)  PCP follow-up appointment information provided and transportation arrangements verified with patient and/or caregiver  AVS reviewed for appointment documentation prior to discharge  OP Care Coordination Team notified to support transitions of care  CM name and role reviewed  Discharge Checklist reviewed and CM will continue to monitor for progress toward discharge goals in nursing and provider rounds  CM reviewed discharge planning process including the following: identifying caregivers at home, preference for d/c planning needs, [facility] Meds to Bed program, availability of treatment team to discuss questions or concerns patient and/or family may have regarding diagnosis, plan of care, old or new medications and discharge planning   CM will continue to follow for care coordination and update assessment as necessary

## 2018-11-13 NOTE — ASSESSMENT & PLAN NOTE
· Baseline Cr approx 1 1, presents with Cr 1 32  · Suspect 2/2 volume overload  · Continue IV diuresis  · Monitor BMP

## 2018-11-14 LAB
ANION GAP SERPL CALCULATED.3IONS-SCNC: 8 MMOL/L (ref 4–13)
BUN SERPL-MCNC: 37 MG/DL (ref 5–25)
CALCIUM SERPL-MCNC: 9.3 MG/DL (ref 8.3–10.1)
CHLORIDE SERPL-SCNC: 97 MMOL/L (ref 100–108)
CO2 SERPL-SCNC: 33 MMOL/L (ref 21–32)
CREAT SERPL-MCNC: 1.17 MG/DL (ref 0.6–1.3)
ERYTHROCYTE [DISTWIDTH] IN BLOOD BY AUTOMATED COUNT: 24.2 % (ref 11.6–15.1)
GFR SERPL CREATININE-BSD FRML MDRD: 57 ML/MIN/1.73SQ M
GLUCOSE SERPL-MCNC: 153 MG/DL (ref 65–140)
GLUCOSE SERPL-MCNC: 196 MG/DL (ref 65–140)
GLUCOSE SERPL-MCNC: 227 MG/DL (ref 65–140)
GLUCOSE SERPL-MCNC: 228 MG/DL (ref 65–140)
GLUCOSE SERPL-MCNC: 98 MG/DL (ref 65–140)
HCT VFR BLD AUTO: 36.6 % (ref 36.5–49.3)
HGB BLD-MCNC: 12.3 G/DL (ref 12–17)
MAGNESIUM SERPL-MCNC: 1.7 MG/DL (ref 1.6–2.6)
MCH RBC QN AUTO: 30.8 PG (ref 26.8–34.3)
MCHC RBC AUTO-ENTMCNC: 33.6 G/DL (ref 31.4–37.4)
MCV RBC AUTO: 92 FL (ref 82–98)
PLATELET # BLD AUTO: 109 THOUSANDS/UL (ref 149–390)
POTASSIUM SERPL-SCNC: 3.8 MMOL/L (ref 3.5–5.3)
RBC # BLD AUTO: 4 MILLION/UL (ref 3.88–5.62)
SODIUM SERPL-SCNC: 138 MMOL/L (ref 136–145)
WBC # BLD AUTO: 6.97 THOUSAND/UL (ref 4.31–10.16)

## 2018-11-14 PROCEDURE — 80048 BASIC METABOLIC PNL TOTAL CA: CPT | Performed by: PHYSICIAN ASSISTANT

## 2018-11-14 PROCEDURE — 82948 REAGENT STRIP/BLOOD GLUCOSE: CPT

## 2018-11-14 PROCEDURE — 83735 ASSAY OF MAGNESIUM: CPT | Performed by: INTERNAL MEDICINE

## 2018-11-14 PROCEDURE — 99232 SBSQ HOSP IP/OBS MODERATE 35: CPT | Performed by: INTERNAL MEDICINE

## 2018-11-14 PROCEDURE — 94760 N-INVAS EAR/PLS OXIMETRY 1: CPT

## 2018-11-14 PROCEDURE — 94640 AIRWAY INHALATION TREATMENT: CPT

## 2018-11-14 PROCEDURE — 85027 COMPLETE CBC AUTOMATED: CPT | Performed by: INTERNAL MEDICINE

## 2018-11-14 RX ADMIN — PREDNISONE 10 MG: 10 TABLET ORAL at 08:19

## 2018-11-14 RX ADMIN — ISODIUM CHLORIDE 3 ML: 0.03 SOLUTION RESPIRATORY (INHALATION) at 13:32

## 2018-11-14 RX ADMIN — HEPARIN SODIUM 5000 UNITS: 5000 INJECTION, SOLUTION INTRAVENOUS; SUBCUTANEOUS at 12:58

## 2018-11-14 RX ADMIN — INSULIN LISPRO 1 UNITS: 100 INJECTION, SOLUTION INTRAVENOUS; SUBCUTANEOUS at 22:01

## 2018-11-14 RX ADMIN — ISODIUM CHLORIDE 3 ML: 0.03 SOLUTION RESPIRATORY (INHALATION) at 19:19

## 2018-11-14 RX ADMIN — HEPARIN SODIUM 5000 UNITS: 5000 INJECTION, SOLUTION INTRAVENOUS; SUBCUTANEOUS at 22:00

## 2018-11-14 RX ADMIN — POTASSIUM CHLORIDE 20 MEQ: 1500 TABLET, EXTENDED RELEASE ORAL at 08:19

## 2018-11-14 RX ADMIN — MONTELUKAST SODIUM 10 MG: 10 TABLET, FILM COATED ORAL at 22:00

## 2018-11-14 RX ADMIN — LEVALBUTEROL HYDROCHLORIDE 1.25 MG: 1.25 SOLUTION, CONCENTRATE RESPIRATORY (INHALATION) at 13:32

## 2018-11-14 RX ADMIN — INSULIN LISPRO 2 UNITS: 100 INJECTION, SOLUTION INTRAVENOUS; SUBCUTANEOUS at 12:55

## 2018-11-14 RX ADMIN — INSULIN LISPRO 2 UNITS: 100 INJECTION, SOLUTION INTRAVENOUS; SUBCUTANEOUS at 17:55

## 2018-11-14 RX ADMIN — FLUTICASONE PROPIONATE 2 SPRAY: 50 SPRAY, METERED NASAL at 08:19

## 2018-11-14 RX ADMIN — FLUTICASONE PROPIONATE 1 PUFF: 220 AEROSOL, METERED RESPIRATORY (INHALATION) at 08:19

## 2018-11-14 RX ADMIN — GABAPENTIN 200 MG: 100 CAPSULE ORAL at 22:00

## 2018-11-14 RX ADMIN — FLUTICASONE PROPIONATE 1 PUFF: 220 AEROSOL, METERED RESPIRATORY (INHALATION) at 17:13

## 2018-11-14 RX ADMIN — METOPROLOL SUCCINATE 100 MG: 100 TABLET, FILM COATED, EXTENDED RELEASE ORAL at 08:19

## 2018-11-14 RX ADMIN — ISODIUM CHLORIDE 3 ML: 0.03 SOLUTION RESPIRATORY (INHALATION) at 07:11

## 2018-11-14 RX ADMIN — LEVALBUTEROL HYDROCHLORIDE 1.25 MG: 1.25 SOLUTION, CONCENTRATE RESPIRATORY (INHALATION) at 07:11

## 2018-11-14 RX ADMIN — LEVALBUTEROL HYDROCHLORIDE 1.25 MG: 1.25 SOLUTION, CONCENTRATE RESPIRATORY (INHALATION) at 19:19

## 2018-11-14 RX ADMIN — PRAVASTATIN SODIUM 20 MG: 20 TABLET ORAL at 17:13

## 2018-11-14 RX ADMIN — FUROSEMIDE 40 MG: 10 INJECTION, SOLUTION INTRAMUSCULAR; INTRAVENOUS at 17:13

## 2018-11-14 RX ADMIN — INSULIN DETEMIR 18 UNITS: 100 INJECTION, SOLUTION SUBCUTANEOUS at 22:09

## 2018-11-14 RX ADMIN — FUROSEMIDE 40 MG: 10 INJECTION, SOLUTION INTRAMUSCULAR; INTRAVENOUS at 08:19

## 2018-11-14 NOTE — PROGRESS NOTES
Cardiology Progress Note - Keyana Felix 80 y o  male MRN: [id number]    Unit/Bed#: -01 Encounter: [id number]      Assessment:  Principal Problem:    Acute on chronic combined systolic and diastolic congestive heart failure (HCC)  Active Problems:    COPD without exacerbation ([facility] )    Essential hypertension    Hyperlipidemia    Type 2 diabetes mellitus, with long-term current use of insulin ([facility])    SUZY (acute kidney injury) ([facility] )      Plan:    1  Acute on chronic systolic and diastolic CHF - A lot of this appeared to be brought on by his steroid treatment for his sciatica  With this came volume overloaded, and hyperglycemia  However I do suspect that dietary indiscretions could have been playing a role as well  He has had a good response to IV Lasix, and is improved from a volume standpoint  Continue IV Lasix today and likely transition back to oral tomorrow  Creatinine is improving, and we will continue to follow urine output and daily labs      2  Nonischemic cardiomyopathy - His ejection fraction dropped on his echo an echocardiogram, to about 25%  Plan at this point is to titrate his medical therapy  We are going to switch from lisinopril to [facility], and we have been holding lisinopril  He will be able to start and Entresto with tomorrow evening's dose  Continue Toprol 100 mg daily, and then we could also consider spironolactone 25 mg daily by discharge  Close outpatient follow-up, as he does have an appointment next Monday with Dr De Dios Check          Subjective:   Patient seen and examined  No significant events overnight  Patient feeling better  Less volume overloaded, responded well to IV Lasix  Objective:     Vitals: Blood pressure 135/72, pulse 84, temperature 97 6 °F (36 4 °C), temperature source Oral, resp  rate 20, height 6' 1" (1 854 m), weight 73 2 kg (161 lb 6 oz), SpO2 95 %  , Body mass index is 21 29 kg/m² , Orthostatic Blood Pressures      Most Recent Value   Blood Pressure 135/72 filed at 11/14/2018 0801   Patient Position - Orthostatic VS  Lying filed at 11/14/2018 0801            Intake/Output Summary (Last 24 hours) at 11/14/18 1047  Last data filed at 11/14/18 0801   Gross per 24 hour   Intake              420 ml   Output             5025 ml   Net            -4605 ml       No significant arrhythmias seen on telemetry review  Sinus rhythm with first-degree AV block        Physical Exam:    GEN: Stella Baez appears well, alert and oriented x 3, pleasant and cooperative   HEENT: pupils equal, round, and reactive to light; extraocular muscles intact  NECK: supple, no carotid bruits   HEART: regular rhythm, normal S1 and S2, no murmurs, clicks, gallops or rubs   LUNGS: clear to auscultation bilaterally; no wheezes, rales, or rhonchi   ABDOMEN: normal bowel sounds, soft, no tenderness, no distention  EXTREMITIES: peripheral pulses normal; no clubbing, cyanosis, or edema  NEURO: no focal findings   SKIN: normal without suspicious lesions on exposed skin    Medications:      Current Facility-Administered Medications:     fluticasone (FLONASE) 50 mcg/act nasal spray 2 spray, 2 spray, Nasal, Daily, Ydaira Merles, PA-C, 2 spray at 11/14/18 0819    fluticasone (FLOVENT HFA) 220 mcg/act inhaler 1 puff, 1 puff, Inhalation, BID, Yadira Merles, PA-C, 1 puff at 11/14/18 0819    furosemide (LASIX) injection 40 mg, 40 mg, Intravenous, BID, Yadira Mercarla, PA-C, 40 mg at 11/14/18 3388    gabapentin (NEURONTIN) capsule 200 mg, 200 mg, Oral, Daily, Yadira Merles, PA-C, 200 mg at 11/13/18 2129    heparin (porcine) subcutaneous injection 5,000 Units, 5,000 Units, Subcutaneous, Q8H [facility], 5,000 Units at 11/13/18 2130 **AND** Platelet count, , , Once, Yadirakandace Azul, PA-C    insulin detemir (LEVEMIR) subcutaneous injection 18 Units, 18 Units, Subcutaneous, HS, Yadirakandace Azul PA-C, 18 Units at 11/13/18 2129    insulin lispro (HumaLOG) 100 units/mL subcutaneous injection 1-5 Units, 1-5 Units, Subcutaneous, HS, Kavya Harden PA-C, 2 Units at 11/13/18 2134    insulin lispro (HumaLOG) 100 units/mL subcutaneous injection 1-6 Units, 1-6 Units, Subcutaneous, TID With Meals, 1 Units at 11/13/18 1758 **AND** Fingerstick Glucose (POCT), , , TID AC, Yuki Burleson PA-C    insulin lispro (HumaLOG) 100 units/mL subcutaneous injection 3 Units, 3 Units, Subcutaneous, TID With Meals, Vidhya Henderson MD, 3 Units at 11/14/18 0820    levalbuterol [facility]) inhalation solution 1 25 mg, 1 25 mg, Nebulization, TID, Al Collier MD, 1 25 mg at 11/14/18 1888    metoprolol succinate (TOPROL-XL) 24 hr tablet 100 mg, 100 mg, Oral, Daily, Ravindra Abreu MD, 100 mg at 11/14/18 0819    montelukast (SINGULAIR) tablet 10 mg, 10 mg, Oral, HS, Kavya Harden PA-C, 10 mg at 11/13/18 2129    potassium chloride (K-DUR,KLOR-CON) CR tablet 20 mEq, 20 mEq, Oral, Daily, Kavya Harden PA-C, 20 mEq at 11/14/18 8744    pravastatin (PRAVACHOL) tablet 20 mg, 20 mg, Oral, Daily With Dinner, Kavya Harden PA-C, 20 mg at 11/13/18 1754    sodium chloride 0 9 % inhalation solution 3 mL, 3 mL, Nebulization, TID, Al Collier MD, 3 mL at 11/14/18 0711    umeclidinium-vilanterol (ANORO ELLIPTA) 62 5-25 MCG/INH inhaler 1 puff, 1 puff, Inhalation, Daily, Kavya Harden PA-C     Labs & Results:          Results from last 7 days  Lab Units 11/14/18  0455 11/13/18  1135 11/13/18  0503 11/12/18  1941   WBC Thousand/uL 6 97  --  7 34 5 71   HEMOGLOBIN g/dL 12 3  --  11 5* 11 8*   HEMATOCRIT % 36 6  --  35 0* 36 1*   PLATELETS Thousands/uL 109* 107* 101* 106*           Results from last 7 days  Lab Units 11/14/18  0455 11/13/18  1135 11/12/18  1941   POTASSIUM mmol/L 3 8 4 1 4 5   CHLORIDE mmol/L 97* 99* 98*   CO2 mmol/L 33* 30 29   BUN mg/dL 37* 38* 37*   CREATININE mg/dL 1 17 1 28 1 32*   CALCIUM mg/dL 9 3 9 3 9 1   ALK PHOS U/L  --   --  108   ALT U/L  --   --  84*   AST U/L  --   --  43 Results from last 7 days  Lab Units 11/14/18  0455   MAGNESIUM mg/dL 1 7       EKG personally reviewed by Diaz Bullock MD   Sinus rhythm, first-degree AV block and left bundle branch block  ECHO(9/2018):  LEFT VENTRICLE:  Size was at the upper limits of normal   Systolic function was severely reduced  Ejection fraction was estimated to be 25 %  There was severe diffuse hypokinesis  Features were consistent with a pseudonormal left ventricular filling pattern, with concomitant abnormal relaxation and increased filling pressure (grade 2 diastolic dysfunction)      RIGHT VENTRICLE:  The ventricle was dilated  Systolic function was reduced      LEFT ATRIUM:  The atrium was mildly to moderately dilated      RIGHT ATRIUM:  The atrium was moderately dilated      MITRAL VALVE:  There was mild regurgitation      TRICUSPID VALVE:  There was moderate regurgitation  Pulmonary artery systolic pressure was moderately increased          Counseling / Coordination of Care  Total floor / unit time spent today 25 minutes  Greater than 50% of total time was spent with the patient and / or family counseling and / or coordination of care

## 2018-11-14 NOTE — ASSESSMENT & PLAN NOTE
· Leg swelling 2/2 acute CHF exacerbation  · BNP 17,000 with increasing LE swelling  · CXR appears somewhat stable compared to CXR 11/7  · CBC without leukocytosis, afebrile  · Reported SOB to PCP on 11/7, was thought to be d/t COPD  · CXR obtained at that time did show some mild pulmonary edema  · No medication changes were made as pt had Cardiology appointment scheduled for next Monday  · Was admitted May 2018 for CHF exacerbation   · New cardiomyopathy seen on echo, cath negative for obstructive CAD  · Repeat Echo Sep 2018 with EF 20%  · Maintained on 40 mg PO Lasix daily  · Continue IV Lasix 40 mg b i d    · Will monitor daily weights, I/o's  · Monitor on tele  · Cardiology follow-up ongoing  · Follows with Dr Dimitry Floyd

## 2018-11-14 NOTE — SOCIAL WORK
LOS 2   NOT A BUNDLE;  NOT A READMISSION  Cm received script for Dean Santo to find the co pay cost through pt's insurance  Cm sent script to [facility] requesting they notify me with the OOP cost   Cm will continue to follow to assist with needs

## 2018-11-14 NOTE — ASSESSMENT & PLAN NOTE
Lab Results   Component Value Date    HGBA1C 7 6 (H) 07/23/2013       Recent Labs      11/13/18   1548  11/13/18   2056  11/14/18   0806  11/14/18   1132   POCGLU  182*  244*  98  227*       Blood Sugar Average: Last 72 hrs:  ·  on arrival, likely steroid induced hyperglycemia  · (on PO prednisone taper for back pain)  · Continue Levemir 18 U hs  ·  NovoLog 3 units with each meal  · Add SSI coverage ac and hs  · Adjust accordingly  (P) 046 [id number]

## 2018-11-14 NOTE — PROGRESS NOTES
Progress Note - Mignon Staples 1933, 80 y o  male MRN: [id number]    Unit/Bed#: -01 Encounter: [id number]    Primary Care Provider: Jenniffer Iqbal MD   Date and time admitted to hospital: 11/12/2018  6:48 PM        * Acute on chronic combined systolic and diastolic congestive heart failure (HCC)   Assessment & Plan    · Leg swelling 2/2 acute CHF exacerbation  · BNP 17,000 with increasing LE swelling  · CXR appears somewhat stable compared to CXR 11/7  · CBC without leukocytosis, afebrile  · Reported SOB to PCP on 11/7, was thought to be d/t COPD  · CXR obtained at that time did show some mild pulmonary edema  · No medication changes were made as pt had Cardiology appointment scheduled for next Monday  · Was admitted May 2018 for CHF exacerbation   · New cardiomyopathy seen on echo, cath negative for obstructive CAD  · Repeat Echo Sep 2018 with EF 20%  · Maintained on 40 mg PO Lasix daily  · Continue IV Lasix 40 mg b i d    · Will monitor daily weights, I/o's  · Monitor on tele  · Cardiology follow-up ongoing  · Follows with Dr Kelley Rai     Type 2 diabetes mellitus, with long-term current use of insulin [facility])   Assessment & Plan    Lab Results   Component Value Date    HGBA1C 7 6 (H) 07/23/2013       Recent Labs      11/13/18   1548  11/13/18   2056  11/14/18   0806  11/14/18   1132   POCGLU  182*  244*  98  227*       Blood Sugar Average: Last 72 hrs:  ·  on arrival, likely steroid induced hyperglycemia  · (on PO prednisone taper for back pain)  · Continue Levemir 18 U hs  ·  NovoLog 3 units with each meal  · Add SSI coverage ac and hs  · Adjust accordingly  (P) 201 [id number]     Essential hypertension   Assessment & Plan    · Controlled, continue Toprol  · Resume ACEI upon improvement of renal function     Hyperlipidemia   Assessment & Plan    · Continue statin     COPD without exacerbation (HCC)   Assessment & Plan    · Respiratory protocol, wheezes heard on my exam  · No improvement of SOB despite PO prednisone for low back pain  · Will obtain procalcitonin, consider abx if positive   · Continue home IH regimen     SUZY (acute kidney injury) ([facility] )   Assessment & Plan    · Baseline Cr approx 1 1, presents with Cr 1 32  · Suspect 2/2 volume overload  · Continue IV diuresis  · Monitor BMP       VTE Pharmacologic Prophylaxis:   Pharmacologic: Heparin  Mechanical VTE Prophylaxis in Place: Yes    Patient Centered Rounds: I have performed bedside rounds with nursing staff today  Discussions with Specialists or Other Care Team Provider:  Nursing    Education and Discussions with Family / Patient:  Patient    Time Spent for Care: 20 minutes  More than 50% of total time spent on counseling and coordination of care as described above  Current Length of Stay: 2 day(s)    Current Patient Status: Inpatient   Certification Statement: The patient will continue to require additional inpatient hospital stay due to IV diuretics    Discharge Plan:  Next 24 hours    Code Status: Level 1 - Full Code      Subjective:   Feeling better today  Leg swelling improved    Objective:     Vitals:   Temp (24hrs), Av 8 °F (36 6 °C), Min:97 6 °F (36 4 °C), Max:98 °F (36 7 °C)    Temp:  [97 6 °F (36 4 °C)-98 °F (36 7 °C)] 97 6 °F (36 4 °C)  HR:  [67-84] 84  Resp:  [16-20] 20  BP: (117-135)/(58-78) 135/72  SpO2:  [95 %-97 %] 96 %  Body mass index is 21 29 kg/m²  Input and Output Summary (last 24 hours): Intake/Output Summary (Last 24 hours) at 18 1342  Last data filed at 18 1101   Gross per 24 hour   Intake              420 ml   Output             4675 ml   Net            -4255 ml       Physical Exam:     Physical Exam     Gen -Patient comfortable at rest  Neck- Supple  No thyromegaly or lymphadenopathy  Lungs-Clear bilaterally without any wheeze or rales   Heart S1-S2, regular rate and rhythm, no murmurs  Abdomen-soft nontender, no organomegaly   Bowel sounds present  Extremities-no cyanosi, Clubbing; pitting edema of the legs  Skin- no rash  Neuro-nonfocal       Additional Data:     Labs:      Results from last 7 days  Lab Units 11/14/18  0455  11/13/18  0503   WBC Thousand/uL 6 97  --  7 34   HEMOGLOBIN g/dL 12 3  --  11 5*   HEMATOCRIT % 36 6  --  35 0*   PLATELETS Thousands/uL 109*  < > 101*   NEUTROS PCT %  --   --  78*   LYMPHS PCT %  --   --  8*   MONOS PCT %  --   --  13*   EOS PCT %  --   --  0   < > = values in this interval not displayed  Results from last 7 days  Lab Units 11/14/18  0455  11/12/18  1941   POTASSIUM mmol/L 3 8  < > 4 5   CHLORIDE mmol/L 97*  < > 98*   CO2 mmol/L 33*  < > 29   BUN mg/dL 37*  < > 37*   CREATININE mg/dL 1 17  < > 1 32*   ANION GAP mmol/L 8  < > 7   CALCIUM mg/dL 9 3  < > 9 1   ALBUMIN g/dL  --   --  3 5   TOTAL BILIRUBIN mg/dL  --   --  1 80*   ALK PHOS U/L  --   --  108   ALT U/L  --   --  84*   AST U/L  --   --  43   < > = values in this interval not displayed  Results from last 7 days  Lab Units 11/14/18  1132 11/14/18  0806 11/13/18  2056 11/13/18  1548 11/13/18  1046 11/13/18  0705 11/12/18  2331   POC GLUCOSE mg/dl 227* 98 244* 182* 229* 105 311*           Results from last 7 days  Lab Units 11/13/18  0503   PROCALCITONIN ng/ml 0 05           * I Have Reviewed All Lab Data Listed Above    * Additional Pertinent Lab Tests Reviewed: No New Labs Available For Today    Imaging:    Imaging Reports Reviewed Today Include:   Imaging Personally Reviewed by Myself Includes:      Recent Cultures (last 7 days):           Last 24 Hours Medication List:     Current Facility-Administered Medications:  fluticasone 2 spray Nasal Daily Lee Suh PA-C   fluticasone 1 puff Inhalation BID Lee Suh PA-C   furosemide 40 mg Intravenous BID Lee Suh PA-C   gabapentin 200 mg Oral Daily Yuki Bulreson PA-C   heparin (porcine) 5,000 Units Subcutaneous Q8H [address] Yuki Burleson, ANDRZEJ   insulin detemir 18 Units Subcutaneous  Yuki MORGAN Rafa Leslie PA-C   insulin lispro 1-5 Units Subcutaneous HS Yuki Burleson PA-C   insulin lispro 1-6 Units Subcutaneous TID With Meals Pepin ANDRZEJ Leonard   insulin lispro 3 Units Subcutaneous TID With Meals Vidhya Henderson MD   levalbuterol 1 25 mg Nebulization TID Renny Park MD   metoprolol succinate 100 mg Oral Daily Reina Friday, MD   montelukast 10 mg Oral HS Yuki Burleson PA-C   potassium chloride 20 mEq Oral Daily Pepin ANDRZEJ Leonard   pravastatin 20 mg Oral Daily With [place] MediaANDRZEJ   sodium chloride 3 mL Nebulization TID Renny Park MD   umeclidinium-vilanterol 1 puff Inhalation Daily Pepinty Leonard PA-C        Today, Patient Was Seen By: Thomas Ingram MD    ** Please Note: Dictation voice to text software may have been used in the creation of this document   **

## 2018-11-15 VITALS
WEIGHT: 151.9 LBS | RESPIRATION RATE: 18 BRPM | HEIGHT: 73 IN | OXYGEN SATURATION: 95 % | HEART RATE: 78 BPM | DIASTOLIC BLOOD PRESSURE: 76 MMHG | SYSTOLIC BLOOD PRESSURE: 124 MMHG | BODY MASS INDEX: 20.13 KG/M2 | TEMPERATURE: 97.6 F

## 2018-11-15 LAB
ANION GAP SERPL CALCULATED.3IONS-SCNC: 7 MMOL/L (ref 4–13)
BUN SERPL-MCNC: 38 MG/DL (ref 5–25)
CALCIUM SERPL-MCNC: 9.6 MG/DL (ref 8.3–10.1)
CHLORIDE SERPL-SCNC: 98 MMOL/L (ref 100–108)
CO2 SERPL-SCNC: 34 MMOL/L (ref 21–32)
CREAT SERPL-MCNC: 1.19 MG/DL (ref 0.6–1.3)
ERYTHROCYTE [DISTWIDTH] IN BLOOD BY AUTOMATED COUNT: 24.1 % (ref 11.6–15.1)
GFR SERPL CREATININE-BSD FRML MDRD: 55 ML/MIN/1.73SQ M
GLUCOSE SERPL-MCNC: 103 MG/DL (ref 65–140)
GLUCOSE SERPL-MCNC: 273 MG/DL (ref 65–140)
GLUCOSE SERPL-MCNC: 70 MG/DL (ref 65–140)
HCT VFR BLD AUTO: 41.7 % (ref 36.5–49.3)
HGB BLD-MCNC: 13.7 G/DL (ref 12–17)
MCH RBC QN AUTO: 30.4 PG (ref 26.8–34.3)
MCHC RBC AUTO-ENTMCNC: 32.9 G/DL (ref 31.4–37.4)
MCV RBC AUTO: 93 FL (ref 82–98)
PLATELET # BLD AUTO: 110 THOUSANDS/UL (ref 149–390)
POTASSIUM SERPL-SCNC: 3.6 MMOL/L (ref 3.5–5.3)
RBC # BLD AUTO: 4.51 MILLION/UL (ref 3.88–5.62)
SODIUM SERPL-SCNC: 139 MMOL/L (ref 136–145)
WBC # BLD AUTO: 6.38 THOUSAND/UL (ref 4.31–10.16)

## 2018-11-15 PROCEDURE — 99232 SBSQ HOSP IP/OBS MODERATE 35: CPT | Performed by: INTERNAL MEDICINE

## 2018-11-15 PROCEDURE — 99239 HOSP IP/OBS DSCHRG MGMT >30: CPT | Performed by: INTERNAL MEDICINE

## 2018-11-15 PROCEDURE — 94640 AIRWAY INHALATION TREATMENT: CPT

## 2018-11-15 PROCEDURE — 80048 BASIC METABOLIC PNL TOTAL CA: CPT | Performed by: INTERNAL MEDICINE

## 2018-11-15 PROCEDURE — 94760 N-INVAS EAR/PLS OXIMETRY 1: CPT

## 2018-11-15 PROCEDURE — 82948 REAGENT STRIP/BLOOD GLUCOSE: CPT

## 2018-11-15 PROCEDURE — 85027 COMPLETE CBC AUTOMATED: CPT | Performed by: INTERNAL MEDICINE

## 2018-11-15 RX ORDER — SPIRONOLACTONE 25 MG/1
25 TABLET ORAL DAILY
Qty: 30 TABLET | Refills: 0 | Status: SHIPPED | OUTPATIENT
Start: 2018-11-16 | End: 2018-12-13 | Stop reason: SDUPTHER

## 2018-11-15 RX ORDER — SPIRONOLACTONE 25 MG/1
25 TABLET ORAL DAILY
Status: DISCONTINUED | OUTPATIENT
Start: 2018-11-16 | End: 2018-11-15 | Stop reason: HOSPADM

## 2018-11-15 RX ORDER — FUROSEMIDE 40 MG/1
40 TABLET ORAL DAILY
Status: DISCONTINUED | OUTPATIENT
Start: 2018-11-16 | End: 2018-11-15 | Stop reason: HOSPADM

## 2018-11-15 RX ADMIN — FUROSEMIDE 40 MG: 10 INJECTION, SOLUTION INTRAMUSCULAR; INTRAVENOUS at 08:13

## 2018-11-15 RX ADMIN — ISODIUM CHLORIDE 3 ML: 0.03 SOLUTION RESPIRATORY (INHALATION) at 07:56

## 2018-11-15 RX ADMIN — POTASSIUM CHLORIDE 20 MEQ: 1500 TABLET, EXTENDED RELEASE ORAL at 08:13

## 2018-11-15 RX ADMIN — FLUTICASONE PROPIONATE 2 SPRAY: 50 SPRAY, METERED NASAL at 08:13

## 2018-11-15 RX ADMIN — LEVALBUTEROL HYDROCHLORIDE 1.25 MG: 1.25 SOLUTION, CONCENTRATE RESPIRATORY (INHALATION) at 07:56

## 2018-11-15 RX ADMIN — FLUTICASONE PROPIONATE 1 PUFF: 220 AEROSOL, METERED RESPIRATORY (INHALATION) at 08:13

## 2018-11-15 RX ADMIN — METOPROLOL SUCCINATE 100 MG: 100 TABLET, FILM COATED, EXTENDED RELEASE ORAL at 08:13

## 2018-11-15 NOTE — SOCIAL WORK
LOS 3   NOT A BUNDLE;  NOT A READMISSION  Cm met with pt  CM reviewed discharge planning process including the following: identifying caregivers at home, preference for d/c planning needs, [facility] Meds to Bed program, availability of treatment team to discuss questions or concerns patient and/or family may have regarding diagnosis, plan of care, old or new medications and discharge planning   CM will continue to follow for care coordination and update assessment as necessary  CM name and role reviewed  Discharge Checklist reviewed and CM will continue to monitor for progress toward discharge goals in nursing and provider rounds  Patient identified as high risk for readmission (HRR)  PCP follow-up appointment information provided and transportation arrangements verified with patient and/or caregiver  AVS reviewed for appointment documentation prior to discharge  OP Care Coordination Team notified to support transitions of care

## 2018-11-15 NOTE — TELEPHONE ENCOUNTER
I see that he was admitted immediately the next day, for increased blood sugars but also being treated for extra fluid    I will follow along, please make sure he has a follow-up appointment within a week after discharge

## 2018-11-15 NOTE — PROGRESS NOTES
Cardiology Progress Note - Monique Diez 80 y o  male MRN: [id number]    Unit/Bed#: -01 Encounter: [id number]      Assessment:  Principal Problem:    Acute on chronic combined systolic and diastolic congestive heart failure (HCC)  Active Problems:    COPD without exacerbation ([facility] )    Essential hypertension    Hyperlipidemia    Type 2 diabetes mellitus, with long-term current use of insulin (HCC)    SUZY (acute kidney injury) ([facility] )      Plan:    1  Acute on chronic systolic and diastolic CHF - A lot of this appeared to be brought on by his steroid treatment for his sciatica  With this came volume overloaded, and hyperglycemia  However I do suspect that dietary indiscretions could have been playing a role as well  He has had a good response to IV Lasix, and is improved from a volume standpoint  Creatinine has improved and is stable  Suggest going back to his regular Lasix 40 mg daily, as we are adding Entresto and spironolactone       2  Nonischemic cardiomyopathy - His ejection fraction dropped on his echo an echocardiogram, to about 25%  Plan at this point is to titrate his medical therapy  We are going to switch from lisinopril to Rob Caroli, and we have been holding lisinopril  He will be able to start and Entresto with tonight's evening's dose  Continue Toprol 100 mg daily  We will also add spironolactone 25 mg daily, and therefore keep off of potassium supplementation  Check BMP in about a week  He already has an appointment Monday with Dr Hannah Montana          Subjective:   Patient seen and examined  No significant events overnight  Patient feeling better  Less volume overloaded, responded well to IV Lasix  Objective:     Vitals: Blood pressure 124/76, pulse 78, temperature 97 6 °F (36 4 °C), temperature source Oral, resp  rate 18, height 6' 1" (1 854 m), weight 68 9 kg (151 lb 14 4 oz), SpO2 95 %  , Body mass index is 20 04 kg/m² ,   Orthostatic Blood Pressures      Most Recent Value   Blood Pressure  124/76 filed at 11/15/2018 0700   Patient Position - Orthostatic VS  Lying filed at 11/15/2018 0700            Intake/Output Summary (Last 24 hours) at 11/15/18 0942  Last data filed at 11/15/18 0941   Gross per 24 hour   Intake              240 ml   Output             5075 ml   Net            -4835 ml       No significant arrhythmias seen on telemetry review  Sinus rhythm with first-degree AV block  Physical Exam:    GEN: Pleasant Oyster appears well, alert and oriented x 3, pleasant and cooperative   HEENT: pupils equal, round, and reactive to light; extraocular muscles intact  NECK: supple, no carotid bruits  Improved JVP  HEART: regular rhythm, distant S1 and S2, no murmurs, clicks, gallops or rubs   LUNGS:  Diminished bilaterally; no wheezes, rales, or rhonchi   ABDOMEN: normal bowel sounds, soft, no tenderness, no distention  EXTREMITIES: peripheral pulses normal; no clubbing, cyanosis  Mild edema    NEURO: no focal findings   SKIN: normal without suspicious lesions on exposed skin      Medications:      Current Facility-Administered Medications:     fluticasone (FLONASE) 50 mcg/act nasal spray 2 spray, 2 spray, Nasal, Daily, Renita Segura PA-C, 2 spray at 11/15/18 0813    fluticasone (FLOVENT HFA) 220 mcg/act inhaler 1 puff, 1 puff, Inhalation, BID, Renita Segura PA-C, 1 puff at 11/15/18 0813    furosemide (LASIX) injection 40 mg, 40 mg, Intravenous, BID, Renita Segura PA-C, 40 mg at 11/15/18 0813    gabapentin (NEURONTIN) capsule 200 mg, 200 mg, Oral, Daily, Brie Burleson PA-C, 200 mg at 11/14/18 2200    heparin (porcine) subcutaneous injection 5,000 Units, 5,000 Units, Subcutaneous, Q8H [facility], 5,000 Units at 11/14/18 2200 **AND** Platelet count, , , Once, Renita Segura PA-C    insulin detemir (LEVEMIR) subcutaneous injection 18 Units, 18 Units, Subcutaneous, HS, Renita Segura PA-C, 18 Units at 11/14/18 2209    insulin lispro (HumaLOG) 100 units/mL subcutaneous injection 1-5 Units, 1-5 Units, Subcutaneous, HS, Shayy Silveira PA-C, 1 Units at 11/14/18 2201    insulin lispro (HumaLOG) 100 units/mL subcutaneous injection 1-6 Units, 1-6 Units, Subcutaneous, TID With Meals, 2 Units at 11/14/18 1755 **AND** Fingerstick Glucose (POCT), , , TID AC, Yuki Burleson PA-C    insulin lispro (HumaLOG) 100 units/mL subcutaneous injection 3 Units, 3 Units, Subcutaneous, TID With Meals, Vidhya Henderson MD, 3 Units at 11/14/18 1754    levalbuterol (Berenda Ales) inhalation solution 1 25 mg, 1 25 mg, Nebulization, TID, Maxime Rios MD, 1 25 mg at 11/15/18 0756    metoprolol succinate (TOPROL-XL) 24 hr tablet 100 mg, 100 mg, Oral, Daily, rBian Riddle MD, 100 mg at 11/15/18 0813    montelukast (SINGULAIR) tablet 10 mg, 10 mg, Oral, HS, Yuki Burleson PA-C, 10 mg at 11/14/18 2200    potassium chloride (K-DUR,KLOR-CON) CR tablet 20 mEq, 20 mEq, Oral, Daily, Shayy Silveira PA-C, 20 mEq at 11/15/18 0813    pravastatin (PRAVACHOL) tablet 20 mg, 20 mg, Oral, Daily With Dinner, Shayy Silveira PA-C, 20 mg at 11/14/18 1713    sodium chloride 0 9 % inhalation solution 3 mL, 3 mL, Nebulization, TID, Maxime Rios MD, 3 mL at 11/15/18 0756    umeclidinium-vilanterol (ANORO ELLIPTA) 62 5-25 MCG/INH inhaler 1 puff, 1 puff, Inhalation, Daily, Shayy Silveira PA-C, 1 puff at 11/15/18 0814     Labs & Results:          Results from last 7 days  Lab Units 11/15/18  0531 11/14/18  0455 11/13/18  1135 11/13/18  0503   WBC Thousand/uL 6 38 6 97  --  7 34   HEMOGLOBIN g/dL 13 7 12 3  --  11 5*   HEMATOCRIT % 41 7 36 6  --  35 0*   PLATELETS Thousands/uL 110* 109* 107* 101*           Results from last 7 days  Lab Units 11/15/18  0531 11/14/18  0455 11/13/18  1135 11/12/18  1941   POTASSIUM mmol/L 3 6 3 8 4 1 4 5   CHLORIDE mmol/L 98* 97* 99* 98*   CO2 mmol/L 34* 33* 30 29   BUN mg/dL 38* 37* 38* 37*   CREATININE mg/dL 1 19 1 17 1 28 1 32*   CALCIUM mg/dL 9 6 9 3 9 3 9  1   ALK PHOS U/L  --   --   --  108   ALT U/L  --   --   --  84*   AST U/L  --   --   --  43           Results from last 7 days  Lab Units 11/14/18  0455   MAGNESIUM mg/dL 1 7       EKG personally reviewed by Ravindra Abreu MD   Sinus rhythm, first-degree AV block and left bundle branch block  ECHO(9/2018):  LEFT VENTRICLE:  Size was at the upper limits of normal   Systolic function was severely reduced  Ejection fraction was estimated to be 25 %  There was severe diffuse hypokinesis  Features were consistent with a pseudonormal left ventricular filling pattern, with concomitant abnormal relaxation and increased filling pressure (grade 2 diastolic dysfunction)      RIGHT VENTRICLE:  The ventricle was dilated  Systolic function was reduced      LEFT ATRIUM:  The atrium was mildly to moderately dilated      RIGHT ATRIUM:  The atrium was moderately dilated      MITRAL VALVE:  There was mild regurgitation      TRICUSPID VALVE:  There was moderate regurgitation  Pulmonary artery systolic pressure was moderately increased          Counseling / Coordination of Care  Total floor / unit time spent today 25 minutes  Greater than 50% of total time was spent with the patient and / or family counseling and / or coordination of care

## 2018-11-15 NOTE — DISCHARGE SUMMARY
Discharge Summary - [facility] Internal Medicine    Patient Information: Juan Askew 80 y o  male MRN: [id number]  Unit/Bed#: -01 Encounter: [id number]    Discharging Physician / Practitioner: Isiah Rivera MD  PCP: Michael Tan MD  Admission Date: 11/12/2018  Discharge Date: 11/15/18    Disposition:     Home    Reason for Admission:  Shortness of breath and lower extremity edema    Discharge Diagnoses:     Principal Problem:    Acute on chronic combined systolic and diastolic congestive heart failure ([facility] )  Active Problems:    Type 2 diabetes mellitus, with long-term current use of insulin ([facility])    Essential hypertension    COPD without exacerbation ([facility] )    Hyperlipidemia    SUZY (acute kidney injury) ([place] [address] )  Resolved Problems:    * No resolved hospital problems  *      Consultations During Hospital Stay:  · Cardiology    Procedures Performed:     · Chest x-ray showed mild pulmonary congestion    Hospital Course:     Juan Askew is a 80 y o  male patient who originally presented to the hospital on 11/12/2018 due to shortness of breath and lower extremity swelling  Patient was recently suffering from sciatica and was prescribed prednisone  Since taking prednisone he was noted to have elevated blood sugars and lower extremity swelling  He also felt more short of breath than normal   He was seen by endocrinologist and was subsequently sent to the emergency department for further evaluation  While inpatient he completed course of prednisone  He was seen by Cardiology and was treated with IV Lasix 40 mg b i d  His symptoms have resolved and continues to improve  Cardiology recommended starting patient on Entresto, which has been initiated and patient tolerating well  He was also to discontinue taking lisinopril and potassium supplements  In addition he was also started on spironolactone  He has schedule appointment with Cardiology on Monday 11/18/2018     He is being discharged home in stable condition  Condition at Discharge: good     Discharge Day Visit / Exam:     Subjective:  Feeling better today and anxious to go home  Vitals: Blood Pressure: 124/76 (11/15/18 0700)  Pulse: 78 (11/15/18 0700)  Temperature: 97 6 °F (36 4 °C) (11/15/18 0700)  Temp Source: Oral (11/15/18 0700)  Respirations: 18 (11/15/18 0700)  Height: 6' 1" (185 4 cm) (11/12/18 2213)  Weight - Scale: 68 9 kg (151 lb 14 4 oz) (11/15/18 0613)  SpO2: 95 % (11/15/18 0800)  Exam:   Physical Exam     Gen -Patient comfortable at rest  Neck- Supple  No thyromegaly or lymphadenopathy  Lungs-Clear bilaterally without any wheeze or rales   Heart S1-S2, regular rate and rhythm, no murmurs  Abdomen-soft nontender, no organomegaly  Bowel sounds present  Extremities-no cyanosi,  clubbing ; trace edema lower extremities  Skin- no rash  Neuro-nonfocal       Discussion with Family:     Discharge instructions/Information to patient and family:   See after visit summary for information provided to patient and family  Provisions for Follow-Up Care:  See after visit summary for information related to follow-up care and any pertinent home health orders  Planned Readmission: no     Discharge Statement:  I spent 35 minutes discharging the patient  This time was spent on the day of discharge  I had direct contact with the patient on the day of discharge  Greater than 50% of the total time was spent examining patient, answering all patient questions, arranging and discussing plan of care with patient as well as directly providing post-discharge instructions  Additional time then spent on discharge activities  Discharge Medications:  See after visit summary for reconciled discharge medications provided to patient and family        ** Please Note: This note has been constructed using a voice recognition system **

## 2018-11-16 ENCOUNTER — TRANSITIONAL CARE MANAGEMENT (OUTPATIENT)
Dept: FAMILY MEDICINE CLINIC | Facility: MEDICAL CENTER | Age: 83
End: 2018-11-16

## 2018-11-19 ENCOUNTER — OFFICE VISIT (OUTPATIENT)
Dept: FAMILY MEDICINE CLINIC | Facility: MEDICAL CENTER | Age: 83
End: 2018-11-19
Payer: COMMERCIAL

## 2018-11-19 ENCOUNTER — OFFICE VISIT (OUTPATIENT)
Dept: CARDIOLOGY CLINIC | Facility: CLINIC | Age: 83
End: 2018-11-19
Payer: COMMERCIAL

## 2018-11-19 VITALS
DIASTOLIC BLOOD PRESSURE: 60 MMHG | HEART RATE: 84 BPM | SYSTOLIC BLOOD PRESSURE: 110 MMHG | WEIGHT: 155.5 LBS | HEIGHT: 73 IN | BODY MASS INDEX: 20.61 KG/M2 | OXYGEN SATURATION: 97 %

## 2018-11-19 VITALS
DIASTOLIC BLOOD PRESSURE: 54 MMHG | SYSTOLIC BLOOD PRESSURE: 90 MMHG | WEIGHT: 156.6 LBS | HEART RATE: 72 BPM | RESPIRATION RATE: 16 BRPM | BODY MASS INDEX: 20.66 KG/M2

## 2018-11-19 DIAGNOSIS — E78.49 OTHER HYPERLIPIDEMIA: ICD-10-CM

## 2018-11-19 DIAGNOSIS — H91.93 BILATERAL HEARING LOSS, UNSPECIFIED HEARING LOSS TYPE: ICD-10-CM

## 2018-11-19 DIAGNOSIS — I50.43 ACUTE ON CHRONIC COMBINED SYSTOLIC AND DIASTOLIC CONGESTIVE HEART FAILURE (HCC): Primary | ICD-10-CM

## 2018-11-19 DIAGNOSIS — E11.42 TYPE 2 DIABETES MELLITUS WITH DIABETIC POLYNEUROPATHY, WITH LONG-TERM CURRENT USE OF INSULIN (HCC): Chronic | ICD-10-CM

## 2018-11-19 DIAGNOSIS — Z79.4 TYPE 2 DIABETES MELLITUS WITH DIABETIC POLYNEUROPATHY, WITH LONG-TERM CURRENT USE OF INSULIN (HCC): Chronic | ICD-10-CM

## 2018-11-19 DIAGNOSIS — I50.43 ACUTE ON CHRONIC COMBINED SYSTOLIC AND DIASTOLIC CONGESTIVE HEART FAILURE (HCC): ICD-10-CM

## 2018-11-19 DIAGNOSIS — J44.9 COPD WITHOUT EXACERBATION (HCC): ICD-10-CM

## 2018-11-19 DIAGNOSIS — H93.13 TINNITUS OF BOTH EARS: Primary | ICD-10-CM

## 2018-11-19 DIAGNOSIS — I50.22 CHRONIC SYSTOLIC CONGESTIVE HEART FAILURE (HCC): ICD-10-CM

## 2018-11-19 DIAGNOSIS — I42.0 DILATED CARDIOMYOPATHY (HCC): ICD-10-CM

## 2018-11-19 PROCEDURE — 1111F DSCHRG MED/CURRENT MED MERGE: CPT | Performed by: FAMILY MEDICINE

## 2018-11-19 PROCEDURE — 99496 TRANSJ CARE MGMT HIGH F2F 7D: CPT | Performed by: FAMILY MEDICINE

## 2018-11-19 PROCEDURE — 99214 OFFICE O/P EST MOD 30 MIN: CPT | Performed by: INTERNAL MEDICINE

## 2018-11-19 NOTE — PROGRESS NOTES
Sree is here for MARY  He was hospitalized for congestive heart failure  He was diuresed and did very well  He is due to see Cardiology today  He complains of ringing in his ears  Has had for quite a while  No associated headaches  Heariing is no good  He has had several hearing aids over the years but does not wear them according to his son  He says his back pain is better  He has been exercising more often  Doing his bike for about 15 minutes  He is concerned about his glucose gong up at lunchtime  He sees Endocrinology next week  He is having problems with urination  He has a slow stream and trouble getting started  He was referred to Urology during hospitalization  TCM Call (since 10/20/2018)     Date and time call was made  11/16/2018 10:45 AM    Patient was hospitialized at  [address] [place] [address]    Date of Admission  11/12/18    Date of discharge  11/12/18    Diagnosis  CHF    Disposition  Home    Current Symptoms  None      TCM Call (since 10/20/2018)     Should patient be enrolled in anticoag monitoring? No    Scheduled for follow up? Yes    Patients specialists  Cardiologist    Did you obtain your prescribed medications  Yes    Do you need help managing your prescriptions or medications  No    Is transportation to your appointment needed  No    I have advised the patient to call PCP with any new or worsening symptoms  Gerri Conn LPN    Living Arrangements  Children    Support System  Family                  O: BP 90/54 (Cuff Size: Large)   Pulse 72   Resp 16   Wt 71 kg (156 lb 9 6 oz)   BMI 20 66 kg/m²   No distress  Neck no adenopathy thyromegaly bruits  Chest is clear with good breath sounds  Cardiac regular rate without murmur  Extremities no edema  Ears both TMs and canals are normal    Assessment  1  Congestive heart lmukvys-shlfxmxf-egakaz up with Cardiology  Encouraged to restrict sodium intake  2   Back pain-has improved    Could consider increase in gabapentin dose if necessary  3  Tinnitus-this is particularly bothersome to the patient  Will check a Lyme titer at his request   Referral to ENT  4   Urinary symptoms-will be seeing Urology; most likely prostatic  5  Diabetes-due to see Endocrinology next week  We discussed appropriate morning diet as well as addition of protein    Plan  Urology referral    ENT referral as above  Recheck 3 months

## 2018-11-19 NOTE — PROGRESS NOTES
Cardiology Follow Up    Dominga Perez  1933  [id number]  [address] [facility]  [address] [place] [address] [address]    1  Acute on chronic combined systolic and diastolic congestive heart failure ([address] )     2  Dilated cardiomyopathy ([facility] )     3  Other hyperlipidemia         I had the pleasure of seeing Dominga Perez for a follow up visit  Interval History: none    History of the presenting illness, Discussion/Summary and My Plan are as follows:      80-year-old without any prior cardiac history-including CAD, MI, bypass, stents or valvular heart disease or family history of cardiomyopathy or coronary artery disease but with a left bundle-branch block since 2013  He was admitted-May 2018-acute congestive heart failure, with diagnosis of a new cardiomyopathy on echocardiogram with an ejection fraction of 35-40%, underwent coronary angiography without any obstructive CAD  He is on medium doses of beta-blocker and ACE-inhibitor  as well as Lasix 40 mg daily with which he has felt significantly better  He denies any orthopnea or dyspnea with exertion or edema  He does admit to fatigue but is playing golf without any symptoms  Exam demonstrates NO e/o CHF  Plan:    Cardiomyopathy and chronic systolic congestive heart failure:  Nonischemic, no alcohol use or chemotherapy  No family history  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-was  treated for symptoms of bronchitis for few months prior to May, negative coronary angiography,   Continue lisinopril at the same dose  Continue Lasix 40 mg daily  Heart failure instructions were reviewed    Check BMP  Now on an excellent medical regimen including a beta-blocker, Entresto and Aldactone, recheck echo in 3 months  No e/o Vol Ol on Exam  Also has COPD  Broached the subject of an ICD or Lifevest - would like to hold off - reasonable considering age    Hypertension: Controlled    Dyslipidemia:  On low-dose statin  He is also diabetic  Diabetes: Followed by an endocrinologist at [place] [address]       Follow-up in 6 months     Patient Active Problem List   Diagnosis    COPD without exacerbation ([place] [address] )    Essential hypertension    Hyperlipidemia    Neuropathy, idiopathic    Multiple nodules of lung    Mixed simple and mucopurulent chronic bronchitis (HCC)    Type 2 diabetes mellitus, with long-term current use of insulin ([facility])    Heart failure (HCC)    Dilated cardiomyopathy (HCC)    Chronic systolic congestive heart failure ([facility])    Abnormal chest CT    Arthritis    Acute on chronic combined systolic and diastolic congestive heart failure ([place] [address] )    SUZY (acute kidney injury) ([place] [address] )     Past Medical History:   Diagnosis Date    COPD (chronic obstructive pulmonary disease) ([place] [address] )     Diabetes mellitus ([place] [address] )     Type 2    Essential hypertension     Heart failure ([place] [address] )     Left inguinal hernia     Malignant melanoma of skin ([place] [address] )      Social History     Social History    Marital status: /Civil Union     Spouse name: N/A    Number of children: 2    Years of education: N/A     Occupational History    restired      Social History Main Topics    Smoking status: Former Smoker     Packs/day: 1 00     Years: 10 00     Quit date: 1960    Smokeless tobacco: Never Used    Alcohol use 0 0 oz/week      Comment: SOcially     Drug use: No    Sexual activity: Not on file     Other Topics Concern    Not on file     Social History Narrative    Caffeine use, active          Family History   Problem Relation Age of Onset    Diabetes Mother     Heart attack Neg Hx     Stroke Neg Hx     Aneurysm Neg Hx     Clotting disorder Neg Hx     Arrhythmia Neg Hx     Hypertension Neg Hx     Hyperlipidemia Neg Hx         pt unsure      Past Surgical History:   Procedure Laterality Date    APPENDECTOMY      CATARACT EXTRACTION, BILATERAL      CHOLECYSTECTOMY      CHOLECYSTECTOMY      COLONOSCOPY  2014    Fiberoptic    HERNIA REPAIR      JOINT REPLACEMENT Left     meniscus repair    KNEE ARTHROSCOPY      Therapeutic       Current Outpatient Prescriptions:     acetaminophen (TYLENOL) 325 mg tablet, Take 2 tablets (650 mg total) by mouth every 6 (six) hours as needed for mild pain, Disp: 30 tablet, Rfl: 0    albuterol (2 5 mg/3 mL) 0 083 % nebulizer solution, Take 1 vial (2 5 mg total) by nebulization every 6 (six) hours as needed for wheezing or shortness of breath, Disp: 1080 mL, Rfl: 3    Ascorbic Acid (VITAMIN C) 1000 MG tablet, Take 1 tablet by mouth daily, Disp: , Rfl:     BD PEN NEEDLE DON U/F 32G X 4 MM MISC, , Disp: , Rfl:     Cinnamon 500 MG TABS, Take 1 tablet by mouth daily  , Disp: , Rfl:     fluticasone (FLONASE) 50 mcg/act nasal spray, 2 sprays into each nostril daily, Disp: 16 g, Rfl: 0    fluticasone (FLOVENT HFA) 220 mcg/act inhaler, Inhale 1 puff 2 (two) times a day, Disp: 1 Inhaler, Rfl: 5    furosemide (LASIX) 40 mg tablet, Take 1 tablet (40 mg total) by mouth daily, Disp: 90 tablet, Rfl: 3    gabapentin (NEURONTIN) 100 mg capsule, Take 2 capsules (200 mg total) by mouth daily, Disp: 180 capsule, Rfl: 0    insulin detemir (LEVEMIR) 100 units/mL subcutaneous injection, Inject 18 Units under the skin daily at bedtime  , Disp: , Rfl:     metoprolol succinate (TOPROL-XL) 50 mg 24 hr tablet, Take 2 tablets (100 mg total) by mouth daily, Disp: 180 tablet, Rfl: 3    montelukast (SINGULAIR) 10 mg tablet, Take 1 tablet (10 mg total) by mouth daily at bedtime, Disp: 30 tablet, Rfl: 3    Multiple Vitamins-Minerals (OCUVITE ADULT 50+ PO), Take 1 tablet by mouth daily, Disp: , Rfl:     NOVOLOG FLEXPEN 100 units/mL injection pen, 4 Units daily with breakfast  , Disp: , Rfl:     ONE TOUCH ULTRA TEST test strip, , Disp: , Rfl:     ONETOUCH DELICA LANCETS FINE MISC, , Disp: , Rfl:     roflumilast 500 mcg TABS, Take 1 tablet (500 mcg total) by mouth daily, Disp: 30 tablet, Rfl: 3    sacubitril-valsartan (ENTRESTO) 24-26 MG TABS, Take 1 tablet by mouth 2 (two) times a day, Disp: 30 tablet, Rfl: 0    simvastatin (ZOCOR) 10 mg tablet, Take 1 tablet (10 mg total) by mouth daily, Disp: 90 tablet, Rfl: 1    spironolactone (ALDACTONE) 25 mg tablet, Take 1 tablet (25 mg total) by mouth daily, Disp: 30 tablet, Rfl: 0    umeclidinium-vilanterol (ANORO ELLIPTA) 62 5-25 MCG/INH inhaler, Inhale 1 puff daily, Disp: 1 Inhaler, Rfl: 4    VENTOLIN  (90 Base) MCG/ACT inhaler, , Disp: , Rfl:     repaglinide (PRANDIN) 1 mg tablet, Take 1 mg by mouth daily 4 tablets in the morning, one tablet in the afternoon, two tablets in the evening , Disp: , Rfl:     repaglinide (PRANDIN) 2 mg tablet, Take 2 mg by mouth daily before dinner, Disp: , Rfl:   No Known Allergies    Labs:not applicable  Imaging: Xr Chest 2 Views    Result Date: 5/12/2018  Narrative: CHEST INDICATION:   Shortness of breath  COMPARISON:  3/26/2018, CT chest 5/7/2018 EXAM PERFORMED/VIEWS:  XR CHEST PA & LATERAL FINDINGS: Cardiomediastinal silhouette appears unremarkable  Minimal streaky left basilar atelectasis  No evolving infiltrates  Possible trace posterior pleural effusions  No pneumothorax  Osseous structures appear within normal limits for patient age  Impression: Streaky left basilar atelectasis and possible trace posterior pleural effusions  No evolving infiltrates  Workstation performed: [id number]     Ct Chest Without Contrast    Result Date: 5/9/2018  Narrative: CT CHEST WITHOUT IV CONTRAST INDICATION:   R91 8: Other nonspecific abnormal finding of lung field  COMPARISON: 11/7/2017, 5/1/2017 and 2/20/2017  TECHNIQUE: CT examination of the chest was performed without intravenous contrast   Axial, sagittal, and coronal 2D reformatted images were created from the source data and submitted for interpretation  Radiation dose length product (DLP) for this visit:  344 mGy-cm     This examination, like all CT scans performed in the [facility], was performed utilizing techniques to minimize radiation dose exposure, including the use of iterative reconstruction and automated exposure control  FINDINGS: LUNGS:  No change in 3 mm right upper lobe nodule laterally on series 2 image 23, and 3 mm left upper lobe nodules on series 3 images 26 and 35  Persistent mild reticulonodular densities at the bilateral lung bases suggestive of chronic infiltrates, atelectasis or scarring  There is new small density with apparent air bronchograms in the perihilar right upper lobe along the major fissure, measuring up to 2 3 cm on series 3 image 29  This is felt to likely represent post infectious/inflammatory consolidation  There is no tracheal or endobronchial lesion  PLEURA:  There has been interval development of small bilateral pleural effusions  HEART/GREAT VESSELS:  Atherosclerotic changes thoracic aorta and coronary arteries  MEDIASTINUM AND TANMAY:  Unremarkable  CHEST WALL AND LOWER NECK:   Unremarkable  VISUALIZED STRUCTURES IN THE UPPER ABDOMEN:  Status post cholecystectomy  OSSEOUS STRUCTURES:  No acute fracture or destructive osseous lesion  Impression: New right upper lobe perihilar density, felt to likely represent post infectious/inflammatory consolidation, however follow-up in 3 months is recommended to ensure resolution  Persistent minimal bibasilar reticulonodular densities, likely chronic infiltrates and/or atelectasis or scarring  Stable tiny bilateral upper lobe nodules  New small bilateral pleural effusions noted  Workstation performed: [id number]     Marlyn Timmons Chest Pe Study    Result Date: 5/12/2018  Narrative: CTA - CHEST WITH IV CONTRAST - PULMONARY ANGIOGRAM INDICATION:   sob/tachy  COPD  COMPARISON: May 7, 2018   TECHNIQUE: CTA examination of the chest was performed using angiographic technique according to a protocol specifically tailored to evaluate for pulmonary embolism  Axial, sagittal, and coronal 2D reformatted images were created from the source data and  submitted for interpretation  In addition, coronal 3D MIP postprocessing was performed on the acquisition scanner  Radiation dose length product (DLP) for this visit:  383 mGy-cm   This examination, like all CT scans performed in the [place], was performed utilizing techniques to minimize radiation dose exposure, including the use of iterative reconstruction and automated exposure control  IV Contrast:  85 mL of iohexol (OMNIPAQUE)  FINDINGS: PULMONARY ARTERIAL TREE:  No pulmonary embolus is seen  LUNGS:  Mostly linear reticular intralobular septal thickening in both lung bases  There is associated mild bibasilar bronchiectasis and peribronchial thickening, similar to prior study and likely at least in part sequela of prior infection and scarring  Since the recent prior study there is increasing linear opacities, likely a combination of subsegmental atelectasis and interstitial fluid accumulation  In the right upper lobe adjacent to the posterior segmental bronchus there is focal opacification which appears to have a component of volume loss in concave bowing of the adjacent fissure suggesting this could represent subsegmental atelectasis  It is not significant change compared to the recent study but new compared to prior in November  Follow-up suggested in 3-6 months to ensure resolution  Stable 3 mm noncalcified nodule lingular, image 5/37  Chronic, decreased compared to 2017  PLEURA:  Increasing mild to moderate bilateral dependent pleural effusions  HEART/AORTA:  Unremarkable for patient's age  MEDIASTINUM AND TANMAY:  Unremarkable  CHEST WALL AND LOWER NECK:   Unremarkable  VISUALIZED STRUCTURES IN THE UPPER ABDOMEN:  Unremarkable  Prior cholecystectomy  OSSEOUS STRUCTURES:  No acute fracture or destructive osseous lesion  Impression: No evidence for acute pulmonary embolism  Increasing mild to moderate bilateral dependent pleural effusions  Chronic scarring, bronchiectasis and peribronchial thickening in the dependent aspects of both lower lobes  Superimposed reticular interstitial opacities which may reflect component of interstitial fluid and subsegmental atelectasis  Small subsegmental focus of consolidation seen in the right upper lobe adjacent to the posterior segmental bronchus  See above for further discussion  Recommend follow-up  Workstation performed: [id number]       Review of Systems:  Review of Systems   Constitutional: Negative  HENT: Negative  Eyes: Negative  Respiratory: Positive for shortness of breath  Negative for cough, choking, chest tightness, wheezing and stridor  Cardiovascular: Negative  Gastrointestinal: Negative  Endocrine: Negative  Genitourinary: Negative  Musculoskeletal: Negative  Skin: Negative  Allergic/Immunologic: Negative  Neurological: Negative  Hematological: Negative  Psychiatric/Behavioral: Negative  Physical Exam:  Physical Exam   Constitutional: He appears well-developed and well-nourished  No distress  HENT:   Head: Normocephalic and atraumatic  Eyes: Pupils are equal, round, and reactive to light  Conjunctivae and EOM are normal  Right eye exhibits no discharge  Left eye exhibits no discharge  Neck: Normal range of motion  Neck supple  No JVD present  No tracheal deviation present  No thyromegaly present  Cardiovascular: Normal rate, normal heart sounds and intact distal pulses  Exam reveals no friction rub  No murmur heard  Pulmonary/Chest: Effort normal and breath sounds normal  No stridor  No respiratory distress  He has no wheezes  He has no rales  Abdominal: Soft  Bowel sounds are normal  He exhibits no distension  There is no tenderness  There is no rebound  Musculoskeletal: Normal range of motion  He exhibits no edema or deformity  Skin: Skin is warm and dry   No rash noted  He is not diaphoretic  No erythema  No pallor

## 2018-11-21 ENCOUNTER — TRANSCRIBE ORDERS (OUTPATIENT)
Dept: ADMINISTRATIVE | Facility: HOSPITAL | Age: 83
End: 2018-11-21

## 2018-11-21 DIAGNOSIS — M54.10 RADICULOPATHY, UNSPECIFIED SPINAL REGION: Primary | ICD-10-CM

## 2018-11-26 ENCOUNTER — TELEPHONE (OUTPATIENT)
Dept: CARDIOLOGY CLINIC | Facility: CLINIC | Age: 83
End: 2018-11-26

## 2018-11-26 NOTE — TELEPHONE ENCOUNTER
Patient called he is concerned that he is losing weight  Since he has left the hospital he has lost a total of 7lbs  I did explain that the water pill was doing its job but he wanted to make you aware  He did have a BMP on Friday  We are waiting for the results

## 2018-11-27 ENCOUNTER — TELEPHONE (OUTPATIENT)
Dept: UROLOGY | Facility: AMBULATORY SURGERY CENTER | Age: 83
End: 2018-11-27

## 2018-11-27 ENCOUNTER — PATIENT OUTREACH (OUTPATIENT)
Dept: FAMILY MEDICINE CLINIC | Facility: MEDICAL CENTER | Age: 83
End: 2018-11-27

## 2018-11-27 NOTE — PROGRESS NOTES
Recent d/c 11/15/18 s/p CHF  Reports doing good  Weighing self daily  Reports notified cardiology of recent 7 pound weight loss yesterday, but reports weight this morning was the same as yesterday  Denies complaints of shortness of breath, edema, increased weakness, fatigue  Complains of feeling lightheaded at times with change in position  Discussed safety/fall precautions  Discussed signs and symptoms of CHF and weight changes to monitor for and report  Denies any falls  Has cane at home if needed  Complains of pain to low back with occasional radiation to left leg  Follows with Dr Dalton Josue ([address]) and has MRI scheduled at [facility] 11/29/18  Exercising on bike  Appetite good  Following low sodium/diabetic diet  Discussed food choices and label reading  Has follow up with endocrine 11/30/18  Checking blood sugars three times daily, reports typically 120-130's, however this morning was 180  Keeping log and will discuss at endocrine appt  Lives with son  Good support system  Denies any current home or outpatient services  Patient drives, however son or daughter will transport if needed  All prescriptions filled and taking as prescribed  Denies any needs or concerns  Provided my contact information and encouraged to call if needed

## 2018-11-27 NOTE — TELEPHONE ENCOUNTER
I received the blood work from [employer] and sent it to the [facility] team to place in patient chart    Some of the out of range valves go as follows:    Glucose   189 H  Urea nitrogen (BUN)  37 H  Creatinine  1 18 H  eGFR NON-AFR [employer]   56 L  BUN/ CREATININE RATIO 31 H  Potassium 4 4 normal     All the other results were within normal limits    Just was not sure how long it would take for labs to be put in chart so I am giving you all out of range info        Thanks

## 2018-11-27 NOTE — TELEPHONE ENCOUNTER
Reason for appointment/Complaint/Diagnosis : urinary retention  Insurance: aet medicare  History of Cancer? no                 If yes, what kind? n/a  Previous urologist? no               Records requested/where? Epic  Outside testing/where? no    Location Preference for office visit?  [facility]

## 2018-11-28 ENCOUNTER — OFFICE VISIT (OUTPATIENT)
Dept: FAMILY MEDICINE CLINIC | Facility: MEDICAL CENTER | Age: 83
End: 2018-11-28
Payer: COMMERCIAL

## 2018-11-28 ENCOUNTER — TELEPHONE (OUTPATIENT)
Dept: CARDIOLOGY CLINIC | Facility: CLINIC | Age: 83
End: 2018-11-28

## 2018-11-28 VITALS
WEIGHT: 148.38 LBS | RESPIRATION RATE: 16 BRPM | SYSTOLIC BLOOD PRESSURE: 100 MMHG | BODY MASS INDEX: 19.58 KG/M2 | DIASTOLIC BLOOD PRESSURE: 60 MMHG | HEART RATE: 70 BPM

## 2018-11-28 DIAGNOSIS — I50.22 CHRONIC SYSTOLIC CONGESTIVE HEART FAILURE (HCC): ICD-10-CM

## 2018-11-28 DIAGNOSIS — I10 ESSENTIAL HYPERTENSION: Chronic | ICD-10-CM

## 2018-11-28 DIAGNOSIS — Z79.4 TYPE 2 DIABETES MELLITUS WITH DIABETIC POLYNEUROPATHY, WITH LONG-TERM CURRENT USE OF INSULIN (HCC): Primary | ICD-10-CM

## 2018-11-28 DIAGNOSIS — E11.42 TYPE 2 DIABETES MELLITUS WITH DIABETIC POLYNEUROPATHY, WITH LONG-TERM CURRENT USE OF INSULIN (HCC): Primary | ICD-10-CM

## 2018-11-28 PROBLEM — R20.2 PARESTHESIA OF BOTH FEET: Status: ACTIVE | Noted: 2017-08-22

## 2018-11-28 LAB — SL AMB POCT GLUCOSE BLD: 181

## 2018-11-28 PROCEDURE — 82948 REAGENT STRIP/BLOOD GLUCOSE: CPT | Performed by: FAMILY MEDICINE

## 2018-11-28 PROCEDURE — 99214 OFFICE O/P EST MOD 30 MIN: CPT | Performed by: FAMILY MEDICINE

## 2018-11-28 PROCEDURE — 3074F SYST BP LT 130 MM HG: CPT | Performed by: FAMILY MEDICINE

## 2018-11-28 PROCEDURE — 3078F DIAST BP <80 MM HG: CPT | Performed by: FAMILY MEDICINE

## 2018-11-28 RX ORDER — FUROSEMIDE 20 MG/1
20 TABLET ORAL DAILY
Qty: 30 TABLET | Refills: 0 | Status: SHIPPED | OUTPATIENT
Start: 2018-11-28 | End: 2018-12-24 | Stop reason: SDUPTHER

## 2018-11-28 NOTE — TELEPHONE ENCOUNTER
----- Message from Marvin Handley MD sent at 11/28/2018  9:30 AM EST -----  Blood work was good, pl let the pt know

## 2018-11-28 NOTE — PROGRESS NOTES
Clari Summers was asked to come in today after a phone call from his dentist   He had been gone there for a cleaning and told that he did feel well  They took his blood pressure and found it was 90/60  Pulse was 83  He is accompanied by his daughter  Clari Summers says the past few days that he has been feeling a little bit dizzy but mostly with no energy  He admits that he has not been eating and drinking enough  He worries that eating too much is going to cause his blood sugar go up    Denies chest mirian, shortness of breath  He ate breakfast and lunch today  Daughter does not think his caloric intake is enough however  He is taking Lasix 40 milligrams daily as per Cardiology  Checkup on 11/19 was good  His back pain is worsened  In fact he made an appointment with a local chiropractor who ordered an MRI which is scheduled for tomorrow  He sees Endocrinology this week  He does due to see Urology for his prostatic symptoms in January  O: /60   Pulse 70   Resp 16   Wt 67 3 kg (148 lb 6 oz)   BMI 19 58 kg/m²    BP by me sitting is 90/60; standing 84/60  Weight is down 7 pounds since 11/19  His mucous membranes are dry  He is in no respiratory distress  Neck no adenopathy thyromegaly  Cardiac regular rate rhythm without murmur  Chest he has a few scattered wheezing but no rales    Assessment  1  Hypotension-discussed case with his cardiologist Doe Ray  Symptoms likely due to dehydration both due to diuresis as well as his inadequate intake  He advised to cut his Lasix down to 20 milligrams daily  Daughter will monitor his fluid and caloric intake  2   Weight loss-likely due to the CHF as well as above factors however need to keep in mind possibility of underlying malignancy  Weight is down over 20 pounds since August   Previous history of myelodysplastic syndrome  CBCs have been normal except for low platelets  3   Sciatica-he will proceed with MRI  Also need to consider the above  4  Prostatic symptoms-will be following up with Urology  5  Diabetes--will discuss his nutritional needs with Endocrinology this week    Plan  As above  Recheck 1 week  Addendum  Discussed with daughter later in the day  Advised him to increase his gabapentin to control his back pain  Apparently MRI was canceled  Will try to reschedule

## 2018-11-28 NOTE — TELEPHONE ENCOUNTER
Spoke with pt, let pt know blood work was good  Pt wanted you to know for the last 3 days he has maintain the same weight

## 2018-11-29 ENCOUNTER — TELEPHONE (OUTPATIENT)
Dept: FAMILY MEDICINE CLINIC | Facility: MEDICAL CENTER | Age: 83
End: 2018-11-29

## 2018-11-29 NOTE — TELEPHONE ENCOUNTER
I informed his daughter, Roberta Frankel, that we would now need to order this test and get a prior authorization before test can be approved for scheduling  I have a staff message up for Dr Faye Islas so she can place order and then we can proceed  The central scheduling team does not see any current openings until next Wednesday at this time  Will await Dr Erin Arias reply and then go forward

## 2018-11-29 NOTE — TELEPHONE ENCOUNTER
----- Message from Ca Grimaldo MD sent at 11/28/2018 10:21 PM EST -----  Glenna Mccray had an MRI scheduled at [facility] by a another physician for today Thursday  This was canceled by the facility  Daughter  asks if there is any possibility get an MRI done today at [facility]  He has persistent left lower extremity and left buttock pain

## 2018-11-30 DIAGNOSIS — M54.17 LUMBOSACRAL RADICULOPATHY: ICD-10-CM

## 2018-11-30 DIAGNOSIS — M54.32 SCIATICA OF LEFT SIDE: Primary | ICD-10-CM

## 2018-11-30 NOTE — TELEPHONE ENCOUNTER
S/w Gustavo  The MRI was denied by insurance , states he has to do PT first   Asking if Dr Ginger Palacio will consider ordering  Aware I will be talking to Dr Ginger Palacio today   Sandra's  contact number is [phone]

## 2018-11-30 NOTE — TELEPHONE ENCOUNTER
Ok to order per Dr Marcell Eisenmenger, however the order is already in [facility] from the ordering physician   Will check to see if we should order or use the existing one

## 2018-11-30 NOTE — TELEPHONE ENCOUNTER
Discussed with Dr Shelly Frazier to order PT  Has f/u appt next week  Will address this and may need to re order and try another prior auth ( appeal)  Sanrda aware   Order placed

## 2018-12-03 ENCOUNTER — EVALUATION (OUTPATIENT)
Dept: PHYSICAL THERAPY | Facility: MEDICAL CENTER | Age: 83
End: 2018-12-03
Payer: COMMERCIAL

## 2018-12-03 ENCOUNTER — PATIENT OUTREACH (OUTPATIENT)
Dept: FAMILY MEDICINE CLINIC | Facility: MEDICAL CENTER | Age: 83
End: 2018-12-03

## 2018-12-03 DIAGNOSIS — M54.32 SCIATICA OF LEFT SIDE: Primary | ICD-10-CM

## 2018-12-03 DIAGNOSIS — M54.17 LUMBOSACRAL RADICULOPATHY: ICD-10-CM

## 2018-12-03 DIAGNOSIS — I50.43 ACUTE ON CHRONIC COMBINED SYSTOLIC AND DIASTOLIC CONGESTIVE HEART FAILURE (HCC): ICD-10-CM

## 2018-12-03 PROCEDURE — 97162 PT EVAL MOD COMPLEX 30 MIN: CPT | Performed by: PHYSICAL THERAPIST

## 2018-12-03 PROCEDURE — G8990 OTHER PT/OT CURRENT STATUS: HCPCS | Performed by: PHYSICAL THERAPIST

## 2018-12-03 PROCEDURE — G8991 OTHER PT/OT GOAL STATUS: HCPCS | Performed by: PHYSICAL THERAPIST

## 2018-12-03 PROCEDURE — 97110 THERAPEUTIC EXERCISES: CPT | Performed by: PHYSICAL THERAPIST

## 2018-12-03 NOTE — PROGRESS NOTES
PT Evaluation     Today's date: 12/3/2018  Patient name: Priscila Green  : 1933  MRN: [id number]  Referring provider: Hilda Wyman MD  Dx:   Encounter Diagnosis     ICD-10-CM    1  Sciatica of left side M54 32 Ambulatory referral to Physical Therapy   2  Lumbosacral radiculopathy M54 17 Ambulatory referral to Physical Therapy   3  Acute on chronic combined systolic and diastolic congestive heart failure (HCC) I50 43                   Assessment  Assessment details: Pt is an alert and oriented 81 yo male, referred to out-pt PT with dx of L sided sciatica  Pt states insidious onset of L sided LBP with pain "shooting" down lat L leg into lat shin  Pt states pain is worse with sit-stand transfers and standing when brushing his teeth or washing dishes  Pt has PMH of DM, HTN, COPD, and cardiomyopathy  Pt states he woke up with pain one morning without injury  Pt saw PCP who prescribed steroids, however due to DM states he was hospitalized for his blood sugar  Pt states sitting is less painful  Pt states he uses excedrin and ices as needed  Upon examination, pt demonstrates pain, tissue tenderness, decreased trunk strength and ROM, gait deficits  Pt will benefit from skilled PT to address the deficits listed above  Thank you for your referral   Impairments: abnormal gait, abnormal or restricted ROM, activity intolerance, impaired balance, impaired physical strength, lacks appropriate home exercise program and pain with function  Understanding of Dx/Px/POC: good   Prognosis: good    Goals  ST: Decrease pain by 25% in 4 weeks with all functional activities  2: Improved ROM by 25% in 4 weeks to assist with all functional activities  3: Improve strength by 1/2 grade in 4 weeks to assist with all functional activities  LT:  Decrease pain to 0-1/10 with all functional activities in 4-6 weeks  2: Improved ROM to WFL's in 4-6 weeks to assist with all functional activities  3:  Increase strength to WFL's in 4-6 weeks to assist with all functional activities    Plan  Patient would benefit from: skilled physical therapy  Planned modality interventions: cryotherapy, unattended electrical stimulation and thermotherapy: hydrocollator packs  Planned therapy interventions: manual therapy, joint mobilization, abdominal trunk stabilization, patient education, strengthening, stretching, therapeutic activities, therapeutic exercise, neuromuscular re-education, home exercise program and therapeutic training  Frequency: 2x week  Duration in visits: 8  Duration in weeks: 4  Plan of Care beginning date: 12/3/2018  Plan of Care expiration date: 2018  Treatment plan discussed with: patient        Subjective Evaluation    Quality of life: good    Pain  Current pain ratin  At best pain ratin  Quality: sharp, knife-like and dull ache  Aggravating factors: walking and standing    Social Support  Steps to enter house: yes  3  Stairs in house: yes   14  Lives in: multiple-level home  Lives with: adult children      Diagnostic Tests  No diagnostic tests performed  Treatments  Current treatment: physical therapy  Patient Goals  Patient goals for therapy: decreased pain, increased motion, independence with ADLs/IADLs and increased strength          Objective     Special Questions  Positive for bladder dysfunction and cardiac problem  Negative for night pain, disturbed sleep, bowel dysfunction, saddle (S4) numbness, history of cancer and history of trauma    Postural Observations    Additional Postural Observation Details  Tall thin male, decreased lordosis with b/l multifidus atrophy present    L ilium elevated in standing    Palpation     Additional Palpation Details  Pain/tenderness L SIJ     Neurological Testing     Sensation     Lumbar   Left   Intact: light touch    Right   Intact: light touch    Reflexes   Left   Patellar (L4): normal (2+)  Achilles (S1): normal (2+)    Right   Patellar (L4): normal (2+)  Achilles (S1): normal (2+)    Active Range of Motion     Additional Active Range of Motion Details  Trunk flex: 50% and painful            Ext: 0% and painful           R SB: 25% and pain in L SIJ reported           L SB: 50% and non-painful    Strength/Myotome Testing     Left Hip   Planes of Motion   Flexion: 3+  Extension: 4-  Abduction: 4-  Adduction: 4-    Right Hip   Planes of Motion   Flexion: 3+  Extension: 4-  Abduction: 4-  Adduction: 4-    Left Knee   Flexion: 4-  Extension: 4-    Left Ankle/Foot   Plantar flexion: 4-  Inversion: 4-    Tests     Additional Tests Details  (+) hamstring tightness L>R present  (+) sup-long sit test for L ant pelvic rot      Flowsheet Rows      Most Recent Value   PT/OT G-Codes   Current Score  54   Projected Score  71   FOTO information reviewed  Yes   Assessment Type  Evaluation   G code set  Other PT/OT Primary   Other PT Primary Current Status ()  CJ   Other PT Primary Goal Status ()  CJ          Precautions: DM, HTN, cardiomyopathy, COPD    Daily Treatment Diary     Manual  12/3            MET for L ant pelvic rot X                                                                    Exercise Diary  12/3            Hip add iso 5"x5            bridges 5"x5            DLS SLR [id number]            clamshells nv            TA ball isos nv            sidestepping GTB nv            Standing hip ext nv                                                                                                                                                                                         Modalities  12/3            CP prn

## 2018-12-05 ENCOUNTER — OFFICE VISIT (OUTPATIENT)
Dept: FAMILY MEDICINE CLINIC | Facility: MEDICAL CENTER | Age: 83
End: 2018-12-05
Payer: COMMERCIAL

## 2018-12-05 VITALS
DIASTOLIC BLOOD PRESSURE: 58 MMHG | SYSTOLIC BLOOD PRESSURE: 100 MMHG | RESPIRATION RATE: 16 BRPM | HEART RATE: 88 BPM | BODY MASS INDEX: 20.12 KG/M2 | WEIGHT: 152.5 LBS

## 2018-12-05 DIAGNOSIS — M54.32 SCIATICA OF LEFT SIDE: ICD-10-CM

## 2018-12-05 DIAGNOSIS — I42.0 DILATED CARDIOMYOPATHY (HCC): ICD-10-CM

## 2018-12-05 DIAGNOSIS — I10 ESSENTIAL HYPERTENSION: Primary | ICD-10-CM

## 2018-12-05 DIAGNOSIS — E11.42 TYPE 2 DIABETES MELLITUS WITH DIABETIC POLYNEUROPATHY, WITH LONG-TERM CURRENT USE OF INSULIN (HCC): ICD-10-CM

## 2018-12-05 DIAGNOSIS — I50.43 ACUTE ON CHRONIC COMBINED SYSTOLIC AND DIASTOLIC CONGESTIVE HEART FAILURE (HCC): ICD-10-CM

## 2018-12-05 DIAGNOSIS — Z79.4 TYPE 2 DIABETES MELLITUS WITH DIABETIC POLYNEUROPATHY, WITH LONG-TERM CURRENT USE OF INSULIN (HCC): ICD-10-CM

## 2018-12-05 PROCEDURE — 3078F DIAST BP <80 MM HG: CPT | Performed by: FAMILY MEDICINE

## 2018-12-05 PROCEDURE — 3074F SYST BP LT 130 MM HG: CPT | Performed by: FAMILY MEDICINE

## 2018-12-05 PROCEDURE — 99214 OFFICE O/P EST MOD 30 MIN: CPT | Performed by: FAMILY MEDICINE

## 2018-12-05 NOTE — PROGRESS NOTES
Dante Rogers is here for followup  He was seen here on 11/28 for hypotension and weakness  After discussion with his cardiologist his  Lasix was reduced to 20 mg  He has not had any further dizzy episodes since  He said he is feeling better overall  He saw endo  Dr Jacek Francis   He was given a new meter which he thinks works better  He called Dr Jacek Francis who adjusted his insulin dose  Fasting glc 107, 125  He notes that he is eating better  He has a f/u appt  He was started with PT for his back  Says its helping already  His gabapentin was increased to 200 mg tid  which he thinks is also helping  He said his back pain is reduced from  approximately an 8/10  to a 3 or 4  He reports that he is able to do more  He said that he did put up [employer] decorations for several hours yesterday  We reviewed his med list   He is taking Entresto, metoprolol, spironolactone, Lasix, and simvastatin from Cardiology  O: /58   Pulse 88   Resp 16   Wt 69 2 kg (152 lb 8 oz)   BMI 20 12 kg/m²   Blood pressure by me is 102/62  His weight is up 4 pounds  He looks well does not appear dyspneic  Neck no adenopathy thyromegaly bruits  Chest no rales  Occasional wheeze  Cardiac regular rate rhythm without murmur  Extremities no edema    Assessment  1  Cardiomyopathy/chronic systolic congestive heart failure-hypotension has improved with Lasix dose reduction with no evidence for failure at this time  Will continue to monitor his weight and symptoms  2  Weight loss-has gained several pounds in the last week  I think this is largely due to his improved appetite  Will continue to monitor to rule out underlying malignancy  3  Diabetes-per Endocrinology  4   Sciatica-improved with initiation of PT as well as titration of his gabapentin dose  Will continue to monitor  I cautioned him  about overdoing it  Plan  As above  Continue P T  Continue gabapentin  Revisit in 5 days for weight check    Otherwise recheck with me in a month  Will need cardiology follow-up

## 2018-12-07 ENCOUNTER — OFFICE VISIT (OUTPATIENT)
Dept: PHYSICAL THERAPY | Facility: MEDICAL CENTER | Age: 83
End: 2018-12-07
Payer: COMMERCIAL

## 2018-12-07 DIAGNOSIS — M54.17 LUMBOSACRAL RADICULOPATHY: ICD-10-CM

## 2018-12-07 DIAGNOSIS — M54.32 SCIATICA OF LEFT SIDE: Primary | ICD-10-CM

## 2018-12-07 DIAGNOSIS — I50.43 ACUTE ON CHRONIC COMBINED SYSTOLIC AND DIASTOLIC CONGESTIVE HEART FAILURE (HCC): ICD-10-CM

## 2018-12-07 PROCEDURE — 97110 THERAPEUTIC EXERCISES: CPT | Performed by: PHYSICAL THERAPIST

## 2018-12-07 PROCEDURE — 97140 MANUAL THERAPY 1/> REGIONS: CPT | Performed by: PHYSICAL THERAPIST

## 2018-12-07 NOTE — PROGRESS NOTES
Daily Note     Today's date: 2018  Patient name: Alexis Hansen  : 1933  MRN: [id number]  Referring provider: Kaamljit Oneill MD  Dx:   Encounter Diagnosis     ICD-10-CM    1  Sciatica of left side M54 32    2  Lumbosacral radiculopathy M54 17    3  Acute on chronic combined systolic and diastolic congestive heart failure (HCC) I50 43                   Subjective: Pt states he cont to have L sided SIJ pain with standing at sink, however states he feels it hasn't occurred as often or as severe as it was prior to IE  Pt states he performs HEP 1x/day as instructed  Objective: See treatment diary below  Precautions: DM, HTN, cardiomyopathy, COPD     Daily Treatment Diary      Manual  12/3  12/7                   MET for L ant pelvic rot X  X                                                                                                                         Exercise Diary  12/3  12/7                   Hip add iso 5"x5  5"x20                   bridges 5"x5  5"x20                   DLS SLR H7GA  [id number]                   clamshells nv  5"x20                   TA ball isos nv                     sidestepping GTB nv  GTB x4laps                   Standing hip ext nv  2x10                    TB hip abd    GTB 5"x20                                                                                                                                                                                                                                                                                                                         Modalities  12/3                     CP prn                                                     Assessment: Tolerated treatment well  Patient demonstrated fatigue post treatment and would benefit from continued PT   (+) sup-long sit test today for slight L ant pelvic rot, which corrected post MET    Progressed DLS ex as charted and pt given updated HEP for new ex initiated today       Plan: Continue per plan of care

## 2018-12-10 ENCOUNTER — OFFICE VISIT (OUTPATIENT)
Dept: FAMILY MEDICINE CLINIC | Facility: MEDICAL CENTER | Age: 83
End: 2018-12-10
Payer: COMMERCIAL

## 2018-12-10 ENCOUNTER — OFFICE VISIT (OUTPATIENT)
Dept: PHYSICAL THERAPY | Facility: MEDICAL CENTER | Age: 83
End: 2018-12-10
Payer: COMMERCIAL

## 2018-12-10 ENCOUNTER — TELEPHONE (OUTPATIENT)
Dept: PULMONOLOGY | Facility: CLINIC | Age: 83
End: 2018-12-10

## 2018-12-10 VITALS
BODY MASS INDEX: 20.05 KG/M2 | TEMPERATURE: 97.8 F | HEART RATE: 74 BPM | WEIGHT: 152 LBS | DIASTOLIC BLOOD PRESSURE: 64 MMHG | SYSTOLIC BLOOD PRESSURE: 104 MMHG | OXYGEN SATURATION: 97 %

## 2018-12-10 DIAGNOSIS — J44.1 COPD WITH ACUTE EXACERBATION (HCC): ICD-10-CM

## 2018-12-10 DIAGNOSIS — M54.17 LUMBOSACRAL RADICULOPATHY: ICD-10-CM

## 2018-12-10 DIAGNOSIS — M54.32 SCIATICA OF LEFT SIDE: Primary | ICD-10-CM

## 2018-12-10 DIAGNOSIS — L03.012 CELLULITIS OF FINGER OF LEFT HAND: Primary | ICD-10-CM

## 2018-12-10 PROCEDURE — 1160F RVW MEDS BY RX/DR IN RCRD: CPT | Performed by: FAMILY MEDICINE

## 2018-12-10 PROCEDURE — 97110 THERAPEUTIC EXERCISES: CPT | Performed by: PHYSICAL THERAPIST

## 2018-12-10 PROCEDURE — 99213 OFFICE O/P EST LOW 20 MIN: CPT | Performed by: FAMILY MEDICINE

## 2018-12-10 RX ORDER — CEPHALEXIN 500 MG/1
500 CAPSULE ORAL EVERY 6 HOURS SCHEDULED
Qty: 40 CAPSULE | Refills: 0 | Status: SHIPPED | OUTPATIENT
Start: 2018-12-10 | End: 2018-12-20

## 2018-12-10 NOTE — PROGRESS NOTES
Shelby Miller complains of pain in his left 5th finger for the past several weeks  The last several days it has been throbbing  He thinks that he injured it while working outside  No fever or chills  O: /64 (BP Location: Left arm, Patient Position: Sitting, Cuff Size: Adult)   Pulse 74   Temp 97 8 °F (36 6 °C)   Wt 68 9 kg (152 lb)   SpO2 97%   BMI 20 05 kg/m²   Left 5th finger is slightly swollen and tender over the distal phalanx  Only minimal erythema  No streaks  There is a scab  Tender to touch  His weight is unchanged today    Assessment  Soft tissue infection left 5th finger    Plan  Rx for Keflex  Advised frequent soaks    Call with progress in 2 days or sooner if necessary

## 2018-12-10 NOTE — PROGRESS NOTES
Daily Note     Today's date: 12/10/2018  Patient name: Zina Vlilagomez  : 1933  MRN: [id number]  Referring provider: Luke Mazariegos MD  Dx:   Encounter Diagnosis     ICD-10-CM    1  Sciatica of left side M54 32    2  Lumbosacral radiculopathy M54 17                   Subjective: Pt states min L sided LBP at present time, stating he is much better since IE  Objective: See treatment diary below  Precautions: DM, HTN, cardiomyopathy, COPD     Daily Treatment Diary      Manual  12/3  12/7                   MET for L ant pelvic rot X  X                                                                                                                         Exercise Diary  12/3  12/7 12/10                 Hip add iso 5"x5  5"x20  5"x20                 bridges 5"x5  5"x20  5"x20                 DLS SLR K1ED  [id number]  2x10ea                 clamshells nv  5"x20  GTB 5"x20ea                 TA ball isos nv    5"x20                 sidestepping GTB nv  GTB x4laps  GTB x4laps                 Standing hip ext nv  2x10  2x10ea                  TB hip abd    GTB 5"x20  GTB 5"20                                                                                                                                                                                                                                                                                                                       Modalities  12/3                     CP prn                                                     Assessment: Tolerated treatment well  Patient demonstrated fatigue post treatment and would benefit from continued PT   (-) sup-long sit test today  Progressed DLS ex as charted  Plan: Continue per plan of care

## 2018-12-13 DIAGNOSIS — I50.43 ACUTE ON CHRONIC COMBINED SYSTOLIC AND DIASTOLIC CONGESTIVE HEART FAILURE (HCC): ICD-10-CM

## 2018-12-13 RX ORDER — SPIRONOLACTONE 25 MG/1
25 TABLET ORAL DAILY
Qty: 30 TABLET | Refills: 11 | Status: SHIPPED | OUTPATIENT
Start: 2018-12-13 | End: 2019-01-07 | Stop reason: SDUPTHER

## 2018-12-14 ENCOUNTER — OFFICE VISIT (OUTPATIENT)
Dept: PHYSICAL THERAPY | Facility: MEDICAL CENTER | Age: 83
End: 2018-12-14
Payer: COMMERCIAL

## 2018-12-14 ENCOUNTER — TELEPHONE (OUTPATIENT)
Dept: FAMILY MEDICINE CLINIC | Facility: MEDICAL CENTER | Age: 83
End: 2018-12-14

## 2018-12-14 DIAGNOSIS — M54.17 LUMBOSACRAL RADICULOPATHY: ICD-10-CM

## 2018-12-14 DIAGNOSIS — M54.32 SCIATICA OF LEFT SIDE: Primary | ICD-10-CM

## 2018-12-14 PROCEDURE — 97140 MANUAL THERAPY 1/> REGIONS: CPT | Performed by: PHYSICAL THERAPIST

## 2018-12-14 PROCEDURE — 97110 THERAPEUTIC EXERCISES: CPT | Performed by: PHYSICAL THERAPIST

## 2018-12-14 NOTE — PROGRESS NOTES
Daily Note     Today's date: 2018  Patient name: Priscila Green  : 1933  MRN: [id number]  Referring provider: Hilda Wyman MD  Dx:   Encounter Diagnosis     ICD-10-CM    1  Sciatica of left side M54 32    2  Lumbosacral radiculopathy M54 17                   Subjective: Pt states he has had increased LBP over the past 2 days, and was very difficult sitting watching a basketball game in [address] seats  Pt states most pain/stiffness with sit-stand transfers  Objective: See treatment diary below  Precautions: DM, HTN, cardiomyopathy, COPD     Daily Treatment Diary      Manual  12/3  12/7  12/14                 MET for L ant pelvic rot X  X                    MET for L post pelvic rot      X                                                                                               Exercise Diary  12/3  12/7 12/10  12/14               Hip add iso 5"x5  5"x20  5"x20  5"x20               bridges 5"x5  5"x20  5"x20  5"x20               DLS SLR X3WI  [id number]  2x10ea  2x10ea               clamshells nv  5"x20  GTB 5"x20ea  GTB  5"x20               TA ball isos nv    5"x20  5"x20               sidestepping GTB nv  GTB x4laps  GTB x4laps  GTB 5"x20               Standing hip ext nv  2x10  2x10ea  2x10ea                TB hip abd    GTB 5"x20  GTB 5"20  GTB 5"x20                functional squats        x20                                                                                                                                                                                                                                                                                             Modalities  12/3                     CP prn                                                     Assessment: Tolerated treatment well  Patient demonstrated fatigue post treatment and would benefit from continued PT   (+) sup-long sit test today for L post pelvic rot today  Improved pelvic alignment post MET        Plan: Continue per plan of care

## 2018-12-17 ENCOUNTER — APPOINTMENT (OUTPATIENT)
Dept: RADIOLOGY | Facility: MEDICAL CENTER | Age: 83
End: 2018-12-17
Payer: COMMERCIAL

## 2018-12-17 ENCOUNTER — OFFICE VISIT (OUTPATIENT)
Dept: FAMILY MEDICINE CLINIC | Facility: MEDICAL CENTER | Age: 83
End: 2018-12-17
Payer: COMMERCIAL

## 2018-12-17 ENCOUNTER — OFFICE VISIT (OUTPATIENT)
Dept: OBGYN CLINIC | Facility: MEDICAL CENTER | Age: 83
End: 2018-12-17
Payer: COMMERCIAL

## 2018-12-17 ENCOUNTER — OFFICE VISIT (OUTPATIENT)
Dept: PHYSICAL THERAPY | Facility: MEDICAL CENTER | Age: 83
End: 2018-12-17
Payer: COMMERCIAL

## 2018-12-17 VITALS
WEIGHT: 154.4 LBS | HEIGHT: 73 IN | HEART RATE: 73 BPM | BODY MASS INDEX: 20.46 KG/M2 | DIASTOLIC BLOOD PRESSURE: 62 MMHG | SYSTOLIC BLOOD PRESSURE: 107 MMHG

## 2018-12-17 VITALS
DIASTOLIC BLOOD PRESSURE: 68 MMHG | BODY MASS INDEX: 20.45 KG/M2 | OXYGEN SATURATION: 98 % | SYSTOLIC BLOOD PRESSURE: 110 MMHG | HEART RATE: 85 BPM | WEIGHT: 155 LBS

## 2018-12-17 DIAGNOSIS — G60.9 NEUROPATHY, IDIOPATHIC: ICD-10-CM

## 2018-12-17 DIAGNOSIS — M79.645 FINGER PAIN, LEFT: ICD-10-CM

## 2018-12-17 DIAGNOSIS — M54.32 SCIATICA OF LEFT SIDE: Primary | ICD-10-CM

## 2018-12-17 DIAGNOSIS — I50.43 ACUTE ON CHRONIC COMBINED SYSTOLIC AND DIASTOLIC CONGESTIVE HEART FAILURE (HCC): ICD-10-CM

## 2018-12-17 DIAGNOSIS — M79.645 FINGER PAIN, LEFT: Primary | ICD-10-CM

## 2018-12-17 DIAGNOSIS — M54.17 LUMBOSACRAL RADICULOPATHY: ICD-10-CM

## 2018-12-17 PROCEDURE — 1036F TOBACCO NON-USER: CPT | Performed by: FAMILY MEDICINE

## 2018-12-17 PROCEDURE — 4040F PNEUMOC VAC/ADMIN/RCVD: CPT | Performed by: ORTHOPAEDIC SURGERY

## 2018-12-17 PROCEDURE — 73130 X-RAY EXAM OF HAND: CPT

## 2018-12-17 PROCEDURE — 99214 OFFICE O/P EST MOD 30 MIN: CPT | Performed by: FAMILY MEDICINE

## 2018-12-17 PROCEDURE — 97140 MANUAL THERAPY 1/> REGIONS: CPT | Performed by: PHYSICAL THERAPIST

## 2018-12-17 PROCEDURE — 99203 OFFICE O/P NEW LOW 30 MIN: CPT | Performed by: ORTHOPAEDIC SURGERY

## 2018-12-17 PROCEDURE — 97110 THERAPEUTIC EXERCISES: CPT | Performed by: PHYSICAL THERAPIST

## 2018-12-17 RX ORDER — FLUTICASONE PROPIONATE 50 MCG
2 SPRAY, SUSPENSION (ML) NASAL DAILY
Qty: 16 G | Refills: 5 | Status: SHIPPED | OUTPATIENT
Start: 2018-12-17 | End: 2019-07-15 | Stop reason: SDUPTHER

## 2018-12-17 NOTE — PROGRESS NOTES
Dayron Rodriguez says that his left finger tip still hurts  It actually had gotten better but now hurts again  He has been soaking it and he finished  his antibiotic  He continues to go to physical therapy and his sciatica is improving  He still gets pain when he gets up in the morning however this gets better after awhile  He saw Physical therapy today and they gave him some recommendations on exercises to do before he gets out of bed  He saw Endocrinology  No changes  He said that his breathing is very good  He feels much better overall  O: /68 (BP Location: Left arm, Patient Position: Sitting, Cuff Size: Adult)   Pulse 85   Wt 70 3 kg (155 lb)   SpO2 98%   BMI 20 45 kg/m²   Weight is up 3 lb  Chest some upper airway noise however no rales  Cardiac regular rate rhythm  Extremities-left hand 5th finger  shows tenderness and paleness  and swelling at the tip  There is some associated scab  Assessment  1  Left 5th finger pain and swelling-question foreign body  Will check x-ray and refer to Hand Orthopedics  2  CHF-remains stable  Weight gain likely related to his improved appetite  No evidence for failure on exam   3  Sciatica-continue PT      Plan  Check x-ray of the left hand  Referral to Orthopedics

## 2018-12-17 NOTE — PROGRESS NOTES
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  A few weeks ago, he pulled some "skin" off his fingertip and the pain started  He was initially seen by his PCP on 12/10/2018 and was placed on keflex and diagnosed with cellulitis  He has been soaking it and completed antibiotics  Initially the finger tip felt better after the antibiotics, but began to hurt again  He denies fever or chills  Denies any discharge          PAST MEDICAL HISTORY:  Past Medical History:   Diagnosis Date    COPD (chronic obstructive pulmonary disease) ([facility] )     Diabetes mellitus ([facility] )     Type 2    Essential hypertension     Heart failure (HCC)     Left inguinal hernia     Lung nodule     Malignant melanoma of skin ([facility] )        PAST SURGICAL HISTORY:  Past Surgical History:   Procedure Laterality Date    APPENDECTOMY      CATARACT EXTRACTION, BILATERAL      CHOLECYSTECTOMY      CHOLECYSTECTOMY      COLONOSCOPY  2014    Fiberoptic    HERNIA REPAIR      JOINT REPLACEMENT Left     meniscus repair    KNEE ARTHROSCOPY      Therapeutic       FAMILY HISTORY:  Family History   Problem Relation Age of Onset    Diabetes Mother     Heart attack Neg Hx     Stroke Neg Hx     Aneurysm Neg Hx     Clotting disorder Neg Hx     Arrhythmia Neg Hx     Hypertension Neg Hx     Hyperlipidemia Neg Hx         pt unsure        SOCIAL HISTORY:  Social History   Substance Use Topics    Smoking status: Former Smoker     Packs/day: 1 00     Years: 10 00     Quit date: 1960    Smokeless tobacco: Never Used    Alcohol use 0 0 oz/week      Comment: SOcially        MEDICATIONS:    Current Outpatient Prescriptions:     acetaminophen (TYLENOL) 325 mg tablet, Take 2 tablets (650 mg total) by mouth every 6 (six) hours as needed for mild pain, Disp: 30 tablet, Rfl: 0    albuterol (2 5 mg/3 mL) 0 083 % nebulizer solution, Take 1 vial (2 5 mg total) by nebulization every 6 (six) hours as needed for wheezing or shortness of breath, Disp: 1080 mL, Rfl: 3    Ascorbic Acid (VITAMIN C) 1000 MG tablet, Take 1 tablet by mouth daily, Disp: , Rfl:     BD PEN NEEDLE DON U/F 32G X 4 MM MISC, , Disp: , Rfl:     cephalexin (KEFLEX) 500 mg capsule, Take 1 capsule (500 mg total) by mouth every 6 (six) hours for 10 days, Disp: 40 capsule, Rfl: 0    Cinnamon 500 MG TABS, Take 1 tablet by mouth daily  , Disp: , Rfl:     fluticasone (FLONASE) 50 mcg/act nasal spray, 2 sprays into each nostril daily, Disp: 16 g, Rfl: 5    fluticasone (FLOVENT HFA) 220 mcg/act inhaler, Inhale 1 puff 2 (two) times a day, Disp: 1 Inhaler, Rfl: 5    furosemide (LASIX) 20 mg tablet, Take 1 tablet (20 mg total) by mouth daily, Disp: 30 tablet, Rfl: 0    gabapentin (NEURONTIN) 100 mg capsule, Take 2 capsules (200 mg total) by mouth daily, Disp: 180 capsule, Rfl: 0    insulin detemir (LEVEMIR) 100 units/mL subcutaneous injection, Inject 15 Units under the skin daily at bedtime  , Disp: , Rfl:     metoprolol succinate (TOPROL-XL) 50 mg 24 hr tablet, Take 2 tablets (100 mg total) by mouth daily, Disp: 180 tablet, Rfl: 3    montelukast (SINGULAIR) 10 mg tablet, Take 1 tablet (10 mg total) by mouth daily at bedtime, Disp: 30 tablet, Rfl: 3    Multiple Vitamins-Minerals (OCUVITE ADULT 50+ PO), Take 1 tablet by mouth daily, Disp: , Rfl:     NOVOLOG FLEXPEN 100 units/mL injection pen, Inject 6 Units under the skin daily with breakfast  , Disp: , Rfl:     ONE TOUCH ULTRA TEST test strip, , Disp: , Rfl:     ONETOUCH DELICA LANCETS FINE MISC, , Disp: , Rfl:     repaglinide (PRANDIN) 1 mg tablet, Take 2 mg by mouth daily two tablets in the afternoon, two tablets in the evening , Disp: , Rfl:     repaglinide (PRANDIN) 2 mg tablet, Take 2 mg by mouth daily before dinner, Disp: , Rfl:     roflumilast (DALIRESP) 500 mcg tablet, Take 1 tablet (500 mcg total) by mouth daily, Disp: 30 tablet, Rfl: 5    sacubitril-valsartan (ENTRESTO) 24-26 MG TABS, Take 1 tablet by mouth 2 (two) times a day, Disp: 30 tablet, Rfl: 11    simvastatin (ZOCOR) 10 mg tablet, Take 1 tablet (10 mg total) by mouth daily, Disp: 90 tablet, Rfl: 1    spironolactone (ALDACTONE) 25 mg tablet, Take 1 tablet (25 mg total) by mouth daily, Disp: 30 tablet, Rfl: 11    umeclidinium-vilanterol (ANORO ELLIPTA) 62 5-25 MCG/INH inhaler, Inhale 1 puff daily, Disp: 1 Inhaler, Rfl: 4    VENTOLIN  (90 Base) MCG/ACT inhaler, , Disp: , Rfl:     ALLERGIES:  No Known Allergies    REVIEW OF SYSTEMS:  Review of Systems   Constitutional: Negative for chills, fever and unexpected weight change  HENT: Negative for hearing loss, nosebleeds and sore throat  Eyes: Negative for pain, redness and visual disturbance  Respiratory: Negative for cough, shortness of breath and wheezing  Cardiovascular: Negative for chest pain, palpitations and leg swelling  Gastrointestinal: Negative for abdominal pain, nausea and vomiting  Endocrine: Negative for polydipsia and polyuria  Genitourinary: Negative for dysuria and hematuria  Musculoskeletal: Negative for arthralgias, joint swelling and myalgias  Skin: Negative for rash and wound  Neurological: Negative for dizziness, numbness and headaches  Psychiatric/Behavioral: Negative for decreased concentration and suicidal ideas  The patient is not nervous/anxious          VITALS:  Vitals:    12/17/18 1323   BP: 107/62   Pulse: 73       LABS:  HgA1c:   Lab Results   Component Value Date    HGBA1C 7 6 (H) 07/23/2013     BMP:   Lab Results Component Value Date    CALCIUM 9 6 11/15/2018     05/08/2017    K 3 6 11/15/2018    CO2 34 (H) 11/15/2018    CL 98 (L) 11/15/2018    BUN 38 (H) 11/15/2018    CREATININE 1 19 11/15/2018       _____________________________________________________  PHYSICAL EXAMINATION:  General: well developed and well nourished, alert, oriented times 3 and appears comfortable  Psychiatric: Normal  HEENT: Trachea Midline, No torticollis  Pulmonary: No audible wheezing or respiratory distress   Skin: No Masses, No Erythema, No Fluctuation, No Ulcerations  Neurovascular: Sensation Intact to the Median, Ulnar, Radial Nerve, Motor Intact to the Median, Ulnar, Radial Nerve and Pulses Intact    MUSCULOSKELETAL EXAMINATION:  Left small finger  Nail is missing  No remnant of the nail present  No signs of infection  No drainage  Fingertip is firm on palpation, which is consistent with some irritation  Full range of motion  Minimally tender to palpation at the finger tip  A small scab is present on the radial side of the finger tip       ___________________________________________________  STUDIES REVIEWED:  Images obtained today of the left hand  3 views demonstrate no foreign body  Arthritic changes to the small finger DIP joint  No fractures  PROCEDURES PERFORMED:  Procedures  No Procedures performed today    _____________________________________________________  ASSESSMENT/PLAN:    Left small finger callous with central lesion - possible hang nail deformity  * May use a fine file to remove the hard skin callous  * Patient may have pulled off a remnant of the nail  * No foreign body noted on xrays or palpated  * May continue soaking finger  We will re-check finger in a few weeks to see if there is any improvement  No signs of infection at this time  Follow Up:  Return for 2-3 weeks        To Do Next Visit:  Re-evaluation of current issue    Scribe Attestation    I,:   Godwin Vargas PA-C am acting as a scribe while in the presence of the attending physician :        I,:   Cami Torres MD personally performed the services described in this documentation    as scribed in my presence :

## 2018-12-19 ENCOUNTER — OFFICE VISIT (OUTPATIENT)
Dept: PHYSICAL THERAPY | Facility: MEDICAL CENTER | Age: 83
End: 2018-12-19
Payer: COMMERCIAL

## 2018-12-19 DIAGNOSIS — M54.32 SCIATICA OF LEFT SIDE: Primary | ICD-10-CM

## 2018-12-19 DIAGNOSIS — M54.17 LUMBOSACRAL RADICULOPATHY: ICD-10-CM

## 2018-12-19 PROCEDURE — G8978 MOBILITY CURRENT STATUS: HCPCS | Performed by: PHYSICAL THERAPIST

## 2018-12-19 PROCEDURE — G8979 MOBILITY GOAL STATUS: HCPCS | Performed by: PHYSICAL THERAPIST

## 2018-12-19 PROCEDURE — 97110 THERAPEUTIC EXERCISES: CPT | Performed by: PHYSICAL THERAPIST

## 2018-12-19 NOTE — PROGRESS NOTES
Daily Note     Today's date: 2018  Patient name: Priscila Green  : 1933  MRN: [id number]  Referring provider: Hilda Wyman MD  Dx:   Encounter Diagnosis     ICD-10-CM    1  Sciatica of left side M54 32    2  Lumbosacral radiculopathy M54 17                   Subjective: Pt states he has been feeling sig improvements in L LBP and decreased pain with initial WB in the morning since addition of piriformis stretching last visit  Objective: See treatment diary below  Precautions: DM, HTN, cardiomyopathy, COPD     Daily Treatment Diary      Manual  12/3  12/7  12/14  12/17               MET for L ant pelvic rot X  X                    MET for L post pelvic rot      X  X                MFR L piriformis and gluteal region        x10'                L LE distraction        x3'                                             Exercise Diary  12/3  12/7 12/10  12/14  12/17  12/19           Hip add iso 5"x5  5"x20  5"x20  5"x20  5"x20  5"x20           bridges 5"x5  5"x20  5"x20  5"x20  5"x20  5"x20           DLS SLR A2JE  [id number]  2x10ea  2x10ea    2x10ea           clamshells nv  5"x20  GTB 5"x20ea  GTB  5"x20    GTB 5"x20ea           TA ball isos nv    5"x20  5"x20    5"x20           sidestepping GTB nv  GTB x4laps  GTB x4laps  GTB 5"x20    GTB x4laps           Standing hip ext nv  2x10  2x10ea  2x10ea    2x10ea            TB hip abd    GTB 5"x20  GTB 5"20  GTB 5"x20    GTB 5"x20            functional squats        x20                piriformis stretch          20"x4  20"x4            hip ER stretch          20"x4 20"x4                                                                                                                                                                                                                                         Modalities  12/3  12/17                   CP prn  x10'                                                   Assessment: Tolerated treatment well   Patient demonstrated fatigue post treatment and would benefit from continued PT   (-) sup-long sit test assessed today  Progressing DLS ex as charted  Min pain reported throughout treatment today,        Plan: Continue per plan of care

## 2018-12-21 ENCOUNTER — TELEPHONE (OUTPATIENT)
Dept: FAMILY MEDICINE CLINIC | Facility: MEDICAL CENTER | Age: 83
End: 2018-12-21

## 2018-12-21 DIAGNOSIS — G60.9 NEUROPATHY, IDIOPATHIC: ICD-10-CM

## 2018-12-21 NOTE — TELEPHONE ENCOUNTER
Patient called the office requesting a refill for Gabapentin to be sent to [facility]  Patient has three days worth of medicine left  Patient was just seen 12/17/18 and was advised from Dr Estefani Khanna to take two pills three times a day, did not see that in office notes  Patient is aware provider is out of office  Routed to Clinical to advise

## 2018-12-24 DIAGNOSIS — I50.22 CHRONIC SYSTOLIC CONGESTIVE HEART FAILURE (HCC): ICD-10-CM

## 2018-12-24 RX ORDER — FUROSEMIDE 20 MG/1
20 TABLET ORAL DAILY
Qty: 90 TABLET | Refills: 2 | Status: SHIPPED | OUTPATIENT
Start: 2018-12-24 | End: 2019-02-25 | Stop reason: SDUPTHER

## 2018-12-24 RX ORDER — GABAPENTIN 100 MG/1
200 CAPSULE ORAL 3 TIMES DAILY
Qty: 540 CAPSULE | Refills: 1 | OUTPATIENT
Start: 2018-12-24 | End: 2019-04-04 | Stop reason: SDUPTHER

## 2018-12-26 ENCOUNTER — OFFICE VISIT (OUTPATIENT)
Dept: PHYSICAL THERAPY | Facility: MEDICAL CENTER | Age: 83
End: 2018-12-26
Payer: COMMERCIAL

## 2018-12-26 DIAGNOSIS — M54.17 LUMBOSACRAL RADICULOPATHY: ICD-10-CM

## 2018-12-26 DIAGNOSIS — M54.32 SCIATICA OF LEFT SIDE: Primary | ICD-10-CM

## 2018-12-26 PROCEDURE — 97140 MANUAL THERAPY 1/> REGIONS: CPT | Performed by: PHYSICAL THERAPIST

## 2018-12-26 PROCEDURE — 97110 THERAPEUTIC EXERCISES: CPT | Performed by: PHYSICAL THERAPIST

## 2018-12-26 NOTE — PROGRESS NOTES
Daily Note     Today's date: 2018  Patient name: Dilip Bauman  : 1933  MRN: [id number]  Referring provider: Kaelyn Castillo MD  Dx:   Encounter Diagnosis     ICD-10-CM    1  Sciatica of left side M54 32    2  Lumbosacral radiculopathy M54 17                   Subjective:   Observed pt wince in pain upon sit-stand and take his first step while in the waiting room into treatment room    Pt states overall pain sx's have been improving    Objective: See treatment diary below  Precautions: DM, HTN, cardiomyopathy, COPD     Daily Treatment Diary      Manual  12/3  12/7  12/14  12/17  12/26             MET for L ant pelvic rot X  X                    MET for L post pelvic rot      X  X                MFR L piriformis and gluteal region        x10'  x10'              L LE distraction        x3'                                             Exercise Diary  12/3  12/7 12/10  12/14  12/17  12/19  12/26         Hip add iso 5"x5  5"x20  5"x20  5"x20  5"x20  5"x20  5"x20         bridges 5"x5  5"x20  5"x20  5"x20  5"x20  5"x20  5"x20         DLS SLR Y9MI  [id number]  2x10ea  2x10ea    2x10ea  2x10ea         clamshells nv  5"x20  GTB 5"x20ea  GTB  5"x20    GTB 5"x20ea  GTB 5"x20         TA ball isos nv    5"x20  5"x20    5"x20  5"x20         sidestepping GTB nv  GTB x4laps  GTB x4laps  GTB 5"x20    GTB x4laps  GTB x4laps         Standing hip ext nv  2x10  2x10ea  2x10ea    2x10ea  2x10ea          TB hip abd    GTB 5"x20  GTB 5"20  GTB 5"x20    GTB 5"x20  GTB 5"x20          functional squats        x20                piriformis stretch          20"x4  20"x4  20"x4          hip ER stretch          20"x4 20"x4  20"x4          s/l hip abd              2x10                                                                                                                                                                                                               Modalities  12/3  12/17                   CP prn  x10'                                                   Assessment: Tolerated treatment well  Patient demonstrated fatigue post treatment and would benefit from continued PT   (-) sup-long sit test assessed again today  Mod soreness in L piriformis and gluteal musculature with MFR, however pt states reduction in hip tightness post     Plan: Continue per plan of care

## 2018-12-28 ENCOUNTER — APPOINTMENT (OUTPATIENT)
Dept: PHYSICAL THERAPY | Facility: MEDICAL CENTER | Age: 83
End: 2018-12-28
Payer: COMMERCIAL

## 2018-12-31 ENCOUNTER — APPOINTMENT (OUTPATIENT)
Dept: PHYSICAL THERAPY | Facility: MEDICAL CENTER | Age: 83
End: 2018-12-31
Payer: COMMERCIAL

## 2019-01-02 ENCOUNTER — TELEPHONE (OUTPATIENT)
Dept: FAMILY MEDICINE CLINIC | Facility: MEDICAL CENTER | Age: 84
End: 2019-01-02

## 2019-01-02 NOTE — TELEPHONE ENCOUNTER
dtr called, wanted to let you know that they saw ent and they are ordering an mri of the brain, and also a lyme  dtr was wondering if you could try to get an mri of the back again, he finished pt and his back is still bothering him, and was hoping he could get done at same time as mri of brain (not scheduled yet)  dtr just wants to to make sure its discussed at his appt with you on Monday 1/7/19

## 2019-01-03 PROBLEM — I50.42 CHRONIC COMBINED SYSTOLIC AND DIASTOLIC CONGESTIVE HEART FAILURE (HCC): Status: ACTIVE | Noted: 2018-05-21

## 2019-01-03 PROBLEM — I50.43 ACUTE ON CHRONIC COMBINED SYSTOLIC AND DIASTOLIC CONGESTIVE HEART FAILURE (HCC): Status: RESOLVED | Noted: 2018-11-12 | Resolved: 2019-01-03

## 2019-01-03 NOTE — TELEPHONE ENCOUNTER
Let her know we can try again and ordered MRI of the LS spine  He has sciatic and persistent lower extremity pain for several months  Also let Andrea Ferrari know I ordered a Lyme test in November which was negative  Need ENT consult

## 2019-01-03 NOTE — PROGRESS NOTES
Heart Failure Follow Up Office Visit Note -     Lexii Mirza   80 y o    male   MRN: [id number]  [address] [phone] [id number]    PCP: Ken Parrish MD  Cardiologist: Dr Raymond Dorantes  Patient resides in [facility]  Diagnoses and all orders for this visit:    Chronic combined systolic and diastolic congestive heart failure (HCC)    Dilated cardiomyopathy ([facility] )    Type 2 diabetes mellitus with diabetic polyneuropathy, with long-term current use of insulin ([facility] )    Other hyperlipidemia    Essential hypertension          Discussion/plan  Chronic combined heart failure  Baseline weight:  151-154Lb  Weighs himself regularly  Baseline creatinine:  1 1--1  3  Repeat BMP,mag monthly x3, firstly within the next week    Educated regarding adherence to a 2 sodium diet and a 1500 ml fluid restriction  --Beta-Blocker: On Toprol 50 mg daily  --ACEi, ARB or ARNi:  On Entresto 24/26 BID  --Aldosterone Receptor Blocker: On spironolactone 25 mg daily  --Diuretic:  On Lasix 20 mg daily  --ICD:  The patient declined a life vest   We discussed this again today and he again declined  --Labs:  BMP and magnesium monthly x3    Dilated cardiomyopathy, ejection fraction nonischemic,EF 20% August 2018, worsening-down from 35 to 40% May 2018  Angiography ( 5/18) disclosed no obstructive CAD  Possible viral induced, as on his initial presentation he presented with several weeks a lower respiratory infection  A repeat echocardiogram has been ordered, scheduled for 2/21/19    Chronic left bundle branch block since 2013    Hypertension  Today's /60; at PCPs this am   On loop diuretic, spironolactone, Toprol  mg/d and Entresto 24/26 BID  Asymptomatic    Hyperlipidemia, on low-dose statin-simvastatin 10    Diabetes mellitus type 2   Followed by an endocrinologist at [facility] will follow up with Dr Cristian Galindo, after his echocardiogram at the end of February  He should call me in the interim he has any problems  I gave him my card        HPI:   63-year-old without any prior cardiac history-including CAD, MI, bypass, stents or valvular heart disease or family history of cardiomyopathy or coronary artery disease but with a left bundle-branch block since 2013  He was admitted-May 2018-acute congestive heart failure, with diagnosis of a new cardiomyopathy on echocardiogram with an ejection fraction of 37%; he underwent coronary angiography  (5/15) without any obstructive CAD  He was hospitalized November 12 through November 15th, 2018 at [facility] with decompensated systolic heart failure  His Ef was found to be worsening now 20%, with severe, diffuse hypokinesis and RV dysfunction, mild MR, moderate TR  He was diuresed  His heart failure medications were adjusted; was placed on Entresto, lasix and aldactone; lisinopril was discontinued  Discharge weight was 151 lb; creatinine was 1 1  He saw Dr Cristian Galindo in hospital follow-up  He was doing well  He declined a Life Vest, after discussion  An echocardiogram was requested in 3 months which is due February 2019  He returns today for cardiology follow-up  He has accompanied by his son, with whom resides    Interval HX  He saw [facility] last week- ringing in the ears, [facility]  He had a scope to evaluate hoarseness  An MRI of the head is anticipated to evaluate hearing loss  He has been having some back problems- going to PT  Anticipated MRI to evaluate that    He denies chest pain, shortness of breath, palpitations, or syncope  He is feeling quite good and wonders if he can golf if, by chance, the sun returns and warms up    The eat at home mostly however occasionally eats out    He tries to watch his sodium intake, keeping it on the low side         Cardiac catheterization May 2018   CORONARY VESSELS:     --  Left main: Normal   --  LAD: The vessel was normal sized  There was mild plaque  There were no significant lesions  The diagonal branches were also free of significant disease  --  Circumflex: The vessel was normal sized and gave rise to one OM branch  There was minor plaque  There were no significant lesions  --  Ramus intermedius: The vessel was normal sized  There was minor non-obstructive plaque  --  RCA: Dominant, giving rise to the PDA and two posterolateral branches  The were no significant lesions      NOTE: Right radial access was employed      Prepared and signed by  Nora Marie MD  Signed 05/15/2018 11:46:32      Past Medical History:   Diagnosis Date    [facility]     Diabetes mellitus ([facility] )     Type 2    Essential hypertension     Heart failure ([facility] )     Left inguinal hernia     Lung nodule     Malignant melanoma of skin ([facility] )        Review of Systems   Constitution: Negative for chills and weakness  HENT: Positive for hearing loss and hoarse voice  Cardiovascular: Negative for chest pain, claudication, cyanosis, dyspnea on exertion, irregular heartbeat, leg swelling, near-syncope, orthopnea, palpitations, paroxysmal nocturnal dyspnea and syncope  Some dizziness/lightheadedness with position changes  Improves with slower changing of positions   Respiratory: Negative for cough and shortness of breath  Gastrointestinal: Negative for heartburn and nausea  Neurological: Negative for dizziness, focal weakness, headaches and light-headedness  All other systems reviewed and are negative  No Known Allergies        Current Outpatient Prescriptions:     acetaminophen (TYLENOL) 325 mg tablet, Take 2 tablets (650 mg total) by mouth every 6 (six) hours as needed for mild pain, Disp: 30 tablet, Rfl: 0    albuterol (2 5 mg/3 mL) 0 083 % nebulizer solution, Take 1 vial (2 5 mg total) by nebulization every 6 (six) hours as needed for wheezing or shortness of breath, Disp: 1080 mL, Rfl: 3    ANORO ELLIPTA 62 5-25 MCG/INH inhaler, INHALE ONE PUFF BY MOUTH ONCE DAILY , Disp: 60 each, Rfl: 3    Ascorbic Acid (VITAMIN C) 1000 MG tablet, Take 1 tablet by mouth daily, Disp: , Rfl:     BD PEN NEEDLE DON U/F 32G X 4 MM MISC, , Disp: , Rfl:     Cinnamon 500 MG TABS, Take 1 tablet by mouth daily  , Disp: , Rfl:     fluticasone (FLONASE) 50 mcg/act nasal spray, 2 sprays into each nostril daily, Disp: 16 g, Rfl: 5    fluticasone (FLOVENT HFA) 220 mcg/act inhaler, Inhale 1 puff 2 (two) times a day, Disp: 1 Inhaler, Rfl: 5    furosemide (LASIX) 20 mg tablet, Take 1 tablet (20 mg total) by mouth daily, Disp: 90 tablet, Rfl: 2    gabapentin (NEURONTIN) 100 mg capsule, Take 2 capsules (200 mg total) by mouth 3 (three) times a day, Disp: 540 capsule, Rfl: 1    insulin detemir (LEVEMIR) 100 units/mL subcutaneous injection, Inject 15 Units under the skin daily at bedtime  , Disp: , Rfl:     metoprolol succinate (TOPROL-XL) 50 mg 24 hr tablet, Take 2 tablets (100 mg total) by mouth daily, Disp: 180 tablet, Rfl: 3    montelukast (SINGULAIR) 10 mg tablet, Take 1 tablet (10 mg total) by mouth daily at bedtime, Disp: 30 tablet, Rfl: 3    Multiple Vitamins-Minerals (OCUVITE ADULT 50+ PO), Take 1 tablet by mouth daily, Disp: , Rfl:     NOVOLOG FLEXPEN 100 units/mL injection pen, Inject 6 Units under the skin daily with breakfast  , Disp: , Rfl:     ONE TOUCH ULTRA TEST test strip, , Disp: , Rfl:     ONETOUCH DELICA LANCETS FINE MISC, , Disp: , Rfl:     repaglinide (PRANDIN) 2 mg tablet, Take 2 mg by mouth daily before dinner, Disp: , Rfl:     roflumilast (DALIRESP) 500 mcg tablet, Take 1 tablet (500 mcg total) by mouth daily, Disp: 30 tablet, Rfl: 5    sacubitril-valsartan (ENTRESTO) 24-26 MG TABS, Take 1 tablet by mouth 2 (two) times a day, Disp: 30 tablet, Rfl: 11    simvastatin (ZOCOR) 10 mg tablet, Take 1 tablet (10 mg total) by mouth daily, Disp: 90 tablet, Rfl: 1    spironolactone (ALDACTONE) 25 mg tablet, Take 1 tablet (25 mg total) by mouth daily, Disp: 30 tablet, Rfl: 11    VENTOLIN  (90 Base) MCG/ACT inhaler, Inhale 4 (four) times a day  , Disp: , Rfl:     Social History     Social History    Marital status: /Civil Union     Spouse name: N/A    Number of children: 2    Years of education: N/A     Occupational History    restired      Social History Main Topics    Smoking status: Former Smoker     Packs/day: 1 00     Years: 10 00     Quit date: 1960    Smokeless tobacco: Never Used    Alcohol use 0 0 oz/week      Comment: SOcially     Drug use: No    Sexual activity: Not on file     Other Topics Concern    Not on file     Social History Narrative    Caffeine use, active           Family History   Problem Relation Age of Onset    Diabetes Mother     Heart attack Neg Hx     Stroke Neg Hx     Aneurysm Neg Hx     Clotting disorder Neg Hx     Arrhythmia Neg Hx     Hypertension Neg Hx     Hyperlipidemia Neg Hx         pt unsure        Physical Exam   Constitutional: He is oriented to person, place, and time  No distress  HENT:   Head: Normocephalic and atraumatic  Eyes: Conjunctivae and EOM are normal    Neck: Normal range of motion  Neck supple  Cardiovascular: Normal rate, regular rhythm, normal heart sounds and intact distal pulses  Pulmonary/Chest: Effort normal and breath sounds normal    Abdominal: Soft  Bowel sounds are normal    Musculoskeletal: Normal range of motion  Neurological: He is alert and oriented to person, place, and time  Skin: Skin is warm and dry  Psychiatric: He has a normal mood and affect  Nursing note and vitals reviewed  Vitals: Blood pressure 100/60, pulse 80, height 6' 1" (1 854 m), weight 72 4 kg (159 lb 9 6 oz), SpO2 97 %     Wt Readings from Last 3 Encounters:   01/07/19 72 4 kg (159 lb 9 6 oz)   01/07/19 69 9 kg (154 lb)   12/17/18 70 kg (154 lb 6 4 oz)         Labs & Results:  Lab Results   Component Value Date    WBC 6 38 11/15/2018    HGB 13 7 11/15/2018    HCT 41 7 11/15/2018    MCV 93 11/15/2018     (L) 11/15/2018     B-Type Natriuretic Peptide   Date Value Ref Range Status   2018 428 (H) <100 pg/mL Final     Comment:        BNP levels increase with age in the general  population with the highest values seen in  individuals greater than 76years of age  Reference: KANU Roy Holes  Cardiol  2002; 05:[id number]  No components found for: CHEM    Results for orders placed during the hospital encounter of 18   Echo complete with contrast if indicated    Narrative [facility], [address] [facility]  [phone]    Transthoracic Echocardiogram  2D, M-mode, Doppler, and Color Doppler    Study date:  06-Sep-2018    Patient: Carlos A Briceño  MR number: [id number]  Account number: [de-identified]  : 1933  Age: 80 years  Gender: Male  Status: Outpatient  Location: [place] [address]   Height: 70 in  Weight: 170 7 lb  BP: 118/ 78 mmHg    Indications: Cardiomyopathy    Diagnoses: I42 9 - Cardiomyopathy, unspecified    Sonographer:  Lars Link  Primary Physician:  Malena Klein MD  Referring Physician:  Harriett Pinedo MD  Group:  [facility]  Interpreting Physician:  Nelson Fofana MD    SUMMARY    LEFT VENTRICLE:  Size was at the upper limits of normal   Systolic function was severely reduced  Ejection fraction was estimated to be 20 %  There was severe diffuse hypokinesis  Features were consistent with a pseudonormal left ventricular filling pattern, with concomitant abnormal relaxation and increased filling pressure (grade 2 diastolic dysfunction)  RIGHT VENTRICLE:  The ventricle was dilated  Systolic function was reduced  LEFT ATRIUM:  The atrium was mildly to moderately dilated  RIGHT ATRIUM:  The atrium was moderately dilated  MITRAL VALVE:  There was mild regurgitation      TRICUSPID VALVE:  There was moderate regurgitation  Pulmonary artery systolic pressure was moderately increased  IVC, HEPATIC VEINS:  The inferior vena cava was dilated  Respirophasic changes were blunted (less than 50% variation)  HISTORY: PRIOR HISTORY: Congestive heart failure, chronic obstructive pulmonary disease, hypertension, hyperlipidemia, diabetes, dilated cardiomyopathy    PROCEDURE: The study was performed in the [address]  This was a routine study  The transthoracic approach was used  The study included complete 2D imaging, M-mode, complete spectral Doppler, and color Doppler  Images were obtained  from the parasternal, apical, subcostal, and suprasternal notch acoustic windows  This was a technically difficult study  LEFT VENTRICLE: Size was at the upper limits of normal  Systolic function was severely reduced  Ejection fraction was estimated to be 20 %  There was severe diffuse hypokinesis  Wall thickness was normal  DOPPLER: Features were consistent  with a pseudonormal left ventricular filling pattern, with concomitant abnormal relaxation and increased filling pressure (grade 2 diastolic dysfunction)  RIGHT VENTRICLE: The ventricle was dilated  Systolic function was reduced  LEFT ATRIUM: The atrium was mildly to moderately dilated  RIGHT ATRIUM: The atrium was moderately dilated  MITRAL VALVE: Valve structure was normal  There was normal leaflet separation  DOPPLER: The transmitral velocity was within the normal range  There was no evidence for stenosis  There was mild regurgitation  AORTIC VALVE: The valve was trileaflet  Leaflets exhibited mildly increased thickness and lower normal cuspal separation  DOPPLER: Transaortic velocity was within the normal range  There was no evidence for stenosis  There was no  significant regurgitation  TRICUSPID VALVE: The valve structure was normal  There was normal leaflet separation  DOPPLER: The transtricuspid velocity was within the normal range   There was no evidence for stenosis  There was moderate regurgitation  Pulmonary artery  systolic pressure was moderately increased  PULMONIC VALVE: Leaflets exhibited normal thickness, no calcification, and normal cuspal separation  DOPPLER: The transpulmonic velocity was within the normal range  There was no significant regurgitation  PERICARDIUM: There was no pericardial effusion  The pericardium was normal in appearance  AORTA: The root exhibited normal size  SYSTEMIC VEINS: IVC: The inferior vena cava was dilated  Respirophasic changes were blunted (less than 50% variation)      SYSTEM MEASUREMENT TABLES    2D  %FS: 7 93 %  Ao Diam: 2 9 cm  EDV(Teich): 147 52 ml  EF Biplane: 32 22 %  EF(Teich): 17 4 %  ESV(Teich): 121 85 ml  IVSd: 1 14 cm  LA Area: 27 15 cm2  LA Diam: 4 67 cm  LVEDV MOD A2C: 187 3 ml  LVEDV MOD A4C: 151 1 ml  LVEDV MOD BP: 172 17 ml  LVEF MOD A2C: 33 8 %  LVEF MOD A4C: 28 69 %  LVESV MOD A2C: 123 99 ml  LVESV MOD A4C: 107 74 ml  LVESV MOD BP: 116 69 ml  LVIDd: 5 5 cm  LVIDs: 5 07 cm  LVLd A2C: 9 45 cm  LVLd A4C: 9 cm  LVLs A2C: 8 43 cm  LVLs A4C: 8 61 cm  LVOT Diam: 2 28 cm  LVPWd: 0 91 cm  RA Area: 33 07 cm2  RV Diam : 4 81 cm  SI(Teich): 13 16 ml/m2  SV MOD A2C: 63 31 ml  SV MOD A4C: 43 36 ml  SV(Cube): 36 57 ml  SV(Teich): 25 67 ml    CW  TR MaxP 5 mmHg  TR Vmax: 2 83 m/s    MM  TAPSE: 0 94 cm    PW  AVC: 396 54 ms  E': 0 02 m/s    [employer] Accredited Echocardiography Laboratory    Prepared and electronically signed by    Miranda Rausch MD  Signed 06-Sep-2018 16:01:42

## 2019-01-03 NOTE — TELEPHONE ENCOUNTER
DANILO: I spoke with Bernadette Winslow and she is aware  We can give him an order at his appointment on Monday and Bernadette Winslow is going to try and schedule them together since she will be taking him to the appointment  Unfortunately he did get a repeat Lyme done that the ENT ordered because when she called here the  said it looked like he never had it drawn since it was still active in his chart  It was done as both you and I found the result  I will call [facility] LUCERO for their consult report

## 2019-01-07 ENCOUNTER — TELEPHONE (OUTPATIENT)
Dept: PULMONOLOGY | Facility: CLINIC | Age: 84
End: 2019-01-07

## 2019-01-07 ENCOUNTER — OFFICE VISIT (OUTPATIENT)
Dept: CARDIOLOGY CLINIC | Facility: CLINIC | Age: 84
End: 2019-01-07
Payer: COMMERCIAL

## 2019-01-07 ENCOUNTER — OFFICE VISIT (OUTPATIENT)
Dept: FAMILY MEDICINE CLINIC | Facility: MEDICAL CENTER | Age: 84
End: 2019-01-07
Payer: COMMERCIAL

## 2019-01-07 VITALS
HEIGHT: 73 IN | HEART RATE: 80 BPM | SYSTOLIC BLOOD PRESSURE: 100 MMHG | OXYGEN SATURATION: 97 % | DIASTOLIC BLOOD PRESSURE: 60 MMHG | WEIGHT: 159.6 LBS | BODY MASS INDEX: 21.15 KG/M2

## 2019-01-07 VITALS
HEIGHT: 73 IN | TEMPERATURE: 97.9 F | HEART RATE: 85 BPM | SYSTOLIC BLOOD PRESSURE: 116 MMHG | WEIGHT: 154 LBS | BODY MASS INDEX: 20.41 KG/M2 | DIASTOLIC BLOOD PRESSURE: 64 MMHG

## 2019-01-07 DIAGNOSIS — E11.42 TYPE 2 DIABETES MELLITUS WITH DIABETIC POLYNEUROPATHY, WITH LONG-TERM CURRENT USE OF INSULIN (HCC): Chronic | ICD-10-CM

## 2019-01-07 DIAGNOSIS — H93.13 TINNITUS OF BOTH EARS: ICD-10-CM

## 2019-01-07 DIAGNOSIS — I42.0 DILATED CARDIOMYOPATHY (HCC): ICD-10-CM

## 2019-01-07 DIAGNOSIS — E78.49 OTHER HYPERLIPIDEMIA: ICD-10-CM

## 2019-01-07 DIAGNOSIS — I50.43 ACUTE ON CHRONIC COMBINED SYSTOLIC AND DIASTOLIC CONGESTIVE HEART FAILURE (HCC): ICD-10-CM

## 2019-01-07 DIAGNOSIS — I50.42 CHRONIC COMBINED SYSTOLIC AND DIASTOLIC CONGESTIVE HEART FAILURE (HCC): Primary | ICD-10-CM

## 2019-01-07 DIAGNOSIS — I10 ESSENTIAL HYPERTENSION: ICD-10-CM

## 2019-01-07 DIAGNOSIS — M54.17 LUMBOSACRAL RADICULOPATHY: Primary | ICD-10-CM

## 2019-01-07 DIAGNOSIS — I50.32 CHRONIC DIASTOLIC HEART FAILURE (HCC): ICD-10-CM

## 2019-01-07 DIAGNOSIS — J41.8 MIXED SIMPLE AND MUCOPURULENT CHRONIC BRONCHITIS (HCC): ICD-10-CM

## 2019-01-07 DIAGNOSIS — J44.1 COPD WITH ACUTE EXACERBATION (HCC): ICD-10-CM

## 2019-01-07 DIAGNOSIS — Z79.4 TYPE 2 DIABETES MELLITUS WITH DIABETIC POLYNEUROPATHY, WITH LONG-TERM CURRENT USE OF INSULIN (HCC): Chronic | ICD-10-CM

## 2019-01-07 PROCEDURE — 99214 OFFICE O/P EST MOD 30 MIN: CPT | Performed by: FAMILY MEDICINE

## 2019-01-07 PROCEDURE — 99214 OFFICE O/P EST MOD 30 MIN: CPT | Performed by: NURSE PRACTITIONER

## 2019-01-07 RX ORDER — METOPROLOL SUCCINATE 50 MG/1
100 TABLET, EXTENDED RELEASE ORAL DAILY
Qty: 180 TABLET | Refills: 0 | Status: SHIPPED | OUTPATIENT
Start: 2019-01-07 | End: 2019-04-08 | Stop reason: SDUPTHER

## 2019-01-07 RX ORDER — SPIRONOLACTONE 25 MG/1
25 TABLET ORAL DAILY
Qty: 90 TABLET | Refills: 0 | Status: SHIPPED | OUTPATIENT
Start: 2019-01-07 | End: 2019-04-08 | Stop reason: SDUPTHER

## 2019-01-07 RX ORDER — UMECLIDINIUM BROMIDE AND VILANTEROL TRIFENATATE 62.5; 25 UG/1; UG/1
POWDER RESPIRATORY (INHALATION)
Qty: 60 EACH | Refills: 3 | Status: SHIPPED | OUTPATIENT
Start: 2019-01-07 | End: 2019-05-03 | Stop reason: SDUPTHER

## 2019-01-07 RX ORDER — AZITHROMYCIN 250 MG/1
TABLET, FILM COATED ORAL
Qty: 6 TABLET | Refills: 0 | Status: SHIPPED | OUTPATIENT
Start: 2019-01-07 | End: 2019-01-11

## 2019-01-07 NOTE — PROGRESS NOTES
Glenna Mccray says he still has low back pain   Radiates down his left leg  Has ahd for 3 months    Did PT, steroids  He says he has not ahd any shortness of breath  However he has congestion and a cough for the past week  No fever or chills  He is using his Anoro and his Flovent daily as well as his albuterol nebulizer  And Flonase nasal spray  No swelling  Appetite is good  He saw ENT for his tinnitus and his hearing loss as well as for his hoarseness  See consult  Laryngoscopy was done which was normal except for mucous  He was advised to have an MRI to rule out acoustic neuroma  He is also being referred to speech therapy  He otherwise feels well  O: /64 (BP Location: Left arm, Patient Position: Sitting, Cuff Size: Adult)   Pulse 85   Temp 97 9 °F (36 6 °C)   Ht 6' 1" (1 854 m)   Wt 69 9 kg (154 lb)   BMI 20 32 kg/m²    Voice is slightly hoarse and raspy any does sound congested  Neck no adenopathy thyromegaly  Chest a few basilar rales otherwise clear with good breath sounds  Cardiac regular rate without murmur  Extremities no edema    Assessment  1  Congestive heart failure-appears stable  Weight is stable and there is no edema  He will be following up with cardiac nurse at [facility] office  2   COPD exacerbation-encouraged to continue his inhalers  Will give a course of antibiotics  3  Tinnitus/hearing loss-getting an MRI per ENT  4  Hoarseness-laryngoscopy within normal limits  He will start speech therapy  5   Sciatica-no response to conservative treatment  Will check an MRI  Plan  Check MRI of the LS spine  As above

## 2019-01-07 NOTE — PATIENT INSTRUCTIONS
Take your medications as directed, and keep your follow up appointments  Adhere to a heart healthy lifestyle, maintaining a low sodium diet  Daily weight and record  If your weight increases 2-3 lbs in one day, or 5 lbs in 2 days, you are short of breath or have lower extremity swelling, please call the heart failure team at  [facility] at [phone]  DASH Eating Plan   WHAT YOU NEED TO KNOW:   The DASH (Dietary Approaches to Stop Hypertension) Eating Plan is designed to help prevent or lower high blood pressure  It can also help to lower LDL (bad) cholesterol and decrease your risk of heart disease  The plan is low in sodium, sugar, unhealthy fats, and total fat  It is high in potassium, calcium, magnesium, and fiber  These nutrients are added when you eat more fruits, vegetables, and whole grains  DISCHARGE INSTRUCTIONS:   Your sodium limit each day: Your dietitian will tell you how much sodium is safe for you to have each day  People with high blood pressure should have no more than 1,500 to 2,300 mg of sodium in a day  A teaspoon (tsp) of salt has 2,300 mg of sodium  This may seem like a difficult goal, but small changes to the foods you eat can make a big difference  Your healthcare provider or dietitian can help you create a meal plan that follows your sodium limit  How to limit sodium:   · Read food labels  Food labels can help you choose foods that are low in sodium  The amount of sodium is listed in milligrams (mg)  The % Daily Value (DV) column tells you how much of your daily needs are met by 1 serving of the food for each nutrient listed  Choose foods that have less than 5% of the DV of sodium  These foods are considered low in sodium  Foods that have 20% or more of the DV of sodium are considered high in sodium  Avoid foods that have more than 300 mg of sodium in each serving  Choose foods that say low-sodium, reduced-sodium, or no salt added on the food label             · Avoid salt   Do not salt food at the table, and add very little salt to foods during cooking  Use herbs and spices, such as onions, garlic, and salt-free seasonings to add flavor to foods  Try lemon or lime juice or vinegar to give foods a tart flavor  Use hot peppers or a small amount of hot pepper sauce to add a spicy flavor to foods  · Ask about salt substitutes  Ask your healthcare provider if you may use salt substitutes  Some salt substitutes have ingredients that can be harmful if you have certain health conditions  · Choose foods carefully at restaurants  Meals from restaurants, especially fast food restaurants, are often high in sodium  Some restaurants have nutrition information that tells you the amount of sodium in their foods  Ask to have your food prepared with less, or no salt  What you need to know about fats:   · Include healthy fats  Examples are unsaturated fats and omega-3 fatty acids  Unsaturated fats are found in soybean, canola, olive, or sunflower oil, and liquid and soft tub margarines  Omega-3 fatty acids are found in fatty fish, such as salmon, tuna, mackerel, and sardines  It is also found in flaxseed oil and ground flaxseed  · Avoid unhealthy fats  Do not eat unhealthy fats, such as saturated fats and trans fats  Saturated fats are found in foods that contain fat from animals  Examples are fatty meats, whole milk, butter, cream, and other dairy foods  It is also found in shortening, stick margarine, palm oil, and coconut oil  Trans fats are found in fried foods, crackers, chips, and baked goods made with margarine or shortening  Foods to include: With the DASH eating plan, you need to eat a certain number of servings from each food group  This will help you get enough of certain nutrients and limit others  The amount of servings you should eat depends on how many calories you need  Your dietitian can tell you how many calories you need   The number of servings listed next to the food groups below are for people who need about 2,000 calories each day    · Grains:  6 to 8 servings (3 of these servings should be whole-grain foods)    ¨ 1 slice of whole-grain bread     ¨ 1 ounce of dry cereal    ¨ ½ cup of cooked cereal, pasta, or brown rice    · Vegetables and fruits:  4 to 5 servings of fruits and 4 to 5 servings of vegetables    ¨ 1 medium fruit    ¨ ½ cup of frozen, canned (no added salt), or chopped fresh vegetables     ¨ ½ cup of fresh, frozen, dried, or canned fruit (canned in light syrup or fruit juice)    ¨ ½ cup of vegetable or fruit juice    · Dairy:  2 to 3 servings    ¨ 1 cup of nonfat (skim) or 1% milk    ¨ 1½ ounces of fat-free or low-fat cheese    ¨ 6 ounces of nonfat or low-fat yogurt    · Lean meat, poultry, and fish:  6 ounces or less    [employer] (chicken, turkey) with no skin    ¨ Fish (especially fatty fish, such as salmon, fresh tuna, or mackerel)    ¨ Lean beef and pork (loin, round, extra lean hamburger)    ¨ Egg whites and egg substitutes    · Nuts, seeds, and legumes:  4 to 5 servings each week    ¨ ½ cup of cooked beans and peas    ¨ 1½ ounces of unsalted nuts    ¨ 2 tablespoons of peanut butter or seeds    · Sweets and added sugars:  5 or less each week    ¨ 1 tablespoon of sugar, jelly, or jam    ¨ ½ cup of sorbet or gelatin    ¨ 1 cup of lemonade    · Fats:  2 to 3 servings each week    ¨ 1 teaspoon of soft margarine or vegetable oil    ¨ 1 tablespoon of mayonnaise    ¨ 2 tablespoons of salad dressing  Foods to avoid:   · Grains:      [employer], such as doughnuts, pastries, cookies, and biscuits (high in fat and sugar)    ¨ Mixes for cornbread and biscuits, packaged foods, such as bread stuffing, rice and pasta mixes, macaroni and cheese, and instant cereals (high in sodium)    · Fruits and vegetables:      ¨ Regular, canned vegetables (high in sodium)    ¨ Sauerkraut, pickled vegetables, and other foods prepared in brine (high in sodium)    Leon Machado vegetables or vegetables in butter or high-fat sauces    ¨ Fruit in cream or butter sauce (high in fat)    · Dairy:      ¨ Whole milk, 2% milk, and cream (high in fat)    ¨ Regular cheese and processed cheese (high in fat and sodium)    · Meats and protein foods:      ¨ Smoked or cured meat, such as corned beef, granda, ham, hot dogs, and sausage (high in fat and sodium)    ¨ Canned beans and canned meats or spreads, such as potted meats, sardines, anchovies, and imitation seafood (high in sodium)    ¨ Deli or lunch meats, such as bologna, ham, turkey, and roast beef (high in sodium)    ¨ High-fat meat (T-bone steak, regular hamburger, and ribs)    ¨ Whole eggs and egg yolks (high in fat)    · Other:      ¨ Seasonings made with salt, such as garlic salt, celery salt, onion salt, seasoned salt, meat tenderizers, and monosodium glutamate (MSG)    ¨ Miso soup and canned or dried soup mixes (high in sodium)    ¨ Regular soy sauce, barbecue sauce, teriyaki sauce, steak sauce, Worcestershire sauce, and most flavored vinegars (high in sodium)    ¨ Regular condiments, such as mustard, ketchup, and salad dressings (high in sodium)    ¨ Gravy and sauces, such as Tommy or cheese sauces (high in sodium and fat)    ¨ Drinks high in sugar, such as soda or fruit drinks    [employer] foods, such as salted chips, popcorn, pretzels, pork rinds, salted crackers, and salted nuts    ¨ Frozen foods, such as dinners, entrees, vegetables with sauces, and breaded meats (high in sodium)  Other guidelines to follow:   · Maintain a healthy weight  Your risk for heart disease is higher if you are overweight  Your healthcare provider may suggest that you lose weight if you are overweight  You can lose weight by eating fewer calories and foods that have added sugars and fat  The DASH meal plan can help you do this  Decrease calories by eating smaller portions at each meal and fewer snacks   Ask your healthcare provider for more information about how to lose weight  · Exercise regularly  Regular exercise can help you reach or maintain a healthy weight  Regular exercise can also help decrease your blood pressure and improve your cholesterol levels  Get 30 minutes or more of moderate exercise each day of the week  To lose weight, get at least 60 minutes of exercise  Talk to your healthcare provider about the best exercise program for you  · Limit alcohol  Women should limit alcohol to 1 drink a day  Men should limit alcohol to 2 drinks a day  A drink of alcohol is 12 ounces of beer, 5 ounces of wine, or 1½ ounces of liquor  © 2017 [address]  Information is for End User's use only and may not be sold, redistributed or otherwise used for commercial purposes  All illustrations and images included in CareNotes® are the copyrighted property of A D A M , Inc  or Gilberto Franco  The above information is an  only  It is not intended as medical advice for individual conditions or treatments  Talk to your doctor, nurse or pharmacist before following any medical regimen to see if it is safe and effective for you

## 2019-01-08 ENCOUNTER — TRANSCRIBE ORDERS (OUTPATIENT)
Dept: ADMINISTRATIVE | Facility: HOSPITAL | Age: 84
End: 2019-01-08

## 2019-01-08 DIAGNOSIS — H93.19 TINNITUS, UNSPECIFIED LATERALITY: ICD-10-CM

## 2019-01-08 DIAGNOSIS — H91.90 HEARING LOSS, UNSPECIFIED HEARING LOSS TYPE, UNSPECIFIED LATERALITY: Primary | ICD-10-CM

## 2019-01-09 ENCOUNTER — OFFICE VISIT (OUTPATIENT)
Dept: UROLOGY | Facility: CLINIC | Age: 84
End: 2019-01-09
Payer: COMMERCIAL

## 2019-01-09 VITALS
WEIGHT: 158.4 LBS | SYSTOLIC BLOOD PRESSURE: 94 MMHG | BODY MASS INDEX: 20.99 KG/M2 | HEIGHT: 73 IN | DIASTOLIC BLOOD PRESSURE: 50 MMHG

## 2019-01-09 DIAGNOSIS — R33.9 URINARY RETENTION: Primary | ICD-10-CM

## 2019-01-09 LAB — POST-VOID RESIDUAL VOLUME, ML POC: 156 ML

## 2019-01-09 PROCEDURE — 51798 US URINE CAPACITY MEASURE: CPT | Performed by: UROLOGY

## 2019-01-09 PROCEDURE — 99204 OFFICE O/P NEW MOD 45 MIN: CPT | Performed by: UROLOGY

## 2019-01-09 NOTE — PROGRESS NOTES
Referring Physician: Areli Hill MD  A copy of this note was sent to the referring physician  Diagnoses and all orders for this visit:    Urinary retention  -     POCT Measure PVR            Assessment and plan:       1  Obstructive lower urinary tract symptoms  -weak stream, slow stream, daytime urgency nocturia x2       Today, we discussed a stepwise approach in treating lower urinary tract symptoms associated with BPH  Lower urinary tract symptoms (LUTS) can be sub-divided into those that result from failure of the bladder to store urine normally ("storage symptoms"), those that result from difficulties in emptying ("voiding symptoms"), or those that follow micturition ("post-micturition symptoms")  Obstructive symptoms such as hesitancy, weak stream, and pushing to urinate are classified as voiding symptoms  OAB is due to the inability of the bladder to store urine normally  The symptoms of frequency, urgency, nocturia, and urge incontinence are classified as storage symptoms  I discussed the mainstays of therapy for voiding symptoms including alpha blockers, 5-alpha reductase inhibitors, and surgical management including TURP (laser, monopolar, bipolar, button electrode), TUIP, and prostatectomy  I had a candid discussion about the risks of each of these medications and the mechanism of action  I also discussed the use of PD5 inhibitors for LUTS  Discussed with coordinate and his son that I believe his symptoms are combination of his diuretic use as well as underlying BP H  I am concerned about initiating an alpha-blocker which would be the most appropriate medical management for his condition, given concern for polypharmacy and inducing orthostatic hypotension in the setting of his Lasix and his beta blocker    For this reason as an alternative I have suggested pursuing flexible cystoscopy and transrectal ultrasound for consideration of a minimally invasive procedure to improve his urinary flow  He is amenable  Cystoscopy and transrectal ultrasound reschedule near future  He and his son were provided with information about the Uro lift procedure as well    Maverick Nielson MD      Chief Complaint     Urinary retention      History of Present Illness     Vinayak Brambila is a 80 y o  male referred for evaluation of obstructive lower urinary tract symptoms  He has had a longstanding history of difficulty emptying his bladder  He reports a weak stream, slow stream, urinary hesitancy  He also has daytime urgency and bothersome nocturia times 2-3  Patient has not initiated on any medical management for BPH  He does have a number of medical comorbidities including dilated cardiomyopathy, COPD, type 2 diabetes and chronic kidney disease  He is currently taking 2 different diuretics and is also on a fluid restriction diet  He is accompanied by his son today  He is overall well-appearing  Detailed Urologic History     - please refer to HPI    Review of Systems     Review of Systems   Constitutional: Negative for activity change and fatigue  HENT: Negative for congestion  Eyes: Negative for visual disturbance  Respiratory: Negative for shortness of breath and wheezing  Cardiovascular: Negative for chest pain and leg swelling  Gastrointestinal: Negative for abdominal pain  Endocrine: Positive for polyuria  Genitourinary: Positive for decreased urine volume and urgency  Negative for dysuria, flank pain and hematuria  Musculoskeletal: Negative for back pain  Allergic/Immunologic: Negative for immunocompromised state  Neurological: Negative for dizziness and numbness  Psychiatric/Behavioral: Negative for dysphoric mood  All other systems reviewed and are negative  AUA SYMPTOM SCORE      Most Recent Value   AUA SYMPTOM SCORE   How often have you had a sensation of not emptying your bladder completely after you finished urinating?   3   How often have you had to urinate again less than two hours after you finished urinating? 3   How often have you found you stopped and started again several times when you urinate? 5   How often have you found it difficult to postpone urination? 5   How often have you had a weak urinary stream?  3   How often have you had to push or strain to begin urination? 5   How many times did you most typically get up to urinate from the time you went to bed at night until the time you got up in the morning? 3   Quality of Life: If you were to spend the rest of your life with your urinary condition just the way it is now, how would you feel about that?  4   AUA SYMPTOM SCORE  27            Allergies     No Known Allergies    Physical Exam     Physical Exam   Constitutional: He is oriented to person, place, and time  He appears well-developed and well-nourished  No distress  HENT:   Head: Normocephalic and atraumatic  Eyes: EOM are normal    Neck: Normal range of motion  Cardiovascular:   Negative lower extremity edema   Pulmonary/Chest: Effort normal and breath sounds normal    Abdominal: Soft  Genitourinary: Penis normal    Genitourinary Comments: Uncircumcised phallus, rectal exam reveals a 30 g prostate no nodules or induration   Musculoskeletal: Normal range of motion  Neurological: He is alert and oriented to person, place, and time  Skin: Skin is warm  Psychiatric: He has a normal mood and affect   His behavior is normal            Vital Signs  Vitals:    01/09/19 0919   BP: 94/50   Weight: 71 8 kg (158 lb 6 4 oz)   Height: 6' 1" (1 854 m)         Current Medications       Current Outpatient Prescriptions:     acetaminophen (TYLENOL) 325 mg tablet, Take 2 tablets (650 mg total) by mouth every 6 (six) hours as needed for mild pain, Disp: 30 tablet, Rfl: 0    albuterol (2 5 mg/3 mL) 0 083 % nebulizer solution, Take 1 vial (2 5 mg total) by nebulization every 6 (six) hours as needed for wheezing or shortness of breath, Disp: 1080 mL, Rfl: 3    ANORO ELLIPTA 62 5-25 MCG/INH inhaler, INHALE ONE PUFF BY MOUTH ONCE DAILY , Disp: 60 each, Rfl: 3    Ascorbic Acid (VITAMIN C) 1000 MG tablet, Take 1 tablet by mouth daily, Disp: , Rfl:     azithromycin (ZITHROMAX) 250 mg tablet, Take 2 tablets today then 1 tablet daily x 4 days, Disp: 6 tablet, Rfl: 0    BD PEN NEEDLE DON U/F 32G X 4 MM MISC, , Disp: , Rfl:     Cinnamon 500 MG TABS, Take 1 tablet by mouth daily  , Disp: , Rfl:     fluticasone (FLONASE) 50 mcg/act nasal spray, 2 sprays into each nostril daily, Disp: 16 g, Rfl: 5    fluticasone (FLOVENT HFA) 220 mcg/act inhaler, Inhale 1 puff 2 (two) times a day, Disp: 1 Inhaler, Rfl: 5    furosemide (LASIX) 20 mg tablet, Take 1 tablet (20 mg total) by mouth daily, Disp: 90 tablet, Rfl: 2    gabapentin (NEURONTIN) 100 mg capsule, Take 2 capsules (200 mg total) by mouth 3 (three) times a day, Disp: 540 capsule, Rfl: 1    insulin detemir (LEVEMIR) 100 units/mL subcutaneous injection, Inject 15 Units under the skin daily at bedtime  , Disp: , Rfl:     metoprolol succinate (TOPROL-XL) 50 mg 24 hr tablet, Take 2 tablets (100 mg total) by mouth daily, Disp: 180 tablet, Rfl: 0    montelukast (SINGULAIR) 10 mg tablet, Take 1 tablet (10 mg total) by mouth daily at bedtime, Disp: 30 tablet, Rfl: 3    Multiple Vitamins-Minerals (OCUVITE ADULT 50+ PO), Take 1 tablet by mouth daily, Disp: , Rfl:     NOVOLOG FLEXPEN 100 units/mL injection pen, Inject 6 Units under the skin daily with breakfast  , Disp: , Rfl:     ONE TOUCH ULTRA TEST test strip, , Disp: , Rfl:     ONETOUCH DELICA LANCETS FINE MISC, , Disp: , Rfl:     repaglinide (PRANDIN) 2 mg tablet, Take 2 mg by mouth daily before dinner, Disp: , Rfl:     roflumilast (DALIRESP) 500 mcg tablet, Take 1 tablet (500 mcg total) by mouth daily, Disp: 30 tablet, Rfl: 5    sacubitril-valsartan (ENTRESTO) 24-26 MG TABS, Take 1 tablet by mouth 2 (two) times a day, Disp: 180 tablet, Rfl: 0   simvastatin (ZOCOR) 10 mg tablet, Take 1 tablet (10 mg total) by mouth daily, Disp: 90 tablet, Rfl: 1    spironolactone (ALDACTONE) 25 mg tablet, Take 1 tablet (25 mg total) by mouth daily, Disp: 90 tablet, Rfl: 0    VENTOLIN  (90 Base) MCG/ACT inhaler, Inhale 4 (four) times a day  , Disp: , Rfl:       Active Problems     Patient Active Problem List   Diagnosis    COPD without exacerbation ([facility] )    Essential hypertension    Hyperlipidemia    Neuropathy, idiopathic    Multiple nodules of lung    Mixed simple and mucopurulent chronic bronchitis (HCC)    Type 2 diabetes mellitus, with long-term current use of insulin (HCC)    Dilated cardiomyopathy (HCC)    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    Paresthesia of both feet    Finger pain, left    Cough    Urinary retention         Past Medical History     Past Medical History:   Diagnosis Date    COPD (chronic obstructive pulmonary disease) ([facility] )     Diabetes mellitus ([place] [address] )     Type 2    Essential hypertension     Heart failure (HCC)     Left inguinal hernia     Lung nodule     Malignant melanoma of skin ([facility] )          Surgical History     Past Surgical History:   Procedure Laterality Date    APPENDECTOMY      CATARACT EXTRACTION, BILATERAL      CHOLECYSTECTOMY      CHOLECYSTECTOMY      COLONOSCOPY  2014    Fiberoptic    HERNIA REPAIR      JOINT REPLACEMENT Left     meniscus repair    KNEE ARTHROSCOPY      Therapeutic         Family History     Family History   Problem Relation Age of Onset    Diabetes Mother     Heart attack Neg Hx     Stroke Neg Hx     Aneurysm Neg Hx     Clotting disorder Neg Hx     Arrhythmia Neg Hx     Hypertension Neg Hx     Hyperlipidemia Neg Hx         pt unsure          Social History     Social History     History   Smoking Status    Former Smoker    Packs/day: 1 00    Years: 10 00    Quit date: 1960   Smokeless Tobacco    Never Used         Pertinent Lab Values     Lab Results   Component Value Date    CREATININE 1 19 11/15/2018       No results found for: PSA    @RESULTRCNT(1H])@      Pertinent Imaging      - n/a    Portions of the record may have been created with voice recognition software   Occasional wrong word or "sound a like" substitutions may have occurred due to the inherent limitations of voice recognition software   Read the chart carefully and recognize, using context, where substitutions have occurred

## 2019-01-11 ENCOUNTER — PATIENT OUTREACH (OUTPATIENT)
Dept: FAMILY MEDICINE CLINIC | Facility: MEDICAL CENTER | Age: 84
End: 2019-01-11

## 2019-01-11 NOTE — PROGRESS NOTES
Follow up call  Patient reports doing good  Denies complaints of shortness of breath, edema, weakness, fatigue  Weighing self daily  Discussed signs and symptoms of heart failure and weight changes to monitor for and report  Complains of pain to low back  Reports has MRI of lower back scheduled 1/25/19  Appetite good  Following low sodium diet  All prescriptions filled and taking as prescribed  Has follow up appts scheduled with PCP and specialists  Denies any needs or concerns  Verified patient has my contact information and encouraged to call if needed

## 2019-01-21 ENCOUNTER — TELEPHONE (OUTPATIENT)
Dept: FAMILY MEDICINE CLINIC | Facility: MEDICAL CENTER | Age: 84
End: 2019-01-21

## 2019-01-21 DIAGNOSIS — J06.9 UPPER RESPIRATORY TRACT INFECTION, UNSPECIFIED TYPE: Primary | ICD-10-CM

## 2019-01-21 RX ORDER — AZITHROMYCIN 250 MG/1
TABLET, FILM COATED ORAL
Qty: 6 TABLET | Refills: 0 | Status: SHIPPED | OUTPATIENT
Start: 2019-01-21 | End: 2019-01-25

## 2019-01-21 NOTE — TELEPHONE ENCOUNTER
Sore throat and cough since Friday  Feels fine, no fever, no SOB or wheezing  not taking any otc's except tylenol  wants z-jensen but doesn't want to come in with the cold weather   Uses [employer]

## 2019-01-21 NOTE — TELEPHONE ENCOUNTER
Patient's daughter Finn Hdez called the office to see if any medicine has been sent yet  Advised will send a note to Dr Gardenia Essex as she is currently with patients  Finn Hdez will like a call once approved

## 2019-01-21 NOTE — TELEPHONE ENCOUNTER
Will send in a prescription however if this is viral antibiotics are not going to make any difference  We do have to be careful as he was just on them a few weeks ago

## 2019-01-24 ENCOUNTER — OFFICE VISIT (OUTPATIENT)
Dept: PULMONOLOGY | Facility: CLINIC | Age: 84
End: 2019-01-24
Payer: COMMERCIAL

## 2019-01-24 VITALS
HEART RATE: 74 BPM | SYSTOLIC BLOOD PRESSURE: 104 MMHG | OXYGEN SATURATION: 93 % | WEIGHT: 153 LBS | DIASTOLIC BLOOD PRESSURE: 80 MMHG | BODY MASS INDEX: 20.28 KG/M2 | HEIGHT: 73 IN

## 2019-01-24 DIAGNOSIS — J41.0 SIMPLE CHRONIC BRONCHITIS (HCC): Primary | ICD-10-CM

## 2019-01-24 DIAGNOSIS — J30.9 ALLERGIC RHINITIS, UNSPECIFIED SEASONALITY, UNSPECIFIED TRIGGER: ICD-10-CM

## 2019-01-24 PROCEDURE — 99214 OFFICE O/P EST MOD 30 MIN: CPT | Performed by: PHYSICIAN ASSISTANT

## 2019-01-24 NOTE — PROGRESS NOTES
Assessment:    1  Simple chronic bronchitis ([address] )     2  Allergic rhinitis, unspecified seasonality, unspecified trigger           Plan:     Patient is here today for follow-up  Overall he is stable with his breathing  Has not had any exacerbations since starting the Daliresp, tolerating it well  He will continue with Anoro, Flovent, Flonase and Singulair  Continue Daliresp daily  Last chest CT was September 2018 which showed a stable 3 mm lingular nodule, stable appearance of atelectasis or scarring in the right upper lobe  He will follow up with us in 6 months or sooner if necessary  Subjective:     Patient ID: Gaby Walker is a 80 y o  male  Chief Complaint:  Patient is an 81 yo male former smoker with PMH of COPD, HTN, HLD, MDS  He was having several exacerbations until we added Daliresp  He is here today for follow-up  He is overall stable with his breathing, he is using the Anoro and Flovent as well as Flonase and Singulair  He has not had any further exacerbations since being on the Daliresp, tolerating it well  The following portions of the patient's history were reviewed in this encounter and updated as appropriate:   Review of Systems   Constitutional: Negative  HENT: Positive for postnasal drip  Respiratory: Positive for shortness of breath  Cardiovascular: Negative  Gastrointestinal: Negative  Genitourinary: Negative  Musculoskeletal: Negative  Skin: Negative  Allergic/Immunologic: Negative  Neurological: Negative  Psychiatric/Behavioral: Negative  Objective:  /80   Pulse 74   Ht 6' 1" (1 854 m)   Wt 69 4 kg (153 lb)   SpO2 93%   BMI 20 19 kg/m²   Physical Exam   Constitutional: He is oriented to person, place, and time  He appears well-developed and well-nourished  No distress  HENT:   Mouth/Throat: Oropharynx is clear and moist    Eyes: Pupils are equal, round, and reactive to light     Cardiovascular: Normal rate and regular rhythm  No murmur heard  Pulmonary/Chest: Effort normal  No respiratory distress  He has no decreased breath sounds  He has no wheezes  He has no rhonchi  He has no rales  Abdominal: Soft  Musculoskeletal: Normal range of motion  Neurological: He is alert and oriented to person, place, and time  Skin: Skin is warm and dry  Psychiatric: He has a normal mood and affect   His behavior is normal  Judgment and thought content normal

## 2019-01-25 ENCOUNTER — HOSPITAL ENCOUNTER (OUTPATIENT)
Dept: MRI IMAGING | Facility: HOSPITAL | Age: 84
Discharge: HOME | End: 2019-01-25
Payer: COMMERCIAL

## 2019-01-25 DIAGNOSIS — H91.90 HEARING LOSS, UNSPECIFIED HEARING LOSS TYPE, UNSPECIFIED LATERALITY: ICD-10-CM

## 2019-01-25 DIAGNOSIS — M54.17 LUMBOSACRAL RADICULOPATHY: ICD-10-CM

## 2019-01-25 DIAGNOSIS — H93.19 TINNITUS, UNSPECIFIED LATERALITY: ICD-10-CM

## 2019-01-25 PROCEDURE — 72148 MRI LUMBAR SPINE W/O DYE: CPT

## 2019-01-25 PROCEDURE — 70553 MRI BRAIN STEM W/O & W/DYE: CPT

## 2019-01-25 PROCEDURE — A9585 GADOBUTROL INJECTION: HCPCS | Performed by: PHYSICIAN ASSISTANT

## 2019-01-25 RX ADMIN — GADOBUTROL 6 ML: 604.72 INJECTION INTRAVENOUS at 16:11

## 2019-01-28 ENCOUNTER — TELEPHONE (OUTPATIENT)
Dept: FAMILY MEDICINE CLINIC | Facility: MEDICAL CENTER | Age: 84
End: 2019-01-28

## 2019-01-28 NOTE — TELEPHONE ENCOUNTER
He does have several findings on his MRI  degenerative changes,  disc herniation as well as a cyst   He still has pain I recommend referral to Orthopedics   Dr Hayes Lai

## 2019-01-28 NOTE — TELEPHONE ENCOUNTER
DORITAI: He says it only bothers him when he first gets up in the morning and not every day  He is going to ride it out for a while and if wants to see ortho he will contacts us again

## 2019-02-18 ENCOUNTER — TELEPHONE (OUTPATIENT)
Dept: CARDIOLOGY CLINIC | Facility: CLINIC | Age: 84
End: 2019-02-18

## 2019-02-18 NOTE — TELEPHONE ENCOUNTER
Spoke with patient who stated lab work was completed last week at [facility]  since no results in chart I contacted [facility] to have results faxed to us and being faxed per [facility]  Patient echo is scheduled for 02/21/2019 prior to office visit Monday

## 2019-02-19 ENCOUNTER — OFFICE VISIT (OUTPATIENT)
Dept: FAMILY MEDICINE CLINIC | Facility: MEDICAL CENTER | Age: 84
End: 2019-02-19
Payer: COMMERCIAL

## 2019-02-19 VITALS
RESPIRATION RATE: 16 BRPM | HEIGHT: 73 IN | BODY MASS INDEX: 21.07 KG/M2 | DIASTOLIC BLOOD PRESSURE: 70 MMHG | WEIGHT: 159 LBS | HEART RATE: 80 BPM | SYSTOLIC BLOOD PRESSURE: 122 MMHG

## 2019-02-19 DIAGNOSIS — Z79.4 TYPE 2 DIABETES MELLITUS WITH DIABETIC POLYNEUROPATHY, WITH LONG-TERM CURRENT USE OF INSULIN (HCC): Primary | Chronic | ICD-10-CM

## 2019-02-19 DIAGNOSIS — E11.42 TYPE 2 DIABETES MELLITUS WITH DIABETIC POLYNEUROPATHY, WITH LONG-TERM CURRENT USE OF INSULIN (HCC): Primary | Chronic | ICD-10-CM

## 2019-02-19 LAB
CREAT UR-MCNC: 69.8 MG/DL
MICROALBUMIN UR-MCNC: 6.6 MG/L (ref 0–20)
MICROALBUMIN/CREAT 24H UR: 9 MG/G CREATININE (ref 0–30)

## 2019-02-19 PROCEDURE — 99214 OFFICE O/P EST MOD 30 MIN: CPT | Performed by: FAMILY MEDICINE

## 2019-02-19 PROCEDURE — 82043 UR ALBUMIN QUANTITATIVE: CPT | Performed by: FAMILY MEDICINE

## 2019-02-19 PROCEDURE — 82570 ASSAY OF URINE CREATININE: CPT | Performed by: FAMILY MEDICINE

## 2019-02-19 RX ORDER — BETAMETHASONE DIPROPIONATE 0.5 MG/G
CREAM TOPICAL
COMMUNITY
Start: 2019-02-03

## 2019-02-19 NOTE — PROGRESS NOTES
Pierre Taina is here for followup  He still gets pain in his low back but only when he gets up in the morning  His MRI did show lumbar disc disease at several levels as well as right-sided synovial facet cyst   He was offered referral but declined  Still with itching  Has ahd for years  Has been treated by dermatology  Has been tried on multiple topical creams with which she said nothing helps  It really does bother him quite a bit and particularly affects his sleep  Saw endocrinology Dr Elena Wynn yesterday  He increased his Levemir  Did a foot exam  He has had no shortness of breath or chest pain  He is due to see Cardiology soon for follow-up for his CHF  He says his tinnitus is better  Using Flonase daily  Had brain MRI ; no acoustic neuroma    Tomorrow he gets his hearing aids  He saw Urology for his urinary symptoms  Diagnosed with BPH  He is considering getting a Urolift procedure  Otherwise is doing well  O: /70   Pulse 80   Resp 16   Ht 6' 1" (1 854 m)   Wt 72 1 kg (159 lb)   BMI 20 98 kg/m²   Neck no adenopathy thyromegaly bruits  Chest clear with good breath sounds  Cardiac regular rate rhythm without murmur    Assessment  1  Chronic back pain with left sciatica-he seems much improved and only seems to have pain for part of the day  He declines any further treatment at this time  Encouraged him to do the exercises he was taught a PT  2   Dry skin dermatitis-unresponsive to multiple topical agents  Will give him a trial of Vistaril to be used at bedtime what is particularly bothersome  3  Diabetes-per Endocrinology  Due for micral   4   BPH symptoms-will follow up with Urology  5    Hypertension-controlled  6  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-to see Cardiology soon  He does appear relatively stable    Plan  Check micral   Rx for Vistaril   As above  Recheck 3 months      ADDENDUM 03/12/2019 FOR  PREOP CLEARANCE  Patient will be having a cystoscopy with insertion Urolift  on 03/22/2019 by Dr Partha Jaime  Current med list is accurate  He has no known allergies  He has had multiple prior surgeries and has had no problems with prior anesthesia  He has no chest pain or shortness of breath as above  There are no current gastrointestinal or respiratory symptoms  No rash fever chills or diarrhea  He has cardiac clearance for the procedure  He is medically stable and medically cleared for Urology procedure

## 2019-02-20 NOTE — PROGRESS NOTES
Heart Failure Follow Up Office Visit Note -     Milli Bhatia   80 y o    male   MRN: [id number]  [address] [place] [phone]    PCP: Dominic Anton MD  Cardiologist: Dr Willie Edwards  Patient resides in [place]  Impression/plan  Chronic combined heart failure  --Baseline weight:  151-154Lb  Weighs himself regularly  Weights at home stable 151--152 lb  --Baseline creatinine:  1 1--1 3    --Educated regarding adherence to a 2 sodium diet and a 1500 ml fluid restriction  --Beta-Blocker: On Toprol 50 mg daily  --ACEi, ARB or ARNi:  On Entresto 24/26 BID  --Aldosterone Receptor Blocker: On spironolactone 25 mg daily  --Diuretic:  On Lasix 20 mg daily  ------I will change his Lasix to 20 mg daily p r n  ONLY for weight gain of 2-3 lb in 1 day or 5 lb in a week  Dilated cardiomyopathy, now with normalized ejection fraction  Initilaly ejection fraction nonischemic,EF 20% August 2018, and was worsening-down from 35 to 40% May 2018  Angiography ( 5/18) disclosed no obstructive CAD  Possible viral induced, as on his initial presentation he presented with several weeks a lower respiratory infection  --echocardiogram 2/21/19:  His ejection fraction improved, now 55% without regional wall motion abnormalities  Chronic left bundle branch block since 2013  Hypertension  Controlled  On loop diuretic, spironolactone, Toprol  mg/d and Entresto 24/26 BID     --discontinue Lasix daily  He will take 20 mg p r n  Only for weight gain as above  Hyperlipidemia, on low-dose statin-simvastatin 10/d  Diabetes mellitus type 2  Followed by an endocrinologist at [address] [place] [address] Blvd  left bundle branch block since 2013        Summary of recommendations  Change Lasix 20 mg to daily p r n   ONLY for weight gain  I recommend he continue on the current medications, and continue to adhere to a low-sodium diet   He will follow up with Dr Alison Montoya in [place] or [place], in 3-4 months  He is to call me if he has problems before then            HPI:   Mr Cj Jose is an 42-year-old gentleman with [id number] mellitus, hypertension,hyperlipidemia and a chronic LBBB, since 2013 but no previously known CAD  He was admitted May 2018 and diagnosed with acute combined heart failure found to have a new cardiomyopathy, ejection fraction 37%  He underwent coronary angiography  (5/13/18) without any obstructive CAD  He was hospitalized November 12- 15th, 2018 at [facility] with decompensated systolic heart failure  His Ef was found to be worsening, now 20%, with severe, diffuse hypokinesis and RV dysfunction, mild MR, moderate TR  He was diuresed  His heart failure medications were adjusted; he was placed on Entresto, lasix and aldactone; lisinopril was discontinued  Discharge weight was 151 lb; creatinine was 1 1  He saw Dr Yoan Walton in hospital follow-up  He was doing well  He declined a Life Vest, after discussion  An echocardiogram was requested in 3 months which is due February 2019  Interval HX  He saw [facility] last week- ringing in the ears, [facility]  He had a scope to evaluate hoarseness  An MRI of the head is anticipated to evaluate hearing loss  He has been having some back problems- going to PT  Anticipated MRI to evaluate that    1/7/19 He returns today for cardiology follow-up  He has accompanied by his son, with whom resides  He denies chest pain, shortness of breath, palpitations, or syncope  He is feeling quite good and wonders if he can golf if, by chance, the sun returns and warms up  They eat at home mostly however occasionally eats out  He tries to watch his sodium intake, keeping it on the low side     2/25/19 he returns today for cardiology follow-up after his echocardiogram   His ejection fraction has normalized  He denies chest pain or shortness of breath    He has light headed briefly, intermittently so 1 to twice a week usually with position changes  Weights are stable  He is adherent to diet and meds as prescribed            Cardiac catheterization May 2018   CORONARY VESSELS:     --  Left main: Normal   --  LAD: The vessel was normal sized  There was mild plaque  There were no significant lesions  The diagonal branches were also free of significant disease  --  Circumflex: The vessel was normal sized and gave rise to one OM branch  There was minor plaque  There were no significant lesions  --  Ramus intermedius: The vessel was normal sized  There was minor non-obstructive plaque  --  RCA: Dominant, giving rise to the PDA and two posterolateral branches  The were no significant lesions      NOTE: Right radial access was employed      Prepared and signed by  Rhys Gasca MD  Signed 05/15/2018 11:46:32      Past Medical History:   Diagnosis Date    [facility]     Diabetes mellitus [facility] )     Type 2    Essential hypertension     Heart failure ([facility] )     Left inguinal hernia     Lung nodule     Malignant melanoma of skin ([facility] )        Review of Systems   Constitution: Negative for chills  HENT: Positive for hearing loss and hoarse voice  Cardiovascular: Negative for chest pain, claudication, cyanosis, dyspnea on exertion, irregular heartbeat, leg swelling, near-syncope, orthopnea, palpitations, paroxysmal nocturnal dyspnea and syncope  Some dizziness/lightheadedness with position changes  Improves with slower changing of positions   Respiratory: Negative for cough and shortness of breath  Gastrointestinal: Negative for heartburn and nausea  Neurological: Negative for dizziness, focal weakness, headaches, light-headedness and weakness  All other systems reviewed and are negative  No Known Allergies        Current Outpatient Medications:     acetaminophen (TYLENOL) 325 mg tablet, Take 2 tablets (650 mg total) by mouth every 6 (six) hours as needed for mild pain, Disp: 30 tablet, Rfl: 0    albuterol (2 5 mg/3 mL) 0 083 % nebulizer solution, Take 1 vial (2 5 mg total) by nebulization every 6 (six) hours as needed for wheezing or shortness of breath, Disp: 1080 mL, Rfl: 3    ANORO ELLIPTA 62 5-25 MCG/INH inhaler, INHALE ONE PUFF BY MOUTH ONCE DAILY , Disp: 60 each, Rfl: 3    Ascorbic Acid (VITAMIN C) 1000 MG tablet, Take 1 tablet by mouth daily, Disp: , Rfl:     BD PEN NEEDLE DON U/F 32G X 4 MM MISC, , Disp: , Rfl:     betamethasone, augmented, (DIPROLENE-AF) 0 05 % cream, , Disp: , Rfl:     Cinnamon 500 MG TABS, Take 1 tablet by mouth daily  , Disp: , Rfl:     fluticasone (FLONASE) 50 mcg/act nasal spray, 2 sprays into each nostril daily, Disp: 16 g, Rfl: 5    fluticasone (FLOVENT HFA) 220 mcg/act inhaler, Inhale 1 puff 2 (two) times a day, Disp: 1 Inhaler, Rfl: 5    furosemide (LASIX) 20 mg tablet, Take 1 tablet (20 mg total) by mouth daily, Disp: 90 tablet, Rfl: 2    gabapentin (NEURONTIN) 100 mg capsule, Take 2 capsules (200 mg total) by mouth 3 (three) times a day, Disp: 540 capsule, Rfl: 1    insulin detemir (LEVEMIR) 100 units/mL subcutaneous injection, Inject 17 Units under the skin daily at bedtime , Disp: , Rfl:     metoprolol succinate (TOPROL-XL) 50 mg 24 hr tablet, Take 2 tablets (100 mg total) by mouth daily, Disp: 180 tablet, Rfl: 0    montelukast (SINGULAIR) 10 mg tablet, Take 1 tablet (10 mg total) by mouth daily at bedtime, Disp: 30 tablet, Rfl: 3    Multiple Vitamins-Minerals (OCUVITE ADULT 50+ PO), Take 1 tablet by mouth daily, Disp: , Rfl:     NOVOLOG FLEXPEN 100 units/mL injection pen, Inject 6 Units under the skin daily with breakfast , Disp: , Rfl:     ONE TOUCH ULTRA TEST test strip, , Disp: , Rfl:     ONETOUCH DELICA LANCETS FINE MISC, , Disp: , Rfl:     repaglinide (PRANDIN) 2 mg tablet, Take 2 mg by mouth daily before lunch , Disp: , Rfl:     roflumilast (DALIRESP) 500 mcg tablet, Take 1 tablet (500 mcg total) by mouth daily, Disp: 30 tablet, Rfl: 5    sacubitril-valsartan (ENTRESTO) 24-26 MG TABS, Take 1 tablet by mouth 2 (two) times a day, Disp: 180 tablet, Rfl: 0    simvastatin (ZOCOR) 10 mg tablet, Take 1 tablet (10 mg total) by mouth daily, Disp: 90 tablet, Rfl: 1    spironolactone (ALDACTONE) 25 mg tablet, Take 1 tablet (25 mg total) by mouth daily, Disp: 90 tablet, Rfl: 0    VENTOLIN  (90 Base) MCG/ACT inhaler, Inhale 4 (four) times a day  , Disp: , Rfl:     Social History     Socioeconomic History    Marital status:      Spouse name: Not on file    Number of children: 2    Years of education: Not on file    Highest education level: Not on file   Occupational History    Occupation: rtired   Social Needs    Financial resource strain: Not on file    Food insecurity:     Worry: Not on file     Inability: Not on file   Technical Machine needs:     Medical: Not on file     Non-medical: Not on file   Tobacco Use    Smoking status: Former Smoker     Packs/day: 1 00     Years: 10 00     Pack years: 10 00     Last attempt to quit: 1960     Years since quittin 1    Smokeless tobacco: Never Used   Substance and Sexual Activity    Alcohol use:  Yes     Alcohol/week: 0 0 oz     Comment: SOcially     Drug use: No    Sexual activity: Not on file   Lifestyle    Physical activity:     Days per week: Not on file     Minutes per session: Not on file    Stress: Not on file   Relationships    Social connections:     Talks on phone: Not on file     Gets together: Not on file     Attends Ca[employer]ism service: Not on file     Active member of club or organization: Not on file     Attends meetings of clubs or organizations: Not on file     Relationship status: Not on file    Intimate partner violence:     Fear of current or ex partner: Not on file     Emotionally abused: Not on file     Physically abused: Not on file     Forced sexual activity: Not on file   Other Topics Concern    Not on file   Social History Narrative    Caffeine use, active       Family History   Problem Relation Age of Onset    Diabetes Mother     Heart attack Neg Hx     Stroke Neg Hx     Aneurysm Neg Hx     Clotting disorder Neg Hx     Arrhythmia Neg Hx     Hypertension Neg Hx     Hyperlipidemia Neg Hx         pt unsure        Physical Exam   Constitutional: He is oriented to person, place, and time  No distress  HENT:   Head: Normocephalic and atraumatic  Hard of hearing   Eyes: Conjunctivae and EOM are normal    Neck: Normal range of motion  Neck supple  Cardiovascular: Normal rate, regular rhythm, normal heart sounds and intact distal pulses  Pulmonary/Chest: Effort normal and breath sounds normal    Abdominal: Soft  Bowel sounds are normal    Musculoskeletal: Normal range of motion  Neurological: He is alert and oriented to person, place, and time  Skin: Skin is warm and dry  He is not diaphoretic  Psychiatric: He has a normal mood and affect  Nursing note and vitals reviewed  Vitals: There were no vitals taken for this visit  Wt Readings from Last 3 Encounters:   02/19/19 72 1 kg (159 lb)   01/24/19 69 4 kg (153 lb)   01/09/19 71 8 kg (158 lb 6 4 oz)         Labs & Results:  Lab Results   Component Value Date    WBC 6 38 11/15/2018    HGB 13 7 11/15/2018    HCT 41 7 11/15/2018    MCV 93 11/15/2018     (L) 11/15/2018     B-Type Natriuretic Peptide   Date Value Ref Range Status   05/22/2018 428 (H) <100 pg/mL Final     Comment:        BNP levels increase with age in the general  population with the highest values seen in  individuals greater than 76years of age  Reference: J  Am  [facility]  Cardiol  2002; 03:[id number]            No components found for: CHEM    Results for orders placed during the hospital encounter of 09/06/18   Echo complete with contrast if indicated    [facility]  [facility], [address] [place] [address]  [phone]    Transthoracic Echocardiogram  2D, M-mode, Doppler, and Color Doppler    Study date:  06-Sep-2018    Patient: Geoff Murillo  MR number: [id number]  Account number: [de-identified]  : 1933  Age: 80 years  Gender: Male  Status: Outpatient  Location: [place] [address]   Height: 70 in  Weight: 170 7 lb  BP: 118/ 78 mmHg    Indications: Cardiomyopathy    Diagnoses: I42 9 - Cardiomyopathy, unspecified    Sonographer:  Louis Chauhan RDCS  Primary Physician:  Owen Vasquez MD  Referring Physician:  Lord Javier MD  Group:  [facility]  Interpreting Physician:  Omar Mast MD    SUMMARY    LEFT VENTRICLE:  Size was at the upper limits of normal   Systolic function was severely reduced  Ejection fraction was estimated to be 20 %  There was severe diffuse hypokinesis  Features were consistent with a pseudonormal left ventricular filling pattern, with concomitant abnormal relaxation and increased filling pressure (grade 2 diastolic dysfunction)  RIGHT VENTRICLE:  The ventricle was dilated  Systolic function was reduced  LEFT ATRIUM:  The atrium was mildly to moderately dilated  RIGHT ATRIUM:  The atrium was moderately dilated  MITRAL VALVE:  There was mild regurgitation  TRICUSPID VALVE:  There was moderate regurgitation  Pulmonary artery systolic pressure was moderately increased  IVC, HEPATIC VEINS:  The inferior vena cava was dilated  Respirophasic changes were blunted (less than 50% variation)  HISTORY: PRIOR HISTORY: Congestive heart failure, chronic obstructive pulmonary disease, hypertension, hyperlipidemia, diabetes, dilated cardiomyopathy    PROCEDURE: The study was performed in the [place]  This was a routine study  The transthoracic approach was used  The study included complete 2D imaging, M-mode, complete spectral Doppler, and color Doppler  Images were obtained  from the parasternal, apical, subcostal, and suprasternal notch acoustic windows   This was a technically difficult study  LEFT VENTRICLE: Size was at the upper limits of normal  Systolic function was severely reduced  Ejection fraction was estimated to be 20 %  There was severe diffuse hypokinesis  Wall thickness was normal  DOPPLER: Features were consistent  with a pseudonormal left ventricular filling pattern, with concomitant abnormal relaxation and increased filling pressure (grade 2 diastolic dysfunction)  RIGHT VENTRICLE: The ventricle was dilated  Systolic function was reduced  LEFT ATRIUM: The atrium was mildly to moderately dilated  RIGHT ATRIUM: The atrium was moderately dilated  MITRAL VALVE: Valve structure was normal  There was normal leaflet separation  DOPPLER: The transmitral velocity was within the normal range  There was no evidence for stenosis  There was mild regurgitation  AORTIC VALVE: The valve was trileaflet  Leaflets exhibited mildly increased thickness and lower normal cuspal separation  DOPPLER: Transaortic velocity was within the normal range  There was no evidence for stenosis  There was no  significant regurgitation  TRICUSPID VALVE: The valve structure was normal  There was normal leaflet separation  DOPPLER: The transtricuspid velocity was within the normal range  There was no evidence for stenosis  There was moderate regurgitation  Pulmonary artery  systolic pressure was moderately increased  PULMONIC VALVE: Leaflets exhibited normal thickness, no calcification, and normal cuspal separation  DOPPLER: The transpulmonic velocity was within the normal range  There was no significant regurgitation  PERICARDIUM: There was no pericardial effusion  The pericardium was normal in appearance  AORTA: The root exhibited normal size  SYSTEMIC VEINS: IVC: The inferior vena cava was dilated  Respirophasic changes were blunted (less than 50% variation)      SYSTEM MEASUREMENT TABLES    2D  %FS: 7 93 %  Ao Diam: 2 9 cm  EDV(Teich): 147 52 ml  EF Biplane: 32 22 %  EF(Teich): 17 4 %  ESV(Teich): 121 85 ml  IVSd: 1 14 cm  LA Area: 27 15 cm2  LA Diam: 4 67 cm  LVEDV MOD A2C: 187 3 ml  LVEDV MOD A4C: 151 1 ml  LVEDV MOD BP: 172 17 ml  LVEF MOD A2C: 33 8 %  LVEF MOD A4C: 28 69 %  LVESV MOD A2C: 123 99 ml  LVESV MOD A4C: 107 74 ml  LVESV MOD BP: 116 69 ml  LVIDd: 5 5 cm  LVIDs: 5 07 cm  LVLd A2C: 9 45 cm  LVLd A4C: 9 cm  LVLs A2C: 8 43 cm  LVLs A4C: 8 61 cm  LVOT Diam: 2 28 cm  LVPWd: 0 91 cm  RA Area: 33 07 cm2  RV Diam : 4 81 cm  SI(Teich): 13 16 ml/m2  SV MOD A2C: 63 31 ml  SV MOD A4C: 43 36 ml  SV(Cube): 36 57 ml  SV(Teich): 25 67 ml    CW  TR MaxP 5 mmHg  TR Vmax: 2 83 m/s    MM  TAPSE: 0 94 cm    PW  AVC: 396 54 ms  E': 0 02 m/s    [employer] Accredited Echocardiography Laboratory    Prepared and electronically signed by    Irwin Macario MD  Signed 06-Sep-2018 16:01:42

## 2019-02-21 ENCOUNTER — TELEPHONE (OUTPATIENT)
Dept: FAMILY MEDICINE CLINIC | Facility: MEDICAL CENTER | Age: 84
End: 2019-02-21

## 2019-02-21 ENCOUNTER — HOSPITAL ENCOUNTER (OUTPATIENT)
Dept: NON INVASIVE DIAGNOSTICS | Facility: MEDICAL CENTER | Age: 84
Discharge: HOME | End: 2019-02-21
Payer: COMMERCIAL

## 2019-02-21 DIAGNOSIS — I50.43 ACUTE ON CHRONIC COMBINED SYSTOLIC AND DIASTOLIC CONGESTIVE HEART FAILURE (HCC): ICD-10-CM

## 2019-02-21 DIAGNOSIS — L29.9 PRURITUS: Primary | ICD-10-CM

## 2019-02-21 DIAGNOSIS — R05.9 COUGH: ICD-10-CM

## 2019-02-21 PROCEDURE — 93306 TTE W/DOPPLER COMPLETE: CPT

## 2019-02-21 PROCEDURE — 93306 TTE W/DOPPLER COMPLETE: CPT | Performed by: INTERNAL MEDICINE

## 2019-02-21 RX ORDER — HYDROXYZINE PAMOATE 25 MG/1
25 CAPSULE ORAL 3 TIMES DAILY PRN
Qty: 30 CAPSULE | Refills: 0 | Status: SHIPPED | OUTPATIENT
Start: 2019-02-21 | End: 2019-04-25 | Stop reason: SDUPTHER

## 2019-02-21 RX ORDER — MONTELUKAST SODIUM 10 MG/1
10 TABLET ORAL
Qty: 30 TABLET | Refills: 3 | Status: SHIPPED | OUTPATIENT
Start: 2019-02-21 | End: 2019-06-28 | Stop reason: SDUPTHER

## 2019-02-21 NOTE — TELEPHONE ENCOUNTER
Brenda Rahman called and states that the Vistaril that was supposed to be sent to the [facility] in [place] was never sent  Can you please send this for Brenda Rahman  He would like a call back when this is done

## 2019-02-21 NOTE — TELEPHONE ENCOUNTER
I spoke with the pharmacy and the cost is only $9   Giovanni Will is aware and will pay out of pocket for this med

## 2019-02-22 ENCOUNTER — TELEPHONE (OUTPATIENT)
Dept: CARDIOLOGY CLINIC | Facility: CLINIC | Age: 84
End: 2019-02-22

## 2019-02-22 NOTE — TELEPHONE ENCOUNTER
----- Message from Faiza Nowak MD sent at 2/21/2019  4:27 PM EST -----  Echo looks good, heart function looks much improved, ejection fraction has normalized, please let the patient know

## 2019-02-25 ENCOUNTER — OFFICE VISIT (OUTPATIENT)
Dept: CARDIOLOGY CLINIC | Facility: CLINIC | Age: 84
End: 2019-02-25
Payer: COMMERCIAL

## 2019-02-25 VITALS
HEART RATE: 74 BPM | BODY MASS INDEX: 21.23 KG/M2 | HEIGHT: 73 IN | WEIGHT: 160.2 LBS | DIASTOLIC BLOOD PRESSURE: 54 MMHG | OXYGEN SATURATION: 97 % | SYSTOLIC BLOOD PRESSURE: 118 MMHG

## 2019-02-25 DIAGNOSIS — I42.0 DILATED CARDIOMYOPATHY (HCC): Primary | ICD-10-CM

## 2019-02-25 DIAGNOSIS — J44.9 COPD WITHOUT EXACERBATION (HCC): ICD-10-CM

## 2019-02-25 DIAGNOSIS — Z79.4 TYPE 2 DIABETES MELLITUS WITH DIABETIC POLYNEUROPATHY, WITH LONG-TERM CURRENT USE OF INSULIN (HCC): ICD-10-CM

## 2019-02-25 DIAGNOSIS — E78.49 OTHER HYPERLIPIDEMIA: ICD-10-CM

## 2019-02-25 DIAGNOSIS — I10 ESSENTIAL HYPERTENSION: ICD-10-CM

## 2019-02-25 DIAGNOSIS — I50.42 CHRONIC COMBINED SYSTOLIC AND DIASTOLIC CONGESTIVE HEART FAILURE (HCC): ICD-10-CM

## 2019-02-25 DIAGNOSIS — E11.42 TYPE 2 DIABETES MELLITUS WITH DIABETIC POLYNEUROPATHY, WITH LONG-TERM CURRENT USE OF INSULIN (HCC): ICD-10-CM

## 2019-02-25 DIAGNOSIS — I50.22 CHRONIC SYSTOLIC CONGESTIVE HEART FAILURE (HCC): ICD-10-CM

## 2019-02-25 PROCEDURE — 99213 OFFICE O/P EST LOW 20 MIN: CPT | Performed by: INTERNAL MEDICINE

## 2019-02-25 RX ORDER — FUROSEMIDE 20 MG/1
20 TABLET ORAL DAILY PRN
Qty: 90 TABLET | Refills: 2 | Status: SHIPPED | OUTPATIENT
Start: 2019-02-25 | End: 2019-03-12 | Stop reason: ALTCHOICE

## 2019-02-25 NOTE — PATIENT INSTRUCTIONS
Continue current medications  Continue following the DASH diet    Take your medications as directed, and keep your follow up appointments  Adhere to a heart healthy lifestyle, maintaining a low sodium diet  Daily weight and record  If your weight increases 2-3 lbs in one day, or 5 lbs in 2 days, you are short of breath or have lower extremity swelling, please call the heart failure team at  [facility] at [phone]

## 2019-02-25 NOTE — LETTER
February 25, 2019     Owen Vasquez MD  [address] [facility] [address]    Patient: Crystal Fraga   YOB: 1933   Date of Visit: 2/25/2019       Dear Dr Ellen Mendieta: Thank you for referring Ramona Frye to me for evaluation  Below are my notes for this consultation  If you have questions, please do not hesitate to call me  I look forward to following your patient along with you  Sincerely,        ANGY Orlando        CC: MD Raymond Gottlieb, [facility]   2/25/2019 11:04 AM  Sign at close encounter  Heart Failure Follow Up Office Visit Note -     Crystal Fraga   80 y o    male   MRN: [id number]  [address]  [address] [facility] [address] [place] [place] [address]  [phone]    PCP: Owen Vasquez MD  Cardiologist: Dr Ignacio Reddy  Patient resides in [facility]  Impression/plan  Chronic combined heart failure  --Baseline weight:  151-154Lb  Weighs himself regularly  Weights at home stable 151--152 lb  --Baseline creatinine:  1 1--1 3    --Educated regarding adherence to a 2 sodium diet and a 1500 ml fluid restriction  --Beta-Blocker: On Toprol 50 mg daily  --ACEi, ARB or ARNi:  On Entresto 24/26 BID  --Aldosterone Receptor Blocker: On spironolactone 25 mg daily  --Diuretic:  On Lasix 20 mg daily  ------I will change his Lasix to 20 mg daily p r n  ONLY for weight gain of 2-3 lb in 1 day or 5 lb in a week  Dilated cardiomyopathy, now with normalized ejection fraction  Initilaly ejection fraction nonischemic,EF 20% August 2018, and was worsening-down from 35 to 40% May 2018  Angiography ( 5/18) disclosed no obstructive CAD  Possible viral induced, as on his initial presentation he presented with several weeks a lower respiratory infection  --echocardiogram 2/21/19:  His ejection fraction improved, now 55% without regional wall motion abnormalities  Chronic left bundle branch block since 2013  Hypertension  Controlled  On loop diuretic, spironolactone, Toprol  mg/d and Entresto 24/26 BID     --discontinue Lasix daily  He will take 20 mg p r n  Only for weight gain as above  Hyperlipidemia, on low-dose statin-simvastatin 10/d  Diabetes mellitus type 2  Followed by an endocrinologist at [address] [place] [address] Blvd  left bundle branch block since 2013        Summary of recommendations  Change Lasix 20 mg to daily p r n  ONLY for weight gain  I recommend he continue on the current medications, and continue to adhere to a low-sodium diet   He will follow up with Dr Renita Kirkpatrick in [place] or [place], in 3-4 months  He is to call me if he has problems before then            HPI:    Mr Levon Ibarra is an 63-year-old gentleman with R2ihcyqafi mellitus, hypertension,hyperlipidemia and a chronic LBBB, since 2013 but no previously known CAD  He was admitted May 2018 and diagnosed with acute combined heart failure found to have a new cardiomyopathy, ejection fraction 37%  He underwent coronary angiography  (5/13/18) without any obstructive CAD  He was hospitalized November 12- 15th, 2018 at [facility] with decompensated systolic heart failure  His Ef was found to be worsening, now 20%, with severe, diffuse hypokinesis and RV dysfunction, mild MR, moderate TR  He was diuresed  His heart failure medications were adjusted; he was placed on Entresto, lasix and aldactone; lisinopril was discontinued  Discharge weight was 151 lb; creatinine was 1 1  He saw Dr Aura Coulter in hospital follow-up  He was doing well  He declined a Life Vest, after discussion  An echocardiogram was requested in 3 months which is due February 2019  Interval HX  He saw [facility] last week- ringing in the ears, [facility]  He had a scope to evaluate hoarseness  An MRI of the head is anticipated to evaluate hearing loss  He has been having some back problems- going to PT   Anticipated MRI to evaluate that    1/7/19 He returns today for cardiology follow-up  He has accompanied by his son, with whom resides  He denies chest pain, shortness of breath, palpitations, or syncope  He is feeling quite good and wonders if he can golf if, by chance, the sun returns and warms up  They eat at home mostly however occasionally eats out  He tries to watch his sodium intake, keeping it on the low side     2/25/19 he returns today for cardiology follow-up after his echocardiogram   His ejection fraction has normalized  He denies chest pain or shortness of breath  He has light headed briefly, intermittently so 1 to twice a week usually with position changes  Weights are stable  He is adherent to diet and meds as prescribed            Cardiac catheterization May 2018   CORONARY VESSELS:     --  Left main: Normal   --  LAD: The vessel was normal sized  There was mild plaque  There were no significant lesions  The diagonal branches were also free of significant disease  --  Circumflex: The vessel was normal sized and gave rise to one OM branch  There was minor plaque  There were no significant lesions  --  Ramus intermedius: The vessel was normal sized  There was minor non-obstructive plaque  --  RCA: Dominant, giving rise to the PDA and two posterolateral branches  The were no significant lesions      NOTE: Right radial access was employed      Prepared and signed by  Sheryle Hoe, MD  Signed 05/15/2018 11:46:32      Past Medical History:   Diagnosis Date    COPD [facility]chronic obstructive [facility] disease) [facility]     [facility]     Type 2    Essential hypertension     Heart failure ([facility] )     Left inguinal hernia     Lung nodule     Malignant melanoma of skin [facility]        Review of Systems   Constitution: Negative for chills  HENT: Positive for hearing loss and hoarse voice      Cardiovascular: Negative for chest pain, claudication, cyanosis, dyspnea on exertion, irregular heartbeat, leg swelling, near-syncope, orthopnea, palpitations, paroxysmal nocturnal dyspnea and syncope  Some dizziness/lightheadedness with position changes  Improves with slower changing of positions   Respiratory: Negative for cough and shortness of breath  Gastrointestinal: Negative for heartburn and nausea  Neurological: Negative for dizziness, focal weakness, headaches, light-headedness and weakness  All other systems reviewed and are negative  No Known Allergies        Current Outpatient Medications:     acetaminophen (TYLENOL) 325 mg tablet, Take 2 tablets (650 mg total) by mouth every 6 (six) hours as needed for mild pain, Disp: 30 tablet, Rfl: 0    albuterol (2 5 mg/3 mL) 0 083 % nebulizer solution, Take 1 vial (2 5 mg total) by nebulization every 6 (six) hours as needed for wheezing or shortness of breath, Disp: 1080 mL, Rfl: 3    ANORO ELLIPTA 62 5-25 MCG/INH inhaler, INHALE ONE PUFF BY MOUTH ONCE DAILY , Disp: 60 each, Rfl: 3    Ascorbic Acid (VITAMIN C) 1000 MG tablet, Take 1 tablet by mouth daily, Disp: , Rfl:     BD PEN NEEDLE DON U/F 32G X 4 MM MISC, , Disp: , Rfl:     betamethasone, augmented, (DIPROLENE-AF) 0 05 % cream, , Disp: , Rfl:     Cinnamon 500 MG TABS, Take 1 tablet by mouth daily  , Disp: , Rfl:     fluticasone (FLONASE) 50 mcg/act nasal spray, 2 sprays into each nostril daily, Disp: 16 g, Rfl: 5    fluticasone (FLOVENT HFA) 220 mcg/act inhaler, Inhale 1 puff 2 (two) times a day, Disp: 1 Inhaler, Rfl: 5    furosemide (LASIX) 20 mg tablet, Take 1 tablet (20 mg total) by mouth daily, Disp: 90 tablet, Rfl: 2    gabapentin (NEURONTIN) 100 mg capsule, Take 2 capsules (200 mg total) by mouth 3 (three) times a day, Disp: 540 capsule, Rfl: 1    insulin detemir (LEVEMIR) 100 units/mL subcutaneous injection, Inject 17 Units under the skin daily at bedtime , Disp: , Rfl:     metoprolol succinate (TOPROL-XL) 50 mg 24 hr tablet, Take 2 tablets (100 mg total) by mouth daily, Disp: 180 tablet, Rfl: 0    montelukast (SINGULAIR) 10 mg tablet, Take 1 tablet (10 mg total) by mouth daily at bedtime, Disp: 30 tablet, Rfl: 3    Multiple Vitamins-Minerals (OCUVITE ADULT 50+ PO), Take 1 tablet by mouth daily, Disp: , Rfl:     NOVOLOG FLEXPEN 100 units/mL injection pen, Inject 6 Units under the skin daily with breakfast , Disp: , Rfl:     ONE TOUCH ULTRA TEST test strip, , Disp: , Rfl:     ONETOUCH DELICA LANCETS FINE MISC, , Disp: , Rfl:     repaglinide (PRANDIN) 2 mg tablet, Take 2 mg by mouth daily before lunch , Disp: , Rfl:     roflumilast (DALIRESP) 500 mcg tablet, Take 1 tablet (500 mcg total) by mouth daily, Disp: 30 tablet, Rfl: 5    sacubitril-valsartan (ENTRESTO) 24-26 MG TABS, Take 1 tablet by mouth 2 (two) times a day, Disp: 180 tablet, Rfl: 0    simvastatin (ZOCOR) 10 mg tablet, Take 1 tablet (10 mg total) by mouth daily, Disp: 90 tablet, Rfl: 1    spironolactone (ALDACTONE) 25 mg tablet, Take 1 tablet (25 mg total) by mouth daily, Disp: 90 tablet, Rfl: 0    VENTOLIN  (90 Base) MCG/ACT inhaler, Inhale 4 (four) times a day  , Disp: , Rfl:     Social History     Socioeconomic History    Marital status:      Spouse name: Not on file    Number of children: 2    Years of education: Not on file    Highest education level: Not on file   Occupational History    Occupation: rtired   Social Needs    Financial resource strain: Not on file    Food insecurity:     Worry: Not on file     Inability: Not on file   [email or web address] needs:     Medical: Not on file     Non-medical: Not on file   Tobacco Use    Smoking status: Former Smoker     Packs/day: 1 00     Years: 10 00     Pack years: 10 00     Last attempt to quit: 1960     Years since quittin 1    Smokeless tobacco: Never Used   Substance and Sexual Activity    Alcohol use:  Yes     Alcohol/week: 0 0 oz     Comment: SOcially     Drug use: No    Sexual activity: Not on file   Lifestyle    Physical activity:     Days per week: Not on file     Minutes per session: Not on file    Stress: Not on file   Relationships    Social connections:     Talks on phone: Not on file     Gets together: Not on file     Attends Buddhist service: Not on file     Active member of club or organization: Not on file     Attends meetings of clubs or organizations: Not on file     Relationship status: Not on file    Intimate partner violence:     Fear of current or ex partner: Not on file     Emotionally abused: Not on file     Physically abused: Not on file     Forced sexual activity: Not on file   Other Topics Concern    Not on file   Social History Narrative    Caffeine use, active       Family History   Problem Relation Age of Onset    Diabetes Mother     Heart attack Neg Hx     Stroke Neg Hx     Aneurysm Neg Hx     Clotting disorder Neg Hx     Arrhythmia Neg Hx     Hypertension Neg Hx     Hyperlipidemia Neg Hx         pt unsure        Physical Exam   Constitutional: He is oriented to person, place, and time  No distress  HENT:   Head: Normocephalic and atraumatic  Hard of hearing   Eyes: Conjunctivae and EOM are normal    Neck: Normal range of motion  Neck supple  Cardiovascular: Normal rate, regular rhythm, normal heart sounds and intact distal pulses  Pulmonary/Chest: Effort normal and breath sounds normal    Abdominal: Soft  Bowel sounds are normal    Musculoskeletal: Normal range of motion  Neurological: He is alert and oriented to person, place, and time  Skin: Skin is warm and dry  He is not diaphoretic  Psychiatric: He has a normal mood and affect  Nursing note and vitals reviewed  Vitals: There were no vitals taken for this visit     Wt Readings from Last 3 Encounters:   02/19/19 72 1 kg (159 lb)   01/24/19 69 4 kg (153 lb)   01/09/19 71 8 kg (158 lb 6 4 oz)         Labs & Results:  Lab Results   Component Value Date    WBC 6 38 11/15/2018    HGB 13 7 11/15/2018    HCT 41 7 11/15/2018    MCV 93 11/15/2018     (L) 11/15/2018     B-Type Natriuretic Peptide   Date Value Ref Range Status   2018 428 (H) <100 pg/mL Final     Comment:        BNP levels increase with age in the general  population with the highest values seen in  individuals greater than 76years of age  Reference: J  Tova Rodriguez Gregorio  Cardiol  2002; 45:644-805  No components found for: CHEM    Results for orders placed during the hospital encounter of 18   Echo complete with contrast if indicated    Narrative [place] [address], [facility]  [phone]    Transthoracic Echocardiogram  2D, M-mode, Doppler, and Color Doppler    Study date:  06-Sep-2018    Patient: Russell Baldwin  MR number: [id number]  Account number: [de-identified]  : 1933  Age: 80 years  Gender: Male  Status: Outpatient  Location: [place] [address]   Height: 70 in  Weight: 170 7 lb  BP: 118/ 78 mmHg    Indications: Cardiomyopathy    Diagnoses: I42 9 - Cardiomyopathy, unspecified    Sonographer:  Sandy Sanches  Primary Physician:  Usama Wallace MD  Referring Physician:  Miriam Sher MD  Group:  [facility]  Interpreting Physician:  Elizabet Rader MD    SUMMARY    LEFT VENTRICLE:  Size was at the upper limits of normal   Systolic function was severely reduced  Ejection fraction was estimated to be 20 %  There was severe diffuse hypokinesis  Features were consistent with a pseudonormal left ventricular filling pattern, with concomitant abnormal relaxation and increased filling pressure (grade 2 diastolic dysfunction)  RIGHT VENTRICLE:  The ventricle was dilated  Systolic function was reduced  LEFT ATRIUM:  The atrium was mildly to moderately dilated  RIGHT ATRIUM:  The atrium was moderately dilated  MITRAL VALVE:  There was mild regurgitation  TRICUSPID VALVE:  There was moderate regurgitation  Pulmonary artery systolic pressure was moderately increased      IVC, HEPATIC VEINS:  The inferior vena cava was dilated  Respirophasic changes were blunted (less than 50% variation)  HISTORY: PRIOR HISTORY: Congestive heart failure, chronic obstructive pulmonary disease, hypertension, hyperlipidemia, diabetes, dilated cardiomyopathy    PROCEDURE: The study was performed in the [address]  This was a routine study  The transthoracic approach was used  The study included complete 2D imaging, M-mode, complete spectral Doppler, and color Doppler  Images were obtained  from the parasternal, apical, subcostal, and suprasternal notch acoustic windows  This was a technically difficult study  LEFT VENTRICLE: Size was at the upper limits of normal  Systolic function was severely reduced  Ejection fraction was estimated to be 20 %  There was severe diffuse hypokinesis  Wall thickness was normal  DOPPLER: Features were consistent  with a pseudonormal left ventricular filling pattern, with concomitant abnormal relaxation and increased filling pressure (grade 2 diastolic dysfunction)  RIGHT VENTRICLE: The ventricle was dilated  Systolic function was reduced  LEFT ATRIUM: The atrium was mildly to moderately dilated  RIGHT ATRIUM: The atrium was moderately dilated  MITRAL VALVE: Valve structure was normal  There was normal leaflet separation  DOPPLER: The transmitral velocity was within the normal range  There was no evidence for stenosis  There was mild regurgitation  AORTIC VALVE: The valve was trileaflet  Leaflets exhibited mildly increased thickness and lower normal cuspal separation  DOPPLER: Transaortic velocity was within the normal range  There was no evidence for stenosis  There was no  significant regurgitation  TRICUSPID VALVE: The valve structure was normal  There was normal leaflet separation  DOPPLER: The transtricuspid velocity was within the normal range  There was no evidence for stenosis  There was moderate regurgitation   Pulmonary artery  systolic pressure was moderately increased  PULMONIC VALVE: Leaflets exhibited normal thickness, no calcification, and normal cuspal separation  DOPPLER: The transpulmonic velocity was within the normal range  There was no significant regurgitation  PERICARDIUM: There was no pericardial effusion  The pericardium was normal in appearance  AORTA: The root exhibited normal size  SYSTEMIC VEINS: IVC: The inferior vena cava was dilated  Respirophasic changes were blunted (less than 50% variation)      SYSTEM MEASUREMENT TABLES    2D  %FS: 7 93 %  Ao Diam: 2 9 cm  EDV(Teich): 147 52 ml  EF Biplane: 32 22 %  EF(Teich): 17 4 %  ESV(Teich): 121 85 ml  IVSd: 1 14 cm  LA Area: 27 15 cm2  LA Diam: 4 67 cm  LVEDV MOD A2C: 187 3 ml  LVEDV MOD A4C: 151 1 ml  LVEDV MOD BP: 172 17 ml  LVEF MOD A2C: 33 8 %  LVEF MOD A4C: 28 69 %  LVESV MOD A2C: 123 99 ml  LVESV MOD A4C: 107 74 ml  LVESV MOD BP: 116 69 ml  LVIDd: 5 5 cm  LVIDs: 5 07 cm  LVLd A2C: 9 45 cm  LVLd A4C: 9 cm  LVLs A2C: 8 43 cm  LVLs A4C: 8 61 cm  LVOT Diam: 2 28 cm  LVPWd: 0 91 cm  RA Area: 33 07 cm2  RV Diam : 4 81 cm  SI(Teich): 13 16 ml/m2  SV MOD A2C: 63 31 ml  SV MOD A4C: 43 36 ml  SV(Cube): 36 57 ml  SV(Teich): 25 67 ml    CW  TR MaxP 5 mmHg  TR Vmax: 2 83 m/s    MM  TAPSE: 0 94 cm    PW  AVC: 396 54 ms  E': 0 02 m/s    [employer] Accredited Echocardiography Laboratory    Prepared and electronically signed by    Radha Mejía MD  Signed 06-Sep-2018 16:01:42

## 2019-02-27 ENCOUNTER — PROCEDURE VISIT (OUTPATIENT)
Dept: UROLOGY | Facility: CLINIC | Age: 84
End: 2019-02-27
Payer: COMMERCIAL

## 2019-02-27 VITALS
HEART RATE: 68 BPM | HEIGHT: 73 IN | SYSTOLIC BLOOD PRESSURE: 128 MMHG | BODY MASS INDEX: 21.2 KG/M2 | DIASTOLIC BLOOD PRESSURE: 88 MMHG | WEIGHT: 160 LBS

## 2019-02-27 DIAGNOSIS — R39.16 BENIGN PROSTATIC HYPERPLASIA (BPH) WITH STRAINING ON URINATION: ICD-10-CM

## 2019-02-27 DIAGNOSIS — R33.9 URINARY RETENTION: Primary | ICD-10-CM

## 2019-02-27 DIAGNOSIS — N40.1 BENIGN PROSTATIC HYPERPLASIA (BPH) WITH STRAINING ON URINATION: ICD-10-CM

## 2019-02-27 PROCEDURE — 52000 CYSTOURETHROSCOPY: CPT | Performed by: UROLOGY

## 2019-02-27 PROCEDURE — 51741 ELECTRO-UROFLOWMETRY FIRST: CPT | Performed by: UROLOGY

## 2019-02-27 PROCEDURE — 51798 US URINE CAPACITY MEASURE: CPT | Performed by: UROLOGY

## 2019-02-27 PROCEDURE — 76872 US TRANSRECTAL: CPT | Performed by: UROLOGY

## 2019-02-27 PROCEDURE — 99214 OFFICE O/P EST MOD 30 MIN: CPT | Performed by: UROLOGY

## 2019-02-27 NOTE — PROGRESS NOTES
Uroflow Result    Indication:     medically refractory lower urinary tract symptoms       Procedure  [facility]  There asked to void volitionally into the uroflow apparatus  The following findings were noted:    Findings:  Voided Volume:   11  Maximum flow (Qmax):  3 ml/s  Average flow:    3 ml/s  Description of emptying curve:   diminutive and flattened due to low emptied volume       Post Void Residual Measurement:  After voiding to completion the patient was brought to the exam room and positioned supine    Residual urine volume was measured with an ultrasound at:    0 ml

## 2019-02-27 NOTE — PROGRESS NOTES
Office Cystoscopy Procedure Note    Indication:     medically refractory lower urinary tract symptoms     Informed consent   The risks, benefits, complications, treatment options, and expected outcomes were discussed with the patient  The patient concurred with the proposed plan and provided informed consent  Anesthesia  Lidocaine jelly 2%    Antibiotic prophylaxis   None    Procedure  The patient was placed in the supineposition, was prepped and draped in the usual manner using sterile technique, and 2% lidocaine jelly instilled into the urethra  A 17 F flexible cystoscope was then inserted into the urethra and the urethra and bladder carefully examined  The following findings were noted:    Findings:  Urethra:  Normal  Prostate: Moderate lateral lobe hyperplasia and a mildly high bladder neck  Prostatic fossa is rather short  Bladder:  Grade 2 trabeculations  Ureteral orifices:  Normal  Other findings:  None     Specimens: None                 Complications:    None; patient tolerated the procedure well           Disposition: To home after 30 minute observation             Condition: Stable

## 2019-02-27 NOTE — PROGRESS NOTES
Office TRUS    Indication    Prostate volumetrics for surgical planning    Transrectal ultrasonography  The patient was placed in the left lateral decubitus position  After an attentive digital rectal examination, a 7 5 mHz sidefire ultrasound probe was gently inserted into the rectum and biplanar imaging of the prostate was done with the findings noted below  Images were taken of any abnormal findings and also to document prostate size      Bladder  The bladder base appeared normal     Prostate      Ultrasound size measurements:  -Volume:  77 g cm3  - Width 6 0 cm  - Height: 4 3 cm  - Length: 5 7 cm    Ultrasound findings:  -Cysts: None  -Masses: None  -Median lobe: absent

## 2019-02-27 NOTE — PROGRESS NOTES
Referring Physician: Joe Babcock MD  A copy of this note was sent to the referring physician  Diagnoses and all orders for this visit:    Urinary retention  -     Case request operating room: [facility] ; Standing  -     Case request operating room: CYSTOSCOPY WITH INSERTION UROLIFT    Benign prostatic hyperplasia (BPH) with straining on urination  -     Case request operating room: [address]; Standing  -     Case request operating room: CYSTOSCOPY WITH INSERTION UROLIFT    Other orders  -     Diet NPO; Sips with meds; Standing  -     Place sequential compression device; Standing  -     ceFAZolin (ANCEF) 1,000 mg in dextrose 5 % 100 mL IVPB            Assessment and plan:       1  Obstructive lower urinary tract symptoms  -weak stream, slow stream, daytime urgency nocturia x2   - alpha-blocker contraindicated due to frailty and predisposition for falls  Finasteride contraindicated due to prostate volume less than 35 g        We reviewed the options for treating BPH/LUTS which include but are not limited to expectant management, medical therapy, transurethral resection of prostate (TURP)  We also discussed minimally invasive options  At this point, the patient wishes to proceed with Urolift  This is a great option for the patient based on the following criteria:    - cystoscopy revealed lateral lobe hyperplasia, mild, mildly high bladder neck  - gland volume 29 g as measured on transrectal ultrasound  - uroflow with Q max of 3  - PVR with a 0  - normal renal function  - no active urinary tract infection  - PSA:  Not applicable due to age outside of screening criteria  - no documented allergy to nickel    - He has failed the following treatment options:  Tamsulosin which is contraindicated due to previous patients to falls, and finasteride is contraindicated due to the patient's gland volume less than 35 g     The additional morbidity of standard surgical options (ie, TURP) is not necessary given the above criteria  The risks of Urolift include but are not limited to bleeding, infection, reaction to anesthesia such as heart attack, stroke, DVT/PE, hyponatremia, bladder neck contracture, urethral stricture, injury to surrounding structures (ureters, rectum, etc)  We discussed that additional risks of trans urethral resection procedures such as incontinence, retrograde ejaculation, and erectile dysfunction have been only rarely reported with the Uro lift procedure  We did discuss that this is a new were procedure and a permanent implant  We discussed that there may be some long-term implications that her on for seen with this newer technology, such as perhaps complicating treatment for prostate cancer if indicated down the line  Finally, I told him that he may require additional procedures secondary to some of these complications  Informed consent was obtained for the Uro lift procedure  This will be scheduled in the near future to be performed under IV sedation  Will conduit request medical clearance from Dr Lo Johnson MD      Chief Complaint     BPH follow-up    History of Present Illness     Jaya Bradley is a 80 y o  male returns in follow-up for bothersome lower urinary tract symptoms  In brief,He has had a longstanding history of difficulty emptying his bladder  He reports a weak stream, slow stream, urinary hesitancy  He also has daytime urgency and bothersome nocturia times 2-3  Patient has not initiated on any medical management for BPH  He does have a number of medical comorbidities including dilated cardiomyopathy, COPD, type 2 diabetes and chronic kidney disease  He is currently taking 2 different diuretics and is also on a fluid restriction diet  He is accompanied by his son today  He is overall well-appearing    At the prior visit we discussed that standard managed with an alpha-blocker would be contraindicated due to his frailty and predisposition to falls  This has been an issue for him before  Overall he is doing very well medically  He has been weaned off of a diuretic  He presents today for cystoscopy and transrectal ultrasound having previously been counseled by me on the procedures in detail  Detailed Urologic History     - please refer to HPI    Review of Systems     Review of Systems   Constitutional: Negative for activity change and fatigue  HENT: Negative for congestion  Eyes: Negative for visual disturbance  Respiratory: Negative for shortness of breath and wheezing  Cardiovascular: Negative for chest pain and leg swelling  Gastrointestinal: Negative for abdominal pain  Endocrine: Positive for polyuria  Genitourinary: Positive for decreased urine volume, difficulty urinating and urgency  Negative for dysuria, flank pain and hematuria  Musculoskeletal: Negative for back pain  Allergic/Immunologic: Negative for immunocompromised state  Neurological: Negative for dizziness and numbness  Psychiatric/Behavioral: Negative for dysphoric mood  All other systems reviewed and are negative  AUA SYMPTOM SCORE      Most Recent Value   AUA SYMPTOM SCORE   How often have you had a sensation of not emptying your bladder completely after you finished urinating? 3   How often have you had to urinate again less than two hours after you finished urinating? 3   How often have you found you stopped and started again several times when you urinate? 5   How often have you found it difficult to postpone urination? 5   How often have you had a weak urinary stream?  3   How often have you had to push or strain to begin urination? 5   How many times did you most typically get up to urinate from the time you went to bed at night until the time you got up in the morning?   3   Quality of Life: If you were to spend the rest of your life with your urinary condition just the way it is now, how would you feel about that?  4   AUA SYMPTOM SCORE  27            Allergies     No Known Allergies    Physical Exam     Physical Exam   Constitutional: He is oriented to person, place, and time  He appears well-developed and well-nourished  No distress  HENT:   Head: Normocephalic and atraumatic  Eyes: EOM are normal    Neck: Normal range of motion  Cardiovascular:   Negative lower extremity edema   Pulmonary/Chest: Effort normal and breath sounds normal    Abdominal: Soft  Genitourinary: Penis normal    Genitourinary Comments: Uncircumcised phallus, rectal exam reveals a 30 g prostate no nodules or induration   Musculoskeletal: Normal range of motion  Neurological: He is alert and oriented to person, place, and time  Skin: Skin is warm  Psychiatric: He has a normal mood and affect   His behavior is normal            Vital Signs  Vitals:    02/27/19 0848   BP: 128/88   BP Location: Left arm   Patient Position: Sitting   Cuff Size: Adult   Pulse: 68   Weight: 72 6 kg (160 lb)   Height: 6' 1" (1 854 m)         Current Medications       Current Outpatient Medications:     acetaminophen (TYLENOL) 325 mg tablet, Take 2 tablets (650 mg total) by mouth every 6 (six) hours as needed for mild pain, Disp: 30 tablet, Rfl: 0    albuterol (2 5 mg/3 mL) 0 083 % nebulizer solution, Take 1 vial (2 5 mg total) by nebulization every 6 (six) hours as needed for wheezing or shortness of breath, Disp: 1080 mL, Rfl: 3    ANORO ELLIPTA 62 5-25 MCG/INH inhaler, INHALE ONE PUFF BY MOUTH ONCE DAILY , Disp: 60 each, Rfl: 3    Ascorbic Acid (VITAMIN C) 1000 MG tablet, Take 1 tablet by mouth daily, Disp: , Rfl:     BD PEN NEEDLE DON U/F 32G X 4 MM MISC, , Disp: , Rfl:     betamethasone, augmented, (DIPROLENE-AF) 0 05 % cream, , Disp: , Rfl:     Cinnamon 500 MG TABS, Take 1 tablet by mouth daily  , Disp: , Rfl:     fluticasone (FLONASE) 50 mcg/act nasal spray, 2 sprays into each nostril daily, Disp: 16 g, Rfl: 5   fluticasone (FLOVENT HFA) 220 mcg/act inhaler, Inhale 1 puff 2 (two) times a day, Disp: 1 Inhaler, Rfl: 5    furosemide (LASIX) 20 mg tablet, Take 1 tablet (20 mg total) by mouth daily as needed (if weight increases 2-3 lb in 1 day or 5 lb/1 week), Disp: 90 tablet, Rfl: 2    gabapentin (NEURONTIN) 100 mg capsule, Take 2 capsules (200 mg total) by mouth 3 (three) times a day, Disp: 540 capsule, Rfl: 1    hydrOXYzine pamoate (VISTARIL) 25 mg capsule, Take 1 capsule (25 mg total) by mouth 3 (three) times a day as needed for itching, Disp: 30 capsule, Rfl: 0    insulin detemir (LEVEMIR) 100 units/mL subcutaneous injection, Inject 17 Units under the skin daily at bedtime , Disp: , Rfl:     metoprolol succinate (TOPROL-XL) 50 mg 24 hr tablet, Take 2 tablets (100 mg total) by mouth daily, Disp: 180 tablet, Rfl: 0    montelukast (SINGULAIR) 10 mg tablet, Take 1 tablet (10 mg total) by mouth daily at bedtime, Disp: 30 tablet, Rfl: 3    Multiple Vitamins-Minerals (OCUVITE ADULT 50+ PO), Take 1 tablet by mouth daily, Disp: , Rfl:     NOVOLOG FLEXPEN 100 units/mL injection pen, Inject 6 Units under the skin daily with breakfast , Disp: , Rfl:     ONE TOUCH ULTRA TEST test strip, , Disp: , Rfl:     ONETOUCH DELICA LANCETS FINE MISC, , Disp: , Rfl:     repaglinide (PRANDIN) 2 mg tablet, Take 2 mg by mouth daily before lunch , Disp: , Rfl:     roflumilast (DALIRESP) 500 mcg tablet, Take 1 tablet (500 mcg total) by mouth daily, Disp: 30 tablet, Rfl: 5    sacubitril-valsartan (ENTRESTO) 24-26 MG TABS, Take 1 tablet by mouth 2 (two) times a day, Disp: 180 tablet, Rfl: 0    simvastatin (ZOCOR) 10 mg tablet, Take 1 tablet (10 mg total) by mouth daily, Disp: 90 tablet, Rfl: 1    spironolactone (ALDACTONE) 25 mg tablet, Take 1 tablet (25 mg total) by mouth daily, Disp: 90 tablet, Rfl: 0    VENTOLIN  (90 Base) MCG/ACT inhaler, Inhale 4 (four) times a day  , Disp: , Rfl:       Active Problems     Patient Active Problem List   Diagnosis    COPD without exacerbation ([facility] )    Essential hypertension    Hyperlipidemia    Neuropathy, idiopathic    Multiple nodules of lung    Mixed simple and mucopurulent chronic bronchitis (HCC)    Type 2 diabetes mellitus, with long-term current use of insulin (HCC)    Dilated cardiomyopathy (HCC)    Chronic combined systolic and diastolic congestive heart failure (HCC)    Abnormal chest CT    Arthritis    SUZY (acute kidney injury) ([facility] )    Paresthesia of both feet    Finger pain, left    Cough    Urinary retention    Benign prostatic hyperplasia (BPH) with straining on urination         Past Medical History     Past Medical History:   Diagnosis Date    COPD (chronic obstructive pulmonary disease) ([facility] )     Diabetes mellitus (HCC)     Type 2    Essential hypertension     Heart failure (HCC)     Left inguinal hernia     Lung nodule     Malignant melanoma of skin ([facility] )          Surgical History     Past Surgical History:   Procedure Laterality Date    APPENDECTOMY      CATARACT EXTRACTION, BILATERAL      CHOLECYSTECTOMY      CHOLECYSTECTOMY      COLONOSCOPY      Fiberoptic    HERNIA REPAIR      JOINT REPLACEMENT Left     meniscus repair    KNEE ARTHROSCOPY      Therapeutic         Family History     Family History   Problem Relation Age of Onset    Diabetes Mother     Heart attack Neg Hx     Stroke Neg Hx     Aneurysm Neg Hx     Clotting disorder Neg Hx     Arrhythmia Neg Hx     Hypertension Neg Hx     Hyperlipidemia Neg Hx         pt unsure          Social History     Social History     Social History     Tobacco Use   Smoking Status Former Smoker    Packs/day: 1 00    Years: 10 00    Pack years: 10 00    Last attempt to quit: Franki Walker Years since quittin 1   Smokeless Tobacco Never Used         Pertinent Lab Values     Lab Results   Component Value Date    CREATININE 1 19 11/15/2018       No results found for: PSA    @RESULTRCNT(1H])@      Pertinent Imaging      - n/a    Portions of the record may have been created with voice recognition software   Occasional wrong word or "sound a like" substitutions may have occurred due to the inherent limitations of voice recognition software   Read the chart carefully and recognize, using context, where substitutions have occurred

## 2019-02-27 NOTE — H&P (VIEW-ONLY)
Referring Physician: Tish Bernard MD  A copy of this note was sent to the referring physician  Diagnoses and all orders for this visit:    Urinary retention  -     Case request operating room: [facility] ; Standing  -     Case request operating room: CYSTOSCOPY WITH INSERTION UROLIFT    Benign prostatic hyperplasia (BPH) with straining on urination  -     Case request operating room: [address]; Standing  -     Case request operating room: CYSTOSCOPY WITH INSERTION UROLIFT    Other orders  -     Diet NPO; Sips with meds; Standing  -     Place sequential compression device; Standing  -     ceFAZolin (ANCEF) 1,000 mg in dextrose 5 % 100 mL IVPB            Assessment and plan:       1  Obstructive lower urinary tract symptoms  -weak stream, slow stream, daytime urgency nocturia x2   - alpha-blocker contraindicated due to frailty and predisposition for falls  Finasteride contraindicated due to prostate volume less than 35 g        We reviewed the options for treating BPH/LUTS which include but are not limited to expectant management, medical therapy, transurethral resection of prostate (TURP)  We also discussed minimally invasive options  At this point, the patient wishes to proceed with Urolift  This is a great option for the patient based on the following criteria:    - cystoscopy revealed lateral lobe hyperplasia, mild, mildly high bladder neck  - gland volume 29 g as measured on transrectal ultrasound  - uroflow with Q max of 3  - PVR with a 0  - normal renal function  - no active urinary tract infection  - PSA:  Not applicable due to age outside of screening criteria  - no documented allergy to nickel    - He has failed the following treatment options:  Tamsulosin which is contraindicated due to previous patients to falls, and finasteride is contraindicated due to the patient's gland volume less than 35 g     The additional morbidity of standard surgical options (ie, TURP) is not necessary given the above criteria  The risks of Urolift include but are not limited to bleeding, infection, reaction to anesthesia such as heart attack, stroke, DVT/PE, hyponatremia, bladder neck contracture, urethral stricture, injury to surrounding structures (ureters, rectum, etc)  We discussed that additional risks of trans urethral resection procedures such as incontinence, retrograde ejaculation, and erectile dysfunction have been only rarely reported with the Uro lift procedure  We did discuss that this is a new were procedure and a permanent implant  We discussed that there may be some long-term implications that her on for seen with this newer technology, such as perhaps complicating treatment for prostate cancer if indicated down the line  Finally, I told him that he may require additional procedures secondary to some of these complications  Informed consent was obtained for the Uro lift procedure  This will be scheduled in the near future to be performed under IV sedation  Will conduit request medical clearance from Dr Jose Luis Harley MD      Chief Complaint     BPH follow-up    History of Present Illness     Priscila Green is a 80 y o  male returns in follow-up for bothersome lower urinary tract symptoms  In brief,He has had a longstanding history of difficulty emptying his bladder  He reports a weak stream, slow stream, urinary hesitancy  He also has daytime urgency and bothersome nocturia times 2-3  Patient has not initiated on any medical management for BPH  He does have a number of medical comorbidities including dilated cardiomyopathy, COPD, type 2 diabetes and chronic kidney disease  He is currently taking 2 different diuretics and is also on a fluid restriction diet  He is accompanied by his son today  He is overall well-appearing    At the prior visit we discussed that standard managed with an alpha-blocker would be contraindicated due to his frailty and predisposition to falls  This has been an issue for him before  Overall he is doing very well medically  He has been weaned off of a diuretic  He presents today for cystoscopy and transrectal ultrasound having previously been counseled by me on the procedures in detail  Detailed Urologic History     - please refer to HPI    Review of Systems     Review of Systems   Constitutional: Negative for activity change and fatigue  HENT: Negative for congestion  Eyes: Negative for visual disturbance  Respiratory: Negative for shortness of breath and wheezing  Cardiovascular: Negative for chest pain and leg swelling  Gastrointestinal: Negative for abdominal pain  Endocrine: Positive for polyuria  Genitourinary: Positive for decreased urine volume, difficulty urinating and urgency  Negative for dysuria, flank pain and hematuria  Musculoskeletal: Negative for back pain  Allergic/Immunologic: Negative for immunocompromised state  Neurological: Negative for dizziness and numbness  Psychiatric/Behavioral: Negative for dysphoric mood  All other systems reviewed and are negative  AUA SYMPTOM SCORE      Most Recent Value   AUA SYMPTOM SCORE   How often have you had a sensation of not emptying your bladder completely after you finished urinating? 3   How often have you had to urinate again less than two hours after you finished urinating? 3   How often have you found you stopped and started again several times when you urinate? 5   How often have you found it difficult to postpone urination? 5   How often have you had a weak urinary stream?  3   How often have you had to push or strain to begin urination? 5   How many times did you most typically get up to urinate from the time you went to bed at night until the time you got up in the morning?   3   Quality of Life: If you were to spend the rest of your life with your urinary condition just the way it is now, how would you feel about that?  4   AUA SYMPTOM SCORE  27            Allergies     No Known Allergies    Physical Exam     Physical Exam   Constitutional: He is oriented to person, place, and time  He appears well-developed and well-nourished  No distress  HENT:   Head: Normocephalic and atraumatic  Eyes: EOM are normal    Neck: Normal range of motion  Cardiovascular:   Negative lower extremity edema   Pulmonary/Chest: Effort normal and breath sounds normal    Abdominal: Soft  Genitourinary: Penis normal    Genitourinary Comments: Uncircumcised phallus, rectal exam reveals a 30 g prostate no nodules or induration   Musculoskeletal: Normal range of motion  Neurological: He is alert and oriented to person, place, and time  Skin: Skin is warm  Psychiatric: He has a normal mood and affect   His behavior is normal            Vital Signs  Vitals:    02/27/19 0848   BP: 128/88   BP Location: Left arm   Patient Position: Sitting   Cuff Size: Adult   Pulse: 68   Weight: 72 6 kg (160 lb)   Height: 6' 1" (1 854 m)         Current Medications       Current Outpatient Medications:     acetaminophen (TYLENOL) 325 mg tablet, Take 2 tablets (650 mg total) by mouth every 6 (six) hours as needed for mild pain, Disp: 30 tablet, Rfl: 0    albuterol (2 5 mg/3 mL) 0 083 % nebulizer solution, Take 1 vial (2 5 mg total) by nebulization every 6 (six) hours as needed for wheezing or shortness of breath, Disp: 1080 mL, Rfl: 3    ANORO ELLIPTA 62 5-25 MCG/INH inhaler, INHALE ONE PUFF BY MOUTH ONCE DAILY , Disp: 60 each, Rfl: 3    Ascorbic Acid (VITAMIN C) 1000 MG tablet, Take 1 tablet by mouth daily, Disp: , Rfl:     BD PEN NEEDLE DON U/F 32G X 4 MM MISC, , Disp: , Rfl:     betamethasone, augmented, (DIPROLENE-AF) 0 05 % cream, , Disp: , Rfl:     Cinnamon 500 MG TABS, Take 1 tablet by mouth daily  , Disp: , Rfl:     fluticasone (FLONASE) 50 mcg/act nasal spray, 2 sprays into each nostril daily, Disp: 16 g, Rfl: 5   fluticasone (FLOVENT HFA) 220 mcg/act inhaler, Inhale 1 puff 2 (two) times a day, Disp: 1 Inhaler, Rfl: 5    furosemide (LASIX) 20 mg tablet, Take 1 tablet (20 mg total) by mouth daily as needed (if weight increases 2-3 lb in 1 day or 5 lb/1 week), Disp: 90 tablet, Rfl: 2    gabapentin (NEURONTIN) 100 mg capsule, Take 2 capsules (200 mg total) by mouth 3 (three) times a day, Disp: 540 capsule, Rfl: 1    hydrOXYzine pamoate (VISTARIL) 25 mg capsule, Take 1 capsule (25 mg total) by mouth 3 (three) times a day as needed for itching, Disp: 30 capsule, Rfl: 0    insulin detemir (LEVEMIR) 100 units/mL subcutaneous injection, Inject 17 Units under the skin daily at bedtime , Disp: , Rfl:     metoprolol succinate (TOPROL-XL) 50 mg 24 hr tablet, Take 2 tablets (100 mg total) by mouth daily, Disp: 180 tablet, Rfl: 0    montelukast (SINGULAIR) 10 mg tablet, Take 1 tablet (10 mg total) by mouth daily at bedtime, Disp: 30 tablet, Rfl: 3    Multiple Vitamins-Minerals (OCUVITE ADULT 50+ PO), Take 1 tablet by mouth daily, Disp: , Rfl:     NOVOLOG FLEXPEN 100 units/mL injection pen, Inject 6 Units under the skin daily with breakfast , Disp: , Rfl:     ONE TOUCH ULTRA TEST test strip, , Disp: , Rfl:     ONETOUCH DELICA LANCETS FINE MISC, , Disp: , Rfl:     repaglinide (PRANDIN) 2 mg tablet, Take 2 mg by mouth daily before lunch , Disp: , Rfl:     roflumilast (DALIRESP) 500 mcg tablet, Take 1 tablet (500 mcg total) by mouth daily, Disp: 30 tablet, Rfl: 5    sacubitril-valsartan (ENTRESTO) 24-26 MG TABS, Take 1 tablet by mouth 2 (two) times a day, Disp: 180 tablet, Rfl: 0    simvastatin (ZOCOR) 10 mg tablet, Take 1 tablet (10 mg total) by mouth daily, Disp: 90 tablet, Rfl: 1    spironolactone (ALDACTONE) 25 mg tablet, Take 1 tablet (25 mg total) by mouth daily, Disp: 90 tablet, Rfl: 0    VENTOLIN  (90 Base) MCG/ACT inhaler, Inhale 4 (four) times a day  , Disp: , Rfl:       Active Problems     Patient Active Problem List   Diagnosis    COPD without exacerbation ([facility] )    Essential hypertension    Hyperlipidemia    Neuropathy, idiopathic    Multiple nodules of lung    Mixed simple and mucopurulent chronic bronchitis (HCC)    Type 2 diabetes mellitus, with long-term current use of insulin (HCC)    Dilated cardiomyopathy (HCC)    Chronic combined systolic and diastolic congestive heart failure (HCC)    Abnormal chest CT    Arthritis    SUZY (acute kidney injury) ([facility] )    Paresthesia of both feet    Finger pain, left    Cough    Urinary retention    Benign prostatic hyperplasia (BPH) with straining on urination         Past Medical History     Past Medical History:   Diagnosis Date    COPD (chronic obstructive pulmonary disease) ([facility] )     Diabetes mellitus (HCC)     Type 2    Essential hypertension     Heart failure (HCC)     Left inguinal hernia     Lung nodule     Malignant melanoma of skin ([facility] )          Surgical History     Past Surgical History:   Procedure Laterality Date    APPENDECTOMY      CATARACT EXTRACTION, BILATERAL      CHOLECYSTECTOMY      CHOLECYSTECTOMY      COLONOSCOPY      Fiberoptic    HERNIA REPAIR      JOINT REPLACEMENT Left     meniscus repair    KNEE ARTHROSCOPY      Therapeutic         Family History     Family History   Problem Relation Age of Onset    Diabetes Mother     Heart attack Neg Hx     Stroke Neg Hx     Aneurysm Neg Hx     Clotting disorder Neg Hx     Arrhythmia Neg Hx     Hypertension Neg Hx     Hyperlipidemia Neg Hx         pt unsure          Social History     Social History     Social History     Tobacco Use   Smoking Status Former Smoker    Packs/day: 1 00    Years: 10 00    Pack years: 10 00    Last attempt to quit: Summer Bowles Years since quittin 1   Smokeless Tobacco Never Used         Pertinent Lab Values     Lab Results   Component Value Date    CREATININE 1 19 11/15/2018       No results found for: PSA    @RESULTRCNT(1H])@      Pertinent Imaging      - n/a    Portions of the record may have been created with voice recognition software   Occasional wrong word or "sound a like" substitutions may have occurred due to the inherent limitations of voice recognition software   Read the chart carefully and recognize, using context, where substitutions have occurred

## 2019-02-27 NOTE — PROGRESS NOTES
Office Cystoscopy Procedure Note    Indication:     medically refractory lower urinary tract symptoms     Informed consent   The risks, benefits, complications, treatment options, and expected outcomes were discussed with the patient  The patient concurred with the proposed plan and provided informed consent  Anesthesia  Lidocaine jelly 2%    Antibiotic prophylaxis   None    Procedure  The patient was placed in the supineposition, was prepped and draped in the usual manner using sterile technique, and 2% lidocaine jelly instilled into the urethra  A 17 F flexible cystoscope was then inserted into the urethra and the urethra and bladder carefully examined  The following findings were noted:    Findings:  Urethra: To mild bulbar urethral strictures are noted in tandem  I am able to comfortably advance the endoscope without formal dilation  Prostate:  Extensive lateral lobe hyperplasia with 100% kissing of the lateral lobes in the midline  Prostatic fossa is high  There is no obstructing median lobe  There is no significant intravesical intrusion into the lumen of the bladder  Bladder:  Normal  Ureteral orifices:  Normal  Other findings:  None     Specimens: None                 Complications:    None; patient tolerated the procedure well           Disposition: To home after 30 minute observation             Condition: Stable

## 2019-02-27 NOTE — LETTER
February 27, 2019     No Elizabeth MD  [facility] [address]    Patient: Sandra Scott   YOB: 1933   Date of Visit: 2/27/2019       Dear Dr Isamar Galloway: Thank you for referring Milvia Dennis to me for evaluation  Below are my notes for this consultation  If you have questions, please do not hesitate to call me  I look forward to following your patient along with you  Sincerely,        Chani Bills MD        CC: MD Chani Enrique MD  2/27/2019  9:35 AM  Sign at close encounter  Uroflow Result    Indication:     medically refractory lower urinary tract symptoms       Procedure  The patient was brought ambulatory to the commode and instructions provided  There asked to void volitionally into the uroflow apparatus  The following findings were noted:    Findings:  Voided Volume:   11  Maximum flow (Qmax):  3 ml/s  Average flow:    3 ml/s  Description of emptying curve:   diminutive and flattened due to low emptied volume       Post Void Residual Measurement:  After voiding to completion the patient was brought to the exam room and positioned supine  Residual urine volume was measured with an ultrasound at:    0 ml      Chani Bills MD  2/27/2019  9:36 AM  Sign at close encounter  Referring Physician: No Elizabeth MD  A copy of this note was sent to the referring physician         Diagnoses and all orders for this visit:    Urinary retention  -     Case request operating room: [address] [place] [address]; Standing  -     Case request operating room: CYSTOSCOPY WITH INSERTION UROLIFT    Benign prostatic hyperplasia (BPH) with straining on urination  -     Case request operating room: [address] [place] [address]; Standing  -     Case request operating room: [address] [place] [address]    Other orders  -     Diet NPO; Sips with meds; Standing  -     Place sequential compression device; Standing  - ceFAZolin (ANCEF) 1,000 mg in dextrose 5 % 100 mL IVPB            Assessment and plan:       1  Obstructive lower urinary tract symptoms  -weak stream, slow stream, daytime urgency nocturia x2   - alpha-blocker contraindicated due to frailty and predisposition for falls  Finasteride contraindicated due to prostate volume less than 35 g        We reviewed the options for treating BPH/LUTS which include but are not limited to expectant management, medical therapy, transurethral resection of prostate (TURP)  We also discussed minimally invasive options  At this point, the patient wishes to proceed with Urolift  This is a great option for the patient based on the following criteria:    - cystoscopy revealed lateral lobe hyperplasia, mild, mildly high bladder neck  - gland volume 29 g as measured on transrectal ultrasound  - uroflow with Q max of 3  - PVR with a 0  - normal renal function  - no active urinary tract infection  - PSA:  Not applicable due to age outside of screening criteria  - no documented allergy to nickel    - He has failed the following treatment options:  Tamsulosin which is contraindicated due to previous patients to falls, and finasteride is contraindicated due to the patient's gland volume less than 35 g   The additional morbidity of standard surgical options (ie, TURP) is not necessary given the above criteria  The risks of Urolift include but are not limited to bleeding, infection, reaction to anesthesia such as heart attack, stroke, DVT/PE, hyponatremia, bladder neck contracture, urethral stricture, injury to surrounding structures (ureters, rectum, etc)  We discussed that additional risks of trans urethral resection procedures such as incontinence, retrograde ejaculation, and erectile dysfunction have been only rarely reported with the Uro lift procedure  We did discuss that this is a new were procedure and a permanent implant    We discussed that there may be some long-term implications that her on for seen with this newer technology, such as perhaps complicating treatment for prostate cancer if indicated down the line  Finally, I told him that he may require additional procedures secondary to some of these complications  Informed consent was obtained for the Uro lift procedure  This will be scheduled in the near future to be performed under IV sedation  Will conduit request medical clearance from Dr Mayra Mcarthur MD      Chief Complaint     BPH follow-up    History of Present Illness     Vinayak Brambila is a 80 y o  male returns in follow-up for bothersome lower urinary tract symptoms  In brief,He has had a longstanding history of difficulty emptying his bladder  He reports a weak stream, slow stream, urinary hesitancy  He also has daytime urgency and bothersome nocturia times 2-3  Patient has not initiated on any medical management for BPH  He does have a number of medical comorbidities including dilated cardiomyopathy, COPD, type 2 diabetes and chronic kidney disease  He is currently taking 2 different diuretics and is also on a fluid restriction diet  He is accompanied by his son today  He is overall well-appearing  At the prior visit we discussed that standard managed with an alpha-blocker would be contraindicated due to his frailty and predisposition to falls  This has been an issue for him before  Overall he is doing very well medically  He has been weaned off of a diuretic  He presents today for cystoscopy and transrectal ultrasound having previously been counseled by me on the procedures in detail  Detailed Urologic History     - please refer to HPI    Review of Systems     Review of Systems   Constitutional: Negative for activity change and fatigue  HENT: Negative for congestion  Eyes: Negative for visual disturbance  Respiratory: Negative for shortness of breath and wheezing      Cardiovascular: Negative for chest pain and leg swelling  Gastrointestinal: Negative for abdominal pain  Endocrine: Positive for polyuria  Genitourinary: Positive for decreased urine volume, difficulty urinating and urgency  Negative for dysuria, flank pain and hematuria  Musculoskeletal: Negative for back pain  Allergic/Immunologic: Negative for immunocompromised state  Neurological: Negative for dizziness and numbness  Psychiatric/Behavioral: Negative for dysphoric mood  All other systems reviewed and are negative  AUA SYMPTOM SCORE      Most Recent Value   AUA SYMPTOM SCORE   How often have you had a sensation of not emptying your bladder completely after you finished urinating? 3   How often have you had to urinate again less than two hours after you finished urinating? 3   How often have you found you stopped and started again several times when you urinate? 5   How often have you found it difficult to postpone urination? 5   How often have you had a weak urinary stream?  3   How often have you had to push or strain to begin urination? 5   How many times did you most typically get up to urinate from the time you went to bed at night until the time you got up in the morning? 3   Quality of Life: If you were to spend the rest of your life with your urinary condition just the way it is now, how would you feel about that?  4   AUA SYMPTOM SCORE  27            Allergies     No Known Allergies    Physical Exam     Physical Exam   Constitutional: He is oriented to person, place, and time  He appears well-developed and well-nourished  No distress  HENT:   Head: Normocephalic and atraumatic  Eyes: EOM are normal    Neck: Normal range of motion  Cardiovascular:   Negative lower extremity edema   Pulmonary/Chest: Effort normal and breath sounds normal    Abdominal: Soft     Genitourinary: Penis normal    Genitourinary Comments: Uncircumcised phallus, rectal exam reveals a 30 g prostate no nodules or induration   Musculoskeletal: Normal range of motion  Neurological: He is alert and oriented to person, place, and time  Skin: Skin is warm  Psychiatric: He has a normal mood and affect   His behavior is normal            Vital Signs  Vitals:    02/27/19 0848   BP: 128/88   BP Location: Left arm   Patient Position: Sitting   Cuff Size: Adult   Pulse: 68   Weight: 72 6 kg (160 lb)   Height: 6' 1" (1 854 m)         Current Medications       Current Outpatient Medications:     acetaminophen (TYLENOL) 325 mg tablet, Take 2 tablets (650 mg total) by mouth every 6 (six) hours as needed for mild pain, Disp: 30 tablet, Rfl: 0    albuterol (2 5 mg/3 mL) 0 083 % nebulizer solution, Take 1 vial (2 5 mg total) by nebulization every 6 (six) hours as needed for wheezing or shortness of breath, Disp: 1080 mL, Rfl: 3    ANORO ELLIPTA 62 5-25 MCG/INH inhaler, INHALE ONE PUFF BY MOUTH ONCE DAILY , Disp: 60 each, Rfl: 3    Ascorbic Acid (VITAMIN C) 1000 MG tablet, Take 1 tablet by mouth daily, Disp: , Rfl:     BD PEN NEEDLE DON U/F 32G X 4 MM MISC, , Disp: , Rfl:     betamethasone, augmented, (DIPROLENE-AF) 0 05 % cream, , Disp: , Rfl:     Cinnamon 500 MG TABS, Take 1 tablet by mouth daily  , Disp: , Rfl:     fluticasone (FLONASE) 50 mcg/act nasal spray, 2 sprays into each nostril daily, Disp: 16 g, Rfl: 5    fluticasone (FLOVENT HFA) 220 mcg/act inhaler, Inhale 1 puff 2 (two) times a day, Disp: 1 Inhaler, Rfl: 5    furosemide (LASIX) 20 mg tablet, Take 1 tablet (20 mg total) by mouth daily as needed (if weight increases 2-3 lb in 1 day or 5 lb/1 week), Disp: 90 tablet, Rfl: 2    gabapentin (NEURONTIN) 100 mg capsule, Take 2 capsules (200 mg total) by mouth 3 (three) times a day, Disp: 540 capsule, Rfl: 1    hydrOXYzine pamoate (VISTARIL) 25 mg capsule, Take 1 capsule (25 mg total) by mouth 3 (three) times a day as needed for itching, Disp: 30 capsule, Rfl: 0    insulin detemir (LEVEMIR) 100 units/mL subcutaneous injection, Inject 17 Units under the skin daily at bedtime , Disp: , Rfl:     metoprolol succinate (TOPROL-XL) 50 mg 24 hr tablet, Take 2 tablets (100 mg total) by mouth daily, Disp: 180 tablet, Rfl: 0    montelukast (SINGULAIR) 10 mg tablet, Take 1 tablet (10 mg total) by mouth daily at bedtime, Disp: 30 tablet, Rfl: 3    Multiple Vitamins-Minerals (OCUVITE ADULT 50+ PO), Take 1 tablet by mouth daily, Disp: , Rfl:     NOVOLOG FLEXPEN 100 units/mL injection pen, Inject 6 Units under the skin daily with breakfast , Disp: , Rfl:     ONE TOUCH ULTRA TEST test strip, , Disp: , Rfl:     ONETOUCH DELICA LANCETS FINE MISC, , Disp: , Rfl:     repaglinide (PRANDIN) 2 mg tablet, Take 2 mg by mouth daily before lunch , Disp: , Rfl:     roflumilast (DALIRESP) 500 mcg tablet, Take 1 tablet (500 mcg total) by mouth daily, Disp: 30 tablet, Rfl: 5    sacubitril-valsartan (ENTRESTO) 24-26 MG TABS, Take 1 tablet by mouth 2 (two) times a day, Disp: 180 tablet, Rfl: 0    simvastatin (ZOCOR) 10 mg tablet, Take 1 tablet (10 mg total) by mouth daily, Disp: 90 tablet, Rfl: 1    spironolactone (ALDACTONE) 25 mg tablet, Take 1 tablet (25 mg total) by mouth daily, Disp: 90 tablet, Rfl: 0    VENTOLIN  (90 Base) MCG/ACT inhaler, Inhale 4 (four) times a day  , Disp: , Rfl:       Active Problems     Patient Active Problem List   Diagnosis    [facility]    Essential hypertension    Hyperlipidemia    Neuropathy, idiopathic    Multiple nodules of lung    Mixed simple and mucopurulent chronic bronchitis (HCC)    Type 2 diabetes mellitus, with long-term current use of insulin (HCC)    Dilated cardiomyopathy (HCC)    Chronic combined systolic and diastolic congestive heart failure (HCC)    Abnormal chest CT    Arthritis    [facility]ZY [facility] kidney [facility]) [facility]    Paresthesia of both feet    Finger pain, left    Cough    Urinary retention    Benign prostatic hyperplasia (BPH) with straining on urination         Past Medical History Past Medical History:   Diagnosis Date    COPD [facility]chronic obstructive pulmonary disease) ([facility]     Diabetes mellitus (HCC)     Type 2    Essential hypertension     Heart failure (HCC)     Left inguinal hernia     Lung nodule     [facility]          Surgical History     Past Surgical History:   Procedure Laterality Date    APPENDECTOMY      CATARACT EXTRACTION, BILATERAL      CHOLECYSTECTOMY      CHOLECYSTECTOMY      COLONOSCOPY      Fiberoptic    HERNIA REPAIR      JOINT REPLACEMENT Left     meniscus repair    KNEE ARTHROSCOPY      Therapeutic         Family History     Family History   Problem Relation Age of Onset    Diabetes Mother     Heart attack Neg Hx     Stroke Neg Hx     Aneurysm Neg Hx     Clotting disorder Neg Hx     Arrhythmia Neg Hx     Hypertension Neg Hx     Hyperlipidemia Neg Hx         pt unsure          Social History     Social History     Social History     Tobacco Use   Smoking Status Former Smoker    Packs/day: 1 00    Years: 10 00    Pack years: 10 00    Last attempt to quit: Shital Mascorro Years since quittin 1   Smokeless Tobacco Never Used         Pertinent Lab Values     Lab Results   Component Value Date    CREATININE 1 19 11/15/2018       No results found for: PSA    @RESULTRCNT(1H])@      Pertinent Imaging      - n/a    Portions of the record may have been created with voice recognition software   Occasional wrong word or "sound a like" substitutions may have occurred due to the inherent limitations of voice recognition software   Read the chart carefully and recognize, using context, where substitutions have occurred  Rey Clemente MD  2019  9:28 AM  Sign at close encounter  Office TRUS    Indication    Prostate volumetrics for surgical planning    Transrectal ultrasonography  The patient was placed in the left lateral decubitus position   After an attentive digital rectal examination, a 7 5 mHz sidefire ultrasound probe was gently inserted into the rectum and biplanar imaging of the prostate was done with the findings noted below  Images were taken of any abnormal findings and also to document prostate size  Bladder  The bladder base appeared normal     Prostate      Ultrasound size measurements:  -Volume:  29 cm3  - Width 5 3 cm  - Height: 3 0 cm  - Length: 3 4 cm    Ultrasound findings:  -Cysts: None  -Masses: None  -Median lobe: absent        Jag Masterson MD  2/27/2019  9:28 AM  Sign at close encounter  Office Cystoscopy Procedure Note    Indication:     medically refractory lower urinary tract symptoms     Informed consent   The risks, benefits, complications, treatment options, and expected outcomes were discussed with the patient  The patient concurred with the proposed plan and provided informed consent  Anesthesia  Lidocaine jelly 2%    Antibiotic prophylaxis   None    Procedure  The patient was placed in the supineposition, was prepped and draped in the usual manner using sterile technique, and 2% lidocaine jelly instilled into the urethra  A 17 F flexible cystoscope was then inserted into the urethra and the urethra and bladder carefully examined  The following findings were noted:    Findings:  Urethra:  Normal  Prostate: Moderate lateral lobe hyperplasia and a mildly high bladder neck  Prostatic fossa is rather short  Bladder:  Grade 2 trabeculations  Ureteral orifices:  Normal  Other findings:  None     Specimens: None                 Complications:    None; patient tolerated the procedure well           Disposition: To home after 30 minute observation             Condition: Stable

## 2019-02-27 NOTE — LETTER
February 27, 2019     Koby Tinajero MD  [address] [facility] [address]    Patient: Tate Mcfadden   YOB: 1933   Date of Visit: 2/27/2019       Dear Dr Dyan Collier: Thank you for referring Bertharobert Cortes to me for evaluation  Below are my notes for this consultation  If you have questions, please do not hesitate to call me  I look forward to following your patient along with you  Sincerely,        Forrest Lam MD        CC: MD Forrest Mckeon MD  2/27/2019  9:35 AM  Sign at close encounter  Uroflow Result    Indication:     medically refractory lower urinary tract symptoms       Procedure  The patient was brought ambulatory to the commode and instructions provided  There asked to void volitionally into the uroflow apparatus  The following findings were noted:    Findings:  Voided Volume:   11  Maximum flow (Qmax):  3 ml/s  Average flow:    3 ml/s  Description of emptying curve:   diminutive and flattened due to low emptied volume       Post Void Residual Measurement:  After voiding to completion the patient was brought to the exam room and positioned supine  Residual urine volume was measured with an ultrasound at:    0 ml      Forrest Lam MD  2/27/2019  9:36 AM  Sign at close encounter  Referring Physician: Koby Tinajero MD  A copy of this note was sent to the referring physician         Diagnoses and all orders for this visit:    Urinary retention  -     Case request operating room: [address] [place] [address]; Standing  -     Case request operating room: CYSTOSCOPY WITH INSERTION UROLIFT    Benign prostatic hyperplasia (BPH) with straining on urination  -     Case request operating room: [address] [place] [address]; Standing  -     Case request operating room: [address] [place] [address]    Other orders  -     Diet NPO; Sips with meds; Standing  -     Place sequential compression device; Standing  - ceFAZolin (ANCEF) 1,000 mg in dextrose 5 % 100 mL IVPB            Assessment and plan:       1  Obstructive lower urinary tract symptoms  -weak stream, slow stream, daytime urgency nocturia x2   - alpha-blocker contraindicated due to frailty and predisposition for falls  Finasteride contraindicated due to prostate volume less than 35 g        We reviewed the options for treating BPH/LUTS which include but are not limited to expectant management, medical therapy, transurethral resection of prostate (TURP)  We also discussed minimally invasive options  At this point, the patient wishes to proceed with Urolift  This is a great option for the patient based on the following criteria:    - cystoscopy revealed lateral lobe hyperplasia, mild, mildly high bladder neck  - gland volume 29 g as measured on transrectal ultrasound  - uroflow with Q max of 3  - PVR with a 0  - normal renal function  - no active urinary tract infection  - PSA:  Not applicable due to age outside of screening criteria  - no documented allergy to nickel    - He has failed the following treatment options:  Tamsulosin which is contraindicated due to previous patients to falls, and finasteride is contraindicated due to the patient's gland volume less than 35 g   The additional morbidity of standard surgical options (ie, TURP) is not necessary given the above criteria  The risks of Urolift include but are not limited to bleeding, infection, reaction to anesthesia such as heart attack, stroke, DVT/PE, hyponatremia, bladder neck contracture, urethral stricture, injury to surrounding structures (ureters, rectum, etc)  We discussed that additional risks of trans urethral resection procedures such as incontinence, retrograde ejaculation, and erectile dysfunction have been only rarely reported with the Uro lift procedure  We did discuss that this is a new were procedure and a permanent implant    We discussed that there may be some long-term implications that her on for seen with this newer technology, such as perhaps complicating treatment for prostate cancer if indicated down the line  Finally, I told him that he may require additional procedures secondary to some of these complications  Informed consent was obtained for the Uro lift procedure  This will be scheduled in the near future to be performed under IV sedation  Will conduit request medical clearance from Dr Bakari Eli MD      Chief Complaint     BPH follow-up    History of Present Illness     Jackie Jacobo is a 80 y o  male returns in follow-up for bothersome lower urinary tract symptoms  In brief,He has had a longstanding history of difficulty emptying his bladder  He reports a weak stream, slow stream, urinary hesitancy  He also has daytime urgency and bothersome nocturia times 2-3  Patient has not initiated on any medical management for BPH  He does have a number of medical comorbidities including dilated cardiomyopathy, COPD, type 2 diabetes and chronic kidney disease  He is currently taking 2 different diuretics and is also on a fluid restriction diet  He is accompanied by his son today  He is overall well-appearing  At the prior visit we discussed that standard managed with an alpha-blocker would be contraindicated due to his frailty and predisposition to falls  This has been an issue for him before  Overall he is doing very well medically  He has been weaned off of a diuretic  He presents today for cystoscopy and transrectal ultrasound having previously been counseled by me on the procedures in detail  Detailed Urologic History     - please refer to HPI    Review of Systems     Review of Systems   Constitutional: Negative for activity change and fatigue  HENT: Negative for congestion  Eyes: Negative for visual disturbance  Respiratory: Negative for shortness of breath and wheezing      Cardiovascular: Negative for chest pain and leg swelling  Gastrointestinal: Negative for abdominal pain  Endocrine: Positive for polyuria  Genitourinary: Positive for decreased urine volume, difficulty urinating and urgency  Negative for dysuria, flank pain and hematuria  Musculoskeletal: Negative for back pain  Allergic/Immunologic: Negative for immunocompromised state  Neurological: Negative for dizziness and numbness  Psychiatric/Behavioral: Negative for dysphoric mood  All other systems reviewed and are negative  AUA SYMPTOM SCORE      Most Recent Value   AUA SYMPTOM SCORE   How often have you had a sensation of not emptying your bladder completely after you finished urinating? 3   How often have you had to urinate again less than two hours after you finished urinating? 3   How often have you found you stopped and started again several times when you urinate? 5   How often have you found it difficult to postpone urination? 5   How often have you had a weak urinary stream?  3   How often have you had to push or strain to begin urination? 5   How many times did you most typically get up to urinate from the time you went to bed at night until the time you got up in the morning? 3   Quality of Life: If you were to spend the rest of your life with your urinary condition just the way it is now, how would you feel about that?  4   AUA SYMPTOM SCORE  27            Allergies     No Known Allergies    Physical Exam     Physical Exam   Constitutional: He is oriented to person, place, and time  He appears well-developed and well-nourished  No distress  HENT:   Head: Normocephalic and atraumatic  Eyes: EOM are normal    Neck: Normal range of motion  Cardiovascular:   Negative lower extremity edema   Pulmonary/Chest: Effort normal and breath sounds normal    Abdominal: Soft     Genitourinary: Penis normal    Genitourinary Comments: Uncircumcised phallus, rectal exam reveals a 30 g prostate no nodules or induration   Musculoskeletal: Normal range of motion  Neurological: He is alert and oriented to person, place, and time  Skin: Skin is warm  Psychiatric: He has a normal mood and affect   His behavior is normal            Vital Signs  Vitals:    02/27/19 0848   BP: 128/88   BP Location: Left arm   Patient Position: Sitting   Cuff Size: Adult   Pulse: 68   Weight: 72 6 kg (160 lb)   Height: 6' 1" (1 854 m)         Current Medications       Current Outpatient Medications:     acetaminophen (TYLENOL) 325 mg tablet, Take 2 tablets (650 mg total) by mouth every 6 (six) hours as needed for mild pain, Disp: 30 tablet, Rfl: 0    albuterol (2 5 mg/3 mL) 0 083 % nebulizer solution, Take 1 vial (2 5 mg total) by nebulization every 6 (six) hours as needed for wheezing or shortness of breath, Disp: 1080 mL, Rfl: 3    ANORO ELLIPTA 62 5-25 MCG/INH inhaler, INHALE ONE PUFF BY MOUTH ONCE DAILY , Disp: 60 each, Rfl: 3    Ascorbic Acid (VITAMIN C) 1000 MG tablet, Take 1 tablet by mouth daily, Disp: , Rfl:     BD PEN NEEDLE DON U/F 32G X 4 MM MISC, , Disp: , Rfl:     betamethasone, augmented, (DIPROLENE-AF) 0 05 % cream, , Disp: , Rfl:     Cinnamon 500 MG TABS, Take 1 tablet by mouth daily  , Disp: , Rfl:     fluticasone (FLONASE) 50 mcg/act nasal spray, 2 sprays into each nostril daily, Disp: 16 g, Rfl: 5    fluticasone (FLOVENT HFA) 220 mcg/act inhaler, Inhale 1 puff 2 (two) times a day, Disp: 1 Inhaler, Rfl: 5    furosemide (LASIX) 20 mg tablet, Take 1 tablet (20 mg total) by mouth daily as needed (if weight increases 2-3 lb in 1 day or 5 lb/1 week), Disp: 90 tablet, Rfl: 2    gabapentin (NEURONTIN) 100 mg capsule, Take 2 capsules (200 mg total) by mouth 3 (three) times a day, Disp: 540 capsule, Rfl: 1    hydrOXYzine pamoate (VISTARIL) 25 mg capsule, Take 1 capsule (25 mg total) by mouth 3 (three) times a day as needed for itching, Disp: 30 capsule, Rfl: 0    insulin detemir (LEVEMIR) 100 units/mL subcutaneous injection, Inject 17 Units under the skin daily at bedtime , Disp: , Rfl:     metoprolol succinate (TOPROL-XL) 50 mg 24 hr tablet, Take 2 tablets (100 mg total) by mouth daily, Disp: 180 tablet, Rfl: 0    montelukast (SINGULAIR) 10 mg tablet, Take 1 tablet (10 mg total) by mouth daily at bedtime, Disp: 30 tablet, Rfl: 3    Multiple Vitamins-Minerals (OCUVITE ADULT 50+ PO), Take 1 tablet by mouth daily, Disp: , Rfl:     NOVOLOG FLEXPEN 100 units/mL injection pen, Inject 6 Units under the skin daily with breakfast , Disp: , Rfl:     ONE TOUCH ULTRA TEST test strip, , Disp: , Rfl:     ONETOUCH DELICA LANCETS FINE MISC, , Disp: , Rfl:     repaglinide (PRANDIN) 2 mg tablet, Take 2 mg by mouth daily before lunch , Disp: , Rfl:     roflumilast (DALIRESP) 500 mcg tablet, Take 1 tablet (500 mcg total) by mouth daily, Disp: 30 tablet, Rfl: 5    sacubitril-valsartan (ENTRESTO) 24-26 MG TABS, Take 1 tablet by mouth 2 (two) times a day, Disp: 180 tablet, Rfl: 0    simvastatin (ZOCOR) 10 mg tablet, Take 1 tablet (10 mg total) by mouth daily, Disp: 90 tablet, Rfl: 1    spironolactone (ALDACTONE) 25 mg tablet, Take 1 tablet (25 mg total) by mouth daily, Disp: 90 tablet, Rfl: 0    VENTOLIN  (90 Base) MCG/ACT inhaler, Inhale 4 (four) times a day  , Disp: , Rfl:       Active Problems     Patient Active Problem List   Diagnosis    [facility]    Essential hypertension    Hyperlipidemia    Neuropathy, idiopathic    Multiple nodules of lung    Mixed simple and mucopurulent chronic bronchitis (HCC)    Type 2 diabetes mellitus, with long-term current use of insulin (HCC)    Dilated cardiomyopathy (HCC)    Chronic combined systolic and diastolic congestive heart failure (HCC)    Abnormal chest CT    Arthritis    SUZY [facility]acute kidney injury) ([facility] )    Paresthesia of both feet    Finger pain, left    Cough    Urinary retention    Benign prostatic hyperplasia (BPH) with straining on urination         Past Medical History Past Medical History:   Diagnosis Date    COPD (chronic obstructive pulmonary disease) ([facility] )     Diabetes mellitus (HCC)     Type 2    Essential hypertension     Heart failure (HCC)     Left inguinal hernia     Lung nodule     Malignant melanoma of skin ([facility] )          Surgical History     Past Surgical History:   Procedure Laterality Date    APPENDECTOMY      CATARACT EXTRACTION, BILATERAL      CHOLECYSTECTOMY      CHOLECYSTECTOMY      COLONOSCOPY      Fiberoptic    HERNIA REPAIR      JOINT REPLACEMENT Left     meniscus repair    KNEE ARTHROSCOPY      Therapeutic         Family History     Family History   Problem Relation Age of Onset    Diabetes Mother     Heart attack Neg Hx     Stroke Neg Hx     Aneurysm Neg Hx     Clotting disorder Neg Hx     Arrhythmia Neg Hx     Hypertension Neg Hx     Hyperlipidemia Neg Hx         pt unsure          Social History     Social History     Social History     Tobacco Use   Smoking Status Former Smoker    Packs/day: 1 00    Years: 10 00    Pack years: 10 00    Last attempt to quit: Monico Mast Years since quittin 1   Smokeless Tobacco Never Used         Pertinent Lab Values     Lab Results   Component Value Date    CREATININE 1 19 11/15/2018       No results found for: PSA    @RESULTRCNT(1H])@      Pertinent Imaging      - n/a    Portions of the record may have been created with voice recognition software   Occasional wrong word or "sound a like" substitutions may have occurred due to the inherent limitations of voice recognition software   Read the chart carefully and recognize, using context, where substitutions have occurred  Americo Pablo MD  2019  9:28 AM  Sign at close encounter  Office TRUS    Indication    Prostate volumetrics for surgical planning    Transrectal ultrasonography  The patient was placed in the left lateral decubitus position   After an attentive digital rectal examination, a 7 5 mHz sidefire ultrasound probe was gently inserted into the rectum and biplanar imaging of the prostate was done with the findings noted below  Images were taken of any abnormal findings and also to document prostate size  Bladder  The bladder base appeared normal     Prostate      Ultrasound size measurements:  -Volume:  29 cm3  - Width 5 3 cm  - Height: 3 0 cm  - Length: 3 4 cm    Ultrasound findings:  -Cysts: None  -Masses: None  -Median lobe: absent        Rolanda Ramírez MD  2/27/2019  9:28 AM  Sign at close encounter  Office Cystoscopy Procedure Note    Indication:     medically refractory lower urinary tract symptoms     Informed consent   The risks, benefits, complications, treatment options, and expected outcomes were discussed with the patient  The patient concurred with the proposed plan and provided informed consent  Anesthesia  Lidocaine jelly 2%    Antibiotic prophylaxis   None    Procedure  The patient was placed in the supineposition, was prepped and draped in the usual manner using sterile technique, and 2% lidocaine jelly instilled into the urethra  A 17 F flexible cystoscope was then inserted into the urethra and the urethra and bladder carefully examined  The following findings were noted:    Findings:  Urethra:  Normal  Prostate: Moderate lateral lobe hyperplasia and a mildly high bladder neck  Prostatic fossa is rather short  Bladder:  Grade 2 trabeculations  Ureteral orifices:  Normal  Other findings:  None     Specimens: None                 Complications:    None; patient tolerated the procedure well           Disposition: To home after 30 minute observation             Condition: Stable

## 2019-02-27 NOTE — PROGRESS NOTES
Office TRUS    Indication    Prostate volumetrics for surgical planning    Transrectal ultrasonography  The patient was placed in the left lateral decubitus position  After an attentive digital rectal examination, a 7 5 mHz sidefire ultrasound probe was gently inserted into the rectum and biplanar imaging of the prostate was done with the findings noted below  Images were taken of any abnormal findings and also to document prostate size      Bladder  The bladder base appeared normal     Prostate      Ultrasound size measurements:  -Volume:  29 cm3  - Width 5 3 cm  - Height: 3 0 cm  - Length: 3 4 cm    Ultrasound findings:  -Cysts: None  -Masses: None  -Median lobe: absent

## 2019-03-04 ENCOUNTER — TELEPHONE (OUTPATIENT)
Dept: FAMILY MEDICINE CLINIC | Facility: MEDICAL CENTER | Age: 84
End: 2019-03-04

## 2019-03-04 NOTE — TELEPHONE ENCOUNTER
Message left on Marija's voice mail  Keep note open for return call once cardiac clearance has been done so we can forward back to Dr Александр An to addend her note on 2/19

## 2019-03-04 NOTE — TELEPHONE ENCOUNTER
Betty Krishnan from Dr Luis Oden office called, pt is having a URO lift on 3/22/19  They saw your note from 2/19 and were wondering if you needed to see him again? She said clearances are good for 60 days and if you don't need to see him if you could just amend your note from February  If you need to see him we just need to call pt to set up and also let Betty Krishnan from Dr Luis Oden office know ext [phone]

## 2019-03-04 NOTE — TELEPHONE ENCOUNTER
We do not have to bring him back in however he will need to have cardiac clearance    Let me know when this is in and I can amend my note from 2/19

## 2019-03-05 ENCOUNTER — TELEPHONE (OUTPATIENT)
Dept: CARDIOLOGY CLINIC | Facility: CLINIC | Age: 84
End: 2019-03-05

## 2019-03-05 NOTE — TELEPHONE ENCOUNTER
From: Rodriguez Maki   Sent: 3/4/2019  10:11 AM   To: ANGY Velazquez   Subject: cardiac clearance                                 Good morning Sara Del Real from Dr Clinton's office called regarding a cardiac clearance for a Uro Lift Procedure  You saw pt on 2/25  Would you be able to clear him from the last appointment or would you like him to be scheduled with you for a clearance?      Please Advise   Naya Richter

## 2019-03-05 NOTE — TELEPHONE ENCOUNTER
From: ANGY Tovar   Sent: 3/4/2019   5:49 PM   To: Nik Funes   Subject: FW: cardiac clearance                             I can complete the form based on the last OV   ----- Message -----

## 2019-03-05 NOTE — LETTER
Cardiology Pre Operative Clearance      PRE OPERATIVE CARDIAC RISK ASSESSMENT    03/05/19    Dominga Perez  1933  [id number]     Date of Surgery: Pending date    Type of Surgery: Uro Lift Procedure    Surgeon: Liberty Moreno MD    No Cardiac Contraindication for Planned Surgical Procedures    Anticoagulation: NO    Physician Comment: Stable heart failure  Is at moderate CV risk  No further cardiac testing is indicated  See the last cardiac note for further information      Electronically Signed: Kaylee Oseguera DNP, NP

## 2019-03-06 NOTE — TELEPHONE ENCOUNTER
aMrgy Vilchis called back from Dr Erin Smith office and said pt has a note in his chart from cardio stating no contraindications for surgery  It's from yesterday and its under "letter"  So if you could do the amendment

## 2019-03-07 ENCOUNTER — ANESTHESIA EVENT (OUTPATIENT)
Dept: PERIOP | Facility: AMBULARY SURGERY CENTER | Age: 84
End: 2019-03-07
Payer: COMMERCIAL

## 2019-03-12 NOTE — TELEPHONE ENCOUNTER
Will need to amend the note for preop clearance  First I need you to call patient and verify that his current meds have not changed since the 2/19      Ask about any prior problems with anesthesia    Also need to ask if he has had any recent respiratory symptoms or gastrointestinal symptoms such as diarrhea,  cold etc

## 2019-03-12 NOTE — TELEPHONE ENCOUNTER
I went over the medication list with Justen Basurto, removed the Lasix but everything else is up to date  He said he has never had any issues with anesthesia and he feels well currently  Denies any cold symptoms, no cough, sob, or breathing issues  Denies nausea, diarrhea, or constipation

## 2019-03-14 NOTE — PRE-PROCEDURE INSTRUCTIONS
Pre-Surgery Instructions:   Medication Instructions    acetaminophen (TYLENOL) 325 mg tablet Instructed patient per Anesthesia Guidelines   albuterol (2 5 mg/3 mL) 0 083 % nebulizer solution Instructed patient per Anesthesia Guidelines   ANORO ELLIPTA 62 5-25 MCG/INH inhaler Instructed patient per Anesthesia Guidelines   Ascorbic Acid (VITAMIN C) 1000 MG tablet Patient was instructed by Physician and understands   Cinnamon 500 MG TABS Patient was instructed by Physician and understands   fluticasone (FLONASE) 50 mcg/act nasal spray Instructed patient per Anesthesia Guidelines   fluticasone (FLOVENT HFA) 220 mcg/act inhaler Instructed patient per Anesthesia Guidelines   gabapentin (NEURONTIN) 100 mg capsule Instructed patient per Anesthesia Guidelines   insulin detemir (LEVEMIR) 100 units/mL subcutaneous injection Patient was instructed by Physician and understands   metoprolol succinate (TOPROL-XL) 50 mg 24 hr tablet Instructed patient per Anesthesia Guidelines   montelukast (SINGULAIR) 10 mg tablet Instructed patient per Anesthesia Guidelines   Multiple Vitamins-Minerals (OCUVITE ADULT 50+ PO) Patient was instructed by Physician and understands   repaglinide (PRANDIN) 2 mg tablet Patient was instructed by Physician and understands   roflumilast (DALIRESP) 500 mcg tablet Instructed patient per Anesthesia Guidelines   sacubitril-valsartan (ENTRESTO) 24-26 MG TABS Instructed patient per Anesthesia Guidelines   simvastatin (ZOCOR) 10 mg tablet Instructed patient per Anesthesia Guidelines   spironolactone (ALDACTONE) 25 mg tablet Instructed patient per Anesthesia Guidelines   VENTOLIN  (90 Base) MCG/ACT inhaler Instructed patient per Anesthesia Guidelines  Pre op and bathing instructions reviewed

## 2019-03-22 ENCOUNTER — ANESTHESIA (OUTPATIENT)
Dept: PERIOP | Facility: AMBULARY SURGERY CENTER | Age: 84
End: 2019-03-22
Payer: COMMERCIAL

## 2019-03-22 ENCOUNTER — TELEPHONE (OUTPATIENT)
Dept: UROLOGY | Facility: CLINIC | Age: 84
End: 2019-03-22

## 2019-03-22 ENCOUNTER — HOSPITAL ENCOUNTER (OUTPATIENT)
Facility: AMBULARY SURGERY CENTER | Age: 84
Setting detail: OUTPATIENT SURGERY
Discharge: HOME | End: 2019-03-22
Attending: UROLOGY | Admitting: UROLOGY
Payer: COMMERCIAL

## 2019-03-22 VITALS
OXYGEN SATURATION: 98 % | DIASTOLIC BLOOD PRESSURE: 58 MMHG | HEART RATE: 82 BPM | TEMPERATURE: 97.8 F | SYSTOLIC BLOOD PRESSURE: 116 MMHG | RESPIRATION RATE: 18 BRPM | WEIGHT: 152 LBS | BODY MASS INDEX: 20.15 KG/M2 | HEIGHT: 73 IN

## 2019-03-22 DIAGNOSIS — R39.16 BENIGN PROSTATIC HYPERPLASIA (BPH) WITH STRAINING ON URINATION: ICD-10-CM

## 2019-03-22 DIAGNOSIS — N40.1 BENIGN PROSTATIC HYPERPLASIA (BPH) WITH STRAINING ON URINATION: ICD-10-CM

## 2019-03-22 DIAGNOSIS — R33.9 URINARY RETENTION: Primary | ICD-10-CM

## 2019-03-22 LAB
GLUCOSE SERPL-MCNC: 270 MG/DL (ref 65–140)
GLUCOSE SERPL-MCNC: 278 MG/DL (ref 65–140)
GLUCOSE SERPL-MCNC: 292 MG/DL (ref 65–140)
GLUCOSE SERPL-MCNC: 301 MG/DL (ref 65–140)

## 2019-03-22 PROCEDURE — 82948 REAGENT STRIP/BLOOD GLUCOSE: CPT

## 2019-03-22 PROCEDURE — 52441 CYSTO INSJ TRNSPRSTC 1 IMPLT: CPT | Performed by: UROLOGY

## 2019-03-22 PROCEDURE — 52442 CYSTO INS TRNSPRSTC IMPLT EA: CPT | Performed by: UROLOGY

## 2019-03-22 PROCEDURE — L8699 PROSTHETIC IMPLANT NOS: HCPCS | Performed by: UROLOGY

## 2019-03-22 DEVICE — IMPLANT URO PROSTATE UROLIFT: Type: IMPLANTABLE DEVICE | Site: URETHRA | Status: FUNCTIONAL

## 2019-03-22 RX ORDER — CEFAZOLIN SODIUM 1 G/50ML
1000 SOLUTION INTRAVENOUS ONCE
Status: COMPLETED | OUTPATIENT
Start: 2019-03-22 | End: 2019-03-22

## 2019-03-22 RX ORDER — HYDROCODONE BITARTRATE AND ACETAMINOPHEN 5; 325 MG/1; MG/1
1 TABLET ORAL EVERY 6 HOURS PRN
Qty: 5 TABLET | Refills: 0 | Status: SHIPPED | OUTPATIENT
Start: 2019-03-22 | End: 2019-04-01

## 2019-03-22 RX ORDER — PROPOFOL 10 MG/ML
INJECTION, EMULSION INTRAVENOUS CONTINUOUS PRN
Status: DISCONTINUED | OUTPATIENT
Start: 2019-03-22 | End: 2019-03-22 | Stop reason: SURG

## 2019-03-22 RX ORDER — PROPOFOL 10 MG/ML
INJECTION, EMULSION INTRAVENOUS AS NEEDED
Status: DISCONTINUED | OUTPATIENT
Start: 2019-03-22 | End: 2019-03-22 | Stop reason: SURG

## 2019-03-22 RX ORDER — FENTANYL CITRATE/PF 50 MCG/ML
25 SYRINGE (ML) INJECTION
Status: DISCONTINUED | OUTPATIENT
Start: 2019-03-22 | End: 2019-03-22 | Stop reason: HOSPADM

## 2019-03-22 RX ORDER — NAPROXEN 500 MG/1
500 TABLET ORAL 2 TIMES DAILY WITH MEALS
Qty: 10 TABLET | Refills: 0 | Status: SHIPPED | OUTPATIENT
Start: 2019-03-22 | End: 2020-05-06

## 2019-03-22 RX ORDER — MAGNESIUM HYDROXIDE 1200 MG/15ML
LIQUID ORAL AS NEEDED
Status: DISCONTINUED | OUTPATIENT
Start: 2019-03-22 | End: 2019-03-22 | Stop reason: HOSPADM

## 2019-03-22 RX ORDER — DOCUSATE SODIUM 100 MG/1
100 CAPSULE, LIQUID FILLED ORAL 2 TIMES DAILY
Qty: 30 CAPSULE | Refills: 0 | Status: SHIPPED | OUTPATIENT
Start: 2019-03-22 | End: 2020-05-28 | Stop reason: ALTCHOICE

## 2019-03-22 RX ORDER — SODIUM CHLORIDE 9 MG/ML
100 INJECTION, SOLUTION INTRAVENOUS CONTINUOUS
Status: DISCONTINUED | OUTPATIENT
Start: 2019-03-22 | End: 2019-03-22 | Stop reason: HOSPADM

## 2019-03-22 RX ORDER — FENTANYL CITRATE 50 UG/ML
INJECTION, SOLUTION INTRAMUSCULAR; INTRAVENOUS AS NEEDED
Status: DISCONTINUED | OUTPATIENT
Start: 2019-03-22 | End: 2019-03-22 | Stop reason: SURG

## 2019-03-22 RX ORDER — SODIUM CHLORIDE 9 MG/ML
INJECTION, SOLUTION INTRAVENOUS CONTINUOUS PRN
Status: DISCONTINUED | OUTPATIENT
Start: 2019-03-22 | End: 2019-03-22 | Stop reason: SURG

## 2019-03-22 RX ORDER — LIDOCAINE HYDROCHLORIDE 20 MG/ML
JELLY TOPICAL AS NEEDED
Status: DISCONTINUED | OUTPATIENT
Start: 2019-03-22 | End: 2019-03-22 | Stop reason: HOSPADM

## 2019-03-22 RX ORDER — CEPHALEXIN 500 MG/1
500 CAPSULE ORAL EVERY 6 HOURS SCHEDULED
Qty: 3 CAPSULE | Refills: 0 | Status: SHIPPED | OUTPATIENT
Start: 2019-03-22 | End: 2019-03-23

## 2019-03-22 RX ORDER — ONDANSETRON 2 MG/ML
INJECTION INTRAMUSCULAR; INTRAVENOUS AS NEEDED
Status: DISCONTINUED | OUTPATIENT
Start: 2019-03-22 | End: 2019-03-22 | Stop reason: SURG

## 2019-03-22 RX ORDER — LIDOCAINE HYDROCHLORIDE 10 MG/ML
INJECTION, SOLUTION INFILTRATION; PERINEURAL AS NEEDED
Status: DISCONTINUED | OUTPATIENT
Start: 2019-03-22 | End: 2019-03-22 | Stop reason: SURG

## 2019-03-22 RX ORDER — PHENAZOPYRIDINE HYDROCHLORIDE 200 MG/1
200 TABLET, FILM COATED ORAL 3 TIMES DAILY PRN
Qty: 10 TABLET | Refills: 0 | Status: SHIPPED | OUTPATIENT
Start: 2019-03-22 | End: 2019-03-25

## 2019-03-22 RX ORDER — ONDANSETRON 2 MG/ML
4 INJECTION INTRAMUSCULAR; INTRAVENOUS ONCE AS NEEDED
Status: DISCONTINUED | OUTPATIENT
Start: 2019-03-22 | End: 2019-03-22 | Stop reason: HOSPADM

## 2019-03-22 RX ADMIN — PROPOFOL 80 MCG/KG/MIN: 10 INJECTION, EMULSION INTRAVENOUS at 08:19

## 2019-03-22 RX ADMIN — FENTANYL CITRATE 25 MCG: 50 INJECTION, SOLUTION INTRAMUSCULAR; INTRAVENOUS at 08:25

## 2019-03-22 RX ADMIN — ONDANSETRON 4 MG: 2 INJECTION INTRAMUSCULAR; INTRAVENOUS at 08:25

## 2019-03-22 RX ADMIN — PHENYLEPHRINE HYDROCHLORIDE 100 MCG: 10 INJECTION INTRAVENOUS at 08:26

## 2019-03-22 RX ADMIN — PROPOFOL 30 MG: 10 INJECTION, EMULSION INTRAVENOUS at 08:16

## 2019-03-22 RX ADMIN — PROPOFOL 50 MG: 10 INJECTION, EMULSION INTRAVENOUS at 08:27

## 2019-03-22 RX ADMIN — CEFAZOLIN SODIUM 1000 MG: 1 SOLUTION INTRAVENOUS at 08:13

## 2019-03-22 RX ADMIN — FENTANYL CITRATE 25 MCG: 50 INJECTION, SOLUTION INTRAMUSCULAR; INTRAVENOUS at 08:16

## 2019-03-22 RX ADMIN — LIDOCAINE HYDROCHLORIDE ANHYDROUS 50 MG: 10 INJECTION, SOLUTION INFILTRATION at 08:16

## 2019-03-22 RX ADMIN — SODIUM CHLORIDE: 0.9 INJECTION, SOLUTION INTRAVENOUS at 07:32

## 2019-03-22 RX ADMIN — PROPOFOL 30 MG: 10 INJECTION, EMULSION INTRAVENOUS at 08:19

## 2019-03-22 RX ADMIN — FENTANYL CITRATE 25 MCG: 50 INJECTION, SOLUTION INTRAMUSCULAR; INTRAVENOUS at 08:54

## 2019-03-22 RX ADMIN — FENTANYL CITRATE 25 MCG: 50 INJECTION, SOLUTION INTRAMUSCULAR; INTRAVENOUS at 09:00

## 2019-03-22 RX ADMIN — INSULIN HUMAN 5 UNITS: 100 INJECTION, SOLUTION PARENTERAL at 09:05

## 2019-03-22 NOTE — ANESTHESIA POSTPROCEDURE EVALUATION
Post-Op Assessment Note    CV Status:  Stable  Pain Score: 0    Pain management: adequate     Mental Status:  Alert and awake   Hydration Status:  Euvolemic   PONV Controlled:  Controlled   Airway Patency:  Patent   Post Op Vitals Reviewed: Yes      Staff: CRNA           BP   92/48   Temp  97 4   Pulse  91   Resp   22   SpO2   3LFM  100

## 2019-03-22 NOTE — TELEPHONE ENCOUNTER
Jj Sepulveda patient, managed by DR Lesa Shepherd, s/p Urolift Implantation on Friday 3/22/19  Per Dr Lesa Shepherd, patient to be scheduled for void trial moncada removal am, PVR pm on Monday 3/25/19, then follow up as scheduled with advanced practitioner  Will contact patient after discharge today to schedule

## 2019-03-22 NOTE — TELEPHONE ENCOUNTER
Called and spoke to daughter, Franck Colmenares,  Scheduled patient for moncada removal at 8 am on Monday 3/25/19 and PVR at 1:30 pm Monday 3/25/19 at [facility]  Aware to come to outpatient office on second floor, specialty pavilion  Daughter states that Dr Carmelina Menchaca informed them patient to be prescribed antibiotic prior to moncada removal   Reviewed chart and no order in chart for antibiotic  Advised daughter, message will be sent to advanced practitioner to request antibiotic prescription for prior to moncada removal and that office will call her back to confirm it was sent  Pharmacy is [facility] [place] in chart  Patient is staying with daughter this weekend and best contact # is for daughter [phone]

## 2019-03-22 NOTE — OP NOTE
Operative Note     PATIENT:  Shakira Kong (MRN [id number])    DATE OF PROCEDURE:   3/22/2019    PRE-OP DIAGNOSES:   1) BPH with obstruction     POST-OP DIAGNOSES AND OPERATIVE FINDINGS:   1) BPH with obstruction    PROCEDURES:  1) UroLift implantation    SURGEON:   Alem Banuelos MD    No qualified teaching residents available to assist    ANESTHESIA TYPE:  General anesthesia    ESTIMATED BLOOD LOSS:   Minimal    COMPLICATIONS:   None    ANTIBIOTICS:  cefazolin    INTRAOPERATIVE THROMBOEMBOLISM PROPHYLAXIS:  Pneumatic compression stockings      PROCEDURE SUMMARY:    The patient was identified, brought to the operating room, and placed on the table in supine position  After induction of general anesthesia, the patient was placed in dorsal lithotomy position and prepped and draped in the usual sterile fashion  A complete formal timeout was performed  A 20F cystoscope was inserted into the bladder  The cystoscopy bridge was replaced with a UroLift delivery device  The first treatment site was the patient's left side approximately 1 5 cm distal to the bladder neck  The distal tip of the delivery device was then angled laterally approximately 20 degrees at this position to compress the lateral lobe  The trigger was pulled, thereby deploying a needle containing the implant through the prostate  The needle was then retracted, allowing one end of the implant to be delivered to the capsular surface of the prostate  The implant was then tensioned to assure capsular seating and removal of slack monofilament  The device was then angled back toward midline and slowly advanced proximally until cystoscopic verification of the monofilament being centered in the delivery bay  The urethral end piece was then affixed to the monofilament thereby tailoring the size of the implant  Excess filament was then severed  The delivery device was then re-advanced into the bladder   The delivery device was then replaced with cystoscope and bridge and the implant location and opening effect was confirmed cystoscopically  One capsular pull-through was experienced at the left apex  This was managed by repositioning into higher lithotomy and placing an additional implant  The same procedure was then repeated on the right side, and two additional implants were delivered just proximal to the veru montanum, again one on left and one on right side of the prostate, following the same technique  A total of 5 implants were fired and 4 were successfully deployed to create a nice anterior channel  Implants were deployed in the following locations:    1  Right bladder neck  2  Left bladder neck  3  Right apex  4  Left apex      A final cystoscopy was conducted first to inspect the location and state of each implant and second, to confirm the presence of a continuous anterior channel was present through the prostatic urethra with irrigation flow turned off  A moncada catheter was then placed  The patient tolerated the procedure well and was transferred to the recovery room awake alert and in stable condition  IMPLANTS:   Implant Name Type Inv  Item Serial No   Lot No  LRB No  Used   IMPLANT URO PROSTATE UROLIFT - [id number]  IMPLANT URO PROSTATE UROLIFT  [employer] [id number] N/A 5      Plan  Patient will be discharged home with a Moncada catheter  Will return to the office on Monday for removal and returned later that day for a trial of void postvoid residual   Will then see him back as scheduled for post uroflow follow-up to include a repeat AUA symptom score and a uroflow PVR

## 2019-03-22 NOTE — TELEPHONE ENCOUNTER
Called and spoke to daughter, advised prescription for Keflex one tablet every 6 hours, start day of moncada removal for total of 3 doses was sent to [facility] and that he should take first dose one hour prior to scheduled moncada removal on Monday 3/25/19  Verbal understanding given

## 2019-03-22 NOTE — DISCHARGE INSTRUCTIONS
UROLIFT      WHAT YOU NEED TO KNOW:   A UroLift is procedure that is done to open the natural channel within your prostate gland  DISCHARGE INSTRUCTIONS:   Medicines:   · Pain medicine: You may be given a prescription medicine to decrease pain  Do not wait until the pain is severe before you take these medicines:  · Naproxen (prescription-strength NSAID/Ibuprofen type medicine) - for moderate pain  This can be substituted with over the counter Ibuprofen if more convenient, or it this is less expensive  · Pyridium - to minimize pain and discomfort when passing your urine  Will turn your urine orange  This can be substituted with over the counter "AZO" if more convenient, or it this is less expensive  · Norco/Percocet - for severe pain  Can be sedating and constipating  · Colace - to prevent constipation  This is also an over the counter medicine - you can purchase it without a prescription, if more convenient or less expensive    · Antibiotics:  You may be provided with antibiotics at discharge, particularly if you are being sent home with a moncada catheter     This medicine is given to fight or prevent an infection caused by bacteria  Always take your antibiotics exactly as ordered by your healthcare provider  Do not stop taking your medicine unless directed by your healthcare provider  Never save antibiotics or take leftover antibiotics that were given to you for another illness  · Take your medicine as directed  Contact your healthcare provider if you think your medicine is not helping or if you have side effects  Tell him or her if you are allergic to any medicine  Keep a list of the medicines, vitamins, and herbs you take  Include the amounts, and when and why you take them  Bring the list or the pill bottles to follow-up visits  Carry your medicine list with you in case of an emergency  Follow up with your healthcare provider or urologist as directed:   You may need to return to make sure you do not have an infection, or to have your Greenwood catheter removed  Write down your questions so you remember to ask them during your visits  Greenwood catheter care: You may be sent home with a Greenwood catheter  If so you will be provided with instructions to remove it    Bladder control:  After surgery, you may leak urine and have trouble controlling when you urinate  Ask for more information about the following ways to help decrease urine leakage:  · Avoid caffeine:  Caffeine can cause problems with bladder control and increase your need to urinate  · Do pelvic floor muscle exercises:  Pelvic floor muscle exercises may help improve your bladder control, if you leak urine  These exercises are done by tightening and relaxing your pelvic muscles  Ask how to do pelvic floor muscle exercises, and how often to do them  · Limit your liquids:  Drink smaller amounts of liquid throughout the day  Do not drink before bedtime  Ask if you should decrease the amount of liquid you drink each day  This may help you control your bladder  · Wear a pad or adult diapers: These may help to absorb leaking urine and decrease the odor  Activity guidelines:  Ask when it is okay for you to return to work and activities, or to have sex  Contact your healthcare provider or urologist if:   · You have a fever  · You have new or more blood in your urine  · You have trouble starting to urinate, or have a weak stream of urine when you urinate  · You feel like you have a full bladder, even after you urinate  You may also leak urine  · You often wake up during the night to urinate  You may also feel the need to urinate right away  · You feel pain and burning when you urinate  · You feel pain or pressure in your lower abdomen  · Your urine looks cloudy, and smells bad  · You have trouble getting an erection or ejaculating  · You have questions or concerns about your condition or care    Seek care immediately or call 911 if:   · You urinate little or not at all  · You have severe abdominal or back pain  · You are dizzy or confused  · You have abdominal pain, nausea, and vomiting  · Your heartbeat is slower than usual   © 2017 [employer] Information is for End User's use only and may not be sold, redistributed or otherwise used for commercial purposes  All illustrations and images included in CareNotes® are the copyrighted property of [employer]  or Gilberto Franco  The above information is an  only  It is not intended as medical advice for individual conditions or treatments  Talk to your doctor, nurse or pharmacist before following any medical regimen to see if it is safe and effective for you

## 2019-03-22 NOTE — ANESTHESIA PREPROCEDURE EVALUATION
Review of Systems/Medical History      No history of anesthetic complications     Cardiovascular  Hyperlipidemia, Hypertension , CHF ,   Comment: EF 54, normal RV (recovered from late 2018) - seen by cardiology pre-op,  Pulmonary  COPD ,        GI/Hepatic  Negative GI/hepatic ROS          Prostatic disorder,        Endo/Other  Diabetes ,      GYN       Hematology  Negative hematology ROS      Musculoskeletal    Arthritis     Neurology  Negative neurology ROS      Psychology           Physical Exam    Airway    Mallampati score: II  TM Distance: >3 FB  Neck ROM: full     Dental       Cardiovascular      Pulmonary      Other Findings        Anesthesia Plan  ASA Score- 3     Anesthesia Type- IV sedation with anesthesia with ASA Monitors  Additional Monitors:   Airway Plan:     Comment: IV sedation,  standard ASA monitors  Risks and benefits discussed with patient; patient consented and agrees to proceed  I saw and evaluated the patient  If seen with CRNA, we have discussed the anesthetic plan and I am in agreement that the plan is appropriate for the patient  Glucose elevated today - at home, 160s this am  We will check post-op  Given that he has no symptoms, this is a chronic medical issue, and is usually under good control, we will proceed        Plan Factors-    Induction- intravenous  Postoperative Plan-     Informed Consent- Anesthetic plan and risks discussed with patient  I personally reviewed this patient with the CRNA  Discussed and agreed on the Anesthesia Plan with the CRNA  Jasmin Miller

## 2019-03-25 ENCOUNTER — PROCEDURE VISIT (OUTPATIENT)
Dept: UROLOGY | Facility: CLINIC | Age: 84
End: 2019-03-25
Payer: COMMERCIAL

## 2019-03-25 ENCOUNTER — TELEPHONE (OUTPATIENT)
Dept: CARDIOLOGY CLINIC | Facility: CLINIC | Age: 84
End: 2019-03-25

## 2019-03-25 VITALS
DIASTOLIC BLOOD PRESSURE: 68 MMHG | HEART RATE: 78 BPM | SYSTOLIC BLOOD PRESSURE: 118 MMHG | HEIGHT: 73 IN | WEIGHT: 159.4 LBS | BODY MASS INDEX: 21.12 KG/M2

## 2019-03-25 DIAGNOSIS — N40.0 BENIGN PROSTATIC HYPERPLASIA, UNSPECIFIED WHETHER LOWER URINARY TRACT SYMPTOMS PRESENT: Primary | ICD-10-CM

## 2019-03-25 LAB — POST-VOID RESIDUAL VOLUME, ML POC: 88 ML

## 2019-03-25 PROCEDURE — 51798 US URINE CAPACITY MEASURE: CPT

## 2019-03-25 NOTE — PROGRESS NOTES
3/25/2019    Lexii Mirza  1933  [id number]    Diagnosis  Chief Complaint     Benign Prostatic Hypertrophy          Patient presents for void trial managed by DR Quay Brunner, s/p Urolift implantation on Friday 3/22/19  Plan  Follow up as scheduled in 4 weeks for post op uroflow/pvr/aua  Procedure Greewnood removal/voiding trial    Greenwood catheter removed after deflation of an intact balloon  Patient tolerated well  Encouraged patient to hydrate well and return this afternoon for post void residual   He knows he  may return early if uncomfortable and unable to urinate  Patient agrees to this plan  Patient returned this afternoon  Patient states able to void four times at home without difficulty  Patient voided 100 ml while in office  Bladder ultrasound performed and PVR measured 88 ml  Reviewed with patient and daughter normal to have some increased urinary urgency/frequency after urolift implantation, instructed ok to use Ibuprofen NSAIDS as needed to decrease inflammation  Advised to contact office if patient develops fever, chills, difficulty urinating  Daughter concerned with patient's weight today and history of congestive heart failure,  No edema noted in lower extremities  Advised daughter to follow up with primary care regarding this concern      Recent Results (from the past 1 hour(s))   POCT Measure PVR    Collection Time: 03/25/19  1:29 PM   Result Value Ref Range    POST-VOID RESIDUAL VOLUME, ML POC 88 mL         Vitals:    03/25/19 0753   BP: 118/68   Pulse: 78   Weight: 72 3 kg (159 lb 6 4 oz)   Height: 6' 1" (1 854 m)           Iveth Elias RN

## 2019-03-25 NOTE — TELEPHONE ENCOUNTER
Pt seen on 2/25  Had urolift procedure on 3/22  On 3/22, Gordons weight was 154 lbs, and today is 159 lbs  He had a moncada in from 3/22 which was removed today  He had been told to increase fluids  He does not have edema, is not sob  Daughter is concerned about the wt gain  His daily lasix 20 mg was d/c and changed to PRN at ov  Daughter asking if he should take lasix for the next couple of days  Please advise

## 2019-04-03 ENCOUNTER — TELEPHONE (OUTPATIENT)
Dept: FAMILY MEDICINE CLINIC | Facility: MEDICAL CENTER | Age: 84
End: 2019-04-03

## 2019-04-04 DIAGNOSIS — G62.9 NEUROPATHY: Primary | ICD-10-CM

## 2019-04-04 DIAGNOSIS — G60.9 NEUROPATHY, IDIOPATHIC: ICD-10-CM

## 2019-04-04 RX ORDER — GABAPENTIN 300 MG/1
300 CAPSULE ORAL 2 TIMES DAILY
COMMUNITY
End: 2019-04-04 | Stop reason: SDUPTHER

## 2019-04-07 RX ORDER — GABAPENTIN 300 MG/1
300 CAPSULE ORAL 2 TIMES DAILY
Qty: 60 CAPSULE | Refills: 2 | Status: SHIPPED | OUTPATIENT
Start: 2019-04-07 | End: 2019-07-29 | Stop reason: SDUPTHER

## 2019-04-08 DIAGNOSIS — E78.01 FAMILIAL HYPERCHOLESTEROLEMIA: ICD-10-CM

## 2019-04-08 DIAGNOSIS — I42.0 DILATED CARDIOMYOPATHY (HCC): ICD-10-CM

## 2019-04-08 DIAGNOSIS — I50.43 ACUTE ON CHRONIC COMBINED SYSTOLIC AND DIASTOLIC CONGESTIVE HEART FAILURE (HCC): ICD-10-CM

## 2019-04-08 RX ORDER — METOPROLOL SUCCINATE 50 MG/1
100 TABLET, EXTENDED RELEASE ORAL DAILY
Qty: 180 TABLET | Refills: 3 | Status: SHIPPED | OUTPATIENT
Start: 2019-04-08 | End: 2020-02-24 | Stop reason: SDUPTHER

## 2019-04-08 RX ORDER — SPIRONOLACTONE 25 MG/1
25 TABLET ORAL DAILY
Qty: 90 TABLET | Refills: 3 | Status: SHIPPED | OUTPATIENT
Start: 2019-04-08 | End: 2020-02-24 | Stop reason: SDUPTHER

## 2019-04-11 RX ORDER — SIMVASTATIN 10 MG
10 TABLET ORAL DAILY
Qty: 90 TABLET | Refills: 1 | Status: SHIPPED | OUTPATIENT
Start: 2019-04-11 | End: 2019-10-25 | Stop reason: SDUPTHER

## 2019-04-18 ENCOUNTER — OFFICE VISIT (OUTPATIENT)
Dept: UROLOGY | Facility: CLINIC | Age: 84
End: 2019-04-18
Payer: COMMERCIAL

## 2019-04-18 VITALS
HEART RATE: 66 BPM | SYSTOLIC BLOOD PRESSURE: 112 MMHG | HEIGHT: 73 IN | WEIGHT: 161 LBS | BODY MASS INDEX: 21.34 KG/M2 | DIASTOLIC BLOOD PRESSURE: 78 MMHG

## 2019-04-18 DIAGNOSIS — N40.0 BENIGN PROSTATIC HYPERPLASIA, UNSPECIFIED WHETHER LOWER URINARY TRACT SYMPTOMS PRESENT: Primary | ICD-10-CM

## 2019-04-18 LAB — POST-VOID RESIDUAL VOLUME, ML POC: 92 ML

## 2019-04-18 PROCEDURE — 99213 OFFICE O/P EST LOW 20 MIN: CPT | Performed by: PHYSICIAN ASSISTANT

## 2019-04-18 PROCEDURE — 51798 US URINE CAPACITY MEASURE: CPT | Performed by: PHYSICIAN ASSISTANT

## 2019-04-25 ENCOUNTER — OFFICE VISIT (OUTPATIENT)
Dept: FAMILY MEDICINE CLINIC | Facility: MEDICAL CENTER | Age: 84
End: 2019-04-25
Payer: COMMERCIAL

## 2019-04-25 VITALS
WEIGHT: 164 LBS | HEIGHT: 73 IN | DIASTOLIC BLOOD PRESSURE: 70 MMHG | BODY MASS INDEX: 21.74 KG/M2 | SYSTOLIC BLOOD PRESSURE: 130 MMHG | RESPIRATION RATE: 16 BRPM | HEART RATE: 70 BPM

## 2019-04-25 DIAGNOSIS — Z79.4 TYPE 2 DIABETES MELLITUS WITH DIABETIC NEUROPATHY, WITH LONG-TERM CURRENT USE OF INSULIN (HCC): Chronic | ICD-10-CM

## 2019-04-25 DIAGNOSIS — E78.2 MIXED HYPERLIPIDEMIA: ICD-10-CM

## 2019-04-25 DIAGNOSIS — L29.9 PRURITUS: ICD-10-CM

## 2019-04-25 DIAGNOSIS — E11.40 TYPE 2 DIABETES MELLITUS WITH DIABETIC NEUROPATHY, WITH LONG-TERM CURRENT USE OF INSULIN (HCC): Chronic | ICD-10-CM

## 2019-04-25 DIAGNOSIS — I42.0 DILATED CARDIOMYOPATHY (HCC): ICD-10-CM

## 2019-04-25 DIAGNOSIS — I10 ESSENTIAL HYPERTENSION: Chronic | ICD-10-CM

## 2019-04-25 DIAGNOSIS — Z12.11 SCREENING FOR COLON CANCER: Primary | ICD-10-CM

## 2019-04-25 PROCEDURE — 99214 OFFICE O/P EST MOD 30 MIN: CPT | Performed by: FAMILY MEDICINE

## 2019-04-25 RX ORDER — HYDROXYZINE PAMOATE 25 MG/1
25 CAPSULE ORAL 3 TIMES DAILY PRN
Qty: 30 CAPSULE | Refills: 1 | Status: SHIPPED | OUTPATIENT
Start: 2019-04-25 | End: 2019-05-22 | Stop reason: SDUPTHER

## 2019-04-30 LAB
ALBUMIN SERPL-MCNC: 4.2 G/DL (ref 3.6–5.1)
ALBUMIN/GLOB SERPL: 1.8 (CALC) (ref 1–2.5)
ALP SERPL-CCNC: 60 U/L (ref 40–115)
ALT SERPL-CCNC: 20 U/L (ref 9–46)
AST SERPL-CCNC: 14 U/L (ref 10–35)
BASOPHILS # BLD AUTO: 192 CELLS/UL (ref 0–200)
BASOPHILS NFR BLD AUTO: 3 %
BILIRUB SERPL-MCNC: 0.5 MG/DL (ref 0.2–1.2)
BUN SERPL-MCNC: 25 MG/DL (ref 7–25)
BUN/CREAT SERPL: 22 (CALC) (ref 6–22)
CALCIUM SERPL-MCNC: 9.6 MG/DL (ref 8.6–10.3)
CHLORIDE SERPL-SCNC: 104 MMOL/L (ref 98–110)
CO2 SERPL-SCNC: 29 MMOL/L (ref 20–32)
CREAT SERPL-MCNC: 1.13 MG/DL (ref 0.7–1.11)
EOSINOPHIL # BLD AUTO: 858 CELLS/UL (ref 15–500)
EOSINOPHIL NFR BLD AUTO: 13.4 %
ERYTHROCYTE [DISTWIDTH] IN BLOOD BY AUTOMATED COUNT: 21.4 % (ref 11–15)
GLOBULIN SER CALC-MCNC: 2.3 G/DL (CALC) (ref 1.9–3.7)
GLUCOSE SERPL-MCNC: 163 MG/DL (ref 65–99)
HCT VFR BLD AUTO: 34.2 % (ref 38.5–50)
HGB BLD-MCNC: 11.2 G/DL (ref 13.2–17.1)
LYMPHOCYTES # BLD AUTO: 1382 CELLS/UL (ref 850–3900)
LYMPHOCYTES NFR BLD AUTO: 21.6 %
MCH RBC QN AUTO: 30.9 PG (ref 27–33)
MCHC RBC AUTO-ENTMCNC: 32.7 G/DL (ref 32–36)
MCV RBC AUTO: 94.2 FL (ref 80–100)
MONOCYTES # BLD AUTO: 666 CELLS/UL (ref 200–950)
MONOCYTES NFR BLD AUTO: 10.4 %
NEUTROPHILS # BLD AUTO: 3302 CELLS/UL (ref 1500–7800)
NEUTROPHILS NFR BLD AUTO: 51.6 %
PLATELET # BLD AUTO: 204 THOUSAND/UL (ref 140–400)
POTASSIUM SERPL-SCNC: 4.9 MMOL/L (ref 3.5–5.3)
PROT SERPL-MCNC: 6.5 G/DL (ref 6.1–8.1)
RBC # BLD AUTO: 3.63 MILLION/UL (ref 4.2–5.8)
SL AMB EGFR AFRICAN AMERICAN: 68 ML/MIN/1.73M2
SL AMB EGFR NON AFRICAN AMERICAN: 59 ML/MIN/1.73M2
SODIUM SERPL-SCNC: 139 MMOL/L (ref 135–146)
TSH SERPL-ACNC: 2.56 MIU/L (ref 0.4–4.5)
WBC # BLD AUTO: 6.4 THOUSAND/UL (ref 3.8–10.8)

## 2019-05-02 ENCOUNTER — TELEPHONE (OUTPATIENT)
Dept: FAMILY MEDICINE CLINIC | Facility: MEDICAL CENTER | Age: 84
End: 2019-05-02

## 2019-05-02 DIAGNOSIS — I10 ESSENTIAL HYPERTENSION: Primary | Chronic | ICD-10-CM

## 2019-05-03 ENCOUNTER — APPOINTMENT (OUTPATIENT)
Dept: LAB | Facility: MEDICAL CENTER | Age: 84
End: 2019-05-03
Payer: COMMERCIAL

## 2019-05-03 DIAGNOSIS — J41.8 MIXED SIMPLE AND MUCOPURULENT CHRONIC BRONCHITIS (HCC): ICD-10-CM

## 2019-05-03 DIAGNOSIS — Z12.11 SCREENING FOR COLON CANCER: ICD-10-CM

## 2019-05-03 LAB — HEMOCCULT STL QL IA: NEGATIVE

## 2019-05-03 PROCEDURE — G0328 FECAL BLOOD SCRN IMMUNOASSAY: HCPCS

## 2019-05-06 ENCOUNTER — TELEPHONE (OUTPATIENT)
Dept: CARDIOLOGY CLINIC | Facility: CLINIC | Age: 84
End: 2019-05-06

## 2019-05-22 DIAGNOSIS — L29.9 PRURITUS: ICD-10-CM

## 2019-05-22 RX ORDER — HYDROXYZINE PAMOATE 25 MG/1
25 CAPSULE ORAL 3 TIMES DAILY PRN
Qty: 30 CAPSULE | Refills: 5 | Status: SHIPPED | OUTPATIENT
Start: 2019-05-22 | End: 2019-09-04 | Stop reason: SDUPTHER

## 2019-06-04 ENCOUNTER — TELEPHONE (OUTPATIENT)
Dept: FAMILY MEDICINE CLINIC | Facility: MEDICAL CENTER | Age: 84
End: 2019-06-04

## 2019-06-05 DIAGNOSIS — L29.9 PRURITUS: ICD-10-CM

## 2019-06-05 RX ORDER — HYDROXYZINE PAMOATE 25 MG/1
25 CAPSULE ORAL 3 TIMES DAILY PRN
Qty: 17 CAPSULE | Refills: 0 | Status: CANCELLED | OUTPATIENT
Start: 2019-06-05

## 2019-06-10 ENCOUNTER — OFFICE VISIT (OUTPATIENT)
Dept: CARDIOLOGY CLINIC | Facility: CLINIC | Age: 84
End: 2019-06-10
Payer: COMMERCIAL

## 2019-06-10 VITALS
SYSTOLIC BLOOD PRESSURE: 100 MMHG | BODY MASS INDEX: 22.08 KG/M2 | HEART RATE: 63 BPM | HEIGHT: 73 IN | OXYGEN SATURATION: 97 % | WEIGHT: 166.6 LBS | DIASTOLIC BLOOD PRESSURE: 52 MMHG

## 2019-06-10 DIAGNOSIS — I10 ESSENTIAL HYPERTENSION: Primary | Chronic | ICD-10-CM

## 2019-06-10 DIAGNOSIS — E78.49 OTHER HYPERLIPIDEMIA: ICD-10-CM

## 2019-06-10 DIAGNOSIS — I42.0 DILATED CARDIOMYOPATHY (HCC): ICD-10-CM

## 2019-06-10 DIAGNOSIS — I50.42 CHRONIC COMBINED SYSTOLIC AND DIASTOLIC CONGESTIVE HEART FAILURE (HCC): ICD-10-CM

## 2019-06-10 PROCEDURE — 99214 OFFICE O/P EST MOD 30 MIN: CPT | Performed by: INTERNAL MEDICINE

## 2019-06-13 ENCOUNTER — TELEPHONE (OUTPATIENT)
Dept: PULMONOLOGY | Facility: CLINIC | Age: 84
End: 2019-06-13

## 2019-06-13 DIAGNOSIS — J44.1 COPD WITH ACUTE EXACERBATION (HCC): ICD-10-CM

## 2019-06-28 ENCOUNTER — TELEPHONE (OUTPATIENT)
Dept: PULMONOLOGY | Facility: CLINIC | Age: 84
End: 2019-06-28

## 2019-06-28 DIAGNOSIS — R05.9 COUGH: ICD-10-CM

## 2019-06-28 RX ORDER — MONTELUKAST SODIUM 10 MG/1
10 TABLET ORAL
Qty: 30 TABLET | Refills: 3 | Status: SHIPPED | OUTPATIENT
Start: 2019-06-28 | End: 2019-10-28 | Stop reason: SDUPTHER

## 2019-07-15 DIAGNOSIS — G60.9 NEUROPATHY, IDIOPATHIC: ICD-10-CM

## 2019-07-16 RX ORDER — FLUTICASONE PROPIONATE 50 MCG
2 SPRAY, SUSPENSION (ML) NASAL DAILY
Qty: 16 G | Refills: 5 | Status: SHIPPED | OUTPATIENT
Start: 2019-07-16 | End: 2019-09-19 | Stop reason: ALTCHOICE

## 2019-07-29 ENCOUNTER — OFFICE VISIT (OUTPATIENT)
Dept: PULMONOLOGY | Facility: CLINIC | Age: 84
End: 2019-07-29
Payer: COMMERCIAL

## 2019-07-29 VITALS
BODY MASS INDEX: 21.87 KG/M2 | DIASTOLIC BLOOD PRESSURE: 70 MMHG | OXYGEN SATURATION: 100 % | HEIGHT: 73 IN | WEIGHT: 165 LBS | HEART RATE: 64 BPM | SYSTOLIC BLOOD PRESSURE: 100 MMHG

## 2019-07-29 DIAGNOSIS — J41.8 MIXED SIMPLE AND MUCOPURULENT CHRONIC BRONCHITIS (HCC): Primary | ICD-10-CM

## 2019-07-29 DIAGNOSIS — G62.9 NEUROPATHY: ICD-10-CM

## 2019-07-29 DIAGNOSIS — R91.1 LUNG NODULE: ICD-10-CM

## 2019-07-29 PROCEDURE — 99213 OFFICE O/P EST LOW 20 MIN: CPT | Performed by: PHYSICIAN ASSISTANT

## 2019-07-29 RX ORDER — GABAPENTIN 300 MG/1
300 CAPSULE ORAL 2 TIMES DAILY
Qty: 60 CAPSULE | Refills: 2 | Status: SHIPPED | OUTPATIENT
Start: 2019-07-29 | End: 2019-11-21

## 2019-07-29 NOTE — TELEPHONE ENCOUNTER
Patient is completely is out of medicine  Patient will like a call once prescription has been sent to [facility]

## 2019-07-29 NOTE — PROGRESS NOTES
Assessment:    1  Mixed simple and mucopurulent chronic bronchitis (HCC)     2  Lung nodule  CT chest wo contrast         Plan:     Patient is here today for follow-up  He is doing well with his breathing, continues to be without any exacerbations since starting on the Daliresp  He will continue with Anoro, Flovent, Flonase and Singulair  Told him to try adding an over-the-counter antihistamine to help with runny nose  Last chest CT was September 2018 which showed a stable 3 mm lingular nodule, stable scarring in the right upper lobe  Will repeat CT scan September 2019  He will follow up with us in 6 months or sooner if necessary  Subjective:     Patient ID: Griselda Lark is a 80 y o  male  Chief Complaint:  Patient is an 79 yo male former smoker with PMH of COPD, HTN, HLD, MDS  He is here today for follow-up  He is doing well with his breathing  Continues on Anoro and Flovent as well as Flonase and Singulair  He is using his Daliresp on a daily basis  He has not had any further exacerbations since starting the Daliresp  Review of Systems   Constitutional: Negative  HENT: Positive for rhinorrhea  Respiratory: Negative  Cardiovascular: Negative  Gastrointestinal: Negative  Genitourinary: Negative  Musculoskeletal: Negative  Skin: Negative  Allergic/Immunologic: Negative  Neurological: Negative  Psychiatric/Behavioral: Negative  Objective:  /70   Pulse 64   Ht 6' 1" (1 854 m)   Wt 74 8 kg (165 lb)   SpO2 100%   BMI 21 77 kg/m²   Physical Exam   Constitutional: He is oriented to person, place, and time  He appears well-developed and well-nourished  No distress  HENT:   Mouth/Throat: Oropharynx is clear and moist    Eyes: Pupils are equal, round, and reactive to light  Cardiovascular: Normal rate and regular rhythm  Pulmonary/Chest: Effort normal and breath sounds normal  No respiratory distress  He has no decreased breath sounds   He has no wheezes  He has no rhonchi  He has no rales  Abdominal: Soft  Musculoskeletal: Normal range of motion  Neurological: He is alert and oriented to person, place, and time  Skin: Skin is warm and dry  Psychiatric: He has a normal mood and affect   His behavior is normal  Judgment and thought content normal

## 2019-07-31 RX ORDER — GABAPENTIN 300 MG/1
CAPSULE ORAL
Qty: 60 CAPSULE | Refills: 1 | Status: SHIPPED | OUTPATIENT
Start: 2019-07-31 | End: 2019-09-19 | Stop reason: ALTCHOICE

## 2019-08-11 RX ORDER — GABAPENTIN 100 MG/1
200 CAPSULE ORAL 3 TIMES DAILY
Qty: 270 CAPSULE | Refills: 0 | OUTPATIENT
Start: 2019-08-11 | End: 2019-09-19 | Stop reason: SDUPTHER

## 2019-09-04 DIAGNOSIS — J41.8 MIXED SIMPLE AND MUCOPURULENT CHRONIC BRONCHITIS (HCC): ICD-10-CM

## 2019-09-04 DIAGNOSIS — L29.9 PRURITUS: ICD-10-CM

## 2019-09-04 RX ORDER — UMECLIDINIUM BROMIDE AND VILANTEROL TRIFENATATE 62.5; 25 UG/1; UG/1
POWDER RESPIRATORY (INHALATION)
Qty: 60 EACH | Refills: 2 | Status: SHIPPED | OUTPATIENT
Start: 2019-09-04 | End: 2019-12-03 | Stop reason: SDUPTHER

## 2019-09-05 RX ORDER — HYDROXYZINE PAMOATE 25 MG/1
25 CAPSULE ORAL 3 TIMES DAILY PRN
Qty: 30 CAPSULE | Refills: 0 | Status: SHIPPED | OUTPATIENT
Start: 2019-09-05 | End: 2019-11-18

## 2019-09-05 RX ORDER — HYDROXYZINE HYDROCHLORIDE 25 MG/1
TABLET, FILM COATED ORAL
Qty: 30 TABLET | Refills: 5 | Status: SHIPPED | OUTPATIENT
Start: 2019-09-05 | End: 2020-05-28 | Stop reason: ALTCHOICE

## 2019-09-12 ENCOUNTER — HOSPITAL ENCOUNTER (OUTPATIENT)
Dept: RADIOLOGY | Facility: MEDICAL CENTER | Age: 84
Discharge: HOME | End: 2019-09-12
Payer: COMMERCIAL

## 2019-09-12 DIAGNOSIS — R91.1 LUNG NODULE: ICD-10-CM

## 2019-09-12 PROCEDURE — 71250 CT THORAX DX C-: CPT

## 2019-09-13 LAB
ALBUMIN SERPL-MCNC: 4.3 G/DL (ref 3.6–5.1)
ALBUMIN/GLOB SERPL: 2 (CALC) (ref 1–2.5)
ALP SERPL-CCNC: 45 U/L (ref 40–115)
ALT SERPL-CCNC: 12 U/L (ref 9–46)
AST SERPL-CCNC: 13 U/L (ref 10–35)
BASOPHILS # BLD AUTO: 157 CELLS/UL (ref 0–200)
BASOPHILS NFR BLD AUTO: 2.7 %
BILIRUB SERPL-MCNC: 0.6 MG/DL (ref 0.2–1.2)
BUN SERPL-MCNC: 30 MG/DL (ref 7–25)
BUN/CREAT SERPL: 21 (CALC) (ref 6–22)
CALCIUM SERPL-MCNC: 9.3 MG/DL (ref 8.6–10.3)
CHLORIDE SERPL-SCNC: 106 MMOL/L (ref 98–110)
CO2 SERPL-SCNC: 24 MMOL/L (ref 20–32)
CREAT SERPL-MCNC: 1.4 MG/DL (ref 0.7–1.11)
EOSINOPHIL # BLD AUTO: 818 CELLS/UL (ref 15–500)
EOSINOPHIL NFR BLD AUTO: 14.1 %
ERYTHROCYTE [DISTWIDTH] IN BLOOD BY AUTOMATED COUNT: 23.5 % (ref 11–15)
GLOBULIN SER CALC-MCNC: 2.1 G/DL (CALC) (ref 1.9–3.7)
GLUCOSE SERPL-MCNC: 78 MG/DL (ref 65–99)
HCT VFR BLD AUTO: 32 % (ref 38.5–50)
HGB BLD-MCNC: 10 G/DL (ref 13.2–17.1)
LYMPHOCYTES # BLD AUTO: 1044 CELLS/UL (ref 850–3900)
LYMPHOCYTES NFR BLD AUTO: 18 %
MCH RBC QN AUTO: 29.2 PG (ref 27–33)
MCHC RBC AUTO-ENTMCNC: 31.3 G/DL (ref 32–36)
MCV RBC AUTO: 93.3 FL (ref 80–100)
MONOCYTES # BLD AUTO: 696 CELLS/UL (ref 200–950)
MONOCYTES NFR BLD AUTO: 12 %
NEUTROPHILS # BLD AUTO: 3086 CELLS/UL (ref 1500–7800)
NEUTROPHILS NFR BLD AUTO: 53.2 %
PLATELET # BLD AUTO: 158 THOUSAND/UL (ref 140–400)
POTASSIUM SERPL-SCNC: 4.9 MMOL/L (ref 3.5–5.3)
PROT SERPL-MCNC: 6.4 G/DL (ref 6.1–8.1)
RBC # BLD AUTO: 3.43 MILLION/UL (ref 4.2–5.8)
SL AMB EGFR AFRICAN AMERICAN: 52 ML/MIN/1.73M2
SL AMB EGFR NON AFRICAN AMERICAN: 45 ML/MIN/1.73M2
SODIUM SERPL-SCNC: 140 MMOL/L (ref 135–146)
WBC # BLD AUTO: 5.8 THOUSAND/UL (ref 3.8–10.8)

## 2019-09-19 ENCOUNTER — OFFICE VISIT (OUTPATIENT)
Dept: FAMILY MEDICINE CLINIC | Facility: MEDICAL CENTER | Age: 84
End: 2019-09-19
Payer: COMMERCIAL

## 2019-09-19 VITALS
HEART RATE: 68 BPM | DIASTOLIC BLOOD PRESSURE: 60 MMHG | RESPIRATION RATE: 16 BRPM | SYSTOLIC BLOOD PRESSURE: 120 MMHG | WEIGHT: 167.8 LBS | BODY MASS INDEX: 22.14 KG/M2

## 2019-09-19 DIAGNOSIS — I10 ESSENTIAL HYPERTENSION: ICD-10-CM

## 2019-09-19 DIAGNOSIS — D64.9 ANEMIA, UNSPECIFIED TYPE: Primary | ICD-10-CM

## 2019-09-19 DIAGNOSIS — D46.9 MYELODYSPLASIA (MYELODYSPLASTIC SYNDROME) (HCC): ICD-10-CM

## 2019-09-19 DIAGNOSIS — Z00.00 MEDICARE ANNUAL WELLNESS VISIT, SUBSEQUENT: ICD-10-CM

## 2019-09-19 DIAGNOSIS — E78.49 OTHER HYPERLIPIDEMIA: ICD-10-CM

## 2019-09-19 DIAGNOSIS — G60.9 NEUROPATHY, IDIOPATHIC: ICD-10-CM

## 2019-09-19 PROCEDURE — 99214 OFFICE O/P EST MOD 30 MIN: CPT | Performed by: FAMILY MEDICINE

## 2019-09-19 PROCEDURE — 1170F FXNL STATUS ASSESSED: CPT | Performed by: FAMILY MEDICINE

## 2019-09-19 PROCEDURE — G0439 PPPS, SUBSEQ VISIT: HCPCS | Performed by: FAMILY MEDICINE

## 2019-09-19 PROCEDURE — 1125F AMNT PAIN NOTED PAIN PRSNT: CPT | Performed by: FAMILY MEDICINE

## 2019-09-19 RX ORDER — GABAPENTIN 100 MG/1
100 CAPSULE ORAL 3 TIMES DAILY
Qty: 90 CAPSULE | Refills: 1 | Status: SHIPPED | OUTPATIENT
Start: 2019-09-19 | End: 2019-10-24 | Stop reason: SDUPTHER

## 2019-09-19 NOTE — PROGRESS NOTES
Larry Starr is here for 3 month follow-up  He is also here for Medicare wellness  See other note  He saw Cardiology  Exam stable and no changes were made  He saw Endocrinology  His insulin is being adjusted  He saw Hematology in 2016 for his anemia  He was diagnosed with mild dysplastic disorder  However he admits he never went back  He denies fatigue or shortness of breath  He said that he does think that he stays adequately hydrated  He notices some bilateral breast tenderness which only occurs in the morning  He has been doing a lot of painting and work at his house  O: /60 (Cuff Size: Standard)   Pulse 68   Resp 16   Wt 76 1 kg (167 lb 12 8 oz)   BMI 22 14 kg/m²   Physical Exam   Constitutional: He is oriented to person, place, and time  He appears well-developed and well-nourished  HENT:   Head: Normocephalic  Right Ear: External ear normal    Left Ear: External ear normal    Mouth/Throat: Oropharynx is clear and moist    Eyes: Pupils are equal, round, and reactive to light  Conjunctivae and EOM are normal  No scleral icterus  Neck: Normal range of motion  Neck supple  Carotid bruit is not present  No thyromegaly present  Cardiovascular: Normal rate, regular rhythm, normal heart sounds and intact distal pulses  Pulmonary/Chest: Effort normal and breath sounds normal    Abdominal: Soft  Normal aorta and bowel sounds are normal  He exhibits no mass  There is no hepatosplenomegaly  There is no tenderness  No hernia  Musculoskeletal: Normal range of motion  He exhibits no edema  Lymphadenopathy:        Head (right side): No submandibular and no occipital adenopathy present  Head (left side): No submandibular and no occipital adenopathy present  He has no cervical adenopathy  Right: No inguinal and no supraclavicular adenopathy present  Left: No inguinal and no supraclavicular adenopathy present     Neurological: He is alert and oriented to person, place, and time  Skin: No lesion and no rash noted  Psychiatric: He has a normal mood and affect  His behavior is normal    Breast palpate normally with no masses  No axillary adenopathy  BW    09/12/2019    creatinine 1 4  GFR 45  Hemoglobin   10 0 hematocrit 32 0  normal white blood cell count and platelet count    Assessment  1  Hypertension-controlled  2  Diabetes-management per Endocrinology  3  Hyperlipidemia-on simvastatin 10 mg daily  Due for blood work  4  CHF--management per Cardiology  Appears stable  5  Peripheral neuropathy-seems to be improved with use of gabapentin  Verified dose with 100 mg t i d  6  COPD-saw pulmonology; recent CTY scan chest  7  Myelodysplastic syndrome-never went back to Hematology for follow-up  He is a little bit more knee make  Encouraged to do so  8  Mild CKD-encouraged adequate hydration  Avoid NSAIDs  Continue  to monitor    Plan  Check CMP, LP  Hematology referral  Recheck 6 months    8  Bilateral breast pain-likely pectoralis muscle strain    Let me know if persists

## 2019-09-19 NOTE — PROGRESS NOTES
Assessment and Plan:     Problem List Items Addressed This Visit     None           Preventive health issues were discussed with patient, and age appropriate screening tests were ordered as noted in patient's After Visit Summary  Personalized health advice and appropriate referrals for health education or preventive services given if needed, as noted in patient's After Visit Summary       History of Present Illness:     Patient presents for Medicare Annual Wellness visit    Patient Care Team:  Kayleen Toth MD as PCP - General  MD Wilson Alejandro MD Paralee Birks, MD Wonda Rang, MD Lucie Griffith, MD     Problem List:     Patient Active Problem List   Diagnosis    COPD without exacerbation [facility])    Essential hypertension    Hyperlipidemia    Neuropathy, idiopathic    Multiple nodules of lung    Mixed simple and mucopurulent chronic bronchitis ([facility]    Type 2 diabetes mellitus, with long-term current use of insulin ([facility] )    Dilated cardiomyopathy ([facility]    Chronic combined systolic and diastolic congestive heart failure ([facility] )    Abnormal chest CT    Arthritis    SUZY (acute kidney injury) ([facility] )    Paresthesia of both feet    Finger pain, left    Cough    Urinary retention    Benign prostatic hyperplasia (BPH) with straining on urination      Past Medical and Surgical History:     Past Medical History:   Diagnosis Date    COPD (chronic obstructive pulmonary disease) ([facility] )     Diabetes mellitus ([facility] )     Type 2    Essential hypertension     Heart failure ([facility]     Hyperlipidemia     Left inguinal hernia     Lung nodule     Malignant melanoma of skin ([facility] )      Past Surgical History:   Procedure Laterality Date    APPENDECTOMY      CATARACT EXTRACTION, BILATERAL      CHOLECYSTECTOMY      CHOLECYSTECTOMY      COLONOSCOPY  2014    Fiberoptic    HERNIA REPAIR      KNEE ARTHROSCOPY Bilateral     Therapeutic    NM CYSTOURETHRO W/IMPLANT N/A 3/22/2019    Procedure: CYSTOSCOPY WITH INSERTION UROLIFT;  Surgeon: Normajean Mortimer, MD;  [facility];  Service: Urology      Family History:     Family History   Problem Relation Age of Onset    Diabetes Mother     Heart attack Neg Hx     Stroke Neg Hx     Aneurysm Neg Hx     Clotting disorder Neg Hx     Arrhythmia Neg Hx     Hypertension Neg Hx     Hyperlipidemia Neg Hx         pt unsure       Social History:     Social History     Socioeconomic History    Marital status:       Spouse name: Not on file    Number of children: 2    Years of education: Not on file    Highest education level: Not on file   Occupational History    Occupation: rtired   Social Needs    Financial resource strain: Not on file    Food insecurity:     Worry: Not on file     Inability: Not on file   hoohbe needs:     Medical: Not on file     Non-medical: Not on file   Tobacco Use    Smoking status: Former Smoker     Packs/day:  00     Years: 10 00     Pack years: 10 00     Last attempt to quit: 1960     Years since quittin 7    Smokeless tobacco: Never Used   Substance and Sexual Activity    Alcohol use: Not Currently     Alcohol/week: 0 0 standard drinks    Drug use: No    Sexual activity: Not on file   Lifestyle    Physical activity:     Days per week: Not on file     Minutes per session: Not on file    Stress: Not on file   Relationships    Social connections:     Talks on phone: Not on file     Gets together: Not on file     Attends [employer] service: Not on file     Active member of club or organization: Not on file     Attends meetings of clubs or organizations: Not on file     Relationship status: Not on file    Intimate partner violence:     Fear of current or ex partner: Not on file     Emotionally abused: Not on file     Physically abused: Not on file     Forced sexual activity: Not on file   Other Topics Concern    Not on file   Social History Narrative    Caffeine use, active Medications and Allergies:     Current Outpatient Medications   Medication Sig Dispense Refill    acetaminophen (TYLENOL) 325 mg tablet Take 2 tablets (650 mg total) by mouth every 6 (six) hours as needed for mild pain 30 tablet 0    albuterol (2 5 mg/3 mL) 0 083 % nebulizer solution Take 1 vial (2 5 mg total) by nebulization every 6 (six) hours as needed for wheezing or shortness of breath 1080 mL 3    ANORO ELLIPTA 62 5-25 MCG/INH inhaler INHALE ONE PUFF BY MOUTH ONCE DAILY  60 each 2    Ascorbic Acid (VITAMIN C) 1000 MG tablet Take 1 tablet by mouth daily      BD PEN NEEDLE DON U/F 32G X 4 MM MISC       betamethasone, augmented, (DIPROLENE-AF) 0 05 % cream       Cinnamon 500 MG TABS Take 1 tablet by mouth daily        fluticasone (FLOVENT HFA) 220 mcg/act inhaler Inhale 1 puff 2 (two) times a day 1 Inhaler 5    gabapentin (NEURONTIN) 100 mg capsule Take 2 capsules (200 mg total) by mouth 3 (three) times a day 270 capsule 0    gabapentin (NEURONTIN) 300 mg capsule Take 1 capsule (300 mg total) by mouth 2 (two) times a day 60 capsule 2    hydrOXYzine HCL (ATARAX) 25 mg tablet TAKE 1 TABLET BY MOUTH 3 TIMES A DAY AS NEEDED FOR ITCHING 30 tablet 5    hydrOXYzine pamoate (VISTARIL) 25 mg capsule Take 1 capsule (25 mg total) by mouth 3 (three) times a day as needed for itching 30 capsule 0    insulin detemir (LEVEMIR) 100 units/mL subcutaneous injection Inject 18 Units under the skin daily at bedtime       metoprolol succinate (TOPROL-XL) 50 mg 24 hr tablet Take 2 tablets (100 mg total) by mouth daily 180 tablet 3    montelukast (SINGULAIR) 10 mg tablet Take 1 tablet (10 mg total) by mouth daily at bedtime 30 tablet 3    Multiple Vitamins-Minerals (OCUVITE ADULT 50+ PO) Take 1 tablet by mouth daily      NOVOLOG FLEXPEN 100 units/mL injection pen Inject 5 Units under the skin daily with breakfast       ONE TOUCH ULTRA TEST test strip       ONETOUCH DELICA LANCETS FINE MISC       repaglinide (PRANDIN) 2 mg tablet Take 2 mg by mouth daily before lunch       roflumilast (DALIRESP) 500 mcg tablet Take 1 tablet (500 mcg total) by mouth daily 30 tablet 5    sacubitril-valsartan (ENTRESTO) 24-26 MG TABS Take 1 tablet by mouth 2 (two) times a day 180 tablet 3    simvastatin (ZOCOR) 10 mg tablet Take 1 tablet (10 mg total) by mouth daily 90 tablet 1    spironolactone (ALDACTONE) 25 mg tablet Take 1 tablet (25 mg total) by mouth daily 90 tablet 3    VENTOLIN  (90 Base) MCG/ACT inhaler Inhale 4 (four) times a day        docusate sodium (COLACE) 100 mg capsule Take 1 capsule (100 mg total) by mouth 2 (two) times a day for 15 days 30 capsule 0    naproxen (EC NAPROSYN) 500 MG EC tablet Take 1 tablet (500 mg total) by mouth 2 (two) times a day with meals for 5 days 10 tablet 0     No current facility-administered medications for this visit  No Known Allergies   Immunizations:     Immunization History   Administered Date(s) Administered    H1N1, All Formulations 01/26/2010    Influenza Split High Dose Preservative Free IM 10/18/2013, 10/05/2015, 09/30/2016, 10/20/2017    Influenza TIV (IM) 10/13/2011, 10/01/2014    Pneumococcal Conjugate 13-Valent 11/22/2017    Pneumococcal Polysaccharide PPV23 01/01/1998, 10/19/2005    Zoster 10/01/2010      Health Maintenance: There are no preventive care reminders to display for this patient  Topic Date Due    HEPATITIS B VACCINES (1 of 3 - Risk 3-dose series) 03/26/1952    DTaP,Tdap,and Td Vaccines (1 - Tdap) 03/26/1954    INFLUENZA VACCINE  07/01/2019      Medicare Health Risk Assessment:     /60 (Cuff Size: Standard)   Pulse 68   Resp 16   Wt 76 1 kg (167 lb 12 8 oz)   BMI 22 14 kg/m²      Teri Andrews is here for his Subsequent Wellness visit  Health Risk Assessment:   Patient rates overall health as good  Patient feels that their physical health rating is slightly better  Eyesight was rated as same  Hearing was rated as same  Patient feels that their emotional and mental health rating is same  Pain experienced in the last 7 days has been none  Patient states that he has experienced no weight loss or gain in last 6 months  Depression Screening:   PHQ-2 Score: 0      Fall Risk Screening: In the past year, patient has experienced: no history of falling in past year      Home Safety:  Patient does not have trouble with stairs inside or outside of their home  Patient has working smoke alarms and has working carbon monoxide detector  Home safety hazards include: none  Nutrition:   Current diet is Regular, No Added Salt and Diabetic  Avoids carbs  Dietary caclium several daily  Caffeine 1-2 coffee daily  Alcohol occ  Exercises regularly with playing golf and yard work  Medications:   Patient is currently taking over-the-counter supplements  OTC medications include: see medication list  Patient is able to manage medications  Activities of Daily Living (ADLs)/Instrumental Activities of Daily Living (IADLs):   Walk and transfer into and out of bed and chair?: Yes  Dress and groom yourself?: Yes    Bathe or shower yourself?: Yes    Feed yourself? Yes  Do your laundry/housekeeping?: Yes  Manage your money, pay your bills and track your expenses?: Yes  Make your own meals?: Yes    Do your own shopping?: Yes    Previous Hospitalizations:   Any hospitalizations or ED visits within the last 12 months?: No      Advance Care Planning:   Living will: Yes    Durable POA for healthcare:  Yes    Advanced directive: Yes    Advanced directive counseling given: Yes    Five wishes given: No    Patient declined ACP directive: Yes    Provider agrees with end of life decisions: Yes      Comments: Daryl Dennis     PREVENTIVE SCREENINGS      Cardiovascular Screening:    General: Screening Not Indicated and History Lipid Disorder    Due for: Lipid Panel      Diabetes Screening:     General: Screening Not Indicated, History Diabetes and Screening Current      Colorectal Cancer Screening:     General: Screening Not Indicated      Prostate Cancer Screening:    General: Screening Not Indicated      Osteoporosis Screening:    General: Risks and Benefits Discussed and Patient Declines      Abdominal Aortic Aneurysm (AAA) Screening:    Risk factors include: tobacco use        General: Screening Not Indicated      Lung Cancer Screening:     General: Screening Not Indicated and Screening Current      Hepatitis C Screening:    General: Screening Not Indicated      Preventive Screening Comments: Immunizations updated  Advised flu shot  Need to verify if he had Shingrix  Other Counseling Topics:   Calcium and vitamin D intake and regular weightbearing exercise  Fall prevention  Encouraged continued exercise  O: /60 (Cuff Size: Standard)   Pulse 68   Resp 16   Wt 76 1 kg (167 lb 12 8 oz)   BMI 22 14 kg/m²   Physical Exam   Constitutional: He is oriented to person, place, and time  He appears well-developed and well-nourished  HENT:   Head: Normocephalic  Right Ear: External ear normal    Left Ear: External ear normal    Mouth/Throat: Oropharynx is clear and moist    Eyes: Pupils are equal, round, and reactive to light  Conjunctivae and EOM are normal  No scleral icterus  Neck: Normal range of motion  Neck supple  Carotid bruit is not present  No thyromegaly present  Cardiovascular: Normal rate, regular rhythm, normal heart sounds and intact distal pulses  Pulmonary/Chest: Effort normal and breath sounds normal    Abdominal: Soft  Normal aorta and bowel sounds are normal  He exhibits no mass  There is no hepatosplenomegaly  There is no tenderness  No hernia  Genitourinary: Rectal exam shows no mass  Musculoskeletal: Normal range of motion  He exhibits no edema  Lymphadenopathy:        Head (right side): No submandibular and no occipital adenopathy present  Head (left side): No submandibular and no occipital adenopathy present  He has no cervical adenopathy  Right: No inguinal and no supraclavicular adenopathy present  Left: No inguinal and no supraclavicular adenopathy present  Neurological: He is alert and oriented to person, place, and time  Skin: No lesion and no rash noted  Psychiatric: He has a normal mood and affect  His behavior is normal      Assessment  Annual Medicare wellness  For medical problem see other note the    Plan   up-to-date  Will be getting flu shot at pharmacy    As above    Margaret Braxton MD

## 2019-09-20 ENCOUNTER — TELEPHONE (OUTPATIENT)
Dept: FAMILY MEDICINE CLINIC | Facility: MEDICAL CENTER | Age: 84
End: 2019-09-20

## 2019-09-20 NOTE — TELEPHONE ENCOUNTER
Yes, we do have his living will under the Media tab- 5/15/18  Listed under living will/ advanced directive  Spoke with [facility], they have no record of Msisy Brumfield getting any vaccines there

## 2019-09-20 NOTE — TELEPHONE ENCOUNTER
----- Message from Perry Fitch MD sent at 9/20/2019 12:45 AM EDT -----  Need to check chart to see if patient had a living will on it  He thinks he gave it to us  Also need to call [facility] in [place] to see if he was given Shingrix

## 2019-09-26 ENCOUNTER — TELEPHONE (OUTPATIENT)
Dept: HEMATOLOGY ONCOLOGY | Facility: CLINIC | Age: 84
End: 2019-09-26

## 2019-09-26 NOTE — TELEPHONE ENCOUNTER
New Patient Encounter    New Patient Intake Form   Patient Details:  Amira Morataya  1933  [id number]    Background Information:  [address] starts by opening a telephone encounter and gathering the following information   Who is calling to schedule? If not self, relationship to patient? Madhavi   Referring Provider Ladonna Mcqueen   What is the diagnosis? 09/12/2019   When was the diagnosis? anemia   Is patient aware of diagnosis? Yes   Reason for visit? NP DX   Have you had any testing done? If so: when, where? Yes   Are records in [email or web address]? yes   Was the patient told to bring a disk? no   Scheduling Information:   Preferred [place]: Tonio George     Requesting Specific Provider? Dallas Valdes   Are there any dates/time the patient cannot be seen? No wednesdays   Counseling Pre-Screen:  If the patient answers YES to any of the below questions, please route to the appropriate location specific counselor    Have you felt anxious or worried about cancer and the treatment you are receiving? No   Has your diagnosis caused physical, emotional, or financial hardship for you? No   Note: Do not ask the patient about transportation issues/needs  Please notate if the patient brings it up and the counselor will schedule accordingly  Miscellaneous: na   After completing the above information, please route to Financial Counselor and the appropriate Nurse Navigator for review

## 2019-10-03 ENCOUNTER — TELEPHONE (OUTPATIENT)
Dept: FAMILY MEDICINE CLINIC | Facility: MEDICAL CENTER | Age: 84
End: 2019-10-03

## 2019-10-03 NOTE — TELEPHONE ENCOUNTER
Pt called to ask if someone could talk to him about the "new strain" of pneumonia vaccine Dr Kvng Jalloh wanted him to have  He has some information from his pharmacy and wants to know which one is the one she wants him to have    Please call him on his cell - [phone]

## 2019-10-03 NOTE — TELEPHONE ENCOUNTER
Kyle Espinal has both Pneumoncoccal vaccines already  According to the visit note he was to check coverage for Shingrix and let us know so we can print out an order for the vaccine

## 2019-10-04 NOTE — TELEPHONE ENCOUNTER
Patient stated he called his insurance and will need a printed script to go to [facility] in [place]   Advised will send a message to Dr Yolanda Cm and we will provide a call back

## 2019-10-06 DIAGNOSIS — Z23 NEED FOR SHINGLES VACCINE: Primary | ICD-10-CM

## 2019-10-16 LAB
ALBUMIN SERPL-MCNC: 4.3 G/DL (ref 3.6–5.1)
ALBUMIN/GLOB SERPL: 1.9 (CALC) (ref 1–2.5)
ALP SERPL-CCNC: 45 U/L (ref 40–115)
ALT SERPL-CCNC: 13 U/L (ref 9–46)
AST SERPL-CCNC: 13 U/L (ref 10–35)
BILIRUB SERPL-MCNC: 0.8 MG/DL (ref 0.2–1.2)
BUN SERPL-MCNC: 25 MG/DL (ref 7–25)
BUN/CREAT SERPL: 20 (CALC) (ref 6–22)
CALCIUM SERPL-MCNC: 9.2 MG/DL (ref 8.6–10.3)
CHLORIDE SERPL-SCNC: 104 MMOL/L (ref 98–110)
CHOLEST SERPL-MCNC: 132 MG/DL
CHOLEST/HDLC SERPL: 3.1 (CALC)
CO2 SERPL-SCNC: 28 MMOL/L (ref 20–32)
CREAT SERPL-MCNC: 1.23 MG/DL (ref 0.7–1.11)
GLOBULIN SER CALC-MCNC: 2.3 G/DL (CALC) (ref 1.9–3.7)
GLUCOSE SERPL-MCNC: 92 MG/DL (ref 65–99)
HDLC SERPL-MCNC: 42 MG/DL
LDLC SERPL CALC-MCNC: 75 MG/DL (CALC)
NONHDLC SERPL-MCNC: 90 MG/DL (CALC)
POTASSIUM SERPL-SCNC: 4.7 MMOL/L (ref 3.5–5.3)
PROT SERPL-MCNC: 6.6 G/DL (ref 6.1–8.1)
SL AMB EGFR AFRICAN AMERICAN: 61 ML/MIN/1.73M2
SL AMB EGFR NON AFRICAN AMERICAN: 53 ML/MIN/1.73M2
SODIUM SERPL-SCNC: 140 MMOL/L (ref 135–146)
TRIGL SERPL-MCNC: 72 MG/DL

## 2019-10-17 ENCOUNTER — TELEPHONE (OUTPATIENT)
Dept: FAMILY MEDICINE CLINIC | Facility: MEDICAL CENTER | Age: 84
End: 2019-10-17

## 2019-10-17 DIAGNOSIS — I10 ESSENTIAL HYPERTENSION: Primary | Chronic | ICD-10-CM

## 2019-10-17 NOTE — TELEPHONE ENCOUNTER
----- Message from Sheila Church MD sent at 10/17/2019  9:13 AM EDT -----  Notify blood work is good  Cholesterol is good  Continue same statin dose  Creatinine (kidney function)  is improved  Continue hydration and avoidance of NSAIDs  Repeat a CMP before his next appointment

## 2019-10-24 DIAGNOSIS — G60.9 NEUROPATHY, IDIOPATHIC: ICD-10-CM

## 2019-10-24 DIAGNOSIS — G62.9 NEUROPATHY: ICD-10-CM

## 2019-10-25 DIAGNOSIS — J44.1 CHRONIC OBSTRUCTIVE PULMONARY DISEASE WITH ACUTE EXACERBATION (HCC): ICD-10-CM

## 2019-10-25 DIAGNOSIS — E78.01 FAMILIAL HYPERCHOLESTEROLEMIA: ICD-10-CM

## 2019-10-25 RX ORDER — SIMVASTATIN 10 MG
TABLET ORAL
Qty: 90 TABLET | Refills: 0 | Status: SHIPPED | OUTPATIENT
Start: 2019-10-25 | End: 2020-01-23

## 2019-10-25 RX ORDER — FLUTICASONE PROPIONATE 220 UG/1
1 AEROSOL, METERED RESPIRATORY (INHALATION) 2 TIMES DAILY
Qty: 12 G | Refills: 4 | Status: SHIPPED | OUTPATIENT
Start: 2019-10-25 | End: 2020-02-19 | Stop reason: SDUPTHER

## 2019-10-25 RX ORDER — GABAPENTIN 100 MG/1
100 CAPSULE ORAL 3 TIMES DAILY
Qty: 90 CAPSULE | Refills: 1 | Status: SHIPPED | OUTPATIENT
Start: 2019-10-25 | End: 2019-11-05 | Stop reason: DRUGHIGH

## 2019-10-27 RX ORDER — GABAPENTIN 300 MG/1
CAPSULE ORAL
Qty: 60 CAPSULE | Refills: 0 | Status: SHIPPED | OUTPATIENT
Start: 2019-10-27 | End: 2019-11-21

## 2019-10-28 ENCOUNTER — TELEPHONE (OUTPATIENT)
Dept: FAMILY MEDICINE CLINIC | Facility: MEDICAL CENTER | Age: 84
End: 2019-10-28

## 2019-10-28 DIAGNOSIS — R05.9 COUGH: ICD-10-CM

## 2019-10-28 RX ORDER — MONTELUKAST SODIUM 10 MG/1
10 TABLET ORAL
Qty: 90 TABLET | Refills: 3 | Status: SHIPPED | OUTPATIENT
Start: 2019-10-28 | End: 2020-11-02

## 2019-10-28 NOTE — TELEPHONE ENCOUNTER
Pt called he is confused about one of his medications  Pt just picked up an rx for gabapentin 100 mg, but the pt was looking at his after visit summary from his September appt, and it has gabapentin 300mg listed and gabapentin 100 mg listed as current meds  Pt wants to know which dose he is to be taking

## 2019-11-01 ENCOUNTER — OFFICE VISIT (OUTPATIENT)
Dept: UROLOGY | Facility: CLINIC | Age: 84
End: 2019-11-01
Payer: COMMERCIAL

## 2019-11-01 VITALS
HEIGHT: 73 IN | HEART RATE: 66 BPM | BODY MASS INDEX: 22.66 KG/M2 | DIASTOLIC BLOOD PRESSURE: 88 MMHG | SYSTOLIC BLOOD PRESSURE: 130 MMHG | WEIGHT: 171 LBS

## 2019-11-01 DIAGNOSIS — R33.9 URINARY RETENTION: ICD-10-CM

## 2019-11-01 PROCEDURE — 51741 ELECTRO-UROFLOWMETRY FIRST: CPT | Performed by: UROLOGY

## 2019-11-01 PROCEDURE — 99214 OFFICE O/P EST MOD 30 MIN: CPT | Performed by: UROLOGY

## 2019-11-01 NOTE — PROGRESS NOTES
There are no diagnoses linked to this encounter  Assessment and plan:       1  BPH  - Status post Urolift procedure 03/22/2019    Mary Stuart continues to do incredibly well following minimally invasive surgical management of his BPH  He had years of symptomatology prior to surgery including frequent nocturia daytime frequency as well and a high degree of bother to his generally very active lifestyle  At this point he is incredibly satisfied with his lower urinary tract symptoms  He is able to golf regularly, having no bothersome symptoms during the day and minimal bother at night  In addition he is off of all medical management for his BPH  His noninvasive urodynamic testing today is all very reassuring  At this point Mr Chantell Maravilla will follow up with Urology on an as-needed basis  I am certainly happy to see him back at any point in time should his lower urinary tract symptoms return or for any other issues at all  Shana Romero MD      Chief Complaint     Chief Complaint   Patient presents with    Follow-up         History of Present Illness     Mary Stuart is a 80 y o  male patient with a history of medically refractory lower urinary tract symptoms including bothersome nocturia and daytime frequency  He had tried medical therapy but did have some orthostatic hypotension  At this point he is 6 months status post Uro lift procedure  He tolerated very well  He is extraordinarily pleased with the results  His alpha blockade has been discontinued, his daytime and nocturnal symptoms are markedly improved he has had no interval hematuria UTIs or urinary retention  Laboratory     Lab Results   Component Value Date    CREATININE 1 23 (H) 10/15/2019         No results found for this or any previous visit (from the past 1 hour(s))  Review of Systems     Review of Systems   Constitutional: Negative for activity change, chills, fatigue and fever     HENT: Negative for congestion  Eyes: Negative for visual disturbance  Respiratory: Negative for shortness of breath and wheezing  Cardiovascular: Negative for chest pain and leg swelling  Gastrointestinal: Negative for abdominal pain, constipation, diarrhea, nausea and vomiting  Endocrine: Negative for polyuria  Genitourinary: Negative for difficulty urinating, dysuria, enuresis, flank pain, frequency, hematuria and urgency  Musculoskeletal: Negative  Negative for back pain  Allergic/Immunologic: Negative for immunocompromised state  Neurological: Negative for dizziness and numbness  Psychiatric/Behavioral: Negative for dysphoric mood  All other systems reviewed and are negative  AUA SYMPTOM SCORE      Most Recent Value   AUA SYMPTOM SCORE   How often have you had a sensation of not emptying your bladder completely after you finished urinating? 0   How often have you had to urinate again less than two hours after you finished urinating? 1   How often have you found you stopped and started again several times when you urinate?  0   How often have you found it difficult to postpone urination? 0   How often have you had a weak urinary stream?  0   How often have you had to push or strain to begin urination? 0   How many times did you most typically get up to urinate from the time you went to bed at night until the time you got up in the morning?  0   Quality of Life: If you were to spend the rest of your life with your urinary condition just the way it is now, how would you feel about that?  2   AUA SYMPTOM SCORE  1                Allergies     No Known Allergies    Physical Exam     Physical Exam   Constitutional: He is oriented to person, place, and time  He appears well-developed and well-nourished  No distress  HENT:   Head: Normocephalic and atraumatic  Right Ear: External ear normal    Left Ear: External ear normal    Eyes: EOM are normal  Right eye exhibits no discharge   Left eye exhibits no discharge  No scleral icterus  Neck: Normal range of motion  Cardiovascular:   Negative CVA tenderness, suprapubic tenderness   Pulmonary/Chest: Effort normal and breath sounds normal    Abdominal: Soft  Musculoskeletal: Normal range of motion  Neurological: He is alert and oriented to person, place, and time  Skin: Skin is warm  He is not diaphoretic  Psychiatric: He has a normal mood and affect  His behavior is normal  Judgment and thought content normal    Nursing note and vitals reviewed          Vital Signs     Vitals:    11/01/19 1035   BP: 130/88   BP Location: Left arm   Patient Position: Sitting   Cuff Size: Adult   Pulse: 66   Weight: 77 6 kg (171 lb)   Height: 6' 1" (1 854 m)         Current Medications       Current Outpatient Medications:     acetaminophen (TYLENOL) 325 mg tablet, Take 2 tablets (650 mg total) by mouth every 6 (six) hours as needed for mild pain, Disp: 30 tablet, Rfl: 0    albuterol (2 5 mg/3 mL) 0 083 % nebulizer solution, Take 1 vial (2 5 mg total) by nebulization every 6 (six) hours as needed for wheezing or shortness of breath, Disp: 1080 mL, Rfl: 3    ANORO ELLIPTA 62 5-25 MCG/INH inhaler, INHALE ONE PUFF BY MOUTH ONCE DAILY , Disp: 60 each, Rfl: 2    Ascorbic Acid (VITAMIN C) 1000 MG tablet, Take 1 tablet by mouth daily, Disp: , Rfl:     BD PEN NEEDLE DON U/F 32G X 4 MM MISC, , Disp: , Rfl:     betamethasone, augmented, (DIPROLENE-AF) 0 05 % cream, , Disp: , Rfl:     Cinnamon 500 MG TABS, Take 1 tablet by mouth daily  , Disp: , Rfl:     fluticasone (FLOVENT HFA) 220 mcg/act inhaler, Inhale 1 puff 2 (two) times a day Rinse mouth after use, Disp: 12 g, Rfl: 4    gabapentin (NEURONTIN) 100 mg capsule, Take 1 capsule (100 mg total) by mouth 3 (three) times a day, Disp: 90 capsule, Rfl: 1    gabapentin (NEURONTIN) 300 mg capsule, Take 1 capsule (300 mg total) by mouth 2 (two) times a day, Disp: 60 capsule, Rfl: 2    hydrOXYzine HCL (ATARAX) 25 mg tablet, TAKE 1 TABLET BY MOUTH 3 TIMES A DAY AS NEEDED FOR ITCHING, Disp: 30 tablet, Rfl: 5    hydrOXYzine pamoate (VISTARIL) 25 mg capsule, Take 1 capsule (25 mg total) by mouth 3 (three) times a day as needed for itching, Disp: 30 capsule, Rfl: 0    insulin detemir (LEVEMIR) 100 units/mL subcutaneous injection, Inject 18 Units under the skin daily at bedtime , Disp: , Rfl:     metoprolol succinate (TOPROL-XL) 50 mg 24 hr tablet, Take 2 tablets (100 mg total) by mouth daily, Disp: 180 tablet, Rfl: 3    montelukast (SINGULAIR) 10 mg tablet, Take 1 tablet (10 mg total) by mouth daily at bedtime, Disp: 90 tablet, Rfl: 3    Multiple Vitamins-Minerals (OCUVITE ADULT 50+ PO), Take 1 tablet by mouth daily, Disp: , Rfl:     NOVOLOG FLEXPEN 100 units/mL injection pen, Inject 5 Units under the skin daily with breakfast , Disp: , Rfl:     ONE TOUCH ULTRA TEST test strip, , Disp: , Rfl:     ONETOUCH DELICA LANCETS FINE MISC, , Disp: , Rfl:     repaglinide (PRANDIN) 2 mg tablet, Take 2 mg by mouth daily before lunch , Disp: , Rfl:     roflumilast (DALIRESP) 500 mcg tablet, Take 1 tablet (500 mcg total) by mouth daily, Disp: 30 tablet, Rfl: 5    sacubitril-valsartan (ENTRESTO) 24-26 MG TABS, Take 1 tablet by mouth 2 (two) times a day, Disp: 180 tablet, Rfl: 3    simvastatin (ZOCOR) 10 mg tablet, TAKE ONE TABLET BY MOUTH ONCE DAILY , Disp: 90 tablet, Rfl: 0    spironolactone (ALDACTONE) 25 mg tablet, Take 1 tablet (25 mg total) by mouth daily, Disp: 90 tablet, Rfl: 3    VENTOLIN  (90 Base) MCG/ACT inhaler, Inhale 4 (four) times a day  , Disp: , Rfl:     docusate sodium (COLACE) 100 mg capsule, Take 1 capsule (100 mg total) by mouth 2 (two) times a day for 15 days, Disp: 30 capsule, Rfl: 0    gabapentin (NEURONTIN) 300 mg capsule, TAKE ONE CAPSULE BY MOUTH TWICE DAILY  (Patient not taking: Reported on 11/1/2019), Disp: 60 capsule, Rfl: 0    naproxen (EC NAPROSYN) 500 MG EC tablet, Take 1 tablet (500 mg total) by mouth 2 (two) times a day with meals for 5 days, Disp: 10 tablet, Rfl: 0      Active Problems     Patient Active Problem List   Diagnosis    [facility] without exacerbation ([facility] )    Essential hypertension    Hyperlipidemia    Neuropathy, idiopathic    Multiple nodules of lung    Mixed simple and mucopurulent chronic bronchitis (HCC)    Type 2 diabetes mellitus, with long-term current use of insulin (HCC)    Dilated cardiomyopathy (HCC)    Chronic combined systolic and diastolic congestive heart failure (HCC)    Abnormal chest CT    Arthritis    SUZY (acute kidney injury) ([facility] )    Paresthesia of both feet    Finger pain, left    Cough    Urinary retention    Benign prostatic hyperplasia (BPH) with straining on urination    Anemia         Past Medical History     Past Medical History:   Diagnosis Date    COPD (chronic obstructive pulmonary disease) ([facility] )     Diabetes mellitus ([facility] )     Type 2    Essential hypertension     Heart failure (HCC)     Hyperlipidemia     Left inguinal hernia     Lung nodule     Malignant melanoma of skin ([facility] )          Surgical History     Past Surgical History:   Procedure Laterality Date    APPENDECTOMY      CATARACT EXTRACTION, BILATERAL      CHOLECYSTECTOMY      CHOLECYSTECTOMY      COLONOSCOPY  2014    Fiberoptic    HERNIA REPAIR      KNEE ARTHROSCOPY Bilateral     Therapeutic    MN CYSTOURETHRO W/IMPLANT N/A 3/22/2019    Procedure: CYSTOSCOPY WITH INSERTION Hope Pencil;  Surgeon: Austin Royal MD;  [facility];  Service: Urology         Family History     Family History   Problem Relation Age of Onset    Diabetes Mother     Heart attack Neg Hx     Stroke Neg Hx     Aneurysm Neg Hx     Clotting disorder Neg Hx     Arrhythmia Neg Hx     Hypertension Neg Hx     Hyperlipidemia Neg Hx         pt unsure          Social History     Social History       Radiology

## 2019-11-02 ENCOUNTER — OFFICE VISIT (OUTPATIENT)
Dept: URGENT CARE | Facility: MEDICAL CENTER | Age: 84
End: 2019-11-02
Payer: COMMERCIAL

## 2019-11-02 VITALS
DIASTOLIC BLOOD PRESSURE: 71 MMHG | HEIGHT: 73 IN | RESPIRATION RATE: 20 BRPM | SYSTOLIC BLOOD PRESSURE: 148 MMHG | BODY MASS INDEX: 23.06 KG/M2 | OXYGEN SATURATION: 98 % | HEART RATE: 78 BPM | WEIGHT: 174 LBS | TEMPERATURE: 98.2 F

## 2019-11-02 DIAGNOSIS — J06.9 UPPER RESPIRATORY TRACT INFECTION, UNSPECIFIED TYPE: Primary | ICD-10-CM

## 2019-11-02 PROCEDURE — 99213 OFFICE O/P EST LOW 20 MIN: CPT | Performed by: PHYSICIAN ASSISTANT

## 2019-11-02 PROCEDURE — S9083 URGENT CARE CENTER GLOBAL: HCPCS | Performed by: PHYSICIAN ASSISTANT

## 2019-11-02 RX ORDER — BENZONATATE 200 MG/1
200 CAPSULE ORAL 3 TIMES DAILY PRN
Qty: 20 CAPSULE | Refills: 0 | Status: SHIPPED | OUTPATIENT
Start: 2019-11-02 | End: 2020-05-28 | Stop reason: ALTCHOICE

## 2019-11-02 NOTE — PROGRESS NOTES
[facility]        NAME: Jeimy Gentile is a 80 y o  male  : 1933    MRN: [id number]  DATE: 2019  TIME: 11:48 AM    Assessment and Plan   Upper respiratory tract infection, unspecified type [J06 9]  1  Upper respiratory tract infection, unspecified type  benzonatate (TESSALON) 200 MG capsule         Patient Instructions     1  Take Tessalon 200mg  Every 8 hours as needed for cough  2  Increase fluids  3  Tylenol as needed if febrile  4  Follow up with PCP in 3-5 days if symptoms persist       Chief Complaint     Chief Complaint   Patient presents with    Cold Like Symptoms     x1day         History of Present Illness       Gerry Artist is an 59-year-old male presents with a 1 day history of nasal discharge, cough, congestion and postnasal drip  The patient denies any fever, chills or body aches since the onset of his symptoms  Patient does report a history of COPD as well as cardiac disease  He denies any difficulty breathing, shortness of breath or wheezing since the onset of his symptoms  Review of Systems   Review of Systems   Constitutional: Negative  HENT: Positive for congestion, postnasal drip and rhinorrhea  Respiratory: Positive for cough  Negative for chest tightness, shortness of breath and wheezing  Cardiovascular: Negative  Gastrointestinal: Negative            Current Medications       Current Outpatient Medications:     acetaminophen (TYLENOL) 325 mg tablet, Take 2 tablets (650 mg total) by mouth every 6 (six) hours as needed for mild pain, Disp: 30 tablet, Rfl: 0    albuterol (2 5 mg/3 mL) 0 083 % nebulizer solution, Take 1 vial (2 5 mg total) by nebulization every 6 (six) hours as needed for wheezing or shortness of breath, Disp: 1080 mL, Rfl: 3    ANORO ELLIPTA 62 5-25 MCG/INH inhaler, INHALE ONE PUFF BY MOUTH ONCE DAILY , Disp: 60 each, Rfl: 2    Ascorbic Acid (VITAMIN C) 1000 MG tablet, Take 1 tablet by mouth daily, Disp: , Rfl:     BD PEN NEEDLE DON U/F 32G X 4 MM MISC, , Disp: , Rfl:     benzonatate (TESSALON) 200 MG capsule, Take 1 capsule (200 mg total) by mouth 3 (three) times a day as needed for cough, Disp: 20 capsule, Rfl: 0    betamethasone, augmented, (DIPROLENE-AF) 0 05 % cream, , Disp: , Rfl:     Cinnamon 500 MG TABS, Take 1 tablet by mouth daily  , Disp: , Rfl:     docusate sodium (COLACE) 100 mg capsule, Take 1 capsule (100 mg total) by mouth 2 (two) times a day for 15 days, Disp: 30 capsule, Rfl: 0    fluticasone (FLOVENT HFA) 220 mcg/act inhaler, Inhale 1 puff 2 (two) times a day Rinse mouth after use, Disp: 12 g, Rfl: 4    gabapentin (NEURONTIN) 100 mg capsule, Take 1 capsule (100 mg total) by mouth 3 (three) times a day, Disp: 90 capsule, Rfl: 1    gabapentin (NEURONTIN) 300 mg capsule, Take 1 capsule (300 mg total) by mouth 2 (two) times a day, Disp: 60 capsule, Rfl: 2    gabapentin (NEURONTIN) 300 mg capsule, TAKE ONE CAPSULE BY MOUTH TWICE DAILY  (Patient not taking: Reported on 11/1/2019), Disp: 60 capsule, Rfl: 0    hydrOXYzine HCL (ATARAX) 25 mg tablet, TAKE 1 TABLET BY MOUTH 3 TIMES A DAY AS NEEDED FOR ITCHING, Disp: 30 tablet, Rfl: 5    hydrOXYzine pamoate (VISTARIL) 25 mg capsule, Take 1 capsule (25 mg total) by mouth 3 (three) times a day as needed for itching, Disp: 30 capsule, Rfl: 0    insulin detemir (LEVEMIR) 100 units/mL subcutaneous injection, Inject 18 Units under the skin daily at bedtime , Disp: , Rfl:     metoprolol succinate (TOPROL-XL) 50 mg 24 hr tablet, Take 2 tablets (100 mg total) by mouth daily, Disp: 180 tablet, Rfl: 3    montelukast (SINGULAIR) 10 mg tablet, Take 1 tablet (10 mg total) by mouth daily at bedtime, Disp: 90 tablet, Rfl: 3    Multiple Vitamins-Minerals (OCUVITE ADULT 50+ PO), Take 1 tablet by mouth daily, Disp: , Rfl:     naproxen (EC NAPROSYN) 500 MG EC tablet, Take 1 tablet (500 mg total) by mouth 2 (two) times a day with meals for 5 days, Disp: 10 tablet, Rfl: 0    NOVOLOG FLEXPEN 100 units/mL injection pen, Inject 5 Units under the skin daily with breakfast , Disp: , Rfl:     ONE TOUCH ULTRA TEST test strip, , Disp: , Rfl:     ONETOUCH DELICA LANCBRYANNA COBB MISC, , Disp: , Rfl:     repaglinide (PRANDIN) 2 mg tablet, Take 2 mg by mouth daily before lunch , Disp: , Rfl:     roflumilast (DALIRESP) 500 mcg tablet, Take 1 tablet (500 mcg total) by mouth daily, Disp: 30 tablet, Rfl: 5    sacubitril-valsartan (ENTRESTO) 24-26 MG TABS, Take 1 tablet by mouth 2 (two) times a day, Disp: 180 tablet, Rfl: 3    simvastatin (ZOCOR) 10 mg tablet, TAKE ONE TABLET BY MOUTH ONCE DAILY , Disp: 90 tablet, Rfl: 0    spironolactone (ALDACTONE) 25 mg tablet, Take 1 tablet (25 mg total) by mouth daily, Disp: 90 tablet, Rfl: 3    VENTOLIN  (90 Base) MCG/ACT inhaler, Inhale 4 (four) times a day  , Disp: , Rfl:     Current Allergies     Allergies as of 11/02/2019    (No Known Allergies)            The following portions of the patient's history were reviewed and updated as appropriate: allergies, current medications, past family history, past medical history, past social history, past surgical history and problem list      Past Medical History:   Diagnosis Date    [facility]     Diabetes mellitus ([facility] )     Type 2    Essential hypertension     Heart failure ([facility] )     Hyperlipidemia     Left inguinal hernia     Lung nodule     Malignant melanoma of skin ([facility] )        Past Surgical History:   Procedure Laterality Date    APPENDECTOMY      CATARACT EXTRACTION, BILATERAL      CHOLECYSTECTOMY      CHOLECYSTECTOMY      COLONOSCOPY  2014    Fiberoptic    HERNIA REPAIR      KNEE ARTHROSCOPY Bilateral     Therapeutic    [facility] CYSTOURETHRO W/IMPLANT N/A 3/22/2019    Procedure: CYSTOSCOPY WITH INSERTION Camilo Pear;  Surgeon: Stevo Robert MD;  [facility];  Service: Urology       Family History   Problem Relation Age of Onset    Diabetes Mother     Heart attack Neg Hx     Stroke Neg Hx     Aneurysm Neg Hx     Clotting disorder Neg Hx     Arrhythmia Neg Hx     Hypertension Neg Hx     Hyperlipidemia Neg Hx         pt unsure          Medications have been verified  Objective   /71 (BP Location: Right arm, Patient Position: Sitting)   Pulse 78   Temp 98 2 °F (36 8 °C) (Temporal)   Resp 20   Ht 6' 1" (1 854 m)   Wt 78 9 kg (174 lb)   SpO2 98%   BMI 22 96 kg/m²        Physical Exam     Physical Exam   Constitutional: He appears well-developed and well-nourished  No distress  HENT:   Head: Normocephalic and atraumatic  Right Ear: Tympanic membrane and ear canal normal    Left Ear: Tympanic membrane and ear canal normal    Nose: Rhinorrhea present  Mouth/Throat: Uvula is midline, oropharynx is clear and moist and mucous membranes are normal    Cardiovascular: Normal rate, regular rhythm and normal heart sounds  No murmur heard

## 2019-11-02 NOTE — PATIENT INSTRUCTIONS
1  Take Tessalon 200mg  Every 8 hours as needed for cough  2  Increase fluids  3  Tylenol as needed if febrile  4   Follow up with PCP in 3-5 days if symptoms persist

## 2019-11-04 NOTE — TELEPHONE ENCOUNTER
He told me at his appt in Sept that he was takikgn 100 mg tid  Rx sent for that 10/25  However I also  see an Rx for 300 mg  Please review this with him

## 2019-11-04 NOTE — TELEPHONE ENCOUNTER
S/w Julien Batter  Taking 100mg tid as well as 300mg bid  I went back in his chart and found the encounter about an  insurance issue  ( 4/4/2019) Looked like the 100mg 2 tablets tid was not covered but 300mg bid would be so you changed it  Somewhere after that 100mg tid was refilled so he has been doing both   Please advise

## 2019-11-06 ENCOUNTER — TELEPHONE (OUTPATIENT)
Dept: FAMILY MEDICINE CLINIC | Facility: MEDICAL CENTER | Age: 84
End: 2019-11-06

## 2019-11-06 DIAGNOSIS — J06.9 UPPER RESPIRATORY TRACT INFECTION, UNSPECIFIED TYPE: Primary | ICD-10-CM

## 2019-11-06 RX ORDER — AZITHROMYCIN 250 MG/1
TABLET, FILM COATED ORAL
Qty: 6 TABLET | Refills: 0 | Status: SHIPPED | OUTPATIENT
Start: 2019-11-06 | End: 2019-11-10

## 2019-11-06 NOTE — TELEPHONE ENCOUNTER
Pt called again to check on status of an Rx for his cough  Explained that Dr dEgar Walls had pts all day and was still seeing pts  He would like to be able to pick something up tonight for his cough

## 2019-11-06 NOTE — TELEPHONE ENCOUNTER
Needs further triage--what symptoms? Nasal congestion or cough? If bad cough is he using his albuterol 4 times daily?

## 2019-11-06 NOTE — TELEPHONE ENCOUNTER
Patient called and states he did go to the [facility] on [address] but it did not work at all

## 2019-11-06 NOTE — TELEPHONE ENCOUNTER
Cough, no other sx  Says hes "bringing up phlegm" and he says he is using his albuterol 4x a day  He is insistent that he needs an antibiotic

## 2019-11-07 NOTE — TELEPHONE ENCOUNTER
Pt picked up rx last night, spoke with Dr Timothy Lam this morning, and she said if pt is not seeing improvement by Monday, she will see him  Pt aware

## 2019-11-11 ENCOUNTER — TELEPHONE (OUTPATIENT)
Dept: FAMILY MEDICINE CLINIC | Facility: MEDICAL CENTER | Age: 84
End: 2019-11-11

## 2019-11-11 NOTE — TELEPHONE ENCOUNTER
Pt took last of zpack last nigh, he is still coughing and bringing up yellow phlegm, but it's not as bad, pt thinks its getting better, but wondered if the antibiotic should be refilled?

## 2019-11-18 DIAGNOSIS — L29.9 PRURITUS: ICD-10-CM

## 2019-11-18 NOTE — TELEPHONE ENCOUNTER
Pt left vm he's having the same itching he had back in September, he wants to know if you can refill this  [facility]

## 2019-11-19 ENCOUNTER — CONSULT (OUTPATIENT)
Dept: HEMATOLOGY ONCOLOGY | Facility: CLINIC | Age: 84
End: 2019-11-19
Payer: COMMERCIAL

## 2019-11-19 VITALS
RESPIRATION RATE: 18 BRPM | TEMPERATURE: 97.8 F | DIASTOLIC BLOOD PRESSURE: 68 MMHG | WEIGHT: 166 LBS | BODY MASS INDEX: 22 KG/M2 | HEART RATE: 74 BPM | HEIGHT: 73 IN | SYSTOLIC BLOOD PRESSURE: 126 MMHG | OXYGEN SATURATION: 95 %

## 2019-11-19 DIAGNOSIS — D46.9 MYELODYSPLASIA (MYELODYSPLASTIC SYNDROME) (HCC): ICD-10-CM

## 2019-11-19 DIAGNOSIS — D64.9 ANEMIA, UNSPECIFIED TYPE: ICD-10-CM

## 2019-11-19 PROCEDURE — 99214 OFFICE O/P EST MOD 30 MIN: CPT | Performed by: INTERNAL MEDICINE

## 2019-11-19 RX ORDER — HYDROXYZINE PAMOATE 25 MG/1
25 CAPSULE ORAL 3 TIMES DAILY PRN
Qty: 30 CAPSULE | Refills: 0 | Status: SHIPPED | OUTPATIENT
Start: 2019-11-19 | End: 2019-12-14 | Stop reason: SDUPTHER

## 2019-11-19 NOTE — PROGRESS NOTES
Hematology / Oncology Outpatient Follow Up Note    Donell Zamora 80 y o  male :1933 [facility]:[id number]         Date:  2019    Assessment / Plan:  A 80year old gentleman with low-grade myelodysplasia  He has mild normocytic normochromic anemia  He has no abnormality of WBC and platelet count  Over the last several years, he has slowly progressive anemia with no symptomatology  Therefore, I do not think he need to initiate MELONIE  I recommended him to continue observation  I am going to obtain CBC, L81, folic acid in 6 months followed by office visit  If he becomes symptomatic from anemia, I would consider erythropoietin  He is in agreement with my recommendations  Subjective:     HPI:  A 80-year-old gentleman who was referred to me, today, because he was recently found to have immature neutrophil, based on the CBC  Since , he has mild normocytic normochromic anemia  He has no symptomatology from hematology standpoint, such as fever, chills or night sweats  He has not recent weight loss  He is otherwise healthy with only past medical history of COPD, diabetes and hypertension  His recent PSA was 0 4 which is within normal limits  He is up-to-date for colonoscopy with most recent done in 2 years ago  He has no history of major surgery except cholecystectomy in 2013  He has no complaint of pain  He denied fever, chills or night sweats  His weight has been stable  He has normal performance status  Interval History:  A 80year old gentleman who has mild normocytic normochromic anemia for several years  He was recently found to have immature neutrophil in the peripheral blood smear  Therefore, he was referred to me for further evaluation  He underwent bone marrow biopsy which showed hypercellular marrow with left shifted trilineage hematopoiesis  Myeloblasts was only 2 5%, suggesting low-grade myelodysplasia  Cytogenetics was normal male karyotype   He has no neutropenia or thrombocytopenia  Currently, he is on observation  He is asymptomatic from hematology standpoint  He missed several appointment with me in the last 2 years  He presents today since his hemoglobin has been slowly dropping  Most recent hemoglobin was 10 0  In February 2019, hemoglobin was 11 2  He has normal WBC and platelet count  He has no symptomatology from hematology standpoint  He denied fever, chills or night sweats  His weight is stable  He has no complaint of pain  He has no fatigue or exertional shortness of breath  His performance status is normal     Objective:     Primary Diagnosis:    Low-grade myelodysplastic syndrome, with normal cytogenetics  Diagnosed in April 2016  Cancer Staging:  Cancer Staging  No matching staging information was found for the patient  Previous Hematologic/ Oncologic Treatment:         Current Hematologic/ Oncologic Treatment:      Observation  Disease Status:     Slowly progressive anemia  Test Results:    Pathology:    Bone marrow biopsy showed hypercellular marrow for his age, cellularity approximately 75%  There was left shifted trilineage progressive hematopoiesis with some dysplastic feature  Myeloblast 2 5%, consistent with low-grade myelodysplastic syndrome  Cytogenetics showed normal male karyotype  Radiology:        Laboratory:    See below  Physical Exam:      General Appearance:    Alert, oriented        Eyes:    PERRL   Ears:    Normal external ear canals, both ears   Nose:   Nares normal, septum midline   Throat:   Mucosa moist  Pharynx without injection  Neck:   Supple       Lungs:     Clear to auscultation bilaterally   Chest Wall:    No tenderness or deformity    Heart:    Regular rate and rhythm       Abdomen:     Soft, non-tender, bowel sounds +, no organomegaly           Extremities:   Extremities no cyanosis or edema       Skin:   no rash or icterus      Lymph nodes:   Cervical, supraclavicular, and axillary nodes normal Neurologic:   CNII-XII intact, normal strength, sensation and reflexes     Throughout          Breast exam:   NA         ROS: Review of Systems   All other systems reviewed and are negative  Imaging: No results found  Labs:   Lab Results   Component Value Date    WBC 5 8 09/12/2019    HGB 10 0 (L) 09/12/2019    HCT 32 0 (L) 09/12/2019    MCV 93 3 09/12/2019     09/12/2019     Lab Results   Component Value Date     05/08/2017    K 4 7 10/15/2019     10/15/2019    CO2 28 10/15/2019    BUN 25 10/15/2019    CREATININE 1 23 (H) 10/15/2019    CALCIUM 9 2 10/15/2019    AST 13 10/15/2019    ALT 13 10/15/2019    ALKPHOS 45 10/15/2019    PROT 6 3 05/08/2017    BILITOT 0 6 05/08/2017    EGFR 55 11/15/2018         Lab Results   Component Value Date    TIBC 313 10/05/2015    FERRITIN 195 10/05/2015       Lab Results   Component Value Date    BKWHWIPQ24 1100 02/18/2016       Lab Results   Component Value Date    FOLATE 19 5 02/18/2016         Current Medications: Reviewed  Allergies: Reviewed  PMH/FH/SH:  Reviewed      Vital Sign:    Body surface area is 1 99 meters squared      Wt Readings from Last 3 Encounters:   11/19/19 75 3 kg (166 lb)   11/02/19 78 9 kg (174 lb)   11/01/19 77 6 kg (171 lb)        Temp Readings from Last 3 Encounters:   11/19/19 97 8 °F (36 6 °C) (Tympanic Core)   11/02/19 98 2 °F (36 8 °C) (Temporal)   03/22/19 97 8 °F (36 6 °C)        BP Readings from Last 3 Encounters:   11/19/19 126/68   11/02/19 148/71   11/01/19 130/88         Pulse Readings from Last 3 Encounters:   11/19/19 74   11/02/19 78   11/01/19 66     @LASTSAO2(3)@

## 2019-11-21 DIAGNOSIS — G62.9 NEUROPATHY: ICD-10-CM

## 2019-11-25 RX ORDER — GABAPENTIN 300 MG/1
300 CAPSULE ORAL 2 TIMES DAILY
Qty: 60 CAPSULE | Refills: 2 | Status: SHIPPED | OUTPATIENT
Start: 2019-11-25 | End: 2020-03-24 | Stop reason: SDUPTHER

## 2019-12-03 ENCOUNTER — TELEPHONE (OUTPATIENT)
Dept: PULMONOLOGY | Facility: CLINIC | Age: 84
End: 2019-12-03

## 2019-12-03 DIAGNOSIS — J41.8 MIXED SIMPLE AND MUCOPURULENT CHRONIC BRONCHITIS (HCC): ICD-10-CM

## 2019-12-10 ENCOUNTER — TELEPHONE (OUTPATIENT)
Dept: PULMONOLOGY | Facility: CLINIC | Age: 84
End: 2019-12-10

## 2019-12-10 DIAGNOSIS — R06.02 SOB (SHORTNESS OF BREATH): ICD-10-CM

## 2019-12-10 RX ORDER — ALBUTEROL SULFATE 2.5 MG/3ML
2.5 SOLUTION RESPIRATORY (INHALATION) EVERY 6 HOURS PRN
Qty: 1080 ML | Refills: 3 | Status: SHIPPED | OUTPATIENT
Start: 2019-12-10 | End: 2020-12-16

## 2019-12-13 DIAGNOSIS — J44.1 COPD WITH ACUTE EXACERBATION (HCC): ICD-10-CM

## 2019-12-14 DIAGNOSIS — L29.9 PRURITUS: ICD-10-CM

## 2019-12-15 RX ORDER — HYDROXYZINE PAMOATE 25 MG/1
CAPSULE ORAL
Qty: 30 CAPSULE | Refills: 0 | Status: SHIPPED | OUTPATIENT
Start: 2019-12-15 | End: 2019-12-31 | Stop reason: SDUPTHER

## 2019-12-31 DIAGNOSIS — L29.9 PRURITUS: ICD-10-CM

## 2019-12-31 NOTE — TELEPHONE ENCOUNTER
Pt called to request Rx for hydroxyzine pamoate  He would like a larger quantity, though, because if he takes 3 per day, it only lasts for 10 days  Please send to [facility] [place] on [address] [place] has only 3 capsules remaining

## 2020-01-01 RX ORDER — HYDROXYZINE PAMOATE 25 MG/1
25 CAPSULE ORAL 3 TIMES DAILY PRN
Qty: 90 CAPSULE | Refills: 2 | Status: SHIPPED | OUTPATIENT
Start: 2020-01-01 | End: 2020-05-12 | Stop reason: SDUPTHER

## 2020-01-23 DIAGNOSIS — E78.01 FAMILIAL HYPERCHOLESTEROLEMIA: ICD-10-CM

## 2020-01-26 RX ORDER — SIMVASTATIN 10 MG
TABLET ORAL
Qty: 90 TABLET | Refills: 1 | Status: SHIPPED | OUTPATIENT
Start: 2020-01-26 | End: 2020-07-24 | Stop reason: SDUPTHER

## 2020-01-28 ENCOUNTER — OFFICE VISIT (OUTPATIENT)
Dept: PULMONOLOGY | Facility: CLINIC | Age: 85
End: 2020-01-28
Payer: COMMERCIAL

## 2020-01-28 VITALS
OXYGEN SATURATION: 99 % | WEIGHT: 172 LBS | SYSTOLIC BLOOD PRESSURE: 110 MMHG | HEART RATE: 70 BPM | DIASTOLIC BLOOD PRESSURE: 64 MMHG | BODY MASS INDEX: 22.8 KG/M2 | HEIGHT: 73 IN

## 2020-01-28 DIAGNOSIS — R91.1 LUNG NODULE: ICD-10-CM

## 2020-01-28 DIAGNOSIS — J41.0 SIMPLE CHRONIC BRONCHITIS (HCC): Primary | ICD-10-CM

## 2020-01-28 PROCEDURE — 99213 OFFICE O/P EST LOW 20 MIN: CPT | Performed by: PHYSICIAN ASSISTANT

## 2020-01-28 NOTE — PROGRESS NOTES
Assessment/Plan:   Diagnoses and all orders for this visit:    Simple chronic bronchitis ([facility] [address] )    Lung nodule     Patient is here today for follow-up  He continues to do well with his breathing  He will continue with his Anoro , Flovent, Singulair, Flonase   Will continue with Daliresp , pérez has gone up this year on the [address]  I did tell him to try taking it every other day and see if this still controls his symptoms, if not would need to go back to daily  He has history of lung nodule , 3 mm nodule in the lingula which has been stable  Has been stable for over 2 years  He will follow up with us in 4 months or sooner if necessary  Return in about 4 months (around 5/28/2020)  All questions are answered to the patient's satisfaction and understanding  He verbalizes understanding  He is encouraged to call with any further questions or concerns  Portions of the record may have been created with voice recognition software  Occasional wrong word or "sound a like" substitutions may have occurred due to the inherent limitations of voice recognition software  Read the chart carefully and recognize, using context, where substitutions have occurred  Electronically Signed by Louisa Garcia PA-C    ______________________________________________________________________    Chief Complaint:   Chief Complaint   Patient presents with    Follow-up       Patient ID: Ac Augustine is a 80 y o  y o  male has a past medical history of COPD (chronic obstructive pulmonary disease) ([facility] [address] ), Diabetes mellitus ([facility] [address] ), Essential hypertension, Heart failure ([facility] [address] ), Hyperlipidemia, Left inguinal hernia, Lung nodule, and Malignant melanoma of skin ([facility] [address] )  1/28/2020  Patient presents today for follow-up visit  Patient is an 79 yo male former smoker with PMH of COPD, HTN, HLD, MDS  He is here today for follow-up  He continues to do well with his breathing on Anoro, Flovent, Flonase and Singulair    He also continues with Chago  Review of Systems   Constitutional: Negative  HENT: Negative  Respiratory: Negative  Cardiovascular: Negative  Gastrointestinal: Negative  Genitourinary: Negative  Musculoskeletal: Negative  Skin: Negative  Allergic/Immunologic: Negative  Neurological: Negative  Psychiatric/Behavioral: Negative  Smoking history: He reports that he quit smoking about 60 years ago  He has a 10 00 pack-year smoking history   He has never used smokeless tobacco     The following portions of the patient's history were reviewed and updated as appropriate: allergies, current medications, past family history, past medical history, past social history, past surgical history and problem list     Immunization History   Administered Date(s) Administered    H1N1, All Formulations 01/26/2010    INFLUENZA 09/28/2018    Influenza Split High Dose Preservative Free IM 10/18/2013, 10/05/2015, 09/30/2016, 10/20/2017    Influenza TIV (IM) 10/13/2011, 10/01/2014    Pneumococcal Conjugate 13-Valent 11/22/2017    Pneumococcal Polysaccharide PPV23 01/01/1998, 10/19/2005    Zoster 10/01/2010    Zoster Vaccine Recombinant 01/03/2020     Current Outpatient Medications   Medication Sig Dispense Refill    acetaminophen (TYLENOL) 325 mg tablet Take 2 tablets (650 mg total) by mouth every 6 (six) hours as needed for mild pain 30 tablet 0    albuterol (2 5 mg/3 mL) 0 083 % nebulizer solution Take 1 vial (2 5 mg total) by nebulization every 6 (six) hours as needed for wheezing or shortness of breath 1080 mL 3    Ascorbic Acid (VITAMIN C) 1000 MG tablet Take 1 tablet by mouth daily      BD PEN NEEDLE DON U/F 32G X 4 MM MISC       benzonatate (TESSALON) 200 MG capsule Take 1 capsule (200 mg total) by mouth 3 (three) times a day as needed for cough 20 capsule 0    betamethasone, augmented, (DIPROLENE-AF) 0 05 % cream       Cinnamon 500 MG TABS Take 1 tablet by mouth daily        fluticasone (FLOVENT HFA) 220 mcg/act inhaler Inhale 1 puff 2 (two) times a day Rinse mouth after use 12 g 4    gabapentin (NEURONTIN) 300 mg capsule Take 1 capsule (300 mg total) by mouth 2 (two) times a day 60 capsule 2    hydrOXYzine HCL (ATARAX) 25 mg tablet TAKE 1 TABLET BY MOUTH 3 TIMES A DAY AS NEEDED FOR ITCHING 30 tablet 5    hydrOXYzine pamoate (VISTARIL) 25 mg capsule Take 1 capsule (25 mg total) by mouth 3 (three) times a day as needed for itching 90 capsule 2    insulin detemir (LEVEMIR) 100 units/mL subcutaneous injection Inject 18 Units under the skin daily at bedtime       metoprolol succinate (TOPROL-XL) 50 mg 24 hr tablet Take 2 tablets (100 mg total) by mouth daily 180 tablet 3    montelukast (SINGULAIR) 10 mg tablet Take 1 tablet (10 mg total) by mouth daily at bedtime 90 tablet 3    Multiple Vitamins-Minerals (OCUVITE ADULT 50+ PO) Take 1 tablet by mouth daily      NOVOLOG FLEXPEN 100 units/mL injection pen Inject 5 Units under the skin daily with breakfast       ONE TOUCH ULTRA TEST test strip       ONETOUCH DELICA LANCETS FINE MISC       repaglinide (PRANDIN) 2 mg tablet Take 2 mg by mouth daily before lunch       roflumilast (DALIRESP) 500 mcg tablet Take 1 tablet (500 mcg total) by mouth daily 30 tablet 5    sacubitril-valsartan (ENTRESTO) 24-26 MG TABS Take 1 tablet by mouth 2 (two) times a day 180 tablet 3    simvastatin (ZOCOR) 10 mg tablet TAKE ONE TABLET BY MOUTH ONCE DAILY  90 tablet 1    spironolactone (ALDACTONE) 25 mg tablet Take 1 tablet (25 mg total) by mouth daily 90 tablet 3    umeclidinium-vilanterol (ANORO ELLIPTA) 62 5-25 MCG/INH inhaler Inhale 1 puff daily 90 each 3    VENTOLIN  (90 Base) MCG/ACT inhaler Inhale 4 (four) times a day        docusate sodium (COLACE) 100 mg capsule Take 1 capsule (100 mg total) by mouth 2 (two) times a day for 15 days 30 capsule 0    naproxen (EC NAPROSYN) 500 MG EC tablet Take 1 tablet (500 mg total) by mouth 2 (two) times a day with meals for 5 days 10 tablet 0     No current facility-administered medications for this visit  Allergies: Patient has no known allergies  Objective:  Vitals:    01/28/20 1100   BP: 110/64   Pulse: 70   SpO2: 99%   Weight: 78 kg (172 lb)   Height: 6' 1" (1 854 m)   Oxygen Therapy  SpO2: 99 %    Wt Readings from Last 3 Encounters:   01/28/20 78 kg (172 lb)   11/19/19 75 3 kg (166 lb)   11/02/19 78 9 kg (174 lb)     Body mass index is 22 69 kg/m²  Physical Exam   Constitutional: He is oriented to person, place, and time  He appears well-developed and well-nourished  No distress  HENT:   Mouth/Throat: Oropharynx is clear and moist    Eyes: Pupils are equal, round, and reactive to light  Cardiovascular: Normal rate, regular rhythm and normal heart sounds  No murmur heard  Pulmonary/Chest: Effort normal and breath sounds normal  No accessory muscle usage  No respiratory distress  He has no decreased breath sounds  He has no wheezes  He has no rhonchi  He has no rales  Abdominal: Soft  There is no tenderness  Musculoskeletal: Normal range of motion  Neurological: He is alert and oriented to person, place, and time  Skin: Skin is warm and dry  No rash noted  Psychiatric: He has a normal mood and affect         Lab Review:   Lab Results   Component Value Date     05/08/2017     03/07/2017     10/11/2016    K 4 7 10/15/2019    K 4 9 09/12/2019    K 4 9 04/29/2019    CO2 28 10/15/2019    CO2 24 09/12/2019    CO2 29 04/29/2019    BUN 25 10/15/2019    BUN 30 (H) 09/12/2019    BUN 25 04/29/2019    CREATININE 1 23 (H) 10/15/2019    CREATININE 1 40 (H) 09/12/2019    CREATININE 1 13 (H) 04/29/2019    CREATININE 1 19 11/15/2018    CREATININE 1 17 11/14/2018    CREATININE 1 28 11/13/2018    CREATININE 1 01 05/08/2017    CREATININE 1 14 (H) 03/07/2017    CREATININE 1 24 (H) 10/11/2016    CALCIUM 9 2 10/15/2019    CALCIUM 9 3 09/12/2019    CALCIUM 9 6 04/29/2019     Lab Results   Component Value Date    WBC 5 8 09/12/2019    WBC 6 38 11/15/2018    WBC 5 5 05/08/2017    HGB 10 0 (L) 09/12/2019    HGB 13 7 11/15/2018    HGB 11 0 (L) 05/08/2017    HCT 32 0 (L) 09/12/2019    HCT 41 7 11/15/2018    HCT 34 9 (L) 05/08/2017    MCV 93 3 09/12/2019    MCV 93 11/15/2018    MCV 96 6 05/08/2017     09/12/2019     (L) 11/15/2018     05/08/2017       Diagnostics:    none pertinent  Office Spirometry Results:     ESS:    No results found

## 2020-02-14 ENCOUNTER — TELEPHONE (OUTPATIENT)
Dept: FAMILY MEDICINE CLINIC | Facility: MEDICAL CENTER | Age: 85
End: 2020-02-14

## 2020-02-14 NOTE — TELEPHONE ENCOUNTER
Received fax for prior auth for Hydroxyzine   I called the pharmacy and they ran it through their discount card and patient paid out of pocket, so no Prior auth needed

## 2020-02-19 DIAGNOSIS — J44.1 CHRONIC OBSTRUCTIVE PULMONARY DISEASE WITH ACUTE EXACERBATION (HCC): ICD-10-CM

## 2020-02-21 RX ORDER — FLUTICASONE PROPIONATE 220 UG/1
1 AEROSOL, METERED RESPIRATORY (INHALATION) 2 TIMES DAILY
Qty: 12 G | Refills: 4 | Status: SHIPPED | OUTPATIENT
Start: 2020-02-21 | End: 2021-03-26

## 2020-02-23 DIAGNOSIS — G60.9 NEUROPATHY, IDIOPATHIC: ICD-10-CM

## 2020-02-24 ENCOUNTER — OFFICE VISIT (OUTPATIENT)
Dept: CARDIOLOGY CLINIC | Facility: CLINIC | Age: 85
End: 2020-02-24
Payer: COMMERCIAL

## 2020-02-24 VITALS
OXYGEN SATURATION: 98 % | HEART RATE: 63 BPM | SYSTOLIC BLOOD PRESSURE: 118 MMHG | DIASTOLIC BLOOD PRESSURE: 62 MMHG | BODY MASS INDEX: 23.14 KG/M2 | HEIGHT: 73 IN | WEIGHT: 174.6 LBS

## 2020-02-24 DIAGNOSIS — I42.0 DILATED CARDIOMYOPATHY (HCC): ICD-10-CM

## 2020-02-24 DIAGNOSIS — I10 ESSENTIAL HYPERTENSION: Primary | Chronic | ICD-10-CM

## 2020-02-24 DIAGNOSIS — I50.43 ACUTE ON CHRONIC COMBINED SYSTOLIC AND DIASTOLIC CONGESTIVE HEART FAILURE (HCC): ICD-10-CM

## 2020-02-24 DIAGNOSIS — E78.49 OTHER HYPERLIPIDEMIA: ICD-10-CM

## 2020-02-24 PROCEDURE — 3008F BODY MASS INDEX DOCD: CPT | Performed by: INTERNAL MEDICINE

## 2020-02-24 PROCEDURE — 3078F DIAST BP <80 MM HG: CPT | Performed by: INTERNAL MEDICINE

## 2020-02-24 PROCEDURE — 4040F PNEUMOC VAC/ADMIN/RCVD: CPT | Performed by: INTERNAL MEDICINE

## 2020-02-24 PROCEDURE — 3066F NEPHROPATHY DOC TX: CPT | Performed by: INTERNAL MEDICINE

## 2020-02-24 PROCEDURE — 99214 OFFICE O/P EST MOD 30 MIN: CPT | Performed by: INTERNAL MEDICINE

## 2020-02-24 PROCEDURE — 1160F RVW MEDS BY RX/DR IN RCRD: CPT | Performed by: INTERNAL MEDICINE

## 2020-02-24 PROCEDURE — 3074F SYST BP LT 130 MM HG: CPT | Performed by: INTERNAL MEDICINE

## 2020-02-24 PROCEDURE — 1036F TOBACCO NON-USER: CPT | Performed by: INTERNAL MEDICINE

## 2020-02-24 RX ORDER — FLUTICASONE PROPIONATE 50 MCG
SPRAY, SUSPENSION (ML) NASAL
Qty: 16 G | Refills: 4 | Status: SHIPPED | OUTPATIENT
Start: 2020-02-24 | End: 2020-08-31

## 2020-02-24 NOTE — PROGRESS NOTES
Cardiology Follow Up    Luz Marina Barrientos  1933  [id number]  [facility] [place]  [address] [address] [phone]    No diagnosis found  There are no diagnoses linked to this encounter  I had the pleasure of seeing Luz Marina Barrientos for a follow up visit  Interval History: None    History of the presenting illness, Discussion/Summary and My Plan are as follows:::    80year-old without any prior cardiac history-including CAD, MI, bypass, stents or valvular heart disease or family history of cardiomyopathy or coronary artery disease but with a left bundle-branch block since 2013, also with a nonischemic cardiomyopathy (negative coronary angiography-May 2018)-diagnosed in May 2018 in the setting of acute congestive heart failure-probably viral-or originally with an ejection fraction of 35-40%, subsequently on ACE-inhibitor and beta-blocker, decreased to 20%, admitted with another episode of acute CHF-November 2018, initiated on Entresto, after which ejection fraction normalized to 55%-February 2019  He is currently on an excellent regimen and has stayed out of the hospital for the last 7 months      He denies any orthopnea or dyspnea with exertion or edema  He continues to play golf, does yd work, lives in a 3 story house and walks  No symptoms      Exam demonstrates NO e/o CHF       Plan:     Nonischemic Cardiomyopathy and chronic systolic congestive heart failure:  Nonischemic (negative coronary angiography-May 2018), no alcohol use or chemotherapy  No family history  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-was  treated for symptoms of bronchitis for few months prior to May 2018     Now on an excellent medical regimen including a beta-blocker, Entresto and Aldactone,  Ejection fraction decreased from 37%-May 2018 to 20-25%-September 2018-while on a good dose of ACE-inhibitor and beta-blocker, admitted with acute CHF-November 2018, then initiated on Entresto-ejection fraction improved to normal-55%-February 2019  No e/o Vol Ol on Exam  Also has COPD    Dry weight at home-167 lb and stable     Hypertension:  Controlled     Dyslipidemia:  On low-dose statin  He is also diabetic      Diabetes: Followed by an endocrinologist at [place] [address]       Follow-up in 6 months  Results for Margie Pod (MRN [id number]) as of 2/24/2020 11:52   Ref  Range 10/15/2019 09:30   Cholesterol Latest Ref Range: <200 mg/dL 132   Triglycerides Latest Ref Range: <150 mg/dL 72   HDL Latest Ref Range: >40 mg/dL 42   Non-HDL Cholesterol Latest Ref Range: <130 mg/dL (calc) 90   LDL Direct Latest Units: mg/dL (calc) 75   Chol HDLC Ratio Latest Ref Range: <5 0 (calc) 3 1       Results for Margie Pod (MRN [id number]) as of 2/24/2020 11:52   Ref   Range 9/12/2019 07:34 10/15/2019 09:30   BUN Latest Ref Range: 7 - 25 mg/dL 30 (H) 25   Creatinine Latest Ref Range: 0 70 - 1 11 mg/dL 1 40 (H) 1 23 (H)       Patient Active Problem List   Diagnosis    [facility]    Essential hypertension    Hyperlipidemia    Neuropathy, idiopathic    Multiple nodules of lung    Mixed simple and mucopurulent chronic bronchitis (HCC)    Type 2 diabetes mellitus, with long-term current use of insulin (HCC)    Dilated cardiomyopathy (HCC)    Chronic combined systolic and diastolic congestive heart failure (HCC)    Abnormal chest CT    Arthritis    [facility])    Paresthesia of both feet    Finger pain, left    Cough    Urinary retention    Benign prostatic hyperplasia (BPH) with straining on urination    Anemia     Past Medical History:   Diagnosis Date    COPD (chronic obstructive pulmonary disease) (HCC)     Diabetes mellitus (HCC)     Type 2    Essential hypertension     Heart failure (HCC)     Hyperlipidemia     Left inguinal hernia     Lung nodule     Malignant melanoma of skin ([facility] ) Social History     Socioeconomic History    Marital status:       Spouse name: Not on file    Number of children: 2    Years of education: Not on file    Highest education level: Not on file   Occupational History    Occupation: rtired   Social Needs    Financial resource strain: Not on file    Food insecurity:     Worry: Not on file     Inability: Not on file   [employer] needs:     Medical: Not on file     Non-medical: Not on file   Tobacco Use    Smoking status: Former Smoker     Packs/day: 1 00     Years: 10 00     Pack years: 10 00     Last attempt to quit: 1960     Years since quittin 1    Smokeless tobacco: Never Used   Substance and Sexual Activity    Alcohol use: Not Currently     Alcohol/week: 0 0 standard drinks    Drug use: No    Sexual activity: Not on file   Lifestyle    Physical activity:     Days per week: Not on file     Minutes per session: Not on file    Stress: Not on file   Relationships    Social connections:     Talks on phone: Not on file     Gets together: Not on file     Attends Religion service: Not on file     Active member of club or organization: Not on file     Attends meetings of clubs or organizations: Not on file     Relationship status: Not on file    Intimate partner violence:     Fear of current or ex partner: Not on file     Emotionally abused: Not on file     Physically abused: Not on file     Forced sexual activity: Not on file   Other Topics Concern    Not on file   Social History Narrative    Caffeine use, active      Family History   Problem Relation Age of Onset    Diabetes Mother     Heart attack Neg Hx     Stroke Neg Hx     Aneurysm Neg Hx     Clotting disorder Neg Hx     Arrhythmia Neg Hx     Hypertension Neg Hx     Hyperlipidemia Neg Hx         pt unsure      Past Surgical History:   Procedure Laterality Date    APPENDECTOMY      CATARACT EXTRACTION, BILATERAL      CHOLECYSTECTOMY      CHOLECYSTECTOMY      COLONOSCOPY 2014    Fiberoptic    HERNIA REPAIR      KNEE ARTHROSCOPY Bilateral     Therapeutic    WA CYSTOURETHRO W/IMPLANT N/A 3/22/2019    Procedure: CYSTOSCOPY WITH INSERTION UROLIFT;  Surgeon: Mario Quiros MD;  Location: [facility];  Service: Urology       Current Outpatient Medications:     acetaminophen (TYLENOL) 325 mg tablet, Take 2 tablets (650 mg total) by mouth every 6 (six) hours as needed for mild pain, Disp: 30 tablet, Rfl: 0    albuterol (2 5 mg/3 mL) 0 083 % nebulizer solution, Take 1 vial (2 5 mg total) by nebulization every 6 (six) hours as needed for wheezing or shortness of breath, Disp: 1080 mL, Rfl: 3    Ascorbic Acid (VITAMIN C) 1000 MG tablet, Take 1 tablet by mouth daily, Disp: , Rfl:     BD PEN NEEDLE DON U/F 32G X 4 MM MISC, , Disp: , Rfl:     betamethasone, augmented, (DIPROLENE-AF) 0 05 % cream, , Disp: , Rfl:     Cinnamon 500 MG TABS, Take 1 tablet by mouth daily  , Disp: , Rfl:     fluticasone (Flovent HFA) 220 mcg/act inhaler, Inhale 1 puff 2 (two) times a day Rinse mouth after use, Disp: 12 g, Rfl: 4    gabapentin (NEURONTIN) 300 mg capsule, Take 1 capsule (300 mg total) by mouth 2 (two) times a day, Disp: 60 capsule, Rfl: 2    hydrOXYzine HCL (ATARAX) 25 mg tablet, TAKE 1 TABLET BY MOUTH 3 TIMES A DAY AS NEEDED FOR ITCHING, Disp: 30 tablet, Rfl: 5    hydrOXYzine pamoate (VISTARIL) 25 mg capsule, Take 1 capsule (25 mg total) by mouth 3 (three) times a day as needed for itching, Disp: 90 capsule, Rfl: 2    insulin detemir (LEVEMIR) 100 units/mL subcutaneous injection, Inject 18 Units under the skin daily at bedtime , Disp: , Rfl:     metoprolol succinate (TOPROL-XL) 50 mg 24 hr tablet, Take 2 tablets (100 mg total) by mouth daily, Disp: 180 tablet, Rfl: 3    montelukast (SINGULAIR) 10 mg tablet, Take 1 tablet (10 mg total) by mouth daily at bedtime, Disp: 90 tablet, Rfl: 3    Multiple Vitamins-Minerals (OCUVITE ADULT 50+ PO), Take 1 tablet by mouth daily, Disp: , Rfl:     naproxen (EC NAPROSYN) 500 MG EC tablet, Take 1 tablet (500 mg total) by mouth 2 (two) times a day with meals for 5 days, Disp: 10 tablet, Rfl: 0    NOVOLOG FLEXPEN 100 units/mL injection pen, Inject 5 Units under the skin daily with breakfast , Disp: , Rfl:     ONE TOUCH ULTRA TEST test strip, , Disp: , Rfl:     ONETOUCH DELICA LANCETS FINE MISC, , Disp: , Rfl:     repaglinide (PRANDIN) 2 mg tablet, Take 2 mg by mouth daily before lunch , Disp: , Rfl:     roflumilast (DALIRESP) 500 mcg tablet, Take 1 tablet (500 mcg total) by mouth daily, Disp: 30 tablet, Rfl: 5    sacubitril-valsartan (ENTRESTO) 24-26 MG TABS, Take 1 tablet by mouth 2 (two) times a day, Disp: 180 tablet, Rfl: 3    simvastatin (ZOCOR) 10 mg tablet, TAKE ONE TABLET BY MOUTH ONCE DAILY , Disp: 90 tablet, Rfl: 1    spironolactone (ALDACTONE) 25 mg tablet, Take 1 tablet (25 mg total) by mouth daily, Disp: 90 tablet, Rfl: 3    umeclidinium-vilanterol (ANORO ELLIPTA) 62 5-25 MCG/INH inhaler, Inhale 1 puff daily, Disp: 90 each, Rfl: 3    VENTOLIN  (90 Base) MCG/ACT inhaler, Inhale 4 (four) times a day  , Disp: , Rfl:     benzonatate (TESSALON) 200 MG capsule, Take 1 capsule (200 mg total) by mouth 3 (three) times a day as needed for cough (Patient not taking: Reported on 2/24/2020), Disp: 20 capsule, Rfl: 0    docusate sodium (COLACE) 100 mg capsule, Take 1 capsule (100 mg total) by mouth 2 (two) times a day for 15 days (Patient not taking: Reported on 2/24/2020), Disp: 30 capsule, Rfl: 0  No Known Allergies    Imaging: No results found  Review of Systems:  Review of Systems   Constitutional: Negative  HENT: Negative  Eyes: Negative  Respiratory: Negative  Cardiovascular: Negative  Endocrine: Negative  Musculoskeletal: Negative  Hematological: Negative          Physical Exam:  /62 (BP Location: Left arm, Patient Position: Sitting, Cuff Size: Standard)   Pulse 63   Ht 6' 1" (1 854 m)   Wt 79 2 kg (174 lb 9 6 oz)   SpO2 98%   BMI 23 04 kg/m²   Physical Exam   Constitutional: He appears well-developed and well-nourished  No distress  HENT:   Head: Normocephalic and atraumatic  Eyes: Pupils are equal, round, and reactive to light  Conjunctivae are normal  Right eye exhibits no discharge  Left eye exhibits no discharge  No scleral icterus  Neck: Normal range of motion  No JVD present  No tracheal deviation present  No thyromegaly present  Cardiovascular: Normal rate and regular rhythm  Exam reveals no gallop and no friction rub  No murmur heard  Pulmonary/Chest: Effort normal and breath sounds normal  No stridor  No respiratory distress  He has no wheezes  Abdominal: Soft  Bowel sounds are normal  He exhibits no distension  There is no tenderness  Musculoskeletal: Normal range of motion  He exhibits no edema, tenderness or deformity  Skin: Skin is warm  No rash noted  He is not diaphoretic  No erythema  No pallor

## 2020-02-25 RX ORDER — METOPROLOL SUCCINATE 50 MG/1
100 TABLET, EXTENDED RELEASE ORAL DAILY
Qty: 180 TABLET | Refills: 3 | Status: SHIPPED | OUTPATIENT
Start: 2020-02-25 | End: 2021-03-29

## 2020-02-25 RX ORDER — SPIRONOLACTONE 25 MG/1
25 TABLET ORAL DAILY
Qty: 90 TABLET | Refills: 3 | Status: SHIPPED | OUTPATIENT
Start: 2020-02-25 | End: 2020-05-06 | Stop reason: SDUPTHER

## 2020-03-02 LAB
ALBUMIN SERPL-MCNC: 4.5 G/DL (ref 3.6–5.1)
ALBUMIN/GLOB SERPL: 2 (CALC) (ref 1–2.5)
ALP SERPL-CCNC: 42 U/L (ref 35–144)
ALT SERPL-CCNC: 11 U/L (ref 9–46)
AST SERPL-CCNC: 11 U/L (ref 10–35)
BILIRUB SERPL-MCNC: 0.5 MG/DL (ref 0.2–1.2)
BUN SERPL-MCNC: 39 MG/DL (ref 7–25)
BUN/CREAT SERPL: 26 (CALC) (ref 6–22)
CALCIUM SERPL-MCNC: 9.8 MG/DL (ref 8.6–10.3)
CHLORIDE SERPL-SCNC: 106 MMOL/L (ref 98–110)
CO2 SERPL-SCNC: 29 MMOL/L (ref 20–32)
CREAT SERPL-MCNC: 1.5 MG/DL (ref 0.7–1.11)
GLOBULIN SER CALC-MCNC: 2.2 G/DL (CALC) (ref 1.9–3.7)
GLUCOSE SERPL-MCNC: 266 MG/DL (ref 65–99)
POTASSIUM SERPL-SCNC: 5.7 MMOL/L (ref 3.5–5.3)
PROT SERPL-MCNC: 6.7 G/DL (ref 6.1–8.1)
SL AMB EGFR AFRICAN AMERICAN: 48 ML/MIN/1.73M2
SL AMB EGFR NON AFRICAN AMERICAN: 42 ML/MIN/1.73M2
SODIUM SERPL-SCNC: 139 MMOL/L (ref 135–146)

## 2020-03-09 DIAGNOSIS — E87.5 HYPERKALEMIA: ICD-10-CM

## 2020-03-09 DIAGNOSIS — R94.4 NONSPECIFIC ABNORMAL RESULTS OF KIDNEY FUNCTION STUDY: Primary | ICD-10-CM

## 2020-03-19 ENCOUNTER — TELEPHONE (OUTPATIENT)
Dept: FAMILY MEDICINE CLINIC | Facility: MEDICAL CENTER | Age: 85
End: 2020-03-19

## 2020-03-19 LAB
BUN SERPL-MCNC: 31 MG/DL (ref 7–25)
BUN/CREAT SERPL: 24 (CALC) (ref 6–22)
CALCIUM SERPL-MCNC: 9.6 MG/DL (ref 8.6–10.3)
CHLORIDE SERPL-SCNC: 104 MMOL/L (ref 98–110)
CO2 SERPL-SCNC: 27 MMOL/L (ref 20–32)
CREAT SERPL-MCNC: 1.31 MG/DL (ref 0.7–1.11)
GLUCOSE SERPL-MCNC: 145 MG/DL (ref 65–99)
POTASSIUM SERPL-SCNC: 5.3 MMOL/L (ref 3.5–5.3)
SL AMB EGFR AFRICAN AMERICAN: 57 ML/MIN/1.73M2
SL AMB EGFR NON AFRICAN AMERICAN: 49 ML/MIN/1.73M2
SODIUM SERPL-SCNC: 138 MMOL/L (ref 135–146)

## 2020-03-19 NOTE — TELEPHONE ENCOUNTER
Pt aware-he said he wants to reschedule his appointment with you due to the concerns of Covid-19, he will keep his Neprology appt however  He will schedule a month later due to his concern-he is avoiding people as much as possible

## 2020-03-19 NOTE — TELEPHONE ENCOUNTER
----- Message from Tremaine Winchester MD sent at 3/19/2020 11:11 AM EDT -----  Notify potassium is better  Kidney function is about the same  I see he has a nephrology appointment at the end of March

## 2020-03-24 DIAGNOSIS — G62.9 NEUROPATHY: ICD-10-CM

## 2020-03-24 RX ORDER — GABAPENTIN 300 MG/1
300 CAPSULE ORAL 2 TIMES DAILY
Qty: 180 CAPSULE | Refills: 1 | Status: SHIPPED | OUTPATIENT
Start: 2020-03-24 | End: 2020-09-17 | Stop reason: SDUPTHER

## 2020-03-24 NOTE — TELEPHONE ENCOUNTER
Pt called to request Rx for gabapentin 300 mg  Please send  90 day supply to [facility] in [place]    He has about 3 days' worth remaining

## 2020-04-01 DIAGNOSIS — J41.8 MIXED SIMPLE AND MUCOPURULENT CHRONIC BRONCHITIS (HCC): ICD-10-CM

## 2020-04-01 RX ORDER — UMECLIDINIUM BROMIDE AND VILANTEROL TRIFENATATE 62.5; 25 UG/1; UG/1
POWDER RESPIRATORY (INHALATION)
Qty: 60 EACH | Refills: 3 | Status: SHIPPED | OUTPATIENT
Start: 2020-04-01 | End: 2020-07-31 | Stop reason: SDUPTHER

## 2020-05-04 ENCOUNTER — TELEPHONE (OUTPATIENT)
Dept: NEPHROLOGY | Facility: CLINIC | Age: 85
End: 2020-05-04

## 2020-05-06 ENCOUNTER — TELEMEDICINE (OUTPATIENT)
Dept: NEPHROLOGY | Facility: CLINIC | Age: 85
End: 2020-05-06
Payer: COMMERCIAL

## 2020-05-06 VITALS — BODY MASS INDEX: 20.94 KG/M2 | HEIGHT: 73 IN | WEIGHT: 158 LBS

## 2020-05-06 DIAGNOSIS — N18.30 CKD (CHRONIC KIDNEY DISEASE), STAGE III (HCC): Primary | ICD-10-CM

## 2020-05-06 DIAGNOSIS — E83.9 CHRONIC KIDNEY DISEASE-MINERAL AND BONE DISORDER: ICD-10-CM

## 2020-05-06 DIAGNOSIS — I50.43 ACUTE ON CHRONIC COMBINED SYSTOLIC AND DIASTOLIC CONGESTIVE HEART FAILURE (HCC): ICD-10-CM

## 2020-05-06 DIAGNOSIS — M89.9 CHRONIC KIDNEY DISEASE-MINERAL AND BONE DISORDER: ICD-10-CM

## 2020-05-06 DIAGNOSIS — E87.5 HYPERKALEMIA: ICD-10-CM

## 2020-05-06 DIAGNOSIS — N18.9 CHRONIC KIDNEY DISEASE-MINERAL AND BONE DISORDER: ICD-10-CM

## 2020-05-06 DIAGNOSIS — N18.30 BENIGN HYPERTENSION WITH CHRONIC KIDNEY DISEASE, STAGE III (HCC): ICD-10-CM

## 2020-05-06 DIAGNOSIS — R94.4 NONSPECIFIC ABNORMAL RESULTS OF KIDNEY FUNCTION STUDY: ICD-10-CM

## 2020-05-06 DIAGNOSIS — I12.9 BENIGN HYPERTENSION WITH CHRONIC KIDNEY DISEASE, STAGE III (HCC): ICD-10-CM

## 2020-05-06 PROBLEM — N17.9 AKI (ACUTE KIDNEY INJURY) (HCC): Status: RESOLVED | Noted: 2018-11-12 | Resolved: 2020-05-06

## 2020-05-06 PROCEDURE — 99203 OFFICE O/P NEW LOW 30 MIN: CPT | Performed by: INTERNAL MEDICINE

## 2020-05-06 RX ORDER — SPIRONOLACTONE 25 MG/1
25 TABLET ORAL EVERY OTHER DAY
Qty: 90 TABLET | Refills: 3
Start: 2020-05-06 | End: 2021-08-17

## 2020-05-12 DIAGNOSIS — L29.9 PRURITUS: ICD-10-CM

## 2020-05-15 ENCOUNTER — HOSPITAL ENCOUNTER (OUTPATIENT)
Dept: RADIOLOGY | Facility: MEDICAL CENTER | Age: 85
Discharge: HOME | End: 2020-05-15
Payer: COMMERCIAL

## 2020-05-15 ENCOUNTER — TELEPHONE (OUTPATIENT)
Dept: HEMATOLOGY ONCOLOGY | Facility: CLINIC | Age: 85
End: 2020-05-15

## 2020-05-15 DIAGNOSIS — N18.30 CKD (CHRONIC KIDNEY DISEASE), STAGE III (HCC): ICD-10-CM

## 2020-05-15 PROCEDURE — 51798 US URINE CAPACITY MEASURE: CPT

## 2020-05-15 RX ORDER — HYDROXYZINE PAMOATE 25 MG/1
25 CAPSULE ORAL 3 TIMES DAILY PRN
Qty: 90 CAPSULE | Refills: 0 | Status: SHIPPED | OUTPATIENT
Start: 2020-05-15 | End: 2020-07-06

## 2020-05-18 LAB
ALBUMIN SERPL-MCNC: 4.4 G/DL (ref 3.6–5.1)
ALBUMIN/GLOB SERPL: 2.4 (CALC) (ref 1–2.5)
ALP SERPL-CCNC: 37 U/L (ref 35–144)
ALT SERPL-CCNC: 10 U/L (ref 9–46)
AST SERPL-CCNC: 13 U/L (ref 10–35)
BASOPHILS # BLD AUTO: 189 CELLS/UL (ref 0–200)
BASOPHILS NFR BLD AUTO: 3.2 %
BILIRUB SERPL-MCNC: 0.7 MG/DL (ref 0.2–1.2)
BUN SERPL-MCNC: 33 MG/DL (ref 7–25)
BUN/CREAT SERPL: 24 (CALC) (ref 6–22)
CALCIUM SERPL-MCNC: 9.4 MG/DL (ref 8.6–10.3)
CHLORIDE SERPL-SCNC: 105 MMOL/L (ref 98–110)
CO2 SERPL-SCNC: 24 MMOL/L (ref 20–32)
CREAT SERPL-MCNC: 1.4 MG/DL (ref 0.7–1.11)
EOSINOPHIL # BLD AUTO: 773 CELLS/UL (ref 15–500)
EOSINOPHIL NFR BLD AUTO: 13.1 %
ERYTHROCYTE [DISTWIDTH] IN BLOOD BY AUTOMATED COUNT: 23.3 % (ref 11–15)
FOLATE SERPL-MCNC: 20.7 NG/ML
GLOBULIN SER CALC-MCNC: 1.8 G/DL (CALC) (ref 1.9–3.7)
GLUCOSE SERPL-MCNC: 152 MG/DL (ref 65–139)
HCT VFR BLD AUTO: 32.4 % (ref 38.5–50)
HGB BLD-MCNC: 10.5 G/DL (ref 13.2–17.1)
LYMPHOCYTES # BLD AUTO: 903 CELLS/UL (ref 850–3900)
LYMPHOCYTES NFR BLD AUTO: 15.3 %
MCH RBC QN AUTO: 29.9 PG (ref 27–33)
MCHC RBC AUTO-ENTMCNC: 32.4 G/DL (ref 32–36)
MCV RBC AUTO: 92.3 FL (ref 80–100)
MONOCYTES # BLD AUTO: 572 CELLS/UL (ref 200–950)
MONOCYTES NFR BLD AUTO: 9.7 %
NEUTROPHILS # BLD AUTO: 3463 CELLS/UL (ref 1500–7800)
NEUTROPHILS NFR BLD AUTO: 58.7 %
PLATELET # BLD AUTO: 165 THOUSAND/UL (ref 140–400)
POTASSIUM SERPL-SCNC: 5.2 MMOL/L (ref 3.5–5.3)
PROT SERPL-MCNC: 6.2 G/DL (ref 6.1–8.1)
RBC # BLD AUTO: 3.51 MILLION/UL (ref 4.2–5.8)
SL AMB EGFR AFRICAN AMERICAN: 52 ML/MIN/1.73M2
SL AMB EGFR NON AFRICAN AMERICAN: 45 ML/MIN/1.73M2
SODIUM SERPL-SCNC: 137 MMOL/L (ref 135–146)
VIT B12 SERPL-MCNC: 1527 PG/ML (ref 200–1100)
WBC # BLD AUTO: 5.9 THOUSAND/UL (ref 3.8–10.8)

## 2020-05-19 ENCOUNTER — TELEMEDICINE (OUTPATIENT)
Dept: HEMATOLOGY ONCOLOGY | Facility: CLINIC | Age: 85
End: 2020-05-19
Payer: COMMERCIAL

## 2020-05-19 DIAGNOSIS — D64.9 ANEMIA, UNSPECIFIED TYPE: Primary | ICD-10-CM

## 2020-05-19 PROCEDURE — 99214 OFFICE O/P EST MOD 30 MIN: CPT | Performed by: INTERNAL MEDICINE

## 2020-05-19 PROCEDURE — 1160F RVW MEDS BY RX/DR IN RCRD: CPT | Performed by: INTERNAL MEDICINE

## 2020-05-26 LAB
BUN SERPL-MCNC: 33 MG/DL (ref 7–25)
BUN/CREAT SERPL: 25 (CALC) (ref 6–22)
CALCIUM SERPL-MCNC: 9.3 MG/DL (ref 8.6–10.3)
CHLORIDE SERPL-SCNC: 106 MMOL/L (ref 98–110)
CO2 SERPL-SCNC: 25 MMOL/L (ref 20–32)
CREAT SERPL-MCNC: 1.31 MG/DL (ref 0.7–1.11)
GLUCOSE SERPL-MCNC: 235 MG/DL (ref 65–99)
POTASSIUM SERPL-SCNC: 4.8 MMOL/L (ref 3.5–5.3)
SL AMB EGFR AFRICAN AMERICAN: 56 ML/MIN/1.73M2
SL AMB EGFR NON AFRICAN AMERICAN: 49 ML/MIN/1.73M2
SODIUM SERPL-SCNC: 139 MMOL/L (ref 135–146)

## 2020-05-27 ENCOUNTER — TELEPHONE (OUTPATIENT)
Dept: NEPHROLOGY | Facility: CLINIC | Age: 85
End: 2020-05-27

## 2020-05-28 ENCOUNTER — OFFICE VISIT (OUTPATIENT)
Dept: FAMILY MEDICINE CLINIC | Facility: MEDICAL CENTER | Age: 85
End: 2020-05-28
Payer: COMMERCIAL

## 2020-05-28 VITALS
SYSTOLIC BLOOD PRESSURE: 130 MMHG | BODY MASS INDEX: 23.75 KG/M2 | HEART RATE: 68 BPM | OXYGEN SATURATION: 95 % | WEIGHT: 179.2 LBS | HEIGHT: 73 IN | RESPIRATION RATE: 16 BRPM | TEMPERATURE: 97.6 F | DIASTOLIC BLOOD PRESSURE: 70 MMHG

## 2020-05-28 DIAGNOSIS — Z79.4 TYPE 2 DIABETES MELLITUS WITH DIABETIC NEUROPATHY, WITH LONG-TERM CURRENT USE OF INSULIN (HCC): ICD-10-CM

## 2020-05-28 DIAGNOSIS — E87.5 HYPERKALEMIA: ICD-10-CM

## 2020-05-28 DIAGNOSIS — J44.1 CHRONIC OBSTRUCTIVE PULMONARY DISEASE WITH ACUTE EXACERBATION (HCC): ICD-10-CM

## 2020-05-28 DIAGNOSIS — E11.40 TYPE 2 DIABETES MELLITUS WITH DIABETIC NEUROPATHY, WITH LONG-TERM CURRENT USE OF INSULIN (HCC): ICD-10-CM

## 2020-05-28 DIAGNOSIS — I10 ESSENTIAL HYPERTENSION: Primary | ICD-10-CM

## 2020-05-28 PROCEDURE — 3008F BODY MASS INDEX DOCD: CPT | Performed by: FAMILY MEDICINE

## 2020-05-28 PROCEDURE — 99214 OFFICE O/P EST MOD 30 MIN: CPT | Performed by: FAMILY MEDICINE

## 2020-05-28 PROCEDURE — 3075F SYST BP GE 130 - 139MM HG: CPT | Performed by: FAMILY MEDICINE

## 2020-05-28 PROCEDURE — 3066F NEPHROPATHY DOC TX: CPT | Performed by: FAMILY MEDICINE

## 2020-05-28 PROCEDURE — 1101F PT FALLS ASSESS-DOCD LE1/YR: CPT | Performed by: FAMILY MEDICINE

## 2020-05-28 PROCEDURE — 4040F PNEUMOC VAC/ADMIN/RCVD: CPT | Performed by: FAMILY MEDICINE

## 2020-05-28 PROCEDURE — 1160F RVW MEDS BY RX/DR IN RCRD: CPT | Performed by: FAMILY MEDICINE

## 2020-05-28 PROCEDURE — 3288F FALL RISK ASSESSMENT DOCD: CPT | Performed by: FAMILY MEDICINE

## 2020-05-28 PROCEDURE — 1036F TOBACCO NON-USER: CPT | Performed by: FAMILY MEDICINE

## 2020-05-28 PROCEDURE — 3078F DIAST BP <80 MM HG: CPT | Performed by: FAMILY MEDICINE

## 2020-05-29 ENCOUNTER — TELEPHONE (OUTPATIENT)
Dept: FAMILY MEDICINE CLINIC | Facility: MEDICAL CENTER | Age: 85
End: 2020-05-29

## 2020-05-29 PROBLEM — E11.40 TYPE 2 DIABETES MELLITUS WITH DIABETIC NEUROPATHY, WITH LONG-TERM CURRENT USE OF INSULIN (HCC): Chronic | Status: ACTIVE | Noted: 2018-05-12

## 2020-06-04 LAB
LEFT EYE DIABETIC RETINOPATHY: NORMAL
RIGHT EYE DIABETIC RETINOPATHY: NORMAL

## 2020-07-01 DIAGNOSIS — L29.9 PRURITUS: ICD-10-CM

## 2020-07-03 DIAGNOSIS — L29.9 PRURITUS: ICD-10-CM

## 2020-07-06 ENCOUNTER — OFFICE VISIT (OUTPATIENT)
Dept: FAMILY MEDICINE CLINIC | Facility: MEDICAL CENTER | Age: 85
End: 2020-07-06
Payer: COMMERCIAL

## 2020-07-06 VITALS
SYSTOLIC BLOOD PRESSURE: 118 MMHG | TEMPERATURE: 98.3 F | RESPIRATION RATE: 16 BRPM | BODY MASS INDEX: 22.93 KG/M2 | WEIGHT: 173 LBS | HEIGHT: 73 IN | DIASTOLIC BLOOD PRESSURE: 68 MMHG | HEART RATE: 70 BPM

## 2020-07-06 DIAGNOSIS — M65.30 TRIGGER FINGER OF RIGHT HAND, UNSPECIFIED FINGER: Primary | ICD-10-CM

## 2020-07-06 PROCEDURE — 99213 OFFICE O/P EST LOW 20 MIN: CPT | Performed by: FAMILY MEDICINE

## 2020-07-06 PROCEDURE — 1160F RVW MEDS BY RX/DR IN RCRD: CPT | Performed by: FAMILY MEDICINE

## 2020-07-06 PROCEDURE — 3074F SYST BP LT 130 MM HG: CPT | Performed by: FAMILY MEDICINE

## 2020-07-06 PROCEDURE — 3008F BODY MASS INDEX DOCD: CPT | Performed by: FAMILY MEDICINE

## 2020-07-06 PROCEDURE — 2022F DILAT RTA XM EVC RTNOPTHY: CPT | Performed by: FAMILY MEDICINE

## 2020-07-06 PROCEDURE — 4040F PNEUMOC VAC/ADMIN/RCVD: CPT | Performed by: FAMILY MEDICINE

## 2020-07-06 PROCEDURE — 3078F DIAST BP <80 MM HG: CPT | Performed by: FAMILY MEDICINE

## 2020-07-06 PROCEDURE — 3066F NEPHROPATHY DOC TX: CPT | Performed by: FAMILY MEDICINE

## 2020-07-06 PROCEDURE — 1036F TOBACCO NON-USER: CPT | Performed by: FAMILY MEDICINE

## 2020-07-06 RX ORDER — HYDROXYZINE PAMOATE 25 MG/1
CAPSULE ORAL
Qty: 90 CAPSULE | Refills: 1 | Status: SHIPPED | OUTPATIENT
Start: 2020-07-06 | End: 2020-10-01 | Stop reason: SDUPTHER

## 2020-07-06 NOTE — PROGRESS NOTES
Shelby Miller is here for several complaints of his right hand  He said he was doing a lot of paint scraping recently  He woke up the other morning with pain in the right 5th finger which went across the top of his hand to his right wrist   He soaked with Epsom salts and felt better  He soaked it with Epsom salts and felt better  Has not recurred  He complains of a stuck feeling in his right 4th finger with occasional pain  He gets occasional numbness in both hands  It mostly occurs while driving  Review of the chart indicates this is been going on since 2013  Has not gotten any worse  Does not occur with any other activities  Does not wake him from sleep  O: /68 (BP Location: Left arm, Patient Position: Sitting, Cuff Size: Adult)   Pulse 70   Temp 98 3 °F (36 8 °C) (Tympanic)   Resp 16   Ht 6' 1" (1 854 m)   Wt 78 5 kg (173 lb)   BMI 22 82 kg/m²   Right hand has deformity of the right PIP with reduced flexion  Full range of motion at the wrist   No tenderness noted over the dorsum of the hand  Negative Tinel's and Phalen signs  Assessment  1  Trigger finger right hand  2  Tendinitis right hand and wrist due to overuse-now resolved  3  Mild carpal tunnel syndrome    Plan  Will refer Orthopedics for the trigger finger

## 2020-07-07 RX ORDER — HYDROXYZINE PAMOATE 25 MG/1
25 CAPSULE ORAL 3 TIMES DAILY PRN
Qty: 90 CAPSULE | Refills: 0 | OUTPATIENT
Start: 2020-07-07

## 2020-07-24 DIAGNOSIS — E78.01 FAMILIAL HYPERCHOLESTEROLEMIA: ICD-10-CM

## 2020-07-24 RX ORDER — SIMVASTATIN 10 MG
10 TABLET ORAL DAILY
Qty: 90 TABLET | Refills: 1 | Status: SHIPPED | OUTPATIENT
Start: 2020-07-24 | End: 2020-10-26 | Stop reason: SDUPTHER

## 2020-07-27 ENCOUNTER — TELEPHONE (OUTPATIENT)
Dept: PULMONOLOGY | Facility: CLINIC | Age: 85
End: 2020-07-27

## 2020-07-27 NOTE — TELEPHONE ENCOUNTER

## 2020-07-28 ENCOUNTER — OFFICE VISIT (OUTPATIENT)
Dept: PULMONOLOGY | Facility: CLINIC | Age: 85
End: 2020-07-28
Payer: COMMERCIAL

## 2020-07-28 VITALS
DIASTOLIC BLOOD PRESSURE: 62 MMHG | OXYGEN SATURATION: 96 % | HEART RATE: 62 BPM | WEIGHT: 171 LBS | BODY MASS INDEX: 22.66 KG/M2 | TEMPERATURE: 97.9 F | HEIGHT: 73 IN | SYSTOLIC BLOOD PRESSURE: 110 MMHG

## 2020-07-28 DIAGNOSIS — J41.8 MIXED SIMPLE AND MUCOPURULENT CHRONIC BRONCHITIS (HCC): Primary | ICD-10-CM

## 2020-07-28 DIAGNOSIS — R91.1 LUNG NODULE: ICD-10-CM

## 2020-07-28 PROCEDURE — 3066F NEPHROPATHY DOC TX: CPT | Performed by: PHYSICIAN ASSISTANT

## 2020-07-28 PROCEDURE — 3078F DIAST BP <80 MM HG: CPT | Performed by: PHYSICIAN ASSISTANT

## 2020-07-28 PROCEDURE — 1036F TOBACCO NON-USER: CPT | Performed by: PHYSICIAN ASSISTANT

## 2020-07-28 PROCEDURE — 4040F PNEUMOC VAC/ADMIN/RCVD: CPT | Performed by: PHYSICIAN ASSISTANT

## 2020-07-28 PROCEDURE — 99213 OFFICE O/P EST LOW 20 MIN: CPT | Performed by: PHYSICIAN ASSISTANT

## 2020-07-28 PROCEDURE — 2022F DILAT RTA XM EVC RTNOPTHY: CPT | Performed by: PHYSICIAN ASSISTANT

## 2020-07-28 PROCEDURE — 3008F BODY MASS INDEX DOCD: CPT | Performed by: PHYSICIAN ASSISTANT

## 2020-07-28 PROCEDURE — 3074F SYST BP LT 130 MM HG: CPT | Performed by: PHYSICIAN ASSISTANT

## 2020-07-28 PROCEDURE — 1160F RVW MEDS BY RX/DR IN RCRD: CPT | Performed by: PHYSICIAN ASSISTANT

## 2020-07-28 NOTE — PROGRESS NOTES
Assessment/Plan:   Diagnoses and all orders for this visit:    Mixed simple and mucopurulent chronic bronchitis ([facility] )    Lung nodule     Patient is here today for follow-up   He overall remains stable with his breathing   Continues with Flovent, Anoro , Singulair, Daliresp every other day due to cost    He has not noted any increased frequency of exacerbations or change in symptoms with using the Daliresp every other day  He does not use his rescue inhaler on a regular basis , I did tell him to try using it prior to any significant exertion to see if this helps with his shortness of breath  Last CT scan was done September 2019  Showed stable 3 mm nodule in the lingula   He will follow up with us in 6 months or sooner if necessary  Return in about 6 months (around 1/28/2021)  All questions are answered to the patient's satisfaction and understanding  He verbalizes understanding  He is encouraged to call with any further questions or concerns  Portions of the record may have been created with voice recognition software  Occasional wrong word or "sound a like" substitutions may have occurred due to the inherent limitations of voice recognition software  Read the chart carefully and recognize, using context, where substitutions have occurred  Electronically Signed by Fabiola Humphreys PA-C    ______________________________________________________________________    Chief Complaint:   Chief Complaint   Patient presents with    Follow-up    COPD       Patient ID: Gaybrent Levels is a 80 y o  y o  male has a past medical history of COPD (chronic obstructive pulmonary disease) ([facility] ), Diabetes mellitus ([facility] ), Essential hypertension, Heart failure ([facility] ), Hyperlipidemia, Left inguinal hernia, Lung nodule, and Malignant melanoma of skin ([facility] )  7/28/2020  Patient presents today for follow-up visit  Patient is an 79 yo male former smoker with PMH of COPD, HTN, HLD, MDS      He is here today for follow-up  He continues to overall do well with his breathing  He does have some mild dyspnea when he tries to exert himself, has not been using his rescue inhaler  He does continue with Anoro, Flovent, Singulair, taking Daliresp every other day due to the cost       Review of Systems   Constitutional: Negative  HENT: Negative  Respiratory: Positive for shortness of breath  Cardiovascular: Negative  Gastrointestinal: Negative  Genitourinary: Negative  Musculoskeletal: Negative  Skin: Negative  Allergic/Immunologic: Negative  Neurological: Negative  Psychiatric/Behavioral: Negative  Smoking history: He reports that he quit smoking about 60 years ago  He has a 10 00 pack-year smoking history   He has never used smokeless tobacco     The following portions of the patient's history were reviewed and updated as appropriate: allergies, current medications, past family history, past medical history, past social history, past surgical history and problem list     Immunization History   Administered Date(s) Administered    H1N1, All Formulations 01/26/2010    INFLUENZA 09/28/2018    Influenza Split High Dose Preservative Free IM 10/18/2013, 10/05/2015, 09/30/2016, 10/20/2017    Influenza TIV (IM) 10/13/2011, 10/01/2014    Pneumococcal Conjugate 13-Valent 11/22/2017    Pneumococcal Polysaccharide PPV23 01/01/1998, 10/19/2005    Zoster 10/01/2010    Zoster Vaccine Recombinant 01/03/2020     Current Outpatient Medications   Medication Sig Dispense Refill    acetaminophen (TYLENOL) 325 mg tablet Take 2 tablets (650 mg total) by mouth every 6 (six) hours as needed for mild pain 30 tablet 0    albuterol (2 5 mg/3 mL) 0 083 % nebulizer solution Take 1 vial (2 5 mg total) by nebulization every 6 (six) hours as needed for wheezing or shortness of breath 1080 mL 3    ANORO ELLIPTA 62 5-25 MCG/INH inhaler INHALE ONE PUFF BY MOUTH ONCE DAILY  60 each 3    Ascorbic Acid (VITAMIN C) 1000 MG tablet Take 1 tablet by mouth daily      BD PEN NEEDLE DON U/F 32G X 4 MM MISC       betamethasone, augmented, (DIPROLENE-AF) 0 05 % cream       Cinnamon 500 MG TABS Take 1 tablet by mouth daily        fluticasone (FLONASE) 50 mcg/act nasal spray USE TWO SPRAYS IN EACH NOSTRIL DAILY  16 g 4    fluticasone (Flovent HFA) 220 mcg/act inhaler Inhale 1 puff 2 (two) times a day Rinse mouth after use 12 g 4    gabapentin (NEURONTIN) 300 mg capsule Take 1 capsule (300 mg total) by mouth 2 (two) times a day 180 capsule 1    hydrOXYzine pamoate (VISTARIL) 25 mg capsule TAKE ONE CAPSULE BY MOUTH THREE TIMES DAILY AS NEEDED FOR ITCHING  90 capsule 1    insulin detemir (LEVEMIR) 100 units/mL subcutaneous injection Inject 18 Units under the skin daily at bedtime       metoprolol succinate (TOPROL-XL) 50 mg 24 hr tablet Take 2 tablets (100 mg total) by mouth daily 180 tablet 3    montelukast (SINGULAIR) 10 mg tablet Take 1 tablet (10 mg total) by mouth daily at bedtime 90 tablet 3    Multiple Vitamins-Minerals (OCUVITE ADULT 50+ PO) Take 1 tablet by mouth daily      NOVOLOG FLEXPEN 100 units/mL injection pen Inject 5 Units under the skin daily with breakfast       ONE TOUCH ULTRA TEST test strip       ONETOUCH DELICA LANCETS FINE MISC       repaglinide (PRANDIN) 2 mg tablet Take 2 mg by mouth daily before lunch       roflumilast (DALIRESP) 500 mcg tablet Take 1 tablet (500 mcg total) by mouth daily 30 tablet 5    sacubitril-valsartan (ENTRESTO) 24-26 MG TABS Take 1 tablet by mouth 2 (two) times a day 180 tablet 3    simvastatin (ZOCOR) 10 mg tablet Take 1 tablet (10 mg total) by mouth daily 90 tablet 1    spironolactone (ALDACTONE) 25 mg tablet Take 1 tablet (25 mg total) by mouth every other day 90 tablet 3    VENTOLIN  (90 Base) MCG/ACT inhaler Inhale 2 puffs every 6 (six) hours as needed for wheezing 1 Inhaler 2     No current facility-administered medications for this visit        Allergies: Patient has no known allergies  Objective:  Vitals:    07/28/20 0955   BP: 110/62   Pulse: 62   Temp: 97 9 °F (36 6 °C)   SpO2: 96%   Weight: 77 6 kg (171 lb)   Height: 6' 1" (1 854 m)   Oxygen Therapy  SpO2: 96 %    Wt Readings from Last 3 Encounters:   07/28/20 77 6 kg (171 lb)   07/06/20 78 5 kg (173 lb)   05/28/20 81 3 kg (179 lb 3 2 oz)     Body mass index is 22 56 kg/m²  Physical Exam   Constitutional: He is oriented to person, place, and time  He appears well-developed and well-nourished  No distress  HENT:   Mouth/Throat: Oropharynx is clear and moist    Eyes: Pupils are equal, round, and reactive to light  Neck: Normal range of motion  Cardiovascular: Normal rate and regular rhythm  Pulmonary/Chest: Effort normal and breath sounds normal  He has no decreased breath sounds  He has no wheezes  He has no rhonchi  He has no rales  Abdominal: Soft  There is no tenderness  Musculoskeletal: Normal range of motion  He exhibits no edema  Neurological: He is alert and oriented to person, place, and time  Skin: Skin is warm and dry  Psychiatric: He has a normal mood and affect  His behavior is normal  Judgment and thought content normal    Vitals reviewed  Lab Review:   Lab Results   Component Value Date     05/08/2017    K 4 8 05/26/2020     05/26/2020    CO2 25 05/26/2020    BUN 33 (H) 05/26/2020    CREATININE 1 31 (H) 05/26/2020    CREATININE 1 19 11/15/2018    CREATININE 1 01 05/08/2017    CALCIUM 9 3 05/26/2020     Lab Results   Component Value Date    WBC 5 9 05/15/2020    WBC 6 38 11/15/2018    WBC 5 5 05/08/2017    HGB 10 5 (L) 05/15/2020    HGB 13 7 11/15/2018    HGB 11 0 (L) 05/08/2017    HCT 32 4 (L) 05/15/2020    HCT 41 7 11/15/2018    HCT 34 9 (L) 05/08/2017    MCV 92 3 05/15/2020    MCV 93 11/15/2018    MCV 96 6 05/08/2017     05/15/2020     (L) 11/15/2018     05/08/2017       Diagnostics:  I have personally reviewed pertinent reports  Reviewed prior imaging  Office Spirometry Results:     ESS:    No results found

## 2020-07-30 DIAGNOSIS — J41.8 MIXED SIMPLE AND MUCOPURULENT CHRONIC BRONCHITIS (HCC): ICD-10-CM

## 2020-07-31 DIAGNOSIS — J41.8 MIXED SIMPLE AND MUCOPURULENT CHRONIC BRONCHITIS (HCC): ICD-10-CM

## 2020-07-31 RX ORDER — UMECLIDINIUM BROMIDE AND VILANTEROL TRIFENATATE 62.5; 25 UG/1; UG/1
POWDER RESPIRATORY (INHALATION)
Qty: 60 EACH | Refills: 0 | OUTPATIENT
Start: 2020-07-31

## 2020-08-20 ENCOUNTER — TELEPHONE (OUTPATIENT)
Dept: NEPHROLOGY | Facility: CLINIC | Age: 85
End: 2020-08-20

## 2020-08-20 DIAGNOSIS — E87.5 HYPERKALEMIA: Primary | ICD-10-CM

## 2020-08-20 LAB
25(OH)D3 SERPL-MCNC: 30 NG/ML (ref 30–100)
ALBUMIN SERPL-MCNC: 4.4 G/DL (ref 3.6–5.1)
ALBUMIN/GLOB SERPL: 2.4 (CALC) (ref 1–2.5)
ALP SERPL-CCNC: 37 U/L (ref 35–144)
ALT SERPL-CCNC: 14 U/L (ref 9–46)
APPEARANCE UR: CLEAR
AST SERPL-CCNC: 14 U/L (ref 10–35)
BACTERIA UR QL AUTO: ABNORMAL /HPF
BASOPHILS # BLD AUTO: 168 CELLS/UL (ref 0–200)
BASOPHILS NFR BLD AUTO: 3 %
BILIRUB SERPL-MCNC: 0.6 MG/DL (ref 0.2–1.2)
BILIRUB UR QL STRIP: NEGATIVE
BUN SERPL-MCNC: 28 MG/DL (ref 7–25)
BUN/CREAT SERPL: 21 (CALC) (ref 6–22)
CALCIUM SERPL-MCNC: 9.1 MG/DL (ref 8.6–10.3)
CALCIUM SERPL-MCNC: 9.1 MG/DL (ref 8.6–10.3)
CHLORIDE SERPL-SCNC: 106 MMOL/L (ref 98–110)
CO2 SERPL-SCNC: 28 MMOL/L (ref 20–32)
COLOR UR: YELLOW
CREAT SERPL-MCNC: 1.35 MG/DL (ref 0.7–1.11)
CREAT UR-MCNC: 97 MG/DL (ref 20–320)
EOSINOPHIL # BLD AUTO: 896 CELLS/UL (ref 15–500)
EOSINOPHIL NFR BLD AUTO: 16 %
ERYTHROCYTE [DISTWIDTH] IN BLOOD BY AUTOMATED COUNT: 22.5 % (ref 11–15)
GLOBULIN SER CALC-MCNC: 1.8 G/DL (CALC) (ref 1.9–3.7)
GLUCOSE SERPL-MCNC: 188 MG/DL (ref 65–99)
GLUCOSE UR QL STRIP: NEGATIVE
HCT VFR BLD AUTO: 33.6 % (ref 38.5–50)
HGB BLD-MCNC: 10.7 G/DL (ref 13.2–17.1)
HGB UR QL STRIP: NEGATIVE
HYALINE CASTS #/AREA URNS LPF: ABNORMAL /LPF
KETONES UR QL STRIP: NEGATIVE
LEUKOCYTE ESTERASE UR QL STRIP: ABNORMAL
LYMPHOCYTES # BLD AUTO: 1036 CELLS/UL (ref 850–3900)
LYMPHOCYTES NFR BLD AUTO: 18.5 %
MAGNESIUM SERPL-MCNC: 1.8 MG/DL (ref 1.5–2.5)
MCH RBC QN AUTO: 28.9 PG (ref 27–33)
MCHC RBC AUTO-ENTMCNC: 31.8 G/DL (ref 32–36)
MCV RBC AUTO: 90.8 FL (ref 80–100)
MONOCYTES # BLD AUTO: 470 CELLS/UL (ref 200–950)
MONOCYTES NFR BLD AUTO: 8.4 %
NEUTROPHILS # BLD AUTO: 3030 CELLS/UL (ref 1500–7800)
NEUTROPHILS NFR BLD AUTO: 54.1 %
NITRITE UR QL STRIP: NEGATIVE
PH UR STRIP: 5.5 [PH] (ref 5–8)
PHOSPHATE SERPL-MCNC: 3.3 MG/DL (ref 2.1–4.3)
PLATELET # BLD AUTO: 138 THOUSAND/UL (ref 140–400)
POTASSIUM SERPL-SCNC: 5.8 MMOL/L (ref 3.5–5.3)
PROT SERPL-MCNC: 6.2 G/DL (ref 6.1–8.1)
PROT UR QL STRIP: NEGATIVE
PROT UR-MCNC: 13 MG/DL (ref 5–25)
PROT/CREAT UR: 0.13 MG/MG CREAT (ref 0.02–0.13)
PROT/CREAT UR: 134 MG/G CREAT (ref 22–128)
PTH-INTACT SERPL-MCNC: 39 PG/ML (ref 14–64)
RBC # BLD AUTO: 3.7 MILLION/UL (ref 4.2–5.8)
RBC #/AREA URNS HPF: ABNORMAL /HPF
SERVICE CMNT-IMP: ABNORMAL
SL AMB EGFR AFRICAN AMERICAN: 54 ML/MIN/1.73M2
SL AMB EGFR NON AFRICAN AMERICAN: 47 ML/MIN/1.73M2
SODIUM SERPL-SCNC: 139 MMOL/L (ref 135–146)
SP GR UR STRIP: 1.02 (ref 1–1.03)
SQUAMOUS #/AREA URNS HPF: ABNORMAL /HPF
WBC # BLD AUTO: 5.6 THOUSAND/UL (ref 3.8–10.8)
WBC #/AREA URNS HPF: ABNORMAL /HPF

## 2020-08-20 NOTE — TELEPHONE ENCOUNTER
Pt advised to follow low K diet and repeat BMP in one week per  Dr Evelyne Rizvi  (K 5 8)    Potassium diet and lab order mailed to patient     ----- Message from Yaneth Blake MD sent at 8/19/2020  5:37 PM EDT -----  Please call patient and inform him that his kidney function is stable  However, his potassium is high (K 5 8)  This is likely due to his cardiac medications (Entresto and Spironolactone)  Please have him go on a low K diet and have him repeat a BMP in 1 week

## 2020-08-31 DIAGNOSIS — G60.9 NEUROPATHY, IDIOPATHIC: ICD-10-CM

## 2020-08-31 LAB
BUN SERPL-MCNC: 33 MG/DL (ref 7–25)
BUN/CREAT SERPL: 22 (CALC) (ref 6–22)
CALCIUM SERPL-MCNC: 9.3 MG/DL (ref 8.6–10.3)
CHLORIDE SERPL-SCNC: 106 MMOL/L (ref 98–110)
CO2 SERPL-SCNC: 26 MMOL/L (ref 20–32)
CREAT SERPL-MCNC: 1.53 MG/DL (ref 0.7–1.11)
GLUCOSE SERPL-MCNC: 161 MG/DL (ref 65–99)
POTASSIUM SERPL-SCNC: 5.4 MMOL/L (ref 3.5–5.3)
SL AMB EGFR AFRICAN AMERICAN: 47 ML/MIN/1.73M2
SL AMB EGFR NON AFRICAN AMERICAN: 40 ML/MIN/1.73M2
SODIUM SERPL-SCNC: 138 MMOL/L (ref 135–146)

## 2020-08-31 RX ORDER — FLUTICASONE PROPIONATE 50 MCG
SPRAY, SUSPENSION (ML) NASAL
Qty: 16 G | Refills: 0 | Status: SHIPPED | OUTPATIENT
Start: 2020-08-31 | End: 2020-10-01 | Stop reason: SDUPTHER

## 2020-09-10 ENCOUNTER — OFFICE VISIT (OUTPATIENT)
Dept: NEPHROLOGY | Facility: CLINIC | Age: 85
End: 2020-09-10
Payer: COMMERCIAL

## 2020-09-10 ENCOUNTER — TELEPHONE (OUTPATIENT)
Dept: NEPHROLOGY | Facility: CLINIC | Age: 85
End: 2020-09-10

## 2020-09-10 VITALS
HEIGHT: 73 IN | SYSTOLIC BLOOD PRESSURE: 118 MMHG | WEIGHT: 176.6 LBS | DIASTOLIC BLOOD PRESSURE: 60 MMHG | BODY MASS INDEX: 23.4 KG/M2 | TEMPERATURE: 98.5 F

## 2020-09-10 DIAGNOSIS — I12.9 BENIGN HYPERTENSION WITH CHRONIC KIDNEY DISEASE, STAGE III (HCC): ICD-10-CM

## 2020-09-10 DIAGNOSIS — N18.30 BENIGN HYPERTENSION WITH CHRONIC KIDNEY DISEASE, STAGE III (HCC): ICD-10-CM

## 2020-09-10 DIAGNOSIS — M89.9 CHRONIC KIDNEY DISEASE-MINERAL AND BONE DISORDER: ICD-10-CM

## 2020-09-10 DIAGNOSIS — E83.9 CHRONIC KIDNEY DISEASE-MINERAL AND BONE DISORDER: ICD-10-CM

## 2020-09-10 DIAGNOSIS — R80.8 OTHER PROTEINURIA: ICD-10-CM

## 2020-09-10 DIAGNOSIS — N18.9 CHRONIC KIDNEY DISEASE-MINERAL AND BONE DISORDER: ICD-10-CM

## 2020-09-10 DIAGNOSIS — E87.5 HYPERKALEMIA: ICD-10-CM

## 2020-09-10 DIAGNOSIS — N18.30 CKD (CHRONIC KIDNEY DISEASE), STAGE III (HCC): Primary | ICD-10-CM

## 2020-09-10 PROCEDURE — 1036F TOBACCO NON-USER: CPT | Performed by: INTERNAL MEDICINE

## 2020-09-10 PROCEDURE — 1160F RVW MEDS BY RX/DR IN RCRD: CPT | Performed by: INTERNAL MEDICINE

## 2020-09-10 PROCEDURE — 99214 OFFICE O/P EST MOD 30 MIN: CPT | Performed by: INTERNAL MEDICINE

## 2020-09-10 NOTE — PROGRESS NOTES
NEPHROLOGY OFFICE PROGRESS NOTE   Albaro Ramirez 80 y o  male MRN: [id number]  DATE: 09/10/20  Reason for visit: Continued evaluation and management of CKD    ASSESSMENT & PLAN:  1  Chronic kidney disease, stage III:  · Mr Jaya Thomas baseline serum creatinine is around 1 3 to 1 5 now  · Etiology of CKD is felt to be multifactorial from hypertensive nephrosclerosis and possible element of diabetic nephropathy complicated by hemodynamic effect of cardiac meds  · Renal function is stable overall  2  Hypertension:  · BP is at goal with Metoprolol, Entresto and Spironolactone  · No changes  3  Congestive heart failure:  · Currently compensated  · Follow up with Dr Nicki Ivan  4  Hyperkalemia:  · K is down from 5 8 to 5 4    · I asked him to focus on a low K diet for now  · Will keep same dose of Spironolactone and Entresto and recheck BMP in 2 months  · If K remains high, then may need to decrease Spironolactone further or stop altogether  5  Proteinuria:  · Controlled with UPC of 0 134      6  Mineral and bone disease  · PTH is at goal - 44 in Aug 2020  · Ca and phos are at goal    · Vitamin D is at goal - 30 in August 2020  Continue Vitamin D 1000 units  Patient Instructions   Stay on a low potassium diet  You may check out the "[employer]" or "[employer]" website for more information on a low potassium diet  No change to current medications  Check BMP in 2 months  Follow up in 3 months  SUBJECTIVE / INTERVAL HISTORY:  Will Varner was last seen via telemed in May 2020  No recent hospital stays or ER visits  No new complaints  He has been doing well overall  His labs came back with hyperkalemia in early August 2020 and we called him to go on a low K diet  His repeat labs show that his K was down to 5 4 from 5 8       PMH/PSH: HTN, DM, HLP, CKD, hyperkalemia, CHF, COPD, back pain, gallstone pancreatitis s/p cholecystectomy, melanoma s/p excision, lung nodules, anemia, BPH s/p urolift  Previous work up:   5/15/20 Renal US: R 10 1 cm, L 10 2 cm, no significant PVR  ALLERGIES: No Known Allergies    REVIEW OF SYSTEMS:  Review of Systems   Constitutional: Negative for appetite change, chills, fatigue and fever  Respiratory: Negative for cough and shortness of breath  Cardiovascular: Negative for chest pain and leg swelling  Gastrointestinal: Negative for abdominal pain, diarrhea, nausea and vomiting  Genitourinary: Negative for dysuria and hematuria  Musculoskeletal: Negative for arthralgias  Neurological: Positive for light-headedness  Negative for dizziness  OBJECTIVE:  /60   Temp 98 5 °F (36 9 °C)   Ht 6' 1" (1 854 m)   Wt 80 1 kg (176 lb 9 6 oz)   BMI 23 30 kg/m²   Current Weight:   Body mass index is 22 56 kg/m²  Physical Exam  Constitutional:       General: He is not in acute distress  Appearance: Normal appearance  He is well-developed and normal weight  He is not ill-appearing  HENT:      Head: Normocephalic and atraumatic  Nose: Nose normal    Eyes:      General: No scleral icterus  Conjunctiva/sclera: Conjunctivae normal    Neck:      Musculoskeletal: Neck supple  Vascular: No JVD  Cardiovascular:      Rate and Rhythm: Normal rate and regular rhythm  Heart sounds: Normal heart sounds  Pulmonary:      Effort: Pulmonary effort is normal  No respiratory distress  Breath sounds: Normal breath sounds  Abdominal:      General: Bowel sounds are normal       Palpations: Abdomen is soft  Musculoskeletal:      Right lower leg: No edema  Left lower leg: No edema  Skin:     General: Skin is warm and dry  Neurological:      Mental Status: He is alert and oriented to person, place, and time     Psychiatric:         Behavior: Behavior normal          Judgment: Judgment normal      Medications:  Current Outpatient Medications:     acetaminophen (TYLENOL) 325 mg tablet, Take 2 tablets (650 mg total) by mouth every 6 (six) hours as needed for mild pain, Disp: 30 tablet, Rfl: 0    albuterol (2 5 mg/3 mL) 0 083 % nebulizer solution, Take 1 vial (2 5 mg total) by nebulization every 6 (six) hours as needed for wheezing or shortness of breath, Disp: 1080 mL, Rfl: 3    Ascorbic Acid (VITAMIN C) 1000 MG tablet, Take 1 tablet by mouth daily, Disp: , Rfl:     BD PEN NEEDLE DON U/F 32G X 4 MM MISC, , Disp: , Rfl:     betamethasone, augmented, (DIPROLENE-AF) 0 05 % cream, , Disp: , Rfl:     Cinnamon 500 MG TABS, Take 1 tablet by mouth daily  , Disp: , Rfl:     fluticasone (FLONASE) 50 mcg/act nasal spray, USE TWO SPRAYS IN EACH NOSTRIL DAILY , Disp: 16 g, Rfl: 0    fluticasone (Flovent HFA) 220 mcg/act inhaler, Inhale 1 puff 2 (two) times a day Rinse mouth after use, Disp: 12 g, Rfl: 4    gabapentin (NEURONTIN) 300 mg capsule, Take 1 capsule (300 mg total) by mouth 2 (two) times a day, Disp: 180 capsule, Rfl: 1    hydrOXYzine pamoate (VISTARIL) 25 mg capsule, TAKE ONE CAPSULE BY MOUTH THREE TIMES DAILY AS NEEDED FOR ITCHING , Disp: 90 capsule, Rfl: 1    insulin detemir (LEVEMIR) 100 units/mL subcutaneous injection, Inject 18 Units under the skin daily at bedtime , Disp: , Rfl:     metoprolol succinate (TOPROL-XL) 50 mg 24 hr tablet, Take 2 tablets (100 mg total) by mouth daily, Disp: 180 tablet, Rfl: 3    montelukast (SINGULAIR) 10 mg tablet, Take 1 tablet (10 mg total) by mouth daily at bedtime, Disp: 90 tablet, Rfl: 3    Multiple Vitamins-Minerals (OCUVITE ADULT 50+ PO), Take 1 tablet by mouth daily, Disp: , Rfl:     NOVOLOG FLEXPEN 100 units/mL injection pen, Inject 5 Units under the skin daily with breakfast , Disp: , Rfl:     ONE TOUCH ULTRA TEST test strip, , Disp: , Rfl:     ONETOUCH DELICA LANCETS FINE MISC, , Disp: , Rfl:     repaglinide (PRANDIN) 2 mg tablet, Take 2 mg by mouth daily before lunch , Disp: , Rfl:     roflumilast (DALIRESP) 500 mcg tablet, Take 1 tablet (500 mcg total) by mouth daily, Disp: 30 tablet, Rfl: 5    sacubitril-valsartan (ENTRESTO) 24-26 MG TABS, Take 1 tablet by mouth 2 (two) times a day, Disp: 180 tablet, Rfl: 3    simvastatin (ZOCOR) 10 mg tablet, Take 1 tablet (10 mg total) by mouth daily, Disp: 90 tablet, Rfl: 1    spironolactone (ALDACTONE) 25 mg tablet, Take 1 tablet (25 mg total) by mouth every other day, Disp: 90 tablet, Rfl: 3    umeclidinium-vilanterol (Anoro Ellipta) 62 5-25 MCG/INH inhaler, Inhale 1 puff daily, Disp: 60 each, Rfl: 5    VENTOLIN  (90 Base) MCG/ACT inhaler, Inhale 2 puffs every 6 (six) hours as needed for wheezing, Disp: 1 Inhaler, Rfl: 2    Laboratory Results:  Results for orders placed or performed in visit on 05/88/94   Basic metabolic panel   Result Value Ref Range    Glucose, Random 161 (H) 65 - 99 mg/dL    BUN 33 (H) 7 - 25 mg/dL    Creatinine 1 53 (H) 0 70 - 1 11 mg/dL    eGFR Non  40 (L) > OR = 60 mL/min/1 73m2    eGFR  47 (L) > OR = 60 mL/min/1 73m2    SL AMB BUN/CREATININE RATIO 22 6 - 22 (calc)    Sodium 138 135 - 146 mmol/L    Potassium 5 4 (H) 3 5 - 5 3 mmol/L    Chloride 106 98 - 110 mmol/L    CO2 26 20 - 32 mmol/L    Calcium 9 3 8 6 - 10 3 mg/dL

## 2020-09-10 NOTE — PATIENT INSTRUCTIONS
Stay on a low potassium diet  You may check out the "[employer]" or "[employer]" website for more information on a low potassium diet  No change to current medications  Check BMP in 2 months  Follow up in 3 months

## 2020-09-10 NOTE — LETTER
September 10, 2020     Luis A Herrera MD  [facility] [address]    Patient: Todd Mitchell   YOB: 1933   Date of Visit: 9/10/2020       Dear Dr Saima Tapia: Thank you for referring Fabiana eLwis to me for evaluation  Below are my notes for this consultation  If you have questions, please do not hesitate to call me  I look forward to following your patient along with you  Sincerely,        Deangelo Carrera MD        CC: No Recipients  Deangelo Carrera MD  9/10/2020  3:10 PM  Sign when Signing Visit  [facility] OFFICE PROGRESS NOTE   Todd Mitchell 80 y o  male MRN: [id number]  DATE: 09/10/20  Reason for visit: Continued evaluation and management of CKD    ASSESSMENT & PLAN:  1  Chronic kidney disease, stage III:  · Mr Raul Nieves baseline serum creatinine is around 1 3 to 1 5 now  · Etiology of CKD is felt to be multifactorial from hypertensive nephrosclerosis and possible element of diabetic nephropathy complicated by hemodynamic effect of cardiac meds  · Renal function is stable overall  2  Hypertension:  · BP is at goal with Metoprolol, Entresto and Spironolactone  · No changes  3  Congestive heart failure:  · Currently compensated  · Follow up with Dr Tamra Lo  4  Hyperkalemia:  · K is down from 5 8 to 5 4    · I asked him to focus on a low K diet for now  · Will keep same dose of Spironolactone and Entresto and recheck BMP in 2 months  · If K remains high, then may need to decrease Spironolactone further or stop altogether  5  Proteinuria:  · Controlled with UPC of 0 134      6  Mineral and bone disease  · PTH is at goal - 44 in Aug 2020  · Ca and phos are at goal    · Vitamin D is at goal - 30 in August 2020  Continue Vitamin D 1000 units  Patient Instructions   Stay on a low potassium diet  You may check out the "[employer]" or "[employer]" website for more information on a low potassium diet     No change to current medications  Check BMP in 2 months  Follow up in 3 months  SUBJECTIVE / INTERVAL HISTORY:  Mo Fraser was last seen via telemed in May 2020  No recent hospital stays or ER visits  No new complaints  He has been doing well overall  His labs came back with hyperkalemia in early August 2020 and we called him to go on a low K diet  His repeat labs show that his K was down to 5 4 from 5 8  PMH/PSH: HTN, DM, HLP, CKD, hyperkalemia, CHF, COPD, back pain, gallstone pancreatitis s/p cholecystectomy, melanoma s/p excision, lung nodules, anemia, BPH s/p urolift  Previous work up:   5/15/20 Renal US: R 10 1 cm, L 10 2 cm, no significant PVR  ALLERGIES: No Known Allergies    REVIEW OF SYSTEMS:  Review of Systems   Constitutional: Negative for appetite change, chills, fatigue and fever  Respiratory: Negative for cough and shortness of breath  Cardiovascular: Negative for chest pain and leg swelling  Gastrointestinal: Negative for abdominal pain, diarrhea, nausea and vomiting  Genitourinary: Negative for dysuria and hematuria  Musculoskeletal: Negative for arthralgias  Neurological: Positive for light-headedness  Negative for dizziness  OBJECTIVE:  /60   Temp 98 5 °F (36 9 °C)   Ht 6' 1" (1 854 m)   Wt 80 1 kg (176 lb 9 6 oz)   BMI 23 30 kg/m²   Current Weight:   Body mass index is 22 56 kg/m²  Physical Exam  Constitutional:       General: He is not in acute distress  Appearance: Normal appearance  He is well-developed and normal weight  He is not ill-appearing  HENT:      Head: Normocephalic and atraumatic  Nose: Nose normal    Eyes:      General: No scleral icterus  Conjunctiva/sclera: Conjunctivae normal    Neck:      Musculoskeletal: Neck supple  Vascular: No JVD  Cardiovascular:      Rate and Rhythm: Normal rate and regular rhythm  Heart sounds: Normal heart sounds  Pulmonary:      Effort: Pulmonary effort is normal  No respiratory distress  Breath sounds: Normal breath sounds  Abdominal:      General: Bowel sounds are normal       Palpations: Abdomen is soft  Musculoskeletal:      Right lower leg: No edema  Left lower leg: No edema  Skin:     General: Skin is warm and dry  Neurological:      Mental Status: He is alert and oriented to person, place, and time     Psychiatric:         Behavior: Behavior normal          Judgment: Judgment normal      Medications:  Current Outpatient Medications:     acetaminophen (TYLENOL) 325 mg tablet, Take 2 tablets (650 mg total) by mouth every 6 (six) hours as needed for mild pain, Disp: 30 tablet, Rfl: 0    albuterol (2 5 mg/3 mL) 0 083 % nebulizer solution, Take 1 vial (2 5 mg total) by nebulization every 6 (six) hours as needed for wheezing or shortness of breath, Disp: 1080 mL, Rfl: 3    Ascorbic Acid (VITAMIN C) 1000 MG tablet, Take 1 tablet by mouth daily, Disp: , Rfl:     BD PEN NEEDLE DON U/F 32G X 4 MM MISC, , Disp: , Rfl:     betamethasone, augmented, (DIPROLENE-AF) 0 05 % cream, , Disp: , Rfl:     Cinnamon 500 MG TABS, Take 1 tablet by mouth daily  , Disp: , Rfl:     fluticasone (FLONASE) 50 mcg/act nasal spray, USE TWO SPRAYS IN EACH NOSTRIL DAILY , Disp: 16 g, Rfl: 0    fluticasone (Flovent HFA) 220 mcg/act inhaler, Inhale 1 puff 2 (two) times a day Rinse mouth after use, Disp: 12 g, Rfl: 4    gabapentin (NEURONTIN) 300 mg capsule, Take 1 capsule (300 mg total) by mouth 2 (two) times a day, Disp: 180 capsule, Rfl: 1    hydrOXYzine pamoate (VISTARIL) 25 mg capsule, TAKE ONE CAPSULE BY MOUTH THREE TIMES DAILY AS NEEDED FOR ITCHING , Disp: 90 capsule, Rfl: 1    insulin detemir (LEVEMIR) 100 units/mL subcutaneous injection, Inject 18 Units under the skin daily at bedtime , Disp: , Rfl:     metoprolol succinate (TOPROL-XL) 50 mg 24 hr tablet, Take 2 tablets (100 mg total) by mouth daily, Disp: 180 tablet, Rfl: 3    montelukast (SINGULAIR) 10 mg tablet, Take 1 tablet (10 mg total) by mouth daily at bedtime, Disp: 90 tablet, Rfl: 3    Multiple Vitamins-Minerals (OCUVITE ADULT 50+ PO), Take 1 tablet by mouth daily, Disp: , Rfl:     NOVOLOG FLEXPEN 100 units/mL injection pen, Inject 5 Units under the skin daily with breakfast , Disp: , Rfl:     ONE TOUCH ULTRA TEST test strip, , Disp: , Rfl:     ONETOUCH DELICA LANCETS FINE MISC, , Disp: , Rfl:     repaglinide (PRANDIN) 2 mg tablet, Take 2 mg by mouth daily before lunch , Disp: , Rfl:     roflumilast (DALIRESP) 500 mcg tablet, Take 1 tablet (500 mcg total) by mouth daily, Disp: 30 tablet, Rfl: 5    sacubitril-valsartan (ENTRESTO) 24-26 MG TABS, Take 1 tablet by mouth 2 (two) times a day, Disp: 180 tablet, Rfl: 3    simvastatin (ZOCOR) 10 mg tablet, Take 1 tablet (10 mg total) by mouth daily, Disp: 90 tablet, Rfl: 1    spironolactone (ALDACTONE) 25 mg tablet, Take 1 tablet (25 mg total) by mouth every other day, Disp: 90 tablet, Rfl: 3    umeclidinium-vilanterol (Anoro Ellipta) 62 5-25 MCG/INH inhaler, Inhale 1 puff daily, Disp: 60 each, Rfl: 5    VENTOLIN  (90 Base) MCG/ACT inhaler, Inhale 2 puffs every 6 (six) hours as needed for wheezing, Disp: 1 Inhaler, Rfl: 2    Laboratory Results:  Results for orders placed or performed in visit on 99/86/40   Basic metabolic panel   Result Value Ref Range    Glucose, Random 161 (H) 65 - 99 mg/dL    BUN 33 (H) 7 - 25 mg/dL    Creatinine 1 53 (H) 0 70 - 1 11 mg/dL    eGFR Non  40 (L) > OR = 60 mL/min/1 73m2    eGFR  47 (L) > OR = 60 mL/min/1 73m2    SL AMB BUN/CREATININE RATIO 22 6 - 22 (calc)    Sodium 138 135 - 146 mmol/L    Potassium 5 4 (H) 3 5 - 5 3 mmol/L    Chloride 106 98 - 110 mmol/L    CO2 26 20 - 32 mmol/L    Calcium 9 3 8 6 - 10 3 mg/dL

## 2020-09-17 DIAGNOSIS — G62.9 NEUROPATHY: ICD-10-CM

## 2020-09-17 RX ORDER — GABAPENTIN 300 MG/1
300 CAPSULE ORAL 2 TIMES DAILY
Qty: 180 CAPSULE | Refills: 1 | Status: SHIPPED | OUTPATIENT
Start: 2020-09-17 | End: 2021-03-14

## 2020-09-17 NOTE — TELEPHONE ENCOUNTER
Pt called to request Rx for gabapentin  Please send to [facility] in [place]  He has 4 or 5 pills left and is aware that Dr Birdie Starr is out of town for two weeks

## 2020-10-01 DIAGNOSIS — G60.9 NEUROPATHY, IDIOPATHIC: ICD-10-CM

## 2020-10-01 DIAGNOSIS — L29.9 PRURITUS: ICD-10-CM

## 2020-10-04 RX ORDER — FLUTICASONE PROPIONATE 50 MCG
2 SPRAY, SUSPENSION (ML) NASAL DAILY
Qty: 16 G | Refills: 0 | Status: SHIPPED | OUTPATIENT
Start: 2020-10-04 | End: 2020-11-09

## 2020-10-04 RX ORDER — HYDROXYZINE PAMOATE 25 MG/1
25 CAPSULE ORAL 3 TIMES DAILY PRN
Qty: 90 CAPSULE | Refills: 1 | Status: SHIPPED | OUTPATIENT
Start: 2020-10-04 | End: 2020-12-22

## 2020-10-26 DIAGNOSIS — E78.01 FAMILIAL HYPERCHOLESTEROLEMIA: ICD-10-CM

## 2020-10-26 DIAGNOSIS — J44.1 COPD WITH ACUTE EXACERBATION (HCC): ICD-10-CM

## 2020-10-26 RX ORDER — SIMVASTATIN 10 MG
10 TABLET ORAL DAILY
Qty: 90 TABLET | Refills: 1 | Status: SHIPPED | OUTPATIENT
Start: 2020-10-26 | End: 2021-07-17

## 2020-11-01 DIAGNOSIS — R05.9 COUGH: ICD-10-CM

## 2020-11-02 RX ORDER — MONTELUKAST SODIUM 10 MG/1
TABLET ORAL
Qty: 90 TABLET | Refills: 0 | Status: SHIPPED | OUTPATIENT
Start: 2020-11-02 | End: 2021-02-02

## 2020-11-06 ENCOUNTER — TELEPHONE (OUTPATIENT)
Dept: NEPHROLOGY | Facility: CLINIC | Age: 85
End: 2020-11-06

## 2020-11-06 LAB
BUN SERPL-MCNC: 31 MG/DL (ref 7–25)
BUN/CREAT SERPL: 22 (CALC) (ref 6–22)
CALCIUM SERPL-MCNC: 9.1 MG/DL (ref 8.6–10.3)
CHLORIDE SERPL-SCNC: 106 MMOL/L (ref 98–110)
CO2 SERPL-SCNC: 29 MMOL/L (ref 20–32)
CREAT SERPL-MCNC: 1.4 MG/DL (ref 0.7–1.11)
GLUCOSE SERPL-MCNC: 134 MG/DL (ref 65–99)
POTASSIUM SERPL-SCNC: 5.5 MMOL/L (ref 3.5–5.3)
SL AMB EGFR AFRICAN AMERICAN: 52 ML/MIN/1.73M2
SL AMB EGFR NON AFRICAN AMERICAN: 45 ML/MIN/1.73M2
SODIUM SERPL-SCNC: 140 MMOL/L (ref 135–146)

## 2020-11-09 DIAGNOSIS — G60.9 NEUROPATHY, IDIOPATHIC: ICD-10-CM

## 2020-11-09 RX ORDER — FLUTICASONE PROPIONATE 50 MCG
SPRAY, SUSPENSION (ML) NASAL
Qty: 16 G | Refills: 0 | Status: SHIPPED | OUTPATIENT
Start: 2020-11-09 | End: 2020-12-17

## 2020-11-16 ENCOUNTER — OFFICE VISIT (OUTPATIENT)
Dept: CARDIOLOGY CLINIC | Facility: MEDICAL CENTER | Age: 85
End: 2020-11-16
Payer: COMMERCIAL

## 2020-11-16 VITALS
BODY MASS INDEX: 23.87 KG/M2 | SYSTOLIC BLOOD PRESSURE: 124 MMHG | DIASTOLIC BLOOD PRESSURE: 70 MMHG | HEIGHT: 73 IN | OXYGEN SATURATION: 99 % | WEIGHT: 180.1 LBS | HEART RATE: 72 BPM

## 2020-11-16 DIAGNOSIS — I42.0 DILATED CARDIOMYOPATHY (HCC): ICD-10-CM

## 2020-11-16 DIAGNOSIS — I12.9 BENIGN HYPERTENSION WITH CHRONIC KIDNEY DISEASE, STAGE III (HCC): Primary | ICD-10-CM

## 2020-11-16 DIAGNOSIS — I50.42 CHRONIC COMBINED SYSTOLIC AND DIASTOLIC CONGESTIVE HEART FAILURE (HCC): ICD-10-CM

## 2020-11-16 DIAGNOSIS — E78.49 OTHER HYPERLIPIDEMIA: ICD-10-CM

## 2020-11-16 DIAGNOSIS — N18.30 BENIGN HYPERTENSION WITH CHRONIC KIDNEY DISEASE, STAGE III (HCC): Primary | ICD-10-CM

## 2020-11-16 PROCEDURE — 99214 OFFICE O/P EST MOD 30 MIN: CPT | Performed by: INTERNAL MEDICINE

## 2020-11-16 PROCEDURE — 93000 ELECTROCARDIOGRAM COMPLETE: CPT | Performed by: INTERNAL MEDICINE

## 2020-11-16 PROCEDURE — 1160F RVW MEDS BY RX/DR IN RCRD: CPT | Performed by: INTERNAL MEDICINE

## 2020-11-16 PROCEDURE — 1036F TOBACCO NON-USER: CPT | Performed by: INTERNAL MEDICINE

## 2020-11-16 PROCEDURE — 4040F PNEUMOC VAC/ADMIN/RCVD: CPT | Performed by: INTERNAL MEDICINE

## 2020-11-30 ENCOUNTER — OFFICE VISIT (OUTPATIENT)
Dept: FAMILY MEDICINE CLINIC | Facility: MEDICAL CENTER | Age: 85
End: 2020-11-30
Payer: COMMERCIAL

## 2020-11-30 VITALS
SYSTOLIC BLOOD PRESSURE: 110 MMHG | HEART RATE: 76 BPM | BODY MASS INDEX: 24.04 KG/M2 | TEMPERATURE: 97.9 F | HEIGHT: 73 IN | OXYGEN SATURATION: 94 % | DIASTOLIC BLOOD PRESSURE: 74 MMHG | WEIGHT: 181.4 LBS

## 2020-11-30 DIAGNOSIS — E11.40 TYPE 2 DIABETES MELLITUS WITH DIABETIC NEUROPATHY, WITH LONG-TERM CURRENT USE OF INSULIN (HCC): ICD-10-CM

## 2020-11-30 DIAGNOSIS — E78.49 OTHER HYPERLIPIDEMIA: ICD-10-CM

## 2020-11-30 DIAGNOSIS — Z00.00 MEDICARE ANNUAL WELLNESS VISIT, SUBSEQUENT: ICD-10-CM

## 2020-11-30 DIAGNOSIS — N18.30 BENIGN HYPERTENSION WITH CHRONIC KIDNEY DISEASE, STAGE III (HCC): Primary | ICD-10-CM

## 2020-11-30 DIAGNOSIS — Z23 IMMUNIZATION DUE: ICD-10-CM

## 2020-11-30 DIAGNOSIS — Z79.4 TYPE 2 DIABETES MELLITUS WITH DIABETIC NEUROPATHY, WITH LONG-TERM CURRENT USE OF INSULIN (HCC): ICD-10-CM

## 2020-11-30 DIAGNOSIS — I12.9 BENIGN HYPERTENSION WITH CHRONIC KIDNEY DISEASE, STAGE III (HCC): Primary | ICD-10-CM

## 2020-11-30 PROCEDURE — 90732 PPSV23 VACC 2 YRS+ SUBQ/IM: CPT | Performed by: FAMILY MEDICINE

## 2020-11-30 PROCEDURE — G0009 ADMIN PNEUMOCOCCAL VACCINE: HCPCS | Performed by: FAMILY MEDICINE

## 2020-11-30 PROCEDURE — G0439 PPPS, SUBSEQ VISIT: HCPCS | Performed by: FAMILY MEDICINE

## 2020-11-30 PROCEDURE — 99214 OFFICE O/P EST MOD 30 MIN: CPT | Performed by: FAMILY MEDICINE

## 2020-12-01 ENCOUNTER — TELEPHONE (OUTPATIENT)
Dept: FAMILY MEDICINE CLINIC | Facility: MEDICAL CENTER | Age: 85
End: 2020-12-01

## 2020-12-01 DIAGNOSIS — Z12.11 SCREENING FOR COLON CANCER: Primary | ICD-10-CM

## 2020-12-08 LAB
CHOLEST SERPL-MCNC: 112 MG/DL
CHOLEST/HDLC SERPL: 2.9 (CALC)
HDLC SERPL-MCNC: 39 MG/DL
LDLC SERPL CALC-MCNC: 60 MG/DL (CALC)
NONHDLC SERPL-MCNC: 73 MG/DL (CALC)
TRIGL SERPL-MCNC: 52 MG/DL
TSH SERPL-ACNC: 2.84 MIU/L (ref 0.4–4.5)

## 2020-12-09 ENCOUNTER — LAB (OUTPATIENT)
Dept: LAB | Facility: MEDICAL CENTER | Age: 85
End: 2020-12-09
Payer: COMMERCIAL

## 2020-12-09 DIAGNOSIS — Z12.11 SCREENING FOR COLON CANCER: ICD-10-CM

## 2020-12-09 LAB — HEMOCCULT STL QL IA: NEGATIVE

## 2020-12-09 PROCEDURE — G0328 FECAL BLOOD SCRN IMMUNOASSAY: HCPCS

## 2020-12-10 ENCOUNTER — TELEPHONE (OUTPATIENT)
Dept: FAMILY MEDICINE CLINIC | Facility: MEDICAL CENTER | Age: 85
End: 2020-12-10

## 2020-12-15 DIAGNOSIS — R06.02 SOB (SHORTNESS OF BREATH): ICD-10-CM

## 2020-12-16 DIAGNOSIS — G60.9 NEUROPATHY, IDIOPATHIC: ICD-10-CM

## 2020-12-16 RX ORDER — ALBUTEROL SULFATE 2.5 MG/3ML
SOLUTION RESPIRATORY (INHALATION)
Qty: 1080 ML | Refills: 3 | Status: SHIPPED | OUTPATIENT
Start: 2020-12-16 | End: 2021-07-01 | Stop reason: SDUPTHER

## 2020-12-17 RX ORDER — FLUTICASONE PROPIONATE 50 MCG
SPRAY, SUSPENSION (ML) NASAL
Qty: 16 G | Refills: 0 | Status: SHIPPED | OUTPATIENT
Start: 2020-12-17 | End: 2021-01-16

## 2020-12-21 DIAGNOSIS — L29.9 PRURITUS: ICD-10-CM

## 2020-12-22 RX ORDER — HYDROXYZINE PAMOATE 25 MG/1
CAPSULE ORAL
Qty: 90 CAPSULE | Refills: 0 | Status: SHIPPED | OUTPATIENT
Start: 2020-12-22 | End: 2021-02-16

## 2021-01-12 LAB
ALBUMIN SERPL-MCNC: 4.5 G/DL (ref 3.6–5.1)
BUN SERPL-MCNC: 49 MG/DL (ref 7–25)
BUN/CREAT SERPL: 34 (CALC) (ref 6–22)
CALCIUM SERPL-MCNC: 9 MG/DL (ref 8.6–10.3)
CHLORIDE SERPL-SCNC: 110 MMOL/L (ref 98–110)
CO2 SERPL-SCNC: 25 MMOL/L (ref 20–32)
CREAT SERPL-MCNC: 1.46 MG/DL (ref 0.7–1.11)
CREAT UR-MCNC: 95 MG/DL (ref 20–320)
GLUCOSE SERPL-MCNC: 145 MG/DL (ref 65–99)
PHOSPHATE SERPL-MCNC: 4 MG/DL (ref 2.1–4.3)
POTASSIUM SERPL-SCNC: 4.9 MMOL/L (ref 3.5–5.3)
PROT UR-MCNC: 14 MG/DL (ref 5–25)
PROT/CREAT UR: 0.15 MG/MG CREAT (ref 0.02–0.13)
PROT/CREAT UR: 147 MG/G CREAT (ref 22–128)
SL AMB EGFR AFRICAN AMERICAN: 49 ML/MIN/1.73M2
SL AMB EGFR NON AFRICAN AMERICAN: 43 ML/MIN/1.73M2
SODIUM SERPL-SCNC: 140 MMOL/L (ref 135–146)

## 2021-01-15 DIAGNOSIS — G60.9 NEUROPATHY, IDIOPATHIC: ICD-10-CM

## 2021-01-16 RX ORDER — FLUTICASONE PROPIONATE 50 MCG
SPRAY, SUSPENSION (ML) NASAL
Qty: 16 G | Refills: 0 | Status: SHIPPED | OUTPATIENT
Start: 2021-01-16 | End: 2021-02-21

## 2021-01-25 ENCOUNTER — OFFICE VISIT (OUTPATIENT)
Dept: NEPHROLOGY | Facility: CLINIC | Age: 86
End: 2021-01-25
Payer: COMMERCIAL

## 2021-01-25 VITALS
WEIGHT: 180 LBS | BODY MASS INDEX: 23.86 KG/M2 | HEIGHT: 73 IN | DIASTOLIC BLOOD PRESSURE: 74 MMHG | SYSTOLIC BLOOD PRESSURE: 126 MMHG

## 2021-01-25 DIAGNOSIS — I12.9 BENIGN HYPERTENSION WITH CHRONIC KIDNEY DISEASE, STAGE III (HCC): ICD-10-CM

## 2021-01-25 DIAGNOSIS — E83.9 CHRONIC KIDNEY DISEASE-MINERAL AND BONE DISORDER: ICD-10-CM

## 2021-01-25 DIAGNOSIS — N18.9 CHRONIC KIDNEY DISEASE-MINERAL AND BONE DISORDER: ICD-10-CM

## 2021-01-25 DIAGNOSIS — M89.9 CHRONIC KIDNEY DISEASE-MINERAL AND BONE DISORDER: ICD-10-CM

## 2021-01-25 DIAGNOSIS — N18.30 BENIGN HYPERTENSION WITH CHRONIC KIDNEY DISEASE, STAGE III (HCC): ICD-10-CM

## 2021-01-25 DIAGNOSIS — N18.32 STAGE 3B CHRONIC KIDNEY DISEASE (HCC): Primary | ICD-10-CM

## 2021-01-25 DIAGNOSIS — R80.8 OTHER PROTEINURIA: ICD-10-CM

## 2021-01-25 DIAGNOSIS — E87.5 HYPERKALEMIA: ICD-10-CM

## 2021-01-25 PROCEDURE — 99214 OFFICE O/P EST MOD 30 MIN: CPT | Performed by: INTERNAL MEDICINE

## 2021-01-25 NOTE — LETTER
January 25, 2021     Kevin Sicard, MD  [facility] [address]    Patient: Sangeetha Peck   YOB: 1933   Date of Visit: 1/25/2021       Dear Dr Nigel Moura: Thank you for referring Carmelinaswati Nicole to me for evaluation  Below are my notes for this consultation  If you have questions, please do not hesitate to call me  I look forward to following your patient along with you  Sincerely,        Cayla Calvert MD        CC: No Recipients  Cayla Calvert MD  1/25/2021  4:11 PM  Sign when Signing Visit  [facility] OFFICE PROGRESS NOTE   Sangeetha Peck 80 y o  male MRN: [id number]  DATE: 01/25/21  Reason for visit: Continued evaluation and management of CKD    ASSESSMENT & PLAN:  1  Chronic kidney disease, stage III:  · Mr Jojo Hess baseline serum creatinine is around 1 3 to 1 5    · Etiology of CKD is felt to be multifactorial from hypertensive nephrosclerosis and possible element of diabetic nephropathy complicated by hemodynamic effect of cardiac meds  · Renal function is stable  Creatinine 1 46     2  Hypertension:  · BP is at goal   · Current regimen: Metoprolol succ 100 mg daily, Entresto 24/26 mg BID and Spironolactone 25 mg QOD  · No changes  3  Congestive heart failure:  · Currently compensated  · Follow up with Dr Kimberly White  4  Hyperkalemia:  · K is stable at 4 9    · Continue low K diet  5  Proteinuria:  · Controlled with UPC of 0 147    6  Mineral and bone disease  · PTH is at goal - 44 in Aug 2020  · Ca and phos are at goal    · Vitamin D is at goal - 30 in August 2020  Not taking Vitamin D      7  Diabetes mellitus:   · Glycemic control is not at goal    · Defer DM management to Dr Niall Jay  · Goal HbA1c is <8 0      8  Hyperlipidemia:   · Lipid control is at goal    · Defer HLP management to PCP  9  CKD related parameters:  · Anemia: Hgb is at goal   · Acid-base: CO2 is at goal (> 22)  · Proteinuria: UPC ratio is at goal (< 1 0)  Patient Instructions   Your kidney function is stable  I recommend no change to your medications today  Check blood test (BMP) in 3 months  Follow up in 6 months - please obtain blood work 1-2 weeks prior to the appointment  SUBJECTIVE / INTERVAL HISTORY:  Elena Dang was last seen in September 2020  Since that time, he has been doing okay  There have been no recent hospitalizations or ER visits  His latest potassium level is 4 9  No new acute issues  PMH/PSH: HTN, DM, HLP, CKD, hyperkalemia, CHF, COPD, back pain, gallstone pancreatitis s/p cholecystectomy, melanoma s/p excision, lung nodules, anemia, BPH s/p urolift  Previous work up:   5/15/20 Renal US: R 10 1 cm, L 10 2 cm, no significant PVR  ALLERGIES: No Known Allergies    REVIEW OF SYSTEMS:  Review of Systems   Constitutional: Negative for appetite change, chills, fatigue and fever  Respiratory: Negative for cough and shortness of breath  Cardiovascular: Negative for chest pain and leg swelling  Gastrointestinal: Negative for abdominal pain, diarrhea, nausea and vomiting  Genitourinary: Negative for dysuria and hematuria  Musculoskeletal: Positive for back pain  Negative for arthralgias  Neurological: Negative for dizziness and light-headedness  OBJECTIVE:  /74   Ht 6' 1" (1 854 m)   Wt 81 6 kg (180 lb)   BMI 23 75 kg/m²   Current Weight: Weight - Scale: 81 6 kg (180 lb) Body mass index is 23 75 kg/m²  Physical Exam  Constitutional:       General: He is not in acute distress  Appearance: Normal appearance  He is well-developed  He is not ill-appearing  HENT:      Head: Normocephalic and atraumatic  Eyes:      General: No scleral icterus  Conjunctiva/sclera: Conjunctivae normal    Neck:      Musculoskeletal: Neck supple  Vascular: No JVD  Cardiovascular:      Rate and Rhythm: Normal rate and regular rhythm  Heart sounds: Normal heart sounds     Pulmonary:      Effort: Pulmonary effort is normal  No respiratory distress  Breath sounds: Normal breath sounds  Abdominal:      General: Bowel sounds are normal       Palpations: Abdomen is soft  Musculoskeletal:      Right lower leg: No edema  Left lower leg: No edema  Skin:     General: Skin is warm and dry  Neurological:      Mental Status: He is alert and oriented to person, place, and time     Psychiatric:         Mood and Affect: Mood normal          Behavior: Behavior normal          Judgment: Judgment normal      Medications:  Current Outpatient Medications:     acetaminophen (TYLENOL) 325 mg tablet, Take 2 tablets (650 mg total) by mouth every 6 (six) hours as needed for mild pain, Disp: 30 tablet, Rfl: 0    albuterol (2 5 mg/3 mL) 0 083 % nebulizer solution, inhale one vial via nebulizer every 6 hours as needed for wheezing or shortness of breath, Disp: 1080 mL, Rfl: 3    Ascorbic Acid (VITAMIN C) 1000 MG tablet, Take 1 tablet by mouth daily, Disp: , Rfl:     BD PEN NEEDLE DON U/F 32G X 4 MM MISC, , Disp: , Rfl:     betamethasone, augmented, (DIPROLENE-AF) 0 05 % cream, , Disp: , Rfl:     Cinnamon 500 MG TABS, Take 1 tablet by mouth daily  , Disp: , Rfl:     fluticasone (FLONASE) 50 mcg/act nasal spray, USE TWO SPRAYS IN EACH NOSTRIL DAILY , Disp: 16 g, Rfl: 0    fluticasone (Flovent HFA) 220 mcg/act inhaler, Inhale 1 puff 2 (two) times a day Rinse mouth after use, Disp: 12 g, Rfl: 4    gabapentin (NEURONTIN) 300 mg capsule, Take 1 capsule (300 mg total) by mouth 2 (two) times a day, Disp: 180 capsule, Rfl: 1    hydrOXYzine pamoate (VISTARIL) 25 mg capsule, TAKE ONE CAPSULE BY MOUTH THREE TIMES DAILY AS NEEDED for itching, Disp: 90 capsule, Rfl: 0    insulin detemir (LEVEMIR) 100 units/mL subcutaneous injection, Inject 20 Units under the skin daily at bedtime , Disp: , Rfl:     metoprolol succinate (TOPROL-XL) 50 mg 24 hr tablet, Take 2 tablets (100 mg total) by mouth daily, Disp: 180 tablet, Rfl: 3   montelukast (SINGULAIR) 10 mg tablet, TAKE ONE TABLET BY MOUTH AT BEDTIME , Disp: 90 tablet, Rfl: 0    Multiple Vitamins-Minerals (OCUVITE ADULT 50+ PO), Take 1 tablet by mouth daily, Disp: , Rfl:     NOVOLOG FLEXPEN 100 units/mL injection pen, Inject 5 Units under the skin daily with breakfast , Disp: , Rfl:     ONE TOUCH ULTRA TEST test strip, , Disp: , Rfl:     ONETOUCH DELICA LANCETS FINE MISC, , Disp: , Rfl:     repaglinide (PRANDIN) 2 mg tablet, Take 2 mg by mouth daily before lunch , Disp: , Rfl:     roflumilast (DALIRESP) 500 mcg tablet, Take 1 tablet (500 mcg total) by mouth daily, Disp: 30 tablet, Rfl: 5    sacubitril-valsartan (ENTRESTO) 24-26 MG TABS, Take 1 tablet by mouth 2 (two) times a day, Disp: 180 tablet, Rfl: 3    simvastatin (ZOCOR) 10 mg tablet, Take 1 tablet (10 mg total) by mouth daily, Disp: 90 tablet, Rfl: 1    spironolactone (ALDACTONE) 25 mg tablet, Take 1 tablet (25 mg total) by mouth every other day, Disp: 90 tablet, Rfl: 3    umeclidinium-vilanterol (Anoro Ellipta) 62 5-25 MCG/INH inhaler, Inhale 1 puff daily, Disp: 60 each, Rfl: 5    VENTOLIN  (90 Base) MCG/ACT inhaler, Inhale 2 puffs every 6 (six) hours as needed for wheezing, Disp: 1 Inhaler, Rfl: 2    Laboratory Results:  Results for orders placed or performed in visit on 01/11/21   Renal function panel   Result Value Ref Range    Glucose, Random 145 (H) 65 - 99 mg/dL    BUN 49 (H) 7 - 25 mg/dL    Creatinine 1 46 (H) 0 70 - 1 11 mg/dL    eGFR Non  43 (L) > OR = 60 mL/min/1 73m2    eGFR  49 (L) > OR = 60 mL/min/1 73m2    SL AMB BUN/CREATININE RATIO 34 (H) 6 - 22 (calc)    Sodium 140 135 - 146 mmol/L    Potassium 4 9 3 5 - 5 3 mmol/L    Chloride 110 98 - 110 mmol/L    CO2 25 20 - 32 mmol/L    Calcium 9 0 8 6 - 10 3 mg/dL    Phosphorus, Serum 4 0 2 1 - 4 3 mg/dL    Albumin 4 5 3 6 - 5 1 g/dL   Protein, Total w/Creat, Random Urine   Result Value Ref Range    Creatinine, Urine 95 20 - 320 mg/dL    Protein/Creat Ratio 147 (H) 22 - 128 mg/g creat    Protein/Creat Ratio 0 147 (H) 0 022 - 0 128 mg/mg creat    Total Protein, Urine 14 5 - 25 mg/dL

## 2021-01-25 NOTE — PATIENT INSTRUCTIONS
Your kidney function is stable  I recommend no change to your medications today  Check blood test (BMP) in 3 months  Follow up in 6 months - please obtain blood work 1-2 weeks prior to the appointment

## 2021-01-25 NOTE — PROGRESS NOTES
NEPHROLOGY OFFICE PROGRESS NOTE   Maricel Lozano 80 y o  male MRN: [id number]  DATE: 01/25/21  Reason for visit: Continued evaluation and management of CKD    ASSESSMENT & PLAN:  1  Chronic kidney disease, stage III:  · Mr Elena Cm baseline serum creatinine is around 1 3 to 1 5    · Etiology of CKD is felt to be multifactorial from hypertensive nephrosclerosis and possible element of diabetic nephropathy complicated by hemodynamic effect of cardiac meds  · Renal function is stable  Creatinine 1 46     2  Hypertension:  · BP is at goal   · Current regimen: Metoprolol succ 100 mg daily, Entresto 24/26 mg BID and Spironolactone 25 mg QOD  · No changes  3  Congestive heart failure:  · Currently compensated  · Follow up with Dr Leon Watkins  4  Hyperkalemia:  · K is stable at 4 9    · Continue low K diet  5  Proteinuria:  · Controlled with UPC of 0 147    6  Mineral and bone disease  · PTH is at goal - 44 in Aug 2020  · Ca and phos are at goal    · Vitamin D is at goal - 30 in August 2020  Not taking Vitamin D      7  Diabetes mellitus:   · Glycemic control is not at goal    · Defer DM management to Dr Albaro Agustin  · Goal HbA1c is <8 0      8  Hyperlipidemia:   · Lipid control is at goal    · Defer HLP management to PCP  9  CKD related parameters:  · Anemia: Hgb is at goal   · Acid-base: CO2 is at goal (> 22)  · Proteinuria: UPC ratio is at goal (< 1 0)  Patient Instructions   Your kidney function is stable  I recommend no change to your medications today  Check blood test (BMP) in 3 months  Follow up in 6 months - please obtain blood work 1-2 weeks prior to the appointment  SUBJECTIVE / INTERVAL HISTORY:   Josie was last seen in September 2020  Since that time, he has been doing okay  There have been no recent hospitalizations or ER visits  His latest potassium level is 4 9  No new acute issues       PMH/PSH: HTN, DM, HLP, CKD, hyperkalemia, CHF, COPD, back pain, gallstone pancreatitis s/p cholecystectomy, melanoma s/p excision, lung nodules, anemia, BPH s/p urolift  Previous work up:   5/15/20 Renal US: R 10 1 cm, L 10 2 cm, no significant PVR  ALLERGIES: No Known Allergies    REVIEW OF SYSTEMS:  Review of Systems   Constitutional: Negative for appetite change, chills, fatigue and fever  Respiratory: Negative for cough and shortness of breath  Cardiovascular: Negative for chest pain and leg swelling  Gastrointestinal: Negative for abdominal pain, diarrhea, nausea and vomiting  Genitourinary: Negative for dysuria and hematuria  Musculoskeletal: Positive for back pain  Negative for arthralgias  Neurological: Negative for dizziness and light-headedness  OBJECTIVE:  /74   Ht 6' 1" (1 854 m)   Wt 81 6 kg (180 lb)   BMI 23 75 kg/m²   Current Weight: Weight - Scale: 81 6 kg (180 lb) Body mass index is 23 75 kg/m²  Physical Exam  Constitutional:       General: He is not in acute distress  Appearance: Normal appearance  He is well-developed  He is not ill-appearing  HENT:      Head: Normocephalic and atraumatic  Eyes:      General: No scleral icterus  Conjunctiva/sclera: Conjunctivae normal    Neck:      Musculoskeletal: Neck supple  Vascular: No JVD  Cardiovascular:      Rate and Rhythm: Normal rate and regular rhythm  Heart sounds: Normal heart sounds  Pulmonary:      Effort: Pulmonary effort is normal  No respiratory distress  Breath sounds: Normal breath sounds  Abdominal:      General: Bowel sounds are normal       Palpations: Abdomen is soft  Musculoskeletal:      Right lower leg: No edema  Left lower leg: No edema  Skin:     General: Skin is warm and dry  Neurological:      Mental Status: He is alert and oriented to person, place, and time     Psychiatric:         Mood and Affect: Mood normal          Behavior: Behavior normal          Judgment: Judgment normal      Medications:  Current Outpatient Medications:     acetaminophen (TYLENOL) 325 mg tablet, Take 2 tablets (650 mg total) by mouth every 6 (six) hours as needed for mild pain, Disp: 30 tablet, Rfl: 0    albuterol (2 5 mg/3 mL) 0 083 % nebulizer solution, inhale one vial via nebulizer every 6 hours as needed for wheezing or shortness of breath, Disp: 1080 mL, Rfl: 3    Ascorbic Acid (VITAMIN C) 1000 MG tablet, Take 1 tablet by mouth daily, Disp: , Rfl:     BD PEN NEEDLE DON U/F 32G X 4 MM MISC, , Disp: , Rfl:     betamethasone, augmented, (DIPROLENE-AF) 0 05 % cream, , Disp: , Rfl:     Cinnamon 500 MG TABS, Take 1 tablet by mouth daily  , Disp: , Rfl:     fluticasone (FLONASE) 50 mcg/act nasal spray, USE TWO SPRAYS IN EACH NOSTRIL DAILY , Disp: 16 g, Rfl: 0    fluticasone (Flovent HFA) 220 mcg/act inhaler, Inhale 1 puff 2 (two) times a day Rinse mouth after use, Disp: 12 g, Rfl: 4    gabapentin (NEURONTIN) 300 mg capsule, Take 1 capsule (300 mg total) by mouth 2 (two) times a day, Disp: 180 capsule, Rfl: 1    hydrOXYzine pamoate (VISTARIL) 25 mg capsule, TAKE ONE CAPSULE BY MOUTH THREE TIMES DAILY AS NEEDED for itching, Disp: 90 capsule, Rfl: 0    insulin detemir (LEVEMIR) 100 units/mL subcutaneous injection, Inject 20 Units under the skin daily at bedtime , Disp: , Rfl:     metoprolol succinate (TOPROL-XL) 50 mg 24 hr tablet, Take 2 tablets (100 mg total) by mouth daily, Disp: 180 tablet, Rfl: 3    montelukast (SINGULAIR) 10 mg tablet, TAKE ONE TABLET BY MOUTH AT BEDTIME , Disp: 90 tablet, Rfl: 0    Multiple Vitamins-Minerals (OCUVITE ADULT 50+ PO), Take 1 tablet by mouth daily, Disp: , Rfl:     NOVOLOG FLEXPEN 100 units/mL injection pen, Inject 5 Units under the skin daily with breakfast , Disp: , Rfl:     ONE TOUCH ULTRA TEST test strip, , Disp: , Rfl:     ONETOUCH DELICA LANCETS FINE MISC, , Disp: , Rfl:     repaglinide (PRANDIN) 2 mg tablet, Take 2 mg by mouth daily before lunch , Disp: , Rfl:     roflumilast (DALIRESP) 500 mcg tablet, Take 1 tablet (500 mcg total) by mouth daily, Disp: 30 tablet, Rfl: 5    sacubitril-valsartan (ENTRESTO) 24-26 MG TABS, Take 1 tablet by mouth 2 (two) times a day, Disp: 180 tablet, Rfl: 3    simvastatin (ZOCOR) 10 mg tablet, Take 1 tablet (10 mg total) by mouth daily, Disp: 90 tablet, Rfl: 1    spironolactone (ALDACTONE) 25 mg tablet, Take 1 tablet (25 mg total) by mouth every other day, Disp: 90 tablet, Rfl: 3    umeclidinium-vilanterol (Anoro Ellipta) 62 5-25 MCG/INH inhaler, Inhale 1 puff daily, Disp: 60 each, Rfl: 5    VENTOLIN  (90 Base) MCG/ACT inhaler, Inhale 2 puffs every 6 (six) hours as needed for wheezing, Disp: 1 Inhaler, Rfl: 2    Laboratory Results:  Results for orders placed or performed in visit on 01/11/21   Renal function panel   Result Value Ref Range    Glucose, Random 145 (H) 65 - 99 mg/dL    BUN 49 (H) 7 - 25 mg/dL    Creatinine 1 46 (H) 0 70 - 1 11 mg/dL    eGFR Non  43 (L) > OR = 60 mL/min/1 73m2    eGFR  49 (L) > OR = 60 mL/min/1 73m2    SL AMB BUN/CREATININE RATIO 34 (H) 6 - 22 (calc)    Sodium 140 135 - 146 mmol/L    Potassium 4 9 3 5 - 5 3 mmol/L    Chloride 110 98 - 110 mmol/L    CO2 25 20 - 32 mmol/L    Calcium 9 0 8 6 - 10 3 mg/dL    Phosphorus, Serum 4 0 2 1 - 4 3 mg/dL    Albumin 4 5 3 6 - 5 1 g/dL   Protein, Total w/Creat, Random Urine   Result Value Ref Range    Creatinine, Urine 95 20 - 320 mg/dL    Protein/Creat Ratio 147 (H) 22 - 128 mg/g creat    Protein/Creat Ratio 0 147 (H) 0 022 - 0 128 mg/mg creat    Total Protein, Urine 14 5 - 25 mg/dL

## 2021-01-27 DIAGNOSIS — J41.8 MIXED SIMPLE AND MUCOPURULENT CHRONIC BRONCHITIS (HCC): ICD-10-CM

## 2021-01-27 RX ORDER — UMECLIDINIUM BROMIDE AND VILANTEROL TRIFENATATE 62.5; 25 UG/1; UG/1
POWDER RESPIRATORY (INHALATION)
Qty: 60 EACH | Refills: 5 | Status: SHIPPED | OUTPATIENT
Start: 2021-01-27 | End: 2021-07-28

## 2021-01-28 ENCOUNTER — OFFICE VISIT (OUTPATIENT)
Dept: PULMONOLOGY | Facility: CLINIC | Age: 86
End: 2021-01-28
Payer: COMMERCIAL

## 2021-01-28 VITALS
WEIGHT: 182.5 LBS | DIASTOLIC BLOOD PRESSURE: 76 MMHG | HEART RATE: 62 BPM | OXYGEN SATURATION: 98 % | HEIGHT: 73 IN | TEMPERATURE: 97.5 F | SYSTOLIC BLOOD PRESSURE: 118 MMHG | BODY MASS INDEX: 24.19 KG/M2

## 2021-01-28 DIAGNOSIS — J41.0 SIMPLE CHRONIC BRONCHITIS (HCC): ICD-10-CM

## 2021-01-28 DIAGNOSIS — R91.1 LUNG NODULE: ICD-10-CM

## 2021-01-28 DIAGNOSIS — R06.02 SOB (SHORTNESS OF BREATH): Primary | ICD-10-CM

## 2021-01-28 PROCEDURE — 99214 OFFICE O/P EST MOD 30 MIN: CPT | Performed by: INTERNAL MEDICINE

## 2021-01-28 PROCEDURE — 1036F TOBACCO NON-USER: CPT | Performed by: INTERNAL MEDICINE

## 2021-01-28 PROCEDURE — 1160F RVW MEDS BY RX/DR IN RCRD: CPT | Performed by: INTERNAL MEDICINE

## 2021-01-28 NOTE — PROGRESS NOTES
Assessment/Plan:   Diagnoses and all orders for this visit:    SOB (shortness of breath)    Simple chronic bronchitis (HCC)    Lung nodule       Moderate amount of COPD, FEV1 of 51% with no bronchodilator response, moderately decreased DLCO, CT of the chest with evidence of emphysema  Currently patient is doing well on anora one puff daily to continue  Patient has been having this chronic cough with history of allergies, discussed with the patient he would benefit from taking an inhaled corticosteroid with the Flovent 220 µg one puff twice daily  Rinse mouth after use  He does have albuterol via the nebulizer to be used 4 times daily as needed only  Continue with daliresp 500 mg his taking only 1 tablet every other day  This has really reduced his exacerbations and the need for prednisone  Vaccinations up-to-date  Lung nodules currently stable  Return in about 6 months (around 7/28/2021)  All questions are answered to the patient's satisfaction and understanding  He verbalizes understanding  He is encouraged to call with any further questions or concerns  Portions of the record may have been created with voice recognition software  Occasional wrong word or "sound a like" substitutions may have occurred due to the inherent limitations of voice recognition software  Read the chart carefully and recognize, using context, where substitutions have occurred  Electronically Signed by Angeline Brown MD    ______________________________________________________________________    Chief Complaint:   Chief Complaint   Patient presents with    Follow-up    COPD       Patient ID: Jemal Sepulveda is a 80 y o  y o  male has a past medical history of COPD (chronic obstructive pulmonary disease) ([facility] [address] ), Diabetes mellitus ([facility] [address] ), Essential hypertension, Heart failure ([address] ), Hyperlipidemia, Left inguinal hernia, Lung nodule, and Malignant melanoma of skin ([facility] [address] )  1/28/2021  Patient presents today for follow-up visit    Patient is an 79 yo male former smoker with PMH of COPD, HTN, HLD, MDS      He is here today for follow-up  He continues to overall do well with his breathing  He does have some mild dyspnea when he tries to exert himself, has not been using his rescue inhaler  He does continue with Anoro, Flovent, Singulair, taking Daliresp every other day due to the cost      Review of Systems   Constitutional: Negative  HENT: Negative  Eyes: Negative  Respiratory: Positive for cough  Cardiovascular: Negative  Gastrointestinal: Negative  Endocrine: Negative  Genitourinary: Negative  Musculoskeletal: Negative  Allergic/Immunologic: Negative  Neurological: Negative  Hematological: Negative  Psychiatric/Behavioral: Negative  Smoking history: He reports that he quit smoking about 61 years ago  He has a 10 00 pack-year smoking history   He has never used smokeless tobacco     The following portions of the patient's history were reviewed and updated as appropriate: allergies, current medications, past family history, past medical history, past social history, past surgical history and problem list     Immunization History   Administered Date(s) Administered    H1N1, All Formulations 01/26/2010    INFLUENZA 09/28/2018    Influenza Split High Dose Preservative Free IM 10/18/2013, 10/05/2015, 09/30/2016, 10/20/2017    Influenza, high dose seasonal 0 7 mL 10/12/2020    Influenza, seasonal, injectable 10/13/2011, 10/01/2014    Pneumococcal Conjugate 13-Valent 11/22/2017    Pneumococcal Polysaccharide PPV23 01/01/1998, 10/19/2005, 11/30/2020    Zoster 10/01/2010    Zoster Vaccine Recombinant 01/03/2020, 10/12/2020     Current Outpatient Medications   Medication Sig Dispense Refill    acetaminophen (TYLENOL) 325 mg tablet Take 2 tablets (650 mg total) by mouth every 6 (six) hours as needed for mild pain 30 tablet 0    albuterol (2 5 mg/3 mL) 0 083 % nebulizer solution inhale one vial via nebulizer every 6 hours as needed for wheezing or shortness of breath 1080 mL 3    Anoro Ellipta 62 5-25 MCG/INH inhaler INHALE ONE PUFF BY MOUTH ONCE DAILY  60 each 5    Ascorbic Acid (VITAMIN C) 1000 MG tablet Take 1 tablet by mouth daily      BD PEN NEEDLE DON U/F 32G X 4 MM MISC       betamethasone, augmented, (DIPROLENE-AF) 0 05 % cream       Cinnamon 500 MG TABS Take 1 tablet by mouth daily        fluticasone (FLONASE) 50 mcg/act nasal spray USE TWO SPRAYS IN EACH NOSTRIL DAILY  16 g 0    fluticasone (Flovent HFA) 220 mcg/act inhaler Inhale 1 puff 2 (two) times a day Rinse mouth after use 12 g 4    gabapentin (NEURONTIN) 300 mg capsule Take 1 capsule (300 mg total) by mouth 2 (two) times a day 180 capsule 1    hydrOXYzine pamoate (VISTARIL) 25 mg capsule TAKE ONE CAPSULE BY MOUTH THREE TIMES DAILY AS NEEDED for itching 90 capsule 0    insulin detemir (LEVEMIR) 100 units/mL subcutaneous injection Inject 20 Units under the skin daily at bedtime       metoprolol succinate (TOPROL-XL) 50 mg 24 hr tablet Take 2 tablets (100 mg total) by mouth daily 180 tablet 3    montelukast (SINGULAIR) 10 mg tablet TAKE ONE TABLET BY MOUTH AT BEDTIME  90 tablet 0    Multiple Vitamins-Minerals (OCUVITE ADULT 50+ PO) Take 1 tablet by mouth daily      NOVOLOG FLEXPEN 100 units/mL injection pen Inject 5 Units under the skin daily with breakfast       ONE TOUCH ULTRA TEST test strip       ONETOUCH DELICA LANCETS FINE MISC       repaglinide (PRANDIN) 2 mg tablet Take 2 mg by mouth daily before lunch       roflumilast (DALIRESP) 500 mcg tablet Take 1 tablet (500 mcg total) by mouth daily 30 tablet 5    sacubitril-valsartan (ENTRESTO) 24-26 MG TABS Take 1 tablet by mouth 2 (two) times a day 180 tablet 3    simvastatin (ZOCOR) 10 mg tablet Take 1 tablet (10 mg total) by mouth daily 90 tablet 1    spironolactone (ALDACTONE) 25 mg tablet Take 1 tablet (25 mg total) by mouth every other day 90 tablet 3    VENTOLIN HFA 108 (90 Base) MCG/ACT inhaler Inhale 2 puffs every 6 (six) hours as needed for wheezing 1 Inhaler 2     No current facility-administered medications for this visit  Allergies: Patient has no known allergies  Objective:  Vitals:    01/28/21 1140   BP: 118/76   Pulse: 62   Temp: 97 5 °F (36 4 °C)   SpO2: 98%   Weight: 82 8 kg (182 lb 8 oz)   Height: 6' 1" (1 854 m)   Oxygen Therapy  SpO2: 98 %    Wt Readings from Last 3 Encounters:   01/28/21 82 8 kg (182 lb 8 oz)   01/25/21 81 6 kg (180 lb)   11/30/20 82 3 kg (181 lb 6 4 oz)     Body mass index is 24 08 kg/m²  Physical Exam  Vitals signs and nursing note reviewed  Constitutional:       Appearance: He is well-developed  HENT:      Head: Normocephalic and atraumatic  Eyes:      Conjunctiva/sclera: Conjunctivae normal       Pupils: Pupils are equal, round, and reactive to light  Neck:      Musculoskeletal: Normal range of motion and neck supple  Thyroid: No thyromegaly  Vascular: No JVD  Cardiovascular:      Rate and Rhythm: Normal rate and regular rhythm  Heart sounds: Normal heart sounds  No murmur  No friction rub  No gallop  Pulmonary:      Effort: Pulmonary effort is normal  No respiratory distress  Breath sounds: Normal breath sounds  No wheezing or rales  Chest:      Chest wall: No tenderness  Musculoskeletal: Normal range of motion  General: No tenderness or deformity  Lymphadenopathy:      Cervical: No cervical adenopathy  Skin:     General: Skin is warm and dry  Neurological:      Mental Status: He is alert and oriented to person, place, and time  Diagnostics:  I have personally reviewed pertinent reports

## 2021-02-02 DIAGNOSIS — R05.9 COUGH: ICD-10-CM

## 2021-02-02 RX ORDER — MONTELUKAST SODIUM 10 MG/1
TABLET ORAL
Qty: 90 TABLET | Refills: 3 | Status: SHIPPED | OUTPATIENT
Start: 2021-02-02 | End: 2022-02-11

## 2021-02-12 DIAGNOSIS — Z23 ENCOUNTER FOR IMMUNIZATION: ICD-10-CM

## 2021-02-15 DIAGNOSIS — L29.9 PRURITUS: ICD-10-CM

## 2021-02-16 RX ORDER — HYDROXYZINE PAMOATE 25 MG/1
CAPSULE ORAL
Qty: 90 CAPSULE | Refills: 0 | Status: SHIPPED | OUTPATIENT
Start: 2021-02-16 | End: 2021-03-31

## 2021-02-21 DIAGNOSIS — G60.9 NEUROPATHY, IDIOPATHIC: ICD-10-CM

## 2021-02-21 RX ORDER — FLUTICASONE PROPIONATE 50 MCG
SPRAY, SUSPENSION (ML) NASAL
Qty: 16 G | Refills: 0 | Status: SHIPPED | OUTPATIENT
Start: 2021-02-21 | End: 2021-03-26

## 2021-03-13 DIAGNOSIS — G62.9 NEUROPATHY: ICD-10-CM

## 2021-03-14 RX ORDER — GABAPENTIN 300 MG/1
CAPSULE ORAL
Qty: 180 CAPSULE | Refills: 0 | Status: SHIPPED | OUTPATIENT
Start: 2021-03-14 | End: 2021-06-13

## 2021-03-23 DIAGNOSIS — I50.43 ACUTE ON CHRONIC COMBINED SYSTOLIC AND DIASTOLIC CONGESTIVE HEART FAILURE (HCC): ICD-10-CM

## 2021-03-23 RX ORDER — SACUBITRIL AND VALSARTAN 24; 26 MG/1; MG/1
TABLET, FILM COATED ORAL
Qty: 180 TABLET | Refills: 0 | Status: SHIPPED | OUTPATIENT
Start: 2021-03-23 | End: 2021-06-23

## 2021-03-26 DIAGNOSIS — G60.9 NEUROPATHY, IDIOPATHIC: ICD-10-CM

## 2021-03-26 DIAGNOSIS — J44.1 CHRONIC OBSTRUCTIVE PULMONARY DISEASE WITH ACUTE EXACERBATION (HCC): ICD-10-CM

## 2021-03-26 RX ORDER — FLUTICASONE PROPIONATE 220 UG/1
AEROSOL, METERED RESPIRATORY (INHALATION)
Qty: 12 G | Refills: 0 | Status: SHIPPED | OUTPATIENT
Start: 2021-03-26 | End: 2021-07-30

## 2021-03-26 RX ORDER — FLUTICASONE PROPIONATE 50 MCG
SPRAY, SUSPENSION (ML) NASAL
Qty: 16 G | Refills: 0 | Status: SHIPPED | OUTPATIENT
Start: 2021-03-26 | End: 2021-04-28

## 2021-03-27 DIAGNOSIS — I42.0 DILATED CARDIOMYOPATHY (HCC): ICD-10-CM

## 2021-03-29 ENCOUNTER — TELEPHONE (OUTPATIENT)
Dept: CARDIOLOGY CLINIC | Facility: MEDICAL CENTER | Age: 86
End: 2021-03-29

## 2021-03-29 ENCOUNTER — OFFICE VISIT (OUTPATIENT)
Dept: FAMILY MEDICINE CLINIC | Facility: MEDICAL CENTER | Age: 86
End: 2021-03-29
Payer: COMMERCIAL

## 2021-03-29 VITALS
DIASTOLIC BLOOD PRESSURE: 70 MMHG | RESPIRATION RATE: 16 BRPM | WEIGHT: 180.6 LBS | SYSTOLIC BLOOD PRESSURE: 142 MMHG | TEMPERATURE: 97 F | HEART RATE: 64 BPM | BODY MASS INDEX: 23.83 KG/M2

## 2021-03-29 DIAGNOSIS — L29.9 PRURITUS: ICD-10-CM

## 2021-03-29 DIAGNOSIS — S51.812A LACERATION OF SKIN OF LEFT FOREARM, INITIAL ENCOUNTER: Primary | ICD-10-CM

## 2021-03-29 PROCEDURE — 99213 OFFICE O/P EST LOW 20 MIN: CPT | Performed by: FAMILY MEDICINE

## 2021-03-29 PROCEDURE — 1160F RVW MEDS BY RX/DR IN RCRD: CPT | Performed by: FAMILY MEDICINE

## 2021-03-29 RX ORDER — METOPROLOL SUCCINATE 50 MG/1
TABLET, EXTENDED RELEASE ORAL
Qty: 180 TABLET | Refills: 0 | Status: SHIPPED | OUTPATIENT
Start: 2021-03-29 | End: 2021-06-17

## 2021-03-29 NOTE — PROGRESS NOTES
Jackie Escalera is here for a cut on his left arm  He  kept it covered with Neosporin and a nonadhesive dressing   See notes last visit  Says he got a carpal tunnel wrist splint which helps  He got his COVID vaccine  O: /70 (Cuff Size: Standard)   Pulse 64   Temp (!) 97 °F (36 1 °C)   Resp 16   Wt 81 9 kg (180 lb 9 6 oz)   BMI 23 83 kg/m²     2 cm v-shaped healing avulsion laceration dorsum  Forearm  No erythema  No exudate  Assessment   avulsion laceration-healing    Plan    Reassured  Will keep non adherent dressing to avoid trauma

## 2021-03-29 NOTE — TELEPHONE ENCOUNTER
DR Thomas Renee 's patient came in today   Raphael Macdonald He had a question is he suppose to be taking 81 mg aspirin a day   I didn't see it in his chart   Patient states he takes a tylenol daily    Please advise patient thanks

## 2021-03-31 RX ORDER — HYDROXYZINE PAMOATE 25 MG/1
CAPSULE ORAL
Qty: 90 CAPSULE | Refills: 0 | Status: SHIPPED | OUTPATIENT
Start: 2021-03-31 | End: 2021-05-12

## 2021-04-19 ENCOUNTER — OFFICE VISIT (OUTPATIENT)
Dept: FAMILY MEDICINE CLINIC | Facility: MEDICAL CENTER | Age: 86
End: 2021-04-19
Payer: COMMERCIAL

## 2021-04-19 VITALS
HEART RATE: 60 BPM | BODY MASS INDEX: 23.44 KG/M2 | WEIGHT: 176.9 LBS | SYSTOLIC BLOOD PRESSURE: 136 MMHG | RESPIRATION RATE: 14 BRPM | DIASTOLIC BLOOD PRESSURE: 72 MMHG | HEIGHT: 73 IN | TEMPERATURE: 97.9 F

## 2021-04-19 DIAGNOSIS — R19.7 DIARRHEA, UNSPECIFIED TYPE: Primary | ICD-10-CM

## 2021-04-19 PROCEDURE — 1036F TOBACCO NON-USER: CPT | Performed by: FAMILY MEDICINE

## 2021-04-19 PROCEDURE — 3725F SCREEN DEPRESSION PERFORMED: CPT | Performed by: FAMILY MEDICINE

## 2021-04-19 PROCEDURE — 3288F FALL RISK ASSESSMENT DOCD: CPT | Performed by: FAMILY MEDICINE

## 2021-04-19 PROCEDURE — 99213 OFFICE O/P EST LOW 20 MIN: CPT | Performed by: FAMILY MEDICINE

## 2021-04-19 PROCEDURE — 1160F RVW MEDS BY RX/DR IN RCRD: CPT | Performed by: FAMILY MEDICINE

## 2021-04-19 PROCEDURE — 1101F PT FALLS ASSESS-DOCD LE1/YR: CPT | Performed by: FAMILY MEDICINE

## 2021-04-19 NOTE — PROGRESS NOTES
Krishna Espino is here for chronic diarrhea  Started a few years ago  Occurs a few times a week  Gets bloated but no pain  He will get episodes where it has difficulty to have a bowel movement followed by loose stools  Can occurs once or twice a week  Generally not bothersome unless he wants to go out play golf etc     No blood  No fever chills  No dietary associations  O: /72 (BP Location: Left arm, Patient Position: Sitting, Cuff Size: Adult)   Pulse 60   Temp 97 9 °F (36 6 °C)   Resp 14   Ht 6' 1" (1 854 m)   Wt 80 2 kg (176 lb 14 4 oz)   BMI 23 34 kg/m²      Looks well  Weight is stable compared to his summer time weight  Abdomen soft nontender without hepatosplenomegaly or masses     Assessment   IBS- since the diarrhea is only bothersome when outside the home will use Imodium as needed  Plan  As above

## 2021-04-27 DIAGNOSIS — G60.9 NEUROPATHY, IDIOPATHIC: ICD-10-CM

## 2021-04-28 RX ORDER — FLUTICASONE PROPIONATE 50 MCG
SPRAY, SUSPENSION (ML) NASAL
Qty: 16 G | Refills: 0 | Status: SHIPPED | OUTPATIENT
Start: 2021-04-28 | End: 2021-06-04

## 2021-05-07 LAB
BUN SERPL-MCNC: 33 MG/DL (ref 7–25)
BUN/CREAT SERPL: 20 (CALC) (ref 6–22)
CALCIUM SERPL-MCNC: 9.7 MG/DL (ref 8.6–10.3)
CHLORIDE SERPL-SCNC: 108 MMOL/L (ref 98–110)
CO2 SERPL-SCNC: 27 MMOL/L (ref 20–32)
CREAT SERPL-MCNC: 1.64 MG/DL (ref 0.7–1.11)
GLUCOSE SERPL-MCNC: 194 MG/DL (ref 65–99)
POTASSIUM SERPL-SCNC: 5.1 MMOL/L (ref 3.5–5.3)
SL AMB EGFR AFRICAN AMERICAN: 43 ML/MIN/1.73M2
SL AMB EGFR NON AFRICAN AMERICAN: 37 ML/MIN/1.73M2
SODIUM SERPL-SCNC: 141 MMOL/L (ref 135–146)

## 2021-05-10 DIAGNOSIS — L29.9 PRURITUS: ICD-10-CM

## 2021-05-12 RX ORDER — HYDROXYZINE PAMOATE 25 MG/1
CAPSULE ORAL
Qty: 90 CAPSULE | Refills: 0 | Status: SHIPPED | OUTPATIENT
Start: 2021-05-12 | End: 2021-07-04

## 2021-05-18 ENCOUNTER — TELEPHONE (OUTPATIENT)
Dept: HEMATOLOGY ONCOLOGY | Facility: CLINIC | Age: 86
End: 2021-05-18

## 2021-05-18 NOTE — TELEPHONE ENCOUNTER
The patient was a No Show to his Tues, 05/18/2021, Appt  I called him @ [phone] and we re-scheduled for next Tues, 05/25/2021, @ 3:20

## 2021-05-25 ENCOUNTER — OFFICE VISIT (OUTPATIENT)
Dept: HEMATOLOGY ONCOLOGY | Facility: CLINIC | Age: 86
End: 2021-05-25
Payer: COMMERCIAL

## 2021-05-25 VITALS
SYSTOLIC BLOOD PRESSURE: 138 MMHG | HEART RATE: 59 BPM | BODY MASS INDEX: 23.06 KG/M2 | RESPIRATION RATE: 18 BRPM | HEIGHT: 73 IN | OXYGEN SATURATION: 97 % | DIASTOLIC BLOOD PRESSURE: 60 MMHG | TEMPERATURE: 98.5 F | WEIGHT: 174 LBS

## 2021-05-25 DIAGNOSIS — D46.20 MDS (MYELODYSPLASTIC SYNDROME), LOW GRADE (HCC): Primary | ICD-10-CM

## 2021-05-25 PROBLEM — D46.Z MDS (MYELODYSPLASTIC SYNDROME), LOW GRADE (HCC): Status: ACTIVE | Noted: 2021-05-25

## 2021-05-25 PROCEDURE — 1160F RVW MEDS BY RX/DR IN RCRD: CPT | Performed by: INTERNAL MEDICINE

## 2021-05-25 PROCEDURE — 99214 OFFICE O/P EST MOD 30 MIN: CPT | Performed by: INTERNAL MEDICINE

## 2021-05-25 NOTE — PROGRESS NOTES
Hematology / Oncology Outpatient Follow Up Note    Cindy Villa 80 y o  male :[id number] DFT:[id number]         Date:  2021    Assessment / Plan:  A 80 year old gentleman with low-grade myelodysplasia  He has mild normocytic normochromic anemia  He has no abnormality of WBC and platelet count  His most recent hemoglobin was 10 7  For many years, he has stable hemoglobin  Since he has no progression, I recommended him to continue with observation  I will see him again in a year with CBC  He is in agreement with my recommendations       Subjective:      HPI:  A 24-year-old gentleman who was referred to me, today, because he was recently found to have immature neutrophil, based on the CBC  Since , he has mild normocytic normochromic anemia  He has no symptomatology from hematology standpoint, such as fever, chills or night sweats  He has not recent weight loss  He is otherwise healthy with only past medical history of COPD, diabetes and hypertension  His recent PSA was 0 4 which is within normal limits  He is up-to-date for colonoscopy with most recent done in 2 years ago  He has no history of major surgery except cholecystectomy in   He has no complaint of pain  He denied fever, chills or night sweats  His weight has been stable  He has normal performance status             Interval History:  A 80 year old gentleman who has mild normocytic normochromic anemia for several years  He was found to have immature neutrophil in the peripheral blood smear  Therefore, he was referred to me for further evaluation  He underwent bone marrow biopsy which showed hypercellular marrow with left shifted trilineage hematopoiesis  Myeloblasts was only 2 5%, suggesting low-grade myelodysplasia  Cytogenetics was normal male karyotype  He has no neutropenia or thrombocytopenia  Currently, he is on observation  He presents today for routine follow-up    His most recent CBC was in 2020 which showed hemoglobin 10 7   He has no significant symptomatic change  Has very mild fatigue  He has very mild exertional shortness of breath which has been stable  His weight is stable  He has no complaint of pain  He denied fever, chills or night sweats  His performance status is normal          Objective:      Primary Diagnosis:     Low-grade myelodysplastic syndrome, with normal cytogenetics  Diagnosed in April 2016       Cancer Staging:  Cancer Staging  No matching staging information was found for the patient         Previous Hematologic/ Oncologic Treatment:            Current Hematologic/ Oncologic Treatment:       Observation      Disease Status:      Slowly progressive anemia      Test Results:     Pathology:     Bone marrow biopsy showed hypercellular marrow for his age, cellularity approximately 75%  There was left shifted trilineage progressive hematopoiesis with some dysplastic feature  Myeloblast 2 5%, consistent with low-grade myelodysplastic syndrome  Cytogenetics showed normal male karyotype      Radiology:           Laboratory:       See below for CBC and CMP  ROS: Review of Systems   All other systems reviewed and are negative  Imaging: No results found        Labs:   Lab Results   Component Value Date    WBC 5 6 08/18/2020    HGB 10 7 (L) 08/18/2020    HCT 33 6 (L) 08/18/2020    MCV 90 8 08/18/2020     (L) 08/18/2020     Lab Results   Component Value Date     05/08/2017    K 5 1 05/07/2021     05/07/2021    CO2 27 05/07/2021    BUN 33 (H) 05/07/2021    CREATININE 1 64 (H) 05/07/2021    CALCIUM 9 7 05/07/2021    AST 14 08/18/2020    ALT 14 08/18/2020    ALKPHOS 37 08/18/2020    PROT 6 3 05/08/2017    BILITOT 0 6 05/08/2017    EGFR 55 11/15/2018         Lab Results   Component Value Date    TIBC 313 10/05/2015    FERRITIN 195 10/05/2015       Lab Results   Component Value Date    TEMFGFHH51 1,527 (H) 05/15/2020       Lab Results   Component Value Date    FOLATE 20 7 05/15/2020 Current Medications: Reviewed  Allergies: Reviewed  PMH/FH/SH:  Reviewed      Vital Sign:    Body surface area is 2 03 meters squared      Wt Readings from Last 3 Encounters:   05/25/21 78 9 kg (174 lb)   04/19/21 80 2 kg (176 lb 14 4 oz)   03/29/21 81 9 kg (180 lb 9 6 oz)        Temp Readings from Last 3 Encounters:   05/25/21 98 5 °F (36 9 °C) (Tympanic Core)   04/19/21 97 9 °F (36 6 °C)   03/29/21 (!) 97 °F (36 1 °C)        BP Readings from Last 3 Encounters:   05/25/21 138/60   04/19/21 136/72   03/29/21 142/70         Pulse Readings from Last 3 Encounters:   05/25/21 59   04/19/21 60   03/29/21 64     @LASTSAO2(3)@

## 2021-06-03 DIAGNOSIS — G60.9 NEUROPATHY, IDIOPATHIC: ICD-10-CM

## 2021-06-04 RX ORDER — FLUTICASONE PROPIONATE 50 MCG
SPRAY, SUSPENSION (ML) NASAL
Qty: 16 G | Refills: 0 | Status: SHIPPED | OUTPATIENT
Start: 2021-06-04 | End: 2021-07-13

## 2021-06-08 LAB
LEFT EYE DIABETIC RETINOPATHY: NORMAL
RIGHT EYE DIABETIC RETINOPATHY: NORMAL

## 2021-06-12 DIAGNOSIS — I42.0 DILATED CARDIOMYOPATHY (HCC): ICD-10-CM

## 2021-06-12 DIAGNOSIS — G62.9 NEUROPATHY: ICD-10-CM

## 2021-06-13 RX ORDER — GABAPENTIN 300 MG/1
CAPSULE ORAL
Qty: 180 CAPSULE | Refills: 0 | Status: SHIPPED | OUTPATIENT
Start: 2021-06-13 | End: 2021-09-19

## 2021-06-15 ENCOUNTER — RA CDI HCC (OUTPATIENT)
Dept: OTHER | Facility: HOSPITAL | Age: 86
End: 2021-06-15

## 2021-06-15 NOTE — PROGRESS NOTES
[facility] [address]  coding opportunities             Chart reviewed, (number of) suggestions sent to provider: 7            Number of suggestions actually used: 3      Number of suggestions NOT actually used: 4  I13 0, I50 42, N18 32, I42 0 not used     Patients insurance company: [employer] (Medicare Advantage and Commercial)     Visit status: Patient arrived for their scheduled appointment     Provider never responded to [employer]  coding request     [facility] [address]  coding opportunities             Chart reviewed, (number of) suggestions sent to provider: 7      E11 40, Z79 4 T2DM with diabetic neuropathy, and long term, current, insulin use  ([facility] )    J44 9 COPD (HCC)    I13 0, I50 42, N18 32  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

## 2021-06-17 RX ORDER — METOPROLOL SUCCINATE 50 MG/1
TABLET, EXTENDED RELEASE ORAL
Qty: 180 TABLET | Refills: 0 | Status: SHIPPED | OUTPATIENT
Start: 2021-06-17 | End: 2021-09-08

## 2021-06-21 ENCOUNTER — OFFICE VISIT (OUTPATIENT)
Dept: FAMILY MEDICINE CLINIC | Facility: MEDICAL CENTER | Age: 86
End: 2021-06-21
Payer: COMMERCIAL

## 2021-06-21 VITALS
HEIGHT: 73 IN | DIASTOLIC BLOOD PRESSURE: 80 MMHG | TEMPERATURE: 97.1 F | HEART RATE: 80 BPM | SYSTOLIC BLOOD PRESSURE: 118 MMHG | BODY MASS INDEX: 22.93 KG/M2 | WEIGHT: 173 LBS

## 2021-06-21 DIAGNOSIS — E11.40 TYPE 2 DIABETES MELLITUS WITH DIABETIC NEUROPATHY, WITH LONG-TERM CURRENT USE OF INSULIN (HCC): Primary | ICD-10-CM

## 2021-06-21 DIAGNOSIS — Z79.4 TYPE 2 DIABETES MELLITUS WITH DIABETIC NEUROPATHY, WITH LONG-TERM CURRENT USE OF INSULIN (HCC): Primary | ICD-10-CM

## 2021-06-21 DIAGNOSIS — J44.9 COPD WITHOUT EXACERBATION (HCC): ICD-10-CM

## 2021-06-21 DIAGNOSIS — R19.7 DIARRHEA, UNSPECIFIED TYPE: ICD-10-CM

## 2021-06-21 DIAGNOSIS — I10 ESSENTIAL HYPERTENSION: ICD-10-CM

## 2021-06-21 DIAGNOSIS — Z23 NEED FOR TDAP VACCINATION: ICD-10-CM

## 2021-06-21 PROCEDURE — 99214 OFFICE O/P EST MOD 30 MIN: CPT | Performed by: FAMILY MEDICINE

## 2021-06-21 PROCEDURE — 3725F SCREEN DEPRESSION PERFORMED: CPT | Performed by: FAMILY MEDICINE

## 2021-06-21 NOTE — PROGRESS NOTES
Mo Fraser is here for 6 month checkup  He saw pulmonology Dr Christiano Brown in January  His asthma has been well controlled  He saw hematology Dr Tawnya Manuel for low-grade myelodysplastic syndrome  He will see Hematology andget an annual CBC   He sees nephrology for chronic kidney disease  This has been relatively stable  He saw  Endocrinology Renato Diaz  For follow-up of his diabetes  His last A1c was 8 1  His Prandin was discontinued due to the possibility that this was contributing to his loose stools as well as occasional hypoglycemic episodes  He still has occ  diarrhea  See notes last visit  He has been using Immodium as needed shantanu when he is going to play golf  This does seem to work well  He may not have a bowel movement after that for 2-3 days but he does not feel constipated  Hemoccult was done in December which was negative  O: /80 (BP Location: Left arm, Patient Position: Sitting, Cuff Size: Adult)   Pulse 80   Temp (!) 97 1 °F (36 2 °C)   Ht 6' 1" (1 854 m)   Wt 78 5 kg (173 lb)   BMI 22 82 kg/m²   Physical Exam     He looks well  Weight is stable  Neck no adenopathy thyromegaly bruits   chest is clear with good breath sounds   Cardiac regular rate without murmur   extremities distal pulses full no edema     Assessment   1  IBS -diarrhea predominant-we discussed dietary modifications as well as occasional use of Imodium  He declines any further management at this time  2  Hypertension -controlled   3  Diabetes- reasonably well controlled and this 80year-old gentleman  Continue follow-up with endocrinology  I did advise and report hypoglycemic episodes to Dr Raghu Mejia    4  Chronic kidney disease-per Nephrology; renal function stable   5  Health maintenance- immunizations are   Up-to-date except for Tdap  Will order  Plan    Tdap  Blood work and recheck 6 months

## 2021-06-22 DIAGNOSIS — I50.43 ACUTE ON CHRONIC COMBINED SYSTOLIC AND DIASTOLIC CONGESTIVE HEART FAILURE (HCC): ICD-10-CM

## 2021-06-23 RX ORDER — SACUBITRIL AND VALSARTAN 24; 26 MG/1; MG/1
TABLET, FILM COATED ORAL
Qty: 180 TABLET | Refills: 0 | Status: SHIPPED | OUTPATIENT
Start: 2021-06-23 | End: 2021-09-08

## 2021-06-29 ENCOUNTER — OFFICE VISIT (OUTPATIENT)
Dept: PULMONOLOGY | Facility: CLINIC | Age: 86
End: 2021-06-29
Payer: COMMERCIAL

## 2021-06-29 VITALS
OXYGEN SATURATION: 98 % | BODY MASS INDEX: 22.93 KG/M2 | DIASTOLIC BLOOD PRESSURE: 70 MMHG | WEIGHT: 173 LBS | SYSTOLIC BLOOD PRESSURE: 124 MMHG | TEMPERATURE: 96.6 F | HEIGHT: 73 IN | HEART RATE: 73 BPM

## 2021-06-29 DIAGNOSIS — R06.02 SOB (SHORTNESS OF BREATH): ICD-10-CM

## 2021-06-29 DIAGNOSIS — R91.1 LUNG NODULE: ICD-10-CM

## 2021-06-29 DIAGNOSIS — J41.0 SIMPLE CHRONIC BRONCHITIS (HCC): Primary | ICD-10-CM

## 2021-06-29 PROCEDURE — 99214 OFFICE O/P EST MOD 30 MIN: CPT | Performed by: INTERNAL MEDICINE

## 2021-06-29 PROCEDURE — 1160F RVW MEDS BY RX/DR IN RCRD: CPT | Performed by: INTERNAL MEDICINE

## 2021-06-29 PROCEDURE — 1036F TOBACCO NON-USER: CPT | Performed by: INTERNAL MEDICINE

## 2021-06-29 NOTE — PROGRESS NOTES
Assessment/Plan:   Diagnoses and all orders for this visit:    Simple chronic bronchitis (HCC)    SOB (shortness of breath)    Lung nodule      Moderate amount of COPD, FEV1 of 51% with no bronchodilator response, moderately decreased DLCO,   Currently patient is doing well on anora one puff daily to continue  Patient has been having this chronic cough with history of allergies,     Continue with the Flovent 220 µg one puff twice daily  Rinse mouth after use  He does have albuterol via the nebulizer to be used 4 times daily as needed only  Continue with daliresp 500 mg his taking only 1 tablet every other day  This has really reduced his exacerbations and the need for prednisone  Vaccinations up-to-date  Lung nodules currently stable  Return in about 6 months    Return in about 6 months (around 12/29/2021)  All questions are answered to the patient's satisfaction and understanding  He verbalizes understanding  He is encouraged to call with any further questions or concerns  Portions of the record may have been created with voice recognition software  Occasional wrong word or "sound a like" substitutions may have occurred due to the inherent limitations of voice recognition software  Read the chart carefully and recognize, using context, where substitutions have occurred  Electronically Signed by Jackie Clinton MD    ______________________________________________________________________    Chief Complaint:   Chief Complaint   Patient presents with    Follow-up       Patient ID: Venetta Moritz is a 80 y o  y o  male has a past medical history of COPD (chronic obstructive pulmonary disease) ([facility] [address] ), Diabetes mellitus ([address] ), Essential hypertension, Heart failure ([address] ), Hyperlipidemia, Left inguinal hernia, Lung nodule, and Malignant melanoma of skin ([facility] [address] )  6/29/2021  Patient presents today for follow-up visit    Patient is an 81 yo male former smoker with PMH of COPD, HTN, HLD, MDS      He is here today for follow-up  He continues to overall do well with his breathing  He does have some mild dyspnea when he tries to exert himself, has not been using his rescue inhaler  He does continue with Anoro, Flovent, Singulair, taking Daliresp every other day due to the cost      Review of Systems   Constitutional: Negative  HENT: Negative  Eyes: Negative  Respiratory: Negative  Cardiovascular: Negative  Gastrointestinal: Negative  Endocrine: Negative  Genitourinary: Negative  Musculoskeletal: Negative  Allergic/Immunologic: Negative  Neurological: Negative  Hematological: Negative  Psychiatric/Behavioral: Negative  Smoking history: He reports that he quit smoking about 61 years ago  He started smoking about 72 years ago  He has a 10 00 pack-year smoking history   He has never used smokeless tobacco     The following portions of the patient's history were reviewed and updated as appropriate: allergies, current medications, past family history, past medical history, past social history, past surgical history and problem list     Immunization History   Administered Date(s) Administered    H1N1, All Formulations 01/26/2010    INFLUENZA 09/28/2018    Influenza Split High Dose Preservative Free IM 10/18/2013, 10/05/2015, 09/30/2016, 10/20/2017    Influenza, high dose seasonal 0 7 mL 10/12/2020    Influenza, seasonal, injectable 10/13/2011, 10/01/2014    Pneumococcal Conjugate 13-Valent 11/22/2017    Pneumococcal Polysaccharide PPV23 01/01/1998, 10/19/2005, 11/30/2020    SARS-CoV-2 / COVID-19 mRNA IM (Moderna) 01/26/2021, 03/02/2021    Zoster 10/01/2010    Zoster Vaccine Recombinant 01/03/2020, 10/12/2020     Current Outpatient Medications   Medication Sig Dispense Refill    acetaminophen (TYLENOL) 325 mg tablet Take 2 tablets (650 mg total) by mouth every 6 (six) hours as needed for mild pain 30 tablet 0    albuterol (2 5 mg/3 mL) 0 083 % nebulizer solution inhale one vial via nebulizer every 6 hours as needed for wheezing or shortness of breath 1080 mL 3    Anoro Ellipta 62 5-25 MCG/INH inhaler INHALE ONE PUFF BY MOUTH ONCE DAILY  60 each 5    Ascorbic Acid (VITAMIN C) 1000 MG tablet Take 1 tablet by mouth daily      BD PEN NEEDLE DON U/F 32G X 4 MM MISC       betamethasone, augmented, (DIPROLENE-AF) 0 05 % cream       Cinnamon 500 MG TABS Take 1 tablet by mouth daily        Entresto 24-26 MG TABS TAKE ONE TABLET BY MOUTH TWICE DAILY  180 tablet 0    Flovent  MCG/ACT inhaler INHALE ONE PUFF TWICE DAILY - rinse mouth after use 12 g 0    fluticasone (FLONASE) 50 mcg/act nasal spray USE TWO SPRAYS IN EACH NOSTRIL DAILY  16 g 0    gabapentin (NEURONTIN) 300 mg capsule TAKE ONE CAPSULE BY MOUTH TWICE DAILY  180 capsule 0    hydrOXYzine pamoate (VISTARIL) 25 mg capsule TAKE ONE CAPSULE BY MOUTH THREE TIMES DAILY AS NEEDED for itching 90 capsule 0    insulin detemir (LEVEMIR) 100 units/mL subcutaneous injection Inject 22 Units under the skin daily at bedtime       metoprolol succinate (TOPROL-XL) 50 mg 24 hr tablet TAKE TWO TABLETS BY MOUTH DAILY  180 tablet 0    montelukast (SINGULAIR) 10 mg tablet TAKE ONE TABLET BY MOUTH AT BEDTIME  90 tablet 3    Multiple Vitamins-Minerals (OCUVITE ADULT 50+ PO) Take 1 tablet by mouth daily      NOVOLOG FLEXPEN 100 units/mL injection pen Inject 5 Units under the skin daily with breakfast       ONE TOUCH ULTRA TEST test strip       ONETOUCH DELICA LANCETS FINE MISC       roflumilast (DALIRESP) 500 mcg tablet Take 1 tablet (500 mcg total) by mouth daily 30 tablet 5    simvastatin (ZOCOR) 10 mg tablet Take 1 tablet (10 mg total) by mouth daily 90 tablet 1    spironolactone (ALDACTONE) 25 mg tablet Take 1 tablet (25 mg total) by mouth every other day 90 tablet 3    VENTOLIN  (90 Base) MCG/ACT inhaler Inhale 2 puffs every 6 (six) hours as needed for wheezing 1 Inhaler 2     No current facility-administered medications for this visit  Allergies: Patient has no known allergies  Objective:  Vitals:    06/29/21 1146   BP: 124/70   Pulse: 73   Temp: (!) 96 6 °F (35 9 °C)   SpO2: 98%   Weight: 78 5 kg (173 lb)   Height: 6' 1" (1 854 m)   Oxygen Therapy  SpO2: 98 %    Wt Readings from Last 3 Encounters:   06/29/21 78 5 kg (173 lb)   06/21/21 78 5 kg (173 lb)   05/25/21 78 9 kg (174 lb)     Body mass index is 22 82 kg/m²  Physical Exam  Vitals and nursing note reviewed  Constitutional:       Appearance: He is well-developed  HENT:      Head: Normocephalic and atraumatic  Eyes:      Conjunctiva/sclera: Conjunctivae normal       Pupils: Pupils are equal, round, and reactive to light  Neck:      Thyroid: No thyromegaly  Vascular: No JVD  Cardiovascular:      Rate and Rhythm: Normal rate and regular rhythm  Heart sounds: Normal heart sounds  No murmur heard  No friction rub  No gallop  Pulmonary:      Effort: Pulmonary effort is normal  No respiratory distress  Breath sounds: Normal breath sounds  No wheezing or rales  Chest:      Chest wall: No tenderness  Musculoskeletal:         General: No tenderness or deformity  Normal range of motion  Cervical back: Normal range of motion and neck supple  Lymphadenopathy:      Cervical: No cervical adenopathy  Skin:     General: Skin is warm and dry  Neurological:      Mental Status: He is alert and oriented to person, place, and time  Diagnostics:  I have personally reviewed pertinent reports

## 2021-07-01 ENCOUNTER — TELEPHONE (OUTPATIENT)
Dept: PULMONOLOGY | Facility: CLINIC | Age: 86
End: 2021-07-01

## 2021-07-04 DIAGNOSIS — L29.9 PRURITUS: ICD-10-CM

## 2021-07-04 RX ORDER — HYDROXYZINE PAMOATE 25 MG/1
CAPSULE ORAL
Qty: 90 CAPSULE | Refills: 0 | Status: SHIPPED | OUTPATIENT
Start: 2021-07-04 | End: 2021-08-14

## 2021-07-12 ENCOUNTER — TELEPHONE (OUTPATIENT)
Dept: FAMILY MEDICINE CLINIC | Facility: MEDICAL CENTER | Age: 86
End: 2021-07-12

## 2021-07-12 DIAGNOSIS — G60.9 NEUROPATHY, IDIOPATHIC: ICD-10-CM

## 2021-07-12 DIAGNOSIS — R19.7 DIARRHEA, UNSPECIFIED TYPE: Primary | ICD-10-CM

## 2021-07-12 NOTE — TELEPHONE ENCOUNTER
Pt said he's still having bowel issues, he is taking the Imodium as you instructed him to do  He says he's been taking it off and on  He says he's having a regular bowel movement and then he'll go again and it's diarrhea  He's wondering if maybe he has some type of infection  Pt wondering if you need to see him again or refer?

## 2021-07-13 LAB
25(OH)D3 SERPL-MCNC: 25 NG/ML (ref 30–100)
ALBUMIN SERPL-MCNC: 4.6 G/DL (ref 3.6–5.1)
BASOPHILS # BLD AUTO: 207 CELLS/UL (ref 0–200)
BASOPHILS NFR BLD AUTO: 3.4 %
BUN SERPL-MCNC: 34 MG/DL (ref 7–25)
BUN/CREAT SERPL: 25 (CALC) (ref 6–22)
CALCIUM SERPL-MCNC: 9.6 MG/DL (ref 8.6–10.3)
CALCIUM SERPL-MCNC: 9.6 MG/DL (ref 8.6–10.3)
CHLORIDE SERPL-SCNC: 105 MMOL/L (ref 98–110)
CO2 SERPL-SCNC: 24 MMOL/L (ref 20–32)
CREAT SERPL-MCNC: 1.34 MG/DL (ref 0.7–1.11)
CREAT UR-MCNC: 88 MG/DL (ref 20–320)
EOSINOPHIL # BLD AUTO: 872 CELLS/UL (ref 15–500)
EOSINOPHIL NFR BLD AUTO: 14.3 %
ERYTHROCYTE [DISTWIDTH] IN BLOOD BY AUTOMATED COUNT: 23.6 % (ref 11–15)
GLUCOSE SERPL-MCNC: 62 MG/DL (ref 65–99)
HCT VFR BLD AUTO: 35.9 % (ref 38.5–50)
HGB BLD-MCNC: 11.3 G/DL (ref 13.2–17.1)
LYMPHOCYTES # BLD AUTO: 1373 CELLS/UL (ref 850–3900)
LYMPHOCYTES NFR BLD AUTO: 22.5 %
MAGNESIUM SERPL-MCNC: 1.8 MG/DL (ref 1.5–2.5)
MCH RBC QN AUTO: 28.5 PG (ref 27–33)
MCHC RBC AUTO-ENTMCNC: 31.5 G/DL (ref 32–36)
MCV RBC AUTO: 90.7 FL (ref 80–100)
MONOCYTES # BLD AUTO: 488 CELLS/UL (ref 200–950)
MONOCYTES NFR BLD AUTO: 8 %
NEUTROPHILS # BLD AUTO: 3160 CELLS/UL (ref 1500–7800)
NEUTROPHILS NFR BLD AUTO: 51.8 %
PHOSPHATE SERPL-MCNC: 3.6 MG/DL (ref 2.1–4.3)
PLATELET # BLD AUTO: 146 THOUSAND/UL (ref 140–400)
POTASSIUM SERPL-SCNC: 5.1 MMOL/L (ref 3.5–5.3)
PROT UR-MCNC: 13 MG/DL (ref 5–25)
PROT/CREAT UR: 0.15 MG/MG CREAT (ref 0.02–0.13)
PROT/CREAT UR: 148 MG/G CREAT (ref 22–128)
PTH-INTACT SERPL-MCNC: 34 PG/ML (ref 14–64)
RBC # BLD AUTO: 3.96 MILLION/UL (ref 4.2–5.8)
SL AMB EGFR AFRICAN AMERICAN: 54 ML/MIN/1.73M2
SL AMB EGFR NON AFRICAN AMERICAN: 47 ML/MIN/1.73M2
SODIUM SERPL-SCNC: 139 MMOL/L (ref 135–146)
WBC # BLD AUTO: 6.1 THOUSAND/UL (ref 3.8–10.8)

## 2021-07-13 RX ORDER — FLUTICASONE PROPIONATE 50 MCG
SPRAY, SUSPENSION (ML) NASAL
Qty: 16 G | Refills: 0 | Status: SHIPPED | OUTPATIENT
Start: 2021-07-13 | End: 2021-08-17

## 2021-07-14 NOTE — TELEPHONE ENCOUNTER
Would like to check stool for infection workup  If this is negative will refer GI    See me regarding orders

## 2021-07-16 ENCOUNTER — TELEPHONE (OUTPATIENT)
Dept: NEPHROLOGY | Facility: CLINIC | Age: 86
End: 2021-07-16

## 2021-07-16 ENCOUNTER — APPOINTMENT (OUTPATIENT)
Dept: LAB | Facility: MEDICAL CENTER | Age: 86
End: 2021-07-16
Payer: COMMERCIAL

## 2021-07-16 DIAGNOSIS — R19.7 DIARRHEA, UNSPECIFIED TYPE: ICD-10-CM

## 2021-07-16 DIAGNOSIS — E78.01 FAMILIAL HYPERCHOLESTEROLEMIA: ICD-10-CM

## 2021-07-16 PROCEDURE — 87505 NFCT AGENT DETECTION GI: CPT

## 2021-07-16 PROCEDURE — 87177 OVA AND PARASITES SMEARS: CPT

## 2021-07-16 PROCEDURE — 89055 LEUKOCYTE ASSESSMENT FECAL: CPT

## 2021-07-16 PROCEDURE — 87209 SMEAR COMPLEX STAIN: CPT

## 2021-07-16 RX ORDER — MELATONIN
1000 DAILY
COMMUNITY

## 2021-07-16 NOTE — TELEPHONE ENCOUNTER
Pt advised that kidney function is stable  Vitamin D low, to start Vitamin D 1000 Units daily  Recheck labs and followup in six months           ----- Message from Imelda De Leon MD sent at 7/15/2021  4:23 PM EDT -----  Please inform patient that:  1  His kidney function is stable  2  Vitamin D level is low - I would like for him to start Vitamin D3 1000 units daily  3  Since his kidney function is stable - we can defer follow up for another 6 months  4  Please place him on the recall list for January 2022 and have him do a RFP in January 2022 prior to follow up

## 2021-07-17 LAB
C DIFF TOX GENS STL QL NAA+PROBE: NEGATIVE
CAMPYLOBACTER DNA SPEC NAA+PROBE: NORMAL
G LAMBLIA AG STL QL IA: NEGATIVE
SALMONELLA DNA SPEC QL NAA+PROBE: NORMAL
SHIGA TOXIN STX GENE SPEC NAA+PROBE: NORMAL
SHIGELLA DNA SPEC QL NAA+PROBE: NORMAL

## 2021-07-17 RX ORDER — SIMVASTATIN 10 MG
TABLET ORAL
Qty: 90 TABLET | Refills: 0 | Status: SHIPPED | OUTPATIENT
Start: 2021-07-17 | End: 2021-10-15 | Stop reason: SDUPTHER

## 2021-07-19 LAB — WBC SPEC QL GRAM STN: NORMAL

## 2021-07-21 LAB — O+P STL CONC: NORMAL

## 2021-07-22 ENCOUNTER — TELEPHONE (OUTPATIENT)
Dept: FAMILY MEDICINE CLINIC | Facility: MEDICAL CENTER | Age: 86
End: 2021-07-22

## 2021-07-22 DIAGNOSIS — R19.7 DIARRHEA, UNSPECIFIED TYPE: Primary | ICD-10-CM

## 2021-07-22 NOTE — TELEPHONE ENCOUNTER
----- Message from Loan Pinon MD sent at 7/22/2021 11:55 AM EDT -----  Notify stool studies all negative    We had bring him to GI if persistent symptoms; does he want referral

## 2021-07-28 DIAGNOSIS — J44.1 CHRONIC OBSTRUCTIVE PULMONARY DISEASE WITH ACUTE EXACERBATION (HCC): ICD-10-CM

## 2021-07-30 RX ORDER — FLUTICASONE PROPIONATE 220 UG/1
AEROSOL, METERED RESPIRATORY (INHALATION)
Qty: 12 G | Refills: 0 | Status: SHIPPED | OUTPATIENT
Start: 2021-07-30 | End: 2021-12-01

## 2021-08-12 ENCOUNTER — TELEPHONE (OUTPATIENT)
Dept: NEPHROLOGY | Facility: CLINIC | Age: 86
End: 2021-08-12

## 2021-08-12 NOTE — TELEPHONE ENCOUNTER
Dr Asher Serrato office called to speak to Dr Abbey Esquivel would like to speak to Dr Abbey Loomis regarding a medication for the patient  Best called back number is [phone] [facility] [id number]

## 2021-08-14 DIAGNOSIS — L29.9 PRURITUS: ICD-10-CM

## 2021-08-14 DIAGNOSIS — I50.43 ACUTE ON CHRONIC COMBINED SYSTOLIC AND DIASTOLIC CONGESTIVE HEART FAILURE (HCC): ICD-10-CM

## 2021-08-14 RX ORDER — HYDROXYZINE PAMOATE 25 MG/1
CAPSULE ORAL
Qty: 90 CAPSULE | Refills: 0 | Status: SHIPPED | OUTPATIENT
Start: 2021-08-14 | End: 2021-09-28

## 2021-08-15 DIAGNOSIS — G60.9 NEUROPATHY, IDIOPATHIC: ICD-10-CM

## 2021-08-17 ENCOUNTER — OFFICE VISIT (OUTPATIENT)
Dept: CARDIOLOGY CLINIC | Facility: MEDICAL CENTER | Age: 86
End: 2021-08-17
Payer: COMMERCIAL

## 2021-08-17 VITALS
DIASTOLIC BLOOD PRESSURE: 56 MMHG | HEIGHT: 73 IN | HEART RATE: 72 BPM | BODY MASS INDEX: 23.07 KG/M2 | SYSTOLIC BLOOD PRESSURE: 128 MMHG | WEIGHT: 174.1 LBS | OXYGEN SATURATION: 97 %

## 2021-08-17 DIAGNOSIS — I50.42 CHRONIC COMBINED SYSTOLIC AND DIASTOLIC CONGESTIVE HEART FAILURE (HCC): ICD-10-CM

## 2021-08-17 DIAGNOSIS — E83.9 CHRONIC KIDNEY DISEASE-MINERAL AND BONE DISORDER: ICD-10-CM

## 2021-08-17 DIAGNOSIS — M89.9 CHRONIC KIDNEY DISEASE-MINERAL AND BONE DISORDER: ICD-10-CM

## 2021-08-17 DIAGNOSIS — I12.9 BENIGN HYPERTENSION WITH CHRONIC KIDNEY DISEASE, STAGE III (HCC): ICD-10-CM

## 2021-08-17 DIAGNOSIS — N18.30 BENIGN HYPERTENSION WITH CHRONIC KIDNEY DISEASE, STAGE III (HCC): ICD-10-CM

## 2021-08-17 DIAGNOSIS — N18.9 CHRONIC KIDNEY DISEASE-MINERAL AND BONE DISORDER: ICD-10-CM

## 2021-08-17 DIAGNOSIS — I42.0 DILATED CARDIOMYOPATHY (HCC): Primary | ICD-10-CM

## 2021-08-17 PROCEDURE — 99214 OFFICE O/P EST MOD 30 MIN: CPT | Performed by: INTERNAL MEDICINE

## 2021-08-17 PROCEDURE — 1036F TOBACCO NON-USER: CPT | Performed by: INTERNAL MEDICINE

## 2021-08-17 PROCEDURE — 1160F RVW MEDS BY RX/DR IN RCRD: CPT | Performed by: INTERNAL MEDICINE

## 2021-08-17 RX ORDER — SPIRONOLACTONE 25 MG/1
TABLET ORAL
Qty: 90 TABLET | Refills: 0 | Status: SHIPPED | OUTPATIENT
Start: 2021-08-17 | End: 2021-09-28

## 2021-08-17 RX ORDER — FLUTICASONE PROPIONATE 50 MCG
SPRAY, SUSPENSION (ML) NASAL
Qty: 16 G | Refills: 0 | Status: SHIPPED | OUTPATIENT
Start: 2021-08-17 | End: 2021-09-30

## 2021-08-17 NOTE — TELEPHONE ENCOUNTER
Talked to Dr Martyn Dandy  He wants to start patient on [address]  Will have Mr Yuridia Carter decrease Spironolactone to 12 5 mg QOD and repeat BMP in 2 weeks

## 2021-08-17 NOTE — PROGRESS NOTES
Cardiology Follow Up    Raz Gao  1933  [id number]  [facility] [id number] [facility]  [phone]    1  Dilated cardiomyopathy ([address] )     2  Chronic combined systolic and diastolic congestive heart failure ([address] )     3  Benign hypertension with chronic kidney disease, stage III ([facility] [address] )     4  Chronic kidney disease-mineral and bone disorder         Diagnoses and all orders for this visit:    Dilated cardiomyopathy ([facility] [address] )    Chronic combined systolic and diastolic congestive heart failure ([facility] [address] )    Benign hypertension with chronic kidney disease, stage III (HCC)    Chronic kidney disease-mineral and bone disorder    Other orders  -     Dapagliflozin Propanediol 5 MG TABS; Take 5 mg by mouth daily    I had the pleasure of seeing Raz Gao for a follow up visit  Interval History: None    History of the presenting illness, Discussion/Summary and My Plan are as follows:::    80year-old without any prior cardiac history-including CAD, MI, bypass, stents or valvular heart disease or family history of cardiomyopathy or coronary artery disease but with a left bundle-branch block since 2013, also with a nonischemic cardiomyopathy (negative coronary angiography-May 2018)-diagnosed in May 2018 in the setting of acute congestive heart failure-probably viral-or originally with an ejection fraction of 35-40%, subsequently on ACE-inhibitor and beta-blocker, decreased to 20%, admitted with another episode of acute CHF-November 2018, initiated on Entresto, after which ejection fraction normalized to 55%-February 2019  He is currently on an excellent regimen and has stayed out of the hospital for 2 years      He denies any orthopnea or dyspnea with exertion or edema  He is playing golf in the summer, does yd work, lives in a 3 story house and walks  No symptoms      Exam demonstrates NO e/o CHF     Plan:     Nonischemic Cardiomyopathy and chronic systolic congestive heart failure:  Nonischemic (negative coronary angiography-May 2018), no alcohol use or chemotherapy  No family history  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-was  treated for symptoms of bronchitis for few months prior to May 2018  Now on an excellent medical regimen including a beta-blocker, Entresto and Aldactone (every other day due to persistent hyperkalemia),  Ejection fraction decreased from 37%-May 2018 to 20-25%-September 2018-while on a good dose of ACE-inhibitor and beta-blocker, admitted with acute CHF-November 2018, then initiated on Entresto-ejection fraction improved to normal-55%-February 2019  No e/o Vol Ol on Exam  Also has COPD  Not on any diuretics    Dry weight at home-172-174 lb and stable, 174 here     Hypertension:  Controlled     CKD: Baseline creat around 1 4, follows with Dr Anjali Petit (Nephrology)     Dyslipidemia:  On low-dose statin  He is also diabetic      Diabetes: Followed by an endocrinologist at [place] [address]  Now on Farxiga     Follow-up in 9 months    Results for Juan Antonio Mckinney (MRN [id number]) as of 8/17/2021 10:40   Ref  Range 3/21/2013 07:53 7/23/2013 08:15   Hemoglobin A1C Latest Ref Range: <5 7 % of total Hgb 7 3 (H) 7 6 (H)     Results for Juan Antonio Mckinney (MRN [id number]) as of 8/17/2021 10:40   Ref  Range 5/7/2021 09:50 7/10/2021 09:12   BUN Latest Ref Range: 7 - 25 mg/dL 33 (H) 34 (H)   Creatinine Latest Ref Range: 0 70 - 1 11 mg/dL 1 64 (H) 1 34 (H)       Results for Juan Antonio Mckinney (MRN [id number]) as of 8/17/2021 10:40   Ref   Range 10/15/2019 09:30 12/7/2020 10:51   Cholesterol Latest Ref Range: <200 mg/dL 132 112   Triglycerides Latest Ref Range: <150 mg/dL 72 52   HDL Latest Ref Range: > OR = 40 mg/dL 42 39 (L)   Non-HDL Cholesterol Latest Ref Range: <130 mg/dL (calc) 90 73   LDL Calculated Latest Units: mg/dL (calc) 75 60   Chol HDLC Ratio Latest Ref Range: <5 0 (calc) 3 1 2 9 Patient Active Problem List   Diagnosis    COPD without exacerbation ([place] [address] )    Benign hypertension with chronic kidney disease, stage III ([facility]    Hyperlipidemia    Neuropathy, idiopathic    Multiple nodules of lung    Mixed simple and mucopurulent chronic bronchitis (HCC)    Type 2 diabetes mellitus with diabetic neuropathy, with long-term current use of insulin (HCC)    Dilated cardiomyopathy (HCC)    Chronic combined systolic and diastolic congestive heart failure (HCC)    Abnormal chest CT    Arthritis    Paresthesia of both feet    Finger pain, left    Cough    Urinary retention    Benign prostatic hyperplasia (BPH) with straining on urination    Anemia    CKD (chronic kidney disease), stage III ([facility])    Hyperkalemia    Chronic kidney disease-mineral and bone disorder    Other proteinuria    MDS (myelodysplastic syndrome), low grade ([facility])     Past Medical History:   Diagnosis Date    COPD (chronic obstructive pulmonary disease) ([place] [address] )     Diabetes mellitus ([place] [address] )     Type 2    Essential hypertension     Heart failure ([facility])     Hyperlipidemia     Left inguinal hernia     Lung nodule     Malignant melanoma of skin ([place] [address] )      Social History     Socioeconomic History    Marital status:       Spouse name: Not on file    Number of children: 2    Years of education: Not on file    Highest education level: Not on file   Occupational History    Occupation: rtired   Tobacco Use    Smoking status: Former Smoker     Packs/day: 1 00     Years: 10 00     Pack years: 10 00     Start date:      Quit date:      Years since quittin 6    Smokeless tobacco: Never Used   Vaping Use    Vaping Use: Never used   Substance and Sexual Activity    Alcohol use: Not Currently     Alcohol/week: 0 0 standard drinks    Drug use: No    Sexual activity: Not on file   Other Topics Concern    Not on file   Social History Narrative    Caffeine use, active     Social Determinants of Health     Financial Resource Strain:     Difficulty of Paying Living Expenses:    Food Insecurity:     Worried About Running Out of Food in the Last Year:     [address] in the Last Year:    Transportation Needs:     Lack of Transportation (Medical):      Lack of Transportation (Non-Medical):    Physical Activity:     Days of Exercise per Week:     Minutes of Exercise per Session:    Stress:     Feeling of Stress :    Social Connections:     Frequency of Communication with Friends and Family:     Frequency of Social Gatherings with Friends and Family:     Attends [employer] Services:     Active Member of Clubs or Organizations:     Attends Club or Organization Meetings:     Marital Status:    Intimate Partner Violence:     Fear of Current or Ex-Partner:     Emotionally Abused:     Physically Abused:     Sexually Abused:       Family History   Problem Relation Age of Onset    Diabetes Mother     Heart attack Neg Hx     Stroke Neg Hx     Aneurysm Neg Hx     Clotting disorder Neg Hx     Arrhythmia Neg Hx     Hypertension Neg Hx     Hyperlipidemia Neg Hx         pt unsure      Past Surgical History:   Procedure Laterality Date    APPENDECTOMY      CATARACT EXTRACTION, BILATERAL      CHOLECYSTECTOMY      CHOLECYSTECTOMY      COLONOSCOPY  2014    Fiberoptic    HERNIA REPAIR      KNEE ARTHROSCOPY Bilateral     Therapeutic    MD CYSTOURETHRO W/IMPLANT N/A 3/22/2019    Procedure: CYSTOSCOPY WITH INSERTION Marco Fonseca;  Surgeon: Lawson Marie MD;  Location: [facility];  Service: Urology       Current Outpatient Medications:     acetaminophen (TYLENOL) 325 mg tablet, Take 2 tablets (650 mg total) by mouth every 6 (six) hours as needed for mild pain, Disp: 30 tablet, Rfl: 0    albuterol (2 5 mg/3 mL) 0 083 % nebulizer solution, Take 3 mL (2 5 mg total) by nebulization every 6 (six) hours as needed for wheezing or shortness of breath, Disp: 1080 mL, Rfl: 3    Anoro Ellipta 62 5-25 MCG/INH inhaler, INHALE ONE PUFF BY MOUTH ONCE DAILY , Disp: 60 blister, Rfl: 5    Ascorbic Acid (VITAMIN C) 1000 MG tablet, Take 1 tablet by mouth daily, Disp: , Rfl:     BD PEN NEEDLE DON U/F 32G X 4 MM MISC, , Disp: , Rfl:     betamethasone, augmented, (DIPROLENE-AF) 0 05 % cream, , Disp: , Rfl:     cholecalciferol (VITAMIN D3) 1,000 units tablet, Take 1,000 Units by mouth daily, Disp: , Rfl:     Cinnamon 500 MG TABS, Take 1 tablet by mouth daily  , Disp: , Rfl:     Dapagliflozin Propanediol 5 MG TABS, Take 5 mg by mouth daily, Disp: , Rfl:     Entresto 24-26 MG TABS, TAKE ONE TABLET BY MOUTH TWICE DAILY , Disp: 180 tablet, Rfl: 0    Flovent  MCG/ACT inhaler, INHALE TWO PUFFS BY MOUTH DAILY, rinse mouth after use , Disp: 12 g, Rfl: 0    fluticasone (FLONASE) 50 mcg/act nasal spray, USE TWO SPRAYS IN EACH NOSTRIL DAILY , Disp: 16 g, Rfl: 0    insulin detemir (LEVEMIR) 100 units/mL subcutaneous injection, Inject 22 Units under the skin daily at bedtime , Disp: , Rfl:     metoprolol succinate (TOPROL-XL) 50 mg 24 hr tablet, TAKE TWO TABLETS BY MOUTH DAILY , Disp: 180 tablet, Rfl: 0    montelukast (SINGULAIR) 10 mg tablet, TAKE ONE TABLET BY MOUTH AT BEDTIME , Disp: 90 tablet, Rfl: 3    Multiple Vitamins-Minerals (OCUVITE ADULT 50+ PO), Take 1 tablet by mouth daily, Disp: , Rfl:     NOVOLOG FLEXPEN 100 units/mL injection pen, Inject 5 Units under the skin daily with breakfast , Disp: , Rfl:     ONE TOUCH ULTRA TEST test strip, , Disp: , Rfl:     ONETOUCH DELICA LANCETS FINE MISC, , Disp: , Rfl:     roflumilast (DALIRESP) 500 mcg tablet, Take 1 tablet (500 mcg total) by mouth daily, Disp: 30 tablet, Rfl: 5    simvastatin (ZOCOR) 10 mg tablet, TAKE ONE TABLET BY MOUTH ONCE DAILY , Disp: 90 tablet, Rfl: 0    spironolactone (ALDACTONE) 25 mg tablet, Take 1 tablet (25 mg total) by mouth every other day, Disp: 90 tablet, Rfl: 3    VENTOLIN  (90 Base) MCG/ACT inhaler, Inhale 2 puffs every 6 (six) hours as needed for wheezing, Disp: 1 Inhaler, Rfl: 2    gabapentin (NEURONTIN) 300 mg capsule, TAKE ONE CAPSULE BY MOUTH TWICE DAILY  (Patient not taking: Reported on 8/17/2021), Disp: 180 capsule, Rfl: 0    hydrOXYzine pamoate (VISTARIL) 25 mg capsule, TAKE ONE CAPSULE BY MOUTH THREE TIMES DAILY AS NEEDED FOR ITCHING  (Patient not taking: Reported on 8/17/2021), Disp: 90 capsule, Rfl: 0  No Known Allergies    Imaging: No results found  Review of Systems:  Review of Systems   Constitutional: Negative  HENT: Negative  Eyes: Negative  Respiratory: Negative  Cardiovascular: Negative  Endocrine: Negative  Musculoskeletal: Negative  Hematological: Negative  Physical Exam:  /56 (BP Location: Left arm, Patient Position: Sitting, Cuff Size: Adult)   Pulse 72   Ht 6' 1" (1 854 m)   Wt 79 kg (174 lb 1 6 oz)   SpO2 97%   BMI 22 97 kg/m²   Physical Exam  Constitutional:       General: He is not in acute distress  Appearance: He is well-developed  He is not diaphoretic  HENT:      Head: Normocephalic and atraumatic  Eyes:      General: No scleral icterus  Right eye: No discharge  Left eye: No discharge  Conjunctiva/sclera: Conjunctivae normal       Pupils: Pupils are equal, round, and reactive to light  Neck:      Thyroid: No thyromegaly  Vascular: No JVD  Trachea: No tracheal deviation  Cardiovascular:      Rate and Rhythm: Normal rate and regular rhythm  Heart sounds: No murmur (Short systolic murmur-TR) heard  No friction rub  No gallop  Pulmonary:      Effort: Pulmonary effort is normal  No respiratory distress  Breath sounds: Normal breath sounds  No stridor  No wheezing  Abdominal:      General: Bowel sounds are normal  There is no distension  Palpations: Abdomen is soft  Tenderness: There is no abdominal tenderness  Musculoskeletal:         General: No tenderness or deformity  Normal range of motion  Cervical back: Normal range of motion  Skin:     General: Skin is warm  Coloration: Skin is not pale  Findings: No erythema or rash

## 2021-08-18 ENCOUNTER — DOCUMENTATION (OUTPATIENT)
Dept: NEPHROLOGY | Facility: CLINIC | Age: 86
End: 2021-08-18

## 2021-08-18 ENCOUNTER — TELEPHONE (OUTPATIENT)
Dept: NEPHROLOGY | Facility: CLINIC | Age: 86
End: 2021-08-18

## 2021-08-18 DIAGNOSIS — N18.31 CHRONIC KIDNEY DISEASE (CKD) STAGE G3A/A2, MODERATELY DECREASED GLOMERULAR FILTRATION RATE (GFR) BETWEEN 45-59 ML/MIN/1.73 SQUARE METER AND ALBUMINURIA CREATININE RATIO BETWEEN 30-299 MG/G (HCC): Primary | ICD-10-CM

## 2021-08-18 NOTE — TELEPHONE ENCOUNTER
Pt advised to decrease Spironolactone to   12 5 mg  Every other day per Dr Jesus Chau (Dr Mikaela Schrader added [place])  Repeat BMP in two weeks  Order mailed to patient as he goes to  [place]         ----- Message from Maru Ramirez MD sent at 8/17/2021  2:46 PM EDT -----  Please call patient and have him decrease Spironolactone to 12 5 mg (half of 25 mg tab) every other day  This is because Dr Mikaela Schrader added [place]  Please have him repeat a BMP in 2 weeks

## 2021-09-02 ENCOUNTER — TELEPHONE (OUTPATIENT)
Dept: NEPHROLOGY | Facility: CLINIC | Age: 86
End: 2021-09-02

## 2021-09-02 LAB
BUN SERPL-MCNC: 38 MG/DL (ref 7–25)
BUN/CREAT SERPL: 27 (CALC) (ref 6–22)
CALCIUM SERPL-MCNC: 9 MG/DL (ref 8.6–10.3)
CHLORIDE SERPL-SCNC: 104 MMOL/L (ref 98–110)
CO2 SERPL-SCNC: 27 MMOL/L (ref 20–32)
CREAT SERPL-MCNC: 1.43 MG/DL (ref 0.7–1.11)
GLUCOSE SERPL-MCNC: 267 MG/DL (ref 65–139)
POTASSIUM SERPL-SCNC: 4.9 MMOL/L (ref 3.5–5.3)
SL AMB EGFR AFRICAN AMERICAN: 50 ML/MIN/1.73M2
SL AMB EGFR NON AFRICAN AMERICAN: 43 ML/MIN/1.73M2
SODIUM SERPL-SCNC: 137 MMOL/L (ref 135–146)

## 2021-09-02 NOTE — TELEPHONE ENCOUNTER
Pt advised that recent labs of 9/1/21 showed stable kidney function per Dr Hunter Peng     ----- Message from Claudia Graff MD sent at 9/2/2021  8:03 AM EDT -----  Please inform patient that kidney function is stable based on latest lab tests (9/1/21)

## 2021-09-04 DIAGNOSIS — I50.43 ACUTE ON CHRONIC COMBINED SYSTOLIC AND DIASTOLIC CONGESTIVE HEART FAILURE (HCC): ICD-10-CM

## 2021-09-04 DIAGNOSIS — I42.0 DILATED CARDIOMYOPATHY (HCC): ICD-10-CM

## 2021-09-08 RX ORDER — METOPROLOL SUCCINATE 50 MG/1
TABLET, EXTENDED RELEASE ORAL
Qty: 180 TABLET | Refills: 0 | Status: SHIPPED | OUTPATIENT
Start: 2021-09-08 | End: 2021-12-27 | Stop reason: SDUPTHER

## 2021-09-08 RX ORDER — SACUBITRIL AND VALSARTAN 24; 26 MG/1; MG/1
TABLET, FILM COATED ORAL
Qty: 180 TABLET | Refills: 0 | Status: SHIPPED | OUTPATIENT
Start: 2021-09-08 | End: 2021-12-29

## 2021-09-15 ENCOUNTER — TELEPHONE (OUTPATIENT)
Dept: NEPHROLOGY | Facility: CLINIC | Age: 86
End: 2021-09-15

## 2021-09-15 NOTE — TELEPHONE ENCOUNTER
[facility] , Dr Janay Mathis office, called to let us know that the patient's creatinine and potassium is elevated based on his BMP on 9/1  They thinking he might need an adjustment on his diuretics since patient is on [place]  They told the patient we would reach out with any recommendations we may have

## 2021-09-16 DIAGNOSIS — G62.9 NEUROPATHY: ICD-10-CM

## 2021-09-19 RX ORDER — GABAPENTIN 300 MG/1
CAPSULE ORAL
Qty: 180 CAPSULE | Refills: 0 | Status: SHIPPED | OUTPATIENT
Start: 2021-09-19 | End: 2021-12-21 | Stop reason: SDUPTHER

## 2021-09-22 NOTE — TELEPHONE ENCOUNTER
Spoke to Dr Gus Biggs  BMP done on 9/14/21 showed creatinine of 1 7 and K of 5 4  Patient is due for repeat labs this week  Will monitor for now without changes  Would try to keep on MRA in light of CHF

## 2021-09-28 ENCOUNTER — DOCUMENTATION (OUTPATIENT)
Dept: NEPHROLOGY | Facility: CLINIC | Age: 86
End: 2021-09-28

## 2021-09-28 ENCOUNTER — TELEPHONE (OUTPATIENT)
Dept: NEPHROLOGY | Facility: CLINIC | Age: 86
End: 2021-09-28

## 2021-09-28 DIAGNOSIS — L29.9 PRURITUS: ICD-10-CM

## 2021-09-28 DIAGNOSIS — E87.5 HYPERKALEMIA: Primary | ICD-10-CM

## 2021-09-28 LAB
EXT GLUCOSE BLD: 157
EXTERNAL ANION GAP: 5
EXTERNAL BUN: 40
EXTERNAL CALCIUM: 9.7
EXTERNAL CHLORIDE: 108
EXTERNAL CO2: 25
EXTERNAL CREATININE: 1.54
EXTERNAL EGFR: 40
EXTERNAL POTASSIUM: 5.6
EXTERNAL SODIUM: 138

## 2021-09-28 RX ORDER — HYDROXYZINE PAMOATE 25 MG/1
CAPSULE ORAL
Qty: 90 CAPSULE | Refills: 0 | Status: SHIPPED | OUTPATIENT
Start: 2021-09-28 | End: 2021-11-11

## 2021-09-28 NOTE — TELEPHONE ENCOUNTER
Pt advised to stop Spironolactone and repeat BMP per Dr Jd Holloway  (K 5 6)  Lab order mailed to pt

## 2021-09-28 NOTE — TELEPHONE ENCOUNTER
Denis Schofield from [facility] called states patients potassium level is at 5 6  Denis Schofield is faxing over results

## 2021-09-30 DIAGNOSIS — G60.9 NEUROPATHY, IDIOPATHIC: ICD-10-CM

## 2021-09-30 RX ORDER — FLUTICASONE PROPIONATE 50 MCG
SPRAY, SUSPENSION (ML) NASAL
Qty: 16 G | Refills: 0 | Status: SHIPPED | OUTPATIENT
Start: 2021-09-30 | End: 2021-10-04

## 2021-10-02 DIAGNOSIS — G60.9 NEUROPATHY, IDIOPATHIC: ICD-10-CM

## 2021-10-04 ENCOUNTER — TELEPHONE (OUTPATIENT)
Dept: FAMILY MEDICINE CLINIC | Facility: MEDICAL CENTER | Age: 86
End: 2021-10-04

## 2021-10-04 LAB
BUN SERPL-MCNC: 31 MG/DL (ref 7–25)
BUN/CREAT SERPL: 20 (CALC) (ref 6–22)
CALCIUM SERPL-MCNC: 9.4 MG/DL (ref 8.6–10.3)
CHLORIDE SERPL-SCNC: 104 MMOL/L (ref 98–110)
CO2 SERPL-SCNC: 26 MMOL/L (ref 20–32)
CREAT SERPL-MCNC: 1.54 MG/DL (ref 0.7–1.11)
GLUCOSE SERPL-MCNC: 125 MG/DL (ref 65–99)
POTASSIUM SERPL-SCNC: 5 MMOL/L (ref 3.5–5.3)
SL AMB EGFR AFRICAN AMERICAN: 46 ML/MIN/1.73M2
SL AMB EGFR NON AFRICAN AMERICAN: 40 ML/MIN/1.73M2
SODIUM SERPL-SCNC: 139 MMOL/L (ref 135–146)

## 2021-10-04 RX ORDER — FLUTICASONE PROPIONATE 50 MCG
SPRAY, SUSPENSION (ML) NASAL
Qty: 16 G | Refills: 0 | Status: SHIPPED | OUTPATIENT
Start: 2021-10-04 | End: 2021-11-04

## 2021-10-06 ENCOUNTER — TELEPHONE (OUTPATIENT)
Dept: NEPHROLOGY | Facility: CLINIC | Age: 86
End: 2021-10-06

## 2021-10-14 ENCOUNTER — CONSULT (OUTPATIENT)
Dept: GASTROENTEROLOGY | Facility: AMBULARY SURGERY CENTER | Age: 86
End: 2021-10-14
Payer: COMMERCIAL

## 2021-10-14 VITALS
BODY MASS INDEX: 22.26 KG/M2 | SYSTOLIC BLOOD PRESSURE: 118 MMHG | HEIGHT: 73 IN | DIASTOLIC BLOOD PRESSURE: 60 MMHG | WEIGHT: 168 LBS

## 2021-10-14 DIAGNOSIS — R19.7 DIARRHEA, UNSPECIFIED TYPE: ICD-10-CM

## 2021-10-14 PROCEDURE — 1036F TOBACCO NON-USER: CPT | Performed by: INTERNAL MEDICINE

## 2021-10-14 PROCEDURE — 99204 OFFICE O/P NEW MOD 45 MIN: CPT | Performed by: INTERNAL MEDICINE

## 2021-10-15 DIAGNOSIS — E78.01 FAMILIAL HYPERCHOLESTEROLEMIA: ICD-10-CM

## 2021-10-15 RX ORDER — SIMVASTATIN 10 MG
10 TABLET ORAL DAILY
Qty: 90 TABLET | Refills: 1 | Status: SHIPPED | OUTPATIENT
Start: 2021-10-15 | End: 2022-03-31 | Stop reason: SDUPTHER

## 2021-10-26 LAB — TSH SERPL-ACNC: 2.39 MIU/L (ref 0.4–4.5)

## 2021-11-01 DIAGNOSIS — J44.1 COPD WITH ACUTE EXACERBATION (HCC): ICD-10-CM

## 2021-11-04 ENCOUNTER — TELEPHONE (OUTPATIENT)
Dept: NEPHROLOGY | Facility: CLINIC | Age: 86
End: 2021-11-04

## 2021-11-04 DIAGNOSIS — G60.9 NEUROPATHY, IDIOPATHIC: ICD-10-CM

## 2021-11-04 RX ORDER — FLUTICASONE PROPIONATE 50 MCG
SPRAY, SUSPENSION (ML) NASAL
Qty: 16 G | Refills: 0 | Status: SHIPPED | OUTPATIENT
Start: 2021-11-04 | End: 2021-12-08

## 2021-11-05 ENCOUNTER — DOCUMENTATION (OUTPATIENT)
Dept: NEPHROLOGY | Facility: CLINIC | Age: 86
End: 2021-11-05

## 2021-11-05 LAB
EXT GLUCOSE BLD: 140
EXTERNAL BUN: 43
EXTERNAL CALCIUM: 9.4
EXTERNAL CHLORIDE: 100
EXTERNAL CO2: 25
EXTERNAL CREATININE: 1.94
EXTERNAL EGFR: 30
EXTERNAL POTASSIUM: 5
EXTERNAL SODIUM: 135

## 2021-11-11 DIAGNOSIS — L29.9 PRURITUS: ICD-10-CM

## 2021-11-11 RX ORDER — HYDROXYZINE PAMOATE 25 MG/1
CAPSULE ORAL
Qty: 90 CAPSULE | Refills: 0 | Status: SHIPPED | OUTPATIENT
Start: 2021-11-11 | End: 2021-12-29

## 2021-11-26 LAB
BUN SERPL-MCNC: 40 MG/DL (ref 7–25)
BUN/CREAT SERPL: 24 (CALC) (ref 6–22)
CALCIUM SERPL-MCNC: 9.5 MG/DL (ref 8.6–10.3)
CHLORIDE SERPL-SCNC: 105 MMOL/L (ref 98–110)
CO2 SERPL-SCNC: 28 MMOL/L (ref 20–32)
CREAT SERPL-MCNC: 1.69 MG/DL (ref 0.7–1.11)
GLUCOSE SERPL-MCNC: 148 MG/DL (ref 65–99)
POTASSIUM SERPL-SCNC: 4.9 MMOL/L (ref 3.5–5.3)
SL AMB EGFR AFRICAN AMERICAN: 41 ML/MIN/1.73M2
SL AMB EGFR NON AFRICAN AMERICAN: 35 ML/MIN/1.73M2
SODIUM SERPL-SCNC: 139 MMOL/L (ref 135–146)

## 2021-12-01 ENCOUNTER — TELEPHONE (OUTPATIENT)
Dept: GASTROENTEROLOGY | Facility: CLINIC | Age: 86
End: 2021-12-01

## 2021-12-01 DIAGNOSIS — J44.1 CHRONIC OBSTRUCTIVE PULMONARY DISEASE WITH ACUTE EXACERBATION (HCC): ICD-10-CM

## 2021-12-01 RX ORDER — FLUTICASONE PROPIONATE 220 UG/1
AEROSOL, METERED RESPIRATORY (INHALATION)
Qty: 12 G | Refills: 0 | Status: SHIPPED | OUTPATIENT
Start: 2021-12-01 | End: 2022-03-29 | Stop reason: SDUPTHER

## 2021-12-08 DIAGNOSIS — G60.9 NEUROPATHY, IDIOPATHIC: ICD-10-CM

## 2021-12-08 RX ORDER — FLUTICASONE PROPIONATE 50 MCG
SPRAY, SUSPENSION (ML) NASAL
Qty: 16 G | Refills: 0 | Status: SHIPPED | OUTPATIENT
Start: 2021-12-08 | End: 2021-12-21 | Stop reason: SDUPTHER

## 2021-12-10 ENCOUNTER — OFFICE VISIT (OUTPATIENT)
Dept: NEPHROLOGY | Facility: CLINIC | Age: 86
End: 2021-12-10
Payer: COMMERCIAL

## 2021-12-10 VITALS
WEIGHT: 168 LBS | HEIGHT: 73 IN | BODY MASS INDEX: 22.26 KG/M2 | HEART RATE: 68 BPM | SYSTOLIC BLOOD PRESSURE: 120 MMHG | DIASTOLIC BLOOD PRESSURE: 78 MMHG

## 2021-12-10 DIAGNOSIS — E83.9 CHRONIC KIDNEY DISEASE-MINERAL AND BONE DISORDER: ICD-10-CM

## 2021-12-10 DIAGNOSIS — I12.9 BENIGN HYPERTENSION WITH CHRONIC KIDNEY DISEASE, STAGE III (HCC): ICD-10-CM

## 2021-12-10 DIAGNOSIS — R80.8 OTHER PROTEINURIA: ICD-10-CM

## 2021-12-10 DIAGNOSIS — M89.9 CHRONIC KIDNEY DISEASE-MINERAL AND BONE DISORDER: ICD-10-CM

## 2021-12-10 DIAGNOSIS — N18.32 STAGE 3B CHRONIC KIDNEY DISEASE (HCC): Primary | ICD-10-CM

## 2021-12-10 DIAGNOSIS — E87.5 HYPERKALEMIA: ICD-10-CM

## 2021-12-10 DIAGNOSIS — N18.30 BENIGN HYPERTENSION WITH CHRONIC KIDNEY DISEASE, STAGE III (HCC): ICD-10-CM

## 2021-12-10 DIAGNOSIS — I50.42 CHRONIC COMBINED SYSTOLIC AND DIASTOLIC CONGESTIVE HEART FAILURE (HCC): ICD-10-CM

## 2021-12-10 DIAGNOSIS — N18.9 CHRONIC KIDNEY DISEASE-MINERAL AND BONE DISORDER: ICD-10-CM

## 2021-12-10 PROCEDURE — 99214 OFFICE O/P EST MOD 30 MIN: CPT | Performed by: PHYSICIAN ASSISTANT

## 2021-12-14 ENCOUNTER — OFFICE VISIT (OUTPATIENT)
Dept: PULMONOLOGY | Facility: CLINIC | Age: 86
End: 2021-12-14
Payer: COMMERCIAL

## 2021-12-14 VITALS
DIASTOLIC BLOOD PRESSURE: 68 MMHG | HEIGHT: 73 IN | OXYGEN SATURATION: 95 % | BODY MASS INDEX: 22.26 KG/M2 | TEMPERATURE: 96.8 F | HEART RATE: 60 BPM | WEIGHT: 168 LBS | SYSTOLIC BLOOD PRESSURE: 118 MMHG

## 2021-12-14 DIAGNOSIS — R06.02 SHORTNESS OF BREATH: ICD-10-CM

## 2021-12-14 DIAGNOSIS — J41.0 SIMPLE CHRONIC BRONCHITIS (HCC): Primary | ICD-10-CM

## 2021-12-14 PROCEDURE — 99213 OFFICE O/P EST LOW 20 MIN: CPT | Performed by: PHYSICIAN ASSISTANT

## 2021-12-14 PROCEDURE — 1160F RVW MEDS BY RX/DR IN RCRD: CPT | Performed by: PHYSICIAN ASSISTANT

## 2021-12-15 PROBLEM — J41.0 SIMPLE CHRONIC BRONCHITIS (HCC): Status: ACTIVE | Noted: 2018-03-26

## 2021-12-21 ENCOUNTER — OFFICE VISIT (OUTPATIENT)
Dept: FAMILY MEDICINE CLINIC | Facility: MEDICAL CENTER | Age: 86
End: 2021-12-21
Payer: COMMERCIAL

## 2021-12-21 VITALS
DIASTOLIC BLOOD PRESSURE: 68 MMHG | WEIGHT: 167 LBS | BODY MASS INDEX: 22.13 KG/M2 | HEIGHT: 73 IN | TEMPERATURE: 97.2 F | HEART RATE: 60 BPM | SYSTOLIC BLOOD PRESSURE: 110 MMHG | RESPIRATION RATE: 16 BRPM

## 2021-12-21 DIAGNOSIS — E11.40 TYPE 2 DIABETES MELLITUS WITH DIABETIC NEUROPATHY, WITH LONG-TERM CURRENT USE OF INSULIN (HCC): ICD-10-CM

## 2021-12-21 DIAGNOSIS — I12.9 BENIGN HYPERTENSION WITH CHRONIC KIDNEY DISEASE, STAGE III (HCC): ICD-10-CM

## 2021-12-21 DIAGNOSIS — N18.30 BENIGN HYPERTENSION WITH CHRONIC KIDNEY DISEASE, STAGE III (HCC): ICD-10-CM

## 2021-12-21 DIAGNOSIS — Z79.4 TYPE 2 DIABETES MELLITUS WITH DIABETIC NEUROPATHY, WITH LONG-TERM CURRENT USE OF INSULIN (HCC): ICD-10-CM

## 2021-12-21 DIAGNOSIS — G60.9 NEUROPATHY, IDIOPATHIC: Primary | ICD-10-CM

## 2021-12-21 DIAGNOSIS — J44.1 CHRONIC OBSTRUCTIVE PULMONARY DISEASE WITH ACUTE EXACERBATION (HCC): ICD-10-CM

## 2021-12-21 DIAGNOSIS — G62.9 NEUROPATHY: ICD-10-CM

## 2021-12-21 DIAGNOSIS — Z23 NEED FOR DIPHTHERIA-TETANUS-PERTUSSIS (TDAP) VACCINE: ICD-10-CM

## 2021-12-21 PROCEDURE — 1036F TOBACCO NON-USER: CPT | Performed by: FAMILY MEDICINE

## 2021-12-21 PROCEDURE — 99214 OFFICE O/P EST MOD 30 MIN: CPT | Performed by: FAMILY MEDICINE

## 2021-12-21 PROCEDURE — 1160F RVW MEDS BY RX/DR IN RCRD: CPT | Performed by: FAMILY MEDICINE

## 2021-12-21 RX ORDER — GABAPENTIN 300 MG/1
300 CAPSULE ORAL DAILY
Qty: 90 CAPSULE | Refills: 3 | Status: SHIPPED | OUTPATIENT
Start: 2021-12-21

## 2021-12-21 RX ORDER — FLUTICASONE PROPIONATE 50 MCG
2 SPRAY, SUSPENSION (ML) NASAL DAILY
Qty: 16 G | Refills: 2 | Status: SHIPPED | OUTPATIENT
Start: 2021-12-21 | End: 2022-03-29 | Stop reason: SDUPTHER

## 2021-12-25 DIAGNOSIS — I50.43 ACUTE ON CHRONIC COMBINED SYSTOLIC AND DIASTOLIC CONGESTIVE HEART FAILURE (HCC): ICD-10-CM

## 2021-12-27 DIAGNOSIS — I50.43 ACUTE ON CHRONIC COMBINED SYSTOLIC AND DIASTOLIC CONGESTIVE HEART FAILURE (HCC): ICD-10-CM

## 2021-12-27 DIAGNOSIS — I42.0 DILATED CARDIOMYOPATHY (HCC): ICD-10-CM

## 2021-12-29 DIAGNOSIS — L29.9 PRURITUS: ICD-10-CM

## 2021-12-29 RX ORDER — SACUBITRIL AND VALSARTAN 24; 26 MG/1; MG/1
TABLET, FILM COATED ORAL
Qty: 180 TABLET | Refills: 0 | Status: SHIPPED | OUTPATIENT
Start: 2021-12-29 | End: 2021-12-29 | Stop reason: SDUPTHER

## 2021-12-29 RX ORDER — SACUBITRIL AND VALSARTAN 24; 26 MG/1; MG/1
1 TABLET, FILM COATED ORAL 2 TIMES DAILY
Qty: 180 TABLET | Refills: 3 | Status: SHIPPED | OUTPATIENT
Start: 2021-12-29 | End: 2022-03-28 | Stop reason: SDUPTHER

## 2021-12-29 RX ORDER — METOPROLOL SUCCINATE 50 MG/1
100 TABLET, EXTENDED RELEASE ORAL DAILY
Qty: 180 TABLET | Refills: 3 | Status: SHIPPED | OUTPATIENT
Start: 2021-12-29

## 2021-12-29 RX ORDER — HYDROXYZINE PAMOATE 25 MG/1
CAPSULE ORAL
Qty: 90 CAPSULE | Refills: 0 | Status: SHIPPED | OUTPATIENT
Start: 2021-12-29 | End: 2022-02-15

## 2022-01-04 LAB
ALBUMIN SERPL-MCNC: 4.5 G/DL (ref 3.6–5.1)
BUN SERPL-MCNC: 37 MG/DL (ref 7–25)
BUN/CREAT SERPL: 22 (CALC) (ref 6–22)
CALCIUM SERPL-MCNC: 9.7 MG/DL (ref 8.6–10.3)
CHLORIDE SERPL-SCNC: 103 MMOL/L (ref 98–110)
CO2 SERPL-SCNC: 29 MMOL/L (ref 20–32)
CREAT SERPL-MCNC: 1.67 MG/DL (ref 0.7–1.11)
GLUCOSE SERPL-MCNC: 132 MG/DL (ref 65–99)
PHOSPHATE SERPL-MCNC: 4.2 MG/DL (ref 2.1–4.3)
POTASSIUM SERPL-SCNC: 4.8 MMOL/L (ref 3.5–5.3)
SL AMB EGFR AFRICAN AMERICAN: 42 ML/MIN/1.73M2
SL AMB EGFR NON AFRICAN AMERICAN: 36 ML/MIN/1.73M2
SODIUM SERPL-SCNC: 139 MMOL/L (ref 135–146)

## 2022-01-05 ENCOUNTER — TELEPHONE (OUTPATIENT)
Dept: NEPHROLOGY | Facility: CLINIC | Age: 87
End: 2022-01-05

## 2022-01-05 NOTE — TELEPHONE ENCOUNTER
Pt advised that kidney function is stable (labs 1/4/22) per  Dr David Gao         ----- Message from Nemo De Leon MD sent at 1/5/2022 12:30 PM EST -----  Please inform patient that kidney function is stable based on latest lab tests (1/4/22)

## 2022-01-20 ENCOUNTER — TELEPHONE (OUTPATIENT)
Dept: PREADMISSION TESTING | Facility: HOSPITAL | Age: 87
End: 2022-01-20

## 2022-01-20 ENCOUNTER — TELEPHONE (OUTPATIENT)
Dept: GASTROENTEROLOGY | Facility: CLINIC | Age: 87
End: 2022-01-20

## 2022-01-20 NOTE — TELEPHONE ENCOUNTER
Patient's daughter, Donnie Mccray, called with concerns regarding father's upcoming colonoscopy  She is concerned with his age and diabetes as to whether or not it is a good idea to proceed  Can someone please reach out to her? Thank you  She can be reached at [phone]

## 2022-01-20 NOTE — TELEPHONE ENCOUNTER
Pt is 80years old with with intermittent diarrhea/urgency at home, previous stool studies in July negative, TSH normal, colonoscopy more than 10 years ago, no recent abdominal imaging, history of cholecystectomy  Was seen in the office back in November and scheduled for colonoscopy on 1/31/22, however daughter would like to avoid this given his age if possible  Discussed there is possibility of missing malignancy by not doing colonoscopy, however given age she reports they probably wouldn't treat if found  Discussed trial of avoidance of dairy/gluten, or trying other medications such as Questran as they are only trying fiber supplementation currently  She will discuss further with her father and let us know

## 2022-01-20 NOTE — TELEPHONE ENCOUNTER
SPOKE WITH PT DAUGHTER RAO, WOULD LIKE TO DISCUSS CONCERN FOR UPCOMING COLONOSCOPY AND NECESSITY AS HE IS 80YEARS OLD, DIABETIC, WIDESPREAD COVID ETC  THANK YOU

## 2022-01-25 DIAGNOSIS — J41.8 MIXED SIMPLE AND MUCOPURULENT CHRONIC BRONCHITIS (HCC): ICD-10-CM

## 2022-01-25 RX ORDER — UMECLIDINIUM BROMIDE AND VILANTEROL TRIFENATATE 62.5; 25 UG/1; UG/1
POWDER RESPIRATORY (INHALATION)
Qty: 60 BLISTER | Refills: 0 | Status: SHIPPED | OUTPATIENT
Start: 2022-01-25 | End: 2022-02-26 | Stop reason: SDUPTHER

## 2022-01-28 ENCOUNTER — OFFICE VISIT (OUTPATIENT)
Dept: GASTROENTEROLOGY | Facility: CLINIC | Age: 87
End: 2022-01-28
Payer: COMMERCIAL

## 2022-01-28 VITALS
SYSTOLIC BLOOD PRESSURE: 111 MMHG | DIASTOLIC BLOOD PRESSURE: 67 MMHG | HEART RATE: 64 BPM | WEIGHT: 167 LBS | HEIGHT: 73 IN | BODY MASS INDEX: 22.13 KG/M2

## 2022-01-28 DIAGNOSIS — R19.7 DIARRHEA, UNSPECIFIED TYPE: Primary | ICD-10-CM

## 2022-01-28 PROCEDURE — 99213 OFFICE O/P EST LOW 20 MIN: CPT | Performed by: INTERNAL MEDICINE

## 2022-01-28 RX ORDER — INSULIN DETEMIR 100 [IU]/ML
10 INJECTION, SOLUTION SUBCUTANEOUS
COMMUNITY
Start: 2022-01-24

## 2022-01-28 NOTE — LETTER
January 30, 2022     No Elizabeth MD  [facility] [address]    Patient: Sandra Scott   YOB: 1933   Date of Visit: 1/28/2022       Dear Dr Isamar Galloway: Thank you for referring Milvia Dennis to me for evaluation  Below are my notes for this consultation  If you have questions, please do not hesitate to call me  I look forward to following your patient along with you  Sincerely,        Carly Miller MD        CC: No Recipients  Carly Miller MD  1/30/2022  9:23 PM  Sign when Signing Visit  [facility]) [facility]  Sandra Scott 80 y o  male MRN: [id number]            Assessment & Plan:    80year-old gentleman, several months of loose stools  Here for follow-up  1  Loose stools:  Unclear etiology, normal thyroid function, mild improvement with fiber supplementation though continues to have symptoms of loose stools and urgency  -discussed with patient and his daughter risks of procedure including bleeding, surgery, perforation, overall safety of procedure  -discussed with them that we are unable to exclude luminal pathology, especially in light of patient's sister's recent diagnosis of colon cancer recommend proceeding with colonoscopy  -they are agreeable and will make further recommendations after endoscopic evaluation      Ellyn Oneill was seen today for follow-up  Diagnoses and all orders for this visit:    Diarrhea, unspecified type            _____________________________________________________________        CC: Follow-up    HPI:  Sandra Scott is a 80 y o male who is here for follow-up  Here for follow-up of diarrhea  Pleasant [de-identified]year-old gentle with history of diabetes, was recently seen when he presented with several months of loose stools, since our last visit patient was started on daily fiber supplementation  He reports that this has helped however he continues to have episodes of urgency, loose stools    Occasionally interrupting patient's quality of life  Patient reports that his weight has been stable  Denies any melena or rectal bleeding  He presents today with his daughter, at his last visit we had recommended a colonoscopy to exclude intraluminal pathology  Patient was concerned about proceeding with procedure  His daughter had additional questions as well  Patient reports that in the interim since our last visit he reports that his sister also had a colonoscopy was found have a large polyp which apparently was cancers ultimately requiring additional endoscopic removal           ROS:  The remainder of the ROS was negative except for the pertinent positives mentioned in HPI  Allergies: Patient has no known allergies      Medications:   Current Outpatient Medications:     acetaminophen (TYLENOL) 325 mg tablet, Take 2 tablets (650 mg total) by mouth every 6 (six) hours as needed for mild pain, Disp: 30 tablet, Rfl: 0    albuterol (2 5 mg/3 mL) 0 083 % nebulizer solution, Take 3 mL (2 5 mg total) by nebulization every 6 (six) hours as needed for wheezing or shortness of breath, Disp: 1080 mL, Rfl: 3    Anoro Ellipta 62 5-25 MCG/INH inhaler, INHALE ONE PUFF BY MOUTH ONCE DAILY, Disp: 60 blister, Rfl: 0    Ascorbic Acid (VITAMIN C) 1000 MG tablet, Take 1 tablet by mouth daily, Disp: , Rfl:     betamethasone, augmented, (DIPROLENE-AF) 0 05 % cream, , Disp: , Rfl:     cholecalciferol (VITAMIN D3) 1,000 units tablet, Take 1,000 Units by mouth daily, Disp: , Rfl:     Cinnamon 500 MG TABS, Take 1 tablet by mouth daily  , Disp: , Rfl:     Dapagliflozin Propanediol 5 MG TABS, Take 5 mg by mouth daily Take 1/2 tablet daily , Disp: , Rfl:     Flovent  MCG/ACT inhaler, INHALE TWO PUFFS BY MOUTH DAILY - rinse mouth after use, Disp: 12 g, Rfl: 0    fluticasone (FLONASE) 50 mcg/act nasal spray, 2 sprays into each nostril daily, Disp: 16 g, Rfl: 02    gabapentin (NEURONTIN) 300 mg capsule, Take 1 capsule (300 mg total) by mouth daily, Disp: 90 capsule, Rfl: 3    hydrOXYzine pamoate (VISTARIL) 25 mg capsule, TAKE ONE CAPSULE BY MOUTH THREE TIMES DAILY AS NEEDED for itching, Disp: 90 capsule, Rfl: 0    insulin aspart, w/niacinamide, (FIASP) 100 Units/mL injection pen, Inject 2 units with breakfast and 4 units with dinner    Hold if not eating , Disp: , Rfl:     insulin detemir (LEVEMIR) 100 units/mL subcutaneous injection, Inject 22 Units under the skin daily at bedtime Taking 18 units , Disp: , Rfl:     metoprolol succinate (TOPROL-XL) 50 mg 24 hr tablet, Take 2 tablets (100 mg total) by mouth daily, Disp: 180 tablet, Rfl: 3    montelukast (SINGULAIR) 10 mg tablet, TAKE ONE TABLET BY MOUTH AT BEDTIME , Disp: 90 tablet, Rfl: 3    Multiple Vitamins-Minerals (OCUVITE ADULT 50+ PO), Take 1 tablet by mouth daily, Disp: , Rfl:     NOVOLOG FLEXPEN 100 units/mL injection pen, Inject 5 Units under the skin daily with breakfast , Disp: , Rfl:     roflumilast (DALIRESP) 500 mcg tablet, Take 1 tablet (500 mcg total) by mouth daily, Disp: 30 tablet, Rfl: 5    sacubitril-valsartan (Entresto) 24-26 MG TABS, Take 1 tablet by mouth 2 (two) times a day, Disp: 180 tablet, Rfl: 3    simvastatin (ZOCOR) 10 mg tablet, Take 1 tablet (10 mg total) by mouth daily, Disp: 90 tablet, Rfl: 1    VENTOLIN  (90 Base) MCG/ACT inhaler, Inhale 2 puffs every 6 (six) hours as needed for wheezing, Disp: 1 Inhaler, Rfl: 2    BD PEN NEEDLE DON U/F 32G X 4 MM MISC, , Disp: , Rfl:     Levemir FlexTouch 100 units/mL injection pen, , Disp: , Rfl:     ONE TOUCH ULTRA TEST test strip, , Disp: , Rfl:     ONETOUCH DELICA LANCETS FINE MISC, , Disp: , Rfl:     Past Medical History:   Diagnosis Date    [facility] obstructive [facility] disease) [facility] [address]     Diabetes mellitus ([facility] [address] [facility]     Type 2    Essential hypertension     Heart failure ([facility] [address] [facility]     Hyperlipidemia     Left inguinal hernia     Lung nodule     Malignant melanoma of skin ([address] )        Past Surgical History:   Procedure Laterality Date    APPENDECTOMY      CATARACT EXTRACTION, BILATERAL      CHOLECYSTECTOMY      CHOLECYSTECTOMY      COLONOSCOPY  2014    Fiberoptic    HERNIA REPAIR      KNEE ARTHROSCOPY Bilateral     Therapeutic    PA CYSTOURETHRO W/IMPLANT N/A 3/22/2019    Procedure: CYSTOSCOPY WITH INSERTION UROLIFT;  Surgeon: Rey Clemente MD;  [facility];  Service: Urology       Family History   Problem Relation Age of Onset    Diabetes Mother     Heart attack Neg Hx     Stroke Neg Hx     Aneurysm Neg Hx     Clotting disorder Neg Hx     Arrhythmia Neg Hx     Hypertension Neg Hx     Hyperlipidemia Neg Hx         pt unsure         reports that he quit smoking about 62 years ago  He started smoking about 73 years ago  He has a 10 00 pack-year smoking history  He has never used smokeless tobacco  He reports previous alcohol use  He reports that he does not use drugs        Physical Exam:    /67 (BP Location: Left arm, Patient Position: Sitting, Cuff Size: Standard)   Pulse 64   Ht 6' 1" (1 854 m)   Wt 75 8 kg (167 lb)   BMI 22 03 kg/m²     Gen: wn/wd, NAD, pleasant elderly gentleman  HEENT: anicteric, MMM, no cervical LAD  CVS: RRR, no m/r/g  CHEST: CTA b/l  ABD: +BS, soft, NT,ND, no hepatosplenomegaly  EXT: no c/c/e  NEURO: aaox3  SKIN: NO rashes

## 2022-01-28 NOTE — PATIENT INSTRUCTIONS
Scheduled date of colonoscopy (as of today): 03/24/22  Physician performing colonoscopy: Demi Head  [facility] of colonoscopy[facility]  Bowel prep reviewed with patient: MIRALAX/DULCOLAX  Instructions reviewed with patient by: VIRGINIA  Clearances: JOSE LUIS

## 2022-01-30 PROBLEM — R19.7 DIARRHEA: Status: ACTIVE | Noted: 2022-01-30

## 2022-01-31 NOTE — PROGRESS NOTES
[facility]) [facility]  Kinsey Fong 80 y o  male MRN: [id number]            Assessment & Plan:    80year-old gentleman, several months of loose stools  Here for follow-up  1  Loose stools:  Unclear etiology, normal thyroid function, mild improvement with fiber supplementation though continues to have symptoms of loose stools and urgency  -discussed with patient and his daughter risks of procedure including bleeding, surgery, perforation, overall safety of procedure  -discussed with them that we are unable to exclude luminal pathology, especially in light of patient's sister's recent diagnosis of colon cancer recommend proceeding with colonoscopy  -they are agreeable and will make further recommendations after endoscopic evaluation      Susan Ashby was seen today for follow-up  Diagnoses and all orders for this visit:    Diarrhea, unspecified type            _____________________________________________________________        CC: Follow-up    HPI:  Kinsey Fong is a 80 y o male who is here for follow-up  Here for follow-up of diarrhea  Pleasant [de-identified]year-old gentle with history of diabetes, was recently seen when he presented with several months of loose stools, since our last visit patient was started on daily fiber supplementation  He reports that this has helped however he continues to have episodes of urgency, loose stools  Occasionally interrupting patient's quality of life  Patient reports that his weight has been stable  Denies any melena or rectal bleeding  He presents today with his daughter, at his last visit we had recommended a colonoscopy to exclude intraluminal pathology  Patient was concerned about proceeding with procedure  His daughter had additional questions as well    Patient reports that in the interim since our last visit he reports that his sister also had a colonoscopy was found have a large polyp which apparently was cancers ultimately requiring additional endoscopic removal           ROS:  The remainder of the ROS was negative except for the pertinent positives mentioned in HPI  Allergies: Patient has no known allergies  Medications:   Current Outpatient Medications:     acetaminophen (TYLENOL) 325 mg tablet, Take 2 tablets (650 mg total) by mouth every 6 (six) hours as needed for mild pain, Disp: 30 tablet, Rfl: 0    albuterol (2 5 mg/3 mL) 0 083 % nebulizer solution, Take 3 mL (2 5 mg total) by nebulization every 6 (six) hours as needed for wheezing or shortness of breath, Disp: 1080 mL, Rfl: 3    Anoro Ellipta 62 5-25 MCG/INH inhaler, INHALE ONE PUFF BY MOUTH ONCE DAILY, Disp: 60 blister, Rfl: 0    Ascorbic Acid (VITAMIN C) 1000 MG tablet, Take 1 tablet by mouth daily, Disp: , Rfl:     betamethasone, augmented, (DIPROLENE-AF) 0 05 % cream, , Disp: , Rfl:     cholecalciferol (VITAMIN D3) 1,000 units tablet, Take 1,000 Units by mouth daily, Disp: , Rfl:     Cinnamon 500 MG TABS, Take 1 tablet by mouth daily  , Disp: , Rfl:     Dapagliflozin Propanediol 5 MG TABS, Take 5 mg by mouth daily Take 1/2 tablet daily , Disp: , Rfl:     Flovent  MCG/ACT inhaler, INHALE TWO PUFFS BY MOUTH DAILY - rinse mouth after use, Disp: 12 g, Rfl: 0    fluticasone (FLONASE) 50 mcg/act nasal spray, 2 sprays into each nostril daily, Disp: 16 g, Rfl: 02    gabapentin (NEURONTIN) 300 mg capsule, Take 1 capsule (300 mg total) by mouth daily, Disp: 90 capsule, Rfl: 3    hydrOXYzine pamoate (VISTARIL) 25 mg capsule, TAKE ONE CAPSULE BY MOUTH THREE TIMES DAILY AS NEEDED for itching, Disp: 90 capsule, Rfl: 0    insulin aspart, w/niacinamide, (FIASP) 100 Units/mL injection pen, Inject 2 units with breakfast and 4 units with dinner    Hold if not eating , Disp: , Rfl:     insulin detemir (LEVEMIR) 100 units/mL subcutaneous injection, Inject 22 Units under the skin daily at bedtime Taking 18 units , Disp: , Rfl:     metoprolol succinate (TOPROL-XL) 50 mg 24 hr tablet, Take 2 tablets (100 mg total) by mouth daily, Disp: 180 tablet, Rfl: 3    montelukast (SINGULAIR) 10 mg tablet, TAKE ONE TABLET BY MOUTH AT BEDTIME , Disp: 90 tablet, Rfl: 3    Multiple Vitamins-Minerals (OCUVITE ADULT 50+ PO), Take 1 tablet by mouth daily, Disp: , Rfl:     NOVOLOG FLEXPEN 100 units/mL injection pen, Inject 5 Units under the skin daily with breakfast , Disp: , Rfl:     roflumilast (DALIRESP) 500 mcg tablet, Take 1 tablet (500 mcg total) by mouth daily, Disp: 30 tablet, Rfl: 5    sacubitril-valsartan (Entresto) 24-26 MG TABS, Take 1 tablet by mouth 2 (two) times a day, Disp: 180 tablet, Rfl: 3    simvastatin (ZOCOR) 10 mg tablet, Take 1 tablet (10 mg total) by mouth daily, Disp: 90 tablet, Rfl: 1    VENTOLIN  (90 Base) MCG/ACT inhaler, Inhale 2 puffs every 6 (six) hours as needed for wheezing, Disp: 1 Inhaler, Rfl: 2    BD PEN NEEDLE DON U/F 32G X 4 MM MISC, , Disp: , Rfl:     Levemir FlexTouch 100 units/mL injection pen, , Disp: , Rfl:     ONE TOUCH ULTRA TEST test strip, , Disp: , Rfl:     ONETOUCH DELICA LANCETS FINE MISC, , Disp: , Rfl:     Past Medical History:   Diagnosis Date    [facility]ch[facility] obstructive [facility] disease) [facility] [address] [facility]     Diabetes mellitus ([facility] [address] )     Type 2    Essential hypertension     Heart failure ([facility] [address] [facility]     Hyperlipidemia     Left inguinal hernia     Lung nodule     Malignant melanoma of skin ([facility] [address] )        Past Surgical History:   Procedure Laterality Date    APPENDECTOMY      CATARACT EXTRACTION, BILATERAL      CHOLECYSTECTOMY      CHOLECYSTECTOMY      COLONOSCOPY  2014    Fiberoptic    HERNIA REPAIR      KNEE ARTHROSCOPY Bilateral     Therapeutic    UT CYSTOURETHRO W/IMPLANT N/A 3/22/2019    Procedure: CYSTOSCOPY WITH Grzegorz Seo;  Surgeon: Americo Pablo MD;  [facility];  Service: Urology       Family History   Problem Relation Age of Onset    Diabetes Mother     Heart attack Neg Hx     Stroke Neg Hx     Aneurysm Neg Hx     Clotting disorder Neg Hx     Arrhythmia Neg Hx     Hypertension Neg Hx     Hyperlipidemia Neg Hx         pt unsure         reports that he quit smoking about 62 years ago  He started smoking about 73 years ago  He has a 10 00 pack-year smoking history  He has never used smokeless tobacco  He reports previous alcohol use  He reports that he does not use drugs        Physical Exam:    /67 (BP Location: Left arm, Patient Position: Sitting, Cuff Size: Standard)   Pulse 64   Ht 6' 1" (1 854 m)   Wt 75 8 kg (167 lb)   BMI 22 03 kg/m²     Gen: wn/wd, NAD, pleasant elderly gentleman  HEENT: anicteric, MMM, no cervical LAD  CVS: RRR, no m/r/g  CHEST: CTA b/l  ABD: +BS, soft, NT,ND, no hepatosplenomegaly  EXT: no c/c/e  NEURO: aaox3  SKIN: NO rashes

## 2022-02-14 DIAGNOSIS — L29.9 PRURITUS: ICD-10-CM

## 2022-02-15 RX ORDER — HYDROXYZINE PAMOATE 25 MG/1
CAPSULE ORAL
Qty: 90 CAPSULE | Refills: 0 | Status: SHIPPED | OUTPATIENT
Start: 2022-02-15 | End: 2022-04-04

## 2022-02-26 DIAGNOSIS — J41.8 MIXED SIMPLE AND MUCOPURULENT CHRONIC BRONCHITIS (HCC): ICD-10-CM

## 2022-02-28 RX ORDER — UMECLIDINIUM BROMIDE AND VILANTEROL TRIFENATATE 62.5; 25 UG/1; UG/1
1 POWDER RESPIRATORY (INHALATION) DAILY
Qty: 60 BLISTER | Refills: 5 | Status: SHIPPED | OUTPATIENT
Start: 2022-02-28

## 2022-03-02 LAB
25(OH)D3 SERPL-MCNC: 34 NG/ML (ref 30–100)
BASOPHILS # BLD AUTO: 192 CELLS/UL (ref 0–200)
BASOPHILS NFR BLD AUTO: 3.7 %
BUN SERPL-MCNC: 31 MG/DL (ref 7–25)
BUN/CREAT SERPL: 19 (CALC) (ref 6–22)
CALCIUM SERPL-MCNC: 9.6 MG/DL (ref 8.6–10.3)
CALCIUM SERPL-MCNC: 9.6 MG/DL (ref 8.6–10.3)
CHLORIDE SERPL-SCNC: 102 MMOL/L (ref 98–110)
CO2 SERPL-SCNC: 29 MMOL/L (ref 20–32)
CREAT SERPL-MCNC: 1.59 MG/DL (ref 0.7–1.11)
CREAT UR-MCNC: 77 MG/DL (ref 20–320)
EOSINOPHIL # BLD AUTO: 692 CELLS/UL (ref 15–500)
EOSINOPHIL NFR BLD AUTO: 13.3 %
ERYTHROCYTE [DISTWIDTH] IN BLOOD BY AUTOMATED COUNT: 22.7 % (ref 11–15)
GLUCOSE SERPL-MCNC: 177 MG/DL (ref 65–99)
HCT VFR BLD AUTO: 37.9 % (ref 38.5–50)
HGB BLD-MCNC: 12.2 G/DL (ref 13.2–17.1)
LYMPHOCYTES # BLD AUTO: 1409 CELLS/UL (ref 850–3900)
LYMPHOCYTES NFR BLD AUTO: 27.1 %
MAGNESIUM SERPL-MCNC: 2.1 MG/DL (ref 1.5–2.5)
MCH RBC QN AUTO: 28 PG (ref 27–33)
MCHC RBC AUTO-ENTMCNC: 32.2 G/DL (ref 32–36)
MCV RBC AUTO: 86.9 FL (ref 80–100)
MONOCYTES # BLD AUTO: 458 CELLS/UL (ref 200–950)
MONOCYTES NFR BLD AUTO: 8.8 %
NEUTROPHILS # BLD AUTO: 2449 CELLS/UL (ref 1500–7800)
NEUTROPHILS NFR BLD AUTO: 47.1 %
PHOSPHATE SERPL-MCNC: 3.7 MG/DL (ref 2.1–4.3)
PLATELET # BLD AUTO: 103 THOUSAND/UL (ref 140–400)
POTASSIUM SERPL-SCNC: 4.8 MMOL/L (ref 3.5–5.3)
PROT UR-MCNC: 15 MG/DL (ref 5–25)
PROT/CREAT UR: 0.2 MG/MG CREAT (ref 0.02–0.13)
PROT/CREAT UR: 195 MG/G CREAT (ref 22–128)
PTH-INTACT SERPL-MCNC: 22 PG/ML (ref 14–64)
RBC # BLD AUTO: 4.36 MILLION/UL (ref 4.2–5.8)
SL AMB EGFR AFRICAN AMERICAN: 44 ML/MIN/1.73M2
SL AMB EGFR NON AFRICAN AMERICAN: 38 ML/MIN/1.73M2
SODIUM SERPL-SCNC: 138 MMOL/L (ref 135–146)
WBC # BLD AUTO: 5.2 THOUSAND/UL (ref 3.8–10.8)

## 2022-03-04 ENCOUNTER — OFFICE VISIT (OUTPATIENT)
Dept: NEPHROLOGY | Facility: CLINIC | Age: 87
End: 2022-03-04
Payer: COMMERCIAL

## 2022-03-04 ENCOUNTER — TELEPHONE (OUTPATIENT)
Dept: CARDIOLOGY CLINIC | Facility: CLINIC | Age: 87
End: 2022-03-04

## 2022-03-04 VITALS
WEIGHT: 167 LBS | HEIGHT: 73 IN | SYSTOLIC BLOOD PRESSURE: 90 MMHG | BODY MASS INDEX: 22.13 KG/M2 | HEART RATE: 60 BPM | DIASTOLIC BLOOD PRESSURE: 50 MMHG

## 2022-03-04 DIAGNOSIS — I15.2 HYPERTENSION ASSOCIATED WITH TYPE 2 DIABETES MELLITUS (HCC): ICD-10-CM

## 2022-03-04 DIAGNOSIS — I50.42 CHRONIC COMBINED SYSTOLIC AND DIASTOLIC CONGESTIVE HEART FAILURE (HCC): ICD-10-CM

## 2022-03-04 DIAGNOSIS — E11.59 HYPERTENSION ASSOCIATED WITH TYPE 2 DIABETES MELLITUS (HCC): ICD-10-CM

## 2022-03-04 DIAGNOSIS — N18.32 STAGE 3B CHRONIC KIDNEY DISEASE (HCC): Primary | ICD-10-CM

## 2022-03-04 PROCEDURE — 99214 OFFICE O/P EST MOD 30 MIN: CPT | Performed by: PHYSICIAN ASSISTANT

## 2022-03-04 NOTE — PATIENT INSTRUCTIONS
Chronic Kidney Disease stage IIIb- Baseline creatinine has progressed slightly from 1 3-1 5 to 1 5-1 7 since November 2021  Suspected etiology due to hypertensive nephrosclerosis, diabetic nephropathy, +/- ischemic nephropathy/hemodynamic changes        Hypertension- He takes entresto 24-26mg twice a day and metoprolol 100mg daily  Avoid salt in your diet  Avoid NSAIDs  BP is soft  I will reach out to cardiology to see if we can reduce his regimen      Proteinuria- Minimal      Borderline Hyperkalemia- Follow a low potassium diet      Chronic Congestive Heart Failure- Continue entresto and metoprolol  Appears euvolemic  Not on a diuretic        Bone Mineral Disorder- PTH and vitamin D level normal   Continue vitamin D cholecalciferol 1000 units daily  Anemia- mild, will continue to monitor     Diabetes mellitus- per endocrinology    Follow up with Dr Mitch Law in 4 months  Please call the office with any questions or concerns

## 2022-03-04 NOTE — TELEPHONE ENCOUNTER
Pt called stating he saw his Nephrologist today [facility] and during that visit his BP was 102/52 and 90/50  Nik Orona would like to speak to you regarding his blood pressure medications  Please advise

## 2022-03-07 ENCOUNTER — TELEPHONE (OUTPATIENT)
Dept: OTHER | Facility: HOSPITAL | Age: 87
End: 2022-03-07

## 2022-03-07 NOTE — TELEPHONE ENCOUNTER
Spoke with Dr Roseanne Norris with cardiology  Okay to decrease metoprolol to 50mg daily  I relayed this to the patient  He will monitor his blood pressures and heart rate at home  No other questions or concerns at this time

## 2022-03-23 ENCOUNTER — TELEPHONE (OUTPATIENT)
Dept: HEMATOLOGY ONCOLOGY | Facility: CLINIC | Age: 87
End: 2022-03-23

## 2022-03-23 NOTE — TELEPHONE ENCOUNTER
Spoke to pt regarding dr mondargon not being in office on 5/24/22  New appt time and date were given and pt was agreeable  6/14/22 at 9am

## 2022-03-24 ENCOUNTER — HOSPITAL ENCOUNTER (OUTPATIENT)
Dept: GASTROENTEROLOGY | Facility: AMBULARY SURGERY CENTER | Age: 87
Setting detail: OUTPATIENT SURGERY
Discharge: HOME | End: 2022-03-24
Attending: INTERNAL MEDICINE | Admitting: INTERNAL MEDICINE
Payer: COMMERCIAL

## 2022-03-24 ENCOUNTER — ANESTHESIA EVENT (OUTPATIENT)
Dept: GASTROENTEROLOGY | Facility: AMBULARY SURGERY CENTER | Age: 87
End: 2022-03-24

## 2022-03-24 ENCOUNTER — ANESTHESIA (OUTPATIENT)
Dept: GASTROENTEROLOGY | Facility: AMBULARY SURGERY CENTER | Age: 87
End: 2022-03-24

## 2022-03-24 VITALS
DIASTOLIC BLOOD PRESSURE: 62 MMHG | RESPIRATION RATE: 18 BRPM | TEMPERATURE: 97.8 F | HEART RATE: 65 BPM | SYSTOLIC BLOOD PRESSURE: 133 MMHG | OXYGEN SATURATION: 95 %

## 2022-03-24 DIAGNOSIS — R19.7 DIARRHEA, UNSPECIFIED TYPE: ICD-10-CM

## 2022-03-24 LAB — GLUCOSE SERPL-MCNC: 128 MG/DL (ref 65–140)

## 2022-03-24 PROCEDURE — 88305 TISSUE EXAM BY PATHOLOGIST: CPT | Performed by: PATHOLOGY

## 2022-03-24 PROCEDURE — 82948 REAGENT STRIP/BLOOD GLUCOSE: CPT

## 2022-03-24 PROCEDURE — 45380 COLONOSCOPY AND BIOPSY: CPT | Performed by: INTERNAL MEDICINE

## 2022-03-24 RX ORDER — ONDANSETRON 2 MG/ML
4 INJECTION INTRAMUSCULAR; INTRAVENOUS ONCE AS NEEDED
Status: CANCELLED | OUTPATIENT
Start: 2022-03-24

## 2022-03-24 RX ORDER — SODIUM CHLORIDE, SODIUM LACTATE, POTASSIUM CHLORIDE, CALCIUM CHLORIDE 600; 310; 30; 20 MG/100ML; MG/100ML; MG/100ML; MG/100ML
INJECTION, SOLUTION INTRAVENOUS CONTINUOUS PRN
Status: DISCONTINUED | OUTPATIENT
Start: 2022-03-24 | End: 2022-03-24

## 2022-03-24 RX ORDER — LIDOCAINE HYDROCHLORIDE 10 MG/ML
INJECTION, SOLUTION EPIDURAL; INFILTRATION; INTRACAUDAL; PERINEURAL AS NEEDED
Status: DISCONTINUED | OUTPATIENT
Start: 2022-03-24 | End: 2022-03-24

## 2022-03-24 RX ORDER — SODIUM CHLORIDE, SODIUM LACTATE, POTASSIUM CHLORIDE, CALCIUM CHLORIDE 600; 310; 30; 20 MG/100ML; MG/100ML; MG/100ML; MG/100ML
100 INJECTION, SOLUTION INTRAVENOUS CONTINUOUS
Status: DISCONTINUED | OUTPATIENT
Start: 2022-03-24 | End: 2022-03-28 | Stop reason: HOSPADM

## 2022-03-24 RX ORDER — PROPOFOL 10 MG/ML
INJECTION, EMULSION INTRAVENOUS AS NEEDED
Status: DISCONTINUED | OUTPATIENT
Start: 2022-03-24 | End: 2022-03-24

## 2022-03-24 RX ADMIN — SODIUM CHLORIDE, SODIUM LACTATE, POTASSIUM CHLORIDE, AND CALCIUM CHLORIDE: .6; .31; .03; .02 INJECTION, SOLUTION INTRAVENOUS at 08:27

## 2022-03-24 RX ADMIN — LIDOCAINE HYDROCHLORIDE 50 MG: 10 INJECTION, SOLUTION EPIDURAL; INFILTRATION; INTRACAUDAL; PERINEURAL at 08:31

## 2022-03-24 RX ADMIN — PROPOFOL 40 MG: 10 INJECTION, EMULSION INTRAVENOUS at 08:32

## 2022-03-24 RX ADMIN — PROPOFOL 50 MG: 10 INJECTION, EMULSION INTRAVENOUS at 08:31

## 2022-03-24 NOTE — ANESTHESIA POSTPROCEDURE EVALUATION
Post-Op Assessment Note    CV Status:  Stable  Pain Score: 0    Pain management: adequate     Mental Status:  Sleepy and arousable   Hydration Status:  Stable   PONV Controlled:  None   Airway Patency:  Patent      Post Op Vitals Reviewed: Yes      Staff: CRNA   Comments: spontaneously breathing, HOB @ 30 degrees, vss, protecting airway, fully endorsed to recovery w/o AC        No complications documented      BP      Temp      Pulse     Resp      SpO2 99

## 2022-03-24 NOTE — H&P
History and Physical -  [facility]  Gaby Walker 80 y o  male MRN: [id number]    HPI: Gaby Walker is a 80y o  year old male who presents with loose stools and family hx of colon cancer       Review of Systems    Historical Information   Past Medical History:   Diagnosis Date    [facility] obstructive [facility] disease) [facility] [address] [facility]     Diabetes mellitus ([facility] )     Type 2    Essential hypertension     Heart failure (HCC)     Hyperlipidemia     Left inguinal hernia     Lung nodule     Malignant melanoma of skin (HCC)      Past Surgical History:   Procedure Laterality Date    APPENDECTOMY      CATARACT EXTRACTION, BILATERAL      CHOLECYSTECTOMY      CHOLECYSTECTOMY      COLONOSCOPY      Fiberoptic    HERNIA REPAIR      KNEE ARTHROSCOPY Bilateral     Therapeutic    [place] CYSTOURETHRO W/IMPLANT N/A 3/22/2019    Procedure: CYSTOSCOPY WITH INSERTION UROLIFT;  Surgeon: Antonino Elizabeth MD;  [facility];  Service: Urology     Social History   Social History     Substance and Sexual Activity   Alcohol Use Not Currently    Alcohol/week: 0 0 standard drinks     Social History     Substance and Sexual Activity   Drug Use No     Social History     Tobacco Use   Smoking Status Former Smoker    Packs/day: 1 00    Years: 10 00    Pack years: 10 00    Start date:     Quit date: 56    Years since quittin 2   Smokeless Tobacco Never Used     Family History   Problem Relation Age of Onset    Diabetes Mother     Heart attack Neg Hx     Stroke Neg Hx     Aneurysm Neg Hx     Clotting disorder Neg Hx     Arrhythmia Neg Hx     Hypertension Neg Hx     Hyperlipidemia Neg Hx         pt unsure        Meds/Allergies     (Not in a hospital admission)      No Known Allergies    Objective     /66   Pulse 77   Temp 97 8 °F (36 6 °C) (Temporal)   Resp 20   SpO2 99%       PHYSICAL EXAM    Gen: NAD  CV: RRR  CHEST: Clear  ABD: soft, NT/ND  EXT: no edema  Neuro: AAO      ASSESSMENT/PLAN:  This is a 80y o  year old male here for loose stools and family hx of colon cancer         PLAN:   Procedure: colonoscopy

## 2022-03-24 NOTE — ANESTHESIA PREPROCEDURE EVALUATION
Procedure:  COLONOSCOPY    Relevant Problems   ANESTHESIA (within normal limits)      CARDIO   (+) Chronic combined systolic and diastolic congestive heart failure (HCC)   (+) Hyperlipidemia      ENDO   (+) Type 2 diabetes mellitus with diabetic neuropathy, with long-term current use of insulin (HCC)      /RENAL   (+) Benign hypertension with chronic kidney disease, stage III (HCC)   (+) CKD (chronic kidney disease), stage III (HCC)   (+) Chronic kidney disease-mineral and bone disorder      HEMATOLOGY   (+) Anemia      MUSCULOSKELETAL   (+) Arthritis      NEURO/PSYCH   (+) Paresthesia of both feet      PULMONARY   (+) COPD without exacerbation (HCC)   (+) Simple chronic bronchitis (HCC)      Other   (+) MDS (myelodysplastic syndrome), low grade (HCC)        Physical Exam    Airway    Mallampati score: II  TM Distance: >3 FB  Neck ROM: limited     Dental   No notable dental hx     Cardiovascular  Rhythm: regular, Rate: normal,     Pulmonary  Breath sounds clear to auscultation,     Other Findings        Anesthesia Plan  ASA Score- 3     Anesthesia Type- IV sedation with anesthesia with ASA Monitors  Additional Monitors:   Airway Plan:           Plan Factors-Exercise tolerance (METS): >4 METS  Chart reviewed  EKG reviewed  Existing labs reviewed  Patient summary reviewed  Patient is not a current smoker  Induction-     Postoperative Plan-     Informed Consent- Anesthetic plan and risks discussed with patient  I personally reviewed this patient with the CRNA  Discussed and agreed on the Anesthesia Plan with the CRNA  Lacey Peña

## 2022-03-28 DIAGNOSIS — I50.43 ACUTE ON CHRONIC COMBINED SYSTOLIC AND DIASTOLIC CONGESTIVE HEART FAILURE (HCC): ICD-10-CM

## 2022-03-29 ENCOUNTER — OFFICE VISIT (OUTPATIENT)
Dept: FAMILY MEDICINE CLINIC | Facility: MEDICAL CENTER | Age: 87
End: 2022-03-29
Payer: COMMERCIAL

## 2022-03-29 VITALS
DIASTOLIC BLOOD PRESSURE: 76 MMHG | TEMPERATURE: 98.4 F | WEIGHT: 166 LBS | RESPIRATION RATE: 18 BRPM | SYSTOLIC BLOOD PRESSURE: 132 MMHG | HEIGHT: 73 IN | HEART RATE: 64 BPM | BODY MASS INDEX: 22 KG/M2

## 2022-03-29 DIAGNOSIS — Z23 NEED FOR TDAP VACCINATION: Primary | ICD-10-CM

## 2022-03-29 DIAGNOSIS — G60.9 NEUROPATHY, IDIOPATHIC: ICD-10-CM

## 2022-03-29 DIAGNOSIS — J44.1 CHRONIC OBSTRUCTIVE PULMONARY DISEASE WITH ACUTE EXACERBATION (HCC): ICD-10-CM

## 2022-03-29 DIAGNOSIS — D46.20 MDS (MYELODYSPLASTIC SYNDROME), LOW GRADE (HCC): ICD-10-CM

## 2022-03-29 PROCEDURE — 3725F SCREEN DEPRESSION PERFORMED: CPT | Performed by: FAMILY MEDICINE

## 2022-03-29 PROCEDURE — 99214 OFFICE O/P EST MOD 30 MIN: CPT | Performed by: FAMILY MEDICINE

## 2022-03-29 PROCEDURE — 1160F RVW MEDS BY RX/DR IN RCRD: CPT | Performed by: FAMILY MEDICINE

## 2022-03-29 PROCEDURE — 1036F TOBACCO NON-USER: CPT | Performed by: FAMILY MEDICINE

## 2022-03-29 RX ORDER — FLUTICASONE PROPIONATE 50 MCG
2 SPRAY, SUSPENSION (ML) NASAL DAILY
Qty: 16 G | Refills: 2 | Status: SHIPPED | OUTPATIENT
Start: 2022-03-29 | End: 2022-03-29 | Stop reason: SDUPTHER

## 2022-03-29 RX ORDER — SACUBITRIL AND VALSARTAN 24; 26 MG/1; MG/1
1 TABLET, FILM COATED ORAL 2 TIMES DAILY
Qty: 180 TABLET | Refills: 3 | Status: SHIPPED | OUTPATIENT
Start: 2022-03-29

## 2022-03-29 RX ORDER — FLUTICASONE PROPIONATE 50 MCG
2 SPRAY, SUSPENSION (ML) NASAL DAILY
Qty: 16 G | Refills: 2 | Status: SHIPPED | OUTPATIENT
Start: 2022-03-29

## 2022-03-29 RX ORDER — FLUTICASONE PROPIONATE 220 UG/1
1 AEROSOL, METERED RESPIRATORY (INHALATION) 2 TIMES DAILY
Qty: 12 G | Refills: 1 | Status: SHIPPED | OUTPATIENT
Start: 2022-03-29

## 2022-03-29 NOTE — PROGRESS NOTES
Gumaro Friedman is here for 3 month follow-up  She is doing very well overall  Continues to remain very active  Does this home and has 3 flights of stairs which she has no problem doing  He saw gastroenterology and had a colonoscopy due to his chronic diarrhea  Dr Vaughn 3/24  Apparently this was normal   No evidence for colitis  He said that he has increased his Metamucil -dose which has helped  He said that lately his eyes feel somewhat itchy but mostly gritty or beatrice  Gumaro Friedman says his vision is good  , No discharge from the eye  She is due to see his ophthalmologist Brandie Ibarra in May  His asthma has been stable  He sees pulmonology  He saw nephrology for his stage 3 chronic kidney disease  In March  He saw Endocrinology Aydee Adams for follow-up of his insulin-dependent diabetes   A1c in February was 8 3 which has been stable for him  He is due to see Cardiology for follow-up of his cardiomyopathy and systolic and diastolic congestive heart failure  He is followed by Hematology for myelodysplastic syndrome  O: /76 (BP Location: Left arm, Patient Position: Sitting, Cuff Size: Adult)   Pulse 64   Temp 98 4 °F (36 9 °C)   Resp 18   Ht 6' 1" (1 854 m)   Wt 75 3 kg (166 lb)   BMI 21 90 kg/m²    No adenopathy thyromegaly bruits  Chest is clear with good breath sound  Cardiac regular rate without murmur  Extremities distal pulses full no edema    Assessment  1  Dry eye-advised follow-up with Ophthalmology   2  Diabetic neuropathy-fairly well controlled with gabapentin; will continue  3  Chronic pruritus-does find hydroxyzine helpful  4  Diabetes-follow-up with endocrinology; A1c around 8 stable  5  Chronic kidney disease-per Nephrology  6  Health maintenance-up-to-date with immunizations including COVID vaccine and booster, Shingrix, pneumonia shots and flu shot  Did advise Tdap    Plan  Tdap  Refill his Flovent and Flonase  Follow-up with specialists as above  Recheck 6 months

## 2022-03-30 ENCOUNTER — TELEPHONE (OUTPATIENT)
Dept: FAMILY MEDICINE CLINIC | Facility: MEDICAL CENTER | Age: 87
End: 2022-03-30

## 2022-03-30 NOTE — TELEPHONE ENCOUNTER
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

## 2022-03-31 DIAGNOSIS — E78.01 FAMILIAL HYPERCHOLESTEROLEMIA: ICD-10-CM

## 2022-03-31 RX ORDER — SIMVASTATIN 10 MG
10 TABLET ORAL DAILY
Qty: 90 TABLET | Refills: 1 | Status: SHIPPED | OUTPATIENT
Start: 2022-03-31

## 2022-04-02 DIAGNOSIS — L29.9 PRURITUS: ICD-10-CM

## 2022-04-04 RX ORDER — HYDROXYZINE PAMOATE 25 MG/1
CAPSULE ORAL
Qty: 90 CAPSULE | Refills: 0 | Status: SHIPPED | OUTPATIENT
Start: 2022-04-04 | End: 2022-05-23

## 2022-04-29 DIAGNOSIS — J44.1 CHRONIC OBSTRUCTIVE PULMONARY DISEASE WITH ACUTE EXACERBATION (HCC): ICD-10-CM

## 2022-05-13 LAB
BASOPHILS # BLD AUTO: 214 CELLS/UL (ref 0–200)
BASOPHILS NFR BLD AUTO: 3.4 %
EOSINOPHIL # BLD AUTO: 605 CELLS/UL (ref 15–500)
EOSINOPHIL NFR BLD AUTO: 9.6 %
ERYTHROCYTE [DISTWIDTH] IN BLOOD BY AUTOMATED COUNT: 22.9 % (ref 11–15)
HCT VFR BLD AUTO: 33.4 % (ref 38.5–50)
HGB BLD-MCNC: 10.6 G/DL (ref 13.2–17.1)
LYMPHOCYTES # BLD AUTO: 1285 CELLS/UL (ref 850–3900)
LYMPHOCYTES NFR BLD AUTO: 20.4 %
MCH RBC QN AUTO: 28.3 PG (ref 27–33)
MCHC RBC AUTO-ENTMCNC: 31.7 G/DL (ref 32–36)
MCV RBC AUTO: 89.3 FL (ref 80–100)
MONOCYTES # BLD AUTO: 573 CELLS/UL (ref 200–950)
MONOCYTES NFR BLD AUTO: 9.1 %
NEUTROPHILS # BLD AUTO: 3623 CELLS/UL (ref 1500–7800)
NEUTROPHILS NFR BLD AUTO: 57.5 %
PLATELET # BLD AUTO: 131 THOUSAND/UL (ref 140–400)
RBC # BLD AUTO: 3.74 MILLION/UL (ref 4.2–5.8)
WBC # BLD AUTO: 6.3 THOUSAND/UL (ref 3.8–10.8)

## 2022-05-21 DIAGNOSIS — L29.9 PRURITUS: ICD-10-CM

## 2022-05-23 RX ORDER — HYDROXYZINE PAMOATE 25 MG/1
CAPSULE ORAL
Qty: 90 CAPSULE | Refills: 0 | Status: SHIPPED | OUTPATIENT
Start: 2022-05-23 | End: 2022-07-05

## 2022-06-02 ENCOUNTER — TELEMEDICINE (OUTPATIENT)
Dept: PULMONOLOGY | Facility: CLINIC | Age: 87
End: 2022-06-02
Payer: COMMERCIAL

## 2022-06-02 DIAGNOSIS — J41.0 SIMPLE CHRONIC BRONCHITIS (HCC): Primary | ICD-10-CM

## 2022-06-02 PROCEDURE — 99441 PR PHYS/QHP TELEPHONE EVALUATION 5-10 MIN: CPT | Performed by: PHYSICIAN ASSISTANT

## 2022-06-02 NOTE — PROGRESS NOTES
Virtual Brief Visit    Patient is located in the following state in which I hold an active license [place]      Assessment/Plan:    Problem List Items Addressed This Visit        Respiratory    Simple chronic bronchitis ([address] ) - Primary      Patient is an 43-year-old male with past medical history of COPD, HTN, HLD, MDS  Telephone visit was done today for follow-up  He is overall stable with his breathing, continues on Anoro, Flovent, Singulair, Daliresp every other day  He has not had any exacerbations in over 2 years  He will continue with the current medications  Suzette Neth is very expensive for him which is why he is using it every other day  Discussed with him that he can try off of the Suzette Neth though it has been controlling his COPD was very well  Also discussed prednisone as an alternative though does have many more side effects  He will follow-up with us in 6 months or sooner if necessary  Recent Visits  No visits were found meeting these conditions  Showing recent visits within past 7 days and meeting all other requirements  Today's Visits  Date Type Provider Dept   06/02/22 Telemedicine Jamshid Mccarthy PA-C [facility]   Showing today's visits and meeting all other requirements  Future Appointments  No visits were found meeting these conditions    Showing future appointments within next 150 days and meeting all other requirements         I spent 9 minutes directly with the patient during this visit

## 2022-07-05 DIAGNOSIS — L29.9 PRURITUS: ICD-10-CM

## 2022-07-05 RX ORDER — HYDROXYZINE PAMOATE 25 MG/1
CAPSULE ORAL
Qty: 90 CAPSULE | Refills: 0 | Status: SHIPPED | OUTPATIENT
Start: 2022-07-05 | End: 2022-08-25

## 2022-07-07 ENCOUNTER — TELEPHONE (OUTPATIENT)
Dept: NEPHROLOGY | Facility: CLINIC | Age: 87
End: 2022-07-07

## 2022-07-07 LAB
BASOPHILS # BLD AUTO: 198 CELLS/UL (ref 0–200)
BASOPHILS NFR BLD AUTO: 4.4 %
BUN SERPL-MCNC: 24 MG/DL (ref 7–25)
BUN/CREAT SERPL: 18 (CALC) (ref 6–22)
CALCIUM SERPL-MCNC: 9.5 MG/DL (ref 8.6–10.3)
CALCIUM SERPL-MCNC: 9.5 MG/DL (ref 8.6–10.3)
CHLORIDE SERPL-SCNC: 107 MMOL/L (ref 98–110)
CO2 SERPL-SCNC: 28 MMOL/L (ref 20–32)
CREAT SERPL-MCNC: 1.3 MG/DL (ref 0.7–1.11)
CREAT UR-MCNC: 89 MG/DL (ref 20–320)
EOSINOPHIL # BLD AUTO: 540 CELLS/UL (ref 15–500)
EOSINOPHIL NFR BLD AUTO: 12 %
ERYTHROCYTE [DISTWIDTH] IN BLOOD BY AUTOMATED COUNT: 22.7 % (ref 11–15)
GLUCOSE SERPL-MCNC: 46 MG/DL (ref 65–99)
HCT VFR BLD AUTO: 33.1 % (ref 38.5–50)
HGB BLD-MCNC: 10.4 G/DL (ref 13.2–17.1)
LYMPHOCYTES # BLD AUTO: 1089 CELLS/UL (ref 850–3900)
LYMPHOCYTES NFR BLD AUTO: 24.2 %
MAGNESIUM SERPL-MCNC: 2.1 MG/DL (ref 1.5–2.5)
MCH RBC QN AUTO: 28.5 PG (ref 27–33)
MCHC RBC AUTO-ENTMCNC: 31.4 G/DL (ref 32–36)
MCV RBC AUTO: 90.7 FL (ref 80–100)
MONOCYTES # BLD AUTO: 468 CELLS/UL (ref 200–950)
MONOCYTES NFR BLD AUTO: 10.4 %
NEUTROPHILS # BLD AUTO: 2205 CELLS/UL (ref 1500–7800)
NEUTROPHILS NFR BLD AUTO: 49 %
PHOSPHATE SERPL-MCNC: 3.6 MG/DL (ref 2.1–4.3)
PLATELET # BLD AUTO: 104 THOUSAND/UL (ref 140–400)
POTASSIUM SERPL-SCNC: 4.6 MMOL/L (ref 3.5–5.3)
PROT UR-MCNC: 18 MG/DL (ref 5–25)
PROT/CREAT UR: 0.2 MG/MG CREAT (ref 0.02–0.13)
PROT/CREAT UR: 202 MG/G CREAT (ref 22–128)
PTH-INTACT SERPL-MCNC: 24 PG/ML (ref 16–77)
RBC # BLD AUTO: 3.65 MILLION/UL (ref 4.2–5.8)
SL AMB EGFR AFRICAN AMERICAN: 56 ML/MIN/1.73M2
SL AMB EGFR NON AFRICAN AMERICAN: 48 ML/MIN/1.73M2
SODIUM SERPL-SCNC: 142 MMOL/L (ref 135–146)
WBC # BLD AUTO: 4.5 THOUSAND/UL (ref 3.8–10.8)

## 2022-07-07 NOTE — TELEPHONE ENCOUNTER
Pt advised that labs are stable, no changes per [facility]  Has f/u visit 7/26/22 with Dr Richard Iglesias     ----- Message from [address] sent at 7/7/2022  7:39 AM EDT -----  Creatinine, electrolytes, hgb are all stable  No changes needed at this time  Patient follow up scheduled later this month for more detailed results review  Thanks

## 2022-07-26 ENCOUNTER — OFFICE VISIT (OUTPATIENT)
Dept: NEPHROLOGY | Facility: CLINIC | Age: 87
End: 2022-07-26
Payer: COMMERCIAL

## 2022-07-26 VITALS
HEIGHT: 73 IN | SYSTOLIC BLOOD PRESSURE: 122 MMHG | DIASTOLIC BLOOD PRESSURE: 68 MMHG | WEIGHT: 158 LBS | BODY MASS INDEX: 20.94 KG/M2

## 2022-07-26 DIAGNOSIS — N18.9 CHRONIC KIDNEY DISEASE-MINERAL AND BONE DISORDER: ICD-10-CM

## 2022-07-26 DIAGNOSIS — N18.32 STAGE 3B CHRONIC KIDNEY DISEASE (HCC): Primary | ICD-10-CM

## 2022-07-26 DIAGNOSIS — D63.1 ANEMIA OF CHRONIC KIDNEY FAILURE, STAGE 3 (MODERATE) (HCC): ICD-10-CM

## 2022-07-26 DIAGNOSIS — E83.9 CHRONIC KIDNEY DISEASE-MINERAL AND BONE DISORDER: ICD-10-CM

## 2022-07-26 DIAGNOSIS — M89.9 CHRONIC KIDNEY DISEASE-MINERAL AND BONE DISORDER: ICD-10-CM

## 2022-07-26 DIAGNOSIS — N18.30 ANEMIA OF CHRONIC KIDNEY FAILURE, STAGE 3 (MODERATE) (HCC): ICD-10-CM

## 2022-07-26 DIAGNOSIS — I12.9 BENIGN HYPERTENSION WITH CHRONIC KIDNEY DISEASE, STAGE III (HCC): ICD-10-CM

## 2022-07-26 DIAGNOSIS — I50.42 CHRONIC COMBINED SYSTOLIC AND DIASTOLIC CONGESTIVE HEART FAILURE (HCC): ICD-10-CM

## 2022-07-26 DIAGNOSIS — N18.30 BENIGN HYPERTENSION WITH CHRONIC KIDNEY DISEASE, STAGE III (HCC): ICD-10-CM

## 2022-07-26 DIAGNOSIS — R80.8 OTHER PROTEINURIA: ICD-10-CM

## 2022-07-26 PROCEDURE — 99214 OFFICE O/P EST MOD 30 MIN: CPT | Performed by: INTERNAL MEDICINE

## 2022-07-26 PROCEDURE — 1160F RVW MEDS BY RX/DR IN RCRD: CPT | Performed by: INTERNAL MEDICINE

## 2022-07-26 NOTE — PROGRESS NOTES
NEPHROLOGY OFFICE PROGRESS NOTE   Albaro Ramirez 80 y o  male MRN: [id number]  DATE: 07/26/22  Reason for visit: Continued evaluation and management of CKD    ASSESSMENT & PLAN:  1  Chronic kidney disease, stage III:  · Mr Jaya Thomas baseline serum creatinine is around 1 3 to 1 6  · Etiology of CKD is felt to be multifactorial from hypertensive nephrosclerosis and possible element of diabetic nephropathy complicated by hemodynamic effect of cardiac meds  · Renal function is stable  Creatinine 1 30  · Treatment: good BP and glycemic control  2  Hypertension:  · BP is at goal   · Current regimen: Metoprolol succ 50 mg daily, Entresto 24/26 mg BID  · No changes  3  Congestive heart failure:  · Currently compensated  · Follow up with Dr Nicki Ivan  · Not on loop diuretics  He is on Farxiga 5 mg QOD  4  Hyperkalemia:  · Stable K  4 6      5  Anemia of chronic disease:  · Hgb stable at around 10 0  · No need for epogen at this time  · Monitor CBC  6  Proteinuria:  · Controlled with UPC of 0 202    7  Mineral and bone disease  · PTH is at goal - 24 in July 2022  · Ca and phos are at goal    · Vitamin D is at goal - 34 in March 2022      8  Diabetes mellitus:   · Defer DM management to Dr Dexter Eng  9  Hyperlipidemia:   · Defer HLP management to PCP  Patient Instructions   Your kidney function is stable  I recommend no change to your medications today  Follow up in 6 months - please obtain blood work 1-2 weeks prior to the appointment  Please avoid taking NSAIDs (Ibuprofen, Motrin, Aleve, Advil, Naproxen, Celebrex, etc )  Make sure you are eating healthy and have adequate exercise  SUBJECTIVE / INTERVAL HISTORY:  Will Varner was last seen in March 2022  He had a colonoscopy in January 2022 for evaluation of diarrhea  Since then, the diarrhea appears to have resolved  There have been no recent hospitalizations or ER visits  He is taking Farxiga 5 mg every other day     He is no longer on spironolactone since the fall of 2021  He feels good and looks great for his age  In fact, he is taking care of his son  PMH/PSH: HTN, DM, HLP, CKD, hyperkalemia, CHF, COPD, back pain, gallstone pancreatitis s/p cholecystectomy, melanoma s/p excision, lung nodules, anemia, BPH s/p urolift  Previous work up:   5/15/20 Renal US: R 10 1 cm, L 10 2 cm, no significant PVR  ALLERGIES: No Known Allergies    REVIEW OF SYSTEMS:  Review of Systems   Constitutional: Negative for appetite change, chills, fatigue and fever  Respiratory: Negative for cough and shortness of breath  Cardiovascular: Negative for chest pain and leg swelling  Gastrointestinal: Negative for abdominal pain, diarrhea, nausea and vomiting  Genitourinary: Negative for difficulty urinating, dysuria and hematuria  Musculoskeletal: Positive for back pain  Negative for arthralgias  Neurological: Negative for dizziness and light-headedness  OBJECTIVE:  /68   Ht 6' 1" (1 854 m)   Wt 71 7 kg (158 lb)   BMI 20 85 kg/m²   Current Weight:   Body mass index is 20 85 kg/m²  Physical Exam  Constitutional:       General: He is not in acute distress  Appearance: Normal appearance  He is well-developed and normal weight  He is not ill-appearing or diaphoretic  HENT:      Head: Normocephalic and atraumatic  Eyes:      General: No scleral icterus  Conjunctiva/sclera: Conjunctivae normal    Neck:      Vascular: No JVD  Cardiovascular:      Rate and Rhythm: Normal rate and regular rhythm  Heart sounds: Normal heart sounds  Pulmonary:      Effort: Pulmonary effort is normal  No respiratory distress  Breath sounds: Normal breath sounds  Abdominal:      General: Bowel sounds are normal       Palpations: Abdomen is soft  Musculoskeletal:      Cervical back: Neck supple  Right lower leg: No edema  Left lower leg: No edema  Skin:     General: Skin is warm and dry     Neurological: Mental Status: He is alert and oriented to person, place, and time     Psychiatric:         Behavior: Behavior normal          Judgment: Judgment normal      Medications:  Current Outpatient Medications:     acetaminophen (TYLENOL) 325 mg tablet, Take 2 tablets (650 mg total) by mouth every 6 (six) hours as needed for mild pain, Disp: 30 tablet, Rfl: 0    albuterol (2 5 mg/3 mL) 0 083 % nebulizer solution, Take 3 mL (2 5 mg total) by nebulization every 6 (six) hours as needed for wheezing or shortness of breath, Disp: 1080 mL, Rfl: 3    Ascorbic Acid (VITAMIN C) 1000 MG tablet, Take 1 tablet by mouth daily, Disp: , Rfl:     BD PEN NEEDLE DON U/F 32G X 4 MM MISC,  , Disp: , Rfl:     betamethasone, augmented, (DIPROLENE-AF) 0 05 % cream, , Disp: , Rfl:     cholecalciferol (VITAMIN D3) 1,000 units tablet, Take 1,000 Units by mouth daily, Disp: , Rfl:     cholestyramine (QUESTRAN) 4 g packet, Take 1 packet (4 g total) by mouth daily, Disp: 30 each, Rfl: 3    Cinnamon 500 MG TABS, Take 1 tablet by mouth daily  , Disp: , Rfl:     Dapagliflozin Propanediol 5 MG TABS, Take 5 mg by mouth every other day Take 1/2 tablet daily ( Farxiga) , Disp: , Rfl:     fluticasone (FLONASE) 50 mcg/act nasal spray, 2 sprays into each nostril daily, Disp: 16 g, Rfl: 02    fluticasone (Flovent HFA) 220 mcg/act inhaler, Inhale 1 puff 2 (two) times a day Rinse mouth after use , Disp: 12 g, Rfl: 1    gabapentin (NEURONTIN) 300 mg capsule, Take 1 capsule (300 mg total) by mouth daily, Disp: 90 capsule, Rfl: 3    hydrOXYzine pamoate (VISTARIL) 25 mg capsule, TAKE ONE CAPSULE BY MOUTH THREE TIMES DAILY AS NEEDED FOR ITCHING, Disp: 90 capsule, Rfl: 0    Levemir FlexTouch 100 units/mL injection pen, 10 Units daily at bedtime , Disp: , Rfl:     metoprolol succinate (TOPROL-XL) 50 mg 24 hr tablet, Take 2 tablets (100 mg total) by mouth daily (Patient taking differently: Take 50 mg by mouth daily 1 tablet daily ), Disp: 180 tablet, Rfl: 3    montelukast (SINGULAIR) 10 mg tablet, TAKE ONE TABLET BY MOUTH NIGHTLY AT BEDTIME, Disp: 90 tablet, Rfl: 3    Multiple Vitamins-Minerals (OCUVITE ADULT 50+ PO), Take 1 tablet by mouth daily, Disp: , Rfl:     NOVOLOG FLEXPEN 100 units/mL injection pen, Inject 4 Units under the skin 3 (three) times a day with meals 4u in am, 2u at lunch and 6u at dinner , Disp: , Rfl:     ONE TOUCH ULTRA TEST test strip,  , Disp: , Rfl:     ONETOUCH DELICA LANCETS FINE MISC,  , Disp: , Rfl:     roflumilast (DALIRESP) 500 mcg tablet, Take 1 tablet (500 mcg total) by mouth daily, Disp: 30 tablet, Rfl: 5    sacubitril-valsartan (Entresto) 24-26 MG TABS, Take 1 tablet by mouth 2 (two) times a day, Disp: 180 tablet, Rfl: 3    simvastatin (ZOCOR) 10 mg tablet, Take 1 tablet (10 mg total) by mouth daily, Disp: 90 tablet, Rfl: 1    umeclidinium-vilanterol (Anoro Ellipta) 62 5-25 MCG/INH inhaler, Inhale 1 puff daily, Disp: 60 blister, Rfl: 5    Ventolin  (90 Base) MCG/ACT inhaler, Inhale 2 puffs every 6 (six) hours as needed for wheezing or shortness of breath, Disp: 18 g, Rfl: 0    Laboratory Results:  Results for orders placed or performed in visit on 07/06/22   PTH, Intact and Calcium   Result Value Ref Range    PTH, Intact 24 16 - 77 pg/mL    Calcium 9 5 8 6 - 10 3 mg/dL   Magnesium   Result Value Ref Range    Magnesium, Serum 2 1 1 5 - 2 5 mg/dL   Phosphorus   Result Value Ref Range    Phosphorus, Serum 3 6 2 1 - 4 3 mg/dL   Basic metabolic panel   Result Value Ref Range    Glucose, Random 46 (L) 65 - 99 mg/dL    BUN 24 7 - 25 mg/dL    Creatinine 1 30 (H) 0 70 - 1 11 mg/dL    eGFR Non  48 (L) > OR = 60 mL/min/1 73m2    eGFR  56 (L) > OR = 60 mL/min/1 73m2    SL AMB BUN/CREATININE RATIO 18 6 - 22 (calc)    Sodium 142 135 - 146 mmol/L    Potassium 4 6 3 5 - 5 3 mmol/L    Chloride 107 98 - 110 mmol/L    CO2 28 20 - 32 mmol/L    Calcium 9 5 8 6 - 10 3 mg/dL   CBC and differential   Result Value Ref Range    White Blood Cell Count 4 5 3 8 - 10 8 Thousand/uL    Red Blood Cell Count 3 65 (L) 4 20 - 5 80 Million/uL    Hemoglobin 10 4 (L) 13 2 - 17 1 g/dL    HCT 33 1 (L) 38 5 - 50 0 %    MCV 90 7 80 0 - 100 0 fL    MCH 28 5 27 0 - 33 0 pg    MCHC 31 4 (L) 32 0 - 36 0 g/dL    RDW 22 7 (H) 11 0 - 15 0 %    Platelet Count 423 (L) 140 - 400 Thousand/uL    SL AMB MPV  7 5 - 12 5 fL    Neutrophils (Absolute) 2,205 1,500 - 7,800 cells/uL    Lymphocytes (Absolute) 1,089 850 - 3,900 cells/uL    Monocytes (Absolute) 468 200 - 950 cells/uL    Eosinophils (Absolute) 540 (H) 15 - 500 cells/uL    Basophils  0 - 200 cells/uL    Neutrophils 49 %    Lymphocytes 24 2 %    Monocytes 10 4 %    Eosinophils 12 0 %    Basophils PCT 4 4 %   Protein, Total w/Creat, Random Urine   Result Value Ref Range    Creatinine, Urine 89 20 - 320 mg/dL    Protein/Creat Ratio 202 (H) 22 - 128 mg/g creat    Protein/Creat Ratio 0 202 (H) 0 022 - 0 128 mg/mg creat    Total Protein, Urine 18 5 - 25 mg/dL   CBC Morphology   Result Value Ref Range    RBC Morphology  NORMAL

## 2022-07-26 NOTE — PATIENT INSTRUCTIONS
Your kidney function is stable  I recommend no change to your medications today  Follow up in 6 months - please obtain blood work 1-2 weeks prior to the appointment  Please avoid taking NSAIDs (Ibuprofen, Motrin, Aleve, Advil, Naproxen, Celebrex, etc )  Make sure you are eating healthy and have adequate exercise

## 2022-07-26 NOTE — LETTER
Dr Joselyn Gerardo July 26, 2022     Tish Bernard MD  [facility] [address]    Patient: Rodolfo Dan   YOB: 1933   Date of Visit: 7/26/2022       Dear Dr Marc Jones: Thank you for referring Zahra Musa to me for evaluation  Below are my notes for this consultation  If you have questions, please do not hesitate to call me  I look forward to following your patient along with you  Sincerely,        Alexander Garvey MD        CC: No Recipients  Alexander Garvey MD  7/26/2022  3:42 PM  Sign when Signing Visit  [facility] [place] OFFICE PROGRESS NOTE   Rodolfo Dan 80 y o  male MRN: [id number]  DATE: 07/26/22  Reason for visit: Continued evaluation and management of CKD    ASSESSMENT & PLAN:  1  Chronic kidney disease, stage III:  · Mr Barrett Tanner baseline serum creatinine is around 1 3 to 1 6  · Etiology of CKD is felt to be multifactorial from hypertensive nephrosclerosis and possible element of diabetic nephropathy complicated by hemodynamic effect of cardiac meds  · Renal function is stable  Creatinine 1 30  · Treatment: good BP and glycemic control  2  Hypertension:  · BP is at goal   · Current regimen: Metoprolol succ 50 mg daily, Entresto 24/26 mg BID  · No changes  3  Congestive heart failure:  · Currently compensated  · Follow up with Dr Ladonna Davis  · Not on loop diuretics  He is on Farxiga 5 mg QOD  4  Hyperkalemia:  · Stable K  4 6      5  Anemia of chronic disease:  · Hgb stable at around 10 0  · No need for epogen at this time  · Monitor CBC  6  Proteinuria:  · Controlled with UPC of 0 202    7  Mineral and bone disease  · PTH is at goal - 24 in July 2022  · Ca and phos are at goal    · Vitamin D is at goal - 34 in March 2022      8  Diabetes mellitus:   · Defer DM management to Dr Shay Grove  9  Hyperlipidemia:   · Defer HLP management to PCP  Patient Instructions   Your kidney function is stable     I recommend no change to your medications MD today    Follow up in 6 months - please obtain blood work 1-2 weeks prior to the appointment  Please avoid taking NSAIDs (Ibuprofen, Motrin, Aleve, Advil, Naproxen, Celebrex, etc )  Make sure you are eating healthy and have adequate exercise  SUBJECTIVE / INTERVAL HISTORY:  Germain Gallegos was last seen in March 2022  He had a colonoscopy in January 2022 for evaluation of diarrhea  Since then, the diarrhea appears to have resolved  There have been no recent hospitalizations or ER visits  He is taking Farxiga 5 mg every other day  He is no longer on spironolactone since the fall of 2021  He feels good and looks great for his age  In fact, he is taking care of his son  PMH/PSH: HTN, DM, HLP, CKD, hyperkalemia, CHF, COPD, back pain, gallstone pancreatitis s/p cholecystectomy, melanoma s/p excision, lung nodules, anemia, BPH s/p urolift  Previous work up:   5/15/20 Renal US: R 10 1 cm, L 10 2 cm, no significant PVR  ALLERGIES: No Known Allergies    REVIEW OF SYSTEMS:  Review of Systems   Constitutional: Negative for appetite change, chills, fatigue and fever  Respiratory: Negative for cough and shortness of breath  Cardiovascular: Negative for chest pain and leg swelling  Gastrointestinal: Negative for abdominal pain, diarrhea, nausea and vomiting  Genitourinary: Negative for difficulty urinating, dysuria and hematuria  Musculoskeletal: Positive for back pain  Negative for arthralgias  Neurological: Negative for dizziness and light-headedness  OBJECTIVE:  /68   Ht 6' 1" (1 854 m)   Wt 71 7 kg (158 lb)   BMI 20 85 kg/m²   Current Weight:   Body mass index is 20 85 kg/m²  Physical Exam  Constitutional:       General: He is not in acute distress  Appearance: Normal appearance  He is well-developed and normal weight  He is not ill-appearing or diaphoretic  HENT:      Head: Normocephalic and atraumatic  Eyes:      General: No scleral icterus  Conjunctiva/sclera: Conjunctivae normal    Neck:      Vascular: No JVD  Cardiovascular:      Rate and Rhythm: Normal rate and regular rhythm  Heart sounds: Normal heart sounds  Pulmonary:      Effort: Pulmonary effort is normal  No respiratory distress  Breath sounds: Normal breath sounds  Abdominal:      General: Bowel sounds are normal       Palpations: Abdomen is soft  Musculoskeletal:      Cervical back: Neck supple  Right lower leg: No edema  Left lower leg: No edema  Skin:     General: Skin is warm and dry  Neurological:      Mental Status: He is alert and oriented to person, place, and time     Psychiatric:         Behavior: Behavior normal          Judgment: Judgment normal      Medications:  Current Outpatient Medications:     acetaminophen (TYLENOL) 325 mg tablet, Take 2 tablets (650 mg total) by mouth every 6 (six) hours as needed for mild pain, Disp: 30 tablet, Rfl: 0    albuterol (2 5 mg/3 mL) 0 083 % nebulizer solution, Take 3 mL (2 5 mg total) by nebulization every 6 (six) hours as needed for wheezing or shortness of breath, Disp: 1080 mL, Rfl: 3    Ascorbic Acid (VITAMIN C) 1000 MG tablet, Take 1 tablet by mouth daily, Disp: , Rfl:     BD PEN NEEDLE DON U/F 32G X 4 MM MISC,  , Disp: , Rfl:     betamethasone, augmented, (DIPROLENE-AF) 0 05 % cream, , Disp: , Rfl:     cholecalciferol (VITAMIN D3) 1,000 units tablet, Take 1,000 Units by mouth daily, Disp: , Rfl:     cholestyramine (QUESTRAN) 4 g packet, Take 1 packet (4 g total) by mouth daily, Disp: 30 each, Rfl: 3    Cinnamon 500 MG TABS, Take 1 tablet by mouth daily  , Disp: , Rfl:     Dapagliflozin Propanediol 5 MG TABS, Take 5 mg by mouth every other day Take 1/2 tablet daily ( Farxiga) , Disp: , Rfl:     fluticasone (FLONASE) 50 mcg/act nasal spray, 2 sprays into each nostril daily, Disp: 16 g, Rfl: 02    fluticasone (Flovent HFA) 220 mcg/act inhaler, Inhale 1 puff 2 (two) times a day Rinse mouth after Dr. Watkins use , Disp: 12 g, Rfl: 1    gabapentin (NEURONTIN) 300 mg capsule, Take 1 capsule (300 mg total) by mouth daily, Disp: 90 capsule, Rfl: 3    hydrOXYzine pamoate (VISTARIL) 25 mg capsule, TAKE ONE CAPSULE BY MOUTH THREE TIMES DAILY AS NEEDED FOR ITCHING, Disp: 90 capsule, Rfl: 0    Levemir FlexTouch 100 units/mL injection pen, 10 Units daily at bedtime , Disp: , Rfl:     metoprolol succinate (TOPROL-XL) 50 mg 24 hr tablet, Take 2 tablets (100 mg total) by mouth daily (Patient taking differently: Take 50 mg by mouth daily 1 tablet daily ), Disp: 180 tablet, Rfl: 3    montelukast (SINGULAIR) 10 mg tablet, TAKE ONE TABLET BY MOUTH NIGHTLY AT BEDTIME, Disp: 90 tablet, Rfl: 3    Multiple Vitamins-Minerals (OCUVITE ADULT 50+ PO), Take 1 tablet by mouth daily, Disp: , Rfl:     NOVOLOG FLEXPEN 100 units/mL injection pen, Inject 4 Units under the skin 3 (three) times a day with meals 4u in am, 2u at lunch and 6u at dinner , Disp: , Rfl:     ONE TOUCH ULTRA TEST test strip,  , Disp: , Rfl:     ONETOUCH DELICA LANCETS FINE MISC,  , Disp: , Rfl:     roflumilast (DALIRESP) 500 mcg tablet, Take 1 tablet (500 mcg total) by mouth daily, Disp: 30 tablet, Rfl: 5    sacubitril-valsartan (Entresto) 24-26 MG TABS, Take 1 tablet by mouth 2 (two) times a day, Disp: 180 tablet, Rfl: 3    simvastatin (ZOCOR) 10 mg tablet, Take 1 tablet (10 mg total) by mouth daily, Disp: 90 tablet, Rfl: 1    umeclidinium-vilanterol (Anoro Ellipta) 62 5-25 MCG/INH inhaler, Inhale 1 puff daily, Disp: 60 blister, Rfl: 5    Ventolin  (90 Base) MCG/ACT inhaler, Inhale 2 puffs every 6 (six) hours as needed for wheezing or shortness of breath, Disp: 18 g, Rfl: 0    Laboratory Results:  Results for orders placed or performed in visit on 07/06/22   PTH, Intact and Calcium   Result Value Ref Range    PTH, Intact 24 16 - 77 pg/mL    Calcium 9 5 8 6 - 10 3 mg/dL   Magnesium   Result Value Ref Range    Magnesium, Serum 2 1 1 5 - 2 5 mg/dL Phosphorus   Result Value Ref Range    Phosphorus, Serum 3 6 2 1 - 4 3 mg/dL   Basic metabolic panel   Result Value Ref Range    Glucose, Random 46 (L) 65 - 99 mg/dL    BUN 24 7 - 25 mg/dL    Creatinine 1 30 (H) 0 70 - 1 11 mg/dL    eGFR Non  48 (L) > OR = 60 mL/min/1 73m2    eGFR  56 (L) > OR = 60 mL/min/1 73m2    SL AMB BUN/CREATININE RATIO 18 6 - 22 (calc)    Sodium 142 135 - 146 mmol/L    Potassium 4 6 3 5 - 5 3 mmol/L    Chloride 107 98 - 110 mmol/L    CO2 28 20 - 32 mmol/L    Calcium 9 5 8 6 - 10 3 mg/dL   CBC and differential   Result Value Ref Range    White Blood Cell Count 4 5 3 8 - 10 8 Thousand/uL    Red Blood Cell Count 3 65 (L) 4 20 - 5 80 Million/uL    Hemoglobin 10 4 (L) 13 2 - 17 1 g/dL    HCT 33 1 (L) 38 5 - 50 0 %    MCV 90 7 80 0 - 100 0 fL    MCH 28 5 27 0 - 33 0 pg    MCHC 31 4 (L) 32 0 - 36 0 g/dL    RDW 22 7 (H) 11 0 - 15 0 %    Platelet Count 750 (L) 140 - 400 Thousand/uL    SL AMB MPV  7 5 - 12 5 fL    Neutrophils (Absolute) 2,205 1,500 - 7,800 cells/uL    Lymphocytes (Absolute) 1,089 850 - 3,900 cells/uL    Monocytes (Absolute) 468 200 - 950 cells/uL    Eosinophils (Absolute) 540 (H) 15 - 500 cells/uL    Basophils  0 - 200 cells/uL    Neutrophils 49 %    Lymphocytes 24 2 %    Monocytes 10 4 %    Eosinophils 12 0 %    Basophils PCT 4 4 %   Protein, Total w/Creat, Random Urine   Result Value Ref Range    Creatinine, Urine 89 20 - 320 mg/dL    Protein/Creat Ratio 202 (H) 22 - 128 mg/g creat    Protein/Creat Ratio 0 202 (H) 0 022 - 0 128 mg/mg creat    Total Protein, Urine 18 5 - 25 mg/dL   CBC Morphology   Result Value Ref Range    RBC Morphology  NORMAL

## 2022-08-09 DIAGNOSIS — R19.7 DIARRHEA, UNSPECIFIED TYPE: ICD-10-CM

## 2022-08-11 RX ORDER — CHOLESTYRAMINE 4 G/9G
POWDER, FOR SUSPENSION ORAL
Qty: 30 EACH | Refills: 0 | Status: SHIPPED | OUTPATIENT
Start: 2022-08-11 | End: 2022-10-01

## 2022-08-11 NOTE — TELEPHONE ENCOUNTER
Pt called back stating he is out of this medication and isn't sure if he still needs to take it or if he should be seen in the office  He states he is not having any issues with diarrhea

## 2022-08-25 DIAGNOSIS — J41.8 MIXED SIMPLE AND MUCOPURULENT CHRONIC BRONCHITIS (HCC): ICD-10-CM

## 2022-08-25 DIAGNOSIS — L29.9 PRURITUS: ICD-10-CM

## 2022-08-25 RX ORDER — HYDROXYZINE PAMOATE 25 MG/1
CAPSULE ORAL
Qty: 90 CAPSULE | Refills: 0 | Status: SHIPPED | OUTPATIENT
Start: 2022-08-25 | End: 2022-08-25

## 2022-08-25 RX ORDER — HYDROXYZINE PAMOATE 25 MG/1
CAPSULE ORAL
Qty: 90 CAPSULE | Refills: 0 | Status: SHIPPED | OUTPATIENT
Start: 2022-08-25 | End: 2022-10-18

## 2022-09-16 DIAGNOSIS — R06.02 SOB (SHORTNESS OF BREATH): ICD-10-CM

## 2022-09-16 RX ORDER — ALBUTEROL SULFATE 2.5 MG/3ML
SOLUTION RESPIRATORY (INHALATION)
Qty: 360 ML | Refills: 0 | Status: SHIPPED | OUTPATIENT
Start: 2022-09-16

## 2022-09-26 ENCOUNTER — OFFICE VISIT (OUTPATIENT)
Dept: CARDIOLOGY CLINIC | Facility: MEDICAL CENTER | Age: 87
End: 2022-09-26
Payer: COMMERCIAL

## 2022-09-26 VITALS
DIASTOLIC BLOOD PRESSURE: 62 MMHG | HEART RATE: 72 BPM | HEIGHT: 73 IN | OXYGEN SATURATION: 94 % | SYSTOLIC BLOOD PRESSURE: 112 MMHG | WEIGHT: 164 LBS | BODY MASS INDEX: 21.74 KG/M2

## 2022-09-26 DIAGNOSIS — I12.9 BENIGN HYPERTENSION WITH CHRONIC KIDNEY DISEASE, STAGE III (HCC): Primary | ICD-10-CM

## 2022-09-26 DIAGNOSIS — N18.30 BENIGN HYPERTENSION WITH CHRONIC KIDNEY DISEASE, STAGE III (HCC): Primary | ICD-10-CM

## 2022-09-26 DIAGNOSIS — I42.0 DILATED CARDIOMYOPATHY (HCC): ICD-10-CM

## 2022-09-26 DIAGNOSIS — I50.42 CHRONIC COMBINED SYSTOLIC AND DIASTOLIC CONGESTIVE HEART FAILURE (HCC): ICD-10-CM

## 2022-09-26 DIAGNOSIS — N18.32 STAGE 3B CHRONIC KIDNEY DISEASE (HCC): ICD-10-CM

## 2022-09-26 DIAGNOSIS — E78.49 OTHER HYPERLIPIDEMIA: ICD-10-CM

## 2022-09-26 PROCEDURE — 99214 OFFICE O/P EST MOD 30 MIN: CPT | Performed by: INTERNAL MEDICINE

## 2022-09-26 NOTE — PROGRESS NOTES
Cardiology Follow Up    Faye Lang  1933  [id number]  [facility] [id number] [facility]  [phone]    1  Benign hypertension with chronic kidney disease, stage III ([address] )     2  Chronic combined systolic and diastolic congestive heart failure ([address] )     3  Dilated cardiomyopathy ([address] )     4  Stage 3b chronic kidney disease ([address] )     5  Other hyperlipidemia         Diagnoses and all orders for this visit:    Benign hypertension with chronic kidney disease, stage III ([address] )    Chronic combined systolic and diastolic congestive heart failure ([address] )    Dilated cardiomyopathy ([address] )    Stage 3b chronic kidney disease ([facility] [address] )    Other hyperlipidemia    I had the pleasure of seeing Faye Lang for a follow up visit  Interval History: None    History of the presenting illness, Discussion/Summary and My Plan are as follows:::    80year-old without any prior cardiac history-including CAD, MI, bypass, stents or valvular heart disease or family history of cardiomyopathy or coronary artery disease but with a left bundle-branch block since 2013, also with a nonischemic cardiomyopathy (negative coronary angiography-May 2018)-diagnosed in May 2018 in the setting of acute congestive heart failure-probably viral-or originally with an ejection fraction of 35-40%, subsequently on ACE-inhibitor and beta-blocker, decreased to 20%, admitted with another episode of acute CHF-November 2018, initiated on Entresto, after which ejection fraction normalized to 55%-February 2019  He is currently on an excellent regimen and has stayed out of the hospital for almost 3 years      He denies any orthopnea or dyspnea with exertion or edema  He is playing golf in the summer, does yd work, lives in a 3 story house and walks, plays golf    No symptoms      Exam demonstrates NO e/o CHF       Plan:     Nonischemic Cardiomyopathy and chronic systolic congestive heart failure with 1 recurrence, now normalized:  Nonischemic (negative coronary angiography-May 2018), no alcohol use or chemotherapy  No family history  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-was  treated for symptoms of bronchitis for few months prior to May 2018  Now on an excellent medical regimen including a beta-blocker, Entresto (was on spironolactone-stopped for hyperkalemia)  Ejection fraction decreased from 37%-May 2018 to 20-25%-September 2018-while on a good dose of ACE-inhibitor and beta-blocker, admitted with acute CHF-November 2018, then initiated on Entresto-ejection fraction improved to normal-55%-February 2019  No e/o Vol Ol on Exam  Also has COPD  Not on any diuretics    Dry weight at home-155-157 lb and stable, 164 here, appears to be losing some caloric weight    Hypertension:  Controlled     CKD: Baseline creat around 1 4, follows with Dr Mario Mckinney (Nephrology)  1 3 in July 2022     Dyslipidemia:  On low-dose statin  He is also diabetic      Diabetes: Followed by an endocrinologist at [facility]   Now on Farxiga, last A1C of 7 7, 6 9 was the last per pt     Follow-up in 9 months     Latest Reference Range & Units 10/15/19 09:30 12/07/20 10:51   Cholesterol <200 mg/dL 132 112   Triglycerides <150 mg/dL 72 52   HDL > OR = 40 mg/dL 42 39 (L)   Non-HDL Cholesterol <130 mg/dL (calc) 90 73   LDL Calculated mg/dL (calc) 75 60   Chol HDLC Ratio <5 0 (calc) 3 1 2 9   (L): Data is abnormally low     Latest Reference Range & Units 03/01/22 08:42 07/06/22 09:22   BUN 7 - 25 mg/dL 31 (H) 24   Creatinine 0 70 - 1 11 mg/dL 1 59 (H) 1 30 (H)   (H): Data is abnormally high     05/12/22 00:00   Hemoglobin A1C 7 7 (E)   (E): External lab result3  Patient Active Problem List   Diagnosis    COPD without exacerbation ([facility] [address] )    Benign hypertension with chronic kidney disease, stage III (HCC)    Hyperlipidemia    Neuropathy, idiopathic    Multiple nodules of lung  Simple chronic bronchitis (HCC)    Type 2 diabetes mellitus with diabetic neuropathy, with long-term current use of insulin (HCC)    Dilated cardiomyopathy (HCC)    Chronic combined systolic and diastolic congestive heart failure (HCC)    Abnormal chest CT    Arthritis    Paresthesia of both feet    Finger pain, left    Cough    Urinary retention    Benign prostatic hyperplasia (BPH) with straining on urination    Anemia of chronic kidney failure, stage 3 (moderate) (HCC)    CKD (chronic kidney disease), stage III (HCC)    Hyperkalemia    Chronic kidney disease-mineral and bone disorder    Other proteinuria    MDS (myelodysplastic syndrome), low grade (HCC)    Diarrhea     Past Medical History:   Diagnosis Date    COPD (chronic obstructive pulmonary disease) [facility] [address] )     Diabetes mellitus ([facility] )     Type 2    Essential hypertension     Heart failure (HCC)     Hyperlipidemia     Left inguinal hernia     Lung nodule     Malignant melanoma of skin ([facility])      Social History     Socioeconomic History    Marital status:       Spouse name: Not on file    Number of children: 2    Years of education: Not on file    Highest education level: Not on file   Occupational History    Occupation: rtired   Tobacco Use    Smoking status: Former Smoker     Packs/day: 1 00     Years: 10 00     Pack years: 10 00     Start date:      Quit date:      Years since quittin 7    Smokeless tobacco: Never Used   Vaping Use    Vaping Use: Never used   Substance and Sexual Activity    Alcohol use: Not Currently     Alcohol/week: 0 0 standard drinks    Drug use: No    Sexual activity: Not on file   Other Topics Concern    Not on file   Social History Narrative    Caffeine use, active     Social Determinants of Health     Financial Resource Strain: Not on file   Food Insecurity: Not on file   Transportation Needs: Not on file   Physical Activity: Not on file   Stress: Not on file   Social Connections: Not on file   Intimate Partner Violence: Not on file   Housing Stability: Not on file      Family History   Problem Relation Age of Onset    Diabetes Mother     Heart attack Neg Hx     Stroke Neg Hx     Aneurysm Neg Hx     Clotting disorder Neg Hx     Arrhythmia Neg Hx     Hypertension Neg Hx     Hyperlipidemia Neg Hx         pt unsure      Past Surgical History:   Procedure Laterality Date    APPENDECTOMY      CATARACT EXTRACTION, BILATERAL      CHOLECYSTECTOMY      CHOLECYSTECTOMY      COLONOSCOPY  2014    Fiberoptic    HERNIA REPAIR      KNEE ARTHROSCOPY Bilateral     Therapeutic    NV CYSTOURETHRO W/IMPLANT N/A 3/22/2019    Procedure: CYSTOSCOPY WITH INSERTION UROLIFT;  Surgeon: Marcus Sheth MD;  [facility]: [facility];  Service: Urology       Current Outpatient Medications:     acetaminophen (TYLENOL) 325 mg tablet, Take 2 tablets (650 mg total) by mouth every 6 (six) hours as needed for mild pain, Disp: 30 tablet, Rfl: 0    albuterol (2 5 mg/3 mL) 0 083 % nebulizer solution, inhale the contents of one ampule by nebulization every 6 hours as needed for wheezing or shortness of breath, Disp: 360 mL, Rfl: 0    Anoro Ellipta 62 5-25 MCG/INH inhaler, INHALE ONE PUFF BY MOUTH ONCE DAILY, Disp: 60 blister, Rfl: 3    Ascorbic Acid (VITAMIN C) 1000 MG tablet, Take 1 tablet by mouth daily, Disp: , Rfl:     BD PEN NEEDLE DON U/F 32G X 4 MM MISC,  , Disp: , Rfl:     betamethasone, augmented, (DIPROLENE-AF) 0 05 % cream, , Disp: , Rfl:     cholecalciferol (VITAMIN D3) 1,000 units tablet, Take 1,000 Units by mouth daily, Disp: , Rfl:     cholestyramine (QUESTRAN) 4 g packet, take one packet by mouth once daily, Disp: 30 each, Rfl: 0    Cinnamon 500 MG TABS, Take 1 tablet by mouth daily  , Disp: , Rfl:     Dapagliflozin Propanediol 5 MG TABS, Take 5 mg by mouth every other day Take 1/2 tablet daily ( Farxiga) , Disp: , Rfl:     fluticasone (FLONASE) 50 mcg/act nasal spray, USE TWO SPRAYS IN EACH NOSTRIL DAILY, Disp: 16 g, Rfl: 0    fluticasone (Flovent HFA) 220 mcg/act inhaler, Inhale 1 puff 2 (two) times a day Rinse mouth after use , Disp: 12 g, Rfl: 1    gabapentin (NEURONTIN) 300 mg capsule, Take 1 capsule (300 mg total) by mouth daily, Disp: 90 capsule, Rfl: 3    hydrOXYzine pamoate (VISTARIL) 25 mg capsule, TAKE ONE CAPSULE BY MOUTH THREE TIMES DAILY AS NEEDED for itching, Disp: 90 capsule, Rfl: 0    Levemir FlexTouch 100 units/mL injection pen, 10 Units daily at bedtime , Disp: , Rfl:     metoprolol succinate (TOPROL-XL) 50 mg 24 hr tablet, Take 2 tablets (100 mg total) by mouth daily (Patient taking differently: Take 50 mg by mouth daily 1 tablet daily), Disp: 180 tablet, Rfl: 3    montelukast (SINGULAIR) 10 mg tablet, TAKE ONE TABLET BY MOUTH NIGHTLY AT BEDTIME, Disp: 90 tablet, Rfl: 3    Multiple Vitamins-Minerals (OCUVITE ADULT 50+ PO), Take 1 tablet by mouth daily, Disp: , Rfl:     NOVOLOG FLEXPEN 100 units/mL injection pen, Inject 4 Units under the skin 3 (three) times a day with meals 4u in am, 2u at lunch and 6u at dinner , Disp: , Rfl:     ONE TOUCH ULTRA TEST test strip,  , Disp: , Rfl:     ONETOUCH DELICA LANCETS FINE MISC,  , Disp: , Rfl:     roflumilast (DALIRESP) 500 mcg tablet, Take 1 tablet (500 mcg total) by mouth daily, Disp: 30 tablet, Rfl: 5    sacubitril-valsartan (Entresto) 24-26 MG TABS, Take 1 tablet by mouth 2 (two) times a day, Disp: 180 tablet, Rfl: 3    simvastatin (ZOCOR) 10 mg tablet, Take 1 tablet (10 mg total) by mouth daily, Disp: 90 tablet, Rfl: 1    Ventolin  (90 Base) MCG/ACT inhaler, Inhale 2 puffs every 6 (six) hours as needed for wheezing or shortness of breath, Disp: 18 g, Rfl: 0  No Known Allergies    Imaging: No results found  Review of Systems:  Review of Systems   Constitutional: Negative  HENT: Negative  Eyes: Negative  Respiratory: Negative  Cardiovascular: Negative  Endocrine: Negative  Musculoskeletal: Negative  Hematological: Negative  Physical Exam:  /62 (BP Location: Left arm, Patient Position: Sitting, Cuff Size: Adult)   Pulse 72   Ht 6' 1" (1 854 m)   Wt 74 4 kg (164 lb)   SpO2 94%   BMI 21 64 kg/m²   Physical Exam  Constitutional:       General: He is not in acute distress  Appearance: He is well-developed  He is not diaphoretic  HENT:      Head: Normocephalic and atraumatic  Eyes:      General: No scleral icterus  Right eye: No discharge  Left eye: No discharge  Conjunctiva/sclera: Conjunctivae normal       Pupils: Pupils are equal, round, and reactive to light  Neck:      Thyroid: No thyromegaly  Vascular: No JVD  Trachea: No tracheal deviation  Cardiovascular:      Rate and Rhythm: Normal rate and regular rhythm  Heart sounds: No murmur (Short systolic murmur-TR) heard  No friction rub  No gallop  Pulmonary:      Effort: Pulmonary effort is normal  No respiratory distress  Breath sounds: Normal breath sounds  No stridor  No wheezing  Abdominal:      General: Bowel sounds are normal  There is no distension  Palpations: Abdomen is soft  Tenderness: There is no abdominal tenderness  Musculoskeletal:         General: No tenderness or deformity  Normal range of motion  Cervical back: Normal range of motion  Skin:     General: Skin is warm  Coloration: Skin is not pale  Findings: No erythema or rash

## 2022-09-29 PROCEDURE — 3288F FALL RISK ASSESSMENT DOCD: CPT | Performed by: STUDENT IN AN ORGANIZED HEALTH CARE EDUCATION/TRAINING PROGRAM

## 2022-09-29 NOTE — PROGRESS NOTES
Assessment and Plan:     Problem List Items Addressed This Visit    None     Visit Diagnoses     Medicare annual wellness visit, subsequent    -  Primary    Nasal congestion        Relevant Medications    fluticasone (FLONASE) 50 mcg/act nasal spray    History of skin cancer        Relevant Orders    Ambulatory Referral to Dermatology    Screening for skin cancer        Relevant Orders    Ambulatory Referral to Dermatology    Need for hepatitis C screening test        Relevant Orders    Hepatitis C antibody    Immunization due        Relevant Orders    Pneumococcal Conjugate Vaccine 20-valent (Pcv20) (Completed)        Preventive health issues were discussed with patient, and age appropriate screening tests were ordered as noted in patient's After Visit Summary  Personalized health advice and appropriate referrals for health education or preventive services given if needed, as noted in patient's After Visit Summary  History of Present Illness:     Patient presents for a Medicare Wellness Visit  Requests refill of Flonase      HPI   Patient Care Team:  Arik Braswell DO as PCP - General (Family Medicine)  MD Wolf Neal, MD Gene Arellano MD Alphonso Fix, MD Ирина Mason MD as Consulting Physician (Endocrinology)       Problem List:     Patient Active Problem List   Diagnosis    COPD without exacerbation [facility])    Benign hypertension with chronic kidney disease, stage III ([facility] [address] )    Hyperlipidemia    Neuropathy, idiopathic    Multiple nodules of lung    Simple chronic bronchitis ([facility] )    Type 2 diabetes mellitus with diabetic neuropathy, with long-term current use of insulin ([facility] )    Dilated cardiomyopathy ([facility] [facility] )    Chronic combined systolic and diastolic congestive heart failure ([facility] )    Abnormal chest CT    Arthritis    Paresthesia of both feet    Finger pain, left    Cough    Urinary retention    Benign prostatic hyperplasia (BPH) with straining on urination    Anemia of chronic kidney failure, stage 3 (moderate) (HCC)    CKD (chronic kidney disease), stage III (HCC)    Hyperkalemia    Chronic kidney disease-mineral and bone disorder    Other proteinuria    MDS (myelodysplastic syndrome), low grade (HCC)    Diarrhea      Past Medical and Surgical History:     Past Medical History:   Diagnosis Date    COPD (chronic obstructive pulmonary disease) ([address] )     Diabetes mellitus (HCC)     Type 2    Essential hypertension     Heart failure (HCC)     Hyperlipidemia     Left inguinal hernia     Lung nodule     Malignant melanoma of skin (HCC)      Past Surgical History:   Procedure Laterality Date    APPENDECTOMY      CATARACT EXTRACTION, BILATERAL      CHOLECYSTECTOMY      CHOLECYSTECTOMY      COLONOSCOPY      Fiberoptic    HERNIA REPAIR      KNEE ARTHROSCOPY Bilateral     Therapeutic    NV CYSTOURETHRO W/IMPLANT N/A 3/22/2019    Procedure: CYSTOSCOPY WITH INSERTION Johnny Kid;  Surgeon: Milton Lombardo MD;  [facility];  Service: Urology      Family History:     Family History   Problem Relation Age of Onset    Diabetes Mother     Heart attack Neg Hx     Stroke Neg Hx     Aneurysm Neg Hx     Clotting disorder Neg Hx     Arrhythmia Neg Hx     Hypertension Neg Hx     Hyperlipidemia Neg Hx         pt unsure       Social History:     Social History     Socioeconomic History    Marital status:       Spouse name: None    Number of children: 2    Years of education: None    Highest education level: None   Occupational History    Occupation: rtired   Tobacco Use    Smoking status: Former Smoker     Packs/day: 1 00     Years: 10 00     Pack years: 10 00     Start date:      Quit date:      Years since quittin 7    Smokeless tobacco: Never Used   Vaping Use    Vaping Use: Never used   Substance and Sexual Activity    Alcohol use: Not Currently     Alcohol/week: 0 0 standard drinks    Drug use: No    Sexual activity: None   Other Topics Concern    None   Social History Narrative    Caffeine use, active     Social Determinants of Health     Financial Resource Strain: Low Risk     Difficulty of Paying Living Expenses: Not hard at all   Food Insecurity: Not on file   Transportation Needs: No Transportation Needs    Lack of Transportation (Medical): No    Lack of Transportation (Non-Medical): No   Physical Activity: Not on file   Stress: Not on file   Social Connections: Not on file   Intimate Partner Violence: Not on file   Housing Stability: Not on file      Medications and Allergies:     Current Outpatient Medications   Medication Sig Dispense Refill    acetaminophen (TYLENOL) 325 mg tablet Take 2 tablets (650 mg total) by mouth every 6 (six) hours as needed for mild pain 30 tablet 0    albuterol (2 5 mg/3 mL) 0 083 % nebulizer solution inhale the contents of one ampule by nebulization every 6 hours as needed for wheezing or shortness of breath 360 mL 0    Anoro Ellipta 62 5-25 MCG/INH inhaler INHALE ONE PUFF BY MOUTH ONCE DAILY 60 blister 3    Ascorbic Acid (VITAMIN C) 1000 MG tablet Take 1 tablet by mouth daily      BD PEN NEEDLE DON U/F 32G X 4 MM MISC        betamethasone, augmented, (DIPROLENE-AF) 0 05 % cream       cholecalciferol (VITAMIN D3) 1,000 units tablet Take 1,000 Units by mouth daily      cholestyramine (QUESTRAN) 4 g packet take one packet by mouth once daily 30 each 0    Cinnamon 500 MG TABS Take 1 tablet by mouth daily        Dapagliflozin Propanediol 5 MG TABS Take 5 mg by mouth every other day Take 1/2 tablet daily ( Farxiga)       fluticasone (FLONASE) 50 mcg/act nasal spray 2 sprays into each nostril daily 16 g 3    fluticasone (Flovent HFA) 220 mcg/act inhaler Inhale 1 puff 2 (two) times a day Rinse mouth after use   12 g 1    gabapentin (NEURONTIN) 300 mg capsule Take 1 capsule (300 mg total) by mouth daily 90 capsule 3    hydrOXYzine pamoate (VISTARIL) 25 mg capsule TAKE ONE CAPSULE BY MOUTH THREE TIMES DAILY AS NEEDED for itching 90 capsule 0    Levemir FlexTouch 100 units/mL injection pen 10 Units daily at bedtime       metoprolol succinate (TOPROL-XL) 50 mg 24 hr tablet Take 2 tablets (100 mg total) by mouth daily (Patient taking differently: Take 50 mg by mouth daily 1 tablet daily) 180 tablet 3    montelukast (SINGULAIR) 10 mg tablet TAKE ONE TABLET BY MOUTH NIGHTLY AT BEDTIME 90 tablet 3    Multiple Vitamins-Minerals (OCUVITE ADULT 50+ PO) Take 1 tablet by mouth daily      NOVOLOG FLEXPEN 100 units/mL injection pen Inject 4 Units under the skin 3 (three) times a day with meals 4u in am, 2u at lunch and 6u at dinner       ONE TOUCH ULTRA TEST test strip        ONETOUCH DELICA LANCETS FINE MISC        roflumilast (DALIRESP) 500 mcg tablet Take 1 tablet (500 mcg total) by mouth daily 30 tablet 5    sacubitril-valsartan (Entresto) 24-26 MG TABS Take 1 tablet by mouth 2 (two) times a day 180 tablet 3    simvastatin (ZOCOR) 10 mg tablet Take 1 tablet (10 mg total) by mouth daily 90 tablet 1    Ventolin  (90 Base) MCG/ACT inhaler Inhale 2 puffs every 6 (six) hours as needed for wheezing or shortness of breath 18 g 0     No current facility-administered medications for this visit       No Known Allergies   Immunizations:     Immunization History   Administered Date(s) Administered    COVID-19 MODERNA VACC 0 5 ML IM 01/26/2021, 03/02/2021, 10/25/2021, 04/21/2022    H1N1, All Formulations 01/26/2010    INFLUENZA 09/28/2018, 10/04/2021, 09/22/2022    Influenza Split High Dose Preservative Free IM 10/18/2013, 10/05/2015, 09/30/2016, 10/20/2017    Influenza, high dose seasonal 0 7 mL 10/12/2020    Influenza, seasonal, injectable 10/13/2011, 10/01/2014    Pneumococcal Conjugate 13-Valent 11/22/2017    Pneumococcal Conjugate Vaccine 20-valent (Pcv20), Polysace 09/30/2022    Pneumococcal Polysaccharide PPV23 01/01/1998, 10/19/2005, 11/30/2020    Tdap 03/30/2022    Zoster 10/01/2010    Zoster Vaccine Recombinant 01/03/2020, 10/12/2020      Health Maintenance: There are no preventive care reminders to display for this patient  There are no preventive care reminders to display for this patient  Medicare Screening Tests and Risk Assessments:     Susan Ashby is here for his Subsequent Wellness visit  Health Risk Assessment:   Patient rates overall health as good  Patient feels that their physical health rating is same  Patient is very satisfied with their life  Eyesight was rated as same  Hearing was rated as same  Patient feels that their emotional and mental health rating is same  Patients states they are never, rarely angry  Patient states they are never, rarely unusually tired/fatigued  Pain experienced in the last 7 days has been none  Patient states that he has experienced no weight loss or gain in last 6 months  Wears hearing aid    Depression Screening:   PHQ-2 Score: 0      Fall Risk Screening: In the past year, patient has experienced: no history of falling in past year      Home Safety:  Patient does not have trouble with stairs inside or outside of their home  Patient has working smoke alarms and has working carbon monoxide detector  Home safety hazards include: none  Nutrition:   Current diet is Regular  Medications:   Patient is currently taking over-the-counter supplements  OTC medications include: see medication list  Patient is able to manage medications  Activities of Daily Living (ADLs)/Instrumental Activities of Daily Living (IADLs):   Walk and transfer into and out of bed and chair?: Yes  Dress and groom yourself?: Yes    Bathe or shower yourself?: Yes    Feed yourself?  Yes  Do your laundry/housekeeping?: Yes  Manage your money, pay your bills and track your expenses?: Yes  Make your own meals?: Yes    Do your own shopping?: Yes    Previous Hospitalizations:   Any hospitalizations or ED visits within the last 12 months?: No Advance Care Planning:   Living will: Yes    Durable POA for healthcare: Yes    Advanced directive: Yes      Cognitive Screening:   Provider or family/friend/caregiver concerned regarding cognition?: No    PREVENTIVE SCREENINGS      Cardiovascular Screening:    General: Screening Not Indicated and History Lipid Disorder      Diabetes Screening:     General: Screening Not Indicated and History Diabetes      Colorectal Cancer Screening:     General: Screening Not Indicated      Prostate Cancer Screening:    General: Screening Not Indicated      Osteoporosis Screening:    General: Screening Not Indicated      Abdominal Aortic Aneurysm (AAA) Screening:    Risk factors include: tobacco use        General: Screening Not Indicated      Lung Cancer Screening:     General: Screening Not Indicated      Hepatitis C Screening:    General: Risks and Benefits Discussed    Hep C Screening Accepted: Yes      Screening, Brief Intervention, and Referral to Treatment (SBIRT)    Screening  Typical number of drinks in a day: 0    Single Item Drug Screening:  How often have you used an illegal drug (including marijuana) or a prescription medication for non-medical reasons in the past year? never    Single Item Drug Screen Score: 0  Interpretation: Negative screen for possible drug use disorder    Other Counseling Topics:   Car/seat belt/driving safety, skin self-exam and sunscreen       /74 (BP Location: Left arm, Patient Position: Sitting, Cuff Size: Adult)   Pulse 64   Temp 98 6 °F (37 °C)   Resp 18   Ht 6' 1" (1 854 m)   Wt 74 8 kg (165 lb)   BMI 21 77 kg/m²     Kandis Husain,

## 2022-09-30 ENCOUNTER — APPOINTMENT (OUTPATIENT)
Dept: LAB | Facility: MEDICAL CENTER | Age: 87
End: 2022-09-30
Payer: COMMERCIAL

## 2022-09-30 ENCOUNTER — OFFICE VISIT (OUTPATIENT)
Dept: FAMILY MEDICINE CLINIC | Facility: MEDICAL CENTER | Age: 87
End: 2022-09-30
Payer: COMMERCIAL

## 2022-09-30 VITALS
HEART RATE: 64 BPM | RESPIRATION RATE: 18 BRPM | DIASTOLIC BLOOD PRESSURE: 74 MMHG | TEMPERATURE: 98.6 F | HEIGHT: 73 IN | SYSTOLIC BLOOD PRESSURE: 122 MMHG | WEIGHT: 165 LBS | BODY MASS INDEX: 21.87 KG/M2

## 2022-09-30 DIAGNOSIS — Z11.59 NEED FOR HEPATITIS C SCREENING TEST: ICD-10-CM

## 2022-09-30 DIAGNOSIS — Z00.00 MEDICARE ANNUAL WELLNESS VISIT, SUBSEQUENT: Primary | ICD-10-CM

## 2022-09-30 DIAGNOSIS — R09.81 NASAL CONGESTION: ICD-10-CM

## 2022-09-30 DIAGNOSIS — Z12.83 SCREENING FOR SKIN CANCER: ICD-10-CM

## 2022-09-30 DIAGNOSIS — Z85.828 HISTORY OF SKIN CANCER: ICD-10-CM

## 2022-09-30 DIAGNOSIS — Z23 IMMUNIZATION DUE: ICD-10-CM

## 2022-09-30 LAB — HCV AB SER QL: NORMAL

## 2022-09-30 PROCEDURE — 1170F FXNL STATUS ASSESSED: CPT | Performed by: STUDENT IN AN ORGANIZED HEALTH CARE EDUCATION/TRAINING PROGRAM

## 2022-09-30 PROCEDURE — 3725F SCREEN DEPRESSION PERFORMED: CPT | Performed by: STUDENT IN AN ORGANIZED HEALTH CARE EDUCATION/TRAINING PROGRAM

## 2022-09-30 PROCEDURE — 36415 COLL VENOUS BLD VENIPUNCTURE: CPT

## 2022-09-30 PROCEDURE — 90677 PCV20 VACCINE IM: CPT

## 2022-09-30 PROCEDURE — 1160F RVW MEDS BY RX/DR IN RCRD: CPT | Performed by: STUDENT IN AN ORGANIZED HEALTH CARE EDUCATION/TRAINING PROGRAM

## 2022-09-30 PROCEDURE — G0009 ADMIN PNEUMOCOCCAL VACCINE: HCPCS

## 2022-09-30 PROCEDURE — 86803 HEPATITIS C AB TEST: CPT

## 2022-09-30 PROCEDURE — G0439 PPPS, SUBSEQ VISIT: HCPCS | Performed by: STUDENT IN AN ORGANIZED HEALTH CARE EDUCATION/TRAINING PROGRAM

## 2022-09-30 PROCEDURE — 1125F AMNT PAIN NOTED PAIN PRSNT: CPT | Performed by: STUDENT IN AN ORGANIZED HEALTH CARE EDUCATION/TRAINING PROGRAM

## 2022-09-30 RX ORDER — FLUTICASONE PROPIONATE 50 MCG
2 SPRAY, SUSPENSION (ML) NASAL DAILY
Qty: 16 G | Refills: 3 | Status: SHIPPED | OUTPATIENT
Start: 2022-09-30

## 2022-09-30 NOTE — PATIENT INSTRUCTIONS
Medicare Preventive Visit Patient Instructions  Thank you for completing your Welcome to Medicare Visit or Medicare Annual Wellness Visit today  Your next wellness visit will be due in one year (10/1/2023)  The screening/preventive services that you may require over the next 5-10 years are detailed below  Some tests may not apply to you based off risk factors and/or age  Screening tests ordered at today's visit but not completed yet may show as past due  Also, please note that scanned in results may not display below  Preventive Screenings:  Service Recommendations Previous Testing/Comments   Colorectal Cancer Screening  · Colonoscopy    · Fecal Occult Blood Test (FOBT)/Fecal Immunochemical Test (FIT)  · Fecal DNA/Cologuard Test  · Flexible Sigmoidoscopy Age: 39-70 years old   Colonoscopy: every 10 years (May be performed more frequently if at higher risk)  OR  FOBT/FIT: every 1 year  OR  Cologuard: every 3 years  OR  Sigmoidoscopy: every 5 years  Screening may be recommended earlier than age 39 if at higher risk for colorectal cancer  Also, an individualized decision between you and your healthcare provider will decide whether screening between the ages of 74-80 would be appropriate   Colonoscopy: 03/24/2022  FOBT/FIT: 12/09/2020  Cologuard: Not on file  Sigmoidoscopy: Not on file    Screening Not Indicated     Prostate Cancer Screening Individualized decision between patient and health care provider in men between ages of 53-78   Medicare will cover every 12 months beginning on the day after your 50th birthday PSA: No results in last 5 years     Screening Not Indicated     Hepatitis C Screening Once for adults born between [birth year] and 1965  More frequently in patients at high risk for Hepatitis C Hep C Antibody: Not on file        Diabetes Screening 1-2 times per year if you're at risk for diabetes or have pre-diabetes Fasting glucose: No results in last 5 years (No results in last 5 years)  A1C: 6 9 % of total Hgb (8/18/2022)  Screening Not Indicated  History Diabetes   Cholesterol Screening Once every 5 years if you don't have a lipid disorder  May order more often based on risk factors  Lipid panel: 12/07/2020  Screening Not Indicated  History Lipid Disorder      Other Preventive Screenings Covered by Medicare:  1  Abdominal Aortic Aneurysm (AAA) Screening: covered once if your at risk  You're considered to be at risk if you have a family history of AAA or a male between the age of 73-68 who smoking at least 100 cigarettes in your lifetime  2  Lung Cancer Screening: covers low dose CT scan once per year if you meet all of the following conditions: (1) Age 50-69; (2) No signs or symptoms of lung cancer; (3) Current smoker or have quit smoking within the last 15 years; (4) You have a tobacco smoking history of at least 20 pack years (packs per day x number of years you smoked); (5) You get a written order from a healthcare provider  3  Glaucoma Screening: covered annually if you're considered high risk: (1) You have diabetes OR (2) Family history of glaucoma OR (3)  aged 48 and older OR (3)  American aged 72 and older  3  Osteoporosis Screening: covered every 2 years if you meet one of the following conditions: (1) Have a vertebral abnormality; (2) On glucocorticoid therapy for more than 3 months; (3) Have primary hyperparathyroidism; (4) On osteoporosis medications and need to assess response to drug therapy  5  HIV Screening: covered annually if you're between the age of 12-76  Also covered annually if you are younger than 13 and older than 72 with risk factors for HIV infection  For pregnant patients, it is covered up to 3 times per pregnancy      Immunizations:  Immunization Recommendations   Influenza Vaccine Annual influenza vaccination during flu season is recommended for all persons aged >= 6 months who do not have contraindications   Pneumococcal Vaccine   * Pneumococcal conjugate vaccine = PCV13 (Prevnar 13), PCV15 (Vaxneuvance), PCV20 (Prevnar 20)  * Pneumococcal polysaccharide vaccine = PPSV23 (Pneumovax) Adults 2364 years old: 1-3 doses may be recommended based on certain risk factors  Adults 72 years old: 1-2 doses may be recommended based off what pneumonia vaccine you previously received   Hepatitis B Vaccine 3 dose series if at intermediate or high risk (ex: diabetes, end stage renal disease, liver disease)   Tetanus (Td) Vaccine - COST NOT COVERED BY MEDICARE PART B Following completion of primary series, a booster dose should be given every 10 years to maintain immunity against tetanus  Td may also be given as tetanus wound prophylaxis  Tdap Vaccine - COST NOT COVERED BY MEDICARE PART B Recommended at least once for all adults  For pregnant patients, recommended with each pregnancy  Shingles Vaccine (Shingrix) - COST NOT COVERED BY MEDICARE PART B  2 shot series recommended in those aged 48 and above     Health Maintenance Due:  There are no preventive care reminders to display for this patient  Immunizations Due:      Topic Date Due    Influenza Vaccine (1) 09/01/2022     Advance Directives   What are advance directives? Advance directives are legal documents that state your wishes and plans for medical care  These plans are made ahead of time in case you lose your ability to make decisions for yourself  Advance directives can apply to any medical decision, such as the treatments you want, and if you want to donate organs  What are the types of advance directives? There are many types of advance directives, and each state has rules about how to use them  You may choose a combination of any of the following:  · Living will: This is a written record of the treatment you want  You can also choose which treatments you do not want, which to limit, and which to stop at a certain time  This includes surgery, medicine, IV fluid, and tube feedings     · Durable power of  for healthcare [facility]): This is a written record that states who you want to make healthcare choices for you when you are unable to make them for yourself  This person, called a proxy, is usually a family member or a friend  You may choose more than 1 proxy  · Do not resuscitate (DNR) order:  A DNR order is used in case your heart stops beating or you stop breathing  It is a request not to have certain forms of treatment, such as CPR  A DNR order may be included in other types of advance directives  · Medical directive: This covers the care that you want if you are in a coma, near death, or unable to make decisions for yourself  You can list the treatments you want for each condition  Treatment may include pain medicine, surgery, blood transfusions, dialysis, IV or tube feedings, and a ventilator (breathing machine)  · Values history: This document has questions about your views, beliefs, and how you feel and think about life  This information can help others choose the care that you would choose  Why are advance directives important? An advance directive helps you control your care  Although spoken wishes may be used, it is better to have your wishes written down  Spoken wishes can be misunderstood, or not followed  Treatments may be given even if you do not want them  An advance directive may make it easier for your family to make difficult choices about your care  © Copyright [employer] 2018 Information is for End User's use only and may not be sold, redistributed or otherwise used for commercial purposes   All illustrations and images included in CareNotes® are the copyrighted property of [employer]  or [address] [place] [address] [employer]

## 2022-10-01 DIAGNOSIS — R19.7 DIARRHEA, UNSPECIFIED TYPE: ICD-10-CM

## 2022-10-01 RX ORDER — CHOLESTYRAMINE 4 G/9G
POWDER, FOR SUSPENSION ORAL
Qty: 30 EACH | Refills: 0 | Status: SHIPPED | OUTPATIENT
Start: 2022-10-01

## 2022-10-06 ENCOUNTER — OFFICE VISIT (OUTPATIENT)
Dept: FAMILY MEDICINE CLINIC | Facility: MEDICAL CENTER | Age: 87
End: 2022-10-06
Payer: COMMERCIAL

## 2022-10-06 VITALS
SYSTOLIC BLOOD PRESSURE: 116 MMHG | HEIGHT: 73 IN | WEIGHT: 165 LBS | DIASTOLIC BLOOD PRESSURE: 78 MMHG | OXYGEN SATURATION: 96 % | BODY MASS INDEX: 21.87 KG/M2 | HEART RATE: 66 BPM

## 2022-10-06 DIAGNOSIS — E78.01 FAMILIAL HYPERCHOLESTEROLEMIA: ICD-10-CM

## 2022-10-06 DIAGNOSIS — S51.019A SKIN TEAR OF ELBOW WITHOUT COMPLICATION, INITIAL ENCOUNTER: Primary | ICD-10-CM

## 2022-10-06 PROCEDURE — 99213 OFFICE O/P EST LOW 20 MIN: CPT

## 2022-10-06 PROCEDURE — 90471 IMMUNIZATION ADMIN: CPT

## 2022-10-06 PROCEDURE — 90715 TDAP VACCINE 7 YRS/> IM: CPT

## 2022-10-06 NOTE — PATIENT INSTRUCTIONS
Please keep your wound clean and dry  Change your dressing once daily as instructed  Your tetanus shot was updated in the office today  Please follow-up in 1 week or sooner if needed

## 2022-10-06 NOTE — PROGRESS NOTES
Assessment/Plan:    Wound care performed in office today to skin tear of right elbow  Patient instructed on symptom based care and instructed on Caring for wound and changing dressing  Advised to follow-up in 1 week or sooner if needed  Tdap updated today  1  Skin tear of elbow without complication, initial encounter  Wound care performed to right elbow by Valdez Vanessa  Patient instructed on keeping area clean, dry and advised to change dressing once daily  Tdap updated today  Advised patient to schedule follow-up appointment in 1 week or sooner if needed  Advised patient to call the office sooner if he develops signs or symptoms of infection such as fever, chills, surrounding erythema, drainage, increased pain, decreased range of motion  Patient understood  - TDAP VACCINE GREATER THAN OR EQUAL TO 8YO IM      Subjective:      Patient ID: Gaby Walker is a 80 y o  male  80year old male presents with complaint of right arm injury at elbow that occurred last night after falling into a dresser while trying to get his stockings off  He did fall to the floor, however denies head strike, loc or any other injuries  No thinners  He denies pain, weakness, numbness, tingling to right arm  He reports today he was even cleaning his siding outside with use of this arm  He offers no other concerns or complaints today  The following portions of the patient's history were reviewed and updated as appropriate: allergies, current medications, past family history, past medical history, past surgical history and problem list     Review of Systems   Constitutional: Negative  HENT: Negative  Eyes: Negative  Respiratory: Negative  Cardiovascular: Negative  Gastrointestinal: Negative  Endocrine: Negative  Genitourinary: Negative  Musculoskeletal: Negative  Skin: Positive for wound (skin tear right elbow)  Allergic/Immunologic: Negative  Neurological: Negative      Hematological: Negative  Psychiatric/Behavioral: Negative  Objective:      /78 (BP Location: Left arm, Patient Position: Sitting, Cuff Size: Adult)   Pulse 66   Ht 6' 1" (1 854 m)   Wt 74 8 kg (165 lb)   SpO2 96%   BMI 21 77 kg/m²          Physical Exam  Vitals and nursing note reviewed  Constitutional:       General: He is not in acute distress  Appearance: Normal appearance  He is not ill-appearing  HENT:      Head: Normocephalic and atraumatic  Cardiovascular:      Rate and Rhythm: Normal rate  Pulses: Normal pulses  Pulmonary:      Effort: Pulmonary effort is normal    Musculoskeletal:         General: No swelling, tenderness or deformity  Normal range of motion  Cervical back: Normal range of motion  Skin:     Comments: Skin tear right elbow with active bleeding  No evidence of surrounding erythema, nontender  Neurological:      General: No focal deficit present  Mental Status: He is alert and oriented to person, place, and time  Mental status is at baseline  Psychiatric:         Mood and Affect: Mood normal          Behavior: Behavior normal          Thought Content:  Thought content normal                     [facility]

## 2022-10-07 RX ORDER — SIMVASTATIN 10 MG
TABLET ORAL
Qty: 90 TABLET | Refills: 0 | Status: SHIPPED | OUTPATIENT
Start: 2022-10-07 | End: 2022-10-10

## 2022-10-13 ENCOUNTER — OFFICE VISIT (OUTPATIENT)
Dept: FAMILY MEDICINE CLINIC | Facility: MEDICAL CENTER | Age: 87
End: 2022-10-13
Payer: COMMERCIAL

## 2022-10-13 ENCOUNTER — APPOINTMENT (OUTPATIENT)
Dept: RADIOLOGY | Facility: MEDICAL CENTER | Age: 87
End: 2022-10-13
Payer: COMMERCIAL

## 2022-10-13 VITALS
DIASTOLIC BLOOD PRESSURE: 60 MMHG | SYSTOLIC BLOOD PRESSURE: 118 MMHG | BODY MASS INDEX: 22.24 KG/M2 | WEIGHT: 167.8 LBS | HEIGHT: 73 IN | OXYGEN SATURATION: 96 % | HEART RATE: 55 BPM

## 2022-10-13 DIAGNOSIS — M25.421 ELBOW SWELLING, RIGHT: ICD-10-CM

## 2022-10-13 DIAGNOSIS — M25.421 ELBOW SWELLING, RIGHT: Primary | ICD-10-CM

## 2022-10-13 DIAGNOSIS — S51.019A SKIN TEAR OF ELBOW WITHOUT COMPLICATION, INITIAL ENCOUNTER: ICD-10-CM

## 2022-10-13 PROCEDURE — 73080 X-RAY EXAM OF ELBOW: CPT

## 2022-10-13 PROCEDURE — 99214 OFFICE O/P EST MOD 30 MIN: CPT

## 2022-10-13 RX ORDER — SULFAMETHOXAZOLE AND TRIMETHOPRIM 800; 160 MG/1; MG/1
1 TABLET ORAL EVERY 12 HOURS SCHEDULED
Qty: 10 TABLET | Refills: 0 | Status: SHIPPED | OUTPATIENT
Start: 2022-10-13 | End: 2022-10-18

## 2022-10-13 NOTE — PROGRESS NOTES
Assessment/Plan:    Xray right elbow  Bactrim x 5 days  Discussed care of skin care and instructions provided  Advised to call the office for worsening or persistent symptoms  1  Elbow swelling, right  Check x-ray right elbow  - XR elbow 3+ vw right; Future  - sulfamethoxazole-trimethoprim (BACTRIM DS) 800-160 mg per tablet; Take 1 tablet by mouth every 12 (twelve) hours for 5 days  Dispense: 10 tablet; Refill: 0    2  Skin tear of elbow without complication, initial encounter  Advised patient to take antibiotic until course is complete  Instructed patient on care of skin tear, instructions also typed up in printed out for patient  Advised patient to call the office if symptoms worsen or persist as discussed  - sulfamethoxazole-trimethoprim (BACTRIM DS) 800-160 mg per tablet; Take 1 tablet by mouth every 12 (twelve) hours for 5 days  Dispense: 10 tablet; Refill: 0      Subjective:      Patient ID: Deidra German is a 80 y o  male  57-year-old male presents for 1 week follow-up skin tear to right elbow  He is doing well overall  Skin tear to right elbow appears to be healing well however there is slight redness surrounding the skin tear and patient is also complaining of slight pain to the area  There is also swelling noted to right elbow today which was not present last week on initial exam   However, patient has full range of motion and denies pain to elbow at site of swelling  There is slight bleeding from skin tear noted however much improved from last week  He reports keeping area clean, dry, covered and has been changing the dressing daily  He offers no other concerns or complaints  The following portions of the patient's history were reviewed and updated as appropriate: allergies, current medications, past family history, past social history, past surgical history and problem list     Review of Systems   Constitutional: Negative  HENT: Negative  Eyes: Negative  Respiratory: Negative  Cardiovascular: Negative  Gastrointestinal: Negative  Endocrine: Negative  Genitourinary: Negative  Musculoskeletal:        Right elbow swelling   Skin:        Skin tear right elbow, recheck   Allergic/Immunologic: Negative  Neurological: Negative  Hematological: Negative  Psychiatric/Behavioral: Negative  Objective:      /60 (BP Location: Left arm, Patient Position: Sitting, Cuff Size: Adult)   Pulse 55   Ht 6' 1" (1 854 m)   Wt 76 1 kg (167 lb 12 8 oz)   SpO2 96%   BMI 22 14 kg/m²          Physical Exam  Vitals and nursing note reviewed  Constitutional:       General: He is not in acute distress  Appearance: Normal appearance  He is normal weight  He is not ill-appearing  HENT:      Head: Normocephalic and atraumatic  Eyes:      Conjunctiva/sclera: Conjunctivae normal       Pupils: Pupils are equal, round, and reactive to light  Cardiovascular:      Rate and Rhythm: Normal rate  Pulses:           Radial pulses are 3+ on the right side and 3+ on the left side  Pulmonary:      Effort: Pulmonary effort is normal    Musculoskeletal:         General: Swelling present  Normal range of motion  Right elbow: Swelling present  No lacerations  Normal range of motion  No tenderness  Left elbow: Normal       Cervical back: Normal range of motion and neck supple  Skin:     General: Skin is warm and dry  Comments: Skin tear to right elbow with minimal bleeding  Slight erythema surrounding the skin tear noted  No evidence of other drainage, ecchymosis, discoloration, streaking  Skin tear cleansed and small piece of dead skin flap cut off today  Area then re-dressed with bacitracin and non-adherent dressing  Neurological:      General: No focal deficit present  Mental Status: He is alert  Mental status is at baseline     Psychiatric:         Mood and Affect: Mood normal          Behavior: Behavior normal  Thought Content:  Thought content normal                     Aniyah Osborn

## 2022-10-13 NOTE — PATIENT INSTRUCTIONS
Keep area clean and dry(may wash area with antibacterial soap, rinse, and pat dry)  Use bacitracin (not neosporin) for an additional 3-5 days then stop  However continue keeping area clean, dry  Change dressing by using non-adherent dressing and wrapping with slight pressure daily  Take antibiotic until course is complete  Xray as ordered  If your skin tear worsens or does not improve over the next few weeks please call the office  Please limit use of right arm until healed

## 2022-10-18 ENCOUNTER — TELEPHONE (OUTPATIENT)
Dept: FAMILY MEDICINE CLINIC | Facility: MEDICAL CENTER | Age: 87
End: 2022-10-18

## 2022-10-18 NOTE — TELEPHONE ENCOUNTER
----- Message from [address] [place] [address] sent at 10/18/2022 12:04 PM EDT -----  Please inform patient xray of elbow is normal   How is his skin tear? Healing up?

## 2022-10-18 NOTE — TELEPHONE ENCOUNTER
Patient called back  Informed of result  He said that the visiting nurse looked at his arm yesterday and said that it looked okay, he will re-check it tomorrow when he is there  Per pt he did have trouble getting the non-stick pad off, it was sticking the wound

## 2022-10-18 NOTE — TELEPHONE ENCOUNTER
Keisha Landeros  He can continue with wound care as discussed  He can leave it open to air for healing when home and cover when out as discussed  Just needs to remember to keep clean and dry

## 2022-10-19 DIAGNOSIS — L29.9 PRURITUS: ICD-10-CM

## 2022-10-19 RX ORDER — HYDROXYZINE PAMOATE 25 MG/1
25 CAPSULE ORAL 3 TIMES DAILY PRN
Qty: 90 CAPSULE | Refills: 1 | Status: SHIPPED | OUTPATIENT
Start: 2022-10-19 | End: 2023-01-17

## 2022-10-19 RX ORDER — HYDROXYZINE PAMOATE 25 MG/1
CAPSULE ORAL
Qty: 90 CAPSULE | Refills: 1 | Status: CANCELLED | OUTPATIENT
Start: 2022-10-19

## 2022-11-29 DIAGNOSIS — J44.1 CHRONIC OBSTRUCTIVE PULMONARY DISEASE WITH ACUTE EXACERBATION (HCC): ICD-10-CM

## 2022-11-29 RX ORDER — FLUTICASONE PROPIONATE 220 UG/1
AEROSOL, METERED RESPIRATORY (INHALATION)
Qty: 12 G | Refills: 0 | Status: SHIPPED | OUTPATIENT
Start: 2022-11-29

## 2022-12-04 DIAGNOSIS — R19.7 DIARRHEA, UNSPECIFIED TYPE: ICD-10-CM

## 2022-12-04 RX ORDER — CHOLESTYRAMINE 4 G/9G
POWDER, FOR SUSPENSION ORAL
Qty: 30 EACH | Refills: 0 | Status: SHIPPED | OUTPATIENT
Start: 2022-12-04

## 2022-12-05 ENCOUNTER — OFFICE VISIT (OUTPATIENT)
Dept: PULMONOLOGY | Facility: CLINIC | Age: 87
End: 2022-12-05

## 2022-12-05 VITALS
HEART RATE: 66 BPM | OXYGEN SATURATION: 93 % | TEMPERATURE: 97.7 F | RESPIRATION RATE: 18 BRPM | BODY MASS INDEX: 22.13 KG/M2 | DIASTOLIC BLOOD PRESSURE: 61 MMHG | HEIGHT: 73 IN | SYSTOLIC BLOOD PRESSURE: 125 MMHG | WEIGHT: 167 LBS

## 2022-12-05 DIAGNOSIS — J41.0 SIMPLE CHRONIC BRONCHITIS (HCC): Primary | ICD-10-CM

## 2022-12-05 RX ORDER — ALBUTEROL SULFATE 2.5 MG/3ML
2.5 SOLUTION RESPIRATORY (INHALATION) EVERY 6 HOURS PRN
Qty: 360 ML | Refills: 5 | Status: SHIPPED | OUTPATIENT
Start: 2022-12-05

## 2022-12-05 NOTE — PROGRESS NOTES
Assessment/Plan:   Diagnoses and all orders for this visit:    Simple chronic bronchitis (HCC)  -     albuterol (2 5 mg/3 mL) 0 083 % nebulizer solution; Take 3 mL (2 5 mg total) by nebulization every 6 (six) hours as needed for wheezing or shortness of breath     Patient is here today for follow-up  He continues to do well with his breathing  He is using his Flovent, Anoro, Singulair, albuterol as needed  He continues to take Daliresp every other day  He will follow-up with us in 6 months or sooner if necessary  Return in about 6 months (around 6/5/2023)  All questions are answered to the patient's satisfaction and understanding  He verbalizes understanding  He is encouraged to call with any further questions or concerns  Portions of the record may have been created with voice recognition software  Occasional wrong word or "sound a like" substitutions may have occurred due to the inherent limitations of voice recognition software  Read the chart carefully and recognize, using context, where substitutions have occurred  Electronically Signed by Shantell Jara PA-C    ______________________________________________________________________    Chief Complaint:   Chief Complaint   Patient presents with   • Follow-up       Patient ID: Pam Krishnamurthy is a 80 y o  y o  male has a past medical history of COPD (chronic obstructive pulmonary disease) ([facility] [address] ), Diabetes mellitus ([facility] [address] ), Essential hypertension, Heart failure ([facility] [address] ), Hyperlipidemia, Left inguinal hernia, Lung nodule, and Malignant melanoma of skin ([facility] [address] )  12/5/2022  Patient presents today for follow-up visit  Patient is an 79 yo male former smoker with PMH of COPD, HTN, HLD, MDS  He is here today for follow-up  He continues to do well with his breathing, continues Anoro, Flovent, Singulair, Daliresp every other day    He uses his rescue inhaler occasionally especially times when he knows he will be exerting himself            Review of Systems   Constitutional: Negative  HENT: Negative  Respiratory: Negative  Cardiovascular: Negative  Gastrointestinal: Negative  Genitourinary: Negative  Musculoskeletal: Negative  Skin: Negative  Allergic/Immunologic: Negative  Neurological: Negative  Psychiatric/Behavioral: Negative  Smoking history: He reports that he quit smoking about 62 years ago  He started smoking about 73 years ago  He has a 10 00 pack-year smoking history   He has never used smokeless tobacco     The following portions of the patient's history were reviewed and updated as appropriate: allergies, current medications, past family history, past medical history, past social history, past surgical history and problem list     Immunization History   Administered Date(s) Administered   • COVID-19 MODERNA VACC 0 5 ML IM 01/26/2021, 03/02/2021, 10/25/2021, 04/21/2022   • H1N1, All Formulations 01/26/2010   • INFLUENZA 09/28/2018, 10/04/2021, 09/22/2022   • Influenza Split High Dose Preservative Free IM 10/18/2013, 10/05/2015, 09/30/2016, 10/20/2017   • Influenza, high dose seasonal 0 7 mL 10/12/2020   • Influenza, seasonal, injectable 10/13/2011, 10/01/2014   • Pneumococcal Conjugate 13-Valent 11/22/2017   • Pneumococcal Conjugate Vaccine 20-valent (Pcv20), Polysace 09/30/2022   • Pneumococcal Polysaccharide PPV23 01/01/1998, 10/19/2005, 11/30/2020   • Tdap 03/30/2022, 10/06/2022   • Zoster 10/01/2010   • Zoster Vaccine Recombinant 01/03/2020, 10/12/2020     Current Outpatient Medications   Medication Sig Dispense Refill   • acetaminophen (TYLENOL) 325 mg tablet Take 2 tablets (650 mg total) by mouth every 6 (six) hours as needed for mild pain 30 tablet 0   • albuterol (2 5 mg/3 mL) 0 083 % nebulizer solution Take 3 mL (2 5 mg total) by nebulization every 6 (six) hours as needed for wheezing or shortness of breath 360 mL 5   • Anoro Ellipta 62 5-25 MCG/INH inhaler INHALE ONE PUFF BY MOUTH ONCE DAILY 60 blister 3   • Ascorbic Acid (VITAMIN C) 1000 MG tablet Take 1 tablet by mouth daily     • BD PEN NEEDLE DON U/F 32G X 4 MM MISC       • betamethasone, augmented, (DIPROLENE-AF) 0 05 % cream      • cholecalciferol (VITAMIN D3) 1,000 units tablet Take 1,000 Units by mouth daily     • cholestyramine (QUESTRAN) 4 g packet DISSOLVE one packet in 8 ounces of liquid and drink   BY MOUTH ONCE DAILY 30 each 0   • Cinnamon 500 MG TABS Take 1 tablet by mouth daily       • Daliresp 500 MCG tablet TAKE ONE TABLET BY MOUTH ONCE DAILY 30 tablet 3   • Dapagliflozin Propanediol 5 MG TABS Take 5 mg by mouth every other day Take 1/2 tablet daily ( Farxiga)      • Flovent  MCG/ACT inhaler INHALE ONE PUFF TWICE DAILY - rinse mouth after use 12 g 0   • fluticasone (FLONASE) 50 mcg/act nasal spray 2 sprays into each nostril daily 16 g 3   • gabapentin (NEURONTIN) 300 mg capsule Take 1 capsule (300 mg total) by mouth daily 90 capsule 3   • hydrOXYzine pamoate (VISTARIL) 25 mg capsule Take 1 capsule (25 mg total) by mouth 3 (three) times a day as needed for itching 90 capsule 1   • Levemir FlexTouch 100 units/mL injection pen 10 Units daily at bedtime      • metoprolol succinate (TOPROL-XL) 50 mg 24 hr tablet Take 2 tablets (100 mg total) by mouth daily (Patient taking differently: Take 50 mg by mouth daily 1 tablet daily) 180 tablet 3   • Multiple Vitamins-Minerals (OCUVITE ADULT 50+ PO) Take 1 tablet by mouth daily     • NOVOLOG FLEXPEN 100 units/mL injection pen Inject 4 Units under the skin 3 (three) times a day with meals 4u in am, 2u at lunch and 6u at dinner      • ONE TOUCH ULTRA TEST test strip       • ONETOUCH DELICA LANCETS FINE MISC       • sacubitril-valsartan (Entresto) 24-26 MG TABS Take 1 tablet by mouth 2 (two) times a day 180 tablet 3   • simvastatin (ZOCOR) 10 mg tablet TAKE ONE TABLET BY MOUTH ONCE DAILY 90 tablet 1   • Ventolin  (90 Base) MCG/ACT inhaler Inhale 2 puffs every 6 (six) hours as needed for wheezing or shortness of breath 18 g 0   • montelukast (SINGULAIR) 10 mg tablet TAKE ONE TABLET BY MOUTH NIGHTLY AT BEDTIME 90 tablet 3     No current facility-administered medications for this visit  Allergies: Patient has no known allergies  Objective:  Vitals:    12/05/22 1338 12/05/22 1340   BP: 125/61    BP Location: Left arm    Patient Position: Sitting    Cuff Size: Large    Pulse: 66    Resp: 18    Temp: 97 7 °F (36 5 °C)    SpO2: 93% 93%   Weight: 75 8 kg (167 lb)    Height: 6' 1" (1 854 m)    Oxygen Therapy  SpO2: 93 %  Oxygen Therapy: None (Room air)    Wt Readings from Last 3 Encounters:   12/05/22 75 8 kg (167 lb)   10/13/22 76 1 kg (167 lb 12 8 oz)   10/06/22 74 8 kg (165 lb)     Body mass index is 22 03 kg/m²  Physical Exam  Vitals reviewed  Constitutional:       General: He is not in acute distress  Appearance: Normal appearance  He is not ill-appearing  HENT:      Head: Normocephalic and atraumatic  Mouth/Throat:      Pharynx: Oropharynx is clear  Eyes:      Conjunctiva/sclera: Conjunctivae normal    Cardiovascular:      Rate and Rhythm: Normal rate and regular rhythm  Pulmonary:      Effort: Pulmonary effort is normal  No respiratory distress  Breath sounds: Normal breath sounds  No decreased breath sounds, wheezing, rhonchi or rales  Abdominal:      General: Abdomen is flat  There is no distension  Musculoskeletal:         General: Normal range of motion  Cervical back: Normal range of motion  Right lower leg: No edema  Left lower leg: No edema  Skin:     General: Skin is warm and dry  Neurological:      Mental Status: He is alert and oriented to person, place, and time     Psychiatric:         Mood and Affect: Mood normal          Behavior: Behavior normal          Lab Review:   Lab Results   Component Value Date     05/08/2017    K 4 6 07/06/2022     07/06/2022    CO2 28 07/06/2022    BUN 24 07/06/2022 CREATININE 1 30 (H) 07/06/2022    CREATININE 1 94 10/26/2021    CREATININE 1 19 11/15/2018    CREATININE 1 01 05/08/2017    CALCIUM 9 5 07/06/2022    CALCIUM 9 5 07/06/2022     Lab Results   Component Value Date    WBC 4 5 07/06/2022    WBC 6 38 11/15/2018    WBC 5 5 05/08/2017    HGB 10 4 (L) 07/06/2022    HGB 13 7 11/15/2018    HGB 11 0 (L) 05/08/2017    HCT 33 1 (L) 07/06/2022    HCT 41 7 11/15/2018    HCT 34 9 (L) 05/08/2017    MCV 90 7 07/06/2022    MCV 93 11/15/2018    MCV 96 6 05/08/2017     (L) 07/06/2022     (L) 11/15/2018     05/08/2017       Diagnostics:    none pertinent  Office Spirometry Results:     ESS:    No results found

## 2022-12-23 DIAGNOSIS — J41.8 MIXED SIMPLE AND MUCOPURULENT CHRONIC BRONCHITIS (HCC): ICD-10-CM

## 2022-12-23 RX ORDER — UMECLIDINIUM BROMIDE AND VILANTEROL TRIFENATATE 62.5; 25 UG/1; UG/1
POWDER RESPIRATORY (INHALATION)
Qty: 60 EACH | Refills: 0 | Status: SHIPPED | OUTPATIENT
Start: 2022-12-23

## 2023-01-07 DIAGNOSIS — G62.9 NEUROPATHY: ICD-10-CM

## 2023-01-09 RX ORDER — GABAPENTIN 300 MG/1
CAPSULE ORAL
Qty: 90 CAPSULE | Refills: 0 | Status: SHIPPED | OUTPATIENT
Start: 2023-01-09

## 2023-01-17 DIAGNOSIS — L29.9 PRURITUS: ICD-10-CM

## 2023-01-17 LAB
ALBUMIN SERPL-MCNC: 4.6 G/DL (ref 3.6–5.1)
BASOPHILS # BLD AUTO: 211 CELLS/UL (ref 0–200)
BASOPHILS NFR BLD AUTO: 4.4 %
BUN SERPL-MCNC: 25 MG/DL (ref 7–25)
BUN/CREAT SERPL: 18 (CALC) (ref 6–22)
CALCIUM SERPL-MCNC: 9.6 MG/DL (ref 8.6–10.3)
CHLORIDE SERPL-SCNC: 105 MMOL/L (ref 98–110)
CO2 SERPL-SCNC: 29 MMOL/L (ref 20–32)
CREAT SERPL-MCNC: 1.38 MG/DL (ref 0.7–1.22)
CREAT UR-MCNC: 86 MG/DL (ref 20–320)
EOSINOPHIL # BLD AUTO: 658 CELLS/UL (ref 15–500)
EOSINOPHIL NFR BLD AUTO: 13.7 %
ERYTHROCYTE [DISTWIDTH] IN BLOOD BY AUTOMATED COUNT: 24.1 % (ref 11–15)
GFR/BSA.PRED SERPLBLD CYS-BASED-ARV: 49 ML/MIN/1.73M2
GLUCOSE SERPL-MCNC: 127 MG/DL (ref 65–99)
HCT VFR BLD AUTO: 34.6 % (ref 38.5–50)
HGB BLD-MCNC: 11.2 G/DL (ref 13.2–17.1)
LYMPHOCYTES # BLD AUTO: 1344 CELLS/UL (ref 850–3900)
LYMPHOCYTES NFR BLD AUTO: 28 %
MAGNESIUM SERPL-MCNC: 2 MG/DL (ref 1.5–2.5)
MCH RBC QN AUTO: 27.3 PG (ref 27–33)
MCHC RBC AUTO-ENTMCNC: 32.4 G/DL (ref 32–36)
MCV RBC AUTO: 84.2 FL (ref 80–100)
MONOCYTES # BLD AUTO: 360 CELLS/UL (ref 200–950)
MONOCYTES NFR BLD AUTO: 7.5 %
NEUTROPHILS # BLD AUTO: 2227 CELLS/UL (ref 1500–7800)
NEUTROPHILS NFR BLD AUTO: 46.4 %
PHOSPHATE SERPL-MCNC: 4 MG/DL (ref 2.1–4.3)
PLATELET # BLD AUTO: 121 THOUSAND/UL (ref 140–400)
POTASSIUM SERPL-SCNC: 4.9 MMOL/L (ref 3.5–5.3)
PROT UR-MCNC: 15 MG/DL (ref 5–25)
PROT/CREAT UR: 0.17 MG/MG CREAT (ref 0.03–0.15)
PROT/CREAT UR: 174 MG/G CREAT (ref 25–148)
RBC # BLD AUTO: 4.11 MILLION/UL (ref 4.2–5.8)
SODIUM SERPL-SCNC: 139 MMOL/L (ref 135–146)
WBC # BLD AUTO: 4.8 THOUSAND/UL (ref 3.8–10.8)

## 2023-01-17 RX ORDER — HYDROXYZINE PAMOATE 25 MG/1
CAPSULE ORAL
Qty: 90 CAPSULE | Refills: 0 | Status: SHIPPED | OUTPATIENT
Start: 2023-01-17

## 2023-01-18 ENCOUNTER — OFFICE VISIT (OUTPATIENT)
Dept: NEPHROLOGY | Facility: CLINIC | Age: 88
End: 2023-01-18

## 2023-01-18 VITALS
WEIGHT: 166.8 LBS | HEART RATE: 66 BPM | BODY MASS INDEX: 22.11 KG/M2 | SYSTOLIC BLOOD PRESSURE: 112 MMHG | DIASTOLIC BLOOD PRESSURE: 64 MMHG | HEIGHT: 73 IN

## 2023-01-18 DIAGNOSIS — N18.32 STAGE 3B CHRONIC KIDNEY DISEASE (HCC): Primary | ICD-10-CM

## 2023-01-18 DIAGNOSIS — Z79.4 TYPE 2 DIABETES MELLITUS WITH DIABETIC NEUROPATHY, WITH LONG-TERM CURRENT USE OF INSULIN (HCC): ICD-10-CM

## 2023-01-18 DIAGNOSIS — N18.9 CHRONIC KIDNEY DISEASE-MINERAL AND BONE DISORDER: ICD-10-CM

## 2023-01-18 DIAGNOSIS — I50.42 CHRONIC COMBINED SYSTOLIC AND DIASTOLIC CONGESTIVE HEART FAILURE (HCC): ICD-10-CM

## 2023-01-18 DIAGNOSIS — N18.30 ANEMIA OF CHRONIC KIDNEY FAILURE, STAGE 3 (MODERATE) (HCC): ICD-10-CM

## 2023-01-18 DIAGNOSIS — M89.9 CHRONIC KIDNEY DISEASE-MINERAL AND BONE DISORDER: ICD-10-CM

## 2023-01-18 DIAGNOSIS — N18.30 BENIGN HYPERTENSION WITH CHRONIC KIDNEY DISEASE, STAGE III (HCC): ICD-10-CM

## 2023-01-18 DIAGNOSIS — E83.9 CHRONIC KIDNEY DISEASE-MINERAL AND BONE DISORDER: ICD-10-CM

## 2023-01-18 DIAGNOSIS — D63.1 ANEMIA OF CHRONIC KIDNEY FAILURE, STAGE 3 (MODERATE) (HCC): ICD-10-CM

## 2023-01-18 DIAGNOSIS — I12.9 BENIGN HYPERTENSION WITH CHRONIC KIDNEY DISEASE, STAGE III (HCC): ICD-10-CM

## 2023-01-18 DIAGNOSIS — E11.40 TYPE 2 DIABETES MELLITUS WITH DIABETIC NEUROPATHY, WITH LONG-TERM CURRENT USE OF INSULIN (HCC): ICD-10-CM

## 2023-01-18 DIAGNOSIS — I42.0 DILATED CARDIOMYOPATHY (HCC): ICD-10-CM

## 2023-01-18 NOTE — PATIENT INSTRUCTIONS
You have chronic kidney disease (CKD) and your kidney function is stable  I recommend no changes to your medications today  Check blood work in 4-5 months  Follow up in 9 months - please obtain blood work 1-2 weeks prior to the appointment  Please avoid taking NSAIDs (Ibuprofen, Motrin, Aleve, Advil, Naproxen, Celebrex, etc )  Make sure you are eating healthy and have adequate exercise

## 2023-01-18 NOTE — PROGRESS NOTES
NEPHROLOGY OFFICE PROGRESS NOTE   Taurus Orr 80 y o  male MRN: [id number]  DATE: 01/18/23  Reason for visit: Continued evaluation and management of CKD    ASSESSMENT & PLAN:  1  Chronic kidney disease, stage IIIB:  · Mr Armand Padgett baseline serum creatinine is around 1 3 to 1 6  · The etiology of his CKD is felt to be multifactorial from hypertensive nephrosclerosis and possible element of diabetic nephropathy complicated by hemodynamic effect of cardiac meds  · Renal function is stable  Creatinine 1 38    · Treatment/Management consists of controlling modifiable risk factors (good blood pressure, glycemic and lipid control) and avoidance of nephrotoxic medications in order to slow down progression of renal disease  2  Hypertension:  · BP is at goal (<130/80)  · Current regimen: Metoprolol succ 50 mg daily, Entresto 24/26 mg BID  · No changes today  3  Congestive heart failure:  · Currently compensated  · Current regimen: Metoprolol succ 50 mg daily, Entresto 24/26 mg BID, Farxiga 5 mg QOD  · Follow up with Dr Ada Armstrong  · Not on loop diuretics  4  Hyperkalemia:  · Resolved since off Spironolactone  5  Anemia of chronic disease:  · Hgb stable at 11 2  · MCV is low - check iron studies  · No need for epogen at this time  6  Proteinuria:  · Controlled UPC - 0 174  · On Valsartan in Entresto  7  Mineral and bone disease  · PTH is at goal - 24 in July 2022  · Ca and phos are at goal    · Vitamin D is at goal - 34 in March 2022      8  Diabetes mellitus:   · Defer DM management to Dr Doni Conn  9  Hyperlipidemia:   · Defer HLP management to PCP  Patient Instructions   You have chronic kidney disease (CKD) and your kidney function is stable  I recommend no changes to your medications today  Check blood work in 4-5 months  Follow up in 9 months - please obtain blood work 1-2 weeks prior to the appointment       Please avoid taking NSAIDs (Ibuprofen, Motrin, Aleve, Advil, Naproxen, Celebrex, etc )  Make sure you are eating healthy and have adequate exercise  SUBJECTIVE / INTERVAL HISTORY:  Russell Martinez was last seen in the office in July 2022   There have been no recent hospital stays or ER visits  He has remained on the same medications since last visit - particularly, Entresto and Enriqueta Rufino  He is not diuretics  Not checking home BP  No new complaints  He lives in a 3 story home with his son  PMH/PSH: HTN, DM, HLP, CKD, hyperkalemia, CHF, COPD, back pain, gallstone pancreatitis s/p cholecystectomy, melanoma s/p excision, lung nodules, anemia, BPH s/p urolift  Previous work up:   5/15/20 Renal US: R 10 1 cm, L 10 2 cm, no significant PVR  ALLERGIES: No Known Allergies    REVIEW OF SYSTEMS:  Review of Systems   Constitutional: Negative for appetite change, chills, fatigue and fever  Respiratory: Negative for cough and shortness of breath  Cardiovascular: Negative for chest pain and leg swelling  Gastrointestinal: Negative for abdominal pain, diarrhea, nausea and vomiting  Genitourinary: Negative for dysuria and hematuria  Musculoskeletal: Negative for arthralgias and back pain  Allergic/Immunologic: Negative for immunocompromised state  Neurological: Negative for dizziness and light-headedness  OBJECTIVE:  /64 (BP Location: Left arm, Patient Position: Sitting, Cuff Size: Standard)   Pulse 66   Ht 6' 1" (1 854 m)   Wt 75 7 kg (166 lb 12 8 oz)   BMI 22 01 kg/m²   Current Weight: Weight - Scale: 75 7 kg (166 lb 12 8 oz) Body mass index is 22 01 kg/m²  Physical Exam  Constitutional:       General: He is not in acute distress  Appearance: Normal appearance  He is well-developed and normal weight  He is not ill-appearing or diaphoretic  HENT:      Head: Normocephalic and atraumatic  Eyes:      General: No scleral icterus  Conjunctiva/sclera: Conjunctivae normal    Neck:      Vascular: No JVD     Cardiovascular:      Rate and Rhythm: Normal rate and regular rhythm  Heart sounds: Normal heart sounds  Pulmonary:      Effort: Pulmonary effort is normal  No respiratory distress  Breath sounds: Normal breath sounds  Abdominal:      General: Bowel sounds are normal       Palpations: Abdomen is soft  Musculoskeletal:      Cervical back: Neck supple  Right lower leg: No edema  Left lower leg: No edema  Skin:     General: Skin is warm and dry  Neurological:      Mental Status: He is alert and oriented to person, place, and time     Psychiatric:         Behavior: Behavior normal      Medications:  Current Outpatient Medications:   •  albuterol (2 5 mg/3 mL) 0 083 % nebulizer solution, Take 3 mL (2 5 mg total) by nebulization every 6 (six) hours as needed for wheezing or shortness of breath, Disp: 360 mL, Rfl: 5  •  Anoro Ellipta 62 5-25 MCG/ACT inhaler, INHALE ONE PUFF BY MOUTH ONCE DAILY, Disp: 60 each, Rfl: 0  •  Ascorbic Acid (VITAMIN C) 1000 MG tablet, Take 1 tablet by mouth daily, Disp: , Rfl:   •  cholecalciferol (VITAMIN D3) 1,000 units tablet, Take 1,000 Units by mouth daily, Disp: , Rfl:   •  cholestyramine (QUESTRAN) 4 g packet, DISSOLVE one packet in 8 ounces of liquid and drink   BY MOUTH ONCE DAILY, Disp: 30 each, Rfl: 0  •  Cinnamon 500 MG TABS, Take 1 tablet by mouth daily  , Disp: , Rfl:   •  Daliresp 500 MCG tablet, TAKE ONE TABLET BY MOUTH ONCE DAILY, Disp: 30 tablet, Rfl: 3  •  Dapagliflozin Propanediol 5 MG TABS, Take 5 mg by mouth every other day Take 1/2 tablet daily ( Farxiga) , Disp: , Rfl:   •  Flovent  MCG/ACT inhaler, INHALE ONE PUFF TWICE DAILY - rinse mouth after use, Disp: 12 g, Rfl: 0  •  fluticasone (FLONASE) 50 mcg/act nasal spray, 2 sprays into each nostril daily, Disp: 16 g, Rfl: 3  •  gabapentin (NEURONTIN) 300 mg capsule, TAKE ONE CAPSULE BY MOUTH ONCE DAILY, Disp: 90 capsule, Rfl: 0  •  hydrOXYzine pamoate (VISTARIL) 25 mg capsule, TAKE ONE CAPSULE BY MOUTH THREE TIMES DAILY AS NEEDED FOR ITCHING, Disp: 90 capsule, Rfl: 0  •  Levemir FlexTouch 100 units/mL injection pen, 10 Units daily at bedtime , Disp: , Rfl:   •  metoprolol succinate (TOPROL-XL) 50 mg 24 hr tablet, Take 2 tablets (100 mg total) by mouth daily (Patient taking differently: Take 50 mg by mouth daily 1 tablet daily), Disp: 180 tablet, Rfl: 3  •  montelukast (SINGULAIR) 10 mg tablet, TAKE ONE TABLET BY MOUTH NIGHTLY AT BEDTIME, Disp: 90 tablet, Rfl: 3  •  Multiple Vitamins-Minerals (OCUVITE ADULT 50+ PO), Take 1 tablet by mouth daily, Disp: , Rfl:   •  NOVOLOG FLEXPEN 100 units/mL injection pen, Inject 4 Units under the skin 3 (three) times a day with meals 4u in am, 2u at lunch and 6u at dinner , Disp: , Rfl:   •  sacubitril-valsartan (Entresto) 24-26 MG TABS, Take 1 tablet by mouth 2 (two) times a day, Disp: 180 tablet, Rfl: 3  •  simvastatin (ZOCOR) 10 mg tablet, TAKE ONE TABLET BY MOUTH ONCE DAILY, Disp: 90 tablet, Rfl: 1  •  Ventolin  (90 Base) MCG/ACT inhaler, Inhale 2 puffs every 6 (six) hours as needed for wheezing or shortness of breath, Disp: 18 g, Rfl: 0  •  acetaminophen (TYLENOL) 325 mg tablet, Take 2 tablets (650 mg total) by mouth every 6 (six) hours as needed for mild pain, Disp: 30 tablet, Rfl: 0  •  BD PEN NEEDLE DON U/F 32G X 4 MM MISC,  , Disp: , Rfl:   •  betamethasone, augmented, (DIPROLENE-AF) 0 05 % cream, , Disp: , Rfl:   •  ONE TOUCH ULTRA TEST test strip,  , Disp: , Rfl:   •  ONETOUCH DELICA LANCETS FINE MISC,  , Disp: , Rfl:     Laboratory Results:  Results for orders placed or performed in visit on 01/16/23   Magnesium   Result Value Ref Range    Magnesium, Serum 2 0 1 5 - 2 5 mg/dL   Renal function panel   Result Value Ref Range    Glucose, Random 127 (H) 65 - 99 mg/dL    BUN 25 7 - 25 mg/dL    Creatinine 1 38 (H) 0 70 - 1 22 mg/dL    eGFR 49 (L) > OR = 60 mL/min/1 73m2    SL AMB BUN/CREATININE RATIO 18 6 - 22 (calc)    Sodium 139 135 - 146 mmol/L    Potassium 4 9 3 5 - 5 3 mmol/L    Chloride 105 98 - 110 mmol/L    CO2 29 20 - 32 mmol/L    Calcium 9 6 8 6 - 10 3 mg/dL    Phosphorus, Serum 4 0 2 1 - 4 3 mg/dL    Albumin 4 6 3 6 - 5 1 g/dL   CBC and differential   Result Value Ref Range    White Blood Cell Count 4 8 3 8 - 10 8 Thousand/uL    Red Blood Cell Count 4 11 (L) 4 20 - 5 80 Million/uL    Hemoglobin 11 2 (L) 13 2 - 17 1 g/dL    HCT 34 6 (L) 38 5 - 50 0 %    MCV 84 2 80 0 - 100 0 fL    MCH 27 3 27 0 - 33 0 pg    MCHC 32 4 32 0 - 36 0 g/dL    RDW 24 1 (H) 11 0 - 15 0 %    Platelet Count 140 (L) 140 - 400 Thousand/uL    SL AMB MPV  7 5 - 12 5 fL    Neutrophils (Absolute) 2,227 1,500 - 7,800 cells/uL    Lymphocytes (Absolute) 1,344 850 - 3,900 cells/uL    Monocytes (Absolute) 360 200 - 950 cells/uL    Eosinophils (Absolute) 658 (H) 15 - 500 cells/uL    Basophils  (H) 0 - 200 cells/uL    Neutrophils 46 4 %    Lymphocytes 28 0 %    Monocytes 7 5 %    Eosinophils 13 7 %    Basophils PCT 4 4 %    Always Message     Protein, Total w/Creat, Random Urine   Result Value Ref Range    Creatinine, Urine 86 20 - 320 mg/dL    Protein/Creat Ratio 174 (H) 25 - 148 mg/g creat    Protein/Creat Ratio 0 174 (H) 0 025 - 0 148 mg/mg creat    Total Protein, Urine 15 5 - 25 mg/dL   CBC Morphology   Result Value Ref Range    RBC Morphology  NORMAL

## 2023-01-18 NOTE — LETTER
January 18, 2023     Wileen Harp, [facility] [address] [place] [address]    Patient: Harinder Suazo   YOB: 1933   Date of Visit: 1/18/2023       Dear Dr Ashley Ramos:    Thank you for referring Warner Brooks to me for evaluation  Below are my notes for this consultation  If you have questions, please do not hesitate to call me  I look forward to following your patient along with you  Sincerely,        Ofelia Carey MD        CC: No Recipients  Ofelia Carey MD  1/18/2023  3:47 PM  Sign when Signing Visit  15 [facility] OFFICE PROGRESS NOTE   Harinder Suazo 80 y o  male MRN: [id number]  DATE: 01/18/23  Reason for visit: Continued evaluation and management of CKD    ASSESSMENT & PLAN:  1  Chronic kidney disease, stage IIIB:  · Mr Deangelo Finn baseline serum creatinine is around 1 3 to 1 6  · The etiology of his CKD is felt to be multifactorial from hypertensive nephrosclerosis and possible element of diabetic nephropathy complicated by hemodynamic effect of cardiac meds  · Renal function is stable  Creatinine 1 38    · Treatment/Management consists of controlling modifiable risk factors (good blood pressure, glycemic and lipid control) and avoidance of nephrotoxic medications in order to slow down progression of renal disease  2  Hypertension:  · BP is at goal (<130/80)  · Current regimen: Metoprolol succ 50 mg daily, Entresto 24/26 mg BID  · No changes today  3  Congestive heart failure:  · Currently compensated  · Current regimen: Metoprolol succ 50 mg daily, Entresto 24/26 mg BID, Farxiga 5 mg QOD  · Follow up with Dr Ashwini Ayon  · Not on loop diuretics  4  Hyperkalemia:  · Resolved since off Spironolactone  5  Anemia of chronic disease:  · Hgb stable at 11 2  · MCV is low - check iron studies  · No need for epogen at this time  6  Proteinuria:  · Controlled UPC - 0 174  · On Valsartan in Entresto       7  Mineral and bone disease  · PTH is at goal - 25 in July 2022  · Ca and phos are at goal    · Vitamin D is at goal - 34 in March 2022      8  Diabetes mellitus:   · Defer DM management to Dr Herb Shoemaker  9  Hyperlipidemia:   · Defer HLP management to PCP  Patient Instructions   You have chronic kidney disease (CKD) and your kidney function is stable  I recommend no changes to your medications today  Check blood work in 4-5 months  Follow up in 9 months - please obtain blood work 1-2 weeks prior to the appointment  Please avoid taking NSAIDs (Ibuprofen, Motrin, Aleve, Advil, Naproxen, Celebrex, etc )  Make sure you are eating healthy and have adequate exercise  SUBJECTIVE / INTERVAL HISTORY:  Corey Moctezuma was last seen in the office in July 2022   There have been no recent hospital stays or ER visits  He has remained on the same medications since last visit - particularly, Entresto and Cheryln Sleight  He is not diuretics  Not checking home BP  No new complaints  He lives in a 3 story home with his son  PMH/PSH: HTN, DM, HLP, CKD, hyperkalemia, CHF, COPD, back pain, gallstone pancreatitis s/p cholecystectomy, melanoma s/p excision, lung nodules, anemia, BPH s/p urolift  Previous work up:   5/15/20 Renal US: R 10 1 cm, L 10 2 cm, no significant PVR  ALLERGIES: No Known Allergies    REVIEW OF SYSTEMS:  Review of Systems   Constitutional: Negative for appetite change, chills, fatigue and fever  Respiratory: Negative for cough and shortness of breath  Cardiovascular: Negative for chest pain and leg swelling  Gastrointestinal: Negative for abdominal pain, diarrhea, nausea and vomiting  Genitourinary: Negative for dysuria and hematuria  Musculoskeletal: Negative for arthralgias and back pain  Allergic/Immunologic: Negative for immunocompromised state  Neurological: Negative for dizziness and light-headedness       OBJECTIVE:  /64 (BP Location: Left arm, Patient Position: Sitting, Cuff Size: Standard)   Pulse 66   Ht 6' 1" (1 854 m)   Wt 75 7 kg (166 lb 12 8 oz)   BMI 22 01 kg/m²   Current Weight: Weight - Scale: 75 7 kg (166 lb 12 8 oz) Body mass index is 22 01 kg/m²  Physical Exam  Constitutional:       General: He is not in acute distress  Appearance: Normal appearance  He is well-developed and normal weight  He is not ill-appearing or diaphoretic  HENT:      Head: Normocephalic and atraumatic  Eyes:      General: No scleral icterus  Conjunctiva/sclera: Conjunctivae normal    Neck:      Vascular: No JVD  Cardiovascular:      Rate and Rhythm: Normal rate and regular rhythm  Heart sounds: Normal heart sounds  Pulmonary:      Effort: Pulmonary effort is normal  No respiratory distress  Breath sounds: Normal breath sounds  Abdominal:      General: Bowel sounds are normal       Palpations: Abdomen is soft  Musculoskeletal:      Cervical back: Neck supple  Right lower leg: No edema  Left lower leg: No edema  Skin:     General: Skin is warm and dry  Neurological:      Mental Status: He is alert and oriented to person, place, and time     Psychiatric:         Behavior: Behavior normal      Medications:  Current Outpatient Medications:   •  albuterol (2 5 mg/3 mL) 0 083 % nebulizer solution, Take 3 mL (2 5 mg total) by nebulization every 6 (six) hours as needed for wheezing or shortness of breath, Disp: 360 mL, Rfl: 5  •  Anoro Ellipta 62 5-25 MCG/ACT inhaler, INHALE ONE PUFF BY MOUTH ONCE DAILY, Disp: 60 each, Rfl: 0  •  Ascorbic Acid (VITAMIN C) 1000 MG tablet, Take 1 tablet by mouth daily, Disp: , Rfl:   •  cholecalciferol (VITAMIN D3) 1,000 units tablet, Take 1,000 Units by mouth daily, Disp: , Rfl:   •  cholestyramine (QUESTRAN) 4 g packet, DISSOLVE one packet in 8 ounces of liquid and drink   BY MOUTH ONCE DAILY, Disp: 30 each, Rfl: 0  •  Cinnamon 500 MG TABS, Take 1 tablet by mouth daily  , Disp: , Rfl:   •  Daliresp 500 MCG tablet, TAKE ONE TABLET BY MOUTH ONCE DAILY, Disp: 30 tablet, Rfl: 3  •  Dapagliflozin Propanediol 5 MG TABS, Take 5 mg by mouth every other day Take 1/2 tablet daily ( Farxiga) , Disp: , Rfl:   •  Flovent  MCG/ACT inhaler, INHALE ONE PUFF TWICE DAILY - rinse mouth after use, Disp: 12 g, Rfl: 0  •  fluticasone (FLONASE) 50 mcg/act nasal spray, 2 sprays into each nostril daily, Disp: 16 g, Rfl: 3  •  gabapentin (NEURONTIN) 300 mg capsule, TAKE ONE CAPSULE BY MOUTH ONCE DAILY, Disp: 90 capsule, Rfl: 0  •  hydrOXYzine pamoate (VISTARIL) 25 mg capsule, TAKE ONE CAPSULE BY MOUTH THREE TIMES DAILY AS NEEDED FOR ITCHING, Disp: 90 capsule, Rfl: 0  •  Levemir FlexTouch 100 units/mL injection pen, 10 Units daily at bedtime , Disp: , Rfl:   •  metoprolol succinate (TOPROL-XL) 50 mg 24 hr tablet, Take 2 tablets (100 mg total) by mouth daily (Patient taking differently: Take 50 mg by mouth daily 1 tablet daily), Disp: 180 tablet, Rfl: 3  •  montelukast (SINGULAIR) 10 mg tablet, TAKE ONE TABLET BY MOUTH NIGHTLY AT BEDTIME, Disp: 90 tablet, Rfl: 3  •  Multiple Vitamins-Minerals (OCUVITE ADULT 50+ PO), Take 1 tablet by mouth daily, Disp: , Rfl:   •  NOVOLOG FLEXPEN 100 units/mL injection pen, Inject 4 Units under the skin 3 (three) times a day with meals 4u in am, 2u at lunch and 6u at dinner , Disp: , Rfl:   •  sacubitril-valsartan (Entresto) 24-26 MG TABS, Take 1 tablet by mouth 2 (two) times a day, Disp: 180 tablet, Rfl: 3  •  simvastatin (ZOCOR) 10 mg tablet, TAKE ONE TABLET BY MOUTH ONCE DAILY, Disp: 90 tablet, Rfl: 1  •  Ventolin  (90 Base) MCG/ACT inhaler, Inhale 2 puffs every 6 (six) hours as needed for wheezing or shortness of breath, Disp: 18 g, Rfl: 0  •  acetaminophen (TYLENOL) 325 mg tablet, Take 2 tablets (650 mg total) by mouth every 6 (six) hours as needed for mild pain, Disp: 30 tablet, Rfl: 0  •  BD PEN NEEDLE DON U/F 32G X 4 MM MISC,  , Disp: , Rfl:   •  betamethasone, augmented, (DIPROLENE-AF) 0 05 % cream, , Disp: , Rfl: •  ONE TOUCH ULTRA TEST test strip,  , Disp: , Rfl:   •  ONETOUCH DELICA LANCETS FINE MISC,  , Disp: , Rfl:     Laboratory Results:  Results for orders placed or performed in visit on 01/16/23   Magnesium   Result Value Ref Range    Magnesium, Serum 2 0 1 5 - 2 5 mg/dL   Renal function panel   Result Value Ref Range    Glucose, Random 127 (H) 65 - 99 mg/dL    BUN 25 7 - 25 mg/dL    Creatinine 1 38 (H) 0 70 - 1 22 mg/dL    eGFR 49 (L) > OR = 60 mL/min/1 73m2    SL AMB BUN/CREATININE RATIO 18 6 - 22 (calc)    Sodium 139 135 - 146 mmol/L    Potassium 4 9 3 5 - 5 3 mmol/L    Chloride 105 98 - 110 mmol/L    CO2 29 20 - 32 mmol/L    Calcium 9 6 8 6 - 10 3 mg/dL    Phosphorus, Serum 4 0 2 1 - 4 3 mg/dL    Albumin 4 6 3 6 - 5 1 g/dL   CBC and differential   Result Value Ref Range    White Blood Cell Count 4 8 3 8 - 10 8 Thousand/uL    Red Blood Cell Count 4 11 (L) 4 20 - 5 80 Million/uL    Hemoglobin 11 2 (L) 13 2 - 17 1 g/dL    HCT 34 6 (L) 38 5 - 50 0 %    MCV 84 2 80 0 - 100 0 fL    MCH 27 3 27 0 - 33 0 pg    MCHC 32 4 32 0 - 36 0 g/dL    RDW 24 1 (H) 11 0 - 15 0 %    Platelet Count 709 (L) 140 - 400 Thousand/uL    SL AMB MPV  7 5 - 12 5 fL    Neutrophils (Absolute) 2,227 1,500 - 7,800 cells/uL    Lymphocytes (Absolute) 1,344 850 - 3,900 cells/uL    Monocytes (Absolute) 360 200 - 950 cells/uL    Eosinophils (Absolute) 658 (H) 15 - 500 cells/uL    Basophils  (H) 0 - 200 cells/uL    Neutrophils 46 4 %    Lymphocytes 28 0 %    Monocytes 7 5 %    Eosinophils 13 7 %    Basophils PCT 4 4 %    Always Message     Protein, Total w/Creat, Random Urine   Result Value Ref Range    Creatinine, Urine 86 20 - 320 mg/dL    Protein/Creat Ratio 174 (H) 25 - 148 mg/g creat    Protein/Creat Ratio 0 174 (H) 0 025 - 0 148 mg/mg creat    Total Protein, Urine 15 5 - 25 mg/dL   CBC Morphology   Result Value Ref Range    RBC Morphology  NORMAL

## 2023-01-26 ENCOUNTER — RA CDI HCC (OUTPATIENT)
Dept: OTHER | Facility: HOSPITAL | Age: 88
End: 2023-01-26

## 2023-01-26 DIAGNOSIS — J41.8 MIXED SIMPLE AND MUCOPURULENT CHRONIC BRONCHITIS (HCC): ICD-10-CM

## 2023-01-26 RX ORDER — UMECLIDINIUM BROMIDE AND VILANTEROL TRIFENATATE 62.5; 25 UG/1; UG/1
POWDER RESPIRATORY (INHALATION)
Qty: 60 EACH | Refills: 0 | Status: SHIPPED | OUTPATIENT
Start: 2023-01-26

## 2023-01-26 NOTE — PROGRESS NOTES
[facility]  coding opportunities          Chart Reviewed number of suggestions sent to Provider: 4   I13 0, I50 42  J44 9  E11 22    Patients Insurance     Medicare Insurance: [facility]

## 2023-01-27 ENCOUNTER — OFFICE VISIT (OUTPATIENT)
Dept: FAMILY MEDICINE CLINIC | Facility: MEDICAL CENTER | Age: 88
End: 2023-01-27

## 2023-01-27 VITALS
DIASTOLIC BLOOD PRESSURE: 60 MMHG | HEART RATE: 64 BPM | SYSTOLIC BLOOD PRESSURE: 116 MMHG | OXYGEN SATURATION: 97 % | HEIGHT: 73 IN | BODY MASS INDEX: 22 KG/M2 | WEIGHT: 166 LBS

## 2023-01-27 DIAGNOSIS — M89.9 CHRONIC KIDNEY DISEASE-MINERAL AND BONE DISORDER: ICD-10-CM

## 2023-01-27 DIAGNOSIS — I42.0 DILATED CARDIOMYOPATHY (HCC): ICD-10-CM

## 2023-01-27 DIAGNOSIS — E11.40 TYPE 2 DIABETES MELLITUS WITH DIABETIC NEUROPATHY, WITH LONG-TERM CURRENT USE OF INSULIN (HCC): Chronic | ICD-10-CM

## 2023-01-27 DIAGNOSIS — D63.1 ANEMIA OF CHRONIC KIDNEY FAILURE, STAGE 3 (MODERATE) (HCC): ICD-10-CM

## 2023-01-27 DIAGNOSIS — Z85.828 HISTORY OF SKIN CANCER: ICD-10-CM

## 2023-01-27 DIAGNOSIS — Z09 FOLLOW-UP EXAM, 3-6 MONTHS SINCE PREVIOUS EXAM: Primary | ICD-10-CM

## 2023-01-27 DIAGNOSIS — E78.49 OTHER HYPERLIPIDEMIA: ICD-10-CM

## 2023-01-27 DIAGNOSIS — N18.30 BENIGN HYPERTENSION WITH CHRONIC KIDNEY DISEASE, STAGE III (HCC): ICD-10-CM

## 2023-01-27 DIAGNOSIS — J44.9 COPD WITHOUT EXACERBATION (HCC): ICD-10-CM

## 2023-01-27 DIAGNOSIS — N18.9 CHRONIC KIDNEY DISEASE-MINERAL AND BONE DISORDER: ICD-10-CM

## 2023-01-27 DIAGNOSIS — I12.9 BENIGN HYPERTENSION WITH CHRONIC KIDNEY DISEASE, STAGE III (HCC): ICD-10-CM

## 2023-01-27 DIAGNOSIS — E83.9 CHRONIC KIDNEY DISEASE-MINERAL AND BONE DISORDER: ICD-10-CM

## 2023-01-27 DIAGNOSIS — N18.30 ANEMIA OF CHRONIC KIDNEY FAILURE, STAGE 3 (MODERATE) (HCC): ICD-10-CM

## 2023-01-27 DIAGNOSIS — D46.20 MDS (MYELODYSPLASTIC SYNDROME), LOW GRADE (HCC): ICD-10-CM

## 2023-01-27 DIAGNOSIS — Z79.4 TYPE 2 DIABETES MELLITUS WITH DIABETIC NEUROPATHY, WITH LONG-TERM CURRENT USE OF INSULIN (HCC): Chronic | ICD-10-CM

## 2023-01-27 DIAGNOSIS — I50.42 CHRONIC COMBINED SYSTOLIC AND DIASTOLIC CONGESTIVE HEART FAILURE (HCC): ICD-10-CM

## 2023-01-27 DIAGNOSIS — M54.32 SCIATICA OF LEFT SIDE: ICD-10-CM

## 2023-01-27 RX ORDER — METOPROLOL SUCCINATE 50 MG/1
50 TABLET, EXTENDED RELEASE ORAL DAILY
COMMUNITY

## 2023-01-27 NOTE — PROGRESS NOTES
[facility] - Clinic Note  Bill [place], [place], 23     Samm Render MRN: [id number] : 3/26/1933 Age: 80 y o  Assessment/Plan     1  Follow-up exam, 3-6 months since previous exam    -Patient presents in follow-up today  -He will return in 6 months and sooner as needed  -Patient believes he completed the COVID-19 bivalent vaccine, does not have his immunization card with him today but, will confirm upon return to home    2  Type 2 diabetes mellitus with diabetic neuropathy, with long-term current use of insulin ([place] [id number] )    -Insulin-dependent  -Managed by Endocrinology  - Lipid Panel with Direct LDL reflex; Future    3  COPD without exacerbation ([place] [id number] )    -Stable with current management  -Managed by Pulmonology    4  Benign hypertension with chronic kidney disease, stage III (HCC)    -Hypertension under excellent control with current management  -Metoprolol succinate 50 mg p o  daily and Entresto 24-26 1 tablet twice daily  -Also on empagliflozin     5  Dilated cardiomyopathy ([place] [address] )    -Following with Cardiology  -Metoprolol succinate 50 mg p o  daily and Entresto 24-26 1 tablet twice daily    6  Chronic combined systolic and diastolic congestive heart failure (HCC)    -Euvolemic  -Following with Cardiology  -Metoprolol succinate 50 mg p o  daily and Entresto 24-26 1 tablet twice daily    7  Anemia of chronic kidney failure, stage 3 (moderate) ([place] [address] )    -Following with Nephrology    8  Chronic kidney disease-mineral and bone disorder    -Following with Nephrology    9  Other hyperlipidemia    -Simvastatin 10 mg p o  nightly  - Lipid Panel with Direct LDL reflex; Future    10  MDS (myelodysplastic syndrome), low grade (HCC)    -Overdue for yearly follow-up with Hematology, had recent CBC    11  History of skin cancer    -Has appointment made with Dermatology for skin check in    Sciatica of left side    -Patient is unsure if he is still taking gabapentin, advised this can help with neuropathic pain, he will confirm and report that  -He is also interested in options other than oral medications, did mention TENS unit      Hipolito Saini acknowledged understanding of treatment plan, all questions answered  Subjective      Hipolito Saini is a 80 y o  male who presents for 3-month follow-up  Patient feeling well today  He does mention occasional sciatica left-sided  He states he saw a commercial on TV about a device can wear on need that could help with this? Patient was apprehensive of buying this as he needed to provide his credit card information  Discussed with patient options such as TENS unit  Patient is happy to report that his last A1c was under good control      The following portions of the patient's history were reviewed and updated as appropriate: allergies, current medications, past family history, past medical history, past social history, past surgical history and problem list      Past Medical History:   Diagnosis Date   • COPD (chronic obstructive pulmonary disease) ([facility] )    • Diabetes mellitus ([facility] )     Type 2   • Essential hypertension    • [facility]    • Hyperlipidemia    • Left inguinal hernia    • Lung nodule    • Malignant melanoma of skin ([facility] )        No Known Allergies    Past Surgical History:   Procedure Laterality Date   • APPENDECTOMY     • CATARACT EXTRACTION, BILATERAL     • CHOLECYSTECTOMY     • CHOLECYSTECTOMY     • COLONOSCOPY  2014    Fiberoptic   • HERNIA REPAIR     • KNEE ARTHROSCOPY Bilateral     Therapeutic   • NM CYSTO INSERTION TRANSPROSTATIC IMPLANT SINGLE N/A 3/22/2019    Procedure: CYSTOSCOPY WITH INSERTION Bj Ramos;  Surgeon: Juan J Werner MD;  Location: [facility]  MAIN OR;  Service: Urology       Family History   Problem Relation Age of Onset   • Diabetes Mother    • Heart attack Neg Hx    • Stroke Neg Hx    • Aneurysm Neg Hx    • Clotting disorder Neg Hx    • Arrhythmia Neg Hx    • Hypertension Neg Hx    • Hyperlipidemia Neg Hx pt unsure        Social History     Socioeconomic History   • Marital status:      Spouse name: None   • Number of children: 2   • Years of education: None   • Highest education level: None   Occupational History   • Occupation: rtired   Tobacco Use   • Smoking status: Former     Packs/day: 1 00     Years: 10 00     Pack years: 10 00     Types: Cigarettes     Start date: 80     Quit date: 1960     Years since quittin 1   • Smokeless tobacco: Never   Vaping Use   • Vaping Use: Never used   Substance and Sexual Activity   • Alcohol use: Not Currently     Alcohol/week: 0 0 standard drinks   • Drug use: No   • Sexual activity: None   Other Topics Concern   • None   Social History Narrative    Caffeine use, active     Social Determinants of Health     Financial Resource Strain: Low Risk    • Difficulty of Paying Living Expenses: Not hard at all   Food Insecurity: Not on file   Transportation Needs: No Transportation Needs   • Lack of Transportation (Medical): No   • Lack of Transportation (Non-Medical):  No   Physical Activity: Not on file   Stress: Not on file   Social Connections: Not on file   Intimate Partner Violence: Not on file   Housing Stability: Not on file       Current Outpatient Medications   Medication Sig Dispense Refill   • acetaminophen (TYLENOL) 325 mg tablet Take 2 tablets (650 mg total) by mouth every 6 (six) hours as needed for mild pain 30 tablet 0   • albuterol (2 5 mg/3 mL) 0 083 % nebulizer solution Take 3 mL (2 5 mg total) by nebulization every 6 (six) hours as needed for wheezing or shortness of breath 360 mL 5   • Anoro Ellipta 62 5-25 MCG/ACT inhaler INHALE ONE PUFF BY MOUTH ONCE DAILY 60 each 0   • Ascorbic Acid (VITAMIN C) 1000 MG tablet Take 1 tablet by mouth daily     • BD PEN NEEDLE DON U/F 32G X 4 MM MISC       • betamethasone, augmented, (DIPROLENE-AF) 0 05 % cream      • cholecalciferol (VITAMIN D3) 1,000 units tablet Take 1,000 Units by mouth daily     • cholestyramine (QUESTRAN) 4 g packet DISSOLVE one packet in 8 ounces of liquid and drink   BY MOUTH ONCE DAILY 30 each 0   • Cinnamon 500 MG TABS Take 1 tablet by mouth daily       • Daliresp 500 MCG tablet TAKE ONE TABLET BY MOUTH ONCE DAILY 30 tablet 3   • Dapagliflozin Propanediol 5 MG TABS Take 5 mg by mouth every other day Take 1/2 tablet daily ( Farxiga)      • Flovent  MCG/ACT inhaler INHALE ONE PUFF TWICE DAILY - rinse mouth after use 12 g 0   • fluticasone (FLONASE) 50 mcg/act nasal spray 2 sprays into each nostril daily 16 g 3   • gabapentin (NEURONTIN) 300 mg capsule TAKE ONE CAPSULE BY MOUTH ONCE DAILY 90 capsule 0   • hydrOXYzine pamoate (VISTARIL) 25 mg capsule TAKE ONE CAPSULE BY MOUTH THREE TIMES DAILY AS NEEDED FOR ITCHING 90 capsule 0   • Levemir FlexTouch 100 units/mL injection pen 10 Units daily at bedtime      • metoprolol succinate (TOPROL-XL) 50 mg 24 hr tablet Take 50 mg by mouth daily     • montelukast (SINGULAIR) 10 mg tablet TAKE ONE TABLET BY MOUTH NIGHTLY AT BEDTIME 90 tablet 3   • Multiple Vitamins-Minerals (OCUVITE ADULT 50+ PO) Take 1 tablet by mouth daily     • NOVOLOG FLEXPEN 100 units/mL injection pen Inject 4 Units under the skin 3 (three) times a day with meals 4u in am, 2u at lunch and 6u at dinner      • ONE TOUCH ULTRA TEST test strip       • ONETOUCH DELICA LANCETS FINE MISC       • sacubitril-valsartan (Entresto) 24-26 MG TABS Take 1 tablet by mouth 2 (two) times a day 180 tablet 3   • simvastatin (ZOCOR) 10 mg tablet TAKE ONE TABLET BY MOUTH ONCE DAILY 90 tablet 1   • Ventolin  (90 Base) MCG/ACT inhaler Inhale 2 puffs every 6 (six) hours as needed for wheezing or shortness of breath 18 g 0     No current facility-administered medications for this visit         Review of Systems     As noted in HPI    Objective      /60 (BP Location: Left arm, Patient Position: Sitting, Cuff Size: Adult)   Pulse 64   Ht 6' 1" (1 854 m)   Wt 75 3 kg (166 lb)   SpO2 97% BMI 21 90 kg/m²     Physical Exam  Vitals reviewed  Constitutional:       General: He is not in acute distress  Appearance: Normal appearance  HENT:      Head: Normocephalic and atraumatic  Nose: Nose normal       Mouth/Throat:      Mouth: Mucous membranes are moist       Pharynx: Oropharynx is clear  Eyes:      Extraocular Movements: Extraocular movements intact  Conjunctiva/sclera: Conjunctivae normal       Pupils: Pupils are equal, round, and reactive to light  Cardiovascular:      Rate and Rhythm: Normal rate and regular rhythm  Pulses: Normal pulses  Heart sounds: Normal heart sounds  Pulmonary:      Effort: Pulmonary effort is normal       Breath sounds: Normal breath sounds  Abdominal:      General: Bowel sounds are normal       Palpations: Abdomen is soft  Tenderness: There is no abdominal tenderness  Musculoskeletal:      Cervical back: Neck supple  Right lower leg: Right lower leg edema: scant  Left lower leg: Left lower leg edema: scant  Lymphadenopathy:      Cervical: No cervical adenopathy  Skin:     General: Skin is warm and dry  Neurological:      Mental Status: He is alert and oriented to person, place, and time  Psychiatric:         Mood and Affect: Mood normal          Behavior: Behavior normal          Thought Content: Thought content normal          Judgment: Judgment normal              Some portions of this record may have been generated with voice recognition software  There may be translation, syntax, or grammatical errors  Occasional wrong word or "sound-a-like" substitutions may have occurred due to the inherent limitations of the voice recognition software  Read the chart carefully and recognize, using context, where substations may have occurred  If you have any questions, please contact the dictating provider for clarification or correction, as needed

## 2023-01-30 ENCOUNTER — TELEPHONE (OUTPATIENT)
Dept: HEMATOLOGY ONCOLOGY | Facility: CLINIC | Age: 88
End: 2023-01-30

## 2023-01-30 DIAGNOSIS — D46.20 MDS (MYELODYSPLASTIC SYNDROME), LOW GRADE (HCC): Primary | ICD-10-CM

## 2023-01-30 NOTE — TELEPHONE ENCOUNTER
Spoke with patient to make him aware that lab script has been sent out in the mail  He verbalized understanding and agreed to plan

## 2023-01-30 NOTE — TELEPHONE ENCOUNTER
Scheduling Appointment SEND TO POOLS    Person calling in Patient     If other than patient calling, are they listed on the communication consent form? No   Doctor Dr Fidel Dasilva   Appointment date and time 2/22/23   Reason for scheduling appointment Needs to make a follow up    Patient verbalized understanding?   Yes    Needs orders placed and wants the script sent to him

## 2023-02-02 ENCOUNTER — TELEPHONE (OUTPATIENT)
Dept: FAMILY MEDICINE CLINIC | Facility: MEDICAL CENTER | Age: 88
End: 2023-02-02

## 2023-02-02 NOTE — TELEPHONE ENCOUNTER
Pt mailed a note with his covid and flu vaccination cards (both are scanned into the chart, and given to clinical to abstract)  The note stated that pt has an appt with Dr Linder Boast on 2/22/23 and that he is taking Gabapentin 300mg  Fausto Starr

## 2023-02-18 LAB
BASOPHILS # BLD AUTO: 172 CELLS/UL (ref 0–200)
BASOPHILS NFR BLD AUTO: 3.3 %
EOSINOPHIL # BLD AUTO: 614 CELLS/UL (ref 15–500)
EOSINOPHIL NFR BLD AUTO: 11.8 %
ERYTHROCYTE [DISTWIDTH] IN BLOOD BY AUTOMATED COUNT: 23.9 % (ref 11–15)
HCT VFR BLD AUTO: 32.8 % (ref 38.5–50)
HGB BLD-MCNC: 10.5 G/DL (ref 13.2–17.1)
LYMPHOCYTES # BLD AUTO: 1128 CELLS/UL (ref 850–3900)
LYMPHOCYTES NFR BLD AUTO: 21.7 %
MCH RBC QN AUTO: 27.6 PG (ref 27–33)
MCHC RBC AUTO-ENTMCNC: 32 G/DL (ref 32–36)
MCV RBC AUTO: 86.3 FL (ref 80–100)
MONOCYTES # BLD AUTO: 484 CELLS/UL (ref 200–950)
MONOCYTES NFR BLD AUTO: 9.3 %
NEUTROPHILS # BLD AUTO: 2803 CELLS/UL (ref 1500–7800)
NEUTROPHILS NFR BLD AUTO: 53.9 %
PLATELET # BLD AUTO: 114 THOUSAND/UL (ref 140–400)
RBC # BLD AUTO: 3.8 MILLION/UL (ref 4.2–5.8)
WBC # BLD AUTO: 5.2 THOUSAND/UL (ref 3.8–10.8)

## 2023-02-21 DIAGNOSIS — R09.81 NASAL CONGESTION: ICD-10-CM

## 2023-02-21 RX ORDER — FLUTICASONE PROPIONATE 50 MCG
SPRAY, SUSPENSION (ML) NASAL
Qty: 16 G | Refills: 0 | Status: SHIPPED | OUTPATIENT
Start: 2023-02-21

## 2023-02-22 ENCOUNTER — OFFICE VISIT (OUTPATIENT)
Dept: HEMATOLOGY ONCOLOGY | Facility: CLINIC | Age: 88
End: 2023-02-22

## 2023-02-22 VITALS
DIASTOLIC BLOOD PRESSURE: 74 MMHG | RESPIRATION RATE: 18 BRPM | HEIGHT: 73 IN | TEMPERATURE: 96.9 F | WEIGHT: 168 LBS | OXYGEN SATURATION: 91 % | BODY MASS INDEX: 22.26 KG/M2 | SYSTOLIC BLOOD PRESSURE: 122 MMHG | HEART RATE: 85 BPM

## 2023-02-22 DIAGNOSIS — D46.20 MDS (MYELODYSPLASTIC SYNDROME), LOW GRADE (HCC): Primary | ICD-10-CM

## 2023-02-22 RX ORDER — INSULIN GLARGINE 100 [IU]/ML
10 INJECTION, SOLUTION SUBCUTANEOUS DAILY
COMMUNITY
Start: 2023-02-01

## 2023-02-22 NOTE — PROGRESS NOTES
Hematology / Oncology Outpatient Follow Up Note    Faye Lang 80 y o  male :3/26/1933 PED:[id number]         Date:  2023    Assessment / Plan:  A 80 year old gentleman with low-grade myelodysplasia  He has mild normocytic normochromic anemia  He has no abnormality of WBC and platelet count  His most recent hemoglobin was 10 7  For many years, he has stable hemoglobin  He has no evidence of progression  He does not require transfusion support  I recommended him to continue surveillance  I will see him again in a year with CBC  He is in agreement with my recommendations  Subjective:      HPI:  A 80-year-old gentleman who was referred to me, today, because he was recently found to have immature neutrophil, based on the CBC  Since , he has mild normocytic normochromic anemia  He has no symptomatology from hematology standpoint, such as fever, chills or night sweats  He has not recent weight loss  He is otherwise healthy with only past medical history of COPD, diabetes and hypertension  His recent PSA was 0 4 which is within normal limits  He is up-to-date for colonoscopy with most recent done in 2 years ago  He has no history of major surgery except cholecystectomy in 2013  He has no complaint of pain  He denied fever, chills or night sweats  His weight has been stable  He has normal performance status             Interval History:  A 80 year old gentleman who has mild normocytic normochromic anemia for several years  He was found to have immature neutrophil in the peripheral blood smear  Therefore, he was referred to me for further evaluation  He underwent bone marrow biopsy which showed hypercellular marrow with left shifted trilineage hematopoiesis  Myeloblasts was only 2 5%, suggesting low-grade myelodysplasia  Cytogenetics was normal male karyotype  He has no neutropenia or thrombocytopenia  Currently, he is on observation  He presents today for routine follow-up    He feels well with no new symptoms  He has no complaint of fatigue or exertional shortness of breath  His weight is stable  He denied any pain  His performance status is normal         Objective:      Primary Diagnosis:     Low-grade myelodysplastic syndrome, with normal cytogenetics  Diagnosed in April 2016       Cancer Staging:  Cancer Staging  No matching staging information was found for the patient         Previous Hematologic/ Oncologic Treatment:            Current Hematologic/ Oncologic Treatment:       Observation      Disease Status:      Slowly progressive anemia      Test Results:     Pathology:     Bone marrow biopsy showed hypercellular marrow for his age, cellularity approximately 75%  There was left shifted trilineage progressive hematopoiesis with some dysplastic feature  Myeloblast 2 5%, consistent with low-grade myelodysplastic syndrome  Cytogenetics showed normal male karyotype      Radiology:           Laboratory:       See below for CBC and CMP  ROS: Review of Systems   All other systems reviewed and are negative  Imaging: No results found        Labs:   Lab Results   Component Value Date    WBC 5 2 02/17/2023    HGB 10 5 (L) 02/17/2023    HCT 32 8 (L) 02/17/2023    MCV 86 3 02/17/2023     (L) 02/17/2023     Lab Results   Component Value Date     05/08/2017    K 4 9 01/16/2023     01/16/2023    CO2 29 01/16/2023    BUN 25 01/16/2023    CREATININE 1 38 (H) 01/16/2023    CALCIUM 9 6 01/16/2023    AST 14 08/18/2020    ALT 14 08/18/2020    ALKPHOS 37 08/18/2020    PROT 6 3 05/08/2017    BILITOT 0 6 05/08/2017    EGFR 49 (L) 01/16/2023         Lab Results   Component Value Date    TIBC 313 10/05/2015    FERRITIN 195 10/05/2015       Lab Results   Component Value Date    TVVVFXYV44 1,527 (H) 05/15/2020       Lab Results   Component Value Date    FOLATE 20 7 05/15/2020         Current Medications: Reviewed  Allergies: Reviewed  PMH/FH/SH:  Reviewed      Vital Sign:    Body surface area is 2 meters squared      Wt Readings from Last 3 Encounters:   02/22/23 76 2 kg (168 lb)   01/27/23 75 3 kg (166 lb)   01/18/23 75 7 kg (166 lb 12 8 oz)        Temp Readings from Last 3 Encounters:   02/22/23 (!) 96 9 °F (36 1 °C) (Tympanic)   12/05/22 97 7 °F (36 5 °C)   09/30/22 98 6 °F (37 °C)        BP Readings from Last 3 Encounters:   02/22/23 122/74   01/27/23 116/60   01/18/23 112/64         Pulse Readings from Last 3 Encounters:   02/22/23 85   01/27/23 64   01/18/23 66     @LASTSAO2(3)@

## 2023-02-27 DIAGNOSIS — J41.8 MIXED SIMPLE AND MUCOPURULENT CHRONIC BRONCHITIS (HCC): ICD-10-CM

## 2023-02-28 RX ORDER — UMECLIDINIUM BROMIDE AND VILANTEROL TRIFENATATE 62.5; 25 UG/1; UG/1
POWDER RESPIRATORY (INHALATION)
Qty: 60 EACH | Refills: 0 | Status: SHIPPED | OUTPATIENT
Start: 2023-02-28

## 2023-03-03 ENCOUNTER — TELEPHONE (OUTPATIENT)
Dept: CARDIOLOGY CLINIC | Facility: CLINIC | Age: 88
End: 2023-03-03

## 2023-03-03 NOTE — TELEPHONE ENCOUNTER
Patient would like to drop off a patient assistance form for [employer] in the [place] office  Can someone call him and let him know the hours and days he can stop by

## 2023-03-07 DIAGNOSIS — J41.0 SIMPLE CHRONIC BRONCHITIS (HCC): ICD-10-CM

## 2023-03-07 NOTE — TELEPHONE ENCOUNTER
Patient is calling, he said his [facility] does not have albuterol sulfate neb solution in stock  He only has about 10 days left  He is asking if we can send in a script to his [facility] pharmacy on file if they have it in stock  He isn't sure if they do or not   Please advise

## 2023-03-08 DIAGNOSIS — I50.43 ACUTE ON CHRONIC COMBINED SYSTOLIC AND DIASTOLIC CONGESTIVE HEART FAILURE (HCC): ICD-10-CM

## 2023-03-08 DIAGNOSIS — L29.9 PRURITUS: ICD-10-CM

## 2023-03-08 RX ORDER — ALBUTEROL SULFATE 2.5 MG/3ML
2.5 SOLUTION RESPIRATORY (INHALATION) EVERY 6 HOURS PRN
Qty: 360 ML | Refills: 5 | Status: SHIPPED | OUTPATIENT
Start: 2023-03-08

## 2023-03-08 RX ORDER — SACUBITRIL AND VALSARTAN 24; 26 MG/1; MG/1
1 TABLET, FILM COATED ORAL 2 TIMES DAILY
Qty: 180 TABLET | Refills: 3 | Status: SHIPPED | OUTPATIENT
Start: 2023-03-08

## 2023-03-08 RX ORDER — HYDROXYZINE PAMOATE 25 MG/1
CAPSULE ORAL
Qty: 90 CAPSULE | Refills: 0 | Status: SHIPPED | OUTPATIENT
Start: 2023-03-08

## 2023-03-15 ENCOUNTER — CONSULT (OUTPATIENT)
Dept: DERMATOLOGY | Facility: CLINIC | Age: 88
End: 2023-03-15

## 2023-03-15 VITALS — TEMPERATURE: 96 F | WEIGHT: 168 LBS | HEIGHT: 73 IN | BODY MASS INDEX: 22.26 KG/M2

## 2023-03-15 DIAGNOSIS — D22.9 MULTIPLE MELANOCYTIC NEVI: Primary | ICD-10-CM

## 2023-03-15 DIAGNOSIS — L57.0 KERATOSIS, ACTINIC: ICD-10-CM

## 2023-03-15 DIAGNOSIS — Z85.828 HISTORY OF SKIN CANCER: ICD-10-CM

## 2023-03-15 DIAGNOSIS — Z85.820 HISTORY OF MELANOMA: ICD-10-CM

## 2023-03-15 DIAGNOSIS — L82.1 SEBORRHEIC KERATOSIS: ICD-10-CM

## 2023-03-15 DIAGNOSIS — L81.4 LENTIGO: ICD-10-CM

## 2023-03-15 DIAGNOSIS — L85.3 XEROSIS OF SKIN: ICD-10-CM

## 2023-03-15 DIAGNOSIS — D18.01 CHERRY ANGIOMA: ICD-10-CM

## 2023-03-15 DIAGNOSIS — Z12.83 SCREENING FOR SKIN CANCER: ICD-10-CM

## 2023-03-15 NOTE — PATIENT INSTRUCTIONS
MELANOCYTIC NEVI ("Moles")    Assessment and Plan:  Based on a thorough discussion of this condition and the management approach to it (including a comprehensive discussion of the known risks, side effects and potential benefits of treatment), the patient (family) agrees to implement the following specific plan:  When outside we recommend using a wide brim hat, sunglasses, long sleeve and pants, sunscreen with SPF 64+ with reapplication every 2 hours, or SPF specific clothing   Benign, reassured  Annual skin check     Melanocytic Nevi  Melanocytic nevi ("moles") are tan or brown, raised or flat areas of the skin which have an increased number of melanocytes  Melanocytes are the cells in our body which make pigment and account for skin color  Some moles are present at birth (I e , "congenital nevi"), while others come up later in life (i e , "acquired nevi")  The sun can stimulate the body to make more moles  Sunburns are not the only thing that triggers more moles  Chronic sun exposure can do it too  Clinically distinguishing a healthy mole from melanoma may be difficult, even for experienced dermatologists  The "ABCDE's" of moles have been suggested as a means of helping to alert a person to a suspicious mole and the possible increased risk of melanoma  The suggestions for raising alert are as follows:    Asymmetry: Healthy moles tend to be symmetric, while melanomas are often asymmetric  Asymmetry means if you draw a line through the mole, the two halves do not match in color, size, shape, or surface texture  Asymmetry can be a result of rapid enlargement of a mole, the development of a raised area on a previously flat lesion, scaling, ulceration, bleeding or scabbing within the mole  Any mole that starts to demonstrate "asymmetry" should be examined promptly by a board certified dermatologist      Border: Healthy moles tend to have discrete, even borders    The border of a melanoma often blends into the normal skin and does not sharply delineate the mole from normal skin  Any mole that starts to demonstrate "uneven borders" should be examined promptly by a board certified dermatologist      Color: Healthy moles tend to be one color throughout  Melanomas tend to be made up of different colors ranging from dark black, blue, white, or red  Any mole that demonstrates a color change should be examined promptly by a board certified dermatologist      Diameter: Healthy moles tend to be smaller than 0 6 cm in size; an exception are "congenital nevi" that can be larger  Melanomas tend to grow and can often be greater than 0 6 cm (1/4 of an inch, or the size of a pencil eraser)  This is only a guideline, and many normal moles may be larger than 0 6 cm without being unhealthy  Any mole that starts to change in size (small to bigger or bigger to smaller) should be examined promptly by a board certified dermatologist      Evolving: Healthy moles tend to "stay the same "  Melanomas may often show signs of change or evolution such as a change in size, shape, color, or elevation  Any mole that starts to itch, bleed, crust, burn, hurt, or ulcerate or demonstrate a change or evolution should be examined promptly by a board certified dermatologist         Hiral Corey and Plan:  Based on a thorough discussion of this condition and the management approach to it (including a comprehensive discussion of the known risks, side effects and potential benefits of treatment), the patient (family) agrees to implement the following specific plan:  When outside we recommend using a wide brim hat, sunglasses, long sleeve and pants, sunscreen with SPF 51+ with reapplication every 2 hours, or SPF specific clothing       What is a lentigo? A lentigo is a pigmented flat or slightly raised lesion with a clearly defined edge  Unlike an ephelis (freckle), it does not fade in the winter months  There are several kinds of lentigo    The name lentigo originally referred to its appearance resembling a small lentil  The plural of lentigo is lentigines, although “lentigos” is also in common use  Who gets lentigines? Lentigines can affect males and females of all ages and races  Solar lentigines are especially prevalent in fair skinned adults  Lentigines associated with syndromes are present at birth or arise during childhood  What causes lentigines? Common forms of lentigo are due to exposure to ultraviolet radiation:  Sun damage including sunburn   Indoor tanning   Phototherapy, especially photochemotherapy (PUVA)    Ionizing radiation, eg radiation therapy, can also cause lentigines  Several familial syndromes associated with widespread lentigines originate from mutations in Chavez-MAP kinase, mTOR signaling and PTEN pathways  What is the treatment for lentigines? Most lentigines are left alone  Attempts to lighten them may not be successful  The following approaches are used:  SPF 50+ broad-spectrum sunscreen   Hydroquinone bleaching cream   Alpha hydroxy acids   Vitamin C   Retinoids   Azelaic acid   Chemical peels  Individual lesions can be permanently removed using:  Cryotherapy   Intense pulsed light   Pigment lasers    How can lentigines be prevented? Lentigines associated with exposure ultraviolet radiation can be prevented by very careful sun protection  Clothing is more successful at preventing new lentigines than are sunscreens  What is the outlook for lentigines? Lentigines usually persist  They may increase in number with age and sun exposure  Some in sun-protected sites may fade and disappear      QUINONES ANGIOMAS    Assessment and Plan:  Based on a thorough discussion of this condition and the management approach to it (including a comprehensive discussion of the known risks, side effects and potential benefits of treatment), the patient (family) agrees to implement the following specific plan:  Monitor for changes  Benign, reassured    Assessment and Plan:    Cherry angioma, also known as [employer] spots, are benign vascular skin lesions  A "cherry angioma" is a firm red, blue or purple papule, 0 1-1 cm in diameter  When thrombosed, they can appear black in colour until evaluated with a dermatoscope when the red or purple colour is more easily seen  Cherry angioma may develop on any part of the body but most often appear on the scalp, face, lips and trunk  An angioma is due to proliferating endothelial cells; these are the cells that line the inside of a blood vessel  Angiomas can arise in early life or later in life; the most common type of angioma is a cherry angioma  Cherry angiomas are very common in males and females of any age or race  They are more noticeable in white skin than in skin of colour  They markedly increase in number from about the age of 36  There may be a family history of similar lesions  Eruptive cherry angiomas have been rarely reported to be associated with internal malignancy  The cause of angiomas is unknown  Genetic analysis of cherry angiomas has shown that they frequently carry specific somatic missense mutations in the GNAQ and GNA11 (Q209H) genes, which are involved in other vascular and melanocytic proliferations  SEBORRHEIC KERATOSIS; NON-INFLAMED    Assessment and Plan:  Based on a thorough discussion of this condition and the management approach to it (including a comprehensive discussion of the known risks, side effects and potential benefits of treatment), the patient (family) agrees to implement the following specific plan:  Monitor for changes  Benign, reassured    Seborrheic Keratosis  A seborrheic keratosis is a harmless warty spot that appears during adult life as a common sign of skin aging  Seborrheic keratoses can arise on any area of skin, covered or uncovered, with the usual exception of the palms and soles  They do not arise from mucous membranes   Seborrheic keratoses can have highly variable appearance  Seborrheic keratoses are extremely common  It has been estimated that over 90% of adults over the age of 61 years have one or more of them  They occur in males and females of all races, typically beginning to erupt in the 35s or 45s  They are uncommon under the age of 21 years  The precise cause of seborrhoeic keratoses is not known  Seborrhoeic keratoses are considered degenerative in nature  As time goes by, seborrheic keratoses tend to become more numerous  Some people inherit a tendency to develop a very large number of them; some people may have hundreds of them  There is no easy way to remove multiple lesions on a single occasion  Unless a specific lesion is "inflamed" and is causing pain or stinging/burning or is bleeding, most insurance companies do not authorize treatment  XEROSIS ("DRY SKIN")    Assessment and Plan:  Based on a thorough discussion of this condition and the management approach to it (including a comprehensive discussion of the known risks, side effects and potential benefits of treatment), the patient (family) agrees to implement the following specific plan:  Use moisturizer like plain Vaseline, Eucerin,Cerave or Aveeno Cream 3 times a day for the dry skin          - Recommend sensitive skin care regimen  - detergent free of dyes and perfumes (example, free and clear)  Try washing clothes with extra rinse cycle  - Short lukewarm showers  - White dove bar soap  - - avoid aerosols and fragrances in the home (candles, plug ins, perfume, air freshener, etc)  Advised patient that the hydroxyzine 25 mg can cause drowsiness and to be cautious when taking it  Be careful when driving  If you can, we recommend, taking this only once a day, in the evening  Dry skin refers to skin that feels dry to touch  Dry skin has a dull surface with a rough, scaly quality  The skin is less pliable and cracked   When dryness is severe, the skin may become inflamed and fissured  Although any body site can be dry, dry skin tends to affect the shins more than any other site  Dry skin is lacking moisture in the outer horny cell layer (stratum corneum) and this results in cracks in the skin surface  Dry skin is also called xerosis, xeroderma or asteatosis (lack of fat)  It can affect males and females of all ages  There is some racial variability in water and lipid content of the skin  Dry skin that starts in early childhood may be one of about 20 types of ichthyosis (fish-scale skin)  There is often a family history of dry skin  Dry skin is commonly seen in people with atopic dermatitis  Nearly everyone > 60 years has dry skin  Dry skin that begins later may be seen in people with certain diseases and conditions  Postmenopausal women  Hypothyroidism  Chronic renal disease   Malnutrition and weight loss   Subclinical dermatitis   Treatment with certain drugs such as oral retinoids, diuretics and epidermal growth factor receptor inhibitors      What is the treatment for dry skin? The mainstay of treatment of dry skin and ichthyosis is moisturisers/emollients  They should be applied liberally and often enough to:  Reduce itch   Improve the barrier function   Prevent entry of irritants, bacteria   Reduce transepidermal water loss  How can dry skin be prevented? Eliminate aggravating factors:  Reduce the frequency of bathing  A humidifier in winter and air conditioner in summer   Compare having a short shower with a prolonged soak in a bath  Use lukewarm, not hot, water  Replace standard soap with a substitute such as a synthetic detergent cleanser, water-miscible emollient, bath oil, anti-pruritic tar oil, colloidal oatmeal etc    Apply an emollient liberally and often, particularly shortly after bathing, and when itchy  The drier the skin, the thicker this should be, especially on the hands  What is the outlook for dry skin?   A tendency to dry skin may persist life-long, or it may improve once contributing factors are controlled  HISTORY OF MELANOMA    Assessment and Plan:  Based on a thorough discussion of this condition and the management approach to it (including a comprehensive discussion of the known risks, side effects and potential benefits of treatment), the patient (family) agrees to implement the following specific plan:  When outside we recommend using a wide brim hat, sunglasses, long sleeve and pants, sunscreen with SPF 16+ with reapplication every 2 hours, or SPF specific clothing   We recommend that you continue to have regular full body skin exams with your dermatologist   We recommend that you see your eye doctor and dentist yearly for exams and make sure they are aware of your past history of Melanoma  What happens at follow-up? The main purpose of follow-up is to detect recurrences early (metastatic melanoma), but it also offers an opportunity to diagnose a new primary melanoma at the first possible opportunity  A second invasive melanoma occurs in 5-10% of melanoma patients and a new melanoma in situ is diagnosed in more than 20% of melanoma patients  Our practice makes the following recommendations for follow-up for patients with invasive melanoma    At-least "monthly" self-skin examinations   Routine skin checks by a board certified dermatologist  Follow-up intervals are "every 3 months" within 2 years of a new melanoma diagnosis; "every 6 months" between 2-4 years of a new melanoma diagnosis; and "annually" after 4 years of a new melanoma diagnosis  Individual patient's needs should be considered before an appropriate follow-up is offered  Provide education and support to help the patient adjust to their illness    Follow-up appointments should include:  A check of the scar where the primary melanoma was removed  Checking the regional lymph nodes  A general skin examination  A full physical examination at least annually by your primary care physician    In those with more advanced primary disease, follow-up may include:  Blood tests  Imaging: ultrasound, X-ray, CT, MRI and PET scan  Most tests are not worthwhile for patients with stage 1 or 2 melanoma unless there are signs or symptoms of disease recurrence or metastasis  No tests are necessary for healthy patients who have remained well for five years or longer after removal of their melanoma  What is the outlook for patients with melanoma? Melanoma in situ is cured by excision because it has no potential to spread around the body  The risk of spread and ultimate death from invasive melanoma depends on several factors, but the main one is the Breslow thickness of the melanoma at the time it was surgically removed  Metastases are rare for melanomas < 0 75 mm and the risk for tumours 0 75-1 mm thick is about 5%  The risk steadily increases with thickness so that melanomas > 4 mm have a risk of metastasis of about 40%  Melanoma is a potentially serious type of skin cancer, in which there is uncontrolled growth of melanocytes (pigment cells)  Melanoma is sometimes called malignant melanoma  Normal melanocytes are found in the basal layer of the epidermis (the outer layer of skin)  Melanocytes produce a protein called melanin, which protects skin cells by absorbing ultraviolet (UV) radiation  Melanocytes are found in equal numbers in black and white skin, but melanocytes in black skin produce much more melanin  People with dark brown or black skin are very much less likely to be damaged by UV radiation than those with white skin      ACTINIC KERATOSIS    Assessment and Plan:  Based on a thorough discussion of this condition and the management approach to it (including a comprehensive discussion of the known risks, side effects and potential benefits of treatment), the patient (family) agrees to implement the following specific plan:  Treated with cryotherapy today; written and verbal consent obtained     PROCEDURE:  DESTRUCTION OF PRE-MALIGNANT LESIONS  After a thorough discussion of treatment options and risk/benefits/alternatives (including but not limited to local pain, scarring, dyspigmentation, blistering, and possible superinfection), verbal and written consent were obtained and the aforementioned lesions were treated on with cryotherapy using liquid nitrogen x 2 cycles for 5-10 seconds  The patient tolerated the procedure well, and after-care instructions were provided  TOTAL NUMBER of 8 pre-malignant lesions were treated today on the ANATOMIC LOCATION: left ear, left check, left hand, right ear  Patient instructions: Your pre-cancerous lesions (called actinic keratosis) were treated with liquid nitrogen today  The treated areas will get more red, crusted over the next few days  There might be some blistering  Apply vaseline to the treated area for the next week to help it heal fully  Do not pick at the area  Return in 3-4 weeks for another round of liquid nitrogen treatment if lesion(s)  fails to fully resolve

## 2023-03-20 DIAGNOSIS — R05.9 COUGH: ICD-10-CM

## 2023-03-21 RX ORDER — MONTELUKAST SODIUM 10 MG/1
TABLET ORAL
Qty: 90 TABLET | Refills: 0 | Status: SHIPPED | OUTPATIENT
Start: 2023-03-21

## 2023-03-28 DIAGNOSIS — J41.8 MIXED SIMPLE AND MUCOPURULENT CHRONIC BRONCHITIS (HCC): ICD-10-CM

## 2023-03-28 RX ORDER — UMECLIDINIUM BROMIDE AND VILANTEROL TRIFENATATE 62.5; 25 UG/1; UG/1
POWDER RESPIRATORY (INHALATION)
Qty: 60 EACH | Refills: 0 | Status: SHIPPED | OUTPATIENT
Start: 2023-03-28

## 2023-04-06 DIAGNOSIS — J44.1 CHRONIC OBSTRUCTIVE PULMONARY DISEASE WITH ACUTE EXACERBATION (HCC): ICD-10-CM

## 2023-04-06 RX ORDER — FLUTICASONE PROPIONATE 220 UG/1
AEROSOL, METERED RESPIRATORY (INHALATION)
Qty: 12 G | Refills: 0 | Status: SHIPPED | OUTPATIENT
Start: 2023-04-06

## 2023-04-24 ENCOUNTER — OFFICE VISIT (OUTPATIENT)
Dept: FAMILY MEDICINE CLINIC | Facility: MEDICAL CENTER | Age: 88
End: 2023-04-24

## 2023-04-24 VITALS
WEIGHT: 169.2 LBS | BODY MASS INDEX: 22.43 KG/M2 | HEIGHT: 73 IN | SYSTOLIC BLOOD PRESSURE: 116 MMHG | TEMPERATURE: 97.8 F | HEART RATE: 70 BPM | DIASTOLIC BLOOD PRESSURE: 66 MMHG | OXYGEN SATURATION: 94 %

## 2023-04-24 DIAGNOSIS — L08.9 SKIN INFECTION: ICD-10-CM

## 2023-04-24 RX ORDER — SULFAMETHOXAZOLE AND TRIMETHOPRIM 800; 160 MG/1; MG/1
1 TABLET ORAL 2 TIMES DAILY
Qty: 6 TABLET | Refills: 0 | Status: SHIPPED | OUTPATIENT
Start: 2023-04-26 | End: 2023-04-29

## 2023-04-24 NOTE — PROGRESS NOTES
[address] Inf - Clinic Note  Edgar [place], 23     Patricia Matias MRN: [id number] : 3/26/1933 Age: 80 y o  Assessment/Plan     1  Skin infection    -Patient presents for recheck of skin laceration and related localized skin infection  -Wound culture 23:  Wound Culture 1+ Growth of    Mixed Skin Christine      -We will extend antibiotic course to complete 10-day antibiotic course, continue concurrent probiotic  - sulfamethoxazole-trimethoprim (BACTRIM DS) 800-160 mg per tablet; Take 1 tablet by mouth 2 (two) times a day for 3 days Do not start before 2023  Dispense: 6 tablet; Refill: 0  -Advised about wound care  -Will return for Steri-Strip application  -Follow-up as needed if wound does not continue to heal, or any new or worsening symptoms    Patricia Matias acknowledged understanding of treatment plan, all questions answered  Subjective      Patricia Matias is a 80 y o  male who presents for skin lesion recheck  Patient feeling well today  He denies any fevers or chills  He states there was some scant drainage after he removed dressing recently      The following portions of the patient's history were reviewed and updated as appropriate: allergies, current medications, past family history, past medical history, past social history, past surgical history and problem list      Past Medical History:   Diagnosis Date   • COPD (chronic obstructive pulmonary disease) ([facility] [address] )    • Diabetes mellitus ([facility] [address] )     Type 2   • Essential hypertension    • Heart failure ([facility] [address] )    • Hyperlipidemia    • Left inguinal hernia    • Lung nodule    • Malignant melanoma of skin ([facility] [address] )        No Known Allergies    Past Surgical History:   Procedure Laterality Date   • APPENDECTOMY     • CATARACT EXTRACTION, BILATERAL     • CHOLECYSTECTOMY     • CHOLECYSTECTOMY     • COLONOSCOPY      Fiberoptic   • HERNIA REPAIR     • KNEE ARTHROSCOPY Bilateral     Therapeutic   • [place] CYSTO INSERTION TRANSPROSTATIC IMPLANT SINGLE N/A 3/22/2019    Procedure: CYSTOSCOPY WITH INSERTION UROLIFT;  Surgeon: Emelia Lara MD;  Location: [facility];  Service: Urology       Family History   Problem Relation Age of Onset   • Diabetes Mother    • Heart attack Neg Hx    • Stroke Neg Hx    • Aneurysm Neg Hx    • Clotting disorder Neg Hx    • Arrhythmia Neg Hx    • Hypertension Neg Hx    • Hyperlipidemia Neg Hx         pt unsure        Social History     Socioeconomic History   • Marital status:      Spouse name: None   • Number of children: 2   • Years of education: None   • Highest education level: None   Occupational History   • Occupation: rtired   Tobacco Use   • Smoking status: Former     Packs/day: 1 00     Years: 10 00     Pack years: 10 00     Types: Cigarettes     Start date: 80     Quit date:      Years since quittin 3   • Smokeless tobacco: Never   Vaping Use   • Vaping Use: Never used   Substance and Sexual Activity   • Alcohol use: Not Currently     Alcohol/week: 0 0 standard drinks   • Drug use: No   • Sexual activity: None   Other Topics Concern   • None   Social History Narrative    Caffeine use, active     Social Determinants of Health     Financial Resource Strain: Low Risk    • Difficulty of Paying Living Expenses: Not hard at all   Food Insecurity: Not on file   Transportation Needs: No Transportation Needs   • Lack of Transportation (Medical): No   • Lack of Transportation (Non-Medical):  No   Physical Activity: Not on file   Stress: Not on file   Social Connections: Not on file   Intimate Partner Violence: Not on file   Housing Stability: Not on file       Current Outpatient Medications   Medication Sig Dispense Refill   • acetaminophen (TYLENOL) 325 mg tablet Take 2 tablets (650 mg total) by mouth every 6 (six) hours as needed for mild pain 30 tablet 0   • albuterol (2 5 mg/3 mL) 0 083 % nebulizer solution inhale the contents of one ampule by nebulization every 6 hours as needed for wheezing or shortness of breath 360 mL 3   • Anoro Ellipta 62 5-25 MCG/ACT inhaler INHALE ONE PUFF BY MOUTH ONCE DAILY 60 each 0   • Ascorbic Acid (VITAMIN C) 1000 MG tablet Take 1 tablet by mouth daily     • BD PEN NEEDLE DON U/F 32G X 4 MM MISC       • betamethasone, augmented, (DIPROLENE-AF) 0 05 % cream      • cholecalciferol (VITAMIN D3) 1,000 units tablet Take 1,000 Units by mouth daily     • cholestyramine (QUESTRAN) 4 g packet DISSOLVE ONE PACKET IN 8 OUNCES OF LIQUID AND TAKE ONE BY MOUTH ONCE DAILY 30 each 0   • Cinnamon 500 MG TABS Take 1 tablet by mouth daily       • Daliresp 500 MCG tablet TAKE ONE TABLET BY MOUTH ONCE DAILY 30 tablet 3   • Dapagliflozin Propanediol 5 MG TABS Take 5 mg by mouth every other day Take 1/2 tablet daily ( Farxiga)      • Flovent  MCG/ACT inhaler INHALE ONE PUFF TWICE DAILY - rinse mouth after use 12 g 0   • fluticasone (FLONASE) 50 mcg/act nasal spray USE TWO SPRAYS IN EACH NOSTRIL DAILY 16 g 1   • gabapentin (NEURONTIN) 300 mg capsule TAKE ONE CAPSULE BY MOUTH ONCE DAILY 90 capsule 0   • hydrOXYzine pamoate (VISTARIL) 25 mg capsule TAKE ONE CAPSULE BY MOUTH THREE TIMES DAILY AS NEEDED FOR ITCHING 90 capsule 0   • Insulin Glargine Solostar 100 UNIT/ML SOPN Inject 10 Units under the skin daily     • Levemir FlexTouch 100 units/mL injection pen 10 Units daily at bedtime     • metoprolol succinate (TOPROL-XL) 50 mg 24 hr tablet Take 50 mg by mouth daily     • montelukast (SINGULAIR) 10 mg tablet TAKE ONE TABLET BY MOUTH NIGHTLY AT BEDTIME 90 tablet 0   • Multiple Vitamins-Minerals (OCUVITE ADULT 50+ PO) Take 1 tablet by mouth daily     • NOVOLOG FLEXPEN 100 units/mL injection pen Inject 4 Units under the skin 3 (three) times a day with meals 4u in am, 2u at lunch and 6u at dinner      • ONE TOUCH ULTRA TEST test strip       • ONETOUCH DELICA LANCETS FINE MISC       • saccharomyces boulardii (FLORASTOR) 250 mg capsule Take 1 capsule (250 mg total) by mouth 2 "(two) times a day for 7 days 14 capsule 0   • sacubitril-valsartan (Entresto) 24-26 MG TABS Take 1 tablet by mouth 2 (two) times a day 180 tablet 3   • simvastatin (ZOCOR) 10 mg tablet TAKE ONE TABLET BY MOUTH ONCE DAILY 90 tablet 1   • [START ON 4/26/2023] sulfamethoxazole-trimethoprim (BACTRIM DS) 800-160 mg per tablet Take 1 tablet by mouth 2 (two) times a day for 3 days Do not start before April 26, 2023  6 tablet 0   • Ventolin  (90 Base) MCG/ACT inhaler Inhale 2 puffs every 6 (six) hours as needed for wheezing or shortness of breath 18 g 0     No current facility-administered medications for this visit  Review of Systems     As noted in HPI    Objective      /66 (BP Location: Left arm, Patient Position: Sitting, Cuff Size: Adult)   Pulse 70   Temp 97 8 °F (36 6 °C)   Ht 6' 1\" (1 854 m)   Wt 76 7 kg (169 lb 3 2 oz)   SpO2 94%   BMI 22 32 kg/m²     Physical Exam  Vitals reviewed  Constitutional:       General: He is not in acute distress  Appearance: Normal appearance  He is not ill-appearing or toxic-appearing  HENT:      Head: Normocephalic and atraumatic  Eyes:      Conjunctiva/sclera: Conjunctivae normal    Pulmonary:      Effort: Pulmonary effort is normal    Skin:     General: Skin is warm and dry  Comments: Laceration right lower extremity with surrounding localized erythema, no active drainage, no active bleeding   Neurological:      Mental Status: He is alert and oriented to person, place, and time  Psychiatric:         Mood and Affect: Mood normal          Behavior: Behavior normal          Thought Content: Thought content normal          Judgment: Judgment normal               Media Information  Document Information    Clinical Image - Mobile Device      04/24/2023 11:55 AM   Attached To:    Office Visit on 4/24/23 with Jennifer Rendon, [address], DO  [facility]       Some portions of this record may have been generated with " "voice recognition software  There may be translation, syntax, or grammatical errors  Occasional wrong word or \"sound-a-like\" substitutions may have occurred due to the inherent limitations of the voice recognition software  Read the chart carefully and recognize, using context, where substations may have occurred  If you have any questions, please contact the dictating provider for clarification or correction, as needed    "

## 2023-04-25 ENCOUNTER — CLINICAL SUPPORT (OUTPATIENT)
Dept: FAMILY MEDICINE CLINIC | Facility: MEDICAL CENTER | Age: 88
End: 2023-04-25

## 2023-04-25 ENCOUNTER — TELEPHONE (OUTPATIENT)
Dept: FAMILY MEDICINE CLINIC | Facility: MEDICAL CENTER | Age: 88
End: 2023-04-25

## 2023-04-25 DIAGNOSIS — T14.8XXA OPEN WOUND: Primary | ICD-10-CM

## 2023-04-25 NOTE — TELEPHONE ENCOUNTER
Steri-Strips will fall off on their own in about 2 weeks, please advise patient to leave them on  Patient can schedule a follow-up in 2 weeks for recheck  Patient can keep area clean and change dressings daily

## 2023-04-25 NOTE — TELEPHONE ENCOUNTER
Patient was in today for steri-strip application, went on successfully  Patient wants to know when he should come back in and how frequently he should be changing his bandage/ leave the strips on  Please advise

## 2023-05-06 DIAGNOSIS — I42.0 DILATED CARDIOMYOPATHY (HCC): Primary | ICD-10-CM

## 2023-05-08 RX ORDER — METOPROLOL SUCCINATE 50 MG/1
TABLET, EXTENDED RELEASE ORAL
Qty: 180 TABLET | Refills: 0 | Status: SHIPPED | OUTPATIENT
Start: 2023-05-08

## 2023-05-11 ENCOUNTER — OFFICE VISIT (OUTPATIENT)
Dept: FAMILY MEDICINE CLINIC | Facility: MEDICAL CENTER | Age: 88
End: 2023-05-11

## 2023-05-11 VITALS
HEART RATE: 68 BPM | SYSTOLIC BLOOD PRESSURE: 122 MMHG | WEIGHT: 168 LBS | RESPIRATION RATE: 16 BRPM | BODY MASS INDEX: 22.26 KG/M2 | OXYGEN SATURATION: 97 % | HEIGHT: 73 IN | TEMPERATURE: 98.8 F | DIASTOLIC BLOOD PRESSURE: 60 MMHG

## 2023-05-11 DIAGNOSIS — Z51.89 ENCOUNTER FOR WOUND RE-CHECK: Primary | ICD-10-CM

## 2023-05-11 NOTE — PROGRESS NOTES
[address] Inf - Clinic Note  [place], [place], 23     Miranda Wheatley MRN: [id number] : 3/26/1933 Age: 80 y o  Assessment/Plan     1  Encounter for wound re-check    -Wound with good progress as far as healing  -No indication for oral antibiotic at this time  -Advised about appropriate dressing at this time  -Advised patient with signs and symptoms which case he should contact    Miranda Wheatley acknowledged understanding of treatment plan, all questions answered  Subjective      Miranda Wheatley is a 80 y o  male who presents for wound recheck  Patient feeling well today  Denies any fever, chills, or drainage from site  Patient mentions he did hit right leg again on  without major injury      The following portions of the patient's history were reviewed and updated as appropriate: allergies, current medications, past family history, past medical history, past social history, past surgical history and problem list      Past Medical History:   Diagnosis Date   • COPD (chronic obstructive pulmonary disease) ([facility] [address] )    • Diabetes mellitus ([facility] [address] )     Type 2   • Essential hypertension    • Heart failure ([facility] [address] )    • Hyperlipidemia    • Left inguinal hernia    • Lung nodule    • [facility]        No Known Allergies    Past Surgical History:   Procedure Laterality Date   • APPENDECTOMY     • CATARACT EXTRACTION, BILATERAL     • CHOLECYSTECTOMY     • CHOLECYSTECTOMY     • COLONOSCOPY  2014    Fiberoptic   • HERNIA REPAIR     • KNEE ARTHROSCOPY Bilateral     Therapeutic   • [place] CYSTO INSERTION TRANSPROSTATIC IMPLANT SINGLE N/A 3/22/2019    Procedure: CYSTOSCOPY WITH INSERTION Deette Asper;  Surgeon: Waddell Ahumada, MD;  Location: [facility];  Service: Urology       Family History   Problem Relation Age of Onset   • Diabetes Mother    • Heart attack Neg Hx    • Stroke Neg Hx    • Aneurysm Neg Hx    • Clotting disorder Neg Hx    • Arrhythmia Neg Hx    • Hypertension Neg Hx    • Hyperlipidemia Neg Hx         pt unsure        Social History     Socioeconomic History   • Marital status:      Spouse name: None   • Number of children: 2   • Years of education: None   • Highest education level: None   Occupational History   • Occupation: rtired   Tobacco Use   • Smoking status: Former     Packs/day: 1 00     Years: 10 00     Pack years: 10 00     Types: Cigarettes     Start date: 80     Quit date: 1960     Years since quittin 4   • Smokeless tobacco: Never   Vaping Use   • Vaping Use: Never used   Substance and Sexual Activity   • Alcohol use: Not Currently     Alcohol/week: 0 0 standard drinks   • Drug use: No   • Sexual activity: None   Other Topics Concern   • None   Social History Narrative    Caffeine use, active     Social Determinants of Health     Financial Resource Strain: Low Risk    • Difficulty of Paying Living Expenses: Not hard at all   Food Insecurity: Not on file   Transportation Needs: No Transportation Needs   • Lack of Transportation (Medical): No   • Lack of Transportation (Non-Medical):  No   Physical Activity: Not on file   Stress: Not on file   Social Connections: Not on file   Intimate Partner Violence: Not on file   Housing Stability: Not on file       Current Outpatient Medications   Medication Sig Dispense Refill   • acetaminophen (TYLENOL) 325 mg tablet Take 2 tablets (650 mg total) by mouth every 6 (six) hours as needed for mild pain 30 tablet 0   • albuterol (2 5 mg/3 mL) 0 083 % nebulizer solution inhale the contents of one ampule by nebulization every 6 hours as needed for wheezing or shortness of breath 360 mL 3   • Anoro Ellipta 62 5-25 MCG/ACT inhaler INHALE ONE PUFF BY MOUTH ONCE DAILY 60 each 3   • Ascorbic Acid (VITAMIN C) 1000 MG tablet Take 1 tablet by mouth daily     • BD PEN NEEDLE DON U/F 32G X 4 MM MISC       • betamethasone, augmented, (DIPROLENE-AF) 0 05 % cream      • cholecalciferol (VITAMIN D3) 1,000 units tablet Take 1,000 Units by mouth daily     • cholestyramine (QUESTRAN) 4 g packet DISSOLVE ONE PACKET IN 8 OUNCES OF LIQUID AND TAKE ONE BY MOUTH ONCE DAILY 30 each 0   • Cinnamon 500 MG TABS Take 1 tablet by mouth daily       • Daliresp 500 MCG tablet TAKE ONE TABLET BY MOUTH ONCE DAILY 30 tablet 3   • Dapagliflozin Propanediol 5 MG TABS Take 5 mg by mouth every other day Take 1/2 tablet daily ( Farxiga)      • Flovent  MCG/ACT inhaler INHALE ONE PUFF TWICE DAILY - rinse mouth after use 12 g 0   • fluticasone (FLONASE) 50 mcg/act nasal spray USE TWO SPRAYS IN EACH NOSTRIL DAILY 16 g 1   • gabapentin (NEURONTIN) 300 mg capsule TAKE ONE CAPSULE BY MOUTH ONCE DAILY 90 capsule 0   • hydrOXYzine pamoate (VISTARIL) 25 mg capsule TAKE ONE CAPSULE BY MOUTH THREE TIMES DAILY AS NEEDED FOR ITCHING 90 capsule 0   • Insulin Glargine Solostar 100 UNIT/ML SOPN Inject 10 Units under the skin daily     • Levemir FlexTouch 100 units/mL injection pen 10 Units daily at bedtime     • metoprolol succinate (TOPROL-XL) 50 mg 24 hr tablet TAKE TWO TABLETS BY MOUTH DAILY 180 tablet 0   • montelukast (SINGULAIR) 10 mg tablet TAKE ONE TABLET BY MOUTH NIGHTLY AT BEDTIME 90 tablet 0   • Multiple Vitamins-Minerals (OCUVITE ADULT 50+ PO) Take 1 tablet by mouth daily     • NOVOLOG FLEXPEN 100 units/mL injection pen Inject 4 Units under the skin 3 (three) times a day with meals 4u in am, 2u at lunch and 6u at dinner      • ONE TOUCH ULTRA TEST test strip       • ONETOUCH DELICA LANCETS FINE MISC       • sacubitril-valsartan (Entresto) 24-26 MG TABS Take 1 tablet by mouth 2 (two) times a day 180 tablet 3   • simvastatin (ZOCOR) 10 mg tablet TAKE ONE TABLET BY MOUTH ONCE DAILY 90 tablet 1   • Ventolin  (90 Base) MCG/ACT inhaler Inhale 2 puffs every 6 (six) hours as needed for wheezing or shortness of breath 18 g 0     No current facility-administered medications for this visit         Review of Systems     As noted in HPI    Objective      BP "122/60 (BP Location: Left arm, Patient Position: Sitting, Cuff Size: Adult)   Pulse 68   Temp 98 8 °F (37 1 °C)   Resp 16   Ht 6' 1\" (1 854 m)   Wt 76 2 kg (168 lb)   SpO2 97%   BMI 22 16 kg/m²     Physical Exam  Vitals reviewed  Constitutional:       General: He is not in acute distress  Appearance: Normal appearance  Pulmonary:      Effort: Pulmonary effort is normal    Skin:     General: Skin is warm and dry  Comments: Right lower extremity skin laceration with good healing, extent of erythema around laceration much decreased, no tactile warmth, no drainage    There is a very small abrasion superior aspect of this previous laceration   Neurological:      Mental Status: He is alert and oriented to person, place, and time  Psychiatric:         Mood and Affect: Mood normal          Thought Content: Thought content normal              Some portions of this record may have been generated with voice recognition software  There may be translation, syntax, or grammatical errors  Occasional wrong word or \"sound-a-like\" substitutions may have occurred due to the inherent limitations of the voice recognition software  Read the chart carefully and recognize, using context, where substations may have occurred  If you have any questions, please contact the dictating provider for clarification or correction, as needed    "

## 2023-05-20 LAB
BUN SERPL-MCNC: 40 MG/DL (ref 7–25)
BUN/CREAT SERPL: 26 (CALC) (ref 6–22)
CALCIUM SERPL-MCNC: 9.5 MG/DL (ref 8.6–10.3)
CHLORIDE SERPL-SCNC: 108 MMOL/L (ref 98–110)
CO2 SERPL-SCNC: 24 MMOL/L (ref 20–32)
CREAT SERPL-MCNC: 1.51 MG/DL (ref 0.7–1.22)
GFR/BSA.PRED SERPLBLD CYS-BASED-ARV: 44 ML/MIN/1.73M2
GLUCOSE SERPL-MCNC: 131 MG/DL (ref 65–99)
POTASSIUM SERPL-SCNC: 4.8 MMOL/L (ref 3.5–5.3)
SODIUM SERPL-SCNC: 139 MMOL/L (ref 135–146)

## 2023-05-22 ENCOUNTER — TELEPHONE (OUTPATIENT)
Dept: NEPHROLOGY | Facility: CLINIC | Age: 88
End: 2023-05-22

## 2023-05-22 NOTE — TELEPHONE ENCOUNTER
Pt advised that labs of 5/19/23 show stable kidney function per Dr Drew Valenzuela     ----- Message from Reyna Wilkinson MD sent at 5/20/2023  4:21 PM EDT -----  Please inform patient that most recent labs (5/19/23) show that kidney function is stable

## 2023-06-05 DIAGNOSIS — R09.81 NASAL CONGESTION: ICD-10-CM

## 2023-06-05 DIAGNOSIS — L29.9 PRURITUS: ICD-10-CM

## 2023-06-05 RX ORDER — HYDROXYZINE PAMOATE 25 MG/1
CAPSULE ORAL
Qty: 90 CAPSULE | Refills: 0 | Status: SHIPPED | OUTPATIENT
Start: 2023-06-05

## 2023-06-05 RX ORDER — FLUTICASONE PROPIONATE 50 MCG
SPRAY, SUSPENSION (ML) NASAL
Qty: 16 G | Refills: 0 | Status: SHIPPED | OUTPATIENT
Start: 2023-06-05

## 2023-06-06 ENCOUNTER — TELEPHONE (OUTPATIENT)
Age: 88
End: 2023-06-06

## 2023-06-06 ENCOUNTER — OFFICE VISIT (OUTPATIENT)
Age: 88
End: 2023-06-06
Payer: COMMERCIAL

## 2023-06-06 VITALS
RESPIRATION RATE: 16 BRPM | WEIGHT: 167 LBS | HEART RATE: 68 BPM | HEIGHT: 73 IN | BODY MASS INDEX: 22.13 KG/M2 | SYSTOLIC BLOOD PRESSURE: 112 MMHG | OXYGEN SATURATION: 95 % | TEMPERATURE: 97.6 F | DIASTOLIC BLOOD PRESSURE: 71 MMHG

## 2023-06-06 DIAGNOSIS — J41.0 SIMPLE CHRONIC BRONCHITIS (HCC): Primary | ICD-10-CM

## 2023-06-06 PROCEDURE — 99213 OFFICE O/P EST LOW 20 MIN: CPT | Performed by: PHYSICIAN ASSISTANT

## 2023-06-06 RX ORDER — UMECLIDINIUM BROMIDE AND VILANTEROL TRIFENATATE 62.5; 25 UG/1; UG/1
1 POWDER RESPIRATORY (INHALATION) DAILY
Qty: 180 EACH | Refills: 3 | Status: SHIPPED | OUTPATIENT
Start: 2023-06-06

## 2023-06-06 RX ORDER — ALBUTEROL SULFATE 90 UG/1
2 AEROSOL, METERED RESPIRATORY (INHALATION) EVERY 6 HOURS PRN
Qty: 8.5 G | Refills: 0 | Status: SHIPPED | OUTPATIENT
Start: 2023-06-06 | End: 2023-06-06

## 2023-06-06 RX ORDER — ALBUTEROL SULFATE 90 UG/1
2 AEROSOL, METERED RESPIRATORY (INHALATION) EVERY 6 HOURS PRN
Qty: 54 G | Refills: 3 | Status: SHIPPED | OUTPATIENT
Start: 2023-06-06

## 2023-06-06 NOTE — PROGRESS NOTES
"Assessment/Plan:   Diagnoses and all orders for this visit:    Simple chronic bronchitis (HCC)  -     Ventolin  (90 Base) MCG/ACT inhaler; Inhale 2 puffs every 6 (six) hours as needed for wheezing  -     umeclidinium-vilanterol (Anoro Ellipta) 62 5-25 mcg/actuation inhaler; Inhale 1 puff daily    Other orders  -     Discontinue: albuterol (PROVENTIL HFA,VENTOLIN HFA) 90 mcg/act inhaler; Inhale 2 puffs every 6 (six) hours as needed for wheezing    Patient is here today for follow-up  Overall stable with his breathing although has noted some slight increase in dyspnea on exertion particularly walking uphill to his garden  Notes some days are better than others but has not noted if the weather plays a part  He continues with his Anoro, Flovent, Singulair, Daliresp every other day  Not using his albuterol on a regular basis  I have asked him to try using the albuterol when he has these episodes of shortness of breath  He is finding that the Anoro is expensive, we will have him fill out the paperwork to see if he qualifies for assistance through the pharmaceutical company  He will follow-up with us in 6 months or sooner if necessary  No follow-ups on file  All questions are answered to the patient's satisfaction and understanding  He verbalizes understanding  He is encouraged to call with any further questions or concerns  Portions of the record may have been created with voice recognition software  Occasional wrong word or \"sound a like\" substitutions may have occurred due to the inherent limitations of voice recognition software  Read the chart carefully and recognize, using context, where substitutions have occurred      Electronically Signed by Tania Hernández PA-C    ______________________________________________________________________    Chief Complaint:   Chief Complaint   Patient presents with   • Follow-up       Patient ID: Jeffy Adkins is a 80 y o  y o  male has a past medical history of " COPD (chronic obstructive pulmonary disease) ([facility] [address] ), Diabetes mellitus ([facility] [address] ), Essential hypertension, [facility] ), Hyperlipidemia, Hypertension, Left inguinal hernia, Lung nodule, and Malignant melanoma of skin ([facility] )  6/6/2023  Patient presents today for follow-up visit  Patient is a 81 yo male former smoker with PMH of COPD, HTN, HLD, MDS  He is here today for follow-up  He overall continues to do well with his breathing on the Anoro, Flovent, Singulair, Daliresp every other day  He is not using his rescue inhaler on a regular basis  He does note with exertion sometimes he is winded easier particularly when walking uphill to his garden  Some days are better than others, he has not noted if the weather plays a part in the symptoms  primary symptoms  Associated symptoms include coughing  Pertinent negatives include no chest pain, fever, headaches, myalgias or sore throat  Review of Systems   Constitutional: Negative  Negative for appetite change and fever  HENT: Positive for postnasal drip, rhinorrhea and sneezing  Negative for ear pain, sore throat and trouble swallowing  Respiratory: Positive for cough and shortness of breath  Cardiovascular: Negative  Negative for chest pain  Gastrointestinal: Negative  Genitourinary: Negative  Musculoskeletal: Negative  Negative for myalgias  Skin: Negative  Allergic/Immunologic: Negative  Neurological: Negative  Negative for headaches  Psychiatric/Behavioral: Negative  Smoking history: He reports that he quit smoking about 63 years ago  His smoking use included cigarettes  He started smoking about 74 years ago  He has a 10 00 pack-year smoking history   He has never used smokeless tobacco     The following portions of the patient's history were reviewed and updated as appropriate: allergies, current medications, past family history, past medical history, past social history, past surgical history and problem list     Immunization History   Administered Date(s) Administered   • COVID-19 MODERNA VACC 0 5 ML IM 01/26/2021, 03/02/2021, 10/25/2021, 04/21/2022   • COVID-19 Moderna Vac BIVALENT 12 Yr+ IM (BOOSTER ONLY) 0 5 ML 11/16/2022   • H1N1, All Formulations 01/26/2010   • INFLUENZA 09/28/2018, 10/04/2021, 09/22/2022   • Influenza Split High Dose Preservative Free IM 10/18/2013, 10/05/2015, 09/30/2016, 10/20/2017   • Influenza, high dose seasonal 0 7 mL 10/12/2020   • Influenza, seasonal, injectable 10/13/2011, 10/01/2014   • Pneumococcal Conjugate 13-Valent 11/22/2017   • Pneumococcal Conjugate Vaccine 20-valent (Pcv20), Polysace 09/30/2022   • Pneumococcal Polysaccharide PPV23 01/01/1998, 10/19/2005, 11/30/2020   • Tdap 03/30/2022, 10/06/2022   • Zoster 10/01/2010   • Zoster Vaccine Recombinant 01/03/2020, 10/12/2020     Current Outpatient Medications   Medication Sig Dispense Refill   • acetaminophen (TYLENOL) 325 mg tablet Take 2 tablets (650 mg total) by mouth every 6 (six) hours as needed for mild pain 30 tablet 0   • albuterol (2 5 mg/3 mL) 0 083 % nebulizer solution inhale the contents of one ampule by nebulization every 6 hours as needed for wheezing or shortness of breath 360 mL 3   • Ascorbic Acid (VITAMIN C) 1000 MG tablet Take 1 tablet by mouth daily     • BD PEN NEEDLE DON U/F 32G X 4 MM MISC       • betamethasone, augmented, (DIPROLENE-AF) 0 05 % cream      • cholecalciferol (VITAMIN D3) 1,000 units tablet Take 1,000 Units by mouth daily     • cholestyramine (QUESTRAN) 4 g packet DISSOLVE ONE PACKET IN 8 OUNCES OF LIQUID AND TAKE ONE BY MOUTH ONCE DAILY 30 each 0   • Cinnamon 500 MG TABS Take 1 tablet by mouth daily       • Daliresp 500 MCG tablet TAKE ONE TABLET BY MOUTH ONCE DAILY 30 tablet 3   • Dapagliflozin Propanediol 5 MG TABS Take 5 mg by mouth every other day Take 1/2 tablet daily ( Farxiga)      • Flovent  MCG/ACT inhaler INHALE ONE PUFF TWICE DAILY - rinse mouth after use 12 g 0   • "fluticasone (FLONASE) 50 mcg/act nasal spray USE TWO SPRAYS IN EACH NOSTRIL DAILY 16 g 0   • gabapentin (NEURONTIN) 300 mg capsule TAKE ONE CAPSULE BY MOUTH ONCE DAILY 90 capsule 0   • hydrOXYzine pamoate (VISTARIL) 25 mg capsule TAKE ONE CAPSULE BY MOUTH THREE TIMES DAILY AS NEEDED for itching 90 capsule 0   • Insulin Glargine Solostar 100 UNIT/ML SOPN Inject 10 Units under the skin daily     • Levemir FlexTouch 100 units/mL injection pen 10 Units daily at bedtime     • metoprolol succinate (TOPROL-XL) 50 mg 24 hr tablet TAKE TWO TABLETS BY MOUTH DAILY 180 tablet 0   • montelukast (SINGULAIR) 10 mg tablet TAKE ONE TABLET BY MOUTH NIGHTLY AT BEDTIME 90 tablet 0   • Multiple Vitamins-Minerals (OCUVITE ADULT 50+ PO) Take 1 tablet by mouth daily     • NOVOLOG FLEXPEN 100 units/mL injection pen Inject 4 Units under the skin 3 (three) times a day with meals 4u in am, 2u at lunch and 6u at dinner      • ONE TOUCH ULTRA TEST test strip       • ONETOUCH DELICA LANCETS FINE MISC       • sacubitril-valsartan (Entresto) 24-26 MG TABS Take 1 tablet by mouth 2 (two) times a day 180 tablet 3   • simvastatin (ZOCOR) 10 mg tablet TAKE ONE TABLET BY MOUTH ONCE DAILY 90 tablet 1   • umeclidinium-vilanterol (Anoro Ellipta) 62 5-25 mcg/actuation inhaler Inhale 1 puff daily 180 each 3   • Ventolin  (90 Base) MCG/ACT inhaler Inhale 2 puffs every 6 (six) hours as needed for wheezing 54 g 3     No current facility-administered medications for this visit  Allergies: Patient has no known allergies  Objective:  Vitals:    06/06/23 1412 06/06/23 1416   BP: 112/71    BP Location: Right arm    Patient Position: Sitting    Cuff Size: Large    Pulse: 68    Resp: 16    Temp: 97 6 °F (36 4 °C)    SpO2: 95% 95%   Weight: 75 8 kg (167 lb)    Height: 6' 1\" (1 854 m)    Oxygen Therapy  SpO2: 95 %  Oxygen Therapy: None (Room air)      Wt Readings from Last 3 Encounters:   06/06/23 75 8 kg (167 lb)   05/11/23 76 2 kg (168 lb)   04/24/23 76 7 " kg (169 lb 3 2 oz)     Body mass index is 22 03 kg/m²  Physical Exam  Vitals reviewed  Constitutional:       Appearance: Normal appearance  HENT:      Head: Normocephalic and atraumatic  Mouth/Throat:      Pharynx: Oropharynx is clear  Eyes:      Conjunctiva/sclera: Conjunctivae normal    Cardiovascular:      Rate and Rhythm: Normal rate and regular rhythm  Pulmonary:      Effort: Pulmonary effort is normal  No respiratory distress  Breath sounds: Normal breath sounds  No decreased breath sounds, wheezing, rhonchi or rales  Abdominal:      General: Abdomen is flat  There is no distension  Musculoskeletal:         General: Normal range of motion  Cervical back: Normal range of motion  Right lower leg: No edema  Left lower leg: No edema  Skin:     General: Skin is warm and dry  Neurological:      Mental Status: He is alert and oriented to person, place, and time  Psychiatric:         Mood and Affect: Mood normal          Behavior: Behavior normal          Lab Review:   Lab Results   Component Value Date    BUN 40 (H) 05/19/2023    CALCIUM 9 5 05/19/2023     05/19/2023    CO2 24 05/19/2023    CREATININE 1 51 (H) 05/19/2023    CREATININE 1 94 10/26/2021    CREATININE 1 19 11/15/2018    CREATININE 1 01 05/08/2017    K 4 8 05/19/2023     05/08/2017     Lab Results   Component Value Date    HCT 32 8 (L) 02/17/2023    HCT 41 7 11/15/2018    HCT 34 9 (L) 05/08/2017    HGB 10 5 (L) 02/17/2023    HGB 13 7 11/15/2018    HGB 11 0 (L) 05/08/2017    MCV 86 3 02/17/2023    MCV 93 11/15/2018    MCV 96 6 05/08/2017     (L) 02/17/2023     (L) 11/15/2018     05/08/2017    WBC 5 2 02/17/2023    WBC 6 38 11/15/2018    WBC 5 5 05/08/2017       Diagnostics:  none pertinent  Office Spirometry Results:     ESS:    No results found    Answers for HPI/ROS submitted by the patient on 6/6/2023  Do you have difficulty breathing?: Yes  Do you have a hoarse voice?: Yes  Chronicity: recurrent  When did you first notice your symptoms?: more than 1 month ago  How often do your symptoms occur?: daily  Since you first noticed this problem, how has it changed?: unchanged  Do you have shortness of breath that occurs with effort or exertion?: Yes  Do you have ear congestion?: No  Do you have heartburn?: No  Do you have fatigue?: Yes  Do you have nasal congestion?: Yes  Do you have shortness of breath when lying flat?: No  Do you have shortness of breath when you wake up?: No  Do you have sweats?: No  Have you experienced weight loss?: No  Which of the following makes your symptoms worse?: any activity, climbing stairs, exposure to smoke, pollen, strenuous activity  Which of the following makes your symptoms better?: rest, steroid inhaler

## 2023-06-06 NOTE — TELEPHONE ENCOUNTER
[facility] Patient assistance program form, Anoro and Ventolin rx faxed at [phone], form scanned in pt's chart

## 2023-06-13 LAB
LEFT EYE DIABETIC RETINOPATHY: NORMAL
RIGHT EYE DIABETIC RETINOPATHY: NORMAL

## 2023-06-17 DIAGNOSIS — R05.9 COUGH: ICD-10-CM

## 2023-06-17 DIAGNOSIS — R19.7 DIARRHEA, UNSPECIFIED TYPE: ICD-10-CM

## 2023-06-17 RX ORDER — CHOLESTYRAMINE 4 G/9G
POWDER, FOR SUSPENSION ORAL
Qty: 30 EACH | Refills: 0 | Status: SHIPPED | OUTPATIENT
Start: 2023-06-17

## 2023-06-18 RX ORDER — MONTELUKAST SODIUM 10 MG/1
TABLET ORAL
Qty: 90 TABLET | Refills: 0 | Status: SHIPPED | OUTPATIENT
Start: 2023-06-18

## 2023-07-07 DIAGNOSIS — J44.1 COPD WITH ACUTE EXACERBATION (HCC): ICD-10-CM

## 2023-07-07 DIAGNOSIS — E78.01 FAMILIAL HYPERCHOLESTEROLEMIA: ICD-10-CM

## 2023-07-07 RX ORDER — SIMVASTATIN 10 MG
TABLET ORAL
Qty: 90 TABLET | Refills: 0 | Status: SHIPPED | OUTPATIENT
Start: 2023-07-07

## 2023-07-07 RX ORDER — ROFLUMILAST 500 UG/1
500 TABLET ORAL DAILY
Qty: 30 TABLET | Refills: 5 | Status: SHIPPED | OUTPATIENT
Start: 2023-07-07

## 2023-07-14 DIAGNOSIS — R09.81 NASAL CONGESTION: ICD-10-CM

## 2023-07-14 RX ORDER — FLUTICASONE PROPIONATE 50 MCG
SPRAY, SUSPENSION (ML) NASAL
Qty: 16 G | Refills: 0 | Status: SHIPPED | OUTPATIENT
Start: 2023-07-14

## 2023-07-16 DIAGNOSIS — G62.9 NEUROPATHY: ICD-10-CM

## 2023-07-17 DIAGNOSIS — R19.7 DIARRHEA, UNSPECIFIED TYPE: ICD-10-CM

## 2023-07-17 RX ORDER — GABAPENTIN 300 MG/1
CAPSULE ORAL
Qty: 90 CAPSULE | Refills: 0 | Status: SHIPPED | OUTPATIENT
Start: 2023-07-17

## 2023-07-17 RX ORDER — CHOLESTYRAMINE 4 G/9G
POWDER, FOR SUSPENSION ORAL
Qty: 30 EACH | Refills: 0 | Status: SHIPPED | OUTPATIENT
Start: 2023-07-17 | End: 2023-08-24

## 2023-07-20 DIAGNOSIS — L29.9 PRURITUS: ICD-10-CM

## 2023-07-21 RX ORDER — HYDROXYZINE PAMOATE 25 MG/1
CAPSULE ORAL
Qty: 90 CAPSULE | Refills: 0 | Status: SHIPPED | OUTPATIENT
Start: 2023-07-21

## 2023-07-25 ENCOUNTER — RA CDI HCC (OUTPATIENT)
Dept: OTHER | Facility: HOSPITAL | Age: 88
End: 2023-07-25

## 2023-07-25 NOTE — PROGRESS NOTES
[address] [facility] coding opportunities          Chart Reviewed number of suggestions sent to Provider: 4     I13.0  J44.9  E11.22, N18.32  (see 5/19/23 labs )    Patients Insurance     Medicare Insurance: [employer] Advantage

## 2023-07-28 ENCOUNTER — OFFICE VISIT (OUTPATIENT)
Dept: FAMILY MEDICINE CLINIC | Facility: MEDICAL CENTER | Age: 88
End: 2023-07-28
Payer: COMMERCIAL

## 2023-07-28 VITALS
SYSTOLIC BLOOD PRESSURE: 122 MMHG | WEIGHT: 168.8 LBS | TEMPERATURE: 97.9 F | DIASTOLIC BLOOD PRESSURE: 78 MMHG | HEIGHT: 73 IN | HEART RATE: 72 BPM | OXYGEN SATURATION: 96 % | BODY MASS INDEX: 22.37 KG/M2

## 2023-07-28 DIAGNOSIS — G89.29 CHRONIC LEFT-SIDED LOW BACK PAIN, UNSPECIFIED WHETHER SCIATICA PRESENT: ICD-10-CM

## 2023-07-28 DIAGNOSIS — M79.609 POPLITEAL PAIN: Primary | ICD-10-CM

## 2023-07-28 DIAGNOSIS — M54.50 CHRONIC LEFT-SIDED LOW BACK PAIN, UNSPECIFIED WHETHER SCIATICA PRESENT: ICD-10-CM

## 2023-07-28 PROCEDURE — 99214 OFFICE O/P EST MOD 30 MIN: CPT | Performed by: STUDENT IN AN ORGANIZED HEALTH CARE EDUCATION/TRAINING PROGRAM

## 2023-07-28 NOTE — PROGRESS NOTES
[address] - Clinic Note  Sigrid [place], [address]     Panfilo Lopez MRN: [id number] : 3/26/1933 Age: 80 y.o. Assessment/Plan     1. Popliteal pain    -We will assess for Baker's cyst  - US extremity soft tissue; Future    2. 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 Right-sided facet degenerative change L4-5 with posteriorly located synovial facet cyst measuring 8mm.       Panfilo Lopez acknowledged understanding of treatment plan, all questions answered. Subjective      Panfilo Lopez is a 80 y.o. male who presents today wishing to discuss his low back pain which has been an issue in the past and left knee pain which is a newer issue. Knee pain is at his left popliteal region. Patient states for years he was attributing his back pain to wearing his wallet on the left side and he used to drive a lot. This became more of an issue and prior PCP did refer to orthopedics. Imaging was obtained and he completed course of physical therapy. Patient states pain at posterior aspect of his knee has been ongoing for the past 3 to 4 months. Describes if he gets up from sitting that is when he experiences the pain the most.  As he ambulates throughout the day pain is not as much bothersome. No calf pain. Patient has been noticing he has been favoring bearing weight on his right side more with his first few steps due to knee discomfort. He denies any hip pain.   Patient states he takes a few Advil prior to golfing which alleviates the knee pain.  Patient denies any urinary or bowel incontinence, saddle anesthesia or weakness. The following portions of the patient's history were reviewed and updated as appropriate: allergies, current medications, past family history, past medical history, past social history, past surgical history and problem list.     Past Medical History:   Diagnosis Date   • Allergic 1989   • COPD (chronic obstructive pulmonary disease) ([address])    • Diabetes mellitus ([address])     Type 2   • Essential hypertension    • Heart failure ([address])    • Hyperlipidemia    • Hypertension    • Left inguinal hernia    • Lung nodule    • Malignant melanoma of skin ([address])        No Known Allergies    Past Surgical History:   Procedure Laterality Date   • APPENDECTOMY     • CATARACT EXTRACTION, BILATERAL     • CHOLECYSTECTOMY     • CHOLECYSTECTOMY     • COLONOSCOPY      Fiberoptic   • HERNIA REPAIR     • KNEE ARTHROSCOPY Bilateral     Therapeutic   • HI CYSTO INSERTION TRANSPROSTATIC IMPLANT SINGLE N/A 3/22/2019    Procedure: CYSTOSCOPY WITH INSERTION Jose Guadalupe Code;  Surgeon: Jackie Finch MD;  Location: [facility];  Service: Urology       Family History   Problem Relation Age of Onset   • Diabetes Mother    • Heart attack Neg Hx    • Stroke Neg Hx    • Aneurysm Neg Hx    • Clotting disorder Neg Hx    • Arrhythmia Neg Hx    • Hypertension Neg Hx    • Hyperlipidemia Neg Hx         pt unsure        Social History     Socioeconomic History   • Marital status:       Spouse name: None   • Number of children: 2   • Years of education: None   • Highest education level: None   Occupational History   • Occupation: rtired   Tobacco Use   • Smoking status: Former     Packs/day: 1.00     Years: 10.00     Total pack years: 10.00     Types: Cigarettes     Start date: 1949     Quit date: 1960     Years since quittin.6   • Smokeless tobacco: Never   Vaping Use   • Vaping Use: Never used   Substance and Sexual Activity   • Alcohol use: Not Currently     Alcohol/week: 52.0 standard drinks of alcohol     Types: 50 Glasses of wine, 2 Standard drinks or equivalent per week   • Drug use: No   • Sexual activity: Not Currently     Partners: Female     Birth control/protection: Sponge   Other Topics Concern   • None   Social History Narrative    Caffeine use, active     Social Determinants of Health     Financial Resource Strain: Low Risk  (9/29/2022)    Overall Financial Resource Strain (CARDIA)    • Difficulty of Paying Living Expenses: Not hard at all   Food Insecurity: Not on file   Transportation Needs: No Transportation Needs (9/29/2022)    PRAPARE - Transportation    • Lack of Transportation (Medical): No    • Lack of Transportation (Non-Medical): No   Physical Activity: Not on file   Stress: Not on file   Social Connections: Not on file   Intimate Partner Violence: Not on file   Housing Stability: Not on file       Current Outpatient Medications   Medication Sig Dispense Refill   • acetaminophen (TYLENOL) 325 mg tablet Take 2 tablets (650 mg total) by mouth every 6 (six) hours as needed for mild pain 30 tablet 0   • albuterol (2.5 mg/3 mL) 0.083 % nebulizer solution inhale the contents of one ampule by nebulization every 6 hours as needed for wheezing or shortness of breath 360 mL 3   • Ascorbic Acid (VITAMIN C) 1000 MG tablet Take 1 tablet by mouth daily     • BD PEN NEEDLE DON U/F 32G X 4 MM MISC       • betamethasone, augmented, (DIPROLENE-AF) 0.05 % cream      • cholecalciferol (VITAMIN D3) 1,000 units tablet Take 1,000 Units by mouth daily     • cholestyramine (QUESTRAN) 4 g packet DISSOLVE ONE PACKET IN 8 OUNCES OF LIQUID AND TAKE ONE BY MOUTH ONE DAILY.  30 each 0   • Cinnamon 500 MG TABS Take 1 tablet by mouth daily       • Dapagliflozin Propanediol 5 MG TABS Take 5 mg by mouth every other day Take 1/2 tablet daily ( Farxiga)      • Flovent  MCG/ACT inhaler INHALE ONE PUFF TWICE DAILY - rinse mouth after use 12 g 0   • fluticasone (FLONASE) 50 mcg/act nasal spray USE TWO SPRAYS IN EACH NOSTRIL DAILY 16 g 0   • gabapentin (NEURONTIN) 300 mg capsule TAKE ONE CAPSULE BY MOUTH ONCE DAILY 90 capsule 0   • hydrOXYzine pamoate (VISTARIL) 25 mg capsule TAKE ONE CAPSULE BY MOUTH THREE TIMES DAILY AS NEEDED FOR ITCHING 90 capsule 0   • Insulin Glargine Solostar 100 UNIT/ML SOPN Inject 10 Units under the skin daily     • Levemir FlexTouch 100 units/mL injection pen 10 Units daily at bedtime     • metoprolol succinate (TOPROL-XL) 50 mg 24 hr tablet TAKE TWO TABLETS BY MOUTH DAILY 180 tablet 0   • montelukast (SINGULAIR) 10 mg tablet TAKE ONE TABLET BY MOUTH NIGHTLY at bedtime 90 tablet 0   • Multiple Vitamins-Minerals (OCUVITE ADULT 50+ PO) Take 1 tablet by mouth daily     • NOVOLOG FLEXPEN 100 units/mL injection pen Inject 4 Units under the skin 3 (three) times a day with meals 4u in am, 2u at lunch and 6u at dinner      • ONE TOUCH ULTRA TEST test strip       • ONETOUCH DELICA LANCETS FINE MISC       • roflumilast (DALIRESP) 500 mcg tablet Take 1 tablet (500 mcg total) by mouth daily 30 tablet 5   • sacubitril-valsartan (Entresto) 24-26 MG TABS Take 1 tablet by mouth 2 (two) times a day 180 tablet 3   • simvastatin (ZOCOR) 10 mg tablet TAKE ONE TABLET BY MOUTH ONCE DAILY 90 tablet 0   • umeclidinium-vilanterol (Anoro Ellipta) 62.5-25 mcg/actuation inhaler Inhale 1 puff daily 180 each 3   • Ventolin  (90 Base) MCG/ACT inhaler Inhale 2 puffs every 6 (six) hours as needed for wheezing 54 g 3     No current facility-administered medications for this visit. Review of Systems     As noted in HPI    Objective      /78 (BP Location: Left arm, Patient Position: Sitting, Cuff Size: Adult)   Pulse 72   Temp 97.9 °F (36.6 °C)   Ht 6' 1" (1.854 m)   Wt 76.6 kg (168 lb 12.8 oz)   SpO2 96%   BMI 22.27 kg/m²     Physical Exam  Vitals reviewed. Constitutional:       General: He is not in acute distress. Appearance: Normal appearance. HENT:      Head: Normocephalic and atraumatic. Eyes:      Conjunctiva/sclera: Conjunctivae normal.   Cardiovascular:      Rate and Rhythm: Normal rate and regular rhythm. Heart sounds: Murmur heard. Pulmonary:      Effort: Pulmonary effort is normal.      Breath sounds: Normal breath sounds. Musculoskeletal:      Lumbar back: No swelling, edema, deformity, signs of trauma, lacerations, spasms, tenderness or bony tenderness. Normal range of motion. Negative right straight leg raise test and negative left straight leg raise test.        Back:       Right knee: No swelling, deformity, effusion, erythema, ecchymosis, lacerations or bony tenderness. Normal range of motion. No tenderness. No LCL laxity, MCL laxity, ACL laxity or PCL laxity. Normal alignment and normal patellar mobility. Normal pulse. Left knee: No swelling, deformity, effusion, erythema, ecchymosis, lacerations or bony tenderness. Normal range of motion. Tenderness (popliteal region ) present. No LCL laxity, MCL laxity, ACL laxity or PCL laxity. Normal alignment and normal patellar mobility. Normal pulse. Instability Tests: Anterior drawer test negative. Posterior drawer test negative. Anterior Lachman test negative. Medial Bj test negative and lateral Bj test negative. Comments: Tight lumbar musculature bilateral    Negative Homans' sign left lower extremity    5 out of 5 muscles or bilateral lower extremities, sensation to light touch intact bilateral lower extremities   Skin:     General: Skin is warm and dry. Neurological:      Mental Status: He is alert and oriented to person, place, and time. Psychiatric:         Mood and Affect: Mood normal.         Behavior: Behavior normal.         Thought Content: Thought content normal.         Judgment: Judgment normal.             Some portions of this record may have been generated with voice recognition software.  There may be translation, syntax, or grammatical errors. Occasional wrong word or "sound-a-like" substitutions may have occurred due to the inherent limitations of the voice recognition software. Read the chart carefully and recognize, using context, where substations may have occurred. If you have any questions, please contact the dictating provider for clarification or correction, as needed.

## 2023-08-09 DIAGNOSIS — J44.1 CHRONIC OBSTRUCTIVE PULMONARY DISEASE WITH ACUTE EXACERBATION (HCC): ICD-10-CM

## 2023-08-09 RX ORDER — FLUTICASONE PROPIONATE 220 UG/1
AEROSOL, METERED RESPIRATORY (INHALATION)
Qty: 12 G | Refills: 0 | Status: SHIPPED | OUTPATIENT
Start: 2023-08-09

## 2023-08-10 ENCOUNTER — HOSPITAL ENCOUNTER (OUTPATIENT)
Dept: RADIOLOGY | Facility: MEDICAL CENTER | Age: 88
Discharge: HOME | End: 2023-08-10
Payer: COMMERCIAL

## 2023-08-10 DIAGNOSIS — M79.609 POPLITEAL PAIN: ICD-10-CM

## 2023-08-10 PROCEDURE — 76882 US LMTD JT/FCL EVL NVASC XTR: CPT

## 2023-08-11 ENCOUNTER — TELEPHONE (OUTPATIENT)
Dept: FAMILY MEDICINE CLINIC | Facility: MEDICAL CENTER | Age: 88
End: 2023-08-11

## 2023-08-11 ENCOUNTER — CONSULT (OUTPATIENT)
Dept: PAIN MEDICINE | Facility: CLINIC | Age: 88
End: 2023-08-11
Payer: COMMERCIAL

## 2023-08-11 VITALS
BODY MASS INDEX: 21.87 KG/M2 | WEIGHT: 165 LBS | SYSTOLIC BLOOD PRESSURE: 126 MMHG | DIASTOLIC BLOOD PRESSURE: 70 MMHG | HEART RATE: 62 BPM | HEIGHT: 73 IN

## 2023-08-11 DIAGNOSIS — M54.16 LUMBAR RADICULOPATHY: Primary | ICD-10-CM

## 2023-08-11 DIAGNOSIS — M54.42 CHRONIC LEFT-SIDED LOW BACK PAIN WITH LEFT-SIDED SCIATICA: ICD-10-CM

## 2023-08-11 DIAGNOSIS — G89.29 CHRONIC LEFT-SIDED LOW BACK PAIN WITH LEFT-SIDED SCIATICA: ICD-10-CM

## 2023-08-11 DIAGNOSIS — M71.22 BAKER CYST, LEFT: Primary | ICD-10-CM

## 2023-08-11 PROCEDURE — 99204 OFFICE O/P NEW MOD 45 MIN: CPT | Performed by: STUDENT IN AN ORGANIZED HEALTH CARE EDUCATION/TRAINING PROGRAM

## 2023-08-11 NOTE — PATIENT INSTRUCTIONS
Epidural Steroid Injection   AMBULATORY CARE:   What you need to know about an epidural steroid injection (JUAN LUIS):  An JUAN LUIS is a procedure to inject steroid medicine into the epidural space. The epidural space is between your spinal cord and vertebrae. Steroids reduce inflammation and fluid buildup in your spine that may be causing pain. You may be given pain medicine along with the steroids. How to prepare for an JUAN LUIS:  Your provider will talk to you about how to prepare for your procedure. He or she will tell you what medicines to take or not take on the day of your procedure. You may need to stop taking blood thinners or other medicines several days before your procedure. You may need to adjust any diabetes medicine you take on the day of your procedure. Steroid medicine can increase your blood sugar level. Arrange for someone to drive you home when you are discharged. What will happen during an JUAN LUIS:   You will be given medicine to numb the procedure area. You will be awake for the procedure, but you will not feel pain. You may also be given medicine to help you relax. Contrast liquid will be used to help your provider see the area better. Tell the provider if you have ever had an allergic reaction to contrast liquid. Your provider may place the needle into your neck area, middle of your back, or tailbone area. He or she may inject the medicine next to the nerves that are causing your pain. He or she may instead inject the medicine into a larger area of the epidural space. This helps the medicine spread to more nerves. Your provider will use a fluoroscope to help guide the needle to the right place. A fluoroscope is a type of x-ray. After the procedure, a bandage will be placed over the injection site to prevent infection. What will happen after an JUAN LUIS:  You will have a bandage over the injection site to prevent infection. Your provider will tell you when you can bathe and any activity guidelines.  You will be able to go home. Risks of an JUAN LUIS:  You may have temporary or permanent nerve damage or paralysis. You may have bleeding or develop a serious infection, such as meningitis (swelling of the brain coverings). An abscess may also develop. An abscess is a pus-filled area under the skin. You may need surgery to fix the abscess. You may have a seizure, anxiety, or trouble sleeping. If you are a man, you may have temporary erectile dysfunction (not able to have an erection). Call your local emergency number (911 in the [address]) if:   You have a seizure. You have trouble moving your legs. Seek care immediately if:   Blood soaks through your bandage. You have a fever or chills, severe back pain, and the procedure area is sensitive to the touch. You cannot control when you urinate or have a bowel movement. Call your doctor if:   You have weakness or numbness in your legs. Your wound is red, swollen, or draining pus. You have nausea or are vomiting. Your face or neck is red and you feel warm. You have more pain than you had before the procedure. You have swelling in your hands or feet. You have questions or concerns about your condition or care. Care for your wound as directed: You may remove the bandage before you go to bed the day of your procedure. You may take a shower, but do not take a bath for at least 24 hours. Self-care:   Do not drive,  use machines, or do strenuous activity for 24 hours after your procedure or as directed. Continue other treatments  as directed. Steroid injections alone will not control your pain. The injections are meant to be used with other treatments, such as physical therapy. Follow up with your doctor as directed:  Write down your questions so you remember to ask them during your visits. © Copyright Lola Hammer 2022 Information is for End User's use only and may not be sold, redistributed or otherwise used for commercial purposes.   The above information is an  only. It is not intended as medical advice for individual conditions or treatments. Talk to your doctor, nurse or pharmacist before following any medical regimen to see if it is safe and effective for you.

## 2023-08-11 NOTE — PROGRESS NOTES
Assessment:  1. Chronic left-sided low back pain, unspecified whether sciatica present        Plan:  No orders of the defined types were placed in this encounter. No orders of the defined types were placed in this encounter. My impressions and treatment recommendations were discussed in detail with the patient, who verbalized understanding and had no further questions. This is a 66-year-old male presents to our office chief complaint of low back pain, left greater than right with radiation left lower extremity. He has mild left leg extension weakness and left foot dorsiflexion weakness. His left-sided sciatica symptoms have been a problem for several years now. Symptoms are likely secondary to lumbar radiculopathy and based on his physical exam, likely due to pathology at the L4-5 level. Previous MRI showed multifactorial canal stenosis at this region. We discussed left L4 and L5 transforaminal epidural steroid fluoroscopic guidance. He would like to review the literature and let us know if he would like to proceed with this. Also recommended physical therapy which she declined due to inefficacy in the past.    He has another complaint of posterior left knee pain which is likely secondary to Bakers cyst which was recently diagnosed. His PCP has given him referral to sports medicine. This was printed for him today. [place] Prescription Drug Monitoring Program report was reviewed and was appropriate     Complete risks and benefits including bleeding, infection, tissue reaction, nerve injury and allergic reaction were discussed. The approach was demonstrated using models and literature was provided. Verbal and written consent was obtained. Discharge instructions were provided. I personally saw and examined the patient and I agree with the above discussed plan of care.     History of Present Illness:    Tatum Bean is a 80 y.o. male who presents to [facility] for initial evaluation of the above stated pain complaints. The patient has a past medical and chronic pain history as outlined in the assessment section. He was referred by DO Nicholas Damno [address] [place] [address] . Patient is here with chief complaint of low back pain with radiation down the left lower extremity. This is a chronic issue for last 4 to 5 years. Moderate to severe intensity over the past 1.  5 out of 10. Intermittent. No typical pattern. Described as shooting in nature. He reports ports primarily left-sided low back pain and radiation down the left lower extremity. Also localizes pain to the posterior left knee. Recently had ultrasound done which shows Baker's cyst in the left popliteal region. Has referral out to sports medicine for this. However, he again endorses pain in the back and down the left leg. This is a chronic issue that has been bothering him for several years. He does remain active and likes to golf. He notes that he takes Advil prior to golfing and this helps his back pain. Unclear dermatomal distribution in the left leg. After period of prolonged walking, he does note some weakness in the legs. However denies cramping in the buttocks, bowel bladder incontinence or saddle anesthesia. It is increased standing, bending. Decreased with walking. No change with lying down, sitting, relaxation, coughing, sneezing. Past medical she includes asthma, diabetes, hypertension, heart failure. No tobacco or marijuana use. Previous MRI from 2019. There is not much in the way of left-sided nerve impingement. Review of Systems:    Review of Systems   Musculoskeletal: Positive for back pain.            Past Medical History:   Diagnosis Date   • Allergic april 1989   • COPD (chronic obstructive pulmonary disease) ([address])    • Diabetes mellitus ([address])     Type 2   • Essential hypertension    • Heart failure ([address])    • Hyperlipidemia    • Hypertension    • Left inguinal hernia    • Lung nodule    • Malignant melanoma of skin ([address])        Past Surgical History:   Procedure Laterality Date   • APPENDECTOMY     • CATARACT EXTRACTION, BILATERAL     • CHOLECYSTECTOMY     • CHOLECYSTECTOMY     • COLONOSCOPY      Fiberoptic   • HERNIA REPAIR     • KNEE ARTHROSCOPY Bilateral     Therapeutic   • CT CYSTO INSERTION TRANSPROSTATIC IMPLANT SINGLE N/A 3/22/2019    Procedure: CYSTOSCOPY WITH INSERTION UROLIFT;  Surgeon: Emperatriz Mckeon MD;  Location: [facility];  Service: Urology       Family History   Problem Relation Age of Onset   • Diabetes Mother    • Heart attack Neg Hx    • Stroke Neg Hx    • Aneurysm Neg Hx    • Clotting disorder Neg Hx    • Arrhythmia Neg Hx    • Hypertension Neg Hx    • Hyperlipidemia Neg Hx         pt unsure        Social History     Occupational History   • Occupation: rtired   Tobacco Use   • Smoking status: Former     Packs/day: 1.00     Years: 10.00     Total pack years: 10.00     Types: Cigarettes     Start date: 1949     Quit date: 1960     Years since quittin.6   • Smokeless tobacco: Never   Vaping Use   • Vaping Use: Never used   Substance and Sexual Activity   • Alcohol use: Not Currently     Alcohol/week: 52.0 standard drinks of alcohol     Types: 50 Glasses of wine, 2 Standard drinks or equivalent per week   • Drug use: No   • Sexual activity: Not Currently     Partners: Female     Birth control/protection: Sponge         Current Outpatient Medications:   •  acetaminophen (TYLENOL) 325 mg tablet, Take 2 tablets (650 mg total) by mouth every 6 (six) hours as needed for mild pain, Disp: 30 tablet, Rfl: 0  •  albuterol (2.5 mg/3 mL) 0.083 % nebulizer solution, inhale the contents of one ampule by nebulization every 6 hours as needed for wheezing or shortness of breath, Disp: 360 mL, Rfl: 3  •  Ascorbic Acid (VITAMIN C) 1000 MG tablet, Take 1 tablet by mouth daily, Disp: , Rfl:   •  BD PEN NEEDLE DON U/F 32G X 4 MM MISC,  , Disp: , Rfl:   •  betamethasone, augmented, (DIPROLENE-AF) 0.05 % cream, , Disp: , Rfl:   •  cholecalciferol (VITAMIN D3) 1,000 units tablet, Take 1,000 Units by mouth daily, Disp: , Rfl:   •  cholestyramine (QUESTRAN) 4 g packet, DISSOLVE ONE PACKET IN 8 OUNCES OF LIQUID AND TAKE ONE BY MOUTH ONE DAILY. , Disp: 30 each, Rfl: 0  •  Cinnamon 500 MG TABS, Take 1 tablet by mouth daily  , Disp: , Rfl:   •  Dapagliflozin Propanediol 5 MG TABS, Take 5 mg by mouth every other day Take 1/2 tablet daily ( Farxiga) , Disp: , Rfl:   •  Flovent  MCG/ACT inhaler, INHALE ONE PUFF TWICE DAILY - rinse mouth after use, Disp: 12 g, Rfl: 0  •  fluticasone (FLONASE) 50 mcg/act nasal spray, USE TWO SPRAYS IN EACH NOSTRIL DAILY, Disp: 16 g, Rfl: 0  •  gabapentin (NEURONTIN) 300 mg capsule, TAKE ONE CAPSULE BY MOUTH ONCE DAILY, Disp: 90 capsule, Rfl: 0  •  hydrOXYzine pamoate (VISTARIL) 25 mg capsule, TAKE ONE CAPSULE BY MOUTH THREE TIMES DAILY AS NEEDED FOR ITCHING, Disp: 90 capsule, Rfl: 0  •  Insulin Glargine Solostar 100 UNIT/ML SOPN, Inject 10 Units under the skin daily, Disp: , Rfl:   •  Levemir FlexTouch 100 units/mL injection pen, 10 Units daily at bedtime, Disp: , Rfl:   •  metoprolol succinate (TOPROL-XL) 50 mg 24 hr tablet, TAKE TWO TABLETS BY MOUTH DAILY, Disp: 180 tablet, Rfl: 0  •  montelukast (SINGULAIR) 10 mg tablet, TAKE ONE TABLET BY MOUTH NIGHTLY at bedtime, Disp: 90 tablet, Rfl: 0  •  Multiple Vitamins-Minerals (OCUVITE ADULT 50+ PO), Take 1 tablet by mouth daily, Disp: , Rfl:   •  NOVOLOG FLEXPEN 100 units/mL injection pen, Inject 4 Units under the skin 3 (three) times a day with meals 4u in am, 2u at lunch and 6u at dinner , Disp: , Rfl:   •  ONE TOUCH ULTRA TEST test strip,  , Disp: , Rfl:   •  ONETOUCH DELICA LANCETS FINE MISC,  , Disp: , Rfl:   •  roflumilast (DALIRESP) 500 mcg tablet, Take 1 tablet (500 mcg total) by mouth daily, Disp: 30 tablet, Rfl: 5  • sacubitril-valsartan (Entresto) 24-26 MG TABS, Take 1 tablet by mouth 2 (two) times a day, Disp: 180 tablet, Rfl: 3  •  simvastatin (ZOCOR) 10 mg tablet, TAKE ONE TABLET BY MOUTH ONCE DAILY, Disp: 90 tablet, Rfl: 0  •  umeclidinium-vilanterol (Anoro Ellipta) 62.5-25 mcg/actuation inhaler, Inhale 1 puff daily, Disp: 180 each, Rfl: 3  •  Ventolin  (90 Base) MCG/ACT inhaler, Inhale 2 puffs every 6 (six) hours as needed for wheezing, Disp: 54 g, Rfl: 3    No Known Allergies    Physical Exam:    /70   Pulse 62   Ht 6' 1" (1.854 m)   Wt 74.8 kg (165 lb)   BMI 21.77 kg/m²     Constitutional: normal, well developed, well nourished, alert, in no distress and non-toxic and no overt pain behavior. Eyes: anicteric  HEENT: grossly intact  Neck: supple, symmetric, trachea midline and no masses   Pulmonary:even and unlabored  Cardiovascular:No edema or pitting edema present  Skin:Normal without rashes or lesions and well hydrated  Psychiatric:Mood and affect appropriate  Neurologic:Cranial Nerves II-XII grossly intact  Musculoskeletal:normal     Lumbar Spine Exam    Appearance:  Normal lordosis  Palpation/Tenderness:  left lumbar paraspinal tenderness  Sensory:  no sensory deficits noted  Range of Motion:  Flexion: Moderately limited  without pain  Extension:  Moderately limited  without pain  Motor Strength:  Left hip flexion:  5/5  Left hip extension:  5/5  Right hip flexion:  5/5  Right hip extension:  5/5  Left knee flexion:  5/5  Left knee extension:  4/5  Right knee flexion:  5/5  Right knee extension:  5/5  Left foot dorsiflexion:  4/5  Left foot plantar flexion:  5/5  Right foot dorsiflexion:  5/5  Right foot plantar flexion:  5/5  Reflexes:  Left Patellar:  2+   Right Patellar:  2+   Left Achilles:  2+   Right Achilles:  2+     Imaging  No orders to display   MRI LUMBAR SPINE WITHOUT CONTRAST  1/25/19     INDICATION: M54.17: Radiculopathy, lumbosacral region.    Chronic low back pain worsening over time.     COMPARISON:  Plain films November 7, 2018     TECHNIQUE:  Sagittal T1, sagittal T2, sagittal inversion recovery, axial T1 and axial T2, coronal T2.       IMAGE QUALITY:  Diagnostic     FINDINGS:     VERTEBRAL BODIES:  Normal alignment of the lumbar spine. No spondylolysis or spondylolisthesis. No scoliosis. No compression fracture. Normal marrow signal is identified within the visualized bony structures. No discrete marrow lesion.     SACRUM:  Normal signal within the sacrum. No evidence of insufficiency or stress fracture.     DISTAL CORD AND CONUS:  Normal size and signal within the distal cord and conus.       PARASPINAL SOFT TISSUES:  Paraspinal soft tissues are unremarkable.     LOWER THORACIC DISC SPACES:  Normal disc height and signal.  No disc herniation, canal stenosis or foraminal narrowing.     LUMBAR DISC SPACES:     L1-L2:  Normal.     L2-L3:  No significant central or foraminal narrowing.     L3-L4:  Mild annular bulge with small marginal osteophytes with facet hypertrophy. No significant central or foraminal narrowing.     L4-L5:  Moderate facet hypertrophy identified. There is diffuse annular bulging with marginal osteophytes. Moderate right foraminal narrowing identified. There is mild central and mild bilateral lateral recess stenosis with mild left foraminal   narrowing. Facet degenerative change on the right with synovial facet cyst projecting posterior to the L4-5 facet history 8mm.     L5-S1:  Mild facet hypertrophy. Mild annular bulge with small central protrusion type disc herniation is mild bilateral foraminal narrowing.     IMPRESSION:     Spondylotic degenerative changes are noted which result in moderate right foraminal narrowing at L4-5 with mild central and foraminal narrowing at L4-5 and L5-S1 as described above. Right-sided facet degenerative change L4-5 with posteriorly located synovial facet cyst measuring 8mm.     No orders of the defined types were placed in this encounter. LUMBAR SPINE 11/7/18     INDICATION:   M54.32: Sciatica, left side.     COMPARISON:  None     VIEWS:  XR SPINE LUMBAR MINIMUM 4 VIEWS NON INJURY        FINDINGS:     There is no evidence of acute fracture or destructive osseous lesion.     Alignment is unremarkable.     No significant lumbar degenerative change noted.     The pedicles appear intact.     There are severe atherosclerotic calcification of the abdominal aorta. There are right upper quadrant surgical clips from prior cholecystectomy.     IMPRESSION:     No significant lumbar spine pathology seen.   Prominent aortic atherosclerotic calcification

## 2023-08-11 NOTE — TELEPHONE ENCOUNTER
----- Message from Kenney Medina DO sent at 8/11/2023 10:27 AM EDT -----  Please inform patient that ultrasound did reveal Baker's cyst (fluid-filled pocket) behind left knee. Recommend consultation with sports medicine, referral placed.

## 2023-08-15 ENCOUNTER — OFFICE VISIT (OUTPATIENT)
Dept: CARDIOLOGY CLINIC | Facility: MEDICAL CENTER | Age: 88
End: 2023-08-15
Payer: COMMERCIAL

## 2023-08-15 VITALS
OXYGEN SATURATION: 93 % | DIASTOLIC BLOOD PRESSURE: 70 MMHG | HEART RATE: 75 BPM | HEIGHT: 73 IN | BODY MASS INDEX: 22 KG/M2 | WEIGHT: 166 LBS | SYSTOLIC BLOOD PRESSURE: 116 MMHG

## 2023-08-15 DIAGNOSIS — N18.32 STAGE 3B CHRONIC KIDNEY DISEASE (HCC): ICD-10-CM

## 2023-08-15 DIAGNOSIS — E78.49 OTHER HYPERLIPIDEMIA: ICD-10-CM

## 2023-08-15 DIAGNOSIS — N18.30 BENIGN HYPERTENSION WITH CHRONIC KIDNEY DISEASE, STAGE III (HCC): Primary | ICD-10-CM

## 2023-08-15 DIAGNOSIS — I12.9 BENIGN HYPERTENSION WITH CHRONIC KIDNEY DISEASE, STAGE III (HCC): Primary | ICD-10-CM

## 2023-08-15 DIAGNOSIS — I42.0 DILATED CARDIOMYOPATHY (HCC): ICD-10-CM

## 2023-08-15 DIAGNOSIS — I50.42 CHRONIC COMBINED SYSTOLIC AND DIASTOLIC CONGESTIVE HEART FAILURE (HCC): ICD-10-CM

## 2023-08-15 PROCEDURE — 93000 ELECTROCARDIOGRAM COMPLETE: CPT | Performed by: INTERNAL MEDICINE

## 2023-08-15 PROCEDURE — 99214 OFFICE O/P EST MOD 30 MIN: CPT | Performed by: INTERNAL MEDICINE

## 2023-08-15 NOTE — PROGRESS NOTES
Cardiology Follow Up    Skip Monaco  3/26/1933  [id number]  [facility]  [address]  [place] [address] [facility] [address] [phone]    No diagnosis found. There are no diagnoses linked to this encounter. I had the pleasure of seeing Skip Monaco for a follow up visit. Interval History: None    History of the presenting illness, Discussion/Summary and My Plan are as follows:::    51-year-old without any prior cardiac history-including CAD, MI, bypass, stents or valvular heart disease or family history of cardiomyopathy or coronary artery disease but with a left bundle-branch block since 2013, also with a nonischemic cardiomyopathy (negative coronary angiography-May 2018)-diagnosed in May 2018 in the setting of acute congestive heart failure-probably viral-or originally with an ejection fraction of 35-40%, subsequently on ACE-inhibitor and beta-blocker, decreased to 20%, admitted with another episode of acute CHF-November 2018, initiated on Entresto, after which ejection fraction normalized to 55%-February 2019. He is currently on an excellent regimen and has stayed out of the hospital for almost 5 years.     He denies any orthopnea or dyspnea with exertion or edema. He is playing golf in the summer, does yd work, lives in a 4 story house and walks, plays golf. No symptoms. His weight at home has been around 161-163, he had 166, not on a diuretic     Exam demonstrates NO e/o CHF. ECGs unchanged with sinus rhythm, first-degree AV block and left bundle     Plan:     Nonischemic Cardiomyopathy and chronic systolic congestive heart failure with 1 recurrence, now normalized:  Nonischemic (negative coronary angiography-May 2018), no alcohol use or chemotherapy. No family history. Possibly left bundle related or viral cardiomyopathy-was  treated for symptoms of bronchitis for few months prior to May 2018.    Now on an excellent medical regimen including a beta-blocker, Entresto (was on spironolactone-stopped for hyperkalemia)  Ejection fraction decreased from 37%-May 2018 to 20-25%-September 2018-while on a good dose of ACE-inhibitor and beta-blocker, admitted with acute CHF-November 2018, then initiated on Entresto-ejection fraction improved to normal-55%-February 2019. No e/o Vol Ol on Exam. Also has COPD  Not on any diuretics    Dry weight at home-161-163 lb and stable, 166 here,     Hypertension:  Controlled     CKD: Baseline creat around 1.4, follows with Dr. Jacqueline Chris (Nephrology). 1.5 in May 2023    Chronic left bundle, negative coronary angiography in May 201  EF had improved to 55% in 2019    Dyslipidemia:  On low-dose statin. He is also diabetic.     Diabetes: Followed by an endocrinologist at [address]  Now on Farxiga, A1C of 7.7 in May 2022, '6.9 per pt last week'     Follow-up in 9 months     Latest Reference Range & Units 07/23/13 08:15 05/12/22 00:00   Hemoglobin A1C <5.7 % of total Hgb 7.6 (H) 7.7 (E)   (H): Data is abnormally high  (E): External lab result     Latest Reference Range & Units 12/07/20 10:51 04/20/23 09:17   Cholesterol <200 mg/dL 112 101   Triglycerides <150 mg/dL 52 34   HDL > OR = 40 mg/dL 39 (L) 53   Non-HDL Cholesterol <130 mg/dL (calc) 73 48   LDL Calculated mg/dL (calc) 60 38   Chol HDLC Ratio <5.0 (calc) 2.9 1.9   (L): Data is abnormally low     Latest Reference Range & Units 07/06/22 09:22 01/16/23 08:54 05/19/23 08:21   BUN 7 - 25 mg/dL 24 25 40 (H)   Creatinine 0.70 - 1.22 mg/dL 1.30 (H) 1.38 (H) 1.51 (H)   (H): Data is abnormally high       Latest Reference Range & Units 10/15/19 09:30 12/07/20 10:51   Cholesterol <200 mg/dL 132 112   Triglycerides <150 mg/dL 72 52   HDL > OR = 40 mg/dL 42 39 (L)   Non-HDL Cholesterol <130 mg/dL (calc) 90 73   LDL Calculated mg/dL (calc) 75 60   Chol HDLC Ratio <5.0 (calc) 3.1 2.9   (L): Data is abnormally low     Latest Reference Range & Units 22 08:42 22 09:22   BUN 7 - 25 mg/dL 31 (H) 24   Creatinine 0.70 - 1.11 mg/dL 1.59 (H) 1.30 (H)   (H): Data is abnormally high     22 00:00   Hemoglobin A1C 7.7 (E)   (E): External lab result3  Patient Active Problem List   Diagnosis   • COPD without exacerbation ([address])   • Benign hypertension with chronic kidney disease, stage III (HCC)   • Hyperlipidemia   • Neuropathy, idiopathic   • Multiple nodules of lung   • Simple chronic bronchitis (HCC)   • Type 2 diabetes mellitus with diabetic neuropathy, with long-term current use of insulin (HCC)   • Dilated cardiomyopathy (HCC)   • Chronic combined systolic and diastolic congestive heart failure ([facility])   • Abnormal chest CT   • Arthritis   • Paresthesia of both feet   • Finger pain, left   • Cough   • Urinary retention   • Benign prostatic hyperplasia (BPH) with straining on urination   • Anemia of chronic kidney failure, stage 3 (moderate) ([facility])   • CKD (chronic kidney disease), stage III ([facility])   • Hyperkalemia   • Chronic kidney disease-mineral and bone disorder   • Other proteinuria   • MDS (myelodysplastic syndrome), low grade ([facility])   • Diarrhea     Past Medical History:   Diagnosis Date   • Allergic 1989   • COPD (chronic obstructive pulmonary disease) ([address] [facility])    • Diabetes mellitus ([address] [facility])     Type 2   • Essential hypertension    • Heart failure ([address] [facility])    • Hyperlipidemia    • Hypertension    • Left inguinal hernia    • Lung nodule    • Malignant melanoma of skin ([address] [facility])      Social History     Socioeconomic History   • Marital status:       Spouse name: Not on file   • Number of children: 2   • Years of education: Not on file   • Highest education level: Not on file   Occupational History   • Occupation: rtired   Tobacco Use   • Smoking status: Former     Packs/day: 1.00     Years: 10.00     Total pack years: 10.00     Types: Cigarettes     Start date: 1949     Quit date: 1960     Years since quittin.6   • Smokeless tobacco: Never   Vaping Use   • Vaping Use: Never used   Substance and Sexual Activity   • Alcohol use: Not Currently     Alcohol/week: 52.0 standard drinks of alcohol     Types: 50 Glasses of wine, 2 Standard drinks or equivalent per week   • Drug use: No   • Sexual activity: Not Currently     Partners: Female     Birth control/protection: Sponge   Other Topics Concern   • Not on file   Social History Narrative    Caffeine use, active     Social Determinants of Health     Financial Resource Strain: Low Risk  (9/29/2022)    Overall Financial Resource Strain (CARDIA)    • Difficulty of Paying Living Expenses: Not hard at all   Food Insecurity: Not on file   Transportation Needs: No Transportation Needs (9/29/2022)    PRAPARE - Transportation    • Lack of Transportation (Medical): No    • Lack of Transportation (Non-Medical):  No   Physical Activity: Not on file   Stress: Not on file   Social Connections: Not on file   Intimate Partner Violence: Not on file   Housing Stability: Not on file      Family History   Problem Relation Age of Onset   • Diabetes Mother    • Heart attack Neg Hx    • Stroke Neg Hx    • Aneurysm Neg Hx    • Clotting disorder Neg Hx    • Arrhythmia Neg Hx    • Hypertension Neg Hx    • Hyperlipidemia Neg Hx         pt unsure      Past Surgical History:   Procedure Laterality Date   • APPENDECTOMY     • CATARACT EXTRACTION, BILATERAL     • CHOLECYSTECTOMY     • CHOLECYSTECTOMY     • COLONOSCOPY  2014    Fiberoptic   • HERNIA REPAIR     • KNEE ARTHROSCOPY Bilateral     Therapeutic   • MO CYSTO INSERTION TRANSPROSTATIC IMPLANT SINGLE N/A 3/22/2019    Procedure: CYSTOSCOPY WITH INSERTION UROLIFT;  Surgeon: Luiza Cowan MD;  [facility] OR;  Service: Urology       Current Outpatient Medications:   •  acetaminophen (TYLENOL) 325 mg tablet, Take 2 tablets (650 mg total) by mouth every 6 (six) hours as needed for mild pain, Disp: 30 tablet, Rfl: 0  •  albuterol (2.5 mg/3 mL) 0.083 % nebulizer solution, inhale the contents of one ampule by nebulization every 6 hours as needed for wheezing or shortness of breath, Disp: 360 mL, Rfl: 3  •  Ascorbic Acid (VITAMIN C) 1000 MG tablet, Take 1 tablet by mouth daily, Disp: , Rfl:   •  BD PEN NEEDLE DON U/F 32G X 4 MM MISC,  , Disp: , Rfl:   •  betamethasone, augmented, (DIPROLENE-AF) 0.05 % cream, , Disp: , Rfl:   •  cholecalciferol (VITAMIN D3) 1,000 units tablet, Take 1,000 Units by mouth daily, Disp: , Rfl:   •  cholestyramine (QUESTRAN) 4 g packet, DISSOLVE ONE PACKET IN 8 OUNCES OF LIQUID AND TAKE ONE BY MOUTH ONE DAILY. , Disp: 30 each, Rfl: 0  •  Cinnamon 500 MG TABS, Take 1 tablet by mouth daily  , Disp: , Rfl:   •  Dapagliflozin Propanediol 5 MG TABS, Take 5 mg by mouth every other day Take 1/2 tablet daily ( Farxiga) , Disp: , Rfl:   •  Flovent  MCG/ACT inhaler, INHALE ONE PUFF TWICE DAILY - rinse mouth after use, Disp: 12 g, Rfl: 0  •  fluticasone (FLONASE) 50 mcg/act nasal spray, USE TWO SPRAYS IN EACH NOSTRIL DAILY, Disp: 16 g, Rfl: 0  •  gabapentin (NEURONTIN) 300 mg capsule, TAKE ONE CAPSULE BY MOUTH ONCE DAILY, Disp: 90 capsule, Rfl: 0  •  hydrOXYzine pamoate (VISTARIL) 25 mg capsule, TAKE ONE CAPSULE BY MOUTH THREE TIMES DAILY AS NEEDED FOR ITCHING, Disp: 90 capsule, Rfl: 0  •  Insulin Glargine Solostar 100 UNIT/ML SOPN, Inject 10 Units under the skin daily, Disp: , Rfl:   •  Levemir FlexTouch 100 units/mL injection pen, 10 Units daily at bedtime, Disp: , Rfl:   •  metoprolol succinate (TOPROL-XL) 50 mg 24 hr tablet, TAKE TWO TABLETS BY MOUTH DAILY, Disp: 180 tablet, Rfl: 0  •  montelukast (SINGULAIR) 10 mg tablet, TAKE ONE TABLET BY MOUTH NIGHTLY at bedtime, Disp: 90 tablet, Rfl: 0  •  Multiple Vitamins-Minerals (OCUVITE ADULT 50+ PO), Take 1 tablet by mouth daily, Disp: , Rfl:   •  NOVOLOG FLEXPEN 100 units/mL injection pen, Inject 4 Units under the skin 3 (three) times a day with meals 4u in am, 2u at lunch and 6u at dinner , Disp: , Rfl:   •  ONE TOUCH ULTRA TEST test strip,  , Disp: , Rfl:   •  ONETOUCH DELICA LANCETS FINE MISC,  , Disp: , Rfl:   •  roflumilast (DALIRESP) 500 mcg tablet, Take 1 tablet (500 mcg total) by mouth daily, Disp: 30 tablet, Rfl: 5  •  sacubitril-valsartan (Entresto) 24-26 MG TABS, Take 1 tablet by mouth 2 (two) times a day, Disp: 180 tablet, Rfl: 3  •  simvastatin (ZOCOR) 10 mg tablet, TAKE ONE TABLET BY MOUTH ONCE DAILY, Disp: 90 tablet, Rfl: 0  •  umeclidinium-vilanterol (Anoro Ellipta) 62.5-25 mcg/actuation inhaler, Inhale 1 puff daily, Disp: 180 each, Rfl: 3  •  Ventolin  (90 Base) MCG/ACT inhaler, Inhale 2 puffs every 6 (six) hours as needed for wheezing, Disp: 54 g, Rfl: 3  No Known Allergies    Imaging: No results found. Review of Systems:  Review of Systems   Constitutional: Negative. HENT: Negative. Eyes: Negative. Respiratory: Negative. Cardiovascular: Negative. Endocrine: Negative. Musculoskeletal: Negative. Hematological: Negative. Physical Exam:  /70 (BP Location: Left arm, Patient Position: Sitting, Cuff Size: Adult)   Pulse 75   Ht 6' 1" (1.854 m)   Wt 75.3 kg (166 lb)   SpO2 93%   BMI 21.90 kg/m²   Physical Exam  Constitutional:       General: He is not in acute distress. Appearance: He is well-developed. He is not diaphoretic. HENT:      Head: Normocephalic and atraumatic. Eyes:      General: No scleral icterus. Right eye: No discharge. Left eye: No discharge. Conjunctiva/sclera: Conjunctivae normal.      Pupils: Pupils are equal, round, and reactive to light. Neck:      Thyroid: No thyromegaly. Vascular: No JVD. Trachea: No tracheal deviation. Cardiovascular:      Rate and Rhythm: Normal rate and regular rhythm. Heart sounds: No murmur (Short systolic murmur-TR) heard. No friction rub. No gallop. Pulmonary:      Effort: Pulmonary effort is normal. No respiratory distress.       Breath sounds: Normal breath sounds. No stridor. No wheezing. Abdominal:      General: Bowel sounds are normal. There is no distension. Palpations: Abdomen is soft. Tenderness: There is no abdominal tenderness. Musculoskeletal:         General: No tenderness or deformity. Normal range of motion. Cervical back: Normal range of motion. Skin:     General: Skin is warm. Coloration: Skin is not pale. Findings: No erythema or rash.

## 2023-08-21 DIAGNOSIS — R09.81 NASAL CONGESTION: ICD-10-CM

## 2023-08-21 RX ORDER — FLUTICASONE PROPIONATE 50 MCG
SPRAY, SUSPENSION (ML) NASAL
Qty: 16 G | Refills: 0 | Status: SHIPPED | OUTPATIENT
Start: 2023-08-21

## 2023-08-24 DIAGNOSIS — R19.7 DIARRHEA, UNSPECIFIED TYPE: ICD-10-CM

## 2023-08-24 RX ORDER — CHOLESTYRAMINE 4 G/9G
POWDER, FOR SUSPENSION ORAL
Qty: 30 EACH | Refills: 5 | Status: SHIPPED | OUTPATIENT
Start: 2023-08-24

## 2023-08-25 ENCOUNTER — TELEPHONE (OUTPATIENT)
Dept: FAMILY MEDICINE CLINIC | Facility: MEDICAL CENTER | Age: 88
End: 2023-08-25

## 2023-08-25 DIAGNOSIS — Z51.81 MEDICATION MONITORING ENCOUNTER: Primary | ICD-10-CM

## 2023-08-25 NOTE — TELEPHONE ENCOUNTER
Patient called back. Would like us to call him on his cell because he will be out of the house for most of the day.      [phone]

## 2023-08-25 NOTE — TELEPHONE ENCOUNTER
Patient called today because he is "very frustrated" with the whole process with his knee  Pain. He was referred to pain management and also ortho. He saw pain mngmt and they said that they do not treat "that" and only offered to give him an injection in his back for his back pain while he was there. Also told him to go to Ortho. He went to his Ortho appt on 8/22 and patient said they asked him, "Why are you here?". He said that they scheduled him with a shoulder specialist who said that she cannot help him and that he would need to see a knee specialist, they then set him up with Dr. Shanika Stokes for mid September. The patient was very upset and told them that he wanted his copay refunded because they "scheduled him with the wrong doctor". He wanted to see if you had any advice while he waits for the appt in September. He is still in a lot of pain, and is "very frustrated".

## 2023-08-26 DIAGNOSIS — J41.0 SIMPLE CHRONIC BRONCHITIS (HCC): ICD-10-CM

## 2023-08-28 ENCOUNTER — APPOINTMENT (OUTPATIENT)
Dept: LAB | Facility: MEDICAL CENTER | Age: 88
End: 2023-08-28
Payer: COMMERCIAL

## 2023-08-28 DIAGNOSIS — Z51.81 MEDICATION MONITORING ENCOUNTER: ICD-10-CM

## 2023-08-28 LAB
ANION GAP SERPL CALCULATED.3IONS-SCNC: 6 MMOL/L
BUN SERPL-MCNC: 32 MG/DL (ref 5–25)
CALCIUM SERPL-MCNC: 9.1 MG/DL (ref 8.4–10.2)
CHLORIDE SERPL-SCNC: 105 MMOL/L (ref 96–108)
CO2 SERPL-SCNC: 26 MMOL/L (ref 21–32)
CREAT SERPL-MCNC: 1.45 MG/DL (ref 0.6–1.3)
GFR SERPL CREATININE-BSD FRML MDRD: 42 ML/MIN/1.73SQ M
GLUCOSE SERPL-MCNC: 170 MG/DL (ref 65–140)
POTASSIUM SERPL-SCNC: 4.7 MMOL/L (ref 3.5–5.3)
SODIUM SERPL-SCNC: 137 MMOL/L (ref 135–147)

## 2023-08-28 PROCEDURE — 80048 BASIC METABOLIC PNL TOTAL CA: CPT

## 2023-08-28 PROCEDURE — 36415 COLL VENOUS BLD VENIPUNCTURE: CPT

## 2023-08-28 RX ORDER — UMECLIDINIUM BROMIDE AND VILANTEROL TRIFENATATE 62.5; 25 UG/1; UG/1
1 POWDER RESPIRATORY (INHALATION) DAILY
Qty: 60 EACH | Refills: 0 | Status: SHIPPED | OUTPATIENT
Start: 2023-08-28

## 2023-08-29 ENCOUNTER — TELEPHONE (OUTPATIENT)
Dept: FAMILY MEDICINE CLINIC | Facility: MEDICAL CENTER | Age: 88
End: 2023-08-29

## 2023-08-29 NOTE — TELEPHONE ENCOUNTER
Called patient and left a message with appt info and to call back to confirm he got the message    Spoke with Jin Ray. Appt tomorrow at 10:15 with Dr Anthony Juarez.

## 2023-08-29 NOTE — TELEPHONE ENCOUNTER
----- Message from Alondra Alegria DO sent at 8/25/2023  4:50 PM EDT -----  Sandra Lesch,    I was hoping you could help me out in scheduling this patient for knee pain and Baker's cyst.  He tells me today he was scheduled with upper extremity specialist instead of knee specialist so had to reschedule and was hoping to be seen sooner than his rescheduled appointment. He is 80years old and quite active with golfing etc., in a good state of health for his age. I very much appreciate it. Clerical/clinical-may we please follow-up on this? Also may you please leave prescription for BMP at  he will  on Monday.

## 2023-08-29 NOTE — TELEPHONE ENCOUNTER
Yes, I would appreciate follow-up for this. Patient was quite upset over the telephone with me. Her number is [phone]. Kindly appreciated. I did discuss with patient on our call that Dr. Lencho Gray would certainly be able to address his concern if he kept that appointment.

## 2023-08-30 ENCOUNTER — OFFICE VISIT (OUTPATIENT)
Dept: OBGYN CLINIC | Facility: MEDICAL CENTER | Age: 88
End: 2023-08-30
Payer: COMMERCIAL

## 2023-08-30 ENCOUNTER — APPOINTMENT (OUTPATIENT)
Dept: RADIOLOGY | Facility: MEDICAL CENTER | Age: 88
End: 2023-08-30
Payer: COMMERCIAL

## 2023-08-30 VITALS — WEIGHT: 166 LBS | BODY MASS INDEX: 22 KG/M2 | HEIGHT: 73 IN

## 2023-08-30 DIAGNOSIS — M25.562 LEFT KNEE PAIN, UNSPECIFIED CHRONICITY: ICD-10-CM

## 2023-08-30 DIAGNOSIS — M71.22 BAKER'S CYST OF KNEE, LEFT: ICD-10-CM

## 2023-08-30 DIAGNOSIS — M17.12 PRIMARY OSTEOARTHRITIS OF LEFT KNEE: Primary | ICD-10-CM

## 2023-08-30 PROCEDURE — 20610 DRAIN/INJ JOINT/BURSA W/O US: CPT | Performed by: PHYSICAL MEDICINE & REHABILITATION

## 2023-08-30 PROCEDURE — 73562 X-RAY EXAM OF KNEE 3: CPT

## 2023-08-30 PROCEDURE — 99214 OFFICE O/P EST MOD 30 MIN: CPT | Performed by: PHYSICAL MEDICINE & REHABILITATION

## 2023-08-30 RX ORDER — ROPIVACAINE HYDROCHLORIDE 5 MG/ML
10 INJECTION, SOLUTION EPIDURAL; INFILTRATION; PERINEURAL
Status: COMPLETED | OUTPATIENT
Start: 2023-08-30 | End: 2023-08-30

## 2023-08-30 RX ORDER — TRIAMCINOLONE ACETONIDE 40 MG/ML
80 INJECTION, SUSPENSION INTRA-ARTICULAR; INTRAMUSCULAR
Status: COMPLETED | OUTPATIENT
Start: 2023-08-30 | End: 2023-08-30

## 2023-08-30 RX ADMIN — TRIAMCINOLONE ACETONIDE 80 MG: 40 INJECTION, SUSPENSION INTRA-ARTICULAR; INTRAMUSCULAR at 10:15

## 2023-08-30 RX ADMIN — ROPIVACAINE HYDROCHLORIDE 10 ML: 5 INJECTION, SOLUTION EPIDURAL; INFILTRATION; PERINEURAL at 10:15

## 2023-08-30 NOTE — PROGRESS NOTES
1. Primary osteoarthritis of left knee  Large joint arthrocentesis: L knee      2. Baker's cyst of knee, left        3. Left knee pain, unspecified chronicity  XR knee 3 vw left non injury        Orders Placed This Encounter   Procedures   • Large joint arthrocentesis: L knee   • XR knee 3 vw left non injury     Impression:  Left knee pain likely secondary to severe medial tibiofemoral osteoarthritis leading to Baker's cyst.  We discussed different treatment options and decided to proceed with an intra-articular steroid injection which could help with his posterior knee symptoms along with arthritis. We will have him return in a few weeks and if still symptomatic, we will proceed with ultrasound-guided Baker's cyst aspiration. Imaging Studies (I personally reviewed images in PACS and report):  Left knee x-rays most recent to this encounter reviewed. These images show severe osteoarthritis that most affects the medial joint space. There is a small joint effusion. Calcifications in the posterior knee. No acute osseous abnormalities. 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 Ultrasound of the soft tissue in the left posterior knee showed a Baker's cyst.  This was performed by radiology. Return for [facility] left Baker's cyst aspiration- Kim to schedule. Patient is in agreement with the above plan. HPI:  Marcia Villaseñor is a 80 y.o. male  who presents for evaluation of   Chief Complaint   Patient presents with   • Left Knee - Pain       Onset/Mechanism: Chronic pain without a recent injury. Location: Posterior knee. Radiation: Denies. Provocative: Random. Severity: Today is a good day. Associated Symptoms: Pain in the back of the knee. Treatment so far: No recent treatment.     Following history reviewed and updated:  Past Medical History:   Diagnosis Date   • Allergic april 1989   • COPD (chronic obstructive pulmonary disease) ([address] [facility])    • Diabetes mellitus ([address] [facility]) Type 2   • Essential hypertension    • Heart failure (HCC)    • Hyperlipidemia    • Hypertension    • Left inguinal hernia    • Lung nodule    • Malignant melanoma of skin ([address])      Past Surgical History:   Procedure Laterality Date   • APPENDECTOMY     • CATARACT EXTRACTION, BILATERAL     • CHOLECYSTECTOMY     • CHOLECYSTECTOMY     • COLONOSCOPY  2014    Fiberoptic   • HERNIA REPAIR     • KNEE ARTHROSCOPY Bilateral     Therapeutic   • CT CYSTO INSERTION TRANSPROSTATIC IMPLANT SINGLE N/A 3/22/2019    Procedure: CYSTOSCOPY WITH INSERTION UROLIFT;  Surgeon: Dilia Fernández MD;  Location: [facility];  Service: Urology     Social History   Social History     Substance and Sexual Activity   Alcohol Use Not Currently   • Alcohol/week: 52.0 standard drinks of alcohol   • Types: 50 Glasses of wine, 2 Standard drinks or equivalent per week     Social History     Substance and Sexual Activity   Drug Use No     Social History     Tobacco Use   Smoking Status Former   • Packs/day: 1.00   • Years: 10.00   • Total pack years: 10.00   • Types: Cigarettes   • Start date: 1949   • Quit date: 1960   • Years since quittin.7   Smokeless Tobacco Never     Family History   Problem Relation Age of Onset   • Diabetes Mother    • Heart attack Neg Hx    • Stroke Neg Hx    • Aneurysm Neg Hx    • Clotting disorder Neg Hx    • Arrhythmia Neg Hx    • Hypertension Neg Hx    • Hyperlipidemia Neg Hx         pt unsure      No Known Allergies     Constitutional:  Ht 6' 1" (1.854 m)   Wt 75.3 kg (166 lb)   BMI 21.90 kg/m²    General: NAD. Eyes: Anicteric sclerae. Neck: Supple. Lungs: Unlabored breathing. Cardiovascular: No lower extremity edema. Skin: Intact without erythema. Neurologic: Sensation intact to light touch. Psychiatric: Mood and affect are appropriate. Left Knee Exam     Tenderness   The patient is experiencing tenderness in the medial joint line.     Range of Motion   Extension: normal     Tests   Varus: negative Valgus: negative    Other   Erythema: absent  Scars: absent  Sensation: normal  Pulse: present  Swelling: none  Effusion: no effusion present             Large joint arthrocentesis: L knee  Universal Protocol:  Consent: Verbal consent obtained. Written consent not obtained. Risks and benefits: risks, benefits and alternatives were discussed  Consent given by: patient  Timeout called at: 8/30/2023 10:15 AM.  Patient understanding: patient states understanding of the procedure being performed  Patient consent: the patient's understanding of the procedure matches consent given  Site marked: the operative site was marked  Radiology Images displayed and confirmed. If images not available, report reviewed: imaging studies available  Patient identity confirmed: verbally with patient    Supporting Documentation  Indications: pain   Procedure Details  Location: knee - L knee  Needle size: 22 G  Ultrasound guidance: no  Approach: Inferolateral to patella. Medications administered: 80 mg triamcinolone acetonide 40 mg/mL; 10 mL ropivacaine 0.5 %    Patient tolerance: patient tolerated the procedure well with no immediate complications  Dressing:  Sterile dressing applied    There was little to no resistance encountered during the injection. Risks of this procedure include:    - Risk of bleeding since a needle is involved. - Risk of infection (1/10,000 chance as per recent studies). Signs/symptoms were discussed and they would prompt an urgent evaluation at an emergency department.  - Risk of pigmentation or skin dimpling in the skin (2-3% chance as per recent studies) from the steroid. - Risk of increased pain from steroid flare (1% chance as per recent studies) that typically lasts 24-48 hours. - Risk of increased blood sugars from the steroid medication that can last for a few weeks.   If the patient is a diabetic or pre-diabetic, they were encouraged to closely monitor their blood sugars and discuss with PCP if elevated more than usual or if having symptoms. The benefits outweigh the risks and so the procedure was completed.

## 2023-09-10 DIAGNOSIS — L29.9 PRURITUS: ICD-10-CM

## 2023-09-11 RX ORDER — HYDROXYZINE PAMOATE 25 MG/1
CAPSULE ORAL
Qty: 90 CAPSULE | Refills: 0 | Status: SHIPPED | OUTPATIENT
Start: 2023-09-11

## 2023-09-15 ENCOUNTER — TELEPHONE (OUTPATIENT)
Dept: FAMILY MEDICINE CLINIC | Facility: MEDICAL CENTER | Age: 88
End: 2023-09-15

## 2023-09-15 NOTE — TELEPHONE ENCOUNTER
Prior auth request received via fax for pt's hydroxyzine pamoate 25 mg caps.     Routed to Clinical Pod

## 2023-09-19 ENCOUNTER — OFFICE VISIT (OUTPATIENT)
Dept: DERMATOLOGY | Facility: CLINIC | Age: 88
End: 2023-09-19
Payer: COMMERCIAL

## 2023-09-19 VITALS — WEIGHT: 166 LBS | BODY MASS INDEX: 22.48 KG/M2 | HEIGHT: 72 IN | TEMPERATURE: 98 F

## 2023-09-19 DIAGNOSIS — Z85.820 HISTORY OF MELANOMA: Primary | ICD-10-CM

## 2023-09-19 DIAGNOSIS — L57.0 KERATOSIS, ACTINIC: ICD-10-CM

## 2023-09-19 PROCEDURE — 99214 OFFICE O/P EST MOD 30 MIN: CPT | Performed by: DERMATOLOGY

## 2023-09-19 PROCEDURE — 17004 DESTROY PREMAL LESIONS 15/>: CPT | Performed by: DERMATOLOGY

## 2023-09-19 NOTE — TELEPHONE ENCOUNTER
PA for Hydroxyzine pamoate submitted through [facility] Key: [facility] [place] Case ID: [id number] questions answered and awaiting determination

## 2023-09-19 NOTE — PATIENT INSTRUCTIONS
Treatment with Cryotherapy    The doctor has treated your skin with nitrogen, which is 320 degrees Fahrenheit below zero. Stinging should subside within a few hours. You can take Tylenol for pain, if needed. Over the next few days, it is normal if the area becomes reddened, a blood blister, or swollen with fluid. If the lesion treated was near the eye - you could get a swollen eye over the next few days. Do not panic! This is all temporary, and will resolve with time. There is no special treatment - just keep the area clean. Makeup and BandAids can be used, if preferred. When the area starts to dry up and peel off, using Vaseline can help healing. It usually takes up to a month for it to heal.  Some lesions are recurrent and may require repeat treatments. If a lesion has not healed in one month, please don't hesitate to contact us. If you have any further questions that are not answered here, please call us. [phone]. Thank you for allowing us to care for you. ACTINIC KERATOSIS      Assessment and Plan:  Based on a thorough discussion of this condition and the management approach to it (including a comprehensive discussion of the known risks, side effects and potential benefits of treatment), the patient (family) agrees to implement the following specific plan:  When outside we recommend using a wide brim hat, sunglasses, long sleeve and pants, sunscreen with SPF 01+ with reapplication every 2 hours, or SPF specific clothing   liquid nitrogen to treat areas. Consent obtained. Expect area to blister, crust, and then fall off within 2 weeks. Please use vaseline.                                      PROCEDURE:  DESTRUCTION OF PRE-MALIGNANT LESIONS  After a thorough discussion of treatment options and risk/benefits/alternatives (including but not limited to local pain, scarring, dyspigmentation, blistering, and possible superinfection), verbal and written consent were obtained and the aforementioned lesions were treated on with cryotherapy using liquid nitrogen x 1 cycle for 5-10 seconds. TOTAL NUMBER of 32 pre-malignant lesions were treated today on the ANATOMIC LOCATION: face, hands and ears. The patient tolerated the procedure well, and after-care instructions were provided.

## 2023-09-19 NOTE — PROGRESS NOTES
[facility] Note     Patient Name: Ezekiel Bashir  Encounter Date: 9/19/2023     Have you been cared for by a [facility] in the last 3 years and, if so, which description applies to you? Yes. I have been here within the last 3 years, and my medical history has NOT changed since that time. I am MALE/not capable of bearing children. REVIEW OF SYSTEMS:  Have you recently had or currently have any of the following? · No changes in my recent health. PAST MEDICAL HISTORY:  Have you personally ever had or currently have any of the following? If "YES," then please provide more detail. · No changes in my medical history. FAMILY HISTORY:  Any "first degree relatives" (parent, brother, sister, or child) with the following? • No changes in my family's known health. PATIENT EXPERIENCE:    • Do you want the Dermatologist to perform a COMPLETE skin exam today including a clinical examination under the "bra and underwear" areas? NO  • If necessary, do we have your permission to call and leave a detailed message on your Preferred Phone number that includes your specific medical information?   Yes      No Known Allergies   Current Outpatient Medications:   •  acetaminophen (TYLENOL) 325 mg tablet, Take 2 tablets (650 mg total) by mouth every 6 (six) hours as needed for mild pain, Disp: 30 tablet, Rfl: 0  •  albuterol (2.5 mg/3 mL) 0.083 % nebulizer solution, inhale the contents of one ampule by nebulization every 6 hours as needed for wheezing or shortness of breath, Disp: 360 mL, Rfl: 3  •  Anoro Ellipta 62.5-25 MCG/ACT inhaler, INHALE ONE PUFF BY MOUTH ONCE DAILY, Disp: 60 each, Rfl: 0  •  Ascorbic Acid (VITAMIN C) 1000 MG tablet, Take 1 tablet by mouth daily, Disp: , Rfl:   •  BD PEN NEEDLE DON U/F 32G X 4 MM MISC,  , Disp: , Rfl:   •  betamethasone, augmented, (DIPROLENE-AF) 0.05 % cream, , Disp: , Rfl:   •  cholecalciferol (VITAMIN D3) 1,000 units tablet, Take 1,000 Units by mouth daily, Disp: , Rfl:   •  cholestyramine (QUESTRAN) 4 g packet, DISSOLVE 1 PACKET IN 8 OZ OF LIQUID AND TAKE BY MOUTH ONCE DAILY, Disp: 30 each, Rfl: 5  •  Cinnamon 500 MG TABS, Take 1 tablet by mouth daily  , Disp: , Rfl:   •  Dapagliflozin Propanediol 5 MG TABS, Take 5 mg by mouth every other day Take 1/2 tablet daily ( Farxiga) , Disp: , Rfl:   •  Flovent  MCG/ACT inhaler, INHALE ONE PUFF TWICE DAILY - rinse mouth after use, Disp: 12 g, Rfl: 0  •  fluticasone (FLONASE) 50 mcg/act nasal spray, INSTILL TWO SPRAYS IN EACH NOSTRIL DAILY, Disp: 16 g, Rfl: 0  •  gabapentin (NEURONTIN) 300 mg capsule, TAKE ONE CAPSULE BY MOUTH ONCE DAILY, Disp: 90 capsule, Rfl: 0  •  hydrOXYzine pamoate (VISTARIL) 25 mg capsule, TAKE ONE CAPSULE BY MOUTH THREE TIMES DAILY AS NEEDED FOR ITCHing, Disp: 90 capsule, Rfl: 0  •  Insulin Glargine Solostar 100 UNIT/ML SOPN, Inject 10 Units under the skin daily, Disp: , Rfl:   •  Levemir FlexTouch 100 units/mL injection pen, 10 Units daily at bedtime, Disp: , Rfl:   •  metoprolol succinate (TOPROL-XL) 50 mg 24 hr tablet, TAKE TWO TABLETS BY MOUTH DAILY, Disp: 180 tablet, Rfl: 0  •  montelukast (SINGULAIR) 10 mg tablet, TAKE ONE TABLET BY MOUTH NIGHTLY at bedtime, Disp: 90 tablet, Rfl: 0  •  Multiple Vitamins-Minerals (OCUVITE ADULT 50+ PO), Take 1 tablet by mouth daily, Disp: , Rfl:   •  NOVOLOG FLEXPEN 100 units/mL injection pen, Inject 4 Units under the skin 3 (three) times a day with meals 4u in am, 2u at lunch and 6u at dinner , Disp: , Rfl:   •  ONE TOUCH ULTRA TEST test strip,  , Disp: , Rfl:   •  ONETOUCH DELICA LANCETS FINE MISC,  , Disp: , Rfl:   •  roflumilast (DALIRESP) 500 mcg tablet, Take 1 tablet (500 mcg total) by mouth daily, Disp: 30 tablet, Rfl: 5  •  sacubitril-valsartan (Entresto) 24-26 MG TABS, Take 1 tablet by mouth 2 (two) times a day, Disp: 180 tablet, Rfl: 3  •  simvastatin (ZOCOR) 10 mg tablet, TAKE ONE TABLET BY MOUTH ONCE DAILY, Disp: 90 tablet, Rfl: 0  • Ventolin  (90 Base) MCG/ACT inhaler, Inhale 2 puffs every 6 (six) hours as needed for wheezing, Disp: 54 g, Rfl: 3          • Whom besides the patient is providing clinical information about today's encounter?   o NO ADDITIONAL HISTORIAN (patient alone provided history)    Physical Exam and Assessment/Plan by Diagnosis:    HISTORY OF MELANOMA     Physical Exam:  • Year Treated: 1990  • TNM Classification:   • Suspected Recurrence: no  • Regional adenopathy: no     Additional History of Present Condition:  Surgery in 1990. Patient unsure of which shoulder it was located on. Last skin exam was approximately 5 years ago.      Assessment and Plan:  Based on a thorough discussion of this condition and the management approach to it (including a comprehensive discussion of the known risks, side effects and potential benefits of treatment), the patient (family) agrees to implement the following specific plan:  • When outside we recommend using a wide brim hat, sunglasses, long sleeve and pants, sunscreen with SPF 15+ with reapplication every 2 hours, or SPF specific clothing   • We recommend that you continue to have regular full body skin exams with your dermatologist.  • We recommend that you see your eye doctor and dentist yearly for exams and make sure they are aware of your past history of Melanoma.      What happens at follow-up? The main purpose of follow-up is to detect recurrences early (metastatic melanoma), but it also offers an opportunity to diagnose a new primary melanoma at the first possible opportunity. A second invasive melanoma occurs in 5-10% of melanoma patients and a new melanoma in situ is diagnosed in more than 20% of melanoma patients.     Our practice makes the following recommendations for follow-up for patients with invasive melanoma.   • At-least "monthly" self-skin examinations   • Routine skin checks by a board certified dermatologist  • Follow-up intervals are "every 3 months" within 2 years of a new melanoma diagnosis; "every 6 months" between 2-4 years of a new melanoma diagnosis; and "annually" after 4 years of a new melanoma diagnosis  • Individual patient's needs should be considered before an appropriate follow-up is offered  • Provide education and support to help the patient adjust to their illness     Follow-up appointments should include:  • A check of the scar where the primary melanoma was removed  • Checking the regional lymph nodes  • A general skin examination  • A full physical examination at least annually by your primary care physician     In those with more advanced primary disease, follow-up may include:  • Blood tests  • Imaging: ultrasound, X-ray, CT, MRI and PET scan.     Most tests are not worthwhile for patients with stage 1 or 2 melanoma unless there are signs or symptoms of disease recurrence or metastasis. No tests are necessary for healthy patients who have remained well for five years or longer after removal of their melanoma.     What is the outlook for patients with melanoma? • Melanoma in situ is cured by excision because it has no potential to spread around the body. • The risk of spread and ultimate death from invasive melanoma depends on several factors, but the main one is the Breslow thickness of the melanoma at the time it was surgically removed. • Metastases are rare for melanomas < 0.75 mm and the risk for tumours 0.75-1 mm thick is about 5%. The risk steadily increases with thickness so that melanomas > 4 mm have a risk of metastasis of about 40%.    Melanoma is a potentially serious type of skin cancer, in which there is uncontrolled growth of melanocytes (pigment cells). Melanoma is sometimes called malignant melanoma. Normal melanocytes are found in the basal layer of the epidermis (the outer layer of skin). Melanocytes produce a protein called melanin, which protects skin cells by absorbing ultraviolet (UV) radiation.  Melanocytes are found in equal numbers in black and white skin, but melanocytes in black skin produce much more melanin. People with dark brown or black skin are very much less likely to be damaged by UV radiation than those with white skin.     ACTINIC KERATOSIS    Physical Exam:  • Anatomic Location Affected:  Face, hands and ears  • Morphological Description:  Scaly pink papules  • Pertinent Positives:  • Pertinent Negatives:      Assessment and Plan:  Based on a thorough discussion of this condition and the management approach to it (including a comprehensive discussion of the known risks, side effects and potential benefits of treatment), the patient (family) agrees to implement the following specific plan:  • When outside we recommend using a wide brim hat, sunglasses, long sleeve and pants, sunscreen with SPF 62+ with reapplication every 2 hours, or SPF specific clothing   liquid nitrogen to treat areas. Consent obtained. Expect area to blister, crust, and then fall off within 2 weeks. Please use vaseline. PROCEDURE:  DESTRUCTION OF PRE-MALIGNANT LESIONS  After a thorough discussion of treatment options and risk/benefits/alternatives (including but not limited to local pain, scarring, dyspigmentation, blistering, and possible superinfection), verbal and written consent were obtained and the aforementioned lesions were treated on with cryotherapy using liquid nitrogen x 1 cycle for 5-10 seconds. TOTAL NUMBER of 32 pre-malignant lesions were treated today on the ANATOMIC LOCATION: face, hands and ears. The patient tolerated the procedure well, and after-care instructions were provided.        Scribe Attestation    I,:  Tate Mejia am acting as a scribe while in the presence of the attending physician.:       I,:  Rex Nguyen MD personally performed the services described in this documentation    as scribed in my presence.:

## 2023-09-20 NOTE — TELEPHONE ENCOUNTER
After Visit Summary   6/22/2018    Soila Juarez    MRN: [id number]           Patient Information     Date Of Birth          1967        Visit Information        Provider Department      6/22/2018 8:00 AM Dara Humphrey PA-C; [employer] [facility]        Today's Diagnoses     Peritoneal carcinomatosis (H)    -  1    Polycythemia vera (H)        Gastroesophageal reflux disease, esophagitis presence not specified           Follow-ups after your visit        Your next 10 appointments already scheduled     Jun 22, 2018  9:30 AM CDT   Infusion 60 with UC ONCOLOGY INFUSION, UC 16 ATC   [facility] ([facility])    [address]  [address]  [facility] [address] [phone]            Jul 02, 2018 12:40 PM CDT   CT CHEST ABDOMEN PELVIS W/O & W CONTRAST with UCCT2   [facility] CT ([facility] [place])    [address]  1[address]  [facility] [address] [phone]           Please bring any scans or X-rays taken at other hospitals, if similar tests were done. Also bring a list of your medicines, including vitamins, minerals and over-the-counter drugs. It is safest to leave personal items at home.  Be sure to tell your doctor:   If you have any allergies.   If there s any chance you are pregnant.   If you are breastfeeding.  How to prepare:   Do not eat or drink for 2 hours before your exam. If you need to take medicine, you may take it with small sips of water. (We may ask you to take liquid medicine as well.)   Please wear loose clothing, such as a sweat suit or jogging clothes. Avoid snaps, zippers and other metal. We may ask you to undress and put on a hospital gown.  Please arrive 30 minutes early for your CT. Once in the department you might be asked to drink water 15-20 minutes prior to your exam.  If indicated you may be asked to drink an oral contrast in advance  PA approved for Hydoxyzine camille    Approvedon September 19  Your request has been approved  Drug  hydrOXYzine Pamoate 25MG capsules of your CT.  If this is the case, the imaging team will let you know or be in contact with you prior to your appointment  Patients over 70 or patients with diabetes or kidney problems:   If you haven t had a blood test (creatinine test) within the last 30 days, the Cardiologist/Radiologist may require you to get this test prior to your exam.  If you have diabetes:   Continue to take your metformin medication on the day of your exam  If you have any questions, please call the Imaging Department where you will have your exam.            Jul 05, 2018 11:30 AM CDT   [facility] Lab Draw with  [facility] LAB DRAW   [facility] Lab Draw ([facility])    [address]  [address]  [place] [id number] [phone]            Jul 05, 2018 12:00 PM CDT   (Arrive by 11:45 AM)   Return Visit with Oswald Hamilton MD   [facility] ([facility])    [address] [place] [address]  [address]  [place] [id number] [phone]            Jul 05, 2018 12:30 PM CDT   Infusion 60 with UC ONCOLOGY INFUSION, UC 30 ATC   [facility] ([facility])    [address] [place] [address]  [address]  [place] [id number] [phone]              Who to contact     If you have questions or need follow up information about today's clinic visit or your schedule please contact [facility] directly at [phone].  Normal or non-critical lab and imaging results will be communicated to you by MyChart, letter or phone within 4 business days after the clinic has received the results. If you do not hear from us within 7 days, please contact the clinic through MyChart or phone. If you have a critical or abnormal lab result, we will notify you by phone as soon as possible.  Submit refill requests through [employer] or call your pharmacy and they will forward the refill request to us. Please allow 3 business days for your refill  "to be completed.          Additional Information About Your Visit        [employer] Information     [facility] gives you secure access to your electronic health record. If you see a primary care provider, you can also send messages to your care team and make appointments. If you have questions, please call your primary care clinic.  If you do not have a primary care provider, please call [phone] and they will assist you.        Care EveryWhere ID     This is your Care EveryWhere ID. This could be used by other organizations to access your [facility] medical records  [id number]        Your Vitals Were     Pulse Temperature Height Pulse Oximetry BMI (Body Mass Index)       83 98.7  F (37.1  C) 1.778 m (5' 10\") 96% 21.14 kg/m2        Blood Pressure from Last 3 Encounters:   06/22/18 101/69   05/31/18 104/65   05/17/18 113/70    Weight from Last 3 Encounters:   06/22/18 66.8 kg (147 lb 4.8 oz)   05/31/18 67 kg (147 lb 11.2 oz)   05/17/18 66.2 kg (146 lb)              Today, you had the following     No orders found for display         Where to get your medicines      These medications were sent to [facility] [address] [place], [place] [id number]    Hours:  TRANSPLANT PHONE NUMBER [phone] Phone:  [phone]     aspirin 81 MG tablet    omeprazole 40 MG capsule          Primary Care Provider Office Phone # Fax #    Aazim SARAH Hamilton [phone]       [address] [place] [address]        Equal Access to Services     BONNIE WADE : Hadii antonio leonardo Somouna, waaxda luqadaha, qaybta kaalmada armen acevedo. So [facility] [phone].    ATENCIÓN: Si habla español, tiene a conner disposición servicios gratuitos de asistencia lingüística. Llame al [phone].    We comply with applicable federal civil rights laws and [place] laws. We do not discriminate on the basis of race, color, " national origin, age, disability, sex, sexual orientation, or gender identity.            Thank you!     Thank you for choosing [facility]  for your care. Our goal is always to provide you with excellent care. Hearing back from our patients is one way we can continue to improve our services. Please take a few minutes to complete the written survey that you may receive in the mail after your visit with us. Thank you!             Your Updated Medication List - Protect others around you: Learn how to safely use, store and throw away your medicines at www.disposemymeds.org.          This list is accurate as of 6/22/18  9:28 AM.  Always use your most recent med list.                   Brand Name Dispense Instructions for use Diagnosis    aspirin 81 MG tablet     90 tablet    Take 1 tablet (81 mg) by mouth daily    Polycythemia vera (H)       BOOST PLUS     56 Bottle    Take 1 Bottle by mouth 2 times daily    Moderate protein-calorie malnutrition (H)       cholecalciferol 1000 UNIT tablet    vitamin D3    30 tablet    Take 1 tablet (1,000 Units) by mouth daily    Vitamin D deficiency       loratadine 10 MG tablet    CLARITIN    30 tablet    Take 1 tablet (10 mg) by mouth daily    Acute seasonal allergic rhinitis due to other allergen, Throat pain       LORazepam 0.5 MG tablet    ATIVAN    30 tablet    Take 1 tablet (0.5 mg) by mouth every 4 hours as needed (Anxiety, Nausea/Vomiting or Sleep)    Peritoneal carcinomatosis (H), Nausea       omeprazole 40 MG capsule    priLOSEC    90 capsule    Take 1 capsule (40 mg) by mouth daily    Gastroesophageal reflux disease, esophagitis presence not specified       polyethylene glycol powder    MIRALAX/GLYCOLAX     Take 1 capful by mouth daily as needed for constipation Reported on 4/6/2017        RA ACETAMINOPHEN FLU COLD PO      Take 1-2 tablets by mouth daily as needed (mild pain, fever, cold symptoms)

## 2023-09-21 ENCOUNTER — OFFICE VISIT (OUTPATIENT)
Dept: OBGYN CLINIC | Facility: MEDICAL CENTER | Age: 88
End: 2023-09-21
Payer: COMMERCIAL

## 2023-09-21 DIAGNOSIS — M17.12 PRIMARY OSTEOARTHRITIS OF LEFT KNEE: Primary | ICD-10-CM

## 2023-09-21 DIAGNOSIS — R05.9 COUGH: ICD-10-CM

## 2023-09-21 DIAGNOSIS — M71.22 BAKER'S CYST OF KNEE, LEFT: ICD-10-CM

## 2023-09-21 PROCEDURE — 99213 OFFICE O/P EST LOW 20 MIN: CPT | Performed by: PHYSICAL MEDICINE & REHABILITATION

## 2023-09-21 PROCEDURE — 20611 DRAIN/INJ JOINT/BURSA W/US: CPT | Performed by: PHYSICAL MEDICINE & REHABILITATION

## 2023-09-21 RX ORDER — ROPIVACAINE HYDROCHLORIDE 5 MG/ML
10 INJECTION, SOLUTION EPIDURAL; INFILTRATION; PERINEURAL
Status: COMPLETED | OUTPATIENT
Start: 2023-09-21 | End: 2023-09-21

## 2023-09-21 RX ADMIN — ROPIVACAINE HYDROCHLORIDE 10 ML: 5 INJECTION, SOLUTION EPIDURAL; INFILTRATION; PERINEURAL at 10:00

## 2023-09-21 NOTE — TELEPHONE ENCOUNTER
rcvd fax  Hydroxyzine pamoate approved until 12/31/2023  Scanned in media  Advised pt via [employer]

## 2023-09-22 RX ORDER — MONTELUKAST SODIUM 10 MG/1
TABLET ORAL
Qty: 90 TABLET | Refills: 0 | Status: SHIPPED | OUTPATIENT
Start: 2023-09-22

## 2023-09-23 DIAGNOSIS — J41.0 SIMPLE CHRONIC BRONCHITIS (HCC): ICD-10-CM

## 2023-09-25 DIAGNOSIS — R09.81 NASAL CONGESTION: ICD-10-CM

## 2023-09-25 RX ORDER — FLUTICASONE PROPIONATE 50 MCG
2 SPRAY, SUSPENSION (ML) NASAL DAILY
Qty: 16 G | Refills: 0 | Status: SHIPPED | OUTPATIENT
Start: 2023-09-25

## 2023-09-25 RX ORDER — UMECLIDINIUM BROMIDE AND VILANTEROL TRIFENATATE 62.5; 25 UG/1; UG/1
1 POWDER RESPIRATORY (INHALATION) DAILY
Qty: 60 EACH | Refills: 0 | Status: SHIPPED | OUTPATIENT
Start: 2023-09-25

## 2023-10-11 DIAGNOSIS — J41.0 SIMPLE CHRONIC BRONCHITIS (HCC): ICD-10-CM

## 2023-10-11 RX ORDER — ALBUTEROL SULFATE 90 UG/1
2 AEROSOL, METERED RESPIRATORY (INHALATION) EVERY 6 HOURS PRN
Qty: 18 G | Refills: 0 | Status: SHIPPED | OUTPATIENT
Start: 2023-10-11

## 2023-10-13 LAB
25(OH)D3 SERPL-MCNC: 34 NG/ML (ref 30–100)
ALBUMIN SERPL-MCNC: 4.5 G/DL (ref 3.6–5.1)
ALBUMIN/GLOB SERPL: 2.3 (CALC) (ref 1–2.5)
ALP SERPL-CCNC: 36 U/L (ref 35–144)
ALT SERPL-CCNC: 14 U/L (ref 9–46)
AST SERPL-CCNC: 16 U/L (ref 10–35)
BASOPHILS # BLD AUTO: 276 CELLS/UL (ref 0–200)
BASOPHILS NFR BLD AUTO: 5.2 %
BILIRUB SERPL-MCNC: 0.6 MG/DL (ref 0.2–1.2)
BUN SERPL-MCNC: 42 MG/DL (ref 7–25)
BUN/CREAT SERPL: 25 (CALC) (ref 6–22)
CALCIUM SERPL-MCNC: 9.5 MG/DL (ref 8.6–10.3)
CALCIUM SERPL-MCNC: 9.5 MG/DL (ref 8.6–10.3)
CHLORIDE SERPL-SCNC: 107 MMOL/L (ref 98–110)
CO2 SERPL-SCNC: 25 MMOL/L (ref 20–32)
CREAT SERPL-MCNC: 1.65 MG/DL (ref 0.7–1.22)
CREAT UR-MCNC: 100 MG/DL (ref 20–320)
EOSINOPHIL # BLD AUTO: 334 CELLS/UL (ref 15–500)
EOSINOPHIL NFR BLD AUTO: 6.3 %
ERYTHROCYTE [DISTWIDTH] IN BLOOD BY AUTOMATED COUNT: 24.6 % (ref 11–15)
FERRITIN SERPL-MCNC: 362 NG/ML (ref 24–380)
GFR/BSA.PRED SERPLBLD CYS-BASED-ARV: 39 ML/MIN/1.73M2
GLOBULIN SER CALC-MCNC: 2 G/DL (CALC) (ref 1.9–3.7)
GLUCOSE SERPL-MCNC: 89 MG/DL (ref 65–99)
HCT VFR BLD AUTO: 31.6 % (ref 38.5–50)
HGB BLD-MCNC: 10.1 G/DL (ref 13.2–17.1)
IRON SATN MFR SERPL: 38 % (CALC) (ref 20–48)
IRON SERPL-MCNC: 101 MCG/DL (ref 50–180)
LYMPHOCYTES # BLD MANUAL: 1214 CELLS/UL (ref 850–3900)
LYMPHOCYTES NFR BLD AUTO: 22.9 %
MAGNESIUM SERPL-MCNC: 2.2 MG/DL (ref 1.5–2.5)
MCH RBC QN AUTO: 27.4 PG (ref 27–33)
MCHC RBC AUTO-ENTMCNC: 32 G/DL (ref 32–36)
MCV RBC AUTO: 85.6 FL (ref 80–100)
MONOCYTES # BLD AUTO: 498 CELLS/UL (ref 200–950)
MONOCYTES NFR BLD AUTO: 9.4 %
MYELOCYTES # BLD: 53 CELLS/UL
MYELOCYTES NFR BLD MANUAL: 1 %
NEUTROPHILS # BLD AUTO: 2926 CELLS/UL (ref 1500–7800)
NEUTROPHILS NFR BLD AUTO: 55.2 %
PHOSPHATE SERPL-MCNC: 4.9 MG/DL (ref 2.1–4.3)
PLATELET # BLD AUTO: 138 THOUSAND/UL (ref 140–400)
POTASSIUM SERPL-SCNC: 4.9 MMOL/L (ref 3.5–5.3)
PROT SERPL-MCNC: 6.5 G/DL (ref 6.1–8.1)
PROT UR-MCNC: 19 MG/DL (ref 5–25)
PROT/CREAT UR: 0.19 MG/MG CREAT (ref 0.03–0.15)
PROT/CREAT UR: 190 MG/G CREAT (ref 25–148)
PTH-INTACT SERPL-MCNC: 26 PG/ML (ref 16–77)
RBC # BLD AUTO: 3.69 MILLION/UL (ref 4.2–5.8)
SODIUM SERPL-SCNC: 138 MMOL/L (ref 135–146)
TIBC SERPL-MCNC: 269 MCG/DL (CALC) (ref 250–425)
WBC # BLD AUTO: 5.3 THOUSAND/UL (ref 3.8–10.8)

## 2023-10-14 DIAGNOSIS — G62.9 NEUROPATHY: ICD-10-CM

## 2023-10-18 RX ORDER — GABAPENTIN 300 MG/1
CAPSULE ORAL
Qty: 90 CAPSULE | Refills: 0 | Status: SHIPPED | OUTPATIENT
Start: 2023-10-18

## 2023-10-19 ENCOUNTER — OFFICE VISIT (OUTPATIENT)
Dept: NEPHROLOGY | Facility: CLINIC | Age: 88
End: 2023-10-19

## 2023-10-19 VITALS
HEART RATE: 68 BPM | BODY MASS INDEX: 22.62 KG/M2 | HEIGHT: 72 IN | SYSTOLIC BLOOD PRESSURE: 112 MMHG | WEIGHT: 167 LBS | OXYGEN SATURATION: 95 % | DIASTOLIC BLOOD PRESSURE: 68 MMHG

## 2023-10-19 DIAGNOSIS — R80.8 OTHER PROTEINURIA: ICD-10-CM

## 2023-10-19 DIAGNOSIS — N18.30 BENIGN HYPERTENSION WITH CHRONIC KIDNEY DISEASE, STAGE III (HCC): ICD-10-CM

## 2023-10-19 DIAGNOSIS — M89.9 CHRONIC KIDNEY DISEASE-MINERAL AND BONE DISORDER: ICD-10-CM

## 2023-10-19 DIAGNOSIS — I50.42 CHRONIC COMBINED SYSTOLIC AND DIASTOLIC CONGESTIVE HEART FAILURE (HCC): ICD-10-CM

## 2023-10-19 DIAGNOSIS — I12.9 BENIGN HYPERTENSION WITH CHRONIC KIDNEY DISEASE, STAGE III (HCC): ICD-10-CM

## 2023-10-19 DIAGNOSIS — N18.30 ANEMIA OF CHRONIC KIDNEY FAILURE, STAGE 3 (MODERATE): ICD-10-CM

## 2023-10-19 DIAGNOSIS — N18.32 STAGE 3B CHRONIC KIDNEY DISEASE (HCC): Primary | ICD-10-CM

## 2023-10-19 DIAGNOSIS — E83.9 CHRONIC KIDNEY DISEASE-MINERAL AND BONE DISORDER: ICD-10-CM

## 2023-10-19 DIAGNOSIS — N18.9 CHRONIC KIDNEY DISEASE-MINERAL AND BONE DISORDER: ICD-10-CM

## 2023-10-19 DIAGNOSIS — D63.1 ANEMIA OF CHRONIC KIDNEY FAILURE, STAGE 3 (MODERATE): ICD-10-CM

## 2023-10-19 PROBLEM — E87.5 HYPERKALEMIA: Status: RESOLVED | Noted: 2020-05-06 | Resolved: 2023-10-19

## 2023-10-19 NOTE — PROGRESS NOTES
NEPHROLOGY OFFICE PROGRESS NOTE   Marina Barrett 80 y.o. male MRN: [id number]  DATE: 10/19/23  Reason for visit: Continued evaluation and management of CKD    ASSESSMENT & PLAN:  Chronic kidney disease, stage IIIB:  Mr. Zechariah Snow baseline serum creatinine is around 1.3 to 1.6. The etiology of his CKD is felt to be multifactorial from hypertensive nephrosclerosis and possible element of diabetic nephropathy complicated by hemodynamic effect of cardiac meds. Renal function is stable. Creatinine 1.65. Treatment: good BP, glycemic and volume control. Hypertension:  BP is at goal (<130/80)  Current regimen: Metoprolol succ 50 mg daily  No changes. Congestive heart failure:  Compensated. Current Rx: Entresto 24/26 mg BID, Farxiga 5 mg QOD. Follow up with Dr. Shaina Clinton. Not on loop diuretics. Stable. Anemia of chronic disease:  Hgb stable at 10.1 in Oct 2023. Iron studies are normal Oct 2023. No need for epogen at this time. Proteinuria:  Controlled UPC - 0.19  On Valsartan in Entresto. Mineral and bone disease  PTH is at goal in Oct 2023. Calcium is at goal.  Phosphorus is above goal.  Monitor for now. Vitamin D is at goal in October 2023. Continue vitamin D 1000 units daily. Diabetes mellitus:   Defer DM management to Dr. Danis Calle. Hyperlipidemia:   Defer HLP management to PCP. Patient Instructions   You have chronic kidney disease (CKD) and your kidney function is stable. I recommend no changes to your medications today. Please check labs in 4-5 months  Follow up in 9 months - please obtain blood work 1-2 weeks prior to the appointment. Please avoid taking NSAIDs (Ibuprofen, Motrin, Aleve, Advil, Naproxen, Celebrex, etc.)  Make sure you are eating healthy and have adequate exercise. SUBJECTIVE / INTERVAL HISTORY:  Erika Andersen was last seen in the office in January 2023. Over the past 9 months, there have been no recent hospitalizations or ER visits.   He continues to follow-up with Dr. Efrem Pacheco who recently made adjustments to his insulin regimen. Not checking home BP. He plays golf on a regular basis and has been doing 9 holes. He feels fairly well overall. No new medical issues. PMH/PSH: HTN, DM, HLP, CKD, hyperkalemia, CHF, COPD, back pain, gallstone pancreatitis s/p cholecystectomy, melanoma s/p excision, lung nodules, anemia, BPH s/p urolift. Previous work up:   5/15/20 Renal US: R 10.1 cm, L 10.2 cm, no significant PVR. ALLERGIES: No Known Allergies    REVIEW OF SYSTEMS:  Review of Systems   Constitutional:  Negative for appetite change, fatigue and fever. Respiratory:  Negative for cough and shortness of breath. Cardiovascular:  Negative for chest pain and leg swelling. Gastrointestinal:  Negative for abdominal pain, diarrhea, nausea and vomiting. Genitourinary:  Negative for dysuria and hematuria. Musculoskeletal:  Positive for back pain. Negative for arthralgias. Neurological:  Positive for light-headedness (rarely when bending over. ). Negative for dizziness. OBJECTIVE:  /68 (BP Location: Left arm, Patient Position: Sitting, Cuff Size: Standard)   Pulse 68   Ht 6' (1.829 m)   Wt 75.8 kg (167 lb)   SpO2 95%   BMI 22.65 kg/m²   Current Weight: Weight - Scale: 75.8 kg (167 lb) Body mass index is 22.65 kg/m². Physical Exam  Constitutional:       General: He is not in acute distress. Appearance: Normal appearance. He is well-developed. He is not ill-appearing, toxic-appearing or diaphoretic. HENT:      Head: Normocephalic and atraumatic. Eyes:      General: No scleral icterus. Conjunctiva/sclera: Conjunctivae normal.   Neck:      Vascular: No JVD. Cardiovascular:      Rate and Rhythm: Normal rate and regular rhythm. Heart sounds: Murmur heard. Pulmonary:      Effort: Pulmonary effort is normal. No respiratory distress. Breath sounds: Normal breath sounds.    Abdominal:      General: Bowel sounds are normal.      Palpations: Abdomen is soft. Musculoskeletal:      Cervical back: Neck supple. Right lower leg: No edema. Left lower leg: No edema. Skin:     General: Skin is warm and dry. Neurological:      Mental Status: He is alert and oriented to person, place, and time.    Psychiatric:         Behavior: Behavior normal.         Judgment: Judgment normal.     Medications:  Current Outpatient Medications:     acetaminophen (TYLENOL) 325 mg tablet, Take 2 tablets (650 mg total) by mouth every 6 (six) hours as needed for mild pain, Disp: 30 tablet, Rfl: 0    albuterol (2.5 mg/3 mL) 0.083 % nebulizer solution, inhale the contents of one ampule by nebulization every 6 hours as needed for wheezing or shortness of breath, Disp: 360 mL, Rfl: 3    Anoro Ellipta 62.5-25 MCG/ACT inhaler, INHALE ONE PUFF BY MOUTH ONCE DAILY, Disp: 60 each, Rfl: 0    Ascorbic Acid (VITAMIN C) 1000 MG tablet, Take 1 tablet by mouth daily, Disp: , Rfl:     cholecalciferol (VITAMIN D3) 1,000 units tablet, Take 1,000 Units by mouth daily, Disp: , Rfl:     cholestyramine (QUESTRAN) 4 g packet, DISSOLVE 1 PACKET IN 8 OZ OF LIQUID AND TAKE BY MOUTH ONCE DAILY (Patient taking differently: 1 packet every other day), Disp: 30 each, Rfl: 5    Cinnamon 500 MG TABS, Take 1 tablet by mouth daily  , Disp: , Rfl:     Dapagliflozin Propanediol 5 MG TABS, Take 5 mg by mouth every other day, Disp: , Rfl:     Flovent  MCG/ACT inhaler, INHALE ONE PUFF TWICE DAILY - rinse mouth after use, Disp: 12 g, Rfl: 0    fluticasone (FLONASE) 50 mcg/act nasal spray, USE TWO SPRAYS IN EACH NOSTRIL DAILY, Disp: 16 g, Rfl: 0    gabapentin (NEURONTIN) 300 mg capsule, TAKE ONE CAPSULE BY MOUTH ONCE DAILY, Disp: 90 capsule, Rfl: 0    hydrOXYzine pamoate (VISTARIL) 25 mg capsule, TAKE ONE CAPSULE BY MOUTH THREE TIMES DAILY AS NEEDED FOR ITCHing, Disp: 90 capsule, Rfl: 0    Insulin Glargine Solostar 100 UNIT/ML SOPN, Inject 10 Units under the skin daily, Disp: , Rfl:     Levemir FlexTouch 100 units/mL injection pen, 10 Units daily at bedtime, Disp: , Rfl:     metoprolol succinate (TOPROL-XL) 50 mg 24 hr tablet, TAKE TWO TABLETS BY MOUTH DAILY, Disp: 180 tablet, Rfl: 0    montelukast (SINGULAIR) 10 mg tablet, TAKE ONE TABLET BY MOUTH ONCE NIGHTLY AT BEDTIME., Disp: 90 tablet, Rfl: 0    Multiple Vitamins-Minerals (OCUVITE ADULT 50+ PO), Take 1 tablet by mouth daily, Disp: , Rfl:     NOVOLOG FLEXPEN 100 units/mL injection pen, Inject 4 Units under the skin 3 (three) times a day with meals 4u in am, 2u at lunch and 6u at dinner , Disp: , Rfl:     roflumilast (DALIRESP) 500 mcg tablet, Take 1 tablet (500 mcg total) by mouth daily, Disp: 30 tablet, Rfl: 5    sacubitril-valsartan (Entresto) 24-26 MG TABS, Take 1 tablet by mouth 2 (two) times a day, Disp: 180 tablet, Rfl: 3    simvastatin (ZOCOR) 10 mg tablet, TAKE ONE TABLET BY MOUTH ONCE DAILY, Disp: 90 tablet, Rfl: 1    Ventolin  (90 Base) MCG/ACT inhaler, INHALE TWO PUFFS BY MOUTH EVERY 6 HOURS AS NEEDED FOR WHEEZING, Disp: 18 g, Rfl: 0    BD PEN NEEDLE DON U/F 32G X 4 MM MISC,  , Disp: , Rfl:     betamethasone, augmented, (DIPROLENE-AF) 0.05 % cream, , Disp: , Rfl:     ONE TOUCH ULTRA TEST test strip,  , Disp: , Rfl:     ONETOUCH DELICA LANCETS FINE MISC,  , Disp: , Rfl:     Laboratory Results:  Results for orders placed or performed in visit on 10/12/23   PTH, Intact and Calcium   Result Value Ref Range    PTH, Intact 26 16 - 77 pg/mL    Calcium 9.5 8.6 - 10.3 mg/dL   Magnesium   Result Value Ref Range    Magnesium, Serum 2.2 1.5 - 2.5 mg/dL   Phosphorus   Result Value Ref Range    Phosphorus, Serum 4.9 (H) 2.1 - 4.3 mg/dL   TIBC   Result Value Ref Range    Iron, Serum 101 50 - 180 mcg/dL    Total Iron Binding Capacity (TIBC) 269 250 - 425 mcg/dL (calc)    Iron Saturation 38 20 - 48 % (calc)   Comprehensive metabolic panel   Result Value Ref Range    Glucose, Random 89 65 - 99 mg/dL    BUN 42 (H) 7 - 25 mg/dL    Creatinine 1.65 (H) 0.70 - 1.22 mg/dL    eGFR 39 (L) > OR = 60 mL/min/1.73m2    SL AMB BUN/CREATININE RATIO 25 (H) 6 - 22 (calc)    Sodium 138 135 - 146 mmol/L    Potassium 4.9 3.5 - 5.3 mmol/L    Chloride 107 98 - 110 mmol/L    CO2 25 20 - 32 mmol/L    Calcium 9.5 8.6 - 10.3 mg/dL    Protein, Total 6.5 6.1 - 8.1 g/dL    Albumin 4.5 3.6 - 5.1 g/dL    Globulin 2.0 1.9 - 3.7 g/dL (calc)    Albumin/Globulin Ratio 2.3 1.0 - 2.5 (calc)    TOTAL BILIRUBIN 0.6 0.2 - 1.2 mg/dL    Alkaline Phosphatase 36 35 - 144 U/L    AST 16 10 - 35 U/L    ALT 14 9 - 46 U/L   CBC and differential   Result Value Ref Range    White Blood Cell Count 5.3 3.8 - 10.8 Thousand/uL    Red Blood Cell Count 3.69 (L) 4.20 - 5.80 Million/uL    Hemoglobin 10.1 (L) 13.2 - 17.1 g/dL    HCT 31.6 (L) 38.5 - 50.0 %    MCV 85.6 80.0 - 100.0 fL    MCH 27.4 27.0 - 33.0 pg    MCHC 32.0 32.0 - 36.0 g/dL    RDW 24.6 (H) 11.0 - 15.0 %    Platelet Count 486 (L) 140 - 400 Thousand/uL    SL AMB MPV  7.5 - 12.5 fL    Always Message     Ferritin   Result Value Ref Range    Ferritin 362 24 - 380 ng/mL   Vitamin D 25 hydroxy   Result Value Ref Range    Vitamin D, 25-Hydroxy, Serum 34 30 - 100 ng/mL   Protein, Total w/Creat, Random Urine   Result Value Ref Range    Creatinine, Urine 100 20 - 320 mg/dL    Protein/Creat Ratio 190 (H) 25 - 148 mg/g creat    Protein/Creat Ratio 0.190 (H) 0.025 - 0.148 mg/mg creat    Total Protein, Urine 19 5 - 25 mg/dL   Differential   Result Value Ref Range    Neutrophils (Absolute) 2,926 1,500 - 7,800 cells/uL    SL AMB ABSOLUTE MYELOCYTES 53 (H) 0 cells/uL    SL AMB LYMPHOCYTES 1,214 850 - 3,900 cells/uL    Monocytes (Absolute) 498 200 - 950 cells/uL    Eosinophils (Absolute) 334 15 - 500 cells/uL    Basophils  (H) 0 - 200 cells/uL    Neutrophils 55.2 %    SL AMB MYELOCYTES 1.0 (H) %    Lymphocytes 22.9 %    Monocytes 9.4 %    Eosinophils 6.3 %    Basophils PCT 5.2 %    RBC Morphology  NORMAL    Note:

## 2023-10-19 NOTE — PATIENT INSTRUCTIONS
You have chronic kidney disease (CKD) and your kidney function is stable. I recommend no changes to your medications today. Please check labs in 4-5 months  Follow up in 9 months - please obtain blood work 1-2 weeks prior to the appointment. Please avoid taking NSAIDs (Ibuprofen, Motrin, Aleve, Advil, Naproxen, Celebrex, etc.)  Make sure you are eating healthy and have adequate exercise.

## 2023-10-25 DIAGNOSIS — L29.9 PRURITUS: ICD-10-CM

## 2023-10-25 DIAGNOSIS — J41.0 SIMPLE CHRONIC BRONCHITIS (HCC): ICD-10-CM

## 2023-10-25 RX ORDER — UMECLIDINIUM BROMIDE AND VILANTEROL TRIFENATATE 62.5; 25 UG/1; UG/1
1 POWDER RESPIRATORY (INHALATION) DAILY
Qty: 60 EACH | Refills: 0 | Status: SHIPPED | OUTPATIENT
Start: 2023-10-25

## 2023-10-25 RX ORDER — HYDROXYZINE PAMOATE 25 MG/1
CAPSULE ORAL
Qty: 90 CAPSULE | Refills: 0 | Status: SHIPPED | OUTPATIENT
Start: 2023-10-25

## 2023-10-28 DIAGNOSIS — I42.0 DILATED CARDIOMYOPATHY (HCC): ICD-10-CM

## 2023-10-30 RX ORDER — METOPROLOL SUCCINATE 50 MG/1
TABLET, EXTENDED RELEASE ORAL
Qty: 180 TABLET | Refills: 0 | Status: SHIPPED | OUTPATIENT
Start: 2023-10-30

## 2023-11-03 DIAGNOSIS — R09.81 NASAL CONGESTION: ICD-10-CM

## 2023-11-03 RX ORDER — FLUTICASONE PROPIONATE 50 MCG
2 SPRAY, SUSPENSION (ML) NASAL DAILY
Qty: 16 G | Refills: 0 | Status: SHIPPED | OUTPATIENT
Start: 2023-11-03

## 2023-11-07 PROBLEM — K58.0 IRRITABLE BOWEL SYNDROME WITH DIARRHEA: Status: ACTIVE | Noted: 2023-11-07

## 2023-11-07 NOTE — PROGRESS NOTES
[facility] - Outpatient Progress Note  Parvin Mata 80 y.o. male MRN: [id number]  Encounter: [id number]    Assessment and Plan    1. Irritable bowel syndrome with diarrhea  -Stable on cholestyramine 4 g once daily and metamucil daily      2. Abdominal bloating  - recommend simethicone PRN      --------------------------------------------------------------------------------------------------------------------    Chief Complaint: Follow-up on irritable bowel    HPI: Parvin Mata is a 80 y.o. male new to me with past medical history of diabetes type 2 COPD, hypertension, chronic kidney disease stage III, dilated cardiomyopathy, arthritis, myelodysplastic syndrome, hyperlipidemia and irritable bowel who presents today for follow up for IBS diarrhea. Stable currently on Questran 4 daily. He also takes metamucil daily. He reports some gas and bloating. No gluten or lactose issues. Not with eating. Passes gas a lot. Usually goes 2-3 times daily. Formed, no loose. Patient denies any nausea, vomiting, abdominal pain, dysphagia, unexpected weight loss, diarrhea, constipation, blood in stool, or black tarry stools. Endoscopy History:  EGD -none per patient  Colonoscopy - 3/24/22 adequate bowel prep and diverticulosis internal hemorrhoids noted.   Random colon biopsies negative for microscopic colitis    Review of Systems:   General: negative for fatigue, fever, night sweats or unexpected weight loss  Psychological: negative for anxiety or depression  Ophthalmic: negative for blurry vision or scleral icterus  ENT: negative for headaches, sore throat or dysphagia  Hematological and Lymphatic: negative for pallor or swollen lymph nodes  Respiratory: negative for cough, shortness of breath or wheezing  Cardiovascular: negative for chest pain, edema or murmur  Gastrointestinal: as mentioned in HPI  Genito-Urinary: negative for dysuria or incontinence  Musculoskeletal: negative for joint pain, joint stiffness or joint swelling  Dermatological: negative for pruritus, rash, or jaundice    Current Medications  Current Outpatient Medications   Medication Sig Dispense Refill   • acetaminophen (TYLENOL) 325 mg tablet Take 2 tablets (650 mg total) by mouth every 6 (six) hours as needed for mild pain 30 tablet 0   • albuterol (2.5 mg/3 mL) 0.083 % nebulizer solution inhale the contents of one ampule by nebulization every 6 hours as needed for wheezing or shortness of breath 360 mL 3   • Anoro Ellipta 62.5-25 MCG/ACT inhaler INHALE ONE PUFF BY MOUTH ONCE DAILY 60 each 0   • Ascorbic Acid (VITAMIN C) 1000 MG tablet Take 1 tablet by mouth daily     • BD PEN NEEDLE DON U/F 32G X 4 MM MISC       • cholecalciferol (VITAMIN D3) 1,000 units tablet Take 1,000 Units by mouth daily     • cholestyramine (QUESTRAN) 4 g packet DISSOLVE 1 PACKET IN 8 OZ OF LIQUID AND TAKE BY MOUTH ONCE DAILY 30 each 11   • Cinnamon 500 MG TABS Take 1 tablet by mouth daily       • Dapagliflozin Propanediol 5 MG TABS Take 5 mg by mouth every other day     • Flovent  MCG/ACT inhaler INHALE ONE PUFF TWICE DAILY - rinse mouth after use 12 g 0   • fluticasone (FLONASE) 50 mcg/act nasal spray USE TWO SPRAYS IN EACH NOSTRIL DAILY 16 g 0   • gabapentin (NEURONTIN) 300 mg capsule TAKE ONE CAPSULE BY MOUTH ONCE DAILY 90 capsule 0   • hydrOXYzine pamoate (VISTARIL) 25 mg capsule TAKE ONE CAPSULE BY MOUTH THREE TIMES DAILY AS NEEDED FOR ITCHING 90 capsule 0   • Insulin Glargine Solostar 100 UNIT/ML SOPN Inject 14 Units under the skin daily     • metoprolol succinate (TOPROL-XL) 50 mg 24 hr tablet TAKE TWO TABLETS BY MOUTH DAILY 180 tablet 0   • montelukast (SINGULAIR) 10 mg tablet TAKE ONE TABLET BY MOUTH ONCE NIGHTLY AT BEDTIME.  90 tablet 0   • Multiple Vitamins-Minerals (OCUVITE ADULT 50+ PO) Take 1 tablet by mouth daily     • NOVOLOG FLEXPEN 100 units/mL injection pen Inject 4 Units under the skin 3 (three) times a day with meals 4u in am, 2u at lunch and 8u at dinner     • ONE TOUCH ULTRA TEST test strip       • ONETOUCH DELICA LANCETS FINE MISC       • roflumilast (DALIRESP) 500 mcg tablet Take 1 tablet (500 mcg total) by mouth daily 30 tablet 5   • sacubitril-valsartan (Entresto) 24-26 MG TABS Take 1 tablet by mouth 2 (two) times a day 180 tablet 3   • simvastatin (ZOCOR) 10 mg tablet TAKE ONE TABLET BY MOUTH ONCE DAILY 90 tablet 1   • Ventolin  (90 Base) MCG/ACT inhaler INHALE TWO PUFFS BY MOUTH EVERY 6 HOURS AS NEEDED FOR WHEEZING 18 g 0   • betamethasone, augmented, (DIPROLENE-AF) 0.05 % cream  (Patient not taking: Reported on 11/8/2023)     • Levemir FlexTouch 100 units/mL injection pen 10 Units daily at bedtime (Patient not taking: Reported on 11/8/2023)       No current facility-administered medications for this visit. Past Medical History  Past Medical History:   Diagnosis Date   • Allergic april 1989   • COPD (chronic obstructive pulmonary disease) ([address])    • Diabetes mellitus ([address])     Type 2   • Essential hypertension    • Heart failure (HCC)    • Hyperlipidemia    • Hypertension    • Impetigo    • Left inguinal hernia    • Lung nodule    • Malignant melanoma of skin ([address])        Past Surgical History  Past Surgical History:   Procedure Laterality Date   • APPENDECTOMY     • CATARACT EXTRACTION, BILATERAL     • CHOLECYSTECTOMY     • CHOLECYSTECTOMY     • COLONOSCOPY  2014    Fiberoptic   • HERNIA REPAIR     • KNEE ARTHROSCOPY Bilateral     Therapeutic   • AL CYSTO INSERTION TRANSPROSTATIC IMPLANT SINGLE N/A 3/22/2019    Procedure: CYSTOSCOPY WITH INSERTION Kenneth Costa;  Surgeon: Danna Arriaga MD;  Location: [facility] MAIN OR;  Service: Urology       Past Social History   Social History     Socioeconomic History   • Marital status:       Spouse name: None   • Number of children: 2   • Years of education: None   • Highest education level: None   Occupational History   • Occupation: rtired   Tobacco Use   • Smoking status: Former     Packs/day: 1.00     Years: 10.00     Total pack years: 10.00     Types: Cigarettes     Start date: 1949     Quit date: 1960     Years since quittin.8   • Smokeless tobacco: Never   Vaping Use   • Vaping Use: Never used   Substance and Sexual Activity   • Alcohol use: Not Currently     Alcohol/week: 52.0 standard drinks of alcohol     Types: 50 Glasses of wine, 2 Standard drinks or equivalent per week   • Drug use: No   • Sexual activity: Not Currently     Partners: Female     Birth control/protection: Sponge   Other Topics Concern   • None   Social History Narrative    Caffeine use, active     Social Determinants of Health     Financial Resource Strain: Low Risk  (2022)    Overall Financial Resource Strain (CARDIA)    • Difficulty of Paying Living Expenses: Not hard at all   Food Insecurity: Not on file   Transportation Needs: No Transportation Needs (2022)    PRAPARE - Transportation    • Lack of Transportation (Medical): No    • Lack of Transportation (Non-Medical):  No   Physical Activity: Not on file   Stress: Not on file   Social Connections: Not on file   Intimate Partner Violence: Not on file   Housing Stability: Not on file       Vital Signs  Vitals:    23 0903   BP: 124/72   BP Location: Right arm   Patient Position: Sitting   Cuff Size: Standard   Pulse: 71   SpO2: 98%   Weight: 77.1 kg (170 lb)   Height: 6' (1.829 m)       Physical Exam:  General appearance: alert, cooperative, no distress  HEENT: normocephalic, anicteric, no eye erythema or discharge, no oropharyngeal thrush  Neck: supple  Lungs: CTA b/l, no rales, rhonchi, or wheezing, unlabored respirations  Heart: RRR, no murmur, rubs, or gallops  Abdomen: soft, non-tender, non-distended, normal bowel sounds, no masses or organomegaly  Rectal: deferred  Extremities: no cyanosis, clubbing, or edema  Musculoskeletal: normal gait  Skin: color and texture normal, no jaundice, no rashes or lesions  Psychiatric: alert and oriented, normal affect and behavior                                                          Darryn Victor PA-C

## 2023-11-08 ENCOUNTER — OFFICE VISIT (OUTPATIENT)
Dept: GASTROENTEROLOGY | Facility: AMBULARY SURGERY CENTER | Age: 88
End: 2023-11-08
Payer: COMMERCIAL

## 2023-11-08 VITALS
DIASTOLIC BLOOD PRESSURE: 72 MMHG | WEIGHT: 170 LBS | OXYGEN SATURATION: 98 % | SYSTOLIC BLOOD PRESSURE: 124 MMHG | HEART RATE: 71 BPM | BODY MASS INDEX: 23.03 KG/M2 | HEIGHT: 72 IN

## 2023-11-08 DIAGNOSIS — R19.7 DIARRHEA, UNSPECIFIED TYPE: ICD-10-CM

## 2023-11-08 DIAGNOSIS — K58.0 IRRITABLE BOWEL SYNDROME WITH DIARRHEA: Primary | ICD-10-CM

## 2023-11-08 DIAGNOSIS — R14.0 ABDOMINAL BLOATING: ICD-10-CM

## 2023-11-08 PROCEDURE — 99213 OFFICE O/P EST LOW 20 MIN: CPT | Performed by: PHYSICIAN ASSISTANT

## 2023-11-08 RX ORDER — CHOLESTYRAMINE 4 G/9G
POWDER, FOR SUSPENSION ORAL
Qty: 30 EACH | Refills: 11 | Status: SHIPPED | OUTPATIENT
Start: 2023-11-08

## 2023-11-08 NOTE — LETTER
November 8, 2023     Jose Flores,  [address]     Patient: Ramirez Landa   YOB: 1933   Date of Visit: 11/8/2023       Dear Dr. Cielo Light:    Thank you for referring Toyin Sam to me for evaluation. Below are my notes for this consultation. If you have questions, please do not hesitate to call me. I look forward to following your patient along with you. Sincerely,        Mali Buckley PA-C        CC: No Recipients    Mali Buckley PA-C  11/8/2023  9:22 AM  Incomplete  [facility] - Outpatient Progress Note  Ramirez Landa 80 y.o. male MRN: [id number]  Encounter: [id number]    Assessment and Plan    1. Irritable bowel syndrome with diarrhea  -Stable on cholestyramine 4 g once daily and metamucil daily      2. Abdominal bloating  - recommend simethicone PRN      --------------------------------------------------------------------------------------------------------------------    Chief Complaint: Follow-up on irritable bowel    HPI: Ramirez Landa is a 80 y.o. male new to me with past medical history of diabetes type 2 COPD, hypertension, chronic kidney disease stage III, dilated cardiomyopathy, arthritis, myelodysplastic syndrome, hyperlipidemia and irritable bowel who presents today for follow up for IBS diarrhea. Stable currently on Questran 4 daily. He also takes metamucil daily. He reports some gas and bloating. No gluten or lactose issues. Not with eating. Passes gas a lot. Usually goes 2-3 times daily. Formed, no loose. Patient denies any nausea, vomiting, abdominal pain, dysphagia, unexpected weight loss, diarrhea, constipation, blood in stool, or black tarry stools. Endoscopy History:  EGD -none per patient  Colonoscopy - 3/24/22 adequate bowel prep and diverticulosis internal hemorrhoids noted.   Random colon biopsies negative for microscopic colitis    Review of Systems: General: negative for fatigue, fever, night sweats or unexpected weight loss  Psychological: negative for anxiety or depression  Ophthalmic: negative for blurry vision or scleral icterus  ENT: negative for headaches, sore throat or dysphagia  Hematological and Lymphatic: negative for pallor or swollen lymph nodes  Respiratory: negative for cough, shortness of breath or wheezing  Cardiovascular: negative for chest pain, edema or murmur  Gastrointestinal: as mentioned in HPI  Genito-Urinary: negative for dysuria or incontinence  Musculoskeletal: negative for joint pain, joint stiffness or joint swelling  Dermatological: negative for pruritus, rash, or jaundice    Current Medications  Current Outpatient Medications   Medication Sig Dispense Refill   • acetaminophen (TYLENOL) 325 mg tablet Take 2 tablets (650 mg total) by mouth every 6 (six) hours as needed for mild pain 30 tablet 0   • albuterol (2.5 mg/3 mL) 0.083 % nebulizer solution inhale the contents of one ampule by nebulization every 6 hours as needed for wheezing or shortness of breath 360 mL 3   • Anoro Ellipta 62.5-25 MCG/ACT inhaler INHALE ONE PUFF BY MOUTH ONCE DAILY 60 each 0   • Ascorbic Acid (VITAMIN C) 1000 MG tablet Take 1 tablet by mouth daily     • BD PEN NEEDLE DON U/F 32G X 4 MM MISC       • cholecalciferol (VITAMIN D3) 1,000 units tablet Take 1,000 Units by mouth daily     • cholestyramine (QUESTRAN) 4 g packet DISSOLVE 1 PACKET IN 8 OZ OF LIQUID AND TAKE BY MOUTH ONCE DAILY (Patient taking differently: 1 packet every other day) 30 each 5   • Cinnamon 500 MG TABS Take 1 tablet by mouth daily       • Dapagliflozin Propanediol 5 MG TABS Take 5 mg by mouth every other day     • Flovent  MCG/ACT inhaler INHALE ONE PUFF TWICE DAILY - rinse mouth after use 12 g 0   • fluticasone (FLONASE) 50 mcg/act nasal spray USE TWO SPRAYS IN EACH NOSTRIL DAILY 16 g 0   • gabapentin (NEURONTIN) 300 mg capsule TAKE ONE CAPSULE BY MOUTH ONCE DAILY 90 capsule 0   • hydrOXYzine pamoate (VISTARIL) 25 mg capsule TAKE ONE CAPSULE BY MOUTH THREE TIMES DAILY AS NEEDED FOR ITCHING 90 capsule 0   • Insulin Glargine Solostar 100 UNIT/ML SOPN Inject 14 Units under the skin daily     • metoprolol succinate (TOPROL-XL) 50 mg 24 hr tablet TAKE TWO TABLETS BY MOUTH DAILY 180 tablet 0   • montelukast (SINGULAIR) 10 mg tablet TAKE ONE TABLET BY MOUTH ONCE NIGHTLY AT BEDTIME. 90 tablet 0   • Multiple Vitamins-Minerals (OCUVITE ADULT 50+ PO) Take 1 tablet by mouth daily     • NOVOLOG FLEXPEN 100 units/mL injection pen Inject 4 Units under the skin 3 (three) times a day with meals 4u in am, 2u at lunch and 8u at dinner     • ONE TOUCH ULTRA TEST test strip       • ONETOUCH DELICA LANCETS FINE MISC       • roflumilast (DALIRESP) 500 mcg tablet Take 1 tablet (500 mcg total) by mouth daily 30 tablet 5   • sacubitril-valsartan (Entresto) 24-26 MG TABS Take 1 tablet by mouth 2 (two) times a day 180 tablet 3   • simvastatin (ZOCOR) 10 mg tablet TAKE ONE TABLET BY MOUTH ONCE DAILY 90 tablet 1   • Ventolin  (90 Base) MCG/ACT inhaler INHALE TWO PUFFS BY MOUTH EVERY 6 HOURS AS NEEDED FOR WHEEZING 18 g 0   • betamethasone, augmented, (DIPROLENE-AF) 0.05 % cream  (Patient not taking: Reported on 11/8/2023)     • Levemir FlexTouch 100 units/mL injection pen 10 Units daily at bedtime (Patient not taking: Reported on 11/8/2023)       No current facility-administered medications for this visit.        Past Medical History  Past Medical History:   Diagnosis Date   • Allergic april 1989   • COPD (chronic obstructive pulmonary disease) ([address])    • Diabetes mellitus ([address])     Type 2   • Essential hypertension    • Heart failure (HCC)    • Hyperlipidemia    • Hypertension    • Impetigo    • Left inguinal hernia    • Lung nodule    • Malignant melanoma of skin ([address])        Past Surgical History  Past Surgical History:   Procedure Laterality Date   • APPENDECTOMY     • CATARACT EXTRACTION, BILATERAL     • CHOLECYSTECTOMY     • CHOLECYSTECTOMY     • COLONOSCOPY  2014    Fiberoptic   • HERNIA REPAIR     • KNEE ARTHROSCOPY Bilateral     Therapeutic   • ID CYSTO INSERTION TRANSPROSTATIC IMPLANT SINGLE N/A 3/22/2019    Procedure: CYSTOSCOPY WITH INSERTION UROLIFT;  Surgeon: Madhu Ybarra MD;  Location: [facility];  Service: Urology       Past Social History   Social History     Socioeconomic History   • Marital status:      Spouse name: None   • Number of children: 2   • Years of education: None   • Highest education level: None   Occupational History   • Occupation: rtired   Tobacco Use   • Smoking status: Former     Packs/day: 1.00     Years: 10.00     Total pack years: 10.00     Types: Cigarettes     Start date: 1949     Quit date: 1960     Years since quittin.8   • Smokeless tobacco: Never   Vaping Use   • Vaping Use: Never used   Substance and Sexual Activity   • Alcohol use: Not Currently     Alcohol/week: 52.0 standard drinks of alcohol     Types: 50 Glasses of wine, 2 Standard drinks or equivalent per week   • Drug use: No   • Sexual activity: Not Currently     Partners: Female     Birth control/protection: Sponge   Other Topics Concern   • None   Social History Narrative    Caffeine use, active     Social Determinants of Health     Financial Resource Strain: Low Risk  (2022)    Overall Financial Resource Strain (CARDIA)    • Difficulty of Paying Living Expenses: Not hard at all   Food Insecurity: Not on file   Transportation Needs: No Transportation Needs (2022)    PRAPARE - Transportation    • Lack of Transportation (Medical): No    • Lack of Transportation (Non-Medical):  No   Physical Activity: Not on file   Stress: Not on file   Social Connections: Not on file   Intimate Partner Violence: Not on file   Housing Stability: Not on file       Vital Signs  Vitals:    23 0903   BP: 124/72   BP Location: Right arm   Patient Position: Sitting   Cuff Size: Standard   Pulse: 71   SpO2: 98%   Weight: 77.1 kg (170 lb)   Height: 6' (1.829 m)       Physical Exam:  General appearance: alert, cooperative, no distress  HEENT: normocephalic, anicteric, no eye erythema or discharge, no oropharyngeal thrush  Neck: supple  Lungs: CTA b/l, no rales, rhonchi, or wheezing, unlabored respirations  Heart: RRR, no murmur, rubs, or gallops  Abdomen: soft, non-tender, non-distended, normal bowel sounds, no masses or organomegaly  Rectal: deferred  Extremities: no cyanosis, clubbing, or edema  Musculoskeletal: normal gait  Skin: color and texture normal, no jaundice, no rashes or lesions  Psychiatric: alert and oriented, normal affect and behavior                                                          Janey Knight [place], [place]  11/8/2023  9:16 AM  Sign when Signing Visit  [facility] - Outpatient Progress Note  Дмитрий Woodson 80 y.o. male MRN: [id number]  Encounter: [id number]    Assessment and Plan    1. Irritable bowel syndrome with diarrhea  -Stable on cholestyramine 4 g once daily    2. Abdominal bloating  - get celiac panel      --------------------------------------------------------------------------------------------------------------------    Chief Complaint: Follow-up on irritable bowel    HPI: Дмитрий Woodson is a 80 y.o. male new to me with past medical history of diabetes type 2 COPD, hypertension, chronic kidney disease stage III, dilated cardiomyopathy, arthritis, myelodysplastic syndrome, hyperlipidemia and irritable bowel who presents today for follow up for IBS diarrhea. Stable currently on Questran 4 daily    He reports some gas and bloating. Patient denies any nausea, vomiting, abdominal pain, dysphagia, unexpected weight loss, diarrhea, constipation, blood in stool, or black tarry stools. Endoscopy History:  EGD -unknown  Colonoscopy - 3/24/22 adequate bowel prep and diverticulosis internal hemorrhoids noted.   Random colon biopsies negative for microscopic colitis    Review of Systems:   General: negative for fatigue, fever, night sweats or unexpected weight loss  Psychological: negative for anxiety or depression  Ophthalmic: negative for blurry vision or scleral icterus  ENT: negative for headaches, sore throat or dysphagia  Hematological and Lymphatic: negative for pallor or swollen lymph nodes  Respiratory: negative for cough, shortness of breath or wheezing  Cardiovascular: negative for chest pain, edema or murmur  Gastrointestinal: as mentioned in HPI  Genito-Urinary: negative for dysuria or incontinence  Musculoskeletal: negative for joint pain, joint stiffness or joint swelling  Dermatological: negative for pruritus, rash, or jaundice    Current Medications  Current Outpatient Medications   Medication Sig Dispense Refill   • acetaminophen (TYLENOL) 325 mg tablet Take 2 tablets (650 mg total) by mouth every 6 (six) hours as needed for mild pain 30 tablet 0   • albuterol (2.5 mg/3 mL) 0.083 % nebulizer solution inhale the contents of one ampule by nebulization every 6 hours as needed for wheezing or shortness of breath 360 mL 3   • Anoro Ellipta 62.5-25 MCG/ACT inhaler INHALE ONE PUFF BY MOUTH ONCE DAILY 60 each 0   • Ascorbic Acid (VITAMIN C) 1000 MG tablet Take 1 tablet by mouth daily     • BD PEN NEEDLE DON U/F 32G X 4 MM MISC       • cholecalciferol (VITAMIN D3) 1,000 units tablet Take 1,000 Units by mouth daily     • cholestyramine (QUESTRAN) 4 g packet DISSOLVE 1 PACKET IN 8 OZ OF LIQUID AND TAKE BY MOUTH ONCE DAILY (Patient taking differently: 1 packet every other day) 30 each 5   • Cinnamon 500 MG TABS Take 1 tablet by mouth daily       • Dapagliflozin Propanediol 5 MG TABS Take 5 mg by mouth every other day     • Flovent  MCG/ACT inhaler INHALE ONE PUFF TWICE DAILY - rinse mouth after use 12 g 0   • fluticasone (FLONASE) 50 mcg/act nasal spray USE TWO SPRAYS IN EACH NOSTRIL DAILY 16 g 0   • gabapentin (NEURONTIN) 300 mg capsule TAKE ONE CAPSULE BY MOUTH ONCE DAILY 90 capsule 0   • hydrOXYzine pamoate (VISTARIL) 25 mg capsule TAKE ONE CAPSULE BY MOUTH THREE TIMES DAILY AS NEEDED FOR ITCHING 90 capsule 0   • Insulin Glargine Solostar 100 UNIT/ML SOPN Inject 14 Units under the skin daily     • metoprolol succinate (TOPROL-XL) 50 mg 24 hr tablet TAKE TWO TABLETS BY MOUTH DAILY 180 tablet 0   • montelukast (SINGULAIR) 10 mg tablet TAKE ONE TABLET BY MOUTH ONCE NIGHTLY AT BEDTIME. 90 tablet 0   • Multiple Vitamins-Minerals (OCUVITE ADULT 50+ PO) Take 1 tablet by mouth daily     • NOVOLOG FLEXPEN 100 units/mL injection pen Inject 4 Units under the skin 3 (three) times a day with meals 4u in am, 2u at lunch and 8u at dinner     • ONE TOUCH ULTRA TEST test strip       • ONETOUCH DELICA LANCETS FINE MISC       • roflumilast (DALIRESP) 500 mcg tablet Take 1 tablet (500 mcg total) by mouth daily 30 tablet 5   • sacubitril-valsartan (Entresto) 24-26 MG TABS Take 1 tablet by mouth 2 (two) times a day 180 tablet 3   • simvastatin (ZOCOR) 10 mg tablet TAKE ONE TABLET BY MOUTH ONCE DAILY 90 tablet 1   • Ventolin  (90 Base) MCG/ACT inhaler INHALE TWO PUFFS BY MOUTH EVERY 6 HOURS AS NEEDED FOR WHEEZING 18 g 0   • betamethasone, augmented, (DIPROLENE-AF) 0.05 % cream  (Patient not taking: Reported on 11/8/2023)     • Levemir FlexTouch 100 units/mL injection pen 10 Units daily at bedtime (Patient not taking: Reported on 11/8/2023)       No current facility-administered medications for this visit.        Past Medical History  Past Medical History:   Diagnosis Date   • Allergic april 1989   • COPD (chronic obstructive pulmonary disease) ([address])    • Diabetes mellitus ([address])     Type 2   • Essential hypertension    • Heart failure (HCC)    • Hyperlipidemia    • Hypertension    • Impetigo    • Left inguinal hernia    • Lung nodule    • Malignant melanoma of skin [facility])        Past Surgical History  Past Surgical History:   Procedure Laterality Date   • APPENDECTOMY     • CATARACT EXTRACTION, BILATERAL     • CHOLECYSTECTOMY     • CHOLECYSTECTOMY     • COLONOSCOPY      Fiberoptic   • HERNIA REPAIR     • KNEE ARTHROSCOPY Bilateral     Therapeutic   • RI CYSTO INSERTION TRANSPROSTATIC IMPLANT SINGLE N/A 3/22/2019    Procedure: CYSTOSCOPY WITH INSERTION UROLIFT;  Surgeon: Jae Lew MD;  Location: [facility];  Service: Urology       Past Social History   Social History     Socioeconomic History   • Marital status:      Spouse name: None   • Number of children: 2   • Years of education: None   • Highest education level: None   Occupational History   • Occupation: rtired   Tobacco Use   • Smoking status: Former     Packs/day: 1.00     Years: 10.00     Total pack years: 10.00     Types: Cigarettes     Start date: 1949     Quit date: 1960     Years since quittin.8   • Smokeless tobacco: Never   Vaping Use   • Vaping Use: Never used   Substance and Sexual Activity   • Alcohol use: Not Currently     Alcohol/week: 52.0 standard drinks of alcohol     Types: 50 Glasses of wine, 2 Standard drinks or equivalent per week   • Drug use: No   • Sexual activity: Not Currently     Partners: Female     Birth control/protection: Sponge   Other Topics Concern   • None   Social History Narrative    Caffeine use, active     Social Determinants of Health     Financial Resource Strain: Low Risk  (2022)    Overall Financial Resource Strain (CARDIA)    • Difficulty of Paying Living Expenses: Not hard at all   Food Insecurity: Not on file   Transportation Needs: No Transportation Needs (2022)    PRAPARE - Transportation    • Lack of Transportation (Medical): No    • Lack of Transportation (Non-Medical):  No   Physical Activity: Not on file   Stress: Not on file   Social Connections: Not on file   Intimate Partner Violence: Not on file   Housing Stability: Not on file       Vital Signs  Vitals:    23 0903   BP: 124/72   BP Location: Right arm   Patient Position: Sitting   Cuff Size: Standard   Pulse: 71   SpO2: 98%   Weight: 77.1 kg (170 lb)   Height: 6' (1.829 m)       Physical Exam:  General appearance: alert, cooperative, no distress  HEENT: normocephalic, anicteric, no eye erythema or discharge, no oropharyngeal thrush  Neck: supple  Lungs: CTA b/l, no rales, rhonchi, or wheezing, unlabored respirations  Heart: RRR, no murmur, rubs, or gallops  Abdomen: soft, non-tender, non-distended, normal bowel sounds, no masses or organomegaly  Rectal: deferred  Extremities: no cyanosis, clubbing, or edema  Musculoskeletal: normal gait  Skin: color and texture normal, no jaundice, no rashes or lesions  Psychiatric: alert and oriented, normal affect and behavior                                                          Ivon Noriega PA-C

## 2023-11-24 DIAGNOSIS — J41.0 SIMPLE CHRONIC BRONCHITIS (HCC): ICD-10-CM

## 2023-11-24 RX ORDER — UMECLIDINIUM BROMIDE AND VILANTEROL TRIFENATATE 62.5; 25 UG/1; UG/1
1 POWDER RESPIRATORY (INHALATION) DAILY
Qty: 60 EACH | Refills: 0 | Status: SHIPPED | OUTPATIENT
Start: 2023-11-24

## 2023-12-04 ENCOUNTER — TELEPHONE (OUTPATIENT)
Dept: CARDIOLOGY CLINIC | Facility: MEDICAL CENTER | Age: 88
End: 2023-12-04

## 2023-12-04 NOTE — TELEPHONE ENCOUNTER
Pt stopped inlast week for the application and a script to be sent in for his meds. Patient assistance. Was it sent in? Can u let him know. ?

## 2023-12-05 ENCOUNTER — OFFICE VISIT (OUTPATIENT)
Age: 88
End: 2023-12-05
Payer: COMMERCIAL

## 2023-12-05 VITALS
WEIGHT: 170 LBS | TEMPERATURE: 97.7 F | HEIGHT: 72 IN | OXYGEN SATURATION: 95 % | BODY MASS INDEX: 23.03 KG/M2 | DIASTOLIC BLOOD PRESSURE: 74 MMHG | HEART RATE: 64 BPM | SYSTOLIC BLOOD PRESSURE: 104 MMHG

## 2023-12-05 DIAGNOSIS — J41.0 SIMPLE CHRONIC BRONCHITIS (HCC): Primary | ICD-10-CM

## 2023-12-05 DIAGNOSIS — R06.02 SOB (SHORTNESS OF BREATH): ICD-10-CM

## 2023-12-05 DIAGNOSIS — J44.9 COPD, MODERATE (HCC): ICD-10-CM

## 2023-12-05 PROCEDURE — 99214 OFFICE O/P EST MOD 30 MIN: CPT | Performed by: INTERNAL MEDICINE

## 2023-12-05 NOTE — PROGRESS NOTES
Assessment/Plan:   Diagnoses and all orders for this visit:    Simple chronic bronchitis (HCC)    SOB (shortness of breath)    COPD, moderate (HCC)    Other orders  -     glucose blood test strip; Use daily        Moderate COPD FEV1 of 51% with no bronchodilator response moderately decreased DLCO  Continue with Anoro 1 puff daily   Continue with Flovent to 20 mcg 1 puff twice daily  Rinse mouth after use continue with Daliresp 500 mcg 1 tablet daily  Continue with albuterol via the nebulizer 4 times daily as needed  Vaccinations up to date  Follow-up in 6 months or earlier as needed  Return in about 6 months (around 6/5/2024). All questions are answered to the patient's satisfaction and understanding. He verbalizes understanding. He is encouraged to call with any further questions or concerns. Portions of the record may have been created with voice recognition software. Occasional wrong word or "sound a like" substitutions may have occurred due to the inherent limitations of voice recognition software. Read the chart carefully and recognize, using context, where substitutions have occurred. Electronically Signed by Max Noriega MD    ______________________________________________________________________    Chief Complaint:   Chief Complaint   Patient presents with    Follow-up       Patient ID: Walter Colon is a 80 y.o. y.o. male has a past medical history of Allergic (april 1989), COPD (chronic obstructive pulmonary disease) ([address]), Diabetes mellitus ([address]), Essential hypertension, Heart failure ([address]), Hyperlipidemia, Hypertension, Impetigo, Left inguinal hernia, Lung nodule, and Malignant melanoma of skin ([address]). 12/5/2023  Patient presents today for follow-up visit. Patient is a 81 yo male former smoker with PMH of COPD, HTN, HLD, MDS. He is here today for follow-up. He overall continues to do well with his breathing on the Anoro, Flovent, Singulair, Daliresp every other day.   He is not using his rescue inhaler on a regular basis. He does note with exertion sometimes he is winded easier particularly when walking uphill to his garden. Some days are better than others, he has not noted if the weather plays a part in the symptoms. General  Associated symptoms include coughing. Pertinent negatives include no chest pain, fever, headaches, myalgias or sore throat. Review of Systems   Constitutional: Negative. Negative for fever. HENT: Negative. Negative for sore throat. Eyes: Negative. Respiratory:  Positive for cough. Cardiovascular: Negative. Negative for chest pain. Gastrointestinal: Negative. Endocrine: Negative. Genitourinary: Negative. Musculoskeletal: Negative. Negative for myalgias. Allergic/Immunologic: Negative. Neurological: Negative. Negative for headaches. Hematological: Negative. Psychiatric/Behavioral: Negative. Smoking history: He reports that he quit smoking about 63 years ago. His smoking use included cigarettes. He started smoking about 74 years ago. He has a 10.00 pack-year smoking history.  He has never used smokeless tobacco.    The following portions of the patient's history were reviewed and updated as appropriate: allergies, current medications, past family history, past medical history, past social history, past surgical history, and problem list.    Immunization History   Administered Date(s) Administered    COVID-19 MODERNA VACC 0.5 ML IM 01/26/2021, 03/02/2021, 10/25/2021, 04/21/2022    COVID-19 Moderna Vac BIVALENT 12 Yr+ IM 0.5 ML 11/16/2022    H1N1, All Formulations 01/26/2010    INFLUENZA 09/28/2018, 10/04/2021, 09/22/2022    Influenza Split High Dose Preservative Free IM 10/18/2013, 10/05/2015, 09/30/2016, 10/20/2017    Influenza, high dose seasonal 0.7 mL 10/12/2020    Influenza, seasonal, injectable 10/13/2011, 10/01/2014    Pneumococcal Conjugate 13-Valent 11/22/2017    Pneumococcal Conjugate Vaccine 20-valent (Pcv20), Polysace 09/30/2022    Pneumococcal Polysaccharide PPV23 01/01/1998, 10/19/2005, 11/30/2020    Tdap 03/30/2022, 10/06/2022    Zoster 10/01/2010    Zoster Vaccine Recombinant 01/03/2020, 10/12/2020     Current Outpatient Medications   Medication Sig Dispense Refill    acetaminophen (TYLENOL) 325 mg tablet Take 2 tablets (650 mg total) by mouth every 6 (six) hours as needed for mild pain 30 tablet 0    albuterol (2.5 mg/3 mL) 0.083 % nebulizer solution inhale the contents of one ampule by nebulization every 6 hours as needed for wheezing or shortness of breath 360 mL 3    Anoro Ellipta 62.5-25 MCG/ACT inhaler INHALE ONE PUFF BY MOUTH ONCE DAILY 60 each 0    Ascorbic Acid (VITAMIN C) 1000 MG tablet Take 1 tablet by mouth daily      BD PEN NEEDLE DON U/F 32G X 4 MM MISC        cholecalciferol (VITAMIN D3) 1,000 units tablet Take 1,000 Units by mouth daily      cholestyramine (QUESTRAN) 4 g packet DISSOLVE 1 PACKET IN 8 OZ OF LIQUID AND TAKE BY MOUTH ONCE DAILY 30 each 11    Cinnamon 500 MG TABS Take 1 tablet by mouth daily        Dapagliflozin Propanediol 5 MG TABS Take 5 mg by mouth every other day      Flovent  MCG/ACT inhaler INHALE ONE PUFF TWICE DAILY - rinse mouth after use 12 g 0    fluticasone (FLONASE) 50 mcg/act nasal spray USE TWO SPRAYS IN EACH NOSTRIL DAILY 16 g 0    gabapentin (NEURONTIN) 300 mg capsule TAKE ONE CAPSULE BY MOUTH ONCE DAILY 90 capsule 0    glucose blood test strip Use daily      hydrOXYzine pamoate (VISTARIL) 25 mg capsule TAKE ONE CAPSULE BY MOUTH THREE TIMES DAILY AS NEEDED FOR ITCHING 90 capsule 0    Insulin Glargine Solostar 100 UNIT/ML SOPN Inject 14 Units under the skin daily      metoprolol succinate (TOPROL-XL) 50 mg 24 hr tablet TAKE TWO TABLETS BY MOUTH DAILY 180 tablet 0    montelukast (SINGULAIR) 10 mg tablet TAKE ONE TABLET BY MOUTH ONCE NIGHTLY AT BEDTIME.  90 tablet 0    Multiple Vitamins-Minerals (OCUVITE ADULT 50+ PO) Take 1 tablet by mouth daily      ONE TOUCH ULTRA TEST test strip        ONETOUCH DELICA LANCETS FINE MISC        roflumilast (DALIRESP) 500 mcg tablet Take 1 tablet (500 mcg total) by mouth daily 30 tablet 5    sacubitril-valsartan (Entresto) 24-26 MG TABS Take 1 tablet by mouth 2 (two) times a day 180 tablet 3    simvastatin (ZOCOR) 10 mg tablet TAKE ONE TABLET BY MOUTH ONCE DAILY 90 tablet 1    Ventolin  (90 Base) MCG/ACT inhaler INHALE TWO PUFFS BY MOUTH EVERY 6 HOURS AS NEEDED FOR WHEEZING 18 g 0    betamethasone, augmented, (DIPROLENE-AF) 0.05 % cream  (Patient not taking: Reported on 11/8/2023)      Levemir FlexTouch 100 units/mL injection pen 10 Units daily at bedtime (Patient not taking: Reported on 11/8/2023)      NOVOLOG FLEXPEN 100 units/mL injection pen Inject 4 Units under the skin 3 (three) times a day with meals 4u in am, 2u at lunch and 8u at dinner       No current facility-administered medications for this visit. Allergies: Patient has no known allergies. Objective:  Vitals:    12/05/23 1019   BP: 104/74   Pulse: 64   Temp: 97.7 °F (36.5 °C)   SpO2: 95%   Weight: 77.1 kg (170 lb)   Height: 6' (1.829 m)   Oxygen Therapy  SpO2: 95 %  . Wt Readings from Last 3 Encounters:   12/05/23 77.1 kg (170 lb)   11/08/23 77.1 kg (170 lb)   10/19/23 75.8 kg (167 lb)     Body mass index is 23.06 kg/m². Physical Exam  Vitals and nursing note reviewed. Constitutional:       Appearance: He is well-developed. HENT:      Head: Normocephalic and atraumatic. Eyes:      Conjunctiva/sclera: Conjunctivae normal.      Pupils: Pupils are equal, round, and reactive to light. Neck:      Thyroid: No thyromegaly. Vascular: No JVD. Cardiovascular:      Rate and Rhythm: Normal rate and regular rhythm. Heart sounds: Normal heart sounds. No murmur heard. No friction rub. No gallop. Pulmonary:      Effort: Pulmonary effort is normal. No respiratory distress. Breath sounds: Normal breath sounds. No wheezing or rales.    Chest: Chest wall: No tenderness. Musculoskeletal:         General: No tenderness or deformity. Normal range of motion. Cervical back: Normal range of motion and neck supple. Lymphadenopathy:      Cervical: No cervical adenopathy. Skin:     General: Skin is warm and dry. Neurological:      Mental Status: He is alert and oriented to person, place, and time.          Lab Review:   Orders Only on 10/12/2023   Component Date Value    PTH, Intact 10/12/2023 26     Calcium 10/12/2023 9.5     Magnesium, Serum 10/12/2023 2.2     Phosphorus, Serum 10/12/2023 4.9 (H)     Iron, Serum 10/12/2023 101     Total Iron Binding [place]* 10/12/2023 269     Iron Saturation 10/12/2023 38     Glucose, Random 10/12/2023 89     BUN 10/12/2023 42 (H)     Creatinine 10/12/2023 1.65 (H)     eGFR 10/12/2023 39 (L)     SL AMB BUN/CREATININE RA* 10/12/2023 25 (H)     Sodium 10/12/2023 138     Potassium 10/12/2023 4.9     Chloride 10/12/2023 107     CO2 10/12/2023 25     Calcium 10/12/2023 9.5     Protein, Total 10/12/2023 6.5     Albumin 10/12/2023 4.5     Globulin 10/12/2023 2.0     Albumin/Globulin Ratio 10/12/2023 2.3     TOTAL BILIRUBIN 10/12/2023 0.6     Alkaline Phosphatase 10/12/2023 36     AST 10/12/2023 16     ALT 10/12/2023 14     White Blood Cell Count 10/12/2023 5.3     Red Blood Cell Count 10/12/2023 3.69 (L)     Hemoglobin 10/12/2023 10.1 (L)     HCT 10/12/2023 31.6 (L)     MCV 10/12/2023 85.6     MCH 10/12/2023 27.4     MCHC 10/12/2023 32.0     RDW 10/12/2023 24.6 (H)     Platelet Count 15/69/1512 138 (L)     SL AMB MPV 10/12/2023      Always Message 10/12/2023      Ferritin 10/12/2023 362     Vitamin D, 25-Hydroxy, S* 10/12/2023 34     Creatinine, Urine 10/12/2023 100     Protein/Creat Ratio 10/12/2023 190 (H)     Protein/Creat Ratio 10/12/2023 0.190 (H)     Total Protein, Urine 10/12/2023 19     Neutrophils (Absolute) 10/12/2023 2,926     SL AMB ABSOLUTE MYELOCYT* 10/12/2023 53 (H)     SL AMB LYMPHOCYTES 10/12/2023 1,214 Monocytes (Absolute) 10/12/2023 498     Eosinophils (Absolute) 10/12/2023 334     Basophils ABS 10/12/2023 276 (H)     Neutrophils 10/12/2023 55.2     SL AMB MYELOCYTES 10/12/2023 1.0 (H)     Lymphocytes 10/12/2023 22.9     Monocytes 10/12/2023 9.4     Eosinophils 10/12/2023 6.3     Basophils PCT 10/12/2023 5.2     RBC Morphology 10/12/2023      Note: 10/12/2023     Appointment on 08/28/2023   Component Date Value    Sodium 08/28/2023 137     Potassium 08/28/2023 4.7     Chloride 08/28/2023 105     CO2 08/28/2023 26     ANION GAP 08/28/2023 6     BUN 08/28/2023 32 (H)     Creatinine 08/28/2023 1.45 (H)     Glucose 08/28/2023 170 (H)     Calcium 08/28/2023 9.1     eGFR 08/28/2023 42    Orders Only on 06/13/2023   Component Date Value    Right Eye Diabetic Retin* 06/13/2023 None     Left Eye Diabetic Retino* 06/13/2023 None        Diagnostics:  I have personally reviewed pertinent reports.

## 2023-12-07 DIAGNOSIS — R09.81 NASAL CONGESTION: ICD-10-CM

## 2023-12-07 DIAGNOSIS — J44.1 CHRONIC OBSTRUCTIVE PULMONARY DISEASE WITH ACUTE EXACERBATION (HCC): ICD-10-CM

## 2023-12-07 RX ORDER — FLUTICASONE PROPIONATE 50 MCG
2 SPRAY, SUSPENSION (ML) NASAL DAILY
Qty: 16 G | Refills: 0 | Status: SHIPPED | OUTPATIENT
Start: 2023-12-07

## 2023-12-07 RX ORDER — FLUTICASONE PROPIONATE 220 UG/1
AEROSOL, METERED RESPIRATORY (INHALATION)
Qty: 12 G | Refills: 0 | Status: SHIPPED | OUTPATIENT
Start: 2023-12-07

## 2023-12-15 DIAGNOSIS — L29.9 PRURITUS: ICD-10-CM

## 2023-12-15 RX ORDER — HYDROXYZINE PAMOATE 25 MG/1
CAPSULE ORAL
Qty: 90 CAPSULE | Refills: 0 | Status: SHIPPED | OUTPATIENT
Start: 2023-12-15

## 2023-12-20 ENCOUNTER — OFFICE VISIT (OUTPATIENT)
Dept: OBGYN CLINIC | Facility: MEDICAL CENTER | Age: 88
End: 2023-12-20
Payer: COMMERCIAL

## 2023-12-20 DIAGNOSIS — M71.22 BAKER'S CYST OF KNEE, LEFT: ICD-10-CM

## 2023-12-20 DIAGNOSIS — M17.12 PRIMARY OSTEOARTHRITIS OF LEFT KNEE: Primary | ICD-10-CM

## 2023-12-20 PROCEDURE — 99213 OFFICE O/P EST LOW 20 MIN: CPT | Performed by: PHYSICAL MEDICINE & REHABILITATION

## 2023-12-20 PROCEDURE — 20610 DRAIN/INJ JOINT/BURSA W/O US: CPT | Performed by: PHYSICAL MEDICINE & REHABILITATION

## 2023-12-20 RX ORDER — TRIAMCINOLONE ACETONIDE 40 MG/ML
80 INJECTION, SUSPENSION INTRA-ARTICULAR; INTRAMUSCULAR
Status: COMPLETED | OUTPATIENT
Start: 2023-12-20 | End: 2023-12-20

## 2023-12-20 RX ORDER — ROPIVACAINE HYDROCHLORIDE 5 MG/ML
10 INJECTION, SOLUTION EPIDURAL; INFILTRATION; PERINEURAL
Status: COMPLETED | OUTPATIENT
Start: 2023-12-20 | End: 2023-12-20

## 2023-12-20 RX ADMIN — ROPIVACAINE HYDROCHLORIDE 10 ML: 5 INJECTION, SOLUTION EPIDURAL; INFILTRATION; PERINEURAL at 09:15

## 2023-12-20 RX ADMIN — TRIAMCINOLONE ACETONIDE 80 MG: 40 INJECTION, SUSPENSION INTRA-ARTICULAR; INTRAMUSCULAR at 09:15

## 2023-12-20 NOTE — PROGRESS NOTES
1. Primary osteoarthritis of left knee  Large joint arthrocentesis: L knee    Injection procedure prior authorization      2. Baker's cyst of knee, left          Orders Placed This Encounter   Procedures    Large joint arthrocentesis: L knee    Injection procedure prior authorization        Impression:  Patient is here in follow up of left knee pain likely secondary to severe medial tibiofemoral osteoarthritis leading to Baker's cyst.  Treatment has included intra-articular steroid injection and Baker's cyst aspiration.  We repeated a steroid injection today.  We will provide Patricio with a brace if he will be walking a lot. Can consider referral for genicular nerve procedure with pain management.    We will obtain authorization for viscosupplementation.      At this juncture, the patient has failed over 3 months of conservative treatment that has included intraarticular steroid injection, home exercises, activity modification, over the counter oral and topical pain medications.  The pain is interfering with their activities of daily living.  They report their pain as 7/10.     Imaging Studies (I personally reviewed images in PACS and report):  Left knee x-rays most recent to this encounter reviewed.  These images show severe osteoarthritis that most affects the medial joint space.  There is a small joint effusion.  Calcifications in the posterior knee.  No acute osseous abnormalities.  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     Ultrasound of the soft tissue in the left posterior knee showed a Baker's cyst.  This was performed by radiology.    No follow-ups on file.    Patient is in agreement with the above plan.    HPI:  Patricio Morris is a 89 y.o. male  who presents in follow up.  Here for   Chief Complaint   Patient presents with    Left Knee - Pain, Follow-up       Since last visit: See above.    Following history reviewed and updated:  Past Medical History:   Diagnosis Date     Allergic 1989    COPD (chronic obstructive pulmonary disease) (HCC)     Diabetes mellitus (HCC)     Type 2    Essential hypertension     Heart failure (HCC)     Hyperlipidemia     Hypertension     Impetigo     Left inguinal hernia     Lung nodule     Malignant melanoma of skin (HCC)      Past Surgical History:   Procedure Laterality Date    APPENDECTOMY      CATARACT EXTRACTION, BILATERAL      CHOLECYSTECTOMY      CHOLECYSTECTOMY      COLONOSCOPY      Fiberoptic    HERNIA REPAIR      KNEE ARTHROSCOPY Bilateral     Therapeutic    RI CYSTO INSERTION TRANSPROSTATIC IMPLANT SINGLE N/A 3/22/2019    Procedure: CYSTOSCOPY WITH INSERTION UROLIFT;  Surgeon: Bird Clinton MD;  Location: [facility];  Service: Urology     Social History   Social History     Substance and Sexual Activity   Alcohol Use Not Currently    Alcohol/week: 52.0 standard drinks of alcohol    Types: 50 Glasses of wine, 2 Standard drinks or equivalent per week     Social History     Substance and Sexual Activity   Drug Use No     Social History     Tobacco Use   Smoking Status Former    Current packs/day: 0.00    Average packs/day: 1 pack/day for 11.0 years (11.0 ttl pk-yrs)    Types: Cigarettes    Start date: 1949    Quit date: 1960    Years since quittin.0   Smokeless Tobacco Never     Family History   Problem Relation Age of Onset    Diabetes Mother     Heart attack Neg Hx     Stroke Neg Hx     Aneurysm Neg Hx     Clotting disorder Neg Hx     Arrhythmia Neg Hx     Hypertension Neg Hx     Hyperlipidemia Neg Hx         pt unsure      No Known Allergies     Constitutional:  There were no vitals taken for this visit.   General: NAD.  Eyes: Clear sclerae.  ENT: No inflammation, lesion, or mass of lips.  No tracheal deviation.  Musculoskeletal: As mentioned below.  Integumentary: No visible rashes or skin lesions.  Pulmonary/Chest: Effort normal. No respiratory distress.   Neuro: CN's grossly intact, LOPEZ.  Psych: Normal affect  and judgement.  Vascular: WWP.    Left Knee Exam     Tenderness   The patient is experiencing tenderness in the medial joint line (Posterior knee).    Range of Motion   Extension:  normal   Flexion:  abnormal     Tests   Varus: negative Valgus: negative    Other   Erythema: absent  Scars: absent  Sensation: normal  Pulse: present  Swelling: none  Effusion: no effusion present             Large joint arthrocentesis: L knee  Universal Protocol:  Consent: Verbal consent obtained. Written consent not obtained.  Risks and benefits: risks, benefits and alternatives were discussed  Consent given by: patient  Timeout called at: 12/20/2023 9:23 AM.  Patient understanding: patient states understanding of the procedure being performed  Patient consent: the patient's understanding of the procedure matches consent given  Site marked: the operative site was marked  Radiology Images displayed and confirmed. If images not available, report reviewed: imaging studies available  Patient identity confirmed: verbally with patient  Supporting Documentation  Indications: pain   Procedure Details  Location: knee - L knee  Needle size: 22 G  Ultrasound guidance: no  Approach: Inferolateral to patella.  Medications administered: 80 mg triamcinolone acetonide 40 mg/mL; 10 mL ropivacaine 0.5 %    Patient tolerance: patient tolerated the procedure well with no immediate complications  Dressing:  Sterile dressing applied    There was little to no resistance encountered during the injection.    Risks of this procedure include:    - Risk of bleeding since a needle is involved.  - Risk of infection (1/10,000 chance as per recent studies).  Signs/symptoms were discussed and they would prompt an urgent evaluation at an emergency department.  - Risk of pigmentation or skin dimpling in the skin (2-3% chance as per recent studies) from the steroid.  - Risk of increased pain from steroid flare (1% chance as per recent studies) that typically lasts 24-48  hours.  - Risk of increased blood sugars from the steroid medication that can last for a few weeks.  If the patient is a diabetic or pre-diabetic, they were encouraged to closely monitor their blood sugars and discuss with PCP if elevated more than usual or if having symptoms.    The benefits outweigh the risks and so the procedure was completed.

## 2023-12-20 NOTE — PATIENT INSTRUCTIONS
You had a cortisone injection today.  Some people also refer to this as steroid or corticosteroid.     There are two medicines in this injection, a numbing medicine (anesthetic) and a steroid.     Most people get relief immediately but it can take up to two weeks.    Rarely, some people can get a flushing reaction where they feel warm and flushed in the face.  This is a side effect and resolves on its own.    If you experience any drainage, redness or fevers, please give us a call or go to the nearest emergency room.

## 2023-12-27 DIAGNOSIS — J41.0 SIMPLE CHRONIC BRONCHITIS (HCC): ICD-10-CM

## 2023-12-27 RX ORDER — UMECLIDINIUM BROMIDE AND VILANTEROL TRIFENATATE 62.5; 25 UG/1; UG/1
1 POWDER RESPIRATORY (INHALATION) DAILY
Qty: 60 EACH | Refills: 0 | Status: SHIPPED | OUTPATIENT
Start: 2023-12-27

## 2023-12-28 DIAGNOSIS — R05.9 COUGH: ICD-10-CM

## 2023-12-28 RX ORDER — MONTELUKAST SODIUM 10 MG/1
10 TABLET ORAL
Qty: 90 TABLET | Refills: 0 | Status: SHIPPED | OUTPATIENT
Start: 2023-12-28

## 2023-12-30 DIAGNOSIS — G62.9 NEUROPATHY: ICD-10-CM

## 2024-01-02 RX ORDER — GABAPENTIN 300 MG/1
CAPSULE ORAL
Qty: 90 CAPSULE | Refills: 1 | Status: SHIPPED | OUTPATIENT
Start: 2024-01-02

## 2024-01-13 DIAGNOSIS — R09.81 NASAL CONGESTION: ICD-10-CM

## 2024-01-15 RX ORDER — FLUTICASONE PROPIONATE 50 MCG
2 SPRAY, SUSPENSION (ML) NASAL DAILY
Qty: 16 G | Refills: 0 | Status: SHIPPED | OUTPATIENT
Start: 2024-01-15

## 2024-01-29 ENCOUNTER — OFFICE VISIT (OUTPATIENT)
Dept: FAMILY MEDICINE CLINIC | Facility: MEDICAL CENTER | Age: 89
End: 2024-01-29
Payer: COMMERCIAL

## 2024-01-29 ENCOUNTER — TELEPHONE (OUTPATIENT)
Dept: HEMATOLOGY ONCOLOGY | Facility: CLINIC | Age: 89
End: 2024-01-29

## 2024-01-29 VITALS
WEIGHT: 165.6 LBS | OXYGEN SATURATION: 97 % | BODY MASS INDEX: 22.43 KG/M2 | HEART RATE: 71 BPM | HEIGHT: 72 IN | DIASTOLIC BLOOD PRESSURE: 64 MMHG | TEMPERATURE: 97.6 F | SYSTOLIC BLOOD PRESSURE: 104 MMHG

## 2024-01-29 DIAGNOSIS — R42 LIGHTHEADEDNESS: ICD-10-CM

## 2024-01-29 DIAGNOSIS — Z00.00 MEDICARE ANNUAL WELLNESS VISIT, SUBSEQUENT: Primary | ICD-10-CM

## 2024-01-29 PROCEDURE — 1159F MED LIST DOCD IN RCRD: CPT | Performed by: STUDENT IN AN ORGANIZED HEALTH CARE EDUCATION/TRAINING PROGRAM

## 2024-01-29 PROCEDURE — 1160F RVW MEDS BY RX/DR IN RCRD: CPT | Performed by: STUDENT IN AN ORGANIZED HEALTH CARE EDUCATION/TRAINING PROGRAM

## 2024-01-29 PROCEDURE — 3288F FALL RISK ASSESSMENT DOCD: CPT | Performed by: STUDENT IN AN ORGANIZED HEALTH CARE EDUCATION/TRAINING PROGRAM

## 2024-01-29 PROCEDURE — G0439 PPPS, SUBSEQ VISIT: HCPCS | Performed by: STUDENT IN AN ORGANIZED HEALTH CARE EDUCATION/TRAINING PROGRAM

## 2024-01-29 PROCEDURE — 1125F AMNT PAIN NOTED PAIN PRSNT: CPT | Performed by: STUDENT IN AN ORGANIZED HEALTH CARE EDUCATION/TRAINING PROGRAM

## 2024-01-29 PROCEDURE — 99213 OFFICE O/P EST LOW 20 MIN: CPT | Performed by: STUDENT IN AN ORGANIZED HEALTH CARE EDUCATION/TRAINING PROGRAM

## 2024-01-29 PROCEDURE — 1170F FXNL STATUS ASSESSED: CPT | Performed by: STUDENT IN AN ORGANIZED HEALTH CARE EDUCATION/TRAINING PROGRAM

## 2024-01-29 PROCEDURE — 1123F ACP DISCUSS/DSCN MKR DOCD: CPT | Performed by: STUDENT IN AN ORGANIZED HEALTH CARE EDUCATION/TRAINING PROGRAM

## 2024-01-29 RX ORDER — METOPROLOL SUCCINATE 50 MG/1
75 TABLET, EXTENDED RELEASE ORAL DAILY
COMMUNITY
End: 2024-01-30

## 2024-01-29 NOTE — ASSESSMENT & PLAN NOTE
Suspect related to lower end normal blood pressure  Currently on subcubitril-valsartan 24 -26 mg tablets 1 tablet p.o. twice daily and Toprol-XL 50 mg p.o. daily  Will decrease dose of Toprol-XL to 25 mg p.o. daily  Will message his Cardiologist as well  Follow-up in 1 to 2 weeks for BP recheck, further adjustment as appropriate

## 2024-01-29 NOTE — ASSESSMENT & PLAN NOTE
Immunizations reviewed  Patient states he completed influenza vaccine at [facility] October 2023  Informed about RSV vaccine  Immunizations otherwise up-to-date

## 2024-01-29 NOTE — PROGRESS NOTES
" Assessment and Plan:     Problem List Items Addressed This Visit          Other    Medicare annual wellness visit, subsequent - Primary     Immunizations reviewed  Patient states he completed influenza vaccine at [facility] October 2023  Informed about RSV vaccine  Immunizations otherwise up-to-date         Lightheadedness     Suspect related to lower end normal blood pressure  Currently on subcubitril-valsartan 24 -26 mg tablets 1 tablet p.o. twice daily and Toprol-XL 50 mg p.o. daily  Will decrease dose of Toprol-XL to 25 mg p.o. daily  Will message his Cardiologist as well  Follow-up in 1 to 2 weeks for BP recheck, further adjustment as appropriate             Preventive health issues were discussed with patient, and age appropriate screening tests were ordered as noted in patient's After Visit Summary.  Personalized health advice and appropriate referrals for health education or preventive services given if needed, as noted in patient's After Visit Summary.     History of Present Illness:     Patient presents for a Medicare Wellness Visit.  Patient does have complaint today.  Patient states when he gets up in the morning and sits on the edge of bed feels \" just a bit\" lightheaded.  He states this lasts about 5 to 10 seconds.  Onset about a month ago.  No other complaints.    HPI   Patient Care Team:  Tino Heard DO as PCP - General (Family Medicine)  MD Merlene Koenig MD Sheila Borick, MD James Finegan, MD Devang Dave, MD Benjamin Joseph Quintana, MD as Consulting Physician (Endocrinology)     Review of Systems:     Review of Systems    As noted in HPI      Problem List:     Patient Active Problem List   Diagnosis    COPD without exacerbation (HCC)    Benign hypertension with chronic kidney disease, stage III (HCC)    Hyperlipidemia    Neuropathy, idiopathic    Multiple nodules of lung    Simple chronic bronchitis (HCC)    Type 2 diabetes mellitus with diabetic neuropathy, with " long-term current use of insulin (HCC)    Dilated cardiomyopathy (HCC)    Chronic combined systolic and diastolic congestive heart failure (HCC)    Abnormal chest CT    Arthritis    Paresthesia of both feet    Finger pain, left    Cough    Urinary retention    Benign prostatic hyperplasia (BPH) with straining on urination    Anemia of chronic kidney failure, stage 3 (moderate)     CKD (chronic kidney disease), stage III (HCC)    Chronic kidney disease-mineral and bone disorder    Other proteinuria    MDS (myelodysplastic syndrome), low grade (HCC)    Diarrhea    Primary osteoarthritis of left knee    Baker's cyst of knee, left    Irritable bowel syndrome with diarrhea    Medicare annual wellness visit, subsequent    Lightheadedness      Past Medical and Surgical History:     Past Medical History:   Diagnosis Date    Allergic april 1989    COPD (chronic obstructive pulmonary disease) (HCC)     Diabetes mellitus (HCC)     Type 2    Essential hypertension     Heart failure (HCC)     Hyperlipidemia     Hypertension     Impetigo     Left inguinal hernia     Lung nodule     Malignant melanoma of skin (HCC)      Past Surgical History:   Procedure Laterality Date    APPENDECTOMY      CATARACT EXTRACTION, BILATERAL      CHOLECYSTECTOMY      CHOLECYSTECTOMY      COLONOSCOPY  2014    Fiberoptic    HERNIA REPAIR      KNEE ARTHROSCOPY Bilateral     Therapeutic    CA CYSTO INSERTION TRANSPROSTATIC IMPLANT SINGLE N/A 3/22/2019    Procedure: CYSTOSCOPY WITH INSERTION UROLIFT;  Surgeon: Bird Clinton MD;  [facility];  Service: Urology      Family History:     Family History   Problem Relation Age of Onset    Diabetes Mother     Heart attack Neg Hx     Stroke Neg Hx     Aneurysm Neg Hx     Clotting disorder Neg Hx     Arrhythmia Neg Hx     Hypertension Neg Hx     Hyperlipidemia Neg Hx         pt unsure       Social History:     Social History     Socioeconomic History    Marital status:      Spouse name:  None    Number of children: 2    Years of education: None    Highest education level: None   Occupational History    Occupation: rtired   Tobacco Use    Smoking status: Former     Current packs/day: 0.00     Average packs/day: 1 pack/day for 11.0 years (11.0 ttl pk-yrs)     Types: Cigarettes     Start date: 1949     Quit date: 1960     Years since quittin.1    Smokeless tobacco: Never   Vaping Use    Vaping status: Never Used   Substance and Sexual Activity    Alcohol use: Not Currently     Alcohol/week: 52.0 standard drinks of alcohol     Types: 50 Glasses of wine, 2 Standard drinks or equivalent per week    Drug use: No    Sexual activity: Not Currently     Partners: Female     Birth control/protection: Sponge   Other Topics Concern    None   Social History Narrative    Caffeine use, active     Social Determinants of Health     Financial Resource Strain: Low Risk  (2024)    Overall Financial Resource Strain (CARDIA)     Difficulty of Paying Living Expenses: Not hard at all   Food Insecurity: Not on file   Transportation Needs: No Transportation Needs (2024)    PRAPARE - Transportation     Lack of Transportation (Medical): No     Lack of Transportation (Non-Medical): No   Physical Activity: Not on file   Stress: Not on file   Social Connections: Not on file   Intimate Partner Violence: Not on file   Housing Stability: Not on file      Medications and Allergies:     Current Outpatient Medications   Medication Sig Dispense Refill    acetaminophen (TYLENOL) 325 mg tablet Take 2 tablets (650 mg total) by mouth every 6 (six) hours as needed for mild pain 30 tablet 0    albuterol (2.5 mg/3 mL) 0.083 % nebulizer solution inhale the contents of one ampule by nebulization every 6 hours as needed for wheezing or shortness of breath 360 mL 3    Anoro Ellipta 62.5-25 MCG/ACT inhaler INHALE ONE PUFF BY MOUTH ONCE DAILY 60 each 0    Ascorbic Acid (VITAMIN C) 1000 MG tablet Take 1 tablet by mouth daily       BD PEN NEEDLE DON U/F 32G X 4 MM MISC        cholecalciferol (VITAMIN D3) 1,000 units tablet Take 1,000 Units by mouth daily      cholestyramine (QUESTRAN) 4 g packet DISSOLVE 1 PACKET IN 8 OZ OF LIQUID AND TAKE BY MOUTH ONCE DAILY 30 each 11    Cinnamon 500 MG TABS Take 1 tablet by mouth daily        Dapagliflozin Propanediol 5 MG TABS Take 5 mg by mouth every other day      Flovent  MCG/ACT inhaler INHALE ONE PUFF BY MOUTH TWICE DAILY - rinse mouth after use 12 g 0    fluticasone (FLONASE) 50 mcg/act nasal spray USE TWO SPRAYS IN EACH NOSTRIL DAILY 16 g 0    gabapentin (NEURONTIN) 300 mg capsule TAKE ONE CAPSULE BY MOUTH ONCE DAILY 90 capsule 1    glucose blood test strip Use daily      hydrOXYzine pamoate (VISTARIL) 25 mg capsule TAKE ONE CAPSULE BY MOUTH THREE TIMES DAILY AS NEEDED FOR ITCHING 90 capsule 0    Insulin Glargine Solostar 100 UNIT/ML SOPN Inject 14 Units under the skin daily      montelukast (SINGULAIR) 10 mg tablet TAKE ONE TABLET BY MOUTH NIGHTLY AT BEDTIME 90 tablet 0    Multiple Vitamins-Minerals (OCUVITE ADULT 50+ PO) Take 1 tablet by mouth daily      NOVOLOG FLEXPEN 100 units/mL injection pen Inject 4 Units under the skin 2 (two) times a day with meals 4u in am,  and 8u at dinner      ONE TOUCH ULTRA TEST test strip        ONETOUCH DELICA LANCETS FINE MISC        roflumilast (DALIRESP) 500 mcg tablet Take 1 tablet (500 mcg total) by mouth daily 30 tablet 5    sacubitril-valsartan (Entresto) 24-26 MG TABS Take 1 tablet by mouth 2 (two) times a day 180 tablet 3    simvastatin (ZOCOR) 10 mg tablet TAKE ONE TABLET BY MOUTH ONCE DAILY 90 tablet 1    Ventolin  (90 Base) MCG/ACT inhaler INHALE TWO PUFFS BY MOUTH EVERY 6 HOURS AS NEEDED FOR WHEEZING 18 g 0    betamethasone, augmented, (DIPROLENE-AF) 0.05 % cream  (Patient not taking: Reported on 11/8/2023)      Levemir FlexTouch 100 units/mL injection pen 10 Units daily at bedtime (Patient not taking: Reported on 11/8/2023)       metoprolol succinate (TOPROL-XL) 25 mg 24 hr tablet Take 25 mg by mouth daily       No current facility-administered medications for this visit.     No Known Allergies   Immunizations:     Immunization History   Administered Date(s) Administered    COVID-19 MODERNA VACC 0.5 ML IM 01/26/2021, 03/02/2021, 10/25/2021, 04/21/2022    COVID-19 Moderna Vac BIVALENT 12 Yr+ IM 0.5 ML 11/16/2022    H1N1, All Formulations 01/26/2010    INFLUENZA 09/28/2018, 10/04/2021, 09/22/2022    Influenza Split High Dose Preservative Free IM 10/18/2013, 10/05/2015, 09/30/2016, 10/20/2017    Influenza, high dose seasonal 0.7 mL 10/12/2020    Influenza, seasonal, injectable 10/13/2011, 10/01/2014    Pneumococcal Conjugate 13-Valent 11/22/2017    Pneumococcal Conjugate Vaccine 20-valent (Pcv20), Polysace 09/30/2022    Pneumococcal Polysaccharide PPV23 01/01/1998, 10/19/2005, 11/30/2020    Tdap 03/30/2022, 10/06/2022    Zoster 10/01/2010    Zoster Vaccine Recombinant 01/03/2020, 10/12/2020      Health Maintenance:         Topic Date Due    Hepatitis C Screening  Completed         Topic Date Due    COVID-19 Vaccine (6 - 2023-24 season) 09/01/2023      Medicare Screening Tests and Risk Assessments:     Patricio is here for his Subsequent Wellness visit.     Health Risk Assessment:   Patient rates overall health as very good. Patient feels that their physical health rating is same. Patient is satisfied with their life. Eyesight was rated as same. Hearing was rated as same. Patient feels that their emotional and mental health rating is same. Patients states they are never, rarely angry. Patient states they are sometimes unusually tired/fatigued. Pain experienced in the last 7 days has been some. Patient's pain rating has been 2/10. Patient states that he has experienced no weight loss or gain in last 6 months.     Depression Screening:   PHQ-2 Score: 0      Fall Risk Screening:   In the past year, patient has experienced: no history of falling in  past year      Home Safety:  Patient does not have trouble with stairs inside or outside of their home. Patient has working smoke alarms and has working carbon monoxide detector. Home safety hazards include: loose rugs on the floor.     Nutrition:   Current diet is Diabetic.     Medications:   Patient is currently taking over-the-counter supplements. OTC medications include: see medication list. Patient is able to manage medications.     Activities of Daily Living (ADLs)/Instrumental Activities of Daily Living (IADLs):   Walk and transfer into and out of bed and chair?: Yes  Dress and groom yourself?: Yes    Bathe or shower yourself?: Yes    Feed yourself? Yes  Do your laundry/housekeeping?: Yes  Manage your money, pay your bills and track your expenses?: Yes  Make your own meals?: Yes    Do your own shopping?: Yes    Previous Hospitalizations:   Any hospitalizations or ED visits within the last 12 months?: No      Advance Care Planning:   Living will: Yes    Advanced directive: Yes      Cognitive Screening:   Provider or family/friend/caregiver concerned regarding cognition?: No    PREVENTIVE SCREENINGS      Cardiovascular Screening:    General: History Lipid Disorder and Screening Current      Diabetes Screening:     General: Risks and Benefits Discussed and History Diabetes      Colorectal Cancer Screening:     General: Screening Not Indicated      Prostate Cancer Screening:    General: Screening Not Indicated      Osteoporosis Screening:    General: Screening Not Indicated      Abdominal Aortic Aneurysm (AAA) Screening:    Risk factors include: tobacco use        General: Screening Not Indicated      Lung Cancer Screening:     General: Screening Not Indicated      Hepatitis C Screening:    General: Screening Current    Screening, Brief Intervention, and Referral to Treatment (SBIRT)    Screening  Typical number of drinks in a day: 0  Typical number of drinks in a week: 0  Interpretation: Low risk drinking  behavior.    Single Item Drug Screening:  How often have you used an illegal drug (including marijuana) or a prescription medication for non-medical reasons in the past year? never    Single Item Drug Screen Score: 0  Interpretation: Negative screen for possible drug use disorder    Other Counseling Topics:   Car/seat belt/driving safety, skin self-exam and sunscreen. Sees Dermatology for skin checks       Physical Exam:     /64 (BP Location: Left arm, Patient Position: Sitting, Cuff Size: Standard)   Pulse 71   Temp 97.6 °F (36.4 °C) (Temporal)   Ht 6' (1.829 m)   Wt 75.1 kg (165 lb 9.6 oz)   SpO2 97%   BMI 22.46 kg/m²     Physical Exam  Vitals reviewed.   Constitutional:       General: He is not in acute distress.     Appearance: Normal appearance. He is not ill-appearing, toxic-appearing or diaphoretic.   HENT:      Head: Normocephalic and atraumatic.   Eyes:      Conjunctiva/sclera: Conjunctivae normal.   Cardiovascular:      Rate and Rhythm: Normal rate and regular rhythm.      Pulses: Normal pulses.      Heart sounds: Normal heart sounds.   Pulmonary:      Effort: Pulmonary effort is normal. No respiratory distress.      Breath sounds: Normal breath sounds. No wheezing or rales.   Musculoskeletal:      Cervical back: Neck supple.      Right lower leg: No edema.      Left lower leg: No edema.   Skin:     General: Skin is warm and dry.   Neurological:      Mental Status: He is alert and oriented to person, place, and time.   Psychiatric:         Mood and Affect: Mood normal.         Behavior: Behavior normal.         Thought Content: Thought content normal.         Judgment: Judgment normal.          Tino Heard DO

## 2024-01-29 NOTE — TELEPHONE ENCOUNTER
I called Patricio regarding an appointment that they have scheduled with Dr. Mccauley scheduled on  2/27/2024.      The patient rescheduled appointment with Dr. Jay on 3/26 at 10 AM.

## 2024-01-29 NOTE — PATIENT INSTRUCTIONS
Medicare Preventive Visit Patient Instructions  Thank you for completing your Welcome to Medicare Visit or Medicare Annual Wellness Visit today. Your next wellness visit will be due in one year (1/29/2025).  The screening/preventive services that you may require over the next 5-10 years are detailed below. Some tests may not apply to you based off risk factors and/or age. Screening tests ordered at today's visit but not completed yet may show as past due. Also, please note that scanned in results may not display below.  Preventive Screenings:  Service Recommendations Previous Testing/Comments   Colorectal Cancer Screening  Colonoscopy    Fecal Occult Blood Test (FOBT)/Fecal Immunochemical Test (FIT)  Fecal DNA/Cologuard Test  Flexible Sigmoidoscopy Age: 45-75 years old   Colonoscopy: every 10 years (May be performed more frequently if at higher risk)  OR  FOBT/FIT: every 1 year  OR  Cologuard: every 3 years  OR  Sigmoidoscopy: every 5 years  Screening may be recommended earlier than age 45 if at higher risk for colorectal cancer. Also, an individualized decision between you and your healthcare provider will decide whether screening between the ages of 76-85 would be appropriate. Colonoscopy: 03/24/2022  FOBT/FIT: Not on file  Cologuard: Not on file  Sigmoidoscopy: Not on file    Screening Not Indicated     Prostate Cancer Screening Individualized decision between patient and health care provider in men between ages of 55-69   Medicare will cover every 12 months beginning on the day after your 50th birthday PSA: No results in last 5 years     Screening Not Indicated     Hepatitis C Screening Once for adults born between 1945 and 1965  More frequently in patients at high risk for Hepatitis C Hep C Antibody: 09/30/2022    Screening Current   Diabetes Screening 1-2 times per year if you're at risk for diabetes or have pre-diabetes Fasting glucose: No results in last 5 years (No results in last 5 years)  A1C: 7.5 % of  total Hgb (9/22/2023)  Screening Not Indicated  History Diabetes   Cholesterol Screening Once every 5 years if you don't have a lipid disorder. May order more often based on risk factors. Lipid panel: 04/20/2023  Screening Not Indicated  History Lipid Disorder      Other Preventive Screenings Covered by Medicare:  Abdominal Aortic Aneurysm (AAA) Screening: covered once if your at risk. You're considered to be at risk if you have a family history of AAA or a male between the age of 65-75 who smoking at least 100 cigarettes in your lifetime.  Lung Cancer Screening: covers low dose CT scan once per year if you meet all of the following conditions: (1) Age 55-77; (2) No signs or symptoms of lung cancer; (3) Current smoker or have quit smoking within the last 15 years; (4) You have a tobacco smoking history of at least 20 pack years (packs per day x number of years you smoked); (5) You get a written order from a healthcare provider.  Glaucoma Screening: covered annually if you're considered high risk: (1) You have diabetes OR (2) Family history of glaucoma OR (3)  aged 50 and older OR (4)  American aged 65 and older  Osteoporosis Screening: covered every 2 years if you meet one of the following conditions: (1) Have a vertebral abnormality; (2) On glucocorticoid therapy for more than 3 months; (3) Have primary hyperparathyroidism; (4) On osteoporosis medications and need to assess response to drug therapy.  HIV Screening: covered annually if you're between the age of 15-65. Also covered annually if you are younger than 15 and older than 65 with risk factors for HIV infection. For pregnant patients, it is covered up to 3 times per pregnancy.    Immunizations:  Immunization Recommendations   Influenza Vaccine Annual influenza vaccination during flu season is recommended for all persons aged >= 6 months who do not have contraindications   Pneumococcal Vaccine   * Pneumococcal conjugate vaccine = PCV13  (Prevnar 13), PCV15 (Vaxneuvance), PCV20 (Prevnar 20)  * Pneumococcal polysaccharide vaccine = PPSV23 (Pneumovax) Adults 19-65 yo with certain risk factors or if 65+ yo  If never received any pneumonia vaccine: recommend Prevnar 20 (PCV20)  Give PCV20 if previously received 1 dose of PCV13 or PPSV23   Hepatitis B Vaccine 3 dose series if at intermediate or high risk (ex: diabetes, end stage renal disease, liver disease)   Respiratory syncytial virus (RSV) Vaccine - COVERED BY MEDICARE PART D  * RSVPreF3 (Arexvy) CDC recommends that adults 60 years of age and older may receive a single dose of RSV vaccine using shared clinical decision-making (SCDM)   Tetanus (Td) Vaccine - COST NOT COVERED BY MEDICARE PART B Following completion of primary series, a booster dose should be given every 10 years to maintain immunity against tetanus. Td may also be given as tetanus wound prophylaxis.   Tdap Vaccine - COST NOT COVERED BY MEDICARE PART B Recommended at least once for all adults. For pregnant patients, recommended with each pregnancy.   Shingles Vaccine (Shingrix) - COST NOT COVERED BY MEDICARE PART B  2 shot series recommended in those 19 years and older who have or will have weakened immune systems or those 50 years and older     Health Maintenance Due:      Topic Date Due   • Hepatitis C Screening  Completed     Immunizations Due:      Topic Date Due   • COVID-19 Vaccine (6 - 2023-24 season) 09/01/2023     Advance Directives   What are advance directives?  Advance directives are legal documents that state your wishes and plans for medical care. These plans are made ahead of time in case you lose your ability to make decisions for yourself. Advance directives can apply to any medical decision, such as the treatments you want, and if you want to donate organs.   What are the types of advance directives?  There are many types of advance directives, and each state has rules about how to use them. You may choose a  combination of any of the following:  Living will:  This is a written record of the treatment you want. You can also choose which treatments you do not want, which to limit, and which to stop at a certain time. This includes surgery, medicine, IV fluid, and tube feedings.   Durable power of  for healthcare (DPAHC):  This is a written record that states who you want to make healthcare choices for you when you are unable to make them for yourself. This person, called a proxy, is usually a family member or a friend. You may choose more than 1 proxy.  Do not resuscitate (DNR) order:  A DNR order is used in case your heart stops beating or you stop breathing. It is a request not to have certain forms of treatment, such as CPR. A DNR order may be included in other types of advance directives.  Medical directive:  This covers the care that you want if you are in a coma, near death, or unable to make decisions for yourself. You can list the treatments you want for each condition. Treatment may include pain medicine, surgery, blood transfusions, dialysis, IV or tube feedings, and a ventilator (breathing machine).  Values history:  This document has questions about your views, beliefs, and how you feel and think about life. This information can help others choose the care that you would choose.  Why are advance directives important?  An advance directive helps you control your care. Although spoken wishes may be used, it is better to have your wishes written down. Spoken wishes can be misunderstood, or not followed. Treatments may be given even if you do not want them. An advance directive may make it easier for your family to make difficult choices about your care.       © Copyright [employer] 2018 Information is for End User's use only and may not be sold, redistributed or otherwise used for commercial purposes. All illustrations and images included in CareNotes® are the copyrighted property of [employer]  or [email or web address]

## 2024-01-30 ENCOUNTER — TELEPHONE (OUTPATIENT)
Age: 89
End: 2024-01-30

## 2024-01-30 RX ORDER — METOPROLOL SUCCINATE 25 MG/1
25 TABLET, EXTENDED RELEASE ORAL DAILY
COMMUNITY
End: 2024-02-08

## 2024-01-30 NOTE — TELEPHONE ENCOUNTER
Attempted to call patient both on home phone and cell phone, no response voicemails left.  When patient returns call please let me know, thank you.

## 2024-01-30 NOTE — TELEPHONE ENCOUNTER
Pt called asking for a call back from Dr. Ramirez.   Pt was told to cut his medication to 1/3-Metoprolol     Pt was told by her Cardiologist to stop taking that medication all together ?  Please advise

## 2024-01-30 NOTE — TELEPHONE ENCOUNTER
I ended up reaching pt on his home # [phone].  Pt wanted to clarify the metoprolol.  Pt said when he rec'd his AVS it said to stop it, but pt stated he's only been taking 1 pill daily.  Pt stated it was changed from bid to qd back in 3/2022 by cardio (Thiago) and nephrologist (Sean)  Pt just wants to clarify if he should still just be taking it once daily, and he stated you had mentioned you were going to discuss with his cardiologist (Dr. Zapata) also.

## 2024-01-31 DIAGNOSIS — L29.9 PRURITUS: ICD-10-CM

## 2024-01-31 DIAGNOSIS — J41.0 SIMPLE CHRONIC BRONCHITIS (HCC): ICD-10-CM

## 2024-01-31 RX ORDER — UMECLIDINIUM BROMIDE AND VILANTEROL TRIFENATATE 62.5; 25 UG/1; UG/1
1 POWDER RESPIRATORY (INHALATION) DAILY
Qty: 60 EACH | Refills: 0 | Status: SHIPPED | OUTPATIENT
Start: 2024-01-31

## 2024-01-31 RX ORDER — HYDROXYZINE PAMOATE 25 MG/1
CAPSULE ORAL
Qty: 90 CAPSULE | Refills: 1 | Status: SHIPPED | OUTPATIENT
Start: 2024-01-31

## 2024-02-03 LAB
BASOPHILS # BLD AUTO: 190 CELLS/UL (ref 0–200)
BASOPHILS NFR BLD AUTO: 3.8 %
EOSINOPHIL # BLD AUTO: 335 CELLS/UL (ref 15–500)
EOSINOPHIL NFR BLD AUTO: 6.7 %
ERYTHROCYTE [DISTWIDTH] IN BLOOD BY AUTOMATED COUNT: 24.6 % (ref 11–15)
HCT VFR BLD AUTO: 32.5 % (ref 38.5–50)
HGB BLD-MCNC: 10.4 G/DL (ref 13.2–17.1)
LYMPHOCYTES # BLD AUTO: 1110 CELLS/UL (ref 850–3900)
LYMPHOCYTES NFR BLD AUTO: 22.2 %
MCH RBC QN AUTO: 27.2 PG (ref 27–33)
MCHC RBC AUTO-ENTMCNC: 32 G/DL (ref 32–36)
MCV RBC AUTO: 85.1 FL (ref 80–100)
MONOCYTES # BLD AUTO: 395 CELLS/UL (ref 200–950)
MONOCYTES NFR BLD AUTO: 7.9 %
NEUTROPHILS # BLD AUTO: 2970 CELLS/UL (ref 1500–7800)
NEUTROPHILS NFR BLD AUTO: 59.4 %
PLATELET # BLD AUTO: 171 THOUSAND/UL (ref 140–400)
RBC # BLD AUTO: 3.82 MILLION/UL (ref 4.2–5.8)
WBC # BLD AUTO: 5 THOUSAND/UL (ref 3.8–10.8)

## 2024-02-08 ENCOUNTER — TELEPHONE (OUTPATIENT)
Age: 89
End: 2024-02-08

## 2024-02-08 ENCOUNTER — PROCEDURE VISIT (OUTPATIENT)
Dept: OBGYN CLINIC | Facility: MEDICAL CENTER | Age: 89
End: 2024-02-08
Payer: COMMERCIAL

## 2024-02-08 ENCOUNTER — TELEPHONE (OUTPATIENT)
Dept: FAMILY MEDICINE CLINIC | Facility: MEDICAL CENTER | Age: 89
End: 2024-02-08

## 2024-02-08 VITALS — SYSTOLIC BLOOD PRESSURE: 103 MMHG | DIASTOLIC BLOOD PRESSURE: 59 MMHG | BODY MASS INDEX: 22.38 KG/M2 | WEIGHT: 165 LBS

## 2024-02-08 DIAGNOSIS — M17.12 PRIMARY OSTEOARTHRITIS OF LEFT KNEE: Primary | ICD-10-CM

## 2024-02-08 PROCEDURE — 20610 DRAIN/INJ JOINT/BURSA W/O US: CPT | Performed by: PHYSICAL MEDICINE & REHABILITATION

## 2024-02-08 NOTE — PROGRESS NOTES
1. Primary osteoarthritis of left knee          Orders Placed This Encounter   Procedures    Large joint arthrocentesis        Impression:  Patient is here in follow up of left knee pain likely secondary to severe medial tibiofemoral osteoarthritis leading to Baker's cyst.  Treatment has included intra-articular steroid injection, brace and Baker's cyst aspiration.  We provided Durolane injection today.  Can consider referral for genicular nerve procedure with pain management.     Imaging Studies (I personally reviewed images in PACS and report):  Left knee x-rays most recent to this encounter reviewed.  These images show severe osteoarthritis that most affects the medial joint space.  There is a small joint effusion.  Calcifications in the posterior knee.  No acute osseous abnormalities.  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     Ultrasound of the soft tissue in the left posterior knee showed a Baker's cyst.  This was performed by radiology.    No follow-ups on file.    Patient is in agreement with the above plan.    HPI:  Patricio Morris is a 89 y.o. male  who presents in follow up.  Here for   Chief Complaint   Patient presents with    Left Knee - Pain, Injections       Since last visit: See above.    Following history reviewed and updated:  Past Medical History:   Diagnosis Date    Allergic april 1989    COPD (chronic obstructive pulmonary disease) (HCC)     Diabetes mellitus (HCC)     Type 2    Essential hypertension     Heart failure (HCC)     Hyperlipidemia     Hypertension     Impetigo     Left inguinal hernia     Lung nodule     Malignant melanoma of skin (HCC)      Past Surgical History:   Procedure Laterality Date    APPENDECTOMY      CATARACT EXTRACTION, BILATERAL      CHOLECYSTECTOMY      CHOLECYSTECTOMY      COLONOSCOPY  2014    Fiberoptic    HERNIA REPAIR      KNEE ARTHROSCOPY Bilateral     Therapeutic    [place] CYSTO INSERTION TRANSPROSTATIC IMPLANT SINGLE N/A 3/22/2019     Procedure: CYSTOSCOPY WITH INSERTION UROLIFT;  Surgeon: Bird Clinton MD;  Location: [facility];  Service: Urology     Social History   Social History     Substance and Sexual Activity   Alcohol Use Not Currently    Alcohol/week: 52.0 standard drinks of alcohol    Types: 50 Glasses of wine, 2 Standard drinks or equivalent per week     Social History     Substance and Sexual Activity   Drug Use No     Social History     Tobacco Use   Smoking Status Former    Current packs/day: 0.00    Average packs/day: 1 pack/day for 11.0 years (11.0 ttl pk-yrs)    Types: Cigarettes    Start date: 1949    Quit date: 1960    Years since quittin.1   Smokeless Tobacco Never     Family History   Problem Relation Age of Onset    Diabetes Mother     Heart attack Neg Hx     Stroke Neg Hx     Aneurysm Neg Hx     Clotting disorder Neg Hx     Arrhythmia Neg Hx     Hypertension Neg Hx     Hyperlipidemia Neg Hx         pt unsure      No Known Allergies     Constitutional:  /59 (BP Location: Right arm, Patient Position: Sitting, Cuff Size: Standard)   Wt 74.8 kg (165 lb)   BMI 22.38 kg/m²    General: NAD.  Eyes: Clear sclerae.  ENT: No inflammation, lesion, or mass of lips.  No tracheal deviation.  Musculoskeletal: As mentioned below.  Integumentary: No visible rashes or skin lesions.  Pulmonary/Chest: Effort normal. No respiratory distress.   Neuro: CN's grossly intact, LOPEZ.  Psych: Normal affect and judgement.  Vascular: WWP.    Left Knee Exam     Tenderness   The patient is experiencing tenderness in the medial joint line.    Range of Motion   Extension:  normal     Tests   Varus: negative Valgus: negative    Other   Erythema: absent  Scars: absent  Sensation: normal  Pulse: present  Swelling: none  Effusion: no effusion present             Large joint arthrocentesis: L knee  Universal Protocol:  Consent: Verbal consent obtained. Written consent not obtained.  Risks and benefits: risks, benefits and  alternatives were discussed  Consent given by: patient  Timeout called at: 2/8/2024 11:16 AM.  Patient understanding: patient states understanding of the procedure being performed  Site marked: the operative site was marked  Radiology Images displayed and confirmed. If images not available, report reviewed: imaging studies available  Patient identity confirmed: verbally with patient  Supporting Documentation  Indications: pain   Procedure Details  Location: knee - L knee  Needle size: 22 G  Ultrasound guidance: no  Approach: Inferolateral to patella.  Medications administered: 3 mL sodium hyaluronate 60 MG/3ML    Patient tolerance: patient tolerated the procedure well with no immediate complications  Dressing:  Sterile dressing applied    Risks of this procedure include:    - Risk of bleeding since a needle is involved.  - Risk of infection (1/10,000 chance as per recent studies).  Signs/symptoms were discussed and they would prompt an urgent evaluation at an emergency department.  - Risk of synovitis from the viscosupplementation.    The benefits outweigh the risks and so we proceeded with the procedure.

## 2024-02-08 NOTE — TELEPHONE ENCOUNTER
Pt was down at ortho this morning for an appt and wanted you to know that his bp was a little low at 103/59.  Pt does have an appt with you on Monday 2/12/24.

## 2024-02-08 NOTE — TELEPHONE ENCOUNTER
Pt calling for an appt today at noon.  He said he has an appt with ortho at 1130 and he wanted his provider to see him right after.    I explained there are no opening today at noon.  He has a cough and hoarseness that started yesterday.  No temp.  Using tylenol and advil.  I did suggest urgent care visit after his ortho appt but he refused.  He wants to know if provider will see him for few minutes.

## 2024-02-08 NOTE — TELEPHONE ENCOUNTER
Spoke to patient and advised to completely discontinue Toprol-XL at this time, keep appointment on 2/12/2024.  He expressed understanding.

## 2024-02-08 NOTE — TELEPHONE ENCOUNTER
S/w pt he said the triage nurse told him to try robitussin, so pt said he would do that and he is already scheduled to see Dr Heard on Monday.  If sx get worse pt will call us back or proceed to the .

## 2024-02-12 ENCOUNTER — OFFICE VISIT (OUTPATIENT)
Dept: FAMILY MEDICINE CLINIC | Facility: MEDICAL CENTER | Age: 89
End: 2024-02-12
Payer: COMMERCIAL

## 2024-02-12 VITALS
BODY MASS INDEX: 22.78 KG/M2 | HEIGHT: 72 IN | WEIGHT: 168.2 LBS | TEMPERATURE: 97.8 F | HEART RATE: 82 BPM | DIASTOLIC BLOOD PRESSURE: 72 MMHG | SYSTOLIC BLOOD PRESSURE: 114 MMHG | OXYGEN SATURATION: 96 %

## 2024-02-12 DIAGNOSIS — R05.9 COUGH, UNSPECIFIED TYPE: ICD-10-CM

## 2024-02-12 DIAGNOSIS — I12.9 BENIGN HYPERTENSION WITH CHRONIC KIDNEY DISEASE, STAGE III (HCC): Primary | ICD-10-CM

## 2024-02-12 DIAGNOSIS — N18.30 BENIGN HYPERTENSION WITH CHRONIC KIDNEY DISEASE, STAGE III (HCC): Primary | ICD-10-CM

## 2024-02-12 PROCEDURE — 87636 SARSCOV2 & INF A&B AMP PRB: CPT | Performed by: STUDENT IN AN ORGANIZED HEALTH CARE EDUCATION/TRAINING PROGRAM

## 2024-02-12 PROCEDURE — 99213 OFFICE O/P EST LOW 20 MIN: CPT | Performed by: STUDENT IN AN ORGANIZED HEALTH CARE EDUCATION/TRAINING PROGRAM

## 2024-02-12 NOTE — PROGRESS NOTES
Formerly [facility], later [facility] - Clinic Note  Tino Heard DO, 24     Patricio Morris MRN: [id number] : 3/26/1933 Age: 89 y.o.     Assessment/Plan     1. Benign hypertension with chronic kidney disease, stage III (HCC)    -Patient presents for blood pressure recheck after discontinuation of Toprol-XL  -Blood pressure 114/72 today, will remain off of beta-blocker at this time  -Continue Entresto    2. Cough, unspecified type    -Advised about supportive care measures, fluids, rest  -Isolate at this time until test result  - COVID/FLU; Future  -Advised at Mucinex DM p.o. twice daily as needed  -Advised about signs and symptoms which case to seek prior medical attention    Patricio Morris acknowledged understanding of treatment plan, all questions answered.    Subjective      Patricio Morris is a 89 y.o. male who presents for blood pressure recheck.  At visit on 2024 patient mention episodes of lightheadedness and blood pressures running low.  Patient was advised to decrease dose of Toprol-XL from 50 mg p.o. daily to 25 mg p.o. daily.  On Thursday of last week, patient advised to completely discontinue.  Blood pressure today 114/72.  Patient has complaint today of nasal congestion and cough.  Onset Tuesday of last week.  No fevers.  Patient reports cough is productive with yellow sputum.  No chest pain, shortness of breath or wheezing.  Patient states he has been drinking tea with honey, lemon and taking Safetussin every 4-6 hours.    The following portions of the patient's history were reviewed and updated as appropriate: allergies, current medications, past family history, past medical history, past social history, past surgical history and problem list.     Past Medical History:   Diagnosis Date    Allergic 1989    COPD (chronic obstructive pulmonary disease) (HCC)     Diabetes mellitus (HCC)     Type 2    Essential hypertension     Heart failure (HCC)     Hyperlipidemia     Hypertension      Impetigo     Left inguinal hernia     Lung nodule     Malignant melanoma of skin (HCC)        No Known Allergies    Past Surgical History:   Procedure Laterality Date    APPENDECTOMY      CATARACT EXTRACTION, BILATERAL      CHOLECYSTECTOMY      CHOLECYSTECTOMY      COLONOSCOPY      Fiberoptic    HERNIA REPAIR      KNEE ARTHROSCOPY Bilateral     Therapeutic    [place] CYSTO INSERTION TRANSPROSTATIC IMPLANT SINGLE N/A 3/22/2019    Procedure: CYSTOSCOPY WITH INSERTION UROLIFT;  Surgeon: Bird Clinton MD;  Location[facility];  Service: Urology       Family History   Problem Relation Age of Onset    Diabetes Mother     Heart attack Neg Hx     Stroke Neg Hx     Aneurysm Neg Hx     Clotting disorder Neg Hx     Arrhythmia Neg Hx     Hypertension Neg Hx     Hyperlipidemia Neg Hx         pt unsure        Social History     Socioeconomic History    Marital status:      Spouse name: None    Number of children: 2    Years of education: None    Highest education level: None   Occupational History    Occupation: rtired   Tobacco Use    Smoking status: Former     Current packs/day: 0.00     Average packs/day: 1 pack/day for 11.0 years (11.0 ttl pk-yrs)     Types: Cigarettes     Start date: 1949     Quit date: 1960     Years since quittin.1     Passive exposure: Past    Smokeless tobacco: Never   Vaping Use    Vaping status: Never Used   Substance and Sexual Activity    Alcohol use: Not Currently     Alcohol/week: 52.0 standard drinks of alcohol     Types: 50 Glasses of wine, 2 Standard drinks or equivalent per week    Drug use: No    Sexual activity: Not Currently     Partners: Female     Birth control/protection: Sponge   Other Topics Concern    None   Social History Narrative    Caffeine use, active     Social Determinants of Health     Financial Resource Strain: Low Risk  (2024)    Overall Financial Resource Strain (CARDIA)     Difficulty of Paying Living Expenses: Not hard at all   Food  Insecurity: Not on file   Transportation Needs: No Transportation Needs (1/29/2024)    PRAPARE - Transportation     Lack of Transportation (Medical): No     Lack of Transportation (Non-Medical): No   Physical Activity: Not on file   Stress: Not on file   Social Connections: Not on file   Intimate Partner Violence: Not on file   Housing Stability: Not on file       Current Outpatient Medications   Medication Sig Dispense Refill    acetaminophen (TYLENOL) 325 mg tablet Take 2 tablets (650 mg total) by mouth every 6 (six) hours as needed for mild pain 30 tablet 0    albuterol (2.5 mg/3 mL) 0.083 % nebulizer solution inhale the contents of one ampule by nebulization every 6 hours as needed for wheezing or shortness of breath 360 mL 3    Anoro Ellipta 62.5-25 MCG/ACT inhaler INHALE ONE PUFF BY MOUTH ONCE DAILY 60 each 0    Ascorbic Acid (VITAMIN C) 1000 MG tablet Take 1 tablet by mouth daily      BD PEN NEEDLE DON U/F 32G X 4 MM MISC        cholecalciferol (VITAMIN D3) 1,000 units tablet Take 1,000 Units by mouth daily      cholestyramine (QUESTRAN) 4 g packet DISSOLVE 1 PACKET IN 8 OZ OF LIQUID AND TAKE BY MOUTH ONCE DAILY 30 each 11    Cinnamon 500 MG TABS Take 1 tablet by mouth daily        Dapagliflozin Propanediol 5 MG TABS Take 5 mg by mouth every other day      Flovent  MCG/ACT inhaler INHALE ONE PUFF BY MOUTH TWICE DAILY - rinse mouth after use 12 g 0    fluticasone (FLONASE) 50 mcg/act nasal spray USE TWO SPRAYS IN EACH NOSTRIL DAILY 16 g 0    gabapentin (NEURONTIN) 300 mg capsule TAKE ONE CAPSULE BY MOUTH ONCE DAILY 90 capsule 1    glucose blood test strip Use daily      hydrOXYzine pamoate (VISTARIL) 25 mg capsule TAKE ONE CAPSULE BY MOUTH THREE TIMES DAILY AS NEEDED FOR ITCHING 90 capsule 1    Insulin Glargine Solostar 100 UNIT/ML SOPN Inject 14 Units under the skin daily      montelukast (SINGULAIR) 10 mg tablet TAKE ONE TABLET BY MOUTH NIGHTLY AT BEDTIME 90 tablet 0    Multiple Vitamins-Minerals  (OCUVITE ADULT 50+ PO) Take 1 tablet by mouth daily      NOVOLOG FLEXPEN 100 units/mL injection pen Inject 4 Units under the skin 2 (two) times a day with meals 4u in am,  and 8u at dinner      ONE TOUCH ULTRA TEST test strip        ONETOUCH DELICA LANCETS FINE MISC        roflumilast (DALIRESP) 500 mcg tablet Take 1 tablet (500 mcg total) by mouth daily 30 tablet 5    sacubitril-valsartan (Entresto) 24-26 MG TABS Take 1 tablet by mouth 2 (two) times a day 180 tablet 3    simvastatin (ZOCOR) 10 mg tablet TAKE ONE TABLET BY MOUTH ONCE DAILY 90 tablet 1    Ventolin  (90 Base) MCG/ACT inhaler INHALE TWO PUFFS BY MOUTH EVERY 6 HOURS AS NEEDED FOR WHEEZING 18 g 0    betamethasone, augmented, (DIPROLENE-AF) 0.05 % cream  (Patient not taking: Reported on 11/8/2023)      Levemir FlexTouch 100 units/mL injection pen 10 Units daily at bedtime (Patient not taking: Reported on 11/8/2023)       No current facility-administered medications for this visit.       Review of Systems     As noted in HPI    Objective      /72 (BP Location: Left arm, Patient Position: Sitting, Cuff Size: Standard)   Pulse 82   Temp 97.8 °F (36.6 °C) (Temporal)   Ht 6' (1.829 m)   Wt 76.3 kg (168 lb 3.2 oz)   SpO2 96%   BMI 22.81 kg/m²     Physical Exam  Vitals reviewed.   Constitutional:       General: He is not in acute distress.     Appearance: He is ill-appearing. He is not toxic-appearing.   HENT:      Head: Normocephalic and atraumatic.      Ears:      Comments: Hearing aids in place     Nose: Congestion and rhinorrhea present.      Mouth/Throat:      Mouth: Mucous membranes are moist.      Pharynx: Oropharynx is clear. No oropharyngeal exudate or posterior oropharyngeal erythema.   Eyes:      General:         Right eye: No discharge.         Left eye: No discharge.      Extraocular Movements: Extraocular movements intact.      Conjunctiva/sclera: Conjunctivae normal.      Pupils: Pupils are equal, round, and reactive to light.  "  Cardiovascular:      Rate and Rhythm: Normal rate.      Pulses: Normal pulses.   Pulmonary:      Effort: Pulmonary effort is normal. No respiratory distress.      Breath sounds: Normal breath sounds. No wheezing or rales.   Musculoskeletal:      Cervical back: Neck supple. No rigidity.   Lymphadenopathy:      Cervical: No cervical adenopathy.   Skin:     General: Skin is warm and dry.   Neurological:      Mental Status: He is alert and oriented to person, place, and time.   Psychiatric:         Mood and Affect: Mood normal.         Behavior: Behavior normal.         Thought Content: Thought content normal.             Some portions of this record may have been generated with voice recognition software. There may be translation, syntax, or grammatical errors. Occasional wrong word or \"sound-a-like\" substitutions may have occurred due to the inherent limitations of the voice recognition software. Read the chart carefully and recognize, using context, where substations may have occurred. If you have any questions, please contact the dictating provider for clarification or correction, as needed.  "

## 2024-02-13 LAB
FLUAV RNA RESP QL NAA+PROBE: NEGATIVE
FLUBV RNA RESP QL NAA+PROBE: NEGATIVE
SARS-COV-2 RNA RESP QL NAA+PROBE: POSITIVE

## 2024-02-16 ENCOUNTER — TELEPHONE (OUTPATIENT)
Age: 89
End: 2024-02-16

## 2024-02-16 DIAGNOSIS — R09.81 NASAL CONGESTION: Primary | ICD-10-CM

## 2024-02-16 RX ORDER — IPRATROPIUM BROMIDE 21 UG/1
2 SPRAY, METERED NASAL EVERY 12 HOURS
Qty: 30 ML | Refills: 0 | Status: SHIPPED | OUTPATIENT
Start: 2024-02-16

## 2024-02-16 NOTE — TELEPHONE ENCOUNTER
Called and spoke with pt to relay Dr Heard's message.  Pt understood, but he wondered if he should also continue with the mucinex, or is there something else he could take.  He said the mucinex hasn't really been helping him much.      Routed to Dr Heard - please advise

## 2024-02-16 NOTE — TELEPHONE ENCOUNTER
Patient tested positive for COVID-19.  Was outside of the window for antiviral therapy.  Fluids, rest.  Atrovent nasal spray sent to pharmacy with instruction.  Should patient have any acute symptoms such as chest pain or shortness of breath he should seek prompt medical attention.

## 2024-02-16 NOTE — TELEPHONE ENCOUNTER
Patient may start Robitussin OTC.  Atrovent nasal spray will assist with nasal congestion and postnasal drip and subsequently cough as well.

## 2024-02-16 NOTE — TELEPHONE ENCOUNTER
Called and spoke with pt to relay Dr Heard's message.  Pt asked which Robitussin he should get, because there are so many different kinds.    Routed to Dr Heard

## 2024-02-16 NOTE — TELEPHONE ENCOUNTER
Bernie called in post OV 2/12 for update. He feels he has not had any improvement with Mucinex DM;  taking 2 per day and has enough left through Sunday 2/18. Still has a lot of coughing with yellow sputum and is tired of coughing. Feels like he has some sinus and nasal congestion. Please follow up with patient.

## 2024-02-21 ENCOUNTER — TELEPHONE (OUTPATIENT)
Age: 89
End: 2024-02-21

## 2024-02-21 PROBLEM — Z00.00 MEDICARE ANNUAL WELLNESS VISIT, SUBSEQUENT: Status: RESOLVED | Noted: 2024-01-29 | Resolved: 2024-02-21

## 2024-02-21 NOTE — TELEPHONE ENCOUNTER
Patient was Positive for Covid.  He still is not feeling great, cough is better.  He wants to know if he should make appt to come in and get tested again for covid.  Or, should he make a F/U just to check up.  Call was lost.

## 2024-02-22 ENCOUNTER — OFFICE VISIT (OUTPATIENT)
Dept: FAMILY MEDICINE CLINIC | Facility: MEDICAL CENTER | Age: 89
End: 2024-02-22
Payer: COMMERCIAL

## 2024-02-22 VITALS
WEIGHT: 168 LBS | TEMPERATURE: 98.3 F | OXYGEN SATURATION: 95 % | BODY MASS INDEX: 22.75 KG/M2 | HEART RATE: 86 BPM | HEIGHT: 72 IN | SYSTOLIC BLOOD PRESSURE: 104 MMHG | DIASTOLIC BLOOD PRESSURE: 64 MMHG

## 2024-02-22 DIAGNOSIS — U07.1 COVID-19: Primary | ICD-10-CM

## 2024-02-22 PROCEDURE — 99213 OFFICE O/P EST LOW 20 MIN: CPT | Performed by: STUDENT IN AN ORGANIZED HEALTH CARE EDUCATION/TRAINING PROGRAM

## 2024-02-22 NOTE — PROGRESS NOTES
"  Formerly [facility], [facility] - Clinic Note  Tino Heard DO, 24     Patricio Morris MRN: [id number] : 3/26/1933 Age: 89 y.o.     Assessment/Plan     1. COVID-19    -Patient presents for recheck, he tested positive for COVID-19 on 2024  -Patient is progressively getting better  -Advised him to continue vitamin regimen and fluids and adequate nutrition  -Lungs clear to auscultation bilaterally, vital signs stable  -Will continue Atrovent nasal spray and Mucinex DM p.o. twice daily as needed at this time  -Advised about signs and symptoms in which case to contact    Patricio Morris acknowledged understanding of treatment plan, all questions answered.    Subjective      Patricio Morris is a 89 y.o. male who presents for recheck.  He tested positive for COVID-19 on 2024.  His symptoms started about a week prior to that.  Today, patient states he is \"feeling much better in the past couple 3 days\", \" the only time a cough now is to bring up mucus\".  No fevers.  He denies any chest pain, shortness of breath or wheezing.  He does endorse rhinorrhea.  As of late, patient has been using Atrovent nasal spray and Robitussin.  Appetite is good.    The following portions of the patient's history were reviewed and updated as appropriate: allergies, current medications, past family history, past medical history, past social history, past surgical history and problem list.     Past Medical History:   Diagnosis Date    Allergic 1989    COPD (chronic obstructive pulmonary disease) (HCC)     Diabetes mellitus (HCC)     Type 2    Essential hypertension     Heart failure (HCC)     Hyperlipidemia     Hypertension     Impetigo     Left inguinal hernia     Lung nodule     Malignant melanoma of skin (HCC)        No Known Allergies    Past Surgical History:   Procedure Laterality Date    APPENDECTOMY      CATARACT EXTRACTION, BILATERAL      CHOLECYSTECTOMY      CHOLECYSTECTOMY      COLONOSCOPY      Fiberoptic    " HERNIA REPAIR      KNEE ARTHROSCOPY Bilateral     Therapeutic    VT CYSTO INSERTION TRANSPROSTATIC IMPLANT SINGLE N/A 3/22/2019    Procedure: CYSTOSCOPY WITH INSERTION UROLIFT;  Surgeon: Bird Clinton MD;  Location: [facility];  Service: Urology       Family History   Problem Relation Age of Onset    Diabetes Mother     Heart attack Neg Hx     Stroke Neg Hx     Aneurysm Neg Hx     Clotting disorder Neg Hx     Arrhythmia Neg Hx     Hypertension Neg Hx     Hyperlipidemia Neg Hx         pt unsure        Social History     Socioeconomic History    Marital status:      Spouse name: None    Number of children: 2    Years of education: None    Highest education level: None   Occupational History    Occupation: rtired   Tobacco Use    Smoking status: Former     Current packs/day: 0.00     Average packs/day: 1 pack/day for 11.0 years (11.0 ttl pk-yrs)     Types: Cigarettes     Start date: 1949     Quit date: 1960     Years since quittin.1     Passive exposure: Past    Smokeless tobacco: Never   Vaping Use    Vaping status: Never Used   Substance and Sexual Activity    Alcohol use: Not Currently     Alcohol/week: 52.0 standard drinks of alcohol     Types: 50 Glasses of wine, 2 Standard drinks or equivalent per week    Drug use: No    Sexual activity: Not Currently     Partners: Female     Birth control/protection: Sponge   Other Topics Concern    None   Social History Narrative    Caffeine use, active     Social Determinants of Health     Financial Resource Strain: Low Risk  (2024)    Overall Financial Resource Strain (CARDIA)     Difficulty of Paying Living Expenses: Not hard at all   Food Insecurity: Not on file   Transportation Needs: No Transportation Needs (2024)    PRAPARE - Transportation     Lack of Transportation (Medical): No     Lack of Transportation (Non-Medical): No   Physical Activity: Not on file   Stress: Not on file   Social Connections: Not on file   Intimate Partner  Violence: Not on file   Housing Stability: Not on file       Current Outpatient Medications   Medication Sig Dispense Refill    acetaminophen (TYLENOL) 325 mg tablet Take 2 tablets (650 mg total) by mouth every 6 (six) hours as needed for mild pain 30 tablet 0    albuterol (2.5 mg/3 mL) 0.083 % nebulizer solution inhale the contents of one ampule by nebulization every 6 hours as needed for wheezing or shortness of breath 360 mL 3    Anoro Ellipta 62.5-25 MCG/ACT inhaler INHALE ONE PUFF BY MOUTH ONCE DAILY 60 each 0    Ascorbic Acid (VITAMIN C) 1000 MG tablet Take 1 tablet by mouth daily      BD PEN NEEDLE DON U/F 32G X 4 MM MISC        cholecalciferol (VITAMIN D3) 1,000 units tablet Take 1,000 Units by mouth daily      cholestyramine (QUESTRAN) 4 g packet DISSOLVE 1 PACKET IN 8 OZ OF LIQUID AND TAKE BY MOUTH ONCE DAILY 30 each 11    Cinnamon 500 MG TABS Take 1 tablet by mouth daily        Dapagliflozin Propanediol 5 MG TABS Take 5 mg by mouth every other day      Flovent  MCG/ACT inhaler INHALE ONE PUFF BY MOUTH TWICE DAILY - rinse mouth after use 12 g 0    fluticasone (FLONASE) 50 mcg/act nasal spray USE TWO SPRAYS IN EACH NOSTRIL DAILY 16 g 0    gabapentin (NEURONTIN) 300 mg capsule TAKE ONE CAPSULE BY MOUTH ONCE DAILY 90 capsule 1    glucose blood test strip Use daily      hydrOXYzine pamoate (VISTARIL) 25 mg capsule TAKE ONE CAPSULE BY MOUTH THREE TIMES DAILY AS NEEDED FOR ITCHING 90 capsule 1    Insulin Glargine Solostar 100 UNIT/ML SOPN Inject 14 Units under the skin daily      ipratropium (ATROVENT) 0.03 % nasal spray 2 sprays into each nostril every 12 (twelve) hours 30 mL 0    montelukast (SINGULAIR) 10 mg tablet TAKE ONE TABLET BY MOUTH NIGHTLY AT BEDTIME 90 tablet 0    Multiple Vitamins-Minerals (OCUVITE ADULT 50+ PO) Take 1 tablet by mouth daily      NOVOLOG FLEXPEN 100 units/mL injection pen Inject 4 Units under the skin 2 (two) times a day with meals 4u in am,  and 8u at dinner      ONE TOUCH  ULTRA TEST test strip        ONETOUCH DELICA LANCETS FINE MISC        roflumilast (DALIRESP) 500 mcg tablet Take 1 tablet (500 mcg total) by mouth daily 30 tablet 5    sacubitril-valsartan (Entresto) 24-26 MG TABS Take 1 tablet by mouth 2 (two) times a day 180 tablet 3    simvastatin (ZOCOR) 10 mg tablet TAKE ONE TABLET BY MOUTH ONCE DAILY 90 tablet 1    Ventolin  (90 Base) MCG/ACT inhaler INHALE TWO PUFFS BY MOUTH EVERY 6 HOURS AS NEEDED FOR WHEEZING 18 g 0    betamethasone, augmented, (DIPROLENE-AF) 0.05 % cream  (Patient not taking: Reported on 2/22/2024)      Levemir FlexTouch 100 units/mL injection pen 10 Units daily at bedtime (Patient not taking: Reported on 11/8/2023)       No current facility-administered medications for this visit.       Review of Systems     As noted in HPI    Objective      /64 (BP Location: Left arm, Patient Position: Sitting, Cuff Size: Standard)   Pulse 86   Temp 98.3 °F (36.8 °C)   Ht 6' (1.829 m)   Wt 76.2 kg (168 lb)   SpO2 95%   BMI 22.78 kg/m²     Physical Exam  Vitals reviewed.   Constitutional:       General: He is not in acute distress.     Appearance: Normal appearance. He is not toxic-appearing.   HENT:      Head: Normocephalic and atraumatic.      Nose: Congestion and rhinorrhea present.      Mouth/Throat:      Mouth: Mucous membranes are moist.      Pharynx: Oropharynx is clear. No oropharyngeal exudate or posterior oropharyngeal erythema.   Eyes:      General:         Right eye: No discharge.         Left eye: No discharge.      Extraocular Movements: Extraocular movements intact.      Conjunctiva/sclera: Conjunctivae normal.      Pupils: Pupils are equal, round, and reactive to light.   Cardiovascular:      Rate and Rhythm: Normal rate and regular rhythm.      Pulses: Normal pulses.   Pulmonary:      Effort: Pulmonary effort is normal. No respiratory distress.      Breath sounds: Normal breath sounds. No decreased breath sounds, wheezing or rales.  "  Musculoskeletal:      Cervical back: Neck supple.   Lymphadenopathy:      Cervical: No cervical adenopathy.   Skin:     General: Skin is warm and dry.      Capillary Refill: Capillary refill takes less than 2 seconds.   Neurological:      Mental Status: He is alert and oriented to person, place, and time.   Psychiatric:         Mood and Affect: Mood normal.         Behavior: Behavior normal.         Thought Content: Thought content normal.         Judgment: Judgment normal.             Some portions of this record may have been generated with voice recognition software. There may be translation, syntax, or grammatical errors. Occasional wrong word or \"sound-a-like\" substitutions may have occurred due to the inherent limitations of the voice recognition software. Read the chart carefully and recognize, using context, where substations may have occurred. If you have any questions, please contact the dictating provider for clarification or correction, as needed.  "

## 2024-02-23 ENCOUNTER — TELEPHONE (OUTPATIENT)
Dept: ADMINISTRATIVE | Facility: OTHER | Age: 89
End: 2024-02-23

## 2024-02-23 NOTE — LETTER
Diabetic Foot Exam Form    Date Requested: 24  Patient: Patricio Morris  Patient : 3/26/1933   Referring Provider: Tino Heard DO    Diabetic Foot Exam Performed with shoes and socks removed        Yes         No     Date of Diabetic Foot Exam ______________________________  Risk Score ____________________________________________    Left Foot       Visual Inspection         Monofilament Testing Sensory Exam        Pedal Pulses         Additional Comments         Right Foot      Visual Inspection         Monofilament Testing Sensory Exam       Pedal Pulses         Additional Comments         Comments __________________________________________________________    Practice Providing Exam ______________________________________________    Exam Performed By (print name) _______________________________________      Provider Signature ___________________________________________________      These reports are needed for  compliance.    Please fax this completed form and a copy of the Diabetic Foot Exam report to our office located at [address] [place] [address] as soon as possible via Fax [phone] cheryl Steele: Phone [phone]    We thank you for your assistance in treating our mutual patient.

## 2024-02-23 NOTE — TELEPHONE ENCOUNTER
Upon review of the In Basket request and the patient's chart, initial outreach has been made via fax to facility. Please see Contacts section for details.     Thank you  Ivette Null

## 2024-02-23 NOTE — TELEPHONE ENCOUNTER
----- Message from Haley Diamond sent at 2/22/2024 11:38 AM EST -----  Regarding: care gap request  02/22/24 11:38 AM    Hello, our patient attached above has had Diabetic Foot Exam completed/performed. Please assist in updating the patient chart by making an External outreach to Pt had DM foot exam done at [facility] by Dr. Baires in last 2 mths.     Thank you,  Haley ROLLINS [facility]

## 2024-02-23 NOTE — LETTER
Diabetic Foot Exam Form    Date Requested: 24  Patient: Patricio Morris  Patient : 3/26/1933   Referring Provider: Tino Heard DO    Diabetic Foot Exam Performed with shoes and socks removed        Yes         No     Date of Diabetic Foot Exam ______________________________  Risk Score ____________________________________________    Left Foot       Visual Inspection         Monofilament Testing Sensory Exam        Pedal Pulses         Additional Comments         Right Foot      Visual Inspection         Monofilament Testing Sensory Exam       Pedal Pulses         Additional Comments         Comments __________________________________________________________    Practice Providing Exam ______________________________________________    Exam Performed By (print name) _______________________________________      Provider Signature ___________________________________________________      These reports are needed for  compliance.    Please fax this completed form and a copy of the Diabetic Foot Exam report to our office located at [address] [place] [address] as soon as possible via Fax [phone] cheryl Steele: Phone [phone]    We thank you for your assistance in treating our mutual patient.

## 2024-02-26 NOTE — TELEPHONE ENCOUNTER
As a follow-up, a second attempt has been made for outreach via fax to facility. Please see Contacts section for details.    Thank you  Ivette Null

## 2024-02-27 NOTE — TELEPHONE ENCOUNTER
Upon review of the In Basket request we have found that the patient has not yet had the requested item completed, patient is scheduled for 3/5/24. Due to protocols, we are unable to hold requests for resulting/linking of a future items and are unable to proceed.     Any additional questions or concerns should be emailed to the Practice Liaisons via the appropriate education email address, please do not reply via In Basket.    Thank you  Ivette Null

## 2024-03-01 DIAGNOSIS — J41.0 SIMPLE CHRONIC BRONCHITIS (HCC): ICD-10-CM

## 2024-03-01 RX ORDER — ALBUTEROL SULFATE 2.5 MG/3ML
SOLUTION RESPIRATORY (INHALATION)
Qty: 360 ML | Refills: 1 | Status: SHIPPED | OUTPATIENT
Start: 2024-03-01

## 2024-03-05 LAB
BUN SERPL-MCNC: 34 MG/DL (ref 7–25)
BUN/CREAT SERPL: 23 (CALC) (ref 6–22)
CALCIUM SERPL-MCNC: 9.2 MG/DL (ref 8.6–10.3)
CHLORIDE SERPL-SCNC: 106 MMOL/L (ref 98–110)
CO2 SERPL-SCNC: 25 MMOL/L (ref 20–32)
CREAT SERPL-MCNC: 1.46 MG/DL (ref 0.7–1.22)
GFR/BSA.PRED SERPLBLD CYS-BASED-ARV: 45 ML/MIN/1.73M2
GLUCOSE SERPL-MCNC: 145 MG/DL (ref 65–99)
POTASSIUM SERPL-SCNC: 4.9 MMOL/L (ref 3.5–5.3)
SODIUM SERPL-SCNC: 138 MMOL/L (ref 135–146)

## 2024-03-06 ENCOUNTER — TELEPHONE (OUTPATIENT)
Dept: NEPHROLOGY | Facility: CLINIC | Age: 89
End: 2024-03-06

## 2024-03-06 ENCOUNTER — OFFICE VISIT (OUTPATIENT)
Dept: HEMATOLOGY ONCOLOGY | Facility: CLINIC | Age: 89
End: 2024-03-06
Payer: COMMERCIAL

## 2024-03-06 VITALS
RESPIRATION RATE: 17 BRPM | WEIGHT: 166 LBS | OXYGEN SATURATION: 92 % | TEMPERATURE: 98 F | SYSTOLIC BLOOD PRESSURE: 100 MMHG | HEIGHT: 72 IN | BODY MASS INDEX: 22.48 KG/M2 | DIASTOLIC BLOOD PRESSURE: 58 MMHG | HEART RATE: 90 BPM

## 2024-03-06 DIAGNOSIS — N18.30 ANEMIA OF CHRONIC KIDNEY FAILURE, STAGE 3 (MODERATE): ICD-10-CM

## 2024-03-06 DIAGNOSIS — D46.Z MDS (MYELODYSPLASTIC SYNDROME), LOW GRADE (HCC): Primary | ICD-10-CM

## 2024-03-06 DIAGNOSIS — D63.1 ANEMIA OF CHRONIC KIDNEY FAILURE, STAGE 3 (MODERATE): ICD-10-CM

## 2024-03-06 PROBLEM — R19.7 DIARRHEA: Status: RESOLVED | Noted: 2022-01-30 | Resolved: 2024-03-06

## 2024-03-06 PROCEDURE — 99213 OFFICE O/P EST LOW 20 MIN: CPT | Performed by: INTERNAL MEDICINE

## 2024-03-06 NOTE — PROGRESS NOTES
Hematology Outpatient Follow - Up Note  Patricio Morris 89 y.o. male MRN: @ Encounter: [id number]        Date:  3/6/2024        Assessment/ Plan:    Low-grade MDS, he had a bone marrow biopsy when he presented with normocytic anemia, bone marrow biopsy showed hypercellular bone marrow with left shifted trilineage hematopoiesis, myeloblast 2.5%, normal cytogenetics    No evidence of neutropenia or thrombus cytopenia, he had been on watchful observation, current hemoglobin 10.4, will continue yearly follow-up with CBC        Labs and imaging studies are reviewed by ordering provider once results are available. If there are findings that need immediate attention, you will be contacted when results available.   Discussing results and the implication on your healthcare is best discussed in person at your follow-up visit.       HPI:    89 year old gentleman who has mild normocytic normochromic anemia for several years. He was found to have immature neutrophil in the peripheral blood smear. Therefore, he was referred to me for further evaluation. He underwent bone marrow biopsy which showed hypercellular marrow with left shifted trilineage hematopoiesis. Myeloblasts was only 2.5%, suggesting low-grade myelodysplasia. Cytogenetics was normal male karyotype. He has no neutropenia or thrombocytopenia    He had been on watchful observation  Interval History:        Previous Treatment:         Test Results:    Imaging: No results found.    Labs:   Lab Results   Component Value Date    WBC 5.0 02/02/2024    HGB 10.4 (L) 02/02/2024    HCT 32.5 (L) 02/02/2024    MCV 85.1 02/02/2024     02/02/2024     Lab Results   Component Value Date     05/08/2017    K 4.9 03/05/2024     03/05/2024    CO2 25 03/05/2024    BUN 34 (H) 03/05/2024    CREATININE 1.46 (H) 03/05/2024    CALCIUM 9.2 03/05/2024    AST 16 10/12/2023    ALT 14 10/12/2023    ALKPHOS 36 10/12/2023    PROT 6.3 05/08/2017    BILITOT 0.6 05/08/2017    EGFR 45  (L) 03/05/2024       Lab Results   Component Value Date    IRON 101 10/12/2023    TIBC 269 10/12/2023    FERRITIN 362 10/12/2023       Lab Results   Component Value Date    [id number] 1,527 (H) 05/15/2020         ROS: Review of Systems   Constitutional: Negative.  Negative for appetite change, chills, diaphoresis, fatigue, fever and unexpected weight change.   HENT:   Negative for hearing loss, lump/mass, mouth sores, nosebleeds, sore throat, trouble swallowing and voice change.    Eyes: Negative.  Negative for eye problems and icterus.   Respiratory: Negative.  Negative for chest tightness, cough, hemoptysis and shortness of breath.    Cardiovascular:  Negative for chest pain and leg swelling.   Gastrointestinal:  Negative for abdominal distention, abdominal pain, blood in stool, constipation, diarrhea (Improved on lenalidomide dose reduction) and nausea.   Endocrine: Negative.    Genitourinary:  Negative for dysuria, frequency, hematuria and pelvic pain.    Musculoskeletal:  Positive for arthralgias and back pain. Negative for flank pain, gait problem, myalgias and neck stiffness.   Skin:  Negative for itching and rash.   Neurological:  Negative for dizziness, gait problem, headaches, light-headedness, numbness and speech difficulty.   Hematological:  Negative for adenopathy. Does not bruise/bleed easily.   Psychiatric/Behavioral:  Negative for confusion, decreased concentration, depression and sleep disturbance. The patient is not nervous/anxious.           Current Medications: Reviewed  Allergies: Reviewed  PMH/FH/SH:  Reviewed      Physical Exam:    Body surface area is 1.97 meters squared.    Wt Readings from Last 3 Encounters:   03/06/24 75.3 kg (166 lb)   02/22/24 76.2 kg (168 lb)   02/12/24 76.3 kg (168 lb 3.2 oz)        Temp Readings from Last 3 Encounters:   03/06/24 98 °F (36.7 °C) (Temporal)   02/22/24 98.3 °F (36.8 °C)   02/12/24 97.8 °F (36.6 °C) (Temporal)        BP Readings from Last 3 Encounters:    24 100/58   24 104/64   24 114/72         Pulse Readings from Last 3 Encounters:   24 90   24 86   24 82        Physical Exam  Vitals reviewed.   Constitutional:       General: He is not in acute distress.     Appearance: He is well-developed. He is not diaphoretic.   HENT:      Head: Normocephalic and atraumatic.   Eyes:      Conjunctiva/sclera: Conjunctivae normal.   Neck:      Trachea: No tracheal deviation.   Cardiovascular:      Rate and Rhythm: Normal rate and regular rhythm.      Heart sounds: Murmur heard.      No friction rub. No gallop.   Pulmonary:      Effort: Pulmonary effort is normal. No respiratory distress.      Breath sounds: Normal breath sounds. No wheezing or rales.   Chest:      Chest wall: No tenderness.   Abdominal:      General: There is no distension.      Palpations: Abdomen is soft.      Tenderness: There is no abdominal tenderness.   Musculoskeletal:      Cervical back: Normal range of motion and neck supple.      Right lower leg: No edema.      Left lower leg: No edema.   Lymphadenopathy:      Cervical: No cervical adenopathy.   Skin:     General: Skin is warm and dry.      Coloration: Skin is not pale.      Findings: No erythema.   Neurological:      Mental Status: He is alert and oriented to person, place, and time.   Psychiatric:         Behavior: Behavior normal.         Thought Content: Thought content normal.         Judgment: Judgment normal.         ECO    Goals and Barriers:  Current Goal: Minimize effects of disease.   Barriers: None.      Patient's Capacity to Self Care:  Patient is able to self care.    Code Status: @CO[facility]uraTAUS@

## 2024-03-06 NOTE — TELEPHONE ENCOUNTER
Pt advised that kidney function and K level stable (labs 3/5/24) per .    ----- Message from Sage Estrada MD sent at 3/6/2024  9:25 AM EST -----  Please inform patient that most recent labs (3/5/24) show that kidney function and potassium level are stable.

## 2024-03-08 DIAGNOSIS — R09.81 NASAL CONGESTION: ICD-10-CM

## 2024-03-08 RX ORDER — FLUTICASONE PROPIONATE 50 MCG
2 SPRAY, SUSPENSION (ML) NASAL DAILY
Qty: 16 G | Refills: 0 | Status: SHIPPED | OUTPATIENT
Start: 2024-03-08

## 2024-03-09 DIAGNOSIS — J41.0 SIMPLE CHRONIC BRONCHITIS (HCC): ICD-10-CM

## 2024-03-11 RX ORDER — UMECLIDINIUM BROMIDE AND VILANTEROL TRIFENATATE 62.5; 25 UG/1; UG/1
1 POWDER RESPIRATORY (INHALATION) DAILY
Qty: 60 EACH | Refills: 5 | Status: SHIPPED | OUTPATIENT
Start: 2024-03-11

## 2024-03-19 PROBLEM — N18.32 CHRONIC KIDNEY DISEASE, STAGE 3B (HCC): Status: ACTIVE | Noted: 2020-05-06

## 2024-03-19 PROBLEM — E11.22 DIABETES MELLITUS WITH CHRONIC KIDNEY DISEASE (HCC): Status: ACTIVE | Noted: 2024-03-19

## 2024-03-19 PROBLEM — I13.0 HYPERTENSIVE HEART AND KIDNEY DISEASE WITH HF AND CKD (HCC): Status: ACTIVE | Noted: 2024-03-19

## 2024-03-19 PROBLEM — N18.32 CHRONIC KIDNEY DISEASE, STAGE 3B (HCC): Status: ACTIVE | Noted: 2024-03-19

## 2024-03-27 ENCOUNTER — OFFICE VISIT (OUTPATIENT)
Dept: DERMATOLOGY | Facility: CLINIC | Age: 89
End: 2024-03-27

## 2024-03-27 VITALS — TEMPERATURE: 97.8 F | HEIGHT: 72 IN | BODY MASS INDEX: 22.48 KG/M2 | WEIGHT: 166 LBS

## 2024-03-27 DIAGNOSIS — D48.5 NEOPLASM OF UNCERTAIN BEHAVIOR OF SKIN: ICD-10-CM

## 2024-03-27 DIAGNOSIS — L85.3 XEROSIS OF SKIN: ICD-10-CM

## 2024-03-27 DIAGNOSIS — D22.9 MULTIPLE MELANOCYTIC NEVI: ICD-10-CM

## 2024-03-27 DIAGNOSIS — L81.4 LENTIGO: ICD-10-CM

## 2024-03-27 DIAGNOSIS — D18.01 CHERRY ANGIOMA: ICD-10-CM

## 2024-03-27 DIAGNOSIS — Z85.820 HISTORY OF MELANOMA: Primary | ICD-10-CM

## 2024-03-27 DIAGNOSIS — L82.1 SEBORRHEIC KERATOSIS: ICD-10-CM

## 2024-03-27 PROCEDURE — 88305 TISSUE EXAM BY PATHOLOGIST: CPT | Performed by: STUDENT IN AN ORGANIZED HEALTH CARE EDUCATION/TRAINING PROGRAM

## 2024-03-27 PROCEDURE — 88342 IMHCHEM/IMCYTCHM 1ST ANTB: CPT | Performed by: STUDENT IN AN ORGANIZED HEALTH CARE EDUCATION/TRAINING PROGRAM

## 2024-03-27 NOTE — PROGRESS NOTES
"[facility] Note     Patient Name: Patricio Morris  Encounter Date: 3/27/2024     Have you been cared for by a [facility] Dermatologist in the last 3 years and, if so, which description applies to you?    Yes.  I have been here within the last 3 years, and my medical history has NOT changed since that time.  I am MALE/not capable of bearing children.    REVIEW OF SYSTEMS:  Have you recently had or currently have any of the following? No changes in my recent health.   PAST MEDICAL HISTORY:  Have you personally ever had or currently have any of the following?  If \"YES,\" then please provide more detail. No changes in my medical history.   HISTORY OF IMMUNOSUPPRESSION: Do you have a history of any of the following:  Systemic Immunosuppression such as Diabetes, Biologic or Immunotherapy, Chemotherapy, Organ Transplantation, Bone Marrow Transplantation?  YES, Diabetes     Answering \"YES\" requires the addition of the dotphrase \"IMMUNOSUPPRESSED\" as the first diagnosis of the patient's visit.   FAMILY HISTORY:  Any \"first degree relatives\" (parent, brother, sister, or child) with the following?    No changes in my family's known health.   PATIENT EXPERIENCE:    Do you want the Dermatologist to perform a COMPLETE skin exam today including a clinical examination under the \"bra and underwear\" areas?  Yes, not under underwear  If necessary, do we have your permission to call and leave a detailed message on your Preferred Phone number that includes your specific medical information?  Yes      No Known Allergies   Current Outpatient Medications:     acetaminophen (TYLENOL) 325 mg tablet, Take 2 tablets (650 mg total) by mouth every 6 (six) hours as needed for mild pain, Disp: 30 tablet, Rfl: 0    albuterol (2.5 mg/3 mL) 0.083 % nebulizer solution, INHALE THE CONTENTS OF ONE AMPULE BY NEBULIZATION EVERY SIX HOURS AS NEEDED FOR WHEEZING OR SHORTNESS OF BREATH, Disp: 360 mL, Rfl: 1    Anoro Ellipta 62.5-25 MCG/ACT " inhaler, INHALE ONE PUFF BY MOUTH ONCE DAILY, Disp: 60 each, Rfl: 5    Ascorbic Acid (VITAMIN C) 1000 MG tablet, Take 1 tablet by mouth daily, Disp: , Rfl:     BD PEN NEEDLE DON U/F 32G X 4 MM MISC,  , Disp: , Rfl:     betamethasone, augmented, (DIPROLENE-AF) 0.05 % cream, , Disp: , Rfl:     cholecalciferol (VITAMIN D3) 1,000 units tablet, Take 1,000 Units by mouth daily, Disp: , Rfl:     cholestyramine (QUESTRAN) 4 g packet, DISSOLVE 1 PACKET IN 8 OZ OF LIQUID AND TAKE BY MOUTH ONCE DAILY, Disp: 30 each, Rfl: 11    Cinnamon 500 MG TABS, Take 1 tablet by mouth daily  , Disp: , Rfl:     Dapagliflozin Propanediol 5 MG TABS, Take 5 mg by mouth every other day, Disp: , Rfl:     Flovent  MCG/ACT inhaler, INHALE ONE PUFF BY MOUTH TWICE DAILY - rinse mouth after use, Disp: 12 g, Rfl: 0    fluticasone (FLONASE) 50 mcg/act nasal spray, USE TWO SPRAYS IN EACH NOSTRIL DAILY, Disp: 16 g, Rfl: 0    gabapentin (NEURONTIN) 300 mg capsule, TAKE ONE CAPSULE BY MOUTH ONCE DAILY, Disp: 90 capsule, Rfl: 1    glucose blood test strip, Use daily, Disp: , Rfl:     hydrOXYzine pamoate (VISTARIL) 25 mg capsule, TAKE ONE CAPSULE BY MOUTH THREE TIMES DAILY AS NEEDED FOR ITCHING, Disp: 90 capsule, Rfl: 1    Insulin Glargine Solostar 100 UNIT/ML SOPN, Inject 14 Units under the skin daily, Disp: , Rfl:     ipratropium (ATROVENT) 0.03 % nasal spray, 2 sprays into each nostril every 12 (twelve) hours, Disp: 30 mL, Rfl: 0    Levemir FlexTouch 100 units/mL injection pen, 10 Units daily at bedtime (Patient not taking: Reported on 11/8/2023), Disp: , Rfl:     montelukast (SINGULAIR) 10 mg tablet, TAKE ONE TABLET BY MOUTH NIGHTLY AT BEDTIME, Disp: 90 tablet, Rfl: 0    Multiple Vitamins-Minerals (OCUVITE ADULT 50+ PO), Take 1 tablet by mouth daily, Disp: , Rfl:     NOVOLOG FLEXPEN 100 units/mL injection pen, Inject 4 Units under the skin 2 (two) times a day with meals 4u in am,  and 8u at dinner, Disp: , Rfl:     ONE TOUCH ULTRA TEST test strip,   , Disp: , Rfl:     ONETOUCH DELICA LANCETS FINE MISC,  , Disp: , Rfl:     roflumilast (DALIRESP) 500 mcg tablet, Take 1 tablet (500 mcg total) by mouth daily, Disp: 30 tablet, Rfl: 5    sacubitril-valsartan (Entresto) 24-26 MG TABS, Take 1 tablet by mouth 2 (two) times a day, Disp: 180 tablet, Rfl: 3    simvastatin (ZOCOR) 10 mg tablet, TAKE ONE TABLET BY MOUTH ONCE DAILY, Disp: 90 tablet, Rfl: 1    Ventolin  (90 Base) MCG/ACT inhaler, INHALE TWO PUFFS BY MOUTH EVERY 6 HOURS AS NEEDED FOR WHEEZING, Disp: 18 g, Rfl: 0          Whom besides the patient is providing clinical information about today's encounter?   NO ADDITIONAL HISTORIAN (patient alone provided history)    Physical Exam and Assessment/Plan by Diagnosis:       HISTORY OF MELANOMA     Physical Exam:  Year Treated: 1990  TNM Classification:   Suspected Recurrence: no  Regional adenopathy: no     Additional History of Present Condition:  Surgery in 1990. Patient unsure of which shoulder it was located on. Last skin exam was approximately 5 years ago.      Assessment and Plan:  Based on a thorough discussion of this condition and the management approach to it (including a comprehensive discussion of the known risks, side effects and potential benefits of treatment), the patient (family) agrees to implement the following specific plan:  When outside we recommend using a wide brim hat, sunglasses, long sleeve and pants, sunscreen with SPF 30+ with reapplication every 2 hours, or SPF specific clothing   We recommend that you continue to have regular full body skin exams with your dermatologist.  We recommend that you see your eye doctor and dentist yearly for exams and make sure they are aware of your past history of Melanoma.      What happens at follow-up?  The main purpose of follow-up is to detect recurrences early (metastatic melanoma), but it also offers an opportunity to diagnose a new primary melanoma at the first possible opportunity. A second  "invasive melanoma occurs in 5-10% of melanoma patients and a new melanoma in situ is diagnosed in more than 20% of melanoma patients.     Our practice makes the following recommendations for follow-up for patients with invasive melanoma.  At-least \"monthly\" self-skin examinations   Routine skin checks by a board certified dermatologist  Follow-up intervals are \"every 3 months\" within 2 years of a new melanoma diagnosis; \"every 6 months\" between 2-4 years of a new melanoma diagnosis; and \"annually\" after 4 years of a new melanoma diagnosis  Individual patient's needs should be considered before an appropriate follow-up is offered  Provide education and support to help the patient adjust to their illness     Follow-up appointments should include:  A check of the scar where the primary melanoma was removed  Checking the regional lymph nodes  A general skin examination  A full physical examination at least annually by your primary care physician     In those with more advanced primary disease, follow-up may include:  Blood tests  Imaging: ultrasound, X-ray, CT, MRI and PET scan.     Most tests are not worthwhile for patients with stage 1 or 2 melanoma unless there are signs or symptoms of disease recurrence or metastasis. No tests are necessary for healthy patients who have remained well for five years or longer after removal of their melanoma.     What is the outlook for patients with melanoma?  Melanoma in situ is cured by excision because it has no potential to spread around the body.  The risk of spread and ultimate death from invasive melanoma depends on several factors, but the main one is the Breslow thickness of the melanoma at the time it was surgically removed.  Metastases are rare for melanomas < 0.75 mm and the risk for tumours 0.75-1 mm thick is about 5%. The risk steadily increases with thickness so that melanomas > 4 mm have a risk of metastasis of about 40%.     Melanoma is a potentially serious type of " "skin cancer, in which there is uncontrolled growth of melanocytes (pigment cells). Melanoma is sometimes called malignant melanoma.  Normal melanocytes are found in the basal layer of the epidermis (the outer layer of skin). Melanocytes produce a protein called melanin, which protects skin cells by absorbing ultraviolet (UV) radiation. Melanocytes are found in equal numbers in black and white skin, but melanocytes in black skin produce much more melanin. People with dark brown or black skin are very much less likely to be damaged by UV radiation than those with white skin.   MELANOCYTIC NEVI (\"Moles\")    Physical Exam:  Anatomic Location Affected:   Mostly on sun-exposed areas of the trunk and extremities  Morphological Description:  Scattered, 1-4mm round to ovoid, symmetrical-appearing, even bordered, skin colored to dark brown macules/papules, mostly in sun-exposed areas  Pertinent Positives:  Pertinent Negatives:    Additional History of Present Condition:      Assessment and Plan:  Based on a thorough discussion of this condition and the management approach to it (including a comprehensive discussion of the known risks, side effects and potential benefits of treatment), the patient (family) agrees to implement the following specific plan:  When outside we recommend using a wide brim hat, sunglasses, long sleeve and pants, sunscreen with SPF 30+ with reapplication every 2 hours, or SPF specific clothing   Benign, reassured  Annual skin check     Melanocytic Nevi  Melanocytic nevi (\"moles\") are tan or brown, raised or flat areas of the skin which have an increased number of melanocytes. Melanocytes are the cells in our body which make pigment and account for skin color.    Some moles are present at birth (I.e., \"congenital nevi\"), while others come up later in life (i.e., \"acquired nevi\").  The sun can stimulate the body to make more moles.  Sunburns are not the only thing that triggers more moles.  Chronic sun " "exposure can do it too.     Clinically distinguishing a healthy mole from melanoma may be difficult, even for experienced dermatologists. The \"ABCDE's\" of moles have been suggested as a means of helping to alert a person to a suspicious mole and the possible increased risk of melanoma.  The suggestions for raising alert are as follows:    Asymmetry: Healthy moles tend to be symmetric, while melanomas are often asymmetric.  Asymmetry means if you draw a line through the mole, the two halves do not match in color, size, shape, or surface texture. Asymmetry can be a result of rapid enlargement of a mole, the development of a raised area on a previously flat lesion, scaling, ulceration, bleeding or scabbing within the mole.  Any mole that starts to demonstrate \"asymmetry\" should be examined promptly by a board certified dermatologist.     Border: Healthy moles tend to have discrete, even borders.  The border of a melanoma often blends into the normal skin and does not sharply delineate the mole from normal skin.  Any mole that starts to demonstrate \"uneven borders\" should be examined promptly by a board certified dermatologist.     Color: Healthy moles tend to be one color throughout.  Melanomas tend to be made up of different colors ranging from dark black, blue, white, or red.  Any mole that demonstrates a color change should be examined promptly by a board certified dermatologist.     Diameter: Healthy moles tend to be smaller than 0.6 cm in size; an exception are \"congenital nevi\" that can be larger.  Melanomas tend to grow and can often be greater than 0.6 cm (1/4 of an inch, or the size of a pencil eraser). This is only a guideline, and many normal moles may be larger than 0.6 cm without being unhealthy.  Any mole that starts to change in size (small to bigger or bigger to smaller) should be examined promptly by a board certified dermatologist.     Evolving: Healthy moles tend to \"stay the same.\"  Melanomas may " often show signs of change or evolution such as a change in size, shape, color, or elevation.  Any mole that starts to itch, bleed, crust, burn, hurt, or ulcerate or demonstrate a change or evolution should be examined promptly by a board certified dermatologist.        LENTIGO    Physical Exam:  Anatomic Location Affected:  trunk, arms  Morphological Description:  Light brown macules  Pertinent Positives:  Pertinent Negatives:    Additional History of Present Condition:      Assessment and Plan:  Based on a thorough discussion of this condition and the management approach to it (including a comprehensive discussion of the known risks, side effects and potential benefits of treatment), the patient (family) agrees to implement the following specific plan:  When outside we recommend using a wide brim hat, sunglasses, long sleeve and pants, sunscreen with SPF 30+ with reapplication every 2 hours, or SPF specific clothing       What is a lentigo?  A lentigo is a pigmented flat or slightly raised lesion with a clearly defined edge. Unlike an ephelis (freckle), it does not fade in the winter months. There are several kinds of lentigo.  The name lentigo originally referred to its appearance resembling a small lentil. The plural of lentigo is lentigines, although “lentigos” is also in common use.    Who gets lentigines?  Lentigines can affect males and females of all ages and races. Solar lentigines are especially prevalent in fair skinned adults. Lentigines associated with syndromes are present at birth or arise during childhood.    What causes lentigines?  Common forms of lentigo are due to exposure to ultraviolet radiation:  Sun damage including sunburn   Indoor tanning   Phototherapy, especially photochemotherapy (PUVA)    Ionizing radiation, eg radiation therapy, can also cause lentigines.  Several familial syndromes associated with widespread lentigines originate from mutations in Chavez-MAP kinase, mTOR signaling and  "PTEN pathways.    What is the treatment for lentigines?  Most lentigines are left alone. Attempts to lighten them may not be successful. The following approaches are used:  SPF 50+ broad-spectrum sunscreen   Hydroquinone bleaching cream   Alpha hydroxy acids   Vitamin C   Retinoids   Azelaic acid   Chemical peels  Individual lesions can be permanently removed using:  Cryotherapy   Intense pulsed light   Pigment lasers    How can lentigines be prevented?  Lentigines associated with exposure ultraviolet radiation can be prevented by very careful sun protection. Clothing is more successful at preventing new lentigines than are sunscreens.    What is the outlook for lentigines?  Lentigines usually persist. They may increase in number with age and sun exposure. Some in sun-protected sites may fade and disappear.    QUINONES ANGIOMAS    Physical Exam:  Anatomic Location Affected:  trunk  Morphological Description:  Scattered cherry red, 1-4 mm papules.  Pertinent Positives:  Pertinent Negatives:    Additional History of Present Condition:      Assessment and Plan:  Based on a thorough discussion of this condition and the management approach to it (including a comprehensive discussion of the known risks, side effects and potential benefits of treatment), the patient (family) agrees to implement the following specific plan:  Monitor for changes  Benign, reassured      Assessment and Plan:    Cherry angioma, also known as Hightower de Rocael spots, are benign vascular skin lesions. A \"cherry angioma\" is a firm red, blue or purple papule, 0.1-1 cm in diameter. When thrombosed, they can appear black in colour until evaluated with a dermatoscope when the red or purple colour is more easily seen. Cherry angioma may develop on any part of the body but most often appear on the scalp, face, lips and trunk.  An angioma is due to proliferating endothelial cells; these are the cells that line the inside of a blood vessel.    Angiomas can " "arise in early life or later in life; the most common type of angioma is a cherry angioma.  Cherry angiomas are very common in males and females of any age or race. They are more noticeable in white skin than in skin of colour. They markedly increase in number from about the age of 40. There may be a family history of similar lesions. Eruptive cherry angiomas have been rarely reported to be associated with internal malignancy. The cause of angiomas is unknown. Genetic analysis of cherry angiomas has shown that they frequently carry specific somatic missense mutations in the GNAQ and GNA11 (Q209H) genes, which are involved in other vascular and melanocytic proliferations.      SEBORRHEIC KERATOSIS; NON-INFLAMED    Physical Exam:  Anatomic Location Affected:  trunk  Morphological Description:  Flat and raised, waxy, smooth to warty textured, yellow to brownish-grey to dark brown to blackish, discrete, \"stuck-on\" appearing papules.  Pertinent Positives:  Pertinent Negatives:    Additional History of Present Condition:      Assessment and Plan:  Based on a thorough discussion of this condition and the management approach to it (including a comprehensive discussion of the known risks, side effects and potential benefits of treatment), the patient (family) agrees to implement the following specific plan:  Monitor for changes  Benign, reassured      Seborrheic Keratosis  A seborrheic keratosis is a harmless warty spot that appears during adult life as a common sign of skin aging.  Seborrheic keratoses can arise on any area of skin, covered or uncovered, with the usual exception of the palms and soles. They do not arise from mucous membranes. Seborrheic keratoses can have highly variable appearance.      Seborrheic keratoses are extremely common. It has been estimated that over 90% of adults over the age of 60 years have one or more of them. They occur in males and females of all races, typically beginning to erupt in the " "30s or 40s. They are uncommon under the age of 20 years.  The precise cause of seborrhoeic keratoses is not known.  Seborrhoeic keratoses are considered degenerative in nature. As time goes by, seborrheic keratoses tend to become more numerous. Some people inherit a tendency to develop a very large number of them; some people may have hundreds of them.      There is no easy way to remove multiple lesions on a single occasion.  Unless a specific lesion is \"inflamed\" and is causing pain or stinging/burning or is bleeding, most insurance companies do not authorize treatment.    XEROSIS (\"DRY SKIN\")    Physical Exam:  Anatomic Location Affected:  diffuse  Morphological Description:  xerosis  Pertinent Positives:  Pertinent Negatives:    Additional History of Present Condition:      Assessment and Plan:  Based on a thorough discussion of this condition and the management approach to it (including a comprehensive discussion of the known risks, side effects and potential benefits of treatment), the patient (family) agrees to implement the following specific plan:  Use moisturizer like Eucerin,Cerave or Aveeno Cream 3 times a day for the dry skin            Dry skin refers to skin that feels dry to touch. Dry skin has a dull surface with a rough, scaly quality. The skin is less pliable and cracked. When dryness is severe, the skin may become inflamed and fissured.  Although any body site can be dry, dry skin tends to affect the shins more than any other site.    Dry skin is lacking moisture in the outer horny cell layer (stratum corneum) and this results in cracks in the skin surface.  Dry skin is also called xerosis, xeroderma or asteatosis (lack of fat).  It can affect males and females of all ages. There is some racial variability in water and lipid content of the skin.  Dry skin that starts in early childhood may be one of about 20 types of ichthyosis (fish-scale skin). There is often a family history of dry skin.   Dry " skin is commonly seen in people with atopic dermatitis.  Nearly everyone > 60 years has dry skin.    Dry skin that begins later may be seen in people with certain diseases and conditions.  Postmenopausal women  Hypothyroidism  Chronic renal disease   Malnutrition and weight loss   Subclinical dermatitis   Treatment with certain drugs such as oral retinoids, diuretics and epidermal growth factor receptor inhibitors      What is the treatment for dry skin?  The mainstay of treatment of dry skin and ichthyosis is moisturisers/emollients. They should be applied liberally and often enough to:  Reduce itch   Improve the barrier function   Prevent entry of irritants, bacteria   Reduce transepidermal water loss.      How can dry skin be prevented?  Eliminate aggravating factors:  Reduce the frequency of bathing.   A humidifier in winter and air conditioner in summer   Compare having a short shower with a prolonged soak in a bath.   Use lukewarm, not hot, water.   Replace standard soap with a substitute such as a synthetic detergent cleanser, water-miscible emollient, bath oil, anti-pruritic tar oil, colloidal oatmeal etc.   Apply an emollient liberally and often, particularly shortly after bathing, and when itchy. The drier the skin, the thicker this should be, especially on the hands.    What is the outlook for dry skin?  A tendency to dry skin may persist life-long, or it may improve once contributing factors are controlled.     NEOPLASM OF UNCERTAIN BEHAVIOR OF SKIN    Physical Exam:  (Anatomic Location); (Size and Morphological Description); (Differential Diagnosis):  A; Left upper back; 1 X 0.8 cm crusted pink papule; Diff Dx: SCC check margins please  B; Left upper chest; 1 X 1 cm scaly pink papule; Diff Dx: SCC check margins please  Pertinent Positives:  Pertinent Negatives:    Additional History of Present Condition:  Present on exam    Assessment and Plan:  I have discussed with the patient that a sample of skin  "via a \"skin biopsy” would be potentially helpful to further make a specific diagnosis under the microscope.  Based on a thorough discussion of this condition and the management approach to it (including a comprehensive discussion of the known risks, side effects and potential benefits of treatment), the patient (family) agrees to implement the following specific plan:    Procedure:  Skin Biopsy.  After a thorough discussion of treatment options and risk/benefits/alternatives (including but not limited to local pain, scarring, dyspigmentation, blistering, possible superinfection, and inability to confirm a diagnosis via histopathology), verbal and written consent were obtained and portion of the rash was biopsied for tissue sample.  See below for consent that was obtained from patient and subsequent Procedure Note.       PROCEDURE TANGENTIAL (SHAVE) BIOPSY NOTE:    Performing Physician:    Anatomic Location; Clinical Description with size (cm); Pre-Op Diagnosis:   A; Left upper back; 1 X 0.8 cm crusted pink papule; Diff Dx: SCC check margins please  B; Left upper chest; 1 X 1 cm scaly pink papule; Diff Dx: SCC check margins please  Post-op diagnosis: Same     Local anesthesia: 1% xylocaine with epi      Topical anesthesia: None    Hemostasis: Aluminum chloride       After obtaining informed consent  at which time there was a discussion about the purpose of biopsy  and low risks of infection and bleeding.  The area was prepped and draped in the usual fashion. Anesthesia was obtained with 1% lidocaine with epinephrine. A shave biopsy to an appropriate sampling depth was obtained by Shave (Dermablade or 15 blade) The resulting wound was covered with surgical ointment and bandaged appropriately.     The patient tolerated the procedure well without complications and was without signs of functional compromise.      Specimen has been sent for review by Dermatopathology.    Standard post-procedure care has been " "explained and has been included in written form within the patient's copy of Informed Consent.    INFORMED CONSENT DISCUSSION AND POST-OPERATIVE INSTRUCTIONS FOR PATIENT    I.  RATIONALE FOR PROCEDURE  I understand that a skin biopsy allows the Dermatologist to test a lesion or rash under the microscope to obtain a diagnosis.  It usually involves numbing the area with numbing medication and removing a small piece of skin; sometimes the area will be closed with sutures. In this specific procedure, sutures are not usually needed.  If any sutures are placed, then they are usually need to be removed in 2 weeks or less.    I understand that my Dermatologist recommends that a skin \"shave\" biopsy be performed today.  A local anesthetic, similar to the kind that a dentist uses when filling a cavity, will be injected with a very small needle into the skin area to be sampled.  The injected skin and tissue underneath \"will go to sleep” and become numb so no pain should be felt afterwards.  An instrument shaped like a tiny \"razor blade\" (shave biopsy instrument) will be used to cut a small piece of tissue and skin from the area so that a sample of tissue can be taken and examined more closely under the microscope.  A slight amount of bleeding will occur, but it will be stopped with direct pressure and a pressure bandage and any other appropriate methods.  I understands that a scar will form where the wound was created.  Surgical ointment will be applied to help protect the wound.  Sutures are not usually needed.    II.  RISKS AND POTENTIAL COMPLICATIONS   I understand the risks and potential complications of a skin biopsy include but are not limited to the following:  Bleeding  Infection  Pain  Scar/keloid  Skin discoloration  Incomplete Removal  Recurrence  Nerve Damage/Numbness/Loss of Function  Allergic Reaction to Anesthesia  Biopsies are diagnostic procedures and based on findings additional treatment or evaluation may be " "required  Loss or destruction of specimen resulting in no additional findings    My Dermatologist has explained to me the nature of the condition, the nature of the procedure, and the benefits to be reasonably expected compared with alternative approaches.  My Dermatologist has discussed the likelihood of major risks or complications of this procedure including the specific risks listed above, such as bleeding, infection, and scarring/keloid.  I understand that a scar is expected after this procedure.  I understand that my physician cannot predict if the scar will form a \"keloid,\" which extends beyond the borders of the wound that is created.  A keloid is a thick, painful, and bumpy scar.  A keloid can be difficult to treat, as it does not always respond well to therapy, which includes injecting cortisone directly into the keloid every few weeks.  While this usually reduces the pain and size of the scar, it does not eliminate it.      I understand that photographs may be taken before and after the procedure.  These will be maintained as part of the medical providers confidential records and may not be made available to me.  I further authorize the medical provider to use the photographs for teaching purposes or to illustrate scientific papers, books, or lectures if in his/her judgment, medical research, education, or science may benefit from its use.    I have had an opportunity to fully inquire about the risks and benefits of this procedure and its alternatives.   I have been given ample time and opportunity to ask questions and to seek a second opinion if I wished to do so.  I acknowledge that there have specifically been no guarantees as to the cosmetic results from the procedure.  I am aware that with any procedure there is always the possibility of an unexpected complication.    III. POST-PROCEDURAL CARE (WHAT YOU WILL NEED TO DO \"AFTER THE BIOPSY\" TO OPTIMIZE HEALING)    Keep the area clean and dry.  Try NOT " "to remove the bandage or get it wet for the first 24 hours.    Gently clean the area and apply surgical ointment (such as Vaseline petrolatum ointment, which is available \"over the counter\" and not a prescription) to the biopsy site for up to 2 weeks straight.  This acts to protect the wound from the outside world.      Sutures are not usually placed in this procedure.  If any sutures were placed, return for suture removal as instructed (generally 1 week for the face, 2 weeks for the body).      Take Acetaminophen (Tylenol) for discomfort, if no contraindications.  Ibuprofen or aspirin could make bleeding worse.    Call our office immediately for signs of infection: fever, chills, increased redness, warmth, tenderness, discomfort/pain, or pus or foul smell coming from the wound.    WHAT TO DO IF THERE IS ANY BLEEDING?  If a small amount of bleeding is noticed, place a clean cloth over the area and apply firm pressure for ten minutes.  Check the wound after 10 minutes of direct pressure.  If bleeding persists, try one more time for an additional 10 minutes of direct pressure on the area.  If the bleeding becomes heavier or does not stop after the second attempt, or if you have any other questions about this procedure, then please call your [facility]'s Dermatologist by calling [phone] ([phone]).     I hereby acknowledge that I have reviewed and verified the site with my Dermatologist and have requested and authorized my Dermatologist to proceed with the procedure.    ACTINIC KERATOSIS    Physical Exam:  Anatomic Location Affected:  Chest, right lower leg  Morphological Description:  Scaly pink papules  Pertinent Positives:  Pertinent Negatives:      Assessment and Plan:  Based on a thorough discussion of this condition and the management approach to it (including a comprehensive discussion of the known risks, side effects and potential benefits of treatment), the patient (family) agrees to implement the following " specific plan:  When outside we recommend using a wide brim hat, sunglasses, long sleeve and pants, sunscreen with SPF 30+ with reapplication every 2 hours, or SPF specific clothing   liquid nitrogen to treat areas. Consent obtained. Expect area to blister, crust, and then fall off within 2 weeks. Please use vaseline.                                     PROCEDURE:  DESTRUCTION OF PRE-MALIGNANT LESIONS  After a thorough discussion of treatment options and risk/benefits/alternatives (including but not limited to local pain, scarring, dyspigmentation, blistering, and possible superinfection), verbal and written consent were obtained and the aforementioned lesions were treated on with cryotherapy using liquid nitrogen x 1 cycle for 5-10 seconds.    TOTAL NUMBER of 3 pre-malignant lesions were treated today on the ANATOMIC LOCATION: chest, right lower leg.     The patient tolerated the procedure well, and after-care instructions were provided.         Scribe Attestation      I,:  Shilpa Husain am acting as a scribe while in the presence of the attending physician.:       I,:  Delmis George MD personally performed the services described in this documentation    as scribed in my presence.:

## 2024-03-27 NOTE — PATIENT INSTRUCTIONS
"HISTORY OF MELANOMA     Physical Exam:  Year Treated: 1990  TNM Classification:   Suspected Recurrence: no  Regional adenopathy: no     Additional History of Present Condition:  Surgery in 1990. Patient unsure of which shoulder it was located on. Last skin exam was approximately 5 years ago.      Assessment and Plan:  Based on a thorough discussion of this condition and the management approach to it (including a comprehensive discussion of the known risks, side effects and potential benefits of treatment), the patient (family) agrees to implement the following specific plan:  When outside we recommend using a wide brim hat, sunglasses, long sleeve and pants, sunscreen with SPF 30+ with reapplication every 2 hours, or SPF specific clothing   We recommend that you continue to have regular full body skin exams with your dermatologist.  We recommend that you see your eye doctor and dentist yearly for exams and make sure they are aware of your past history of Melanoma.      What happens at follow-up?  The main purpose of follow-up is to detect recurrences early (metastatic melanoma), but it also offers an opportunity to diagnose a new primary melanoma at the first possible opportunity. A second invasive melanoma occurs in 5-10% of melanoma patients and a new melanoma in situ is diagnosed in more than 20% of melanoma patients.     Our practice makes the following recommendations for follow-up for patients with invasive melanoma.  At-least \"monthly\" self-skin examinations   Routine skin checks by a board certified dermatologist  Follow-up intervals are \"every 3 months\" within 2 years of a new melanoma diagnosis; \"every 6 months\" between 2-4 years of a new melanoma diagnosis; and \"annually\" after 4 years of a new melanoma diagnosis  Individual patient's needs should be considered before an appropriate follow-up is offered  Provide education and support to help the patient adjust to their illness     Follow-up appointments " "should include:  A check of the scar where the primary melanoma was removed  Checking the regional lymph nodes  A general skin examination  A full physical examination at least annually by your primary care physician     In those with more advanced primary disease, follow-up may include:  Blood tests  Imaging: ultrasound, X-ray, CT, MRI and PET scan.     Most tests are not worthwhile for patients with stage 1 or 2 melanoma unless there are signs or symptoms of disease recurrence or metastasis. No tests are necessary for healthy patients who have remained well for five years or longer after removal of their melanoma.     What is the outlook for patients with melanoma?  Melanoma in situ is cured by excision because it has no potential to spread around the body.  The risk of spread and ultimate death from invasive melanoma depends on several factors, but the main one is the Breslow thickness of the melanoma at the time it was surgically removed.  Metastases are rare for melanomas < 0.75 mm and the risk for tumours 0.75-1 mm thick is about 5%. The risk steadily increases with thickness so that melanomas > 4 mm have a risk of metastasis of about 40%.     Melanoma is a potentially serious type of skin cancer, in which there is uncontrolled growth of melanocytes (pigment cells). Melanoma is sometimes called malignant melanoma.  Normal melanocytes are found in the basal layer of the epidermis (the outer layer of skin). Melanocytes produce a protein called melanin, which protects skin cells by absorbing ultraviolet (UV) radiation. Melanocytes are found in equal numbers in black and white skin, but melanocytes in black skin produce much more melanin. People with dark brown or black skin are very much less likely to be damaged by UV radiation than those with white skin.   MELANOCYTIC NEVI (\"Moles\")    Physical Exam:  Anatomic Location Affected:   Mostly on sun-exposed areas of the trunk and extremities  Morphological " "Description:  Scattered, 1-4mm round to ovoid, symmetrical-appearing, even bordered, skin colored to dark brown macules/papules, mostly in sun-exposed areas  Pertinent Positives:  Pertinent Negatives:    Additional History of Present Condition:      Assessment and Plan:  Based on a thorough discussion of this condition and the management approach to it (including a comprehensive discussion of the known risks, side effects and potential benefits of treatment), the patient (family) agrees to implement the following specific plan:  When outside we recommend using a wide brim hat, sunglasses, long sleeve and pants, sunscreen with SPF 30+ with reapplication every 2 hours, or SPF specific clothing   Benign, reassured  Annual skin check     Melanocytic Nevi  Melanocytic nevi (\"moles\") are tan or brown, raised or flat areas of the skin which have an increased number of melanocytes. Melanocytes are the cells in our body which make pigment and account for skin color.    Some moles are present at birth (I.e., \"congenital nevi\"), while others come up later in life (i.e., \"acquired nevi\").  The sun can stimulate the body to make more moles.  Sunburns are not the only thing that triggers more moles.  Chronic sun exposure can do it too.     Clinically distinguishing a healthy mole from melanoma may be difficult, even for experienced dermatologists. The \"ABCDE's\" of moles have been suggested as a means of helping to alert a person to a suspicious mole and the possible increased risk of melanoma.  The suggestions for raising alert are as follows:    Asymmetry: Healthy moles tend to be symmetric, while melanomas are often asymmetric.  Asymmetry means if you draw a line through the mole, the two halves do not match in color, size, shape, or surface texture. Asymmetry can be a result of rapid enlargement of a mole, the development of a raised area on a previously flat lesion, scaling, ulceration, bleeding or scabbing within the mole.  " "Any mole that starts to demonstrate \"asymmetry\" should be examined promptly by a board certified dermatologist.     Border: Healthy moles tend to have discrete, even borders.  The border of a melanoma often blends into the normal skin and does not sharply delineate the mole from normal skin.  Any mole that starts to demonstrate \"uneven borders\" should be examined promptly by a board certified dermatologist.     Color: Healthy moles tend to be one color throughout.  Melanomas tend to be made up of different colors ranging from dark black, blue, white, or red.  Any mole that demonstrates a color change should be examined promptly by a board certified dermatologist.     Diameter: Healthy moles tend to be smaller than 0.6 cm in size; an exception are \"congenital nevi\" that can be larger.  Melanomas tend to grow and can often be greater than 0.6 cm (1/4 of an inch, or the size of a pencil eraser). This is only a guideline, and many normal moles may be larger than 0.6 cm without being unhealthy.  Any mole that starts to change in size (small to bigger or bigger to smaller) should be examined promptly by a board certified dermatologist.     Evolving: Healthy moles tend to \"stay the same.\"  Melanomas may often show signs of change or evolution such as a change in size, shape, color, or elevation.  Any mole that starts to itch, bleed, crust, burn, hurt, or ulcerate or demonstrate a change or evolution should be examined promptly by a board certified dermatologist.        LENTIGO    Physical Exam:  Anatomic Location Affected:  trunk, arms  Morphological Description:  Light brown macules  Pertinent Positives:  Pertinent Negatives:    Additional History of Present Condition:      Assessment and Plan:  Based on a thorough discussion of this condition and the management approach to it (including a comprehensive discussion of the known risks, side effects and potential benefits of treatment), the patient (family) agrees to " implement the following specific plan:  When outside we recommend using a wide brim hat, sunglasses, long sleeve and pants, sunscreen with SPF 30+ with reapplication every 2 hours, or SPF specific clothing       What is a lentigo?  A lentigo is a pigmented flat or slightly raised lesion with a clearly defined edge. Unlike an ephelis (freckle), it does not fade in the winter months. There are several kinds of lentigo.  The name lentigo originally referred to its appearance resembling a small lentil. The plural of lentigo is lentigines, although “lentigos” is also in common use.    Who gets lentigines?  Lentigines can affect males and females of all ages and races. Solar lentigines are especially prevalent in fair skinned adults. Lentigines associated with syndromes are present at birth or arise during childhood.    What causes lentigines?  Common forms of lentigo are due to exposure to ultraviolet radiation:  Sun damage including sunburn   Indoor tanning   Phototherapy, especially photochemotherapy (PUVA)    Ionizing radiation, eg radiation therapy, can also cause lentigines.  Several familial syndromes associated with widespread lentigines originate from mutations in Chavez-MAP kinase, mTOR signaling and PTEN pathways.    What is the treatment for lentigines?  Most lentigines are left alone. Attempts to lighten them may not be successful. The following approaches are used:  SPF 50+ broad-spectrum sunscreen   Hydroquinone bleaching cream   Alpha hydroxy acids   Vitamin C   Retinoids   Azelaic acid   Chemical peels  Individual lesions can be permanently removed using:  Cryotherapy   Intense pulsed light   Pigment lasers    How can lentigines be prevented?  Lentigines associated with exposure ultraviolet radiation can be prevented by very careful sun protection. Clothing is more successful at preventing new lentigines than are sunscreens.    What is the outlook for lentigines?  Lentigines usually persist. They may  "increase in number with age and sun exposure. Some in sun-protected sites may fade and disappear.    QUINONES ANGIOMAS    Physical Exam:  Anatomic Location Affected:  trunk  Morphological Description:  Scattered cherry red, 1-4 mm papules.  Pertinent Positives:  Pertinent Negatives:    Additional History of Present Condition:      Assessment and Plan:  Based on a thorough discussion of this condition and the management approach to it (including a comprehensive discussion of the known risks, side effects and potential benefits of treatment), the patient (family) agrees to implement the following specific plan:  Monitor for changes  Benign, reassured      Assessment and Plan:    Cherry angioma, also known as Hightower de Rocael spots, are benign vascular skin lesions. A \"cherry angioma\" is a firm red, blue or purple papule, 0.1-1 cm in diameter. When thrombosed, they can appear black in colour until evaluated with a dermatoscope when the red or purple colour is more easily seen. Cherry angioma may develop on any part of the body but most often appear on the scalp, face, lips and trunk.  An angioma is due to proliferating endothelial cells; these are the cells that line the inside of a blood vessel.    Angiomas can arise in early life or later in life; the most common type of angioma is a cherry angioma.  Cherry angiomas are very common in males and females of any age or race. They are more noticeable in white skin than in skin of colour. They markedly increase in number from about the age of 40. There may be a family history of similar lesions. Eruptive cherry angiomas have been rarely reported to be associated with internal malignancy. The cause of angiomas is unknown. Genetic analysis of cherry angiomas has shown that they frequently carry specific somatic missense mutations in the GNAQ and GNA11 (Q209H) genes, which are involved in other vascular and melanocytic proliferations.      SEBORRHEIC KERATOSIS; " "NON-INFLAMED    Physical Exam:  Anatomic Location Affected:  trunk  Morphological Description:  Flat and raised, waxy, smooth to warty textured, yellow to brownish-grey to dark brown to blackish, discrete, \"stuck-on\" appearing papules.  Pertinent Positives:  Pertinent Negatives:    Additional History of Present Condition:      Assessment and Plan:  Based on a thorough discussion of this condition and the management approach to it (including a comprehensive discussion of the known risks, side effects and potential benefits of treatment), the patient (family) agrees to implement the following specific plan:  Monitor for changes  Benign, reassured      Seborrheic Keratosis  A seborrheic keratosis is a harmless warty spot that appears during adult life as a common sign of skin aging.  Seborrheic keratoses can arise on any area of skin, covered or uncovered, with the usual exception of the palms and soles. They do not arise from mucous membranes. Seborrheic keratoses can have highly variable appearance.      Seborrheic keratoses are extremely common. It has been estimated that over 90% of adults over the age of 60 years have one or more of them. They occur in males and females of all races, typically beginning to erupt in the 30s or 40s. They are uncommon under the age of 20 years.  The precise cause of seborrhoeic keratoses is not known.  Seborrhoeic keratoses are considered degenerative in nature. As time goes by, seborrheic keratoses tend to become more numerous. Some people inherit a tendency to develop a very large number of them; some people may have hundreds of them.      There is no easy way to remove multiple lesions on a single occasion.  Unless a specific lesion is \"inflamed\" and is causing pain or stinging/burning or is bleeding, most insurance companies do not authorize treatment.    XEROSIS (\"DRY SKIN\")    Physical Exam:  Anatomic Location Affected:  diffuse  Morphological Description:  xerosis  Pertinent " Positives:  Pertinent Negatives:    Additional History of Present Condition:      Assessment and Plan:  Based on a thorough discussion of this condition and the management approach to it (including a comprehensive discussion of the known risks, side effects and potential benefits of treatment), the patient (family) agrees to implement the following specific plan:  Use moisturizer like Eucerin,Cerave or Aveeno Cream 3 times a day for the dry skin            Dry skin refers to skin that feels dry to touch. Dry skin has a dull surface with a rough, scaly quality. The skin is less pliable and cracked. When dryness is severe, the skin may become inflamed and fissured.  Although any body site can be dry, dry skin tends to affect the shins more than any other site.    Dry skin is lacking moisture in the outer horny cell layer (stratum corneum) and this results in cracks in the skin surface.  Dry skin is also called xerosis, xeroderma or asteatosis (lack of fat).  It can affect males and females of all ages. There is some racial variability in water and lipid content of the skin.  Dry skin that starts in early childhood may be one of about 20 types of ichthyosis (fish-scale skin). There is often a family history of dry skin.   Dry skin is commonly seen in people with atopic dermatitis.  Nearly everyone > 60 years has dry skin.    Dry skin that begins later may be seen in people with certain diseases and conditions.  Postmenopausal women  Hypothyroidism  Chronic renal disease   Malnutrition and weight loss   Subclinical dermatitis   Treatment with certain drugs such as oral retinoids, diuretics and epidermal growth factor receptor inhibitors      What is the treatment for dry skin?  The mainstay of treatment of dry skin and ichthyosis is moisturisers/emollients. They should be applied liberally and often enough to:  Reduce itch   Improve the barrier function   Prevent entry of irritants, bacteria   Reduce transepidermal  "water loss.      How can dry skin be prevented?  Eliminate aggravating factors:  Reduce the frequency of bathing.   A humidifier in winter and air conditioner in summer   Compare having a short shower with a prolonged soak in a bath.   Use lukewarm, not hot, water.   Replace standard soap with a substitute such as a synthetic detergent cleanser, water-miscible emollient, bath oil, anti-pruritic tar oil, colloidal oatmeal etc.   Apply an emollient liberally and often, particularly shortly after bathing, and when itchy. The drier the skin, the thicker this should be, especially on the hands.    What is the outlook for dry skin?  A tendency to dry skin may persist life-long, or it may improve once contributing factors are controlled.       NEOPLASM OF UNCERTAIN BEHAVIOR OF SKIN    Physical Exam:  (Anatomic Location); (Size and Morphological Description); (Differential Diagnosis):  A; Left upper back; 1 X 0.8 cm crusted pink papule; Diff Dx: SCC check margins please  B; Left upper chest; 1 X 1 cm scaly pink papule; Diff Dx: SCC check margins please  Pertinent Positives:  Pertinent Negatives:    Additional History of Present Condition:  Present on exam    Assessment and Plan:  I have discussed with the patient that a sample of skin via a \"skin biopsy” would be potentially helpful to further make a specific diagnosis under the microscope.  Based on a thorough discussion of this condition and the management approach to it (including a comprehensive discussion of the known risks, side effects and potential benefits of treatment), the patient (family) agrees to implement the following specific plan:    Procedure:  Skin Biopsy.  After a thorough discussion of treatment options and risk/benefits/alternatives (including but not limited to local pain, scarring, dyspigmentation, blistering, possible superinfection, and inability to confirm a diagnosis via histopathology), verbal and written consent were obtained and portion of " "the rash was biopsied for tissue sample.  See below for consent that was obtained from patient and subsequent Procedure Note.       PROCEDURE TANGENTIAL (SHAVE) BIOPSY NOTE:    Performing Physician:    Anatomic Location; Clinical Description with size (cm); Pre-Op Diagnosis:   A; Left upper back; 1 X 0.8 cm crusted pink papule; Diff Dx: SCC check margins please  B; Left upper chest; 1 X 1 cm scaly pink papule; Diff Dx: SCC check margins please  Post-op diagnosis: Same     Local anesthesia: 1% xylocaine with epi      Topical anesthesia: None    Hemostasis: Aluminum chloride       After obtaining informed consent  at which time there was a discussion about the purpose of biopsy  and low risks of infection and bleeding.  The area was prepped and draped in the usual fashion. Anesthesia was obtained with 1% lidocaine with epinephrine. A shave biopsy to an appropriate sampling depth was obtained by Shave (Dermablade or 15 blade) The resulting wound was covered with surgical ointment and bandaged appropriately.     The patient tolerated the procedure well without complications and was without signs of functional compromise.      Specimen has been sent for review by Dermatopathology.    Standard post-procedure care has been explained and has been included in written form within the patient's copy of Informed Consent.    INFORMED CONSENT DISCUSSION AND POST-OPERATIVE INSTRUCTIONS FOR PATIENT    I.  RATIONALE FOR PROCEDURE  I understand that a skin biopsy allows the Dermatologist to test a lesion or rash under the microscope to obtain a diagnosis.  It usually involves numbing the area with numbing medication and removing a small piece of skin; sometimes the area will be closed with sutures. In this specific procedure, sutures are not usually needed.  If any sutures are placed, then they are usually need to be removed in 2 weeks or less.    I understand that my Dermatologist recommends that a skin \"shave\" biopsy be " "performed today.  A local anesthetic, similar to the kind that a dentist uses when filling a cavity, will be injected with a very small needle into the skin area to be sampled.  The injected skin and tissue underneath \"will go to sleep” and become numb so no pain should be felt afterwards.  An instrument shaped like a tiny \"razor blade\" (shave biopsy instrument) will be used to cut a small piece of tissue and skin from the area so that a sample of tissue can be taken and examined more closely under the microscope.  A slight amount of bleeding will occur, but it will be stopped with direct pressure and a pressure bandage and any other appropriate methods.  I understands that a scar will form where the wound was created.  Surgical ointment will be applied to help protect the wound.  Sutures are not usually needed.    II.  RISKS AND POTENTIAL COMPLICATIONS   I understand the risks and potential complications of a skin biopsy include but are not limited to the following:  Bleeding  Infection  Pain  Scar/keloid  Skin discoloration  Incomplete Removal  Recurrence  Nerve Damage/Numbness/Loss of Function  Allergic Reaction to Anesthesia  Biopsies are diagnostic procedures and based on findings additional treatment or evaluation may be required  Loss or destruction of specimen resulting in no additional findings    My Dermatologist has explained to me the nature of the condition, the nature of the procedure, and the benefits to be reasonably expected compared with alternative approaches.  My Dermatologist has discussed the likelihood of major risks or complications of this procedure including the specific risks listed above, such as bleeding, infection, and scarring/keloid.  I understand that a scar is expected after this procedure.  I understand that my physician cannot predict if the scar will form a \"keloid,\" which extends beyond the borders of the wound that is created.  A keloid is a thick, painful, and bumpy scar.  A " "keloid can be difficult to treat, as it does not always respond well to therapy, which includes injecting cortisone directly into the keloid every few weeks.  While this usually reduces the pain and size of the scar, it does not eliminate it.      I understand that photographs may be taken before and after the procedure.  These will be maintained as part of the medical providers confidential records and may not be made available to me.  I further authorize the medical provider to use the photographs for teaching purposes or to illustrate scientific papers, books, or lectures if in his/her judgment, medical research, education, or science may benefit from its use.    I have had an opportunity to fully inquire about the risks and benefits of this procedure and its alternatives.   I have been given ample time and opportunity to ask questions and to seek a second opinion if I wished to do so.  I acknowledge that there have specifically been no guarantees as to the cosmetic results from the procedure.  I am aware that with any procedure there is always the possibility of an unexpected complication.    III. POST-PROCEDURAL CARE (WHAT YOU WILL NEED TO DO \"AFTER THE BIOPSY\" TO OPTIMIZE HEALING)    Keep the area clean and dry.  Try NOT to remove the bandage or get it wet for the first 24 hours.    Gently clean the area and apply surgical ointment (such as Vaseline petrolatum ointment, which is available \"over the counter\" and not a prescription) to the biopsy site for up to 2 weeks straight.  This acts to protect the wound from the outside world.      Sutures are not usually placed in this procedure.  If any sutures were placed, return for suture removal as instructed (generally 1 week for the face, 2 weeks for the body).      Take Acetaminophen (Tylenol) for discomfort, if no contraindications.  Ibuprofen or aspirin could make bleeding worse.    Call our office immediately for signs of infection: fever, chills, increased " redness, warmth, tenderness, discomfort/pain, or pus or foul smell coming from the wound.    WHAT TO DO IF THERE IS ANY BLEEDING?  If a small amount of bleeding is noticed, place a clean cloth over the area and apply firm pressure for ten minutes.  Check the wound after 10 minutes of direct pressure.  If bleeding persists, try one more time for an additional 10 minutes of direct pressure on the area.  If the bleeding becomes heavier or does not stop after the second attempt, or if you have any other questions about this procedure, then please call your [facility] Dermatologist by calling [phone] ([phone]).     I hereby acknowledge that I have reviewed and verified the site with my Dermatologist and have requested and authorized my Dermatologist to proceed with the procedure.    ACTINIC KERATOSIS    Physical Exam:  Anatomic Location Affected:  Chest, right lower leg  Morphological Description:  Scaly pink papules  Pertinent Positives:  Pertinent Negatives:      Assessment and Plan:  Based on a thorough discussion of this condition and the management approach to it (including a comprehensive discussion of the known risks, side effects and potential benefits of treatment), the patient (family) agrees to implement the following specific plan:  When outside we recommend using a wide brim hat, sunglasses, long sleeve and pants, sunscreen with SPF 30+ with reapplication every 2 hours, or SPF specific clothing   liquid nitrogen to treat areas. Consent obtained. Expect area to blister, crust, and then fall off within 2 weeks. Please use vaseline.                                     PROCEDURE:  DESTRUCTION OF PRE-MALIGNANT LESIONS  After a thorough discussion of treatment options and risk/benefits/alternatives (including but not limited to local pain, scarring, dyspigmentation, blistering, and possible superinfection), verbal and written consent were obtained and the aforementioned lesions were treated on with cryotherapy  using liquid nitrogen x 1 cycle for 5-10 seconds.    TOTAL NUMBER of 3 pre-malignant lesions were treated today on the ANATOMIC LOCATION: chest, right lower leg.     The patient tolerated the procedure well, and after-care instructions were provided.

## 2024-03-29 DIAGNOSIS — E78.01 FAMILIAL HYPERCHOLESTEROLEMIA: ICD-10-CM

## 2024-03-29 RX ORDER — SIMVASTATIN 10 MG
TABLET ORAL
Qty: 90 TABLET | Refills: 1 | Status: SHIPPED | OUTPATIENT
Start: 2024-03-29

## 2024-04-01 PROCEDURE — 88342 IMHCHEM/IMCYTCHM 1ST ANTB: CPT | Performed by: STUDENT IN AN ORGANIZED HEALTH CARE EDUCATION/TRAINING PROGRAM

## 2024-04-01 PROCEDURE — 88305 TISSUE EXAM BY PATHOLOGIST: CPT | Performed by: STUDENT IN AN ORGANIZED HEALTH CARE EDUCATION/TRAINING PROGRAM

## 2024-04-02 NOTE — RESULT ENCOUNTER NOTE
DERMATOPATHOLOGY RESULT NOTE    Results reviewed by ordering physician.  RESIDENTS: Please call patient and review results. Offer as below      Result & Plan by Specimen:    Specimen A: malignant  Plan: monitor      Specimen B: malignant  Plan: monitor vs prescription for 5fu x 4 weeks

## 2024-04-04 ENCOUNTER — TELEPHONE (OUTPATIENT)
Age: 89
End: 2024-04-04

## 2024-04-04 DIAGNOSIS — D09.9 SQUAMOUS CELL CARCINOMA IN SITU: Primary | ICD-10-CM

## 2024-04-04 RX ORDER — FLUOROURACIL 50 MG/G
CREAM TOPICAL 2 TIMES DAILY
Qty: 40 G | Refills: 0 | Status: SHIPPED | OUTPATIENT
Start: 2024-04-04

## 2024-04-04 NOTE — TELEPHONE ENCOUNTER
PA for Fluorouracil 5% cream    Submitted via    []CMM-KEY   [x]SureHunterOn-Case ID # [id number]   []Faxed to plan   []Other website   []Phone call Case ID #     Office notes sent, clinical questions answered. Awaiting determination    Turnaround time for your insurance to make a decision on your Prior Authorization can take 7-21 business days.

## 2024-04-05 NOTE — TELEPHONE ENCOUNTER
PA for Fluorouracil 5% cream Approved   Date(s) approved 01/01/2024 - 07/03/2024  Case #[id number]     Patient advised by [] Palmer Hargreaves Message                      [x] Phone call       Pharmacy advised by [x]Fax                                     []Phone call    Approval letter scanned into Media Yes

## 2024-04-14 DIAGNOSIS — R09.81 NASAL CONGESTION: ICD-10-CM

## 2024-04-14 RX ORDER — FLUTICASONE PROPIONATE 50 MCG
2 SPRAY, SUSPENSION (ML) NASAL DAILY
Qty: 16 G | Refills: 0 | Status: SHIPPED | OUTPATIENT
Start: 2024-04-14

## 2024-04-30 ENCOUNTER — OFFICE VISIT (OUTPATIENT)
Dept: CARDIOLOGY CLINIC | Facility: MEDICAL CENTER | Age: 89
End: 2024-04-30
Payer: COMMERCIAL

## 2024-04-30 VITALS
DIASTOLIC BLOOD PRESSURE: 60 MMHG | SYSTOLIC BLOOD PRESSURE: 118 MMHG | HEIGHT: 72 IN | BODY MASS INDEX: 22.48 KG/M2 | OXYGEN SATURATION: 90 % | WEIGHT: 166 LBS | HEART RATE: 77 BPM

## 2024-04-30 DIAGNOSIS — Z79.4 TYPE 2 DIABETES MELLITUS WITH DIABETIC NEUROPATHY, WITH LONG-TERM CURRENT USE OF INSULIN (HCC): Chronic | ICD-10-CM

## 2024-04-30 DIAGNOSIS — I42.0 DILATED CARDIOMYOPATHY (HCC): Primary | ICD-10-CM

## 2024-04-30 DIAGNOSIS — I13.0 HYPERTENSIVE HEART AND KIDNEY DISEASE WITH HF AND CKD (HCC): ICD-10-CM

## 2024-04-30 DIAGNOSIS — E11.40 TYPE 2 DIABETES MELLITUS WITH DIABETIC NEUROPATHY, WITH LONG-TERM CURRENT USE OF INSULIN (HCC): Chronic | ICD-10-CM

## 2024-04-30 DIAGNOSIS — E78.49 OTHER HYPERLIPIDEMIA: ICD-10-CM

## 2024-04-30 PROCEDURE — 1160F RVW MEDS BY RX/DR IN RCRD: CPT | Performed by: INTERNAL MEDICINE

## 2024-04-30 PROCEDURE — 99214 OFFICE O/P EST MOD 30 MIN: CPT | Performed by: INTERNAL MEDICINE

## 2024-04-30 PROCEDURE — 1159F MED LIST DOCD IN RCRD: CPT | Performed by: INTERNAL MEDICINE

## 2024-04-30 NOTE — PROGRESS NOTES
Cardiology Follow Up    Patricio Morris  3/26/1933  [id number]  [facility]  [address]  [address]  [place] [address] [phone]    No diagnosis found.    There are no diagnoses linked to this encounter.I had the pleasure of seeing Patricio Morris for a follow up visit.     Interval History: None    History of the presenting illness, Discussion/Summary and My Plan are as follows:::    89-year-old without any prior cardiac history-including CAD, MI, bypass, stents or valvular heart disease or family history of cardiomyopathy or coronary artery disease but with a left bundle-branch block since 2013, also with a nonischemic cardiomyopathy (negative coronary angiography-May 2018)-diagnosed in May 2018 in the setting of acute congestive heart failure-probably viral-or originally with an ejection fraction of 35-40%, subsequently on ACE-inhibitor and beta-blocker, decreased to 20%, admitted with another episode of acute CHF-November 2018, initiated on Entresto, after which ejection fraction normalized to 55%-February 2019.     He is currently on an excellent regimen and has stayed out of the hospital for almost 5 years.     He denies any orthopnea or dyspnea with exertion or edema.   He has again started golf in the summer, does [employer] work, lives in a 4 story house and walks.  No symptoms.  His weight has been around 161-163 at home, he had 166 here and stable, not on a diuretic  COVID in February 2024, reviewed notes from his primary care physician, nephrology and hematology     Exam demonstrates NO e/o CHF.    ECGs unchanged with sinus rhythm, first-degree AV block and left bundle     Plan:     Nonischemic Cardiomyopathy and chronic systolic congestive heart failure with 1 recurrence, now normalized:  Nonischemic (negative coronary angiography-May 2018), no alcohol use or chemotherapy.  No family history.  Possibly left bundle  related or viral cardiomyopathy-was  treated for symptoms of bronchitis for few months prior to May 2018.   Now on an excellent medical regimen including a beta-blocker, Entresto (was on spironolactone-stopped for hyperkalemia) and dapagliflozin but only on 5 q. OD (also with CKD and 5 at home past this is versus her medication that they have thatperiodic hypoglycemia) my son could give me or something that somebody lives with me sugar is the only thing surgery    Your blood sugar goes low sugar is everything that provides you something tells me that he mentions some descending colon  Thank ejection fraction decreased from 37%-May 2018 to 20-25%-September 2018-while on a good dose of ACE-inhibitor and beta-blocker, admitted with acute CHF-November 2018, then initiated on Entresto-ejection fraction improved to normal-55%-February 2019.   No e/o Vol Ol on Exam. Also has COPD  Not on any diuretics  Dry weight-161-163 at home and 166 in the office and stable, not on a diuretic  Has low-grade MDS as well    Hypertension:  Controlled     CKD: Baseline creat around upto 1.6, follows with Dr. Estrada (Nephrology).  1.4-March 2024    Chronic left bundle, negative coronary angiography in May 2018  EF had improved to 55% in 2019    Dyslipidemia:  On low-dose statin.  He is also diabetic.     Diabetes:  Followed by an endocrinologist at [facility]. Now on Farxiga, A1C of 7.7 in May 2022, '6.9-7. Per patient most recently     Follow-up in 9 months     Latest Reference Range & Units 10/12/23 09:02 02/02/24 09:51 03/05/24 08:23   BUN 7 - 25 mg/dL 42 (H)  34 (H)   Creatinine 0.70 - 1.22 mg/dL 1.65 (H) 1.33 (H) (E) 1.46 (H)   (H): Data is abnormally high  (E): External lab result   Latest Reference Range & Units 12/07/20 10:51 04/20/23 09:17   Cholesterol <200 mg/dL 112 101   Triglycerides <150 mg/dL 52 34   HDL > OR = 40 mg/dL 39 (L) 53   Non-HDL Cholesterol <130 mg/dL (calc) 73 48   LDL Calculated mg/dL (calc) 60 38    Chol/HDL Ratio <5.0 (calc) 2.9 1.9   (L): Data is abnormally low  (E): External lab result3  Patient Active Problem List   Diagnosis    COPD without exacerbation (HCC)    Hyperlipidemia    Neuropathy, idiopathic    Multiple nodules of lung    Simple chronic bronchitis (HCC)    Type 2 diabetes mellitus with diabetic neuropathy, with long-term current use of insulin (HCC)    Dilated cardiomyopathy (HCC)    Chronic combined systolic and diastolic congestive heart failure (HCC)    Abnormal chest CT    Arthritis    Paresthesia of both feet    Finger pain, left    Urinary retention    Benign prostatic hyperplasia (BPH) with straining on urination    Anemia of chronic kidney failure, stage 3 (moderate)  (HCC)    Chronic kidney disease-mineral and bone disorder    Other proteinuria    MDS (myelodysplastic syndrome), low grade (HCC)    Primary osteoarthritis of left knee    Baker's cyst of knee, left    Irritable bowel syndrome with diarrhea    Lightheadedness    Diabetes mellitus with chronic kidney disease (HCC)    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)    Chronic kidney disease, stage 3b (HCC)     Past Medical History:   Diagnosis Date    Allergic 1989    COPD (chronic obstructive pulmonary disease) (HCC)     Diabetes mellitus (HCC)     Type 2    Essential hypertension     Heart failure (HCC)     Hyperlipidemia     Hypertension     Impetigo     Left inguinal hernia     Lung nodule     Malignant melanoma of skin (HCC)      Social History     Socioeconomic History    Marital status:      Spouse name: Not on file    Number of children: 2    Years of education: Not on file    Highest education level: Not on file   Occupational History    Occupation: rtired   Tobacco Use    Smoking status: Former     Current packs/day: 0.00     Average packs/day: 1 pack/day for 11.0 years (11.0 ttl pk-yrs)     Types: Cigarettes     Start date: 1949     Quit date: 1960     Years since quittin.3      Passive exposure: Past    Smokeless tobacco: Never   Vaping Use    Vaping status: Never Used   Substance and Sexual Activity    Alcohol use: Not Currently     Alcohol/week: 52.0 standard drinks of alcohol     Types: 50 Glasses of wine, 2 Standard drinks or equivalent per week    Drug use: No    Sexual activity: Not Currently     Partners: Female     Birth control/protection: Sponge   Other Topics Concern    Not on file   Social History Narrative    Caffeine use, active     Social Determinants of Health     Financial Resource Strain: Low Risk  (1/29/2024)    Overall Financial Resource Strain (CARDIA)     Difficulty of Paying Living Expenses: Not hard at all   Food Insecurity: Not on file   Transportation Needs: No Transportation Needs (1/29/2024)    PRAPARE - Transportation     Lack of Transportation (Medical): No     Lack of Transportation (Non-Medical): No   Physical Activity: Not on file   Stress: Not on file   Social Connections: Not on file   Intimate Partner Violence: Not on file   Housing Stability: Not on file      Family History   Problem Relation Age of Onset    Diabetes Mother     Heart attack Neg Hx     Stroke Neg Hx     Aneurysm Neg Hx     Clotting disorder Neg Hx     Arrhythmia Neg Hx     Hypertension Neg Hx     Hyperlipidemia Neg Hx         pt unsure      Past Surgical History:   Procedure Laterality Date    APPENDECTOMY      CATARACT EXTRACTION, BILATERAL      CHOLECYSTECTOMY      CHOLECYSTECTOMY      COLONOSCOPY  2014    Fiberoptic    HERNIA REPAIR      KNEE ARTHROSCOPY Bilateral     Therapeutic    [place] CYSTO INSERTION TRANSPROSTATIC IMPLANT SINGLE N/A 3/22/2019    Procedure: CYSTOSCOPY WITH INSERTION UROLIFT;  Surgeon: Bird Clinton MD;  Location: [facility];  Service: Urology       Current Outpatient Medications:     acetaminophen (TYLENOL) 325 mg tablet, Take 2 tablets (650 mg total) by mouth every 6 (six) hours as needed for mild pain, Disp: 30 tablet, Rfl: 0    albuterol (2.5 mg/3  mL) 0.083 % nebulizer solution, INHALE THE CONTENTS OF ONE AMPULE BY NEBULIZATION EVERY SIX HOURS AS NEEDED FOR WHEEZING OR SHORTNESS OF BREATH, Disp: 360 mL, Rfl: 1    Anoro Ellipta 62.5-25 MCG/ACT inhaler, INHALE ONE PUFF BY MOUTH ONCE DAILY, Disp: 60 each, Rfl: 5    Ascorbic Acid (VITAMIN C) 1000 MG tablet, Take 1 tablet by mouth daily, Disp: , Rfl:     BD PEN NEEDLE DON U/F 32G X 4 MM MISC,  , Disp: , Rfl:     cholecalciferol (VITAMIN D3) 1,000 units tablet, Take 1,000 Units by mouth daily, Disp: , Rfl:     cholestyramine (QUESTRAN) 4 g packet, DISSOLVE 1 PACKET IN 8 OZ OF LIQUID AND TAKE BY MOUTH ONCE DAILY, Disp: 30 each, Rfl: 11    Cinnamon 500 MG TABS, Take 1 tablet by mouth daily  , Disp: , Rfl:     Dapagliflozin Propanediol 5 MG TABS, Take 5 mg by mouth every other day, Disp: , Rfl:     Flovent  MCG/ACT inhaler, INHALE ONE PUFF BY MOUTH TWICE DAILY - rinse mouth after use, Disp: 12 g, Rfl: 0    fluorouracil (EFUDEX) 5 % cream, Apply topically 2 (two) times a day To the area of the left chest that was biopsied for a total of 4 weeks., Disp: 40 g, Rfl: 0    fluticasone (FLONASE) 50 mcg/act nasal spray, USE TWO SPRAYS IN EACH NOSTRIL DAILY, Disp: 16 g, Rfl: 0    gabapentin (NEURONTIN) 300 mg capsule, TAKE ONE CAPSULE BY MOUTH ONCE DAILY, Disp: 90 capsule, Rfl: 1    glucose blood test strip, Use daily, Disp: , Rfl:     hydrOXYzine pamoate (VISTARIL) 25 mg capsule, TAKE ONE CAPSULE BY MOUTH THREE TIMES DAILY AS NEEDED FOR ITCHING, Disp: 90 capsule, Rfl: 1    Insulin Glargine Solostar 100 UNIT/ML SOPN, Inject 14 Units under the skin daily, Disp: , Rfl:     ipratropium (ATROVENT) 0.03 % nasal spray, 2 sprays into each nostril every 12 (twelve) hours, Disp: 30 mL, Rfl: 0    montelukast (SINGULAIR) 10 mg tablet, TAKE ONE TABLET BY MOUTH NIGHTLY AT BEDTIME, Disp: 90 tablet, Rfl: 1    Multiple Vitamins-Minerals (OCUVITE ADULT 50+ PO), Take 1 tablet by mouth daily, Disp: , Rfl:     NOVOLOG FLEXPEN 100 units/mL  injection pen, Inject 4 Units under the skin 2 (two) times a day with meals 4u in am,  and 8u at dinner, Disp: , Rfl:     ONE TOUCH ULTRA TEST test strip,  , Disp: , Rfl:     ONETOUCH DELICA LANCETS FINE MISC,  , Disp: , Rfl:     roflumilast (DALIRESP) 500 mcg tablet, Take 1 tablet (500 mcg total) by mouth daily, Disp: 30 tablet, Rfl: 5    sacubitril-valsartan (Entresto) 24-26 MG TABS, Take 1 tablet by mouth 2 (two) times a day, Disp: 180 tablet, Rfl: 3    simvastatin (ZOCOR) 10 mg tablet, TAKE ONE TABLET BY MOUTH ONCE DAILY, Disp: 90 tablet, Rfl: 1    Ventolin  (90 Base) MCG/ACT inhaler, INHALE TWO PUFFS BY MOUTH EVERY 6 HOURS AS NEEDED FOR WHEEZING, Disp: 18 g, Rfl: 0    betamethasone, augmented, (DIPROLENE-AF) 0.05 % cream, , Disp: , Rfl:     Levemir FlexTouch 100 units/mL injection pen, 10 Units daily at bedtime (Patient not taking: Reported on 11/8/2023), Disp: , Rfl:   No Known Allergies    Imaging: No results found.    Review of Systems:  Review of Systems   Constitutional: Negative.    HENT: Negative.     Eyes: Negative.    Respiratory: Negative.     Cardiovascular: Negative.    Endocrine: Negative.    Musculoskeletal: Negative.    Hematological: Negative.        Physical Exam:  /60 (BP Location: Left arm, Patient Position: Sitting, Cuff Size: Adult)   Pulse 77   Ht 6' (1.829 m)   Wt 75.3 kg (166 lb)   SpO2 90%   BMI 22.51 kg/m²   Physical Exam  Constitutional:       General: He is not in acute distress.     Appearance: He is well-developed. He is not diaphoretic.   HENT:      Head: Normocephalic and atraumatic.   Eyes:      General: No scleral icterus.        Right eye: No discharge.         Left eye: No discharge.      Conjunctiva/sclera: Conjunctivae normal.      Pupils: Pupils are equal, round, and reactive to light.   Neck:      Thyroid: No thyromegaly.      Vascular: No JVD.      Trachea: No tracheal deviation.   Cardiovascular:      Rate and Rhythm: Normal rate and regular rhythm.       Heart sounds: No murmur (Short systolic murmur-TR) heard.     No friction rub. No gallop.   Pulmonary:      Effort: Pulmonary effort is normal. No respiratory distress.      Breath sounds: Normal breath sounds. No stridor. No wheezing.   Abdominal:      General: Bowel sounds are normal. There is no distension.      Palpations: Abdomen is soft.      Tenderness: There is no abdominal tenderness.   Musculoskeletal:         General: No tenderness or deformity. Normal range of motion.      Cervical back: Normal range of motion.   Skin:     General: Skin is warm.      Coloration: Skin is not pale.      Findings: No erythema or rash.

## 2024-05-01 DIAGNOSIS — J44.1 CHRONIC OBSTRUCTIVE PULMONARY DISEASE WITH ACUTE EXACERBATION (HCC): ICD-10-CM

## 2024-05-02 RX ORDER — FLUTICASONE PROPIONATE 220 UG/1
AEROSOL, METERED RESPIRATORY (INHALATION)
Qty: 12 G | Refills: 1 | Status: SHIPPED | OUTPATIENT
Start: 2024-05-02

## 2024-05-04 DIAGNOSIS — L29.9 PRURITUS: ICD-10-CM

## 2024-05-04 RX ORDER — HYDROXYZINE PAMOATE 25 MG/1
CAPSULE ORAL
Qty: 90 CAPSULE | Refills: 1 | Status: SHIPPED | OUTPATIENT
Start: 2024-05-04

## 2024-05-15 DIAGNOSIS — R09.81 NASAL CONGESTION: ICD-10-CM

## 2024-05-15 RX ORDER — FLUTICASONE PROPIONATE 50 MCG
2 SPRAY, SUSPENSION (ML) NASAL DAILY
Qty: 16 G | Refills: 1 | Status: SHIPPED | OUTPATIENT
Start: 2024-05-15

## 2024-06-17 ENCOUNTER — OFFICE VISIT (OUTPATIENT)
Age: 89
End: 2024-06-17
Payer: COMMERCIAL

## 2024-06-17 VITALS
WEIGHT: 162 LBS | DIASTOLIC BLOOD PRESSURE: 70 MMHG | HEART RATE: 95 BPM | HEIGHT: 72 IN | BODY MASS INDEX: 21.94 KG/M2 | RESPIRATION RATE: 16 BRPM | TEMPERATURE: 98.2 F | OXYGEN SATURATION: 92 % | SYSTOLIC BLOOD PRESSURE: 119 MMHG

## 2024-06-17 DIAGNOSIS — J41.0 SIMPLE CHRONIC BRONCHITIS (HCC): Primary | ICD-10-CM

## 2024-06-17 PROCEDURE — 99213 OFFICE O/P EST LOW 20 MIN: CPT | Performed by: PHYSICIAN ASSISTANT

## 2024-06-17 PROCEDURE — G2211 COMPLEX E/M VISIT ADD ON: HCPCS | Performed by: PHYSICIAN ASSISTANT

## 2024-06-17 NOTE — PROGRESS NOTES
"Assessment/Plan:   Diagnoses and all orders for this visit:    Simple chronic bronchitis (HCC)        Patient is here today for follow-up.  He continues to do well with his breathing.  He is on Anoro, Flovent with albuterol very occasionally.  He also takes Singulair.  He takes Daliresp but is taking it every other day due to cost.  This seems to be working well for him as he has not had an exacerbation in several years since starting the Daliresp.  I did give him paperwork to see if he can obtain some assistance with the pharmaceutical company for the Daliresp, he has done this in the past and did not qualify but will try again.    He will follow-up with us in about 6 months or sooner if necessary.    Return in about 6 months (around 12/17/2024).  All questions are answered to the patient's satisfaction and understanding.  He verbalizes understanding.  He is encouraged to call with any further questions or concerns.    Portions of the record may have been created with voice recognition software.  Occasional wrong word or \"sound a like\" substitutions may have occurred due to the inherent limitations of voice recognition software.  Read the chart carefully and recognize, using context, where substitutions have occurred.    Electronically Signed by Emmett Mendieta PA-C    ______________________________________________________________________    Chief Complaint:   Chief Complaint   Patient presents with    Follow-up       Patient ID: Patricio is a 89 y.o. y.o. male has a past medical history of Allergic (april 1989), COPD (chronic obstructive pulmonary disease) (HCC), Diabetes mellitus (HCC), Essential hypertension, Heart failure (HCC), Hyperlipidemia, Hypertension, Impetigo, Left inguinal hernia, Lung nodule, and Malignant melanoma of skin (HCC).    6/17/2024  Patient presents today for follow-up visit.  Patient is a 89 yo male former smoker with PMH of COPD, HTN, HLD, MDS.    He is here today for follow-up.  He " continues to do well with his breathing on Anoro, Flovent, Singulair, Daliresp every other day.  Using his albuterol very infrequently.  He does still stay active playing golf a couple of times per week as well as taking care of his garden.        Review of Systems   Constitutional: Negative.    HENT: Negative.     Respiratory: Negative.     Cardiovascular: Negative.    Gastrointestinal: Negative.    Genitourinary: Negative.    Musculoskeletal: Negative.    Skin: Negative.    Allergic/Immunologic: Negative.    Neurological: Negative.    Psychiatric/Behavioral: Negative.         Smoking history: He reports that he quit smoking about 64 years ago. His smoking use included cigarettes. He started smoking about 75 years ago. He has a 11 pack-year smoking history. He has been exposed to tobacco smoke. He has never used smokeless tobacco.    The following portions of the patient's history were reviewed and updated as appropriate: allergies, current medications, past family history, past medical history, past social history, past surgical history, and problem list.    Immunization History   Administered Date(s) Administered    COVID-19 MODERNA VACC 0.5 ML IM 01/26/2021, 03/02/2021, 10/25/2021, 04/21/2022    COVID-19 Moderna Vac BIVALENT 12 Yr+ IM 0.5 ML 11/16/2022    H1N1, All Formulations 01/26/2010    INFLUENZA 10/01/2014, 10/05/2015, 09/28/2018, 10/04/2021, 09/22/2022, 09/29/2023    Influenza Split High Dose Preservative Free IM 10/18/2013, 10/05/2015, 09/30/2016, 10/20/2017    Influenza, high dose seasonal 0.7 mL 10/12/2020    Influenza, seasonal, injectable 10/13/2011, 10/01/2014    Pneumococcal Conjugate 13-Valent 11/22/2017    Pneumococcal Conjugate Vaccine 20-valent (Pcv20), Polysace 09/30/2022    Pneumococcal Polysaccharide PPV23 01/01/1998, 10/19/2005, 11/30/2020    Tdap 03/30/2022, 10/06/2022    Zoster 10/01/2010    Zoster Vaccine Recombinant 01/03/2020, 10/12/2020     Current Outpatient Medications   Medication  Sig Dispense Refill    acetaminophen (TYLENOL) 325 mg tablet Take 2 tablets (650 mg total) by mouth every 6 (six) hours as needed for mild pain 30 tablet 0    albuterol (2.5 mg/3 mL) 0.083 % nebulizer solution INHALE THE CONTENTS OF ONE AMPULE BY NEBULIZATION EVERY SIX HOURS AS NEEDED FOR WHEEZING OR SHORTNESS OF BREATH 360 mL 1    Anoro Ellipta 62.5-25 MCG/ACT inhaler INHALE ONE PUFF BY MOUTH ONCE DAILY 60 each 5    Ascorbic Acid (VITAMIN C) 1000 MG tablet Take 1 tablet by mouth daily      BD PEN NEEDLE DON U/F 32G X 4 MM MISC        cholecalciferol (VITAMIN D3) 1,000 units tablet Take 1,000 Units by mouth daily      cholestyramine (QUESTRAN) 4 g packet DISSOLVE 1 PACKET IN 8 OZ OF LIQUID AND TAKE BY MOUTH ONCE DAILY 30 each 11    Cinnamon 500 MG TABS Take 1 tablet by mouth daily        Dapagliflozin Propanediol 5 MG TABS Take 5 mg by mouth every other day      fluorouracil (EFUDEX) 5 % cream Apply topically 2 (two) times a day To the area of the left chest that was biopsied for a total of 4 weeks. 40 g 0    fluticasone (FLONASE) 50 mcg/act nasal spray USE TWO SPRAYS IN EACH NOSTRIL DAILY 16 g 1    fluticasone (Flovent HFA) 220 mcg/act inhaler INHALE ONE PUFF BY MOUTH TWICE DAILY - rinse mouth after use 12 g 1    gabapentin (NEURONTIN) 300 mg capsule TAKE ONE CAPSULE BY MOUTH ONCE DAILY 90 capsule 1    glucose blood test strip Use daily      hydrOXYzine pamoate (VISTARIL) 25 mg capsule TAKE ONE CAPSULE BY MOUTH THREE TIMES DAILY AS NEEDED FOR ITCHING 90 capsule 1    Insulin Glargine Solostar 100 UNIT/ML SOPN Inject 14 Units under the skin daily      ipratropium (ATROVENT) 0.03 % nasal spray 2 sprays into each nostril every 12 (twelve) hours 30 mL 0    montelukast (SINGULAIR) 10 mg tablet TAKE ONE TABLET BY MOUTH NIGHTLY AT BEDTIME 90 tablet 1    Multiple Vitamins-Minerals (OCUVITE ADULT 50+ PO) Take 1 tablet by mouth daily      NOVOLOG FLEXPEN 100 units/mL injection pen Inject 4 Units under the skin 2 (two) times  a day with meals 4u in am,  and 8u at dinner      ONE TOUCH ULTRA TEST test strip        ONETOUCH DELICA LANCETS FINE MISC        roflumilast (DALIRESP) 500 mcg tablet Take 1 tablet (500 mcg total) by mouth daily 30 tablet 5    sacubitril-valsartan (Entresto) 24-26 MG TABS Take 1 tablet by mouth 2 (two) times a day 180 tablet 3    simvastatin (ZOCOR) 10 mg tablet TAKE ONE TABLET BY MOUTH ONCE DAILY 90 tablet 1    Ventolin  (90 Base) MCG/ACT inhaler INHALE TWO PUFFS BY MOUTH EVERY 6 HOURS AS NEEDED FOR WHEEZING 18 g 0    betamethasone, augmented, (DIPROLENE-AF) 0.05 % cream  (Patient not taking: Reported on 2/22/2024)      Levemir FlexTouch 100 units/mL injection pen 10 Units daily at bedtime (Patient not taking: Reported on 6/17/2024)       No current facility-administered medications for this visit.     Allergies: Patient has no known allergies.    Objective:  Vitals:    06/17/24 1406 06/17/24 1407   BP: 119/70    BP Location: Left arm    Patient Position: Sitting    Cuff Size: Large    Pulse: 95    Resp: 16    Temp: 98.2 °F (36.8 °C)    SpO2: 92% 92%   Weight: 73.5 kg (162 lb)    Height: 6' (1.829 m)    Oxygen Therapy  SpO2: 92 %  Oxygen Therapy: None (Room air)  .  Wt Readings from Last 3 Encounters:   06/17/24 73.5 kg (162 lb)   04/30/24 75.3 kg (166 lb)   03/27/24 75.3 kg (166 lb)     Body mass index is 21.97 kg/m².    Physical Exam  Constitutional:       General: He is not in acute distress.     Appearance: Normal appearance. He is well-developed. He is not ill-appearing.   HENT:      Head: Normocephalic and atraumatic.      Mouth/Throat:      Pharynx: Oropharynx is clear.   Eyes:      Pupils: Pupils are equal, round, and reactive to light.   Cardiovascular:      Rate and Rhythm: Normal rate and regular rhythm.   Pulmonary:      Effort: Pulmonary effort is normal. No respiratory distress.      Breath sounds: Normal breath sounds. No decreased breath sounds, wheezing, rhonchi or rales.   Abdominal:       General: Abdomen is flat. There is no distension.   Musculoskeletal:         General: Normal range of motion.      Cervical back: Normal range of motion.      Right lower leg: No edema.      Left lower leg: No edema.   Skin:     General: Skin is warm and dry.      Findings: No rash.   Neurological:      Mental Status: He is alert and oriented to person, place, and time.   Psychiatric:         Mood and Affect: Mood normal.         Behavior: Behavior normal.         Lab Review:   Lab Results   Component Value Date     05/08/2017    K 4.9 03/05/2024    K 5.0 02/07/2022     03/05/2024     02/07/2022    CO2 25 03/05/2024    CO2 28 02/07/2022    BUN 34 (H) 03/05/2024    BUN 32 (H) 02/07/2022    BUN 40 (H) 09/24/2021    CREATININE 1.46 (H) 03/05/2024    CREATININE 1.33 (H) 02/02/2024    CALCIUM 9.2 03/05/2024    CALCIUM 9.6 02/07/2022     Lab Results   Component Value Date    WBC 5.0 02/02/2024    WBC 6.38 11/15/2018    WBC 5.5 05/08/2017    HGB 10.4 (L) 02/02/2024    HGB 13.7 11/15/2018    HGB 11.0 (L) 05/08/2017    HCT 32.5 (L) 02/02/2024    HCT 41.7 11/15/2018    HCT 34.9 (L) 05/08/2017    MCV 85.1 02/02/2024    MCV 93 11/15/2018    MCV 96.6 05/08/2017     02/02/2024     (L) 11/15/2018     05/08/2017       Diagnostics:    none pertinent  Office Spirometry Results:     ESS:    No results found.

## 2024-07-02 ENCOUNTER — TELEPHONE (OUTPATIENT)
Dept: NEPHROLOGY | Facility: CLINIC | Age: 89
End: 2024-07-02

## 2024-07-02 LAB
25(OH)D3 SERPL-MCNC: 39 NG/ML (ref 30–100)
ALBUMIN SERPL-MCNC: 4.6 G/DL (ref 3.6–5.1)
ALBUMIN/CREAT UR: 9 MG/G CREAT
ALBUMIN/GLOB SERPL: 2.2 (CALC) (ref 1–2.5)
ALP SERPL-CCNC: 38 U/L (ref 35–144)
ALT SERPL-CCNC: 11 U/L (ref 9–46)
AST SERPL-CCNC: 12 U/L (ref 10–35)
BILIRUB SERPL-MCNC: 0.6 MG/DL (ref 0.2–1.2)
BUN SERPL-MCNC: 28 MG/DL (ref 7–25)
BUN/CREAT SERPL: 20 (CALC) (ref 6–22)
CALCIUM SERPL-MCNC: 9.6 MG/DL (ref 8.6–10.3)
CALCIUM SERPL-MCNC: 9.6 MG/DL (ref 8.6–10.3)
CHLORIDE SERPL-SCNC: 104 MMOL/L (ref 98–110)
CO2 SERPL-SCNC: 27 MMOL/L (ref 20–32)
CREAT SERPL-MCNC: 1.42 MG/DL (ref 0.7–1.22)
CREAT UR-MCNC: 100 MG/DL (ref 20–320)
ERYTHROCYTE [DISTWIDTH] IN BLOOD BY AUTOMATED COUNT: 24.4 % (ref 11–15)
GFR/BSA.PRED SERPLBLD CYS-BASED-ARV: 47 ML/MIN/1.73M2
GLOBULIN SER CALC-MCNC: 2.1 G/DL (CALC) (ref 1.9–3.7)
GLUCOSE SERPL-MCNC: 161 MG/DL (ref 65–99)
HCT VFR BLD AUTO: 35.4 % (ref 38.5–50)
HGB BLD-MCNC: 11.2 G/DL (ref 13.2–17.1)
MAGNESIUM SERPL-MCNC: 2 MG/DL (ref 1.5–2.5)
MCH RBC QN AUTO: 27.3 PG (ref 27–33)
MCHC RBC AUTO-ENTMCNC: 31.6 G/DL (ref 32–36)
MCV RBC AUTO: 86.1 FL (ref 80–100)
MICROALBUMIN UR-MCNC: 0.9 MG/DL
PHOSPHATE SERPL-MCNC: 3.2 MG/DL (ref 2.1–4.3)
PLATELET # BLD AUTO: 196 THOUSAND/UL (ref 140–400)
POTASSIUM SERPL-SCNC: 5.4 MMOL/L (ref 3.5–5.3)
PROT SERPL-MCNC: 6.7 G/DL (ref 6.1–8.1)
PTH-INTACT SERPL-MCNC: 38 PG/ML (ref 16–77)
RBC # BLD AUTO: 4.11 MILLION/UL (ref 4.2–5.8)
SODIUM SERPL-SCNC: 137 MMOL/L (ref 135–146)
WBC # BLD AUTO: 8 THOUSAND/UL (ref 3.8–10.8)

## 2024-07-02 NOTE — TELEPHONE ENCOUNTER
Pt advised that labs of 7/1/24 show stable kidney function.  K on high side.  Per , follow low K diet.      ----- Message from Sage Estrada MD sent at 7/1/2024 10:09 PM EDT -----  Clinical Team - Please inform patient that most recent labs (7/1/24) show that kidney function is stable. Potassium is on the high side but acceptable. Please make sure he is on a low K diet.     Clerical Team - Please arrange for follow up in the office with next available opening.

## 2024-07-13 DIAGNOSIS — G62.9 NEUROPATHY: ICD-10-CM

## 2024-07-13 RX ORDER — GABAPENTIN 300 MG/1
CAPSULE ORAL
Qty: 90 CAPSULE | Refills: 1 | Status: SHIPPED | OUTPATIENT
Start: 2024-07-13

## 2024-07-18 ENCOUNTER — OFFICE VISIT (OUTPATIENT)
Dept: OBGYN CLINIC | Facility: MEDICAL CENTER | Age: 89
End: 2024-07-18
Payer: COMMERCIAL

## 2024-07-18 VITALS — HEART RATE: 77 BPM | SYSTOLIC BLOOD PRESSURE: 120 MMHG | DIASTOLIC BLOOD PRESSURE: 70 MMHG

## 2024-07-18 DIAGNOSIS — M71.22 BAKER'S CYST OF KNEE, LEFT: ICD-10-CM

## 2024-07-18 DIAGNOSIS — M17.12 PRIMARY OSTEOARTHRITIS OF LEFT KNEE: Primary | ICD-10-CM

## 2024-07-18 PROCEDURE — 20610 DRAIN/INJ JOINT/BURSA W/O US: CPT | Performed by: PHYSICAL MEDICINE & REHABILITATION

## 2024-07-18 PROCEDURE — 99213 OFFICE O/P EST LOW 20 MIN: CPT | Performed by: PHYSICAL MEDICINE & REHABILITATION

## 2024-07-18 RX ORDER — ROPIVACAINE HYDROCHLORIDE 5 MG/ML
10 INJECTION, SOLUTION EPIDURAL; INFILTRATION; PERINEURAL
Status: COMPLETED | OUTPATIENT
Start: 2024-07-18 | End: 2024-07-18

## 2024-07-18 RX ORDER — TRIAMCINOLONE ACETONIDE 40 MG/ML
80 INJECTION, SUSPENSION INTRA-ARTICULAR; INTRAMUSCULAR
Status: COMPLETED | OUTPATIENT
Start: 2024-07-18 | End: 2024-07-18

## 2024-07-18 RX ADMIN — TRIAMCINOLONE ACETONIDE 80 MG: 40 INJECTION, SUSPENSION INTRA-ARTICULAR; INTRAMUSCULAR at 13:15

## 2024-07-18 RX ADMIN — ROPIVACAINE HYDROCHLORIDE 10 ML: 5 INJECTION, SOLUTION EPIDURAL; INFILTRATION; PERINEURAL at 13:15

## 2024-07-18 NOTE — PROGRESS NOTES
1. Primary osteoarthritis of left knee  Large joint arthrocentesis: L knee    ropivacaine (NAROPIN) 0.5 % injection 10 mL    triamcinolone acetonide (KENALOG-40) 40 mg/mL injection 80 mg    Injection procedure prior authorization      2. Baker's cyst of knee, left          Orders Placed This Encounter   Procedures    Large joint arthrocentesis: L knee    Injection procedure prior authorization        Impression:  Patient is here in follow up of left knee pain likely secondary to severe medial tibiofemoral osteoarthritis leading to Baker's cyst.  Treatment has included intra-articular steroid injection, HA injection, brace and Baker's cyst aspiration. Today we repeated a steroid injection.  We will attempt another HA injection in August. Can consider referral for genicular nerve procedure with pain management.  Patient's daughter was present for today's encounter.  We discussed that knee replacement is not a great option due to his comorbidities.    We will obtain authorization for viscosupplementation.      At this juncture, the patient has failed over 3 months of conservative treatment that has included intraarticular steroid injection, home exercises, activity modification, over the counter oral and topical pain medications.  The pain is interfering with their activities of daily living.     They had improvement after their most previous hyaluronic acid injections.  Their pain score was 8/10 and then 4/10 after the hyaluronic acid injections.     Imaging Studies (I personally reviewed images in PACS and report):  Left knee x-rays most recent to this encounter reviewed.  These images show severe osteoarthritis that most affects the medial joint space.  There is a small joint effusion.  Calcifications in the posterior knee.  No acute osseous abnormalities.  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     Ultrasound of the soft tissue in the left posterior knee showed a Baker's cyst.  This  was performed by radiology.    No follow-ups on file.    Patient is in agreement with the above plan.    HPI:  Patricio Morris is a 89 y.o. male  who presents in follow up.  Here for   Chief Complaint   Patient presents with    Left Knee - Pain, Follow-up       Since last visit: See above.    Following history reviewed and updated:  Past Medical History:   Diagnosis Date        COPD (chronic obstructive pulmonary disease) (HCC)     Diabetes mellitus (HCC)     Type 2    Essential hypertension     Heart failure (HCC)     Hyperlipidemia     Hypertension     Impetigo     Left inguinal hernia     Lung nodule     Malignant melanoma of skin (HCC)      Past Surgical History:   Procedure Laterality Date    APPENDECTOMY      CATARACT EXTRACTION, BILATERAL      CHOLECYSTECTOMY      CHOLECYSTECTOMY      COLONOSCOPY      Fiberoptic    HERNIA REPAIR      KNEE ARTHROSCOPY Bilateral     Therapeutic    KS CYSTO INSERTION TRANSPROSTATIC IMPLANT SINGLE N/A 3/22/2019    Procedure: CYSTOSCOPY WITH INSERTION UROLIFT;  Surgeon: Bird Clinton MD;  Location: [facility];  Service: Urology     Social History   Social History     Substance and Sexual Activity   Alcohol Use Not Currently    Alcohol/week: 52.0 standard drinks of alcohol    Types: 50 Glasses of wine, 2 Standard drinks or equivalent per week     Social History     Substance and Sexual Activity   Drug Use No     Social History     Tobacco Use   Smoking Status Former    Current packs/day: 0.00    Average packs/day: 1 pack/day for 11.0 years (11.0 ttl pk-yrs)    Types: Cigarettes    Start date: 1949    Quit date: 1960    Years since quittin.5    Passive exposure: Past   Smokeless Tobacco Never     Family History   Problem Relation Age of Onset    Diabetes Mother     Heart attack Neg Hx     Stroke Neg Hx     Aneurysm Neg Hx     Clotting disorder Neg Hx     Arrhythmia Neg Hx     Hypertension Neg Hx     Hyperlipidemia Neg Hx         pt  unsure      No Known Allergies     Constitutional:  /70 (BP Location: Left arm, Patient Position: Sitting, Cuff Size: Standard)   Pulse 77    General: NAD.  Eyes: Clear sclerae.  ENT: No inflammation, lesion, or mass of lips.  No tracheal deviation.  Musculoskeletal: As mentioned below.  Integumentary: No visible rashes or skin lesions.  Pulmonary/Chest: Effort normal. No respiratory distress.   Neuro: CN's grossly intact, LOPEZ.  Psych: Normal affect and judgement.  Vascular: WWP.    Left Knee Exam     Tenderness   The patient is experiencing tenderness in the medial joint line.    Range of Motion   Extension:  normal     Other   Erythema: absent  Scars: absent  Sensation: normal  Pulse: present  Swelling: none  Effusion: no effusion present             Large joint arthrocentesis: L knee  Universal Protocol:  Consent: Verbal consent obtained. Written consent not obtained.  Risks and benefits: risks, benefits and alternatives were discussed  Consent given by: patient  Timeout called at: 7/18/2024 1:20 PM.  Patient understanding: patient states understanding of the procedure being performed  Patient consent: the patient's understanding of the procedure matches consent given  Site marked: the operative site was marked  Radiology Images displayed and confirmed. If images not available, report reviewed: imaging studies available  Patient identity confirmed: verbally with patient  Supporting Documentation  Indications: pain   Procedure Details  Location: knee - L knee  Needle size: 22 G  Ultrasound guidance: no  Approach: Inferolateral to patella.  Medications administered: 80 mg triamcinolone acetonide 40 mg/mL; 10 mL ropivacaine 0.5 %    Patient tolerance: patient tolerated the procedure well with no immediate complications  Dressing:  Sterile dressing applied    There was little to no resistance encountered during the injection.    Risks of this procedure include:    - Risk of bleeding since a needle is  involved.  - Risk of infection (1/10,000 chance as per recent studies).  Signs/symptoms were discussed and they would prompt an urgent evaluation at an emergency department.  - Risk of pigmentation or skin dimpling in the skin (2-3% chance as per recent studies) from the steroid.  - Risk of increased pain from steroid flare (1% chance as per recent studies) that typically lasts 24-48 hours.  - Risk of increased blood sugars from the steroid medication that can last for a few weeks.  If the patient is a diabetic or pre-diabetic, they were encouraged to closely monitor their blood sugars and discuss with PCP if elevated more than usual or if having symptoms.    The benefits outweigh the risks and so the procedure was completed.

## 2024-08-01 ENCOUNTER — OFFICE VISIT (OUTPATIENT)
Dept: NEPHROLOGY | Facility: CLINIC | Age: 89
End: 2024-08-01
Payer: COMMERCIAL

## 2024-08-01 ENCOUNTER — TELEPHONE (OUTPATIENT)
Age: 89
End: 2024-08-01

## 2024-08-01 VITALS
BODY MASS INDEX: 21.54 KG/M2 | HEART RATE: 88 BPM | DIASTOLIC BLOOD PRESSURE: 72 MMHG | OXYGEN SATURATION: 96 % | WEIGHT: 159 LBS | SYSTOLIC BLOOD PRESSURE: 114 MMHG | HEIGHT: 72 IN

## 2024-08-01 DIAGNOSIS — E83.9 CHRONIC KIDNEY DISEASE-MINERAL AND BONE DISORDER: ICD-10-CM

## 2024-08-01 DIAGNOSIS — I50.42 CHRONIC COMBINED SYSTOLIC AND DIASTOLIC CONGESTIVE HEART FAILURE (HCC): ICD-10-CM

## 2024-08-01 DIAGNOSIS — N18.9 CHRONIC KIDNEY DISEASE-MINERAL AND BONE DISORDER: ICD-10-CM

## 2024-08-01 DIAGNOSIS — D63.1 ANEMIA OF CHRONIC KIDNEY FAILURE, STAGE 3 (MODERATE)  (HCC): ICD-10-CM

## 2024-08-01 DIAGNOSIS — N18.32 STAGE 3B CHRONIC KIDNEY DISEASE (HCC): Primary | ICD-10-CM

## 2024-08-01 DIAGNOSIS — N18.32 CHRONIC KIDNEY DISEASE, STAGE 3B (HCC): ICD-10-CM

## 2024-08-01 DIAGNOSIS — M89.9 CHRONIC KIDNEY DISEASE-MINERAL AND BONE DISORDER: ICD-10-CM

## 2024-08-01 DIAGNOSIS — N18.30 ANEMIA OF CHRONIC KIDNEY FAILURE, STAGE 3 (MODERATE)  (HCC): ICD-10-CM

## 2024-08-01 PROCEDURE — 99214 OFFICE O/P EST MOD 30 MIN: CPT | Performed by: INTERNAL MEDICINE

## 2024-08-01 NOTE — TELEPHONE ENCOUNTER
Patient called in to follow up he said that Dr Estrada wanted him to check on a prescription when he got home.  Per office notes patient was to check to see if he takes metoprolol.  Patient looked through his medication bottles and said he no longer takes metoprolol he thinks that his endocrinologist told him to stop it.  Informed patient will send message to provider.

## 2024-08-01 NOTE — PATIENT INSTRUCTIONS
You have chronic kidney disease (CKD) and your kidney function is stable.   I recommend no changes to your medications today.   Please check your medication bottles to see if you are on Metoprolol when you get home.   Please go on a low potassium diet.   Try to minimize the use of Advil for pain. Tylenol is okay to use.   Follow up in 6 months - please obtain blood work 1-2 weeks prior to the appointment.

## 2024-08-01 NOTE — PROGRESS NOTES
NEPHROLOGY OFFICE PROGRESS NOTE   Patricio Morris 89 y.o. male MRN: [id number]  DATE: 08/01/24  Reason for visit: Continued evaluation and management of CKD    ASSESSMENT & PLAN:  Chronic kidney disease, stage IIIB:  Mr. Morris's baseline serum creatinine is around 1.3 to 1.6.   The etiology of his CKD is felt to be multifactorial from hypertensive nephrosclerosis and possible element of diabetic nephropathy complicated by hemodynamic effect of cardiac meds.   Renal function is stable.  Creatinine 1.42.   Treatment: good BP, glycemic and volume control.     Hypertension:  BP is at goal (<130/80)  Current regimen: None.   Metoprolol succ 50 mg was stopped in Feb 2024.   No changes.     Congestive heart failure:  Compensated.   Current Rx: Entresto 24/26 mg BID, Farxiga 5 mg QOD vs OD  Follow up with Dr. Zapata.   Not on loop diuretics.    Hyperkalemia  Low K diet advised.      Anemia of chronic disease:  Hgb stable at 11.2.     Proteinuria:  Urine ACR is 9 mg/g  On Valsartan in Entresto.     Mineral and bone disease  PTH is at goal in July 2024.   Calcium and phos are at goal.   Vitamin D is at goal in July 2024.   Continue vitamin D 1000 units daily.    Diabetes mellitus:   Defer DM management to Dr. Cruz.     Hyperlipidemia:   Defer HLP management to PCP.     Patient Instructions   You have chronic kidney disease (CKD) and your kidney function is stable.   I recommend no changes to your medications today.   Please check your medication bottles to see if you are on Metoprolol when you get home.   Please go on a low potassium diet.   Try to minimize the use of Advil for pain. Tylenol is okay to use.   Follow up in 6 months - please obtain blood work 1-2 weeks prior to the appointment.     SUBJECTIVE / INTERVAL HISTORY:  Patricio was last seen in the office in Oct 2023.   Since that time, no hospital stays or ER visits.   He continues to follow up with Dr. Cruz and Dr. Zapata.   He plays 9 holes of golf once a  week.   No new medical issues.   No acute complaints except for L knee pain - he had a cortisone shot a few weeks ago.   Of note, it seems that his Metoprolol was stopped in Feb 2024.     PMH/PSH: HTN, DM, HLP, CKD, hyperkalemia, CHF, COPD, back pain, gallstone pancreatitis s/p cholecystectomy, melanoma s/p excision, lung nodules, anemia, BPH s/p urolift.     Previous work up:   5/15/20 Renal US: R 10.1 cm, L 10.2 cm, no significant PVR.     ALLERGIES: No Known Allergies    REVIEW OF SYSTEMS:  Review of Systems   Constitutional:  Negative for appetite change, chills, fatigue and fever.   Respiratory:  Negative for cough and shortness of breath.    Cardiovascular:  Negative for chest pain and leg swelling.   Gastrointestinal:  Negative for abdominal pain, diarrhea, nausea and vomiting.   Genitourinary:  Negative for dysuria and hematuria.   Musculoskeletal:  Positive for arthralgias.   Neurological:  Negative for dizziness and light-headedness.     OBJECTIVE:  /72 (BP Location: Left arm, Patient Position: Sitting, Cuff Size: Standard)   Pulse 88   Ht 6' (1.829 m)   Wt 72.1 kg (159 lb)   SpO2 96%   BMI 21.56 kg/m²   Current Weight:   Body mass index is 21.56 kg/m².  Physical Exam  Constitutional:       General: He is not in acute distress.     Appearance: Normal appearance. He is well-developed and normal weight. He is not ill-appearing, toxic-appearing or diaphoretic.   HENT:      Head: Normocephalic and atraumatic.   Eyes:      General: No scleral icterus.     Conjunctiva/sclera: Conjunctivae normal.   Neck:      Vascular: No JVD.   Cardiovascular:      Rate and Rhythm: Normal rate and regular rhythm.      Heart sounds: Normal heart sounds.   Pulmonary:      Effort: Pulmonary effort is normal. No respiratory distress.      Breath sounds: Normal breath sounds.   Abdominal:      General: Bowel sounds are normal.      Palpations: Abdomen is soft.   Musculoskeletal:      Cervical back: Neck supple.      Right  lower leg: No edema.      Left lower leg: No edema.   Skin:     General: Skin is warm and dry.   Neurological:      Mental Status: He is alert and oriented to person, place, and time.   Psychiatric:         Mood and Affect: Mood normal.         Behavior: Behavior normal.         Judgment: Judgment normal.     Medications:  Current Outpatient Medications:     acetaminophen (TYLENOL) 325 mg tablet, Take 2 tablets (650 mg total) by mouth every 6 (six) hours as needed for mild pain, Disp: 30 tablet, Rfl: 0    albuterol (2.5 mg/3 mL) 0.083 % nebulizer solution, INHALE THE CONTENTS OF ONE AMPULE BY NEBULIZATION EVERY SIX HOURS AS NEEDED FOR WHEEZING OR SHORTNESS OF BREATH, Disp: 360 mL, Rfl: 1    Anoro Ellipta 62.5-25 MCG/ACT inhaler, INHALE ONE PUFF BY MOUTH ONCE DAILY, Disp: 60 each, Rfl: 5    Ascorbic Acid (VITAMIN C) 1000 MG tablet, Take 1 tablet by mouth daily, Disp: , Rfl:     BD PEN NEEDLE DON U/F 32G X 4 MM MISC,  , Disp: , Rfl:     betamethasone, augmented, (DIPROLENE-AF) 0.05 % cream, , Disp: , Rfl:     cholecalciferol (VITAMIN D3) 1,000 units tablet, Take 1,000 Units by mouth daily, Disp: , Rfl:     cholestyramine (QUESTRAN) 4 g packet, DISSOLVE 1 PACKET IN 8 OZ OF LIQUID AND TAKE BY MOUTH ONCE DAILY, Disp: 30 each, Rfl: 11    Cinnamon 500 MG TABS, Take 1 tablet by mouth daily  , Disp: , Rfl:     Dapagliflozin Propanediol 5 MG TABS, Take 5 mg by mouth every other day, Disp: , Rfl:     fluorouracil (EFUDEX) 5 % cream, Apply topically 2 (two) times a day To the area of the left chest that was biopsied for a total of 4 weeks., Disp: 40 g, Rfl: 0    fluticasone (FLONASE) 50 mcg/act nasal spray, USE TWO SPRAYS IN EACH NOSTRIL DAILY, Disp: 16 g, Rfl: 1    fluticasone (Flovent HFA) 220 mcg/act inhaler, INHALE ONE PUFF BY MOUTH TWICE DAILY - rinse mouth after use, Disp: 12 g, Rfl: 1    gabapentin (NEURONTIN) 300 mg capsule, TAKE ONE CAPSULE BY MOUTH ONCE DAILY, Disp: 90 capsule, Rfl: 1    glucose blood test strip, Use  daily, Disp: , Rfl:     hydrOXYzine pamoate (VISTARIL) 25 mg capsule, TAKE ONE CAPSULE BY MOUTH THREE TIMES DAILY AS NEEDED FOR ITCHING, Disp: 90 capsule, Rfl: 1    Insulin Glargine Solostar 100 UNIT/ML SOPN, Inject 14 Units under the skin daily, Disp: , Rfl:     ipratropium (ATROVENT) 0.03 % nasal spray, 2 sprays into each nostril every 12 (twelve) hours, Disp: 30 mL, Rfl: 0    Levemir FlexTouch 100 units/mL injection pen, 10 Units daily at bedtime (Patient not taking: Reported on 6/17/2024), Disp: , Rfl:     montelukast (SINGULAIR) 10 mg tablet, TAKE ONE TABLET BY MOUTH NIGHTLY AT BEDTIME, Disp: 90 tablet, Rfl: 1    Multiple Vitamins-Minerals (OCUVITE ADULT 50+ PO), Take 1 tablet by mouth daily, Disp: , Rfl:     NOVOLOG FLEXPEN 100 units/mL injection pen, Inject 4 Units under the skin 2 (two) times a day with meals 4u in am,  and 8u at dinner, Disp: , Rfl:     ONE TOUCH ULTRA TEST test strip,  , Disp: , Rfl:     ONETOUCH DELICA LANCETS FINE MISC,  , Disp: , Rfl:     roflumilast (DALIRESP) 500 mcg tablet, Take 1 tablet (500 mcg total) by mouth daily, Disp: 30 tablet, Rfl: 5    sacubitril-valsartan (Entresto) 24-26 MG TABS, Take 1 tablet by mouth 2 (two) times a day, Disp: 180 tablet, Rfl: 3    simvastatin (ZOCOR) 10 mg tablet, TAKE ONE TABLET BY MOUTH ONCE DAILY, Disp: 90 tablet, Rfl: 1    Ventolin  (90 Base) MCG/ACT inhaler, INHALE TWO PUFFS BY MOUTH EVERY 6 HOURS AS NEEDED FOR WHEEZING, Disp: 18 g, Rfl: 0    Laboratory Results:  Results for orders placed or performed in visit on 07/01/24   Microalbumin, Random Urine (W/Creatinine)   Result Value Ref Range    Creatinine, Urine 100 20 - 320 mg/dL    Albumin,U,Random 0.9 See Note: mg/dL    Microalb/Creat Ratio 9 <30 mg/g creat   PTH, Intact and Calcium   Result Value Ref Range    PTH, Intact 38 16 - 77 pg/mL    Calcium 9.6 8.6 - 10.3 mg/dL   Magnesium   Result Value Ref Range    Magnesium, Serum 2.0 1.5 - 2.5 mg/dL   Phosphorus   Result Value Ref Range     Phosphorus, Serum 3.2 2.1 - 4.3 mg/dL   Comprehensive metabolic panel   Result Value Ref Range    Glucose, Random 161 (H) 65 - 99 mg/dL    BUN 28 (H) 7 - 25 mg/dL    Creatinine 1.42 (H) 0.70 - 1.22 mg/dL    eGFR 47 (L) > OR = 60 mL/min/1.73m2    SL AMB BUN/CREATININE RATIO 20 6 - 22 (calc)    Sodium 137 135 - 146 mmol/L    Potassium 5.4 (H) 3.5 - 5.3 mmol/L    Chloride 104 98 - 110 mmol/L    CO2 27 20 - 32 mmol/L    Calcium 9.6 8.6 - 10.3 mg/dL    Protein, Total 6.7 6.1 - 8.1 g/dL    Albumin 4.6 3.6 - 5.1 g/dL    Globulin 2.1 1.9 - 3.7 g/dL (calc)    Albumin/Globulin Ratio 2.2 1.0 - 2.5 (calc)    TOTAL BILIRUBIN 0.6 0.2 - 1.2 mg/dL    Alkaline Phosphatase 38 35 - 144 U/L    AST 12 10 - 35 U/L    ALT 11 9 - 46 U/L   CBC   Result Value Ref Range    White Blood Cell Count 8.0 3.8 - 10.8 Thousand/uL    Red Blood Cell Count 4.11 (L) 4.20 - 5.80 Million/uL    Hemoglobin 11.2 (L) 13.2 - 17.1 g/dL    HCT 35.4 (L) 38.5 - 50.0 %    MCV 86.1 80.0 - 100.0 fL    MCH 27.3 27.0 - 33.0 pg    MCHC 31.6 (L) 32.0 - 36.0 g/dL    RDW 24.4 (H) 11.0 - 15.0 %    Platelet Count 196 140 - 400 Thousand/uL    SL AMB MPV  7.5 - 12.5 fL   Vitamin D 25 hydroxy   Result Value Ref Range    Vitamin D, 25-Hydroxy, Serum 39 30 - 100 ng/mL

## 2024-08-02 DIAGNOSIS — I42.0 DILATED CARDIOMYOPATHY (HCC): Primary | ICD-10-CM

## 2024-08-02 RX ORDER — METOPROLOL SUCCINATE 50 MG/1
50 TABLET, EXTENDED RELEASE ORAL DAILY
Qty: 90 TABLET | Refills: 3 | Status: SHIPPED | OUTPATIENT
Start: 2024-08-02

## 2024-08-06 ENCOUNTER — TELEPHONE (OUTPATIENT)
Age: 89
End: 2024-08-06

## 2024-08-06 ENCOUNTER — TELEPHONE (OUTPATIENT)
Dept: CARDIOLOGY CLINIC | Facility: MEDICAL CENTER | Age: 89
End: 2024-08-06

## 2024-08-06 NOTE — TELEPHONE ENCOUNTER
----- Message from Christy Zapata MD sent at 8/2/2024  5:37 PM EDT -----  Could you please let him know that I have put him on metoprolol XL 50 mg daily, this had helped his heart muscle recover and become stronger in the past, I do not want him to lose its efficacy.  If there is a side effect that he has experienced, he can let us know but was previously on 50 twice daily

## 2024-08-06 NOTE — TELEPHONE ENCOUNTER
Patient calling said that his cardiologist is now putting him back on the metoprolol succinate 50 mg daily.  He wanted to make Dr Estrada aware.

## 2024-08-09 ENCOUNTER — RA CDI HCC (OUTPATIENT)
Dept: OTHER | Facility: HOSPITAL | Age: 89
End: 2024-08-09

## 2024-08-09 NOTE — PROGRESS NOTES
I13.0, e11.22, J44.9  HCC coding opportunities          Chart Reviewed number of suggestions sent to Provider: 3     Patients Insurance     Medicare Insurance: [employer]vantage

## 2024-08-10 DIAGNOSIS — J44.1 COPD WITH ACUTE EXACERBATION (HCC): ICD-10-CM

## 2024-08-12 RX ORDER — ROFLUMILAST 500 UG/1
500 TABLET ORAL DAILY
Qty: 30 TABLET | Refills: 5 | Status: SHIPPED | OUTPATIENT
Start: 2024-08-12

## 2024-08-13 DIAGNOSIS — L29.9 PRURITUS: ICD-10-CM

## 2024-08-14 ENCOUNTER — PROCEDURE VISIT (OUTPATIENT)
Dept: OBGYN CLINIC | Facility: MEDICAL CENTER | Age: 89
End: 2024-08-14
Payer: COMMERCIAL

## 2024-08-14 VITALS — BODY MASS INDEX: 21.54 KG/M2 | HEIGHT: 72 IN | WEIGHT: 159 LBS

## 2024-08-14 DIAGNOSIS — M17.12 PRIMARY OSTEOARTHRITIS OF LEFT KNEE: Primary | ICD-10-CM

## 2024-08-14 PROCEDURE — 20610 DRAIN/INJ JOINT/BURSA W/O US: CPT | Performed by: PHYSICAL MEDICINE & REHABILITATION

## 2024-08-14 RX ORDER — HYDROXYZINE PAMOATE 25 MG/1
CAPSULE ORAL
Qty: 90 CAPSULE | Refills: 1 | Status: SHIPPED | OUTPATIENT
Start: 2024-08-14

## 2024-08-14 NOTE — PROGRESS NOTES
1. Primary osteoarthritis of left knee          Orders Placed This Encounter   Procedures    Large joint arthrocentesis        Impression:  Patient is here in follow up of left knee pain likely secondary to severe medial tibiofemoral osteoarthritis leading to Baker's cyst.  Treatment has included intra-articular steroid injection, HA injection, brace and Baker's cyst aspiration.     Today we completed a Durolane injection.  Can consider referral for genicular nerve procedure with pain management.  Patient's daughter was present for today's encounter.  We discussed that knee replacement is not a great option due to his comorbidities.     Imaging Studies (I personally reviewed images in PACS and report):  Left knee x-rays most recent to this encounter reviewed.  These images show severe osteoarthritis that most affects the medial joint space.  There is a small joint effusion.  Calcifications in the posterior knee.  No acute osseous abnormalities.  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     Ultrasound of the soft tissue in the left posterior knee showed a Baker's cyst.  This was performed by radiology.    No follow-ups on file.    Patient is in agreement with the above plan.    HPI:  Patricio Morris is a 89 y.o. male  who presents in follow up.  Here for   Chief Complaint   Patient presents with    Left Knee - Pain, Injections       Since last visit: See above.    Following history reviewed and updated:  Past Medical History:   Diagnosis Date    Allergic april 1989    COPD (chronic obstructive pulmonary disease) (HCC)     Diabetes mellitus (HCC)     Type 2    Essential hypertension     Heart failure (HCC)     Hyperlipidemia     Hypertension     Impetigo     Left inguinal hernia     Lung nodule     Malignant melanoma of skin (HCC)      Past Surgical History:   Procedure Laterality Date    APPENDECTOMY      CATARACT EXTRACTION, BILATERAL      CHOLECYSTECTOMY      CHOLECYSTECTOMY       COLONOSCOPY  2014    Fiberoptic    HERNIA REPAIR      KNEE ARTHROSCOPY Bilateral     Therapeutic    MO CYSTO INSERTION TRANSPROSTATIC IMPLANT SINGLE N/A 3/22/2019    Procedure: CYSTOSCOPY WITH INSERTION UROLIFT;  Surgeon: Bird Clinton MD;  Location: [facility];  Service: Urology     Social History   Social History     Substance and Sexual Activity   Alcohol Use Not Currently    Alcohol/week: 52.0 standard drinks of alcohol    Types: 50 Glasses of wine, 2 Standard drinks or equivalent per week     Social History     Substance and Sexual Activity   Drug Use No     Social History     Tobacco Use   Smoking Status Former    Current packs/day: 0.00    Average packs/day: 1 pack/day for 11.0 years (11.0 ttl pk-yrs)    Types: Cigarettes    Start date: 1949    Quit date: 1960    Years since quittin.6    Passive exposure: Past   Smokeless Tobacco Never     Family History   Problem Relation Age of Onset    Diabetes Mother     Heart attack Neg Hx     Stroke Neg Hx     Aneurysm Neg Hx     Clotting disorder Neg Hx     Arrhythmia Neg Hx     Hypertension Neg Hx     Hyperlipidemia Neg Hx         pt unsure      No Known Allergies     Constitutional:  Ht 6' (1.829 m)   Wt 72.1 kg (159 lb)   BMI 21.56 kg/m²    General: NAD.  Eyes: Clear sclerae.  ENT: No inflammation, lesion, or mass of lips.  No tracheal deviation.  Musculoskeletal: As mentioned below.  Integumentary: No visible rashes or skin lesions.  Pulmonary/Chest: Effort normal. No respiratory distress.   Neuro: CN's grossly intact, LOPEZ.  Psych: Normal affect and judgement.  Vascular: WWP.    Left Knee Exam     Tenderness   The patient is experiencing tenderness in the medial joint line.    Range of Motion   Extension:  normal     Other   Erythema: absent  Scars: absent  Sensation: normal  Pulse: present             Large joint arthrocentesis: L knee  Universal Protocol:  procedure performed by consultantConsent: Verbal consent obtained. Written consent  not obtained.  Risks and benefits: risks, benefits and alternatives were discussed  Consent given by: patient  Timeout called at: 8/14/2024 1:48 PM.  Patient understanding: patient states understanding of the procedure being performed  Site marked: the operative site was marked  Radiology Images displayed and confirmed. If images not available, report reviewed: imaging studies available  Patient identity confirmed: verbally with patient  Supporting Documentation  Indications: pain   Procedure Details  Location: knee - L knee  Needle size: 22 G  Ultrasound guidance: no  Approach: Inferolateral to patella.  Medications administered: 3 mL sodium hyaluronate 60 MG/3ML    Patient tolerance: patient tolerated the procedure well with no immediate complications  Dressing:  Sterile dressing applied    Risks of this procedure include:    - Risk of bleeding since a needle is involved.  - Risk of infection (1/10,000 chance as per recent studies).  Signs/symptoms were discussed and they would prompt an urgent evaluation at an emergency department.  - Risk of synovitis from the viscosupplementation.    The benefits outweigh the risks and so we proceeded with the procedure.

## 2024-08-20 ENCOUNTER — OFFICE VISIT (OUTPATIENT)
Dept: FAMILY MEDICINE CLINIC | Facility: MEDICAL CENTER | Age: 89
End: 2024-08-20
Payer: COMMERCIAL

## 2024-08-20 VITALS
HEART RATE: 70 BPM | BODY MASS INDEX: 21.91 KG/M2 | TEMPERATURE: 97.8 F | SYSTOLIC BLOOD PRESSURE: 100 MMHG | RESPIRATION RATE: 18 BRPM | OXYGEN SATURATION: 94 % | HEIGHT: 72 IN | WEIGHT: 161.8 LBS | DIASTOLIC BLOOD PRESSURE: 58 MMHG

## 2024-08-20 DIAGNOSIS — K58.0 IRRITABLE BOWEL SYNDROME WITH DIARRHEA: ICD-10-CM

## 2024-08-20 DIAGNOSIS — E78.49 OTHER HYPERLIPIDEMIA: ICD-10-CM

## 2024-08-20 DIAGNOSIS — D46.Z MDS (MYELODYSPLASTIC SYNDROME), LOW GRADE (HCC): ICD-10-CM

## 2024-08-20 DIAGNOSIS — N18.30 ANEMIA OF CHRONIC KIDNEY FAILURE, STAGE 3 (MODERATE)  (HCC): ICD-10-CM

## 2024-08-20 DIAGNOSIS — E11.40 TYPE 2 DIABETES MELLITUS WITH DIABETIC NEUROPATHY, WITH LONG-TERM CURRENT USE OF INSULIN (HCC): Chronic | ICD-10-CM

## 2024-08-20 DIAGNOSIS — N18.32 STAGE 3B CHRONIC KIDNEY DISEASE (HCC): ICD-10-CM

## 2024-08-20 DIAGNOSIS — D63.1 ANEMIA OF CHRONIC KIDNEY FAILURE, STAGE 3 (MODERATE)  (HCC): ICD-10-CM

## 2024-08-20 DIAGNOSIS — J41.0 SIMPLE CHRONIC BRONCHITIS (HCC): ICD-10-CM

## 2024-08-20 DIAGNOSIS — I13.0 HYPERTENSIVE HEART AND KIDNEY DISEASE WITH HF AND CKD (HCC): ICD-10-CM

## 2024-08-20 DIAGNOSIS — I50.42 CHRONIC COMBINED SYSTOLIC AND DIASTOLIC CONGESTIVE HEART FAILURE (HCC): ICD-10-CM

## 2024-08-20 DIAGNOSIS — Z79.4 TYPE 2 DIABETES MELLITUS WITH DIABETIC NEUROPATHY, WITH LONG-TERM CURRENT USE OF INSULIN (HCC): Chronic | ICD-10-CM

## 2024-08-20 DIAGNOSIS — Z09 FOLLOW-UP EXAM, 3-6 MONTHS SINCE PREVIOUS EXAM: Primary | ICD-10-CM

## 2024-08-20 PROCEDURE — G2211 COMPLEX E/M VISIT ADD ON: HCPCS | Performed by: STUDENT IN AN ORGANIZED HEALTH CARE EDUCATION/TRAINING PROGRAM

## 2024-08-20 PROCEDURE — 99214 OFFICE O/P EST MOD 30 MIN: CPT | Performed by: STUDENT IN AN ORGANIZED HEALTH CARE EDUCATION/TRAINING PROGRAM

## 2024-08-20 NOTE — PROGRESS NOTES
[facility] - Clinic Note  Tino Heard, DO, 24     Patricio Morris MRN: [id number] : 3/26/1933 Age: 89 y.o.     Assessment/Plan     1. Follow-up exam, 3-6 months since previous exam    -Return in 6 months and sooner as needed    2. Chronic combined systolic and diastolic congestive heart failure (HCC)    -Following with cardiology  -Euvolemic    3. 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 Type 2 diabetes mellitus with diabetic neuropathy, with long-term current use of insulin (HCC)    -Managed by endocrinology, most recent A1c 7  -Gabapentin    5. Anemia of chronic kidney failure, stage 3 (moderate)  (HCC)    -Following with nephrology    6. Stage 3b chronic kidney disease (HCC)    -Following with nephrology  Component      Latest Ref Rng 2024   GLUCOSE      65 - 99 mg/dL 161 (H)    BUN      7 - 25 mg/dL 28 (H)    Creatinine      0.70 - 1.22 mg/dL 1.42 (H)    Creatinine       1.46 (H) (E)   SL AMB BUN/CREATININE RATIO      6 - 22 (calc) 20    Sodium      135 - 146 mmol/L 137    Potassium      3.5 - 5.3 mmol/L 5.4 (H)    Chloride      98 - 110 mmol/L 104    Carbon Dioxide      20 - 32 mmol/L 27    Calcium      8.6 - 10.3 mg/dL 9.6    Calcium      8.6 - 10.3 mg/dL 9.6    Total Protein      6.1 - 8.1 g/dL 6.7    Albumin      3.6 - 5.1 g/dL 4.6    Globulin      1.9 - 3.7 g/dL (calc) 2.1    Albumin/Globulin Ratio      1.0 - 2.5 (calc) 2.2    Total Bilirubin      0.2 - 1.2 mg/dL 0.6    ALK PHOS      35 - 144 U/L 38    AST      10 - 35 U/L 12    ALT      9 - 46 U/L 11    GFR, Calculated      > OR = 60 mL/min/1.73m2 47 (L)    GFR, Calculated       45 (L) (E)      Legend:  (H) High  (L) Low  (E) External lab result    7. MDS (myelodysplastic syndrome), low grade (HCC)    -Following with heme-onc    8. Other hyperlipidemia    -Simvastatin 10 mg p.o. nightly  - Lipid Panel with Direct LDL reflex; Future    9. Simple  chronic bronchitis (HCC)    -Following with pulmonology, doing very well with current regimen    10. Irritable bowel syndrome with diarrhea    -Established with GI, [facility]      Patricio Morris acknowledged understanding of treatment plan, all questions answered.    Subjective      Patricio Morris is a 89 y.o. male who presents for medical follow-up.  He denies any chest pain.  Patient states knee pain has improved and he is able to golf.    The following portions of the patient's history were reviewed and updated as appropriate: allergies, current medications, past family history, past medical history, past social history, past surgical history and problem list.   Past Medical History:   Diagnosis Date    Allergic april 1989    COPD (chronic obstructive pulmonary disease) (HCC)     Diabetes mellitus (HCC)     Type 2    Essential hypertension     Heart failure (HCC)     Hyperlipidemia     Hypertension     Impetigo     Left inguinal hernia     Lung nodule     Malignant melanoma of skin (HCC)        No Known Allergies    Past Surgical History:   Procedure Laterality Date    APPENDECTOMY      CATARACT EXTRACTION, BILATERAL      CHOLECYSTECTOMY      CHOLECYSTECTOMY      COLONOSCOPY  2014    Fiberoptic    HERNIA REPAIR      KNEE ARTHROSCOPY Bilateral     Therapeutic    CT CYSTO INSERTION TRANSPROSTATIC IMPLANT SINGLE N/A 3/22/2019    Procedure: CYSTOSCOPY WITH INSERTION UROLIFT;  Surgeon: Bird Clinton MD;  Location: [facility];  Service: Urology       Family History   Problem Relation Age of Onset    Diabetes Mother     Heart attack Neg Hx     Stroke Neg Hx     Aneurysm Neg Hx     Clotting disorder Neg Hx     Arrhythmia Neg Hx     Hypertension Neg Hx     Hyperlipidemia Neg Hx         pt unsure        Social History     Socioeconomic History    Marital status:      Spouse name: None    Number of children: 2    Years of education: None    Highest education level: None   Occupational History     Occupation: rtired   Tobacco Use    Smoking status: Former     Current packs/day: 0.00     Average packs/day: 1 pack/day for 11.0 years (11.0 ttl pk-yrs)     Types: Cigarettes     Start date: 1949     Quit date: 1960     Years since quittin.6     Passive exposure: Past    Smokeless tobacco: Never   Vaping Use    Vaping status: Never Used   Substance and Sexual Activity    Alcohol use: Not Currently     Alcohol/week: 52.0 standard drinks of alcohol     Types: 50 Glasses of wine, 2 Standard drinks or equivalent per week    Drug use: No    Sexual activity: Not Currently     Partners: Female     Birth control/protection: Sponge   Other Topics Concern    None   Social History Narrative    Caffeine use, active     Social Determinants of Health     Financial Resource Strain: Low Risk  (2024)    Overall Financial Resource Strain (CARDIA)     Difficulty of Paying Living Expenses: Not hard at all   Food Insecurity: Not on file   Transportation Needs: No Transportation Needs (2024)    PRAPARE - Transportation     Lack of Transportation (Medical): No     Lack of Transportation (Non-Medical): No   Physical Activity: Not on file   Stress: Not on file   Social Connections: Not on file   Intimate Partner Violence: Not on file   Housing Stability: Not on file       Current Outpatient Medications   Medication Sig Dispense Refill    acetaminophen (TYLENOL) 325 mg tablet Take 2 tablets (650 mg total) by mouth every 6 (six) hours as needed for mild pain 30 tablet 0    albuterol (2.5 mg/3 mL) 0.083 % nebulizer solution INHALE THE CONTENTS OF ONE AMPULE BY NEBULIZATION EVERY SIX HOURS AS NEEDED FOR WHEEZING OR SHORTNESS OF BREATH 360 mL 1    Anoro Ellipta 62.5-25 MCG/ACT inhaler INHALE ONE PUFF BY MOUTH ONCE DAILY 60 each 5    Ascorbic Acid (VITAMIN C) 1000 MG tablet Take 1 tablet by mouth daily      cholecalciferol (VITAMIN D3) 1,000 units tablet Take 1,000 Units by mouth daily      cholestyramine (QUESTRAN) 4 g  packet DISSOLVE 1 PACKET IN 8 OZ OF LIQUID AND TAKE BY MOUTH ONCE DAILY 30 each 11    Cinnamon 500 MG TABS Take 1 tablet by mouth daily        Dapagliflozin Propanediol 5 MG TABS Take 5 mg by mouth every other day      fluorouracil (EFUDEX) 5 % cream Apply topically 2 (two) times a day To the area of the left chest that was biopsied for a total of 4 weeks. 40 g 0    fluticasone (FLONASE) 50 mcg/act nasal spray USE TWO SPRAYS IN EACH NOSTRIL DAILY 16 g 1    fluticasone (Flovent HFA) 220 mcg/act inhaler INHALE ONE PUFF BY MOUTH TWICE DAILY - rinse mouth after use 12 g 1    gabapentin (NEURONTIN) 300 mg capsule TAKE ONE CAPSULE BY MOUTH ONCE DAILY 90 capsule 1    glucose blood test strip Use daily      hydrOXYzine pamoate (VISTARIL) 25 mg capsule TAKE ONE CAPSULE BY MOUTH THREE TIMES DAILY AS NEEDED FOR ITCHING 90 capsule 1    Insulin Glargine Solostar 100 UNIT/ML SOPN Inject 14 Units under the skin daily      ipratropium (ATROVENT) 0.03 % nasal spray 2 sprays into each nostril every 12 (twelve) hours 30 mL 0    metoprolol succinate (TOPROL-XL) 50 mg 24 hr tablet Take 1 tablet (50 mg total) by mouth daily 90 tablet 3    montelukast (SINGULAIR) 10 mg tablet TAKE ONE TABLET BY MOUTH NIGHTLY AT BEDTIME 90 tablet 1    Multiple Vitamins-Minerals (OCUVITE ADULT 50+ PO) Take 1 tablet by mouth daily      NOVOLOG FLEXPEN 100 units/mL injection pen Inject 4 Units under the skin 2 (two) times a day with meals 4u in am,  and 8u at dinner      ONE TOUCH ULTRA TEST test strip        ONETOUCH DELICA LANCETS FINE MISC        roflumilast (DALIRESP) 500 mcg tablet TAKE ONE TABLET BY MOUTH ONCE DAILY 30 tablet 5    sacubitril-valsartan (Entresto) 24-26 MG TABS Take 1 tablet by mouth 2 (two) times a day 180 tablet 3    simvastatin (ZOCOR) 10 mg tablet TAKE ONE TABLET BY MOUTH ONCE DAILY 90 tablet 1    Ventolin  (90 Base) MCG/ACT inhaler INHALE TWO PUFFS BY MOUTH EVERY 6 HOURS AS NEEDED FOR WHEEZING 18 g 0    BD PEN NEEDLE DON U/F  32G X 4 MM MISC        betamethasone, augmented, (DIPROLENE-AF) 0.05 % cream  (Patient not taking: Reported on 2/22/2024)      Levemir FlexTouch 100 units/mL injection pen 10 Units daily at bedtime (Patient not taking: Reported on 6/17/2024)       No current facility-administered medications for this visit.       Review of Systems     As noted in HPI    Objective      /58 (BP Location: Left arm, Patient Position: Sitting, Cuff Size: Standard)   Pulse 70   Temp 97.8 °F (36.6 °C) (Temporal)   Resp 18   Ht 6' (1.829 m)   Wt 73.4 kg (161 lb 12.8 oz)   SpO2 94%   BMI 21.94 kg/m²     Physical Exam  Vitals reviewed.   Constitutional:       Appearance: Normal appearance.   HENT:      Head: Normocephalic and atraumatic.      Nose: Nose normal.      Mouth/Throat:      Mouth: Mucous membranes are moist.      Pharynx: Oropharynx is clear.   Eyes:      Extraocular Movements: Extraocular movements intact.      Conjunctiva/sclera: Conjunctivae normal.      Pupils: Pupils are equal, round, and reactive to light.   Cardiovascular:      Rate and Rhythm: Normal rate and regular rhythm.      Heart sounds: Murmur heard.   Pulmonary:      Effort: Pulmonary effort is normal. No respiratory distress.      Breath sounds: Normal breath sounds. No wheezing or rales.   Abdominal:      General: Abdomen is flat. Bowel sounds are normal.      Palpations: Abdomen is soft.      Tenderness: There is no abdominal tenderness.   Musculoskeletal:      Cervical back: Neck supple.      Right lower leg: No edema.      Left lower leg: No edema.   Skin:     General: Skin is warm and dry.      Capillary Refill: Capillary refill takes less than 2 seconds.   Neurological:      Mental Status: He is alert and oriented to person, place, and time.   Psychiatric:         Mood and Affect: Mood normal.         Behavior: Behavior normal.         Thought Content: Thought content normal.             Some portions of this record may have been generated with  "voice recognition software. There may be translation, syntax, or grammatical errors. Occasional wrong word or \"sound-a-like\" substitutions may have occurred due to the inherent limitations of the voice recognition software. Read the chart carefully and recognize, using context, where substations may have occurred. If you have any questions, please contact the dictating provider for clarification or correction, as needed.  "

## 2024-08-23 DIAGNOSIS — J41.0 SIMPLE CHRONIC BRONCHITIS (HCC): ICD-10-CM

## 2024-08-23 RX ORDER — ALBUTEROL SULFATE 0.83 MG/ML
SOLUTION RESPIRATORY (INHALATION)
Qty: 360 ML | Refills: 1 | Status: SHIPPED | OUTPATIENT
Start: 2024-08-23

## 2024-09-09 DIAGNOSIS — J41.0 SIMPLE CHRONIC BRONCHITIS (HCC): ICD-10-CM

## 2024-09-09 RX ORDER — UMECLIDINIUM BROMIDE AND VILANTEROL TRIFENATATE 62.5; 25 UG/1; UG/1
1 POWDER RESPIRATORY (INHALATION) DAILY
Qty: 60 EACH | Refills: 5 | Status: SHIPPED | OUTPATIENT
Start: 2024-09-09

## 2024-10-03 ENCOUNTER — OFFICE VISIT (OUTPATIENT)
Dept: DERMATOLOGY | Facility: CLINIC | Age: 89
End: 2024-10-03
Payer: COMMERCIAL

## 2024-10-03 VITALS — TEMPERATURE: 98 F | HEIGHT: 72 IN | BODY MASS INDEX: 21.81 KG/M2 | WEIGHT: 161 LBS

## 2024-10-03 DIAGNOSIS — L81.4 LENTIGO: ICD-10-CM

## 2024-10-03 DIAGNOSIS — L82.1 SEBORRHEIC KERATOSIS: ICD-10-CM

## 2024-10-03 DIAGNOSIS — L57.0 KERATOSIS, ACTINIC: ICD-10-CM

## 2024-10-03 DIAGNOSIS — D22.9 MULTIPLE MELANOCYTIC NEVI: ICD-10-CM

## 2024-10-03 DIAGNOSIS — D18.01 CHERRY ANGIOMA: Primary | ICD-10-CM

## 2024-10-03 DIAGNOSIS — L85.3 XEROSIS OF SKIN: ICD-10-CM

## 2024-10-03 DIAGNOSIS — Z85.820 HISTORY OF MELANOMA: ICD-10-CM

## 2024-10-03 PROCEDURE — 17003 DESTRUCT PREMALG LES 2-14: CPT | Performed by: DERMATOLOGY

## 2024-10-03 PROCEDURE — 17000 DESTRUCT PREMALG LESION: CPT | Performed by: DERMATOLOGY

## 2024-10-03 PROCEDURE — 99214 OFFICE O/P EST MOD 30 MIN: CPT | Performed by: DERMATOLOGY

## 2024-10-03 NOTE — PROGRESS NOTES
"[facility] Note     Patient Name: Patricio Morris  Encounter Date: 10/3/24     Have you been cared for by a [facility] Dermatologist in the last 3 years and, if so, which description applies to you?    Yes.  I have been here within the last 3 years, and my medical history has NOT changed since that time.  I am MALE/not capable of bearing children.    REVIEW OF SYSTEMS:  Have you recently had or currently have any of the following? No changes in my recent health.   PAST MEDICAL HISTORY:  Have you personally ever had or currently have any of the following?  If \"YES,\" then please provide more detail. No changes in my medical history.   HISTORY OF IMMUNOSUPPRESSION: Do you have a history of any of the following:  Systemic Immunosuppression such as Diabetes, Biologic or Immunotherapy, Chemotherapy, Organ Transplantation, Bone Marrow Transplantation or Prednisone?   YES, Diabetes      Answering \"YES\" requires the addition of the dotphrase \"IMMUNOSUPPRESSED\" as the first diagnosis of the patient's visit.   FAMILY HISTORY:  Any \"first degree relatives\" (parent, brother, sister, or child) with the following?    No changes in my family's known health.   PATIENT EXPERIENCE:    Do you want the Dermatologist to perform a COMPLETE skin exam today including a clinical examination under the \"bra and underwear\" areas?  Yes  If necessary, do we have your permission to call and leave a detailed message on your Preferred Phone number that includes your specific medical information?  Yes      No Known Allergies   Current Outpatient Medications:     acetaminophen (TYLENOL) 325 mg tablet, Take 2 tablets (650 mg total) by mouth every 6 (six) hours as needed for mild pain, Disp: 30 tablet, Rfl: 0    albuterol (2.5 mg/3 mL) 0.083 % nebulizer solution, INHALE THE CONTENTS OF ONE AMPULE BY NEBULIZATION EVERY SIX HOURS AS NEEDED FOR WHEEZING OR SHORTNESS OF BREATH, Disp: 360 mL, Rfl: 1    Ascorbic Acid (VITAMIN C) 1000 MG " tablet, Take 1 tablet by mouth daily, Disp: , Rfl:     BD PEN NEEDLE DON U/F 32G X 4 MM MISC,  , Disp: , Rfl:     betamethasone, augmented, (DIPROLENE-AF) 0.05 % cream, , Disp: , Rfl:     cholecalciferol (VITAMIN D3) 1,000 units tablet, Take 1,000 Units by mouth daily, Disp: , Rfl:     cholestyramine (QUESTRAN) 4 g packet, DISSOLVE 1 PACKET IN 8 OZ OF LIQUID AND TAKE BY MOUTH ONCE DAILY, Disp: 30 each, Rfl: 11    Cinnamon 500 MG TABS, Take 1 tablet by mouth daily  , Disp: , Rfl:     Dapagliflozin Propanediol 5 MG TABS, Take 5 mg by mouth every other day, Disp: , Rfl:     fluorouracil (EFUDEX) 5 % cream, Apply topically 2 (two) times a day To the area of the left chest that was biopsied for a total of 4 weeks., Disp: 40 g, Rfl: 0    fluticasone (FLONASE) 50 mcg/act nasal spray, USE TWO SPRAYS IN EACH NOSTRIL DAILY, Disp: 16 g, Rfl: 1    fluticasone (Flovent HFA) 220 mcg/act inhaler, INHALE ONE PUFF BY MOUTH TWICE DAILY - rinse mouth after use, Disp: 12 g, Rfl: 1    gabapentin (NEURONTIN) 300 mg capsule, TAKE ONE CAPSULE BY MOUTH ONCE DAILY, Disp: 90 capsule, Rfl: 1    glucose blood test strip, Use daily, Disp: , Rfl:     hydrOXYzine pamoate (VISTARIL) 25 mg capsule, TAKE ONE CAPSULE BY MOUTH THREE TIMES DAILY AS NEEDED FOR ITCHING, Disp: 90 capsule, Rfl: 1    Insulin Glargine Solostar 100 UNIT/ML SOPN, Inject 14 Units under the skin daily, Disp: , Rfl:     ipratropium (ATROVENT) 0.03 % nasal spray, 2 sprays into each nostril every 12 (twelve) hours, Disp: 30 mL, Rfl: 0    Levemir FlexTouch 100 units/mL injection pen, 10 Units daily at bedtime (Patient not taking: Reported on 6/17/2024), Disp: , Rfl:     metoprolol succinate (TOPROL-XL) 50 mg 24 hr tablet, Take 1 tablet (50 mg total) by mouth daily, Disp: 90 tablet, Rfl: 3    montelukast (SINGULAIR) 10 mg tablet, TAKE ONE TABLET BY MOUTH NIGHTLY AT BEDTIME, Disp: 90 tablet, Rfl: 1    Multiple Vitamins-Minerals (OCUVITE ADULT 50+ PO), Take 1 tablet by mouth daily,  Disp: , Rfl:     NOVOLOG FLEXPEN 100 units/mL injection pen, Inject 4 Units under the skin 2 (two) times a day with meals 4u in am,  and 8u at dinner, Disp: , Rfl:     ONE TOUCH ULTRA TEST test strip,  , Disp: , Rfl:     ONETOUCH DELICA LANCETS FINE MISC,  , Disp: , Rfl:     roflumilast (DALIRESP) 500 mcg tablet, TAKE ONE TABLET BY MOUTH ONCE DAILY, Disp: 30 tablet, Rfl: 5    sacubitril-valsartan (Entresto) 24-26 MG TABS, Take 1 tablet by mouth 2 (two) times a day, Disp: 180 tablet, Rfl: 3    simvastatin (ZOCOR) 10 mg tablet, TAKE ONE TABLET BY MOUTH ONCE DAILY, Disp: 90 tablet, Rfl: 1    umeclidinium-vilanterol (Anoro Ellipta) 62.5-25 mcg/actuation inhaler, INHALE ONE PUFF BY MOUTH ONCE DAILY, Disp: 60 each, Rfl: 5    Ventolin  (90 Base) MCG/ACT inhaler, INHALE TWO PUFFS BY MOUTH EVERY 6 HOURS AS NEEDED FOR WHEEZING, Disp: 18 g, Rfl: 0          Whom besides the patient is providing clinical information about today's encounter?   NO ADDITIONAL HISTORIAN (patient alone provided history)    Physical Exam and Assessment/Plan by Diagnosis:    HISTORY OF MELANOMA     Physical Exam:  Year Treated: 1990  TNM Classification:   Suspected Recurrence: no  Regional adenopathy: no     Additional History of Present Condition:  Surgery in 1990. Patient unsure of which shoulder it was located on. Last skin exam was approximately 5 years ago.      Assessment and Plan:  Based on a thorough discussion of this condition and the management approach to it (including a comprehensive discussion of the known risks, side effects and potential benefits of treatment), the patient (family) agrees to implement the following specific plan:  When outside we recommend using a wide brim hat, sunglasses, long sleeve and pants, sunscreen with SPF 30+ with reapplication every 2 hours, or SPF specific clothing   We recommend that you continue to have regular full body skin exams with your dermatologist.  We recommend that you see your eye doctor  "and dentist yearly for exams and make sure they are aware of your past history of Melanoma.      What happens at follow-up?  The main purpose of follow-up is to detect recurrences early (metastatic melanoma), but it also offers an opportunity to diagnose a new primary melanoma at the first possible opportunity. A second invasive melanoma occurs in 5-10% of melanoma patients and a new melanoma in situ is diagnosed in more than 20% of melanoma patients.     Our practice makes the following recommendations for follow-up for patients with invasive melanoma.  At-least \"monthly\" self-skin examinations   Routine skin checks by a board certified dermatologist  Follow-up intervals are \"every 3 months\" within 2 years of a new melanoma diagnosis; \"every 6 months\" between 2-4 years of a new melanoma diagnosis; and \"annually\" after 4 years of a new melanoma diagnosis  Individual patient's needs should be considered before an appropriate follow-up is offered  Provide education and support to help the patient adjust to their illness     Follow-up appointments should include:  A check of the scar where the primary melanoma was removed  Checking the regional lymph nodes  A general skin examination  A full physical examination at least annually by your primary care physician     In those with more advanced primary disease, follow-up may include:  Blood tests  Imaging: ultrasound, X-ray, CT, MRI and PET scan.     Most tests are not worthwhile for patients with stage 1 or 2 melanoma unless there are signs or symptoms of disease recurrence or metastasis. No tests are necessary for healthy patients who have remained well for five years or longer after removal of their melanoma.     What is the outlook for patients with melanoma?  Melanoma in situ is cured by excision because it has no potential to spread around the body.  The risk of spread and ultimate death from invasive melanoma depends on several factors, but the main one is the " Breslow thickness of the melanoma at the time it was surgically removed.  Metastases are rare for melanomas < 0.75 mm and the risk for tumours 0.75-1 mm thick is about 5%. The risk steadily increases with thickness so that melanomas > 4 mm have a risk of metastasis of about 40%.     Melanoma is a potentially serious type of skin cancer, in which there is uncontrolled growth of melanocytes (pigment cells). Melanoma is sometimes called malignant melanoma.  Normal melanocytes are found in the basal layer of the epidermis (the outer layer of skin). Melanocytes produce a protein called melanin, which protects skin cells by absorbing ultraviolet (UV) radiation. Melanocytes are found in equal numbers in black and white skin, but melanocytes in black skin produce much more melanin. People with dark brown or black skin are very much less likely to be damaged by UV radiation than those with white skin.    ACTINIC KERATOSIS    Physical Exam:  Anatomic Location Affected:  neck, ears, cheeks, hands   Morphological Description:  Scaly pink papules  Pertinent Positives:  Pertinent Negatives:      Assessment and Plan:  Based on a thorough discussion of this condition and the management approach to it (including a comprehensive discussion of the known risks, side effects and potential benefits of treatment), the patient (family) agrees to implement the following specific plan:  When outside we recommend using a wide brim hat, sunglasses, long sleeve and pants, sunscreen with SPF 30+ with reapplication every 2 hours, or SPF specific clothing   monitor for any changes in size, thickness, bleeding; discussed risk of skin cancer.   liquid nitrogen to treat areas. Consent obtained. Expect area to blister, crust, and then fall off within 2 weeks. Please use vaseline.                                     PROCEDURE:  DESTRUCTION OF PRE-MALIGNANT LESIONS  After a thorough discussion of treatment options and risk/benefits/alternatives  "(including but not limited to local pain, scarring, dyspigmentation, blistering, and possible superinfection), verbal and written consent were obtained and the aforementioned lesions were treated on with cryotherapy using liquid nitrogen x 1 cycle for 5-10 seconds.    TOTAL NUMBER of 7 pre-malignant lesions were treated today on the ANATOMIC LOCATION: hands, ears.     The patient tolerated the procedure well, and after-care instructions were provided.        MELANOCYTIC NEVI (\"Moles\")    Physical Exam:  Anatomic Location Affected:   Mostly on sun-exposed areas of the trunk and extremities  Morphological Description:  Scattered, 1-4mm round to ovoid, symmetrical-appearing, even bordered, skin colored to dark brown macules/papules, mostly in sun-exposed areas  Pertinent Positives:  Pertinent Negatives:    Additional History of Present Condition:      Assessment and Plan:  Based on a thorough discussion of this condition and the management approach to it (including a comprehensive discussion of the known risks, side effects and potential benefits of treatment), the patient (family) agrees to implement the following specific plan:  When outside we recommend using a wide brim hat, sunglasses, long sleeve and pants, sunscreen with SPF 30+ with reapplication every 2 hours, or SPF specific clothing   Benign, reassured  Annual skin check     Melanocytic Nevi  Melanocytic nevi (\"moles\") are tan or brown, raised or flat areas of the skin which have an increased number of melanocytes. Melanocytes are the cells in our body which make pigment and account for skin color.    Some moles are present at birth (I.e., \"congenital nevi\"), while others come up later in life (i.e., \"acquired nevi\").  The sun can stimulate the body to make more moles.  Sunburns are not the only thing that triggers more moles.  Chronic sun exposure can do it too.     Clinically distinguishing a healthy mole from melanoma may be difficult, even for " "experienced dermatologists. The \"ABCDE's\" of moles have been suggested as a means of helping to alert a person to a suspicious mole and the possible increased risk of melanoma.  The suggestions for raising alert are as follows:    Asymmetry: Healthy moles tend to be symmetric, while melanomas are often asymmetric.  Asymmetry means if you draw a line through the mole, the two halves do not match in color, size, shape, or surface texture. Asymmetry can be a result of rapid enlargement of a mole, the development of a raised area on a previously flat lesion, scaling, ulceration, bleeding or scabbing within the mole.  Any mole that starts to demonstrate \"asymmetry\" should be examined promptly by a board certified dermatologist.     Border: Healthy moles tend to have discrete, even borders.  The border of a melanoma often blends into the normal skin and does not sharply delineate the mole from normal skin.  Any mole that starts to demonstrate \"uneven borders\" should be examined promptly by a board certified dermatologist.     Color: Healthy moles tend to be one color throughout.  Melanomas tend to be made up of different colors ranging from dark black, blue, white, or red.  Any mole that demonstrates a color change should be examined promptly by a board certified dermatologist.     Diameter: Healthy moles tend to be smaller than 0.6 cm in size; an exception are \"congenital nevi\" that can be larger.  Melanomas tend to grow and can often be greater than 0.6 cm (1/4 of an inch, or the size of a pencil eraser). This is only a guideline, and many normal moles may be larger than 0.6 cm without being unhealthy.  Any mole that starts to change in size (small to bigger or bigger to smaller) should be examined promptly by a board certified dermatologist.     Evolving: Healthy moles tend to \"stay the same.\"  Melanomas may often show signs of change or evolution such as a change in size, shape, color, or elevation.  Any mole that " starts to itch, bleed, crust, burn, hurt, or ulcerate or demonstrate a change or evolution should be examined promptly by a board certified dermatologist.        LENTIGO    Physical Exam:  Anatomic Location Affected:  trunk, arms  Morphological Description:  Light brown macules  Pertinent Positives:  Pertinent Negatives:    Additional History of Present Condition:      Assessment and Plan:  Based on a thorough discussion of this condition and the management approach to it (including a comprehensive discussion of the known risks, side effects and potential benefits of treatment), the patient (family) agrees to implement the following specific plan:  When outside we recommend using a wide brim hat, sunglasses, long sleeve and pants, sunscreen with SPF 30+ with reapplication every 2 hours, or SPF specific clothing       What is a lentigo?  A lentigo is a pigmented flat or slightly raised lesion with a clearly defined edge. Unlike an ephelis (freckle), it does not fade in the winter months. There are several kinds of lentigo.  The name lentigo originally referred to its appearance resembling a small lentil. The plural of lentigo is lentigines, although “lentigos” is also in common use.    Who gets lentigines?  Lentigines can affect males and females of all ages and races. Solar lentigines are especially prevalent in fair skinned adults. Lentigines associated with syndromes are present at birth or arise during childhood.    What causes lentigines?  Common forms of lentigo are due to exposure to ultraviolet radiation:  Sun damage including sunburn   Indoor tanning   Phototherapy, especially photochemotherapy (PUVA)    Ionizing radiation, eg radiation therapy, can also cause lentigines.  Several familial syndromes associated with widespread lentigines originate from mutations in Chavez-MAP kinase, mTOR signaling and PTEN pathways.    What is the treatment for lentigines?  Most lentigines are left alone. Attempts to lighten  "them may not be successful. The following approaches are used:  SPF 50+ broad-spectrum sunscreen   Hydroquinone bleaching cream   Alpha hydroxy acids   Vitamin C   Retinoids   Azelaic acid   Chemical peels  Individual lesions can be permanently removed using:  Cryotherapy   Intense pulsed light   Pigment lasers    How can lentigines be prevented?  Lentigines associated with exposure ultraviolet radiation can be prevented by very careful sun protection. Clothing is more successful at preventing new lentigines than are sunscreens.    What is the outlook for lentigines?  Lentigines usually persist. They may increase in number with age and sun exposure. Some in sun-protected sites may fade and disappear.    QUINONES ANGIOMAS    Physical Exam:  Anatomic Location Affected:  trunk  Morphological Description:  Scattered cherry red, 1-4 mm papules.  Pertinent Positives:  Pertinent Negatives:    Additional History of Present Condition:      Assessment and Plan:  Based on a thorough discussion of this condition and the management approach to it (including a comprehensive discussion of the known risks, side effects and potential benefits of treatment), the patient (family) agrees to implement the following specific plan:  Monitor for changes  Benign, reassured      Assessment and Plan:    Cherry angioma, also known as Hightower de Rocael spots, are benign vascular skin lesions. A \"cherry angioma\" is a firm red, blue or purple papule, 0.1-1 cm in diameter. When thrombosed, they can appear black in colour until evaluated with a dermatoscope when the red or purple colour is more easily seen. Cherry angioma may develop on any part of the body but most often appear on the scalp, face, lips and trunk.  An angioma is due to proliferating endothelial cells; these are the cells that line the inside of a blood vessel.    Angiomas can arise in early life or later in life; the most common type of angioma is a cherry angioma.  Cherry angiomas " "are very common in males and females of any age or race. They are more noticeable in white skin than in skin of colour. They markedly increase in number from about the age of 40. There may be a family history of similar lesions. Eruptive cherry angiomas have been rarely reported to be associated with internal malignancy. The cause of angiomas is unknown. Genetic analysis of cherry angiomas has shown that they frequently carry specific somatic missense mutations in the GNAQ and GNA11 (Q209H) genes, which are involved in other vascular and melanocytic proliferations.      SEBORRHEIC KERATOSIS; NON-INFLAMED    Physical Exam:  Anatomic Location Affected:  trunk  Morphological Description:  Flat and raised, waxy, smooth to warty textured, yellow to brownish-grey to dark brown to blackish, discrete, \"stuck-on\" appearing papules.  Pertinent Positives:  Pertinent Negatives:    Additional History of Present Condition:      Assessment and Plan:  Based on a thorough discussion of this condition and the management approach to it (including a comprehensive discussion of the known risks, side effects and potential benefits of treatment), the patient (family) agrees to implement the following specific plan:  Monitor for changes  Benign, reassured      Seborrheic Keratosis  A seborrheic keratosis is a harmless warty spot that appears during adult life as a common sign of skin aging.  Seborrheic keratoses can arise on any area of skin, covered or uncovered, with the usual exception of the palms and soles. They do not arise from mucous membranes. Seborrheic keratoses can have highly variable appearance.      Seborrheic keratoses are extremely common. It has been estimated that over 90% of adults over the age of 60 years have one or more of them. They occur in males and females of all races, typically beginning to erupt in the 30s or 40s. They are uncommon under the age of 20 years.  The precise cause of seborrhoeic keratoses is not " "known.  Seborrhoeic keratoses are considered degenerative in nature. As time goes by, seborrheic keratoses tend to become more numerous. Some people inherit a tendency to develop a very large number of them; some people may have hundreds of them.      There is no easy way to remove multiple lesions on a single occasion.  Unless a specific lesion is \"inflamed\" and is causing pain or stinging/burning or is bleeding, most insurance companies do not authorize treatment.    XEROSIS (\"DRY SKIN\")    Physical Exam:  Anatomic Location Affected:  diffuse  Morphological Description:  xerosis  Pertinent Positives:  Pertinent Negatives:    Additional History of Present Condition:      Assessment and Plan:  Based on a thorough discussion of this condition and the management approach to it (including a comprehensive discussion of the known risks, side effects and potential benefits of treatment), the patient (family) agrees to implement the following specific plan:  Use moisturizer like Eucerin,Cerave or Aveeno Cream 3 times a day for the dry skin            Dry skin refers to skin that feels dry to touch. Dry skin has a dull surface with a rough, scaly quality. The skin is less pliable and cracked. When dryness is severe, the skin may become inflamed and fissured.  Although any body site can be dry, dry skin tends to affect the shins more than any other site.    Dry skin is lacking moisture in the outer horny cell layer (stratum corneum) and this results in cracks in the skin surface.  Dry skin is also called xerosis, xeroderma or asteatosis (lack of fat).  It can affect males and females of all ages. There is some racial variability in water and lipid content of the skin.  Dry skin that starts in early childhood may be one of about 20 types of ichthyosis (fish-scale skin). There is often a family history of dry skin.   Dry skin is commonly seen in people with atopic dermatitis.  Nearly everyone > 60 years has dry skin.    Dry " skin that begins later may be seen in people with certain diseases and conditions.  Postmenopausal women  Hypothyroidism  Chronic renal disease   Malnutrition and weight loss   Subclinical dermatitis   Treatment with certain drugs such as oral retinoids, diuretics and epidermal growth factor receptor inhibitors      What is the treatment for dry skin?  The mainstay of treatment of dry skin and ichthyosis is moisturisers/emollients. They should be applied liberally and often enough to:  Reduce itch   Improve the barrier function   Prevent entry of irritants, bacteria   Reduce transepidermal water loss.      How can dry skin be prevented?  Eliminate aggravating factors:  Reduce the frequency of bathing.   A humidifier in winter and air conditioner in summer   Compare having a short shower with a prolonged soak in a bath.   Use lukewarm, not hot, water.   Replace standard soap with a substitute such as a synthetic detergent cleanser, water-miscible emollient, bath oil, anti-pruritic tar oil, colloidal oatmeal etc.   Apply an emollient liberally and often, particularly shortly after bathing, and when itchy. The drier the skin, the thicker this should be, especially on the hands.    What is the outlook for dry skin?  A tendency to dry skin may persist life-long, or it may improve once contributing factors are controlled.     Scribe Attestation      I,:  Ladonna Kelly MA am acting as a scribe while in the presence of the attending physician.:       I,:  Delmis George MD personally performed the services described in this documentation    as scribed in my presence.:

## 2024-10-03 NOTE — PATIENT INSTRUCTIONS
"HISTORY OF MELANOMA      Assessment and Plan:  Based on a thorough discussion of this condition and the management approach to it (including a comprehensive discussion of the known risks, side effects and potential benefits of treatment), the patient (family) agrees to implement the following specific plan:  When outside we recommend using a wide brim hat, sunglasses, long sleeve and pants, sunscreen with SPF 30+ with reapplication every 2 hours, or SPF specific clothing   We recommend that you continue to have regular full body skin exams with your dermatologist.  We recommend that you see your eye doctor and dentist yearly for exams and make sure they are aware of your past history of Melanoma.      What happens at follow-up?  The main purpose of follow-up is to detect recurrences early (metastatic melanoma), but it also offers an opportunity to diagnose a new primary melanoma at the first possible opportunity. A second invasive melanoma occurs in 5-10% of melanoma patients and a new melanoma in situ is diagnosed in more than 20% of melanoma patients.     Our practice makes the following recommendations for follow-up for patients with invasive melanoma.  At-least \"monthly\" self-skin examinations   Routine skin checks by a board certified dermatologist  Follow-up intervals are \"every 3 months\" within 2 years of a new melanoma diagnosis; \"every 6 months\" between 2-4 years of a new melanoma diagnosis; and \"annually\" after 4 years of a new melanoma diagnosis  Individual patient's needs should be considered before an appropriate follow-up is offered  Provide education and support to help the patient adjust to their illness     Follow-up appointments should include:  A check of the scar where the primary melanoma was removed  Checking the regional lymph nodes  A general skin examination  A full physical examination at least annually by your primary care physician     In those with more advanced primary disease, " follow-up may include:  Blood tests  Imaging: ultrasound, X-ray, CT, MRI and PET scan.     Most tests are not worthwhile for patients with stage 1 or 2 melanoma unless there are signs or symptoms of disease recurrence or metastasis. No tests are necessary for healthy patients who have remained well for five years or longer after removal of their melanoma.     What is the outlook for patients with melanoma?  Melanoma in situ is cured by excision because it has no potential to spread around the body.  The risk of spread and ultimate death from invasive melanoma depends on several factors, but the main one is the Breslow thickness of the melanoma at the time it was surgically removed.  Metastases are rare for melanomas < 0.75 mm and the risk for tumours 0.75-1 mm thick is about 5%. The risk steadily increases with thickness so that melanomas > 4 mm have a risk of metastasis of about 40%.     Melanoma is a potentially serious type of skin cancer, in which there is uncontrolled growth of melanocytes (pigment cells). Melanoma is sometimes called malignant melanoma.  Normal melanocytes are found in the basal layer of the epidermis (the outer layer of skin). Melanocytes produce a protein called melanin, which protects skin cells by absorbing ultraviolet (UV) radiation. Melanocytes are found in equal numbers in black and white skin, but melanocytes in black skin produce much more melanin. People with dark brown or black skin are very much less likely to be damaged by UV radiation than those with white skin.    ACTINIC KERATOSIS    Assessment and Plan:  Based on a thorough discussion of this condition and the management approach to it (including a comprehensive discussion of the known risks, side effects and potential benefits of treatment), the patient (family) agrees to implement the following specific plan:  When outside we recommend using a wide brim hat, sunglasses, long sleeve and pants, sunscreen with SPF 30+ with  "reapplication every 2 hours, or SPF specific clothing   monitor for any changes in size, thickness, bleeding; discussed risk of skin cancer.   liquid nitrogen to treat areas. Consent obtained. Expect area to blister, crust, and then fall off within 2 weeks. Please use vaseline.                                     PROCEDURE:  DESTRUCTION OF PRE-MALIGNANT LESIONS  After a thorough discussion of treatment options and risk/benefits/alternatives (including but not limited to local pain, scarring, dyspigmentation, blistering, and possible superinfection), verbal and written consent were obtained and the aforementioned lesions were treated on with cryotherapy using liquid nitrogen x 1 cycle for 5-10 seconds.    The patient tolerated the procedure well, and after-care instructions were provided.          MELANOCYTIC NEVI (\"Moles\")      Assessment and Plan:  Based on a thorough discussion of this condition and the management approach to it (including a comprehensive discussion of the known risks, side effects and potential benefits of treatment), the patient (family) agrees to implement the following specific plan:  When outside we recommend using a wide brim hat, sunglasses, long sleeve and pants, sunscreen with SPF 30+ with reapplication every 2 hours, or SPF specific clothing   Benign, reassured  Annual skin check     Melanocytic Nevi  Melanocytic nevi (\"moles\") are tan or brown, raised or flat areas of the skin which have an increased number of melanocytes. Melanocytes are the cells in our body which make pigment and account for skin color.    Some moles are present at birth (I.e., \"congenital nevi\"), while others come up later in life (i.e., \"acquired nevi\").  The sun can stimulate the body to make more moles.  Sunburns are not the only thing that triggers more moles.  Chronic sun exposure can do it too.     Clinically distinguishing a healthy mole from melanoma may be difficult, even for experienced dermatologists. The " "\"ABCDE's\" of moles have been suggested as a means of helping to alert a person to a suspicious mole and the possible increased risk of melanoma.  The suggestions for raising alert are as follows:    Asymmetry: Healthy moles tend to be symmetric, while melanomas are often asymmetric.  Asymmetry means if you draw a line through the mole, the two halves do not match in color, size, shape, or surface texture. Asymmetry can be a result of rapid enlargement of a mole, the development of a raised area on a previously flat lesion, scaling, ulceration, bleeding or scabbing within the mole.  Any mole that starts to demonstrate \"asymmetry\" should be examined promptly by a board certified dermatologist.     Border: Healthy moles tend to have discrete, even borders.  The border of a melanoma often blends into the normal skin and does not sharply delineate the mole from normal skin.  Any mole that starts to demonstrate \"uneven borders\" should be examined promptly by a board certified dermatologist.     Color: Healthy moles tend to be one color throughout.  Melanomas tend to be made up of different colors ranging from dark black, blue, white, or red.  Any mole that demonstrates a color change should be examined promptly by a board certified dermatologist.     Diameter: Healthy moles tend to be smaller than 0.6 cm in size; an exception are \"congenital nevi\" that can be larger.  Melanomas tend to grow and can often be greater than 0.6 cm (1/4 of an inch, or the size of a pencil eraser). This is only a guideline, and many normal moles may be larger than 0.6 cm without being unhealthy.  Any mole that starts to change in size (small to bigger or bigger to smaller) should be examined promptly by a board certified dermatologist.     Evolving: Healthy moles tend to \"stay the same.\"  Melanomas may often show signs of change or evolution such as a change in size, shape, color, or elevation.  Any mole that starts to itch, bleed, crust, burn, " hurt, or ulcerate or demonstrate a change or evolution should be examined promptly by a board certified dermatologist.        LENTIGO      Assessment and Plan:  Based on a thorough discussion of this condition and the management approach to it (including a comprehensive discussion of the known risks, side effects and potential benefits of treatment), the patient (family) agrees to implement the following specific plan:  When outside we recommend using a wide brim hat, sunglasses, long sleeve and pants, sunscreen with SPF 30+ with reapplication every 2 hours, or SPF specific clothing       What is a lentigo?  A lentigo is a pigmented flat or slightly raised lesion with a clearly defined edge. Unlike an ephelis (freckle), it does not fade in the winter months. There are several kinds of lentigo.  The name lentigo originally referred to its appearance resembling a small lentil. The plural of lentigo is lentigines, although “lentigos” is also in common use.    Who gets lentigines?  Lentigines can affect males and females of all ages and races. Solar lentigines are especially prevalent in fair skinned adults. Lentigines associated with syndromes are present at birth or arise during childhood.    What causes lentigines?  Common forms of lentigo are due to exposure to ultraviolet radiation:  Sun damage including sunburn   Indoor tanning   Phototherapy, especially photochemotherapy (PUVA)    Ionizing radiation, eg radiation therapy, can also cause lentigines.  Several familial syndromes associated with widespread lentigines originate from mutations in Chavez-MAP kinase, mTOR signaling and PTEN pathways.    What is the treatment for lentigines?  Most lentigines are left alone. Attempts to lighten them may not be successful. The following approaches are used:  SPF 50+ broad-spectrum sunscreen   Hydroquinone bleaching cream   Alpha hydroxy acids   Vitamin C   Retinoids   Azelaic acid   Chemical peels  Individual lesions can be  "permanently removed using:  Cryotherapy   Intense pulsed light   Pigment lasers    How can lentigines be prevented?  Lentigines associated with exposure ultraviolet radiation can be prevented by very careful sun protection. Clothing is more successful at preventing new lentigines than are sunscreens.    What is the outlook for lentigines?  Lentigines usually persist. They may increase in number with age and sun exposure. Some in sun-protected sites may fade and disappear.    QUINONES ANGIOMAS        Assessment and Plan:  Based on a thorough discussion of this condition and the management approach to it (including a comprehensive discussion of the known risks, side effects and potential benefits of treatment), the patient (family) agrees to implement the following specific plan:  Monitor for changes  Benign, reassured      Assessment and Plan:    Cherry angioma, also known as Hightower de Rocael spots, are benign vascular skin lesions. A \"cherry angioma\" is a firm red, blue or purple papule, 0.1-1 cm in diameter. When thrombosed, they can appear black in colour until evaluated with a dermatoscope when the red or purple colour is more easily seen. Cherry angioma may develop on any part of the body but most often appear on the scalp, face, lips and trunk.  An angioma is due to proliferating endothelial cells; these are the cells that line the inside of a blood vessel.    Angiomas can arise in early life or later in life; the most common type of angioma is a cherry angioma.  Cherry angiomas are very common in males and females of any age or race. They are more noticeable in white skin than in skin of colour. They markedly increase in number from about the age of 40. There may be a family history of similar lesions. Eruptive cherry angiomas have been rarely reported to be associated with internal malignancy. The cause of angiomas is unknown. Genetic analysis of cherry angiomas has shown that they frequently carry specific " "somatic missense mutations in the GNAQ and GNA11 (Q209H) genes, which are involved in other vascular and melanocytic proliferations.      SEBORRHEIC KERATOSIS; NON-INFLAMED      Assessment and Plan:  Based on a thorough discussion of this condition and the management approach to it (including a comprehensive discussion of the known risks, side effects and potential benefits of treatment), the patient (family) agrees to implement the following specific plan:  Monitor for changes  Benign, reassured      Seborrheic Keratosis  A seborrheic keratosis is a harmless warty spot that appears during adult life as a common sign of skin aging.  Seborrheic keratoses can arise on any area of skin, covered or uncovered, with the usual exception of the palms and soles. They do not arise from mucous membranes. Seborrheic keratoses can have highly variable appearance.      Seborrheic keratoses are extremely common. It has been estimated that over 90% of adults over the age of 60 years have one or more of them. They occur in males and females of all races, typically beginning to erupt in the 30s or 40s. They are uncommon under the age of 20 years.  The precise cause of seborrhoeic keratoses is not known.  Seborrhoeic keratoses are considered degenerative in nature. As time goes by, seborrheic keratoses tend to become more numerous. Some people inherit a tendency to develop a very large number of them; some people may have hundreds of them.      There is no easy way to remove multiple lesions on a single occasion.  Unless a specific lesion is \"inflamed\" and is causing pain or stinging/burning or is bleeding, most insurance companies do not authorize treatment.    XEROSIS (\"DRY SKIN\")    Assessment and Plan:  Based on a thorough discussion of this condition and the management approach to it (including a comprehensive discussion of the known risks, side effects and potential benefits of treatment), the patient (family) agrees to " implement the following specific plan:  Use moisturizer like Eucerin,Cerave or Aveeno Cream 3 times a day for the dry skin            Dry skin refers to skin that feels dry to touch. Dry skin has a dull surface with a rough, scaly quality. The skin is less pliable and cracked. When dryness is severe, the skin may become inflamed and fissured.  Although any body site can be dry, dry skin tends to affect the shins more than any other site.    Dry skin is lacking moisture in the outer horny cell layer (stratum corneum) and this results in cracks in the skin surface.  Dry skin is also called xerosis, xeroderma or asteatosis (lack of fat).  It can affect males and females of all ages. There is some racial variability in water and lipid content of the skin.  Dry skin that starts in early childhood may be one of about 20 types of ichthyosis (fish-scale skin). There is often a family history of dry skin.   Dry skin is commonly seen in people with atopic dermatitis.  Nearly everyone > 60 years has dry skin.    Dry skin that begins later may be seen in people with certain diseases and conditions.  Postmenopausal women  Hypothyroidism  Chronic renal disease   Malnutrition and weight loss   Subclinical dermatitis   Treatment with certain drugs such as oral retinoids, diuretics and epidermal growth factor receptor inhibitors      What is the treatment for dry skin?  The mainstay of treatment of dry skin and ichthyosis is moisturisers/emollients. They should be applied liberally and often enough to:  Reduce itch   Improve the barrier function   Prevent entry of irritants, bacteria   Reduce transepidermal water loss.      How can dry skin be prevented?  Eliminate aggravating factors:  Reduce the frequency of bathing.   A humidifier in winter and air conditioner in summer   Compare having a short shower with a prolonged soak in a bath.   Use lukewarm, not hot, water.   Replace standard soap with a substitute such as a synthetic  detergent cleanser, water-miscible emollient, bath oil, anti-pruritic tar oil, colloidal oatmeal etc.   Apply an emollient liberally and often, particularly shortly after bathing, and when itchy. The drier the skin, the thicker this should be, especially on the hands.    What is the outlook for dry skin?  A tendency to dry skin may persist life-long, or it may improve once contributing factors are controlled.

## 2024-10-05 DIAGNOSIS — R09.81 NASAL CONGESTION: ICD-10-CM

## 2024-10-05 RX ORDER — FLUTICASONE PROPIONATE 50 MCG
2 SPRAY, SUSPENSION (ML) NASAL DAILY
Qty: 16 G | Refills: 0 | Status: SHIPPED | OUTPATIENT
Start: 2024-10-05

## 2024-10-06 DIAGNOSIS — E78.01 FAMILIAL HYPERCHOLESTEROLEMIA: ICD-10-CM

## 2024-10-08 RX ORDER — SIMVASTATIN 10 MG
TABLET ORAL
Qty: 90 TABLET | Refills: 0 | Status: SHIPPED | OUTPATIENT
Start: 2024-10-08

## 2024-10-17 ENCOUNTER — OFFICE VISIT (OUTPATIENT)
Dept: OBGYN CLINIC | Facility: MEDICAL CENTER | Age: 89
End: 2024-10-17
Payer: COMMERCIAL

## 2024-10-17 VITALS
BODY MASS INDEX: 21.81 KG/M2 | SYSTOLIC BLOOD PRESSURE: 116 MMHG | HEIGHT: 72 IN | WEIGHT: 161 LBS | DIASTOLIC BLOOD PRESSURE: 65 MMHG | HEART RATE: 70 BPM

## 2024-10-17 DIAGNOSIS — M17.12 PRIMARY OSTEOARTHRITIS OF LEFT KNEE: Primary | ICD-10-CM

## 2024-10-17 PROCEDURE — 20610 DRAIN/INJ JOINT/BURSA W/O US: CPT | Performed by: PHYSICAL MEDICINE & REHABILITATION

## 2024-10-17 PROCEDURE — 99213 OFFICE O/P EST LOW 20 MIN: CPT | Performed by: PHYSICAL MEDICINE & REHABILITATION

## 2024-10-17 RX ORDER — TRIAMCINOLONE ACETONIDE 40 MG/ML
80 INJECTION, SUSPENSION INTRA-ARTICULAR; INTRAMUSCULAR
Status: COMPLETED | OUTPATIENT
Start: 2024-10-17 | End: 2024-10-17

## 2024-10-17 RX ORDER — ROPIVACAINE HYDROCHLORIDE 5 MG/ML
10 INJECTION, SOLUTION EPIDURAL; INFILTRATION; PERINEURAL
Status: COMPLETED | OUTPATIENT
Start: 2024-10-17 | End: 2024-10-17

## 2024-10-17 RX ADMIN — ROPIVACAINE HYDROCHLORIDE 10 ML: 5 INJECTION, SOLUTION EPIDURAL; INFILTRATION; PERINEURAL at 13:00

## 2024-10-17 RX ADMIN — TRIAMCINOLONE ACETONIDE 80 MG: 40 INJECTION, SUSPENSION INTRA-ARTICULAR; INTRAMUSCULAR at 13:00

## 2024-10-17 NOTE — PROGRESS NOTES
1. Primary osteoarthritis of left knee  Large joint arthrocentesis: L knee        Orders Placed This Encounter   Procedures   • Large joint arthrocentesis: L knee      Impression:  Patient is here in follow up of left knee pain likely secondary to severe medial tibiofemoral osteoarthritis leading to Baker's cyst.  Treatment has included intra-articular steroid injection, HA injection, brace and Baker's cyst aspiration.  Repeat steroid injection given today.     Can consider referral for genicular nerve procedure with pain management. We discussed that knee replacement is not a great option due to his comorbidities.     Imaging Studies (I personally reviewed images in PACS and report):  Left knee x-rays most recent to this encounter reviewed.  These images show severe osteoarthritis that most affects the medial joint space.  There is a small joint effusion.  Calcifications in the posterior knee.  No acute osseous abnormalities.  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     Ultrasound of the soft tissue in the left posterior knee showed a Baker's cyst.  This was performed by radiology.    No follow-ups on file.    Patient is in agreement with the above plan.    HPI:  Patricio Morris is a 89 y.o. male  who presents in follow up.  Here for   Chief Complaint   Patient presents with   • Left Knee - Pain, Follow-up       Since last visit: See above.    Following history reviewed and updated:  Past Medical History:   Diagnosis Date   • Allergic april 1989   • COPD (chronic obstructive pulmonary disease) (HCC)    • Diabetes mellitus (HCC)     Type 2   • Essential hypertension    • Heart failure (HCC)    • Hyperlipidemia    • Hypertension    • Impetigo    • Left inguinal hernia    • Lung nodule    • Malignant melanoma of skin (HCC)      Past Surgical History:   Procedure Laterality Date   • APPENDECTOMY     • CATARACT EXTRACTION, BILATERAL     • CHOLECYSTECTOMY     • CHOLECYSTECTOMY     •  COLONOSCOPY  2014    Fiberoptic   • HERNIA REPAIR     • KNEE ARTHROSCOPY Bilateral     Therapeutic   • NY CYSTO INSERTION TRANSPROSTATIC IMPLANT SINGLE N/A 3/22/2019    Procedure: CYSTOSCOPY WITH INSERTION UROLIFT;  Surgeon: Bird Clinton MD;  Location: [facility];  Service: Urology     Social History   Social History     Substance and Sexual Activity   Alcohol Use Not Currently   • Alcohol/week: 52.0 standard drinks of alcohol   • Types: 50 Glasses of wine, 2 Standard drinks or equivalent per week     Social History     Substance and Sexual Activity   Drug Use No     Social History     Tobacco Use   Smoking Status Former   • Current packs/day: 0.00   • Average packs/day: 1 pack/day for 11.0 years (11.0 ttl pk-yrs)   • Types: Cigarettes   • Start date: 1949   • Quit date: 1960   • Years since quittin.8   • Passive exposure: Past   Smokeless Tobacco Never     Family History   Problem Relation Age of Onset   • Diabetes Mother    • Heart attack Neg Hx    • Stroke Neg Hx    • Aneurysm Neg Hx    • Clotting disorder Neg Hx    • Arrhythmia Neg Hx    • Hypertension Neg Hx    • Hyperlipidemia Neg Hx         pt unsure      No Known Allergies     Constitutional:  /65   Pulse 70   Ht 6' (1.829 m)   Wt 73 kg (161 lb)   BMI 21.84 kg/m²    General: NAD.  Eyes: Clear sclerae.  ENT: No inflammation, lesion, or mass of lips.  No tracheal deviation.  Musculoskeletal: As mentioned below.  Integumentary: No visible rashes or skin lesions.  Pulmonary/Chest: Effort normal. No respiratory distress.   Neuro: CN's grossly intact, LOPEZ.  Psych: Normal affect and judgement.  Vascular: WWP.    Left Knee Exam     Tenderness   The patient is experiencing tenderness in the medial joint line.    Range of Motion   Extension:  normal     Other   Erythema: absent  Scars: absent           Large joint arthrocentesis: L knee  Universal Protocol:  procedure performed by consultantConsent: Verbal consent obtained. Written  consent not obtained.  Risks and benefits: risks, benefits and alternatives were discussed  Consent given by: patient  Timeout called at: 10/17/2024 1:10 PM.  Patient understanding: patient states understanding of the procedure being performed  Patient consent: the patient's understanding of the procedure matches consent given  Site marked: the operative site was marked  Radiology Images displayed and confirmed. If images not available, report reviewed: imaging studies available  Patient identity confirmed: verbally with patient  Supporting Documentation  Indications: pain   Procedure Details  Location: knee - L knee  Needle size: 22 G  Ultrasound guidance: no  Approach: Inferolateral to patella.  Medications administered: 80 mg triamcinolone acetonide 40 mg/mL; 10 mL ropivacaine 0.5 %    Patient tolerance: patient tolerated the procedure well with no immediate complications  Dressing:  Sterile dressing applied    There was little to no resistance encountered during the injection.    Risks of this procedure include:    - Risk of bleeding since a needle is involved.  - Risk of infection (1/10,000 chance as per recent studies).  Signs/symptoms were discussed and they would prompt an urgent evaluation at an emergency department.  - Risk of pigmentation or skin dimpling in the skin (2-3% chance as per recent studies) from the steroid.  - Risk of increased pain from steroid flare (1% chance as per recent studies) that typically lasts 24-48 hours.  - Risk of increased blood sugars from the steroid medication that can last for a few weeks.  If the patient is a diabetic or pre-diabetic, they were encouraged to closely monitor their blood sugars and discuss with PCP if elevated more than usual or if having symptoms.    The benefits outweigh the risks and so the procedure was completed.

## 2024-10-23 ENCOUNTER — TELEPHONE (OUTPATIENT)
Age: 89
End: 2024-10-23

## 2024-10-23 DIAGNOSIS — J41.0 SIMPLE CHRONIC BRONCHITIS (HCC): Primary | ICD-10-CM

## 2024-10-23 RX ORDER — FLUTICASONE FUROATE 100 UG/1
1 POWDER RESPIRATORY (INHALATION) DAILY
Qty: 30 BLISTER | Refills: 5 | Status: SHIPPED | OUTPATIENT
Start: 2024-10-23 | End: 2025-04-21

## 2024-10-23 NOTE — TELEPHONE ENCOUNTER
Taylor from [facility] is calling in regards to Patricio's Fluticasone Flovent HFA 220mcg. She states the the brand name for this medication is discontinued and the generic is not covered.   The formulary on Patricio's insurance will cover Arnuity.     Please advise if this can be send or an a different alternative.     Thank you.

## 2024-10-25 LAB
BUN SERPL-MCNC: 33 MG/DL (ref 7–25)
BUN/CREAT SERPL: 26 (CALC) (ref 6–22)
CALCIUM SERPL-MCNC: 9.8 MG/DL (ref 8.6–10.3)
CHLORIDE SERPL-SCNC: 106 MMOL/L (ref 98–110)
CO2 SERPL-SCNC: 27 MMOL/L (ref 20–32)
CREAT SERPL-MCNC: 1.29 MG/DL (ref 0.7–1.22)
GFR/BSA.PRED SERPLBLD CYS-BASED-ARV: 52 ML/MIN/1.73M2
GLUCOSE SERPL-MCNC: 138 MG/DL (ref 65–99)
POTASSIUM SERPL-SCNC: 5.2 MMOL/L (ref 3.5–5.3)
SODIUM SERPL-SCNC: 141 MMOL/L (ref 135–146)

## 2024-10-28 ENCOUNTER — TELEPHONE (OUTPATIENT)
Dept: NEPHROLOGY | Facility: CLINIC | Age: 89
End: 2024-10-28

## 2024-10-28 NOTE — TELEPHONE ENCOUNTER
Pt advised that labs of 10/24/24 show stable kidney function per Dr. Estrada.      ----- Message from Noris GRIDER sent at 10/25/2024 10:01 AM EDT -----    ----- Message -----  From: Sage Estrada MD  Sent: 10/25/2024  10:00 AM EDT  To: Nephrology [facility]    Please inform patient that most recent labs (10/24/24) show that kidney function is stable.

## 2024-11-09 DIAGNOSIS — R09.81 NASAL CONGESTION: ICD-10-CM

## 2024-11-10 RX ORDER — FLUTICASONE PROPIONATE 50 MCG
2 SPRAY, SUSPENSION (ML) NASAL DAILY
Qty: 16 G | Refills: 0 | Status: SHIPPED | OUTPATIENT
Start: 2024-11-10

## 2024-11-15 DIAGNOSIS — I50.43 ACUTE ON CHRONIC COMBINED SYSTOLIC AND DIASTOLIC CONGESTIVE HEART FAILURE (HCC): ICD-10-CM

## 2024-11-15 RX ORDER — SACUBITRIL AND VALSARTAN 24; 26 MG/1; MG/1
1 TABLET, FILM COATED ORAL 2 TIMES DAILY
Qty: 180 TABLET | Refills: 1 | Status: SHIPPED | OUTPATIENT
Start: 2024-11-15

## 2024-11-16 DIAGNOSIS — R05.9 COUGH: ICD-10-CM

## 2024-11-18 RX ORDER — MONTELUKAST SODIUM 10 MG/1
10 TABLET ORAL
Qty: 90 TABLET | Refills: 1 | Status: SHIPPED | OUTPATIENT
Start: 2024-11-18

## 2024-11-20 DIAGNOSIS — R19.7 DIARRHEA, UNSPECIFIED TYPE: ICD-10-CM

## 2024-11-21 RX ORDER — CHOLESTYRAMINE 4 G/9G
POWDER, FOR SUSPENSION ORAL
Qty: 30 EACH | Refills: 4 | Status: SHIPPED | OUTPATIENT
Start: 2024-11-21

## 2024-11-25 DIAGNOSIS — L29.9 PRURITUS: ICD-10-CM

## 2024-11-26 RX ORDER — HYDROXYZINE PAMOATE 25 MG/1
CAPSULE ORAL
Qty: 90 CAPSULE | Refills: 1 | Status: SHIPPED | OUTPATIENT
Start: 2024-11-26

## 2024-12-02 ENCOUNTER — TELEPHONE (OUTPATIENT)
Dept: CARDIOLOGY CLINIC | Facility: CLINIC | Age: 89
End: 2024-12-02

## 2024-12-02 NOTE — TELEPHONE ENCOUNTER
Caller: Patricio Morris     Doctor: Christy Zapata MD     Reason for call: Pt calling to check status of his medication Assistance application for sacubitril-valsartan (Entresto.     Pt stated that application was submitted on 11/7/24,but it has to come directly from office. Please complete form from [employer]. Please fax completed application to   FAX:[phone]  PH:[phone]    Call back#: [phone] (patient)

## 2024-12-16 ENCOUNTER — OFFICE VISIT (OUTPATIENT)
Age: 89
End: 2024-12-16
Payer: COMMERCIAL

## 2024-12-16 VITALS
TEMPERATURE: 98 F | BODY MASS INDEX: 22.08 KG/M2 | HEART RATE: 69 BPM | HEIGHT: 72 IN | OXYGEN SATURATION: 94 % | DIASTOLIC BLOOD PRESSURE: 60 MMHG | SYSTOLIC BLOOD PRESSURE: 124 MMHG | WEIGHT: 163 LBS

## 2024-12-16 DIAGNOSIS — R09.81 NASAL CONGESTION: ICD-10-CM

## 2024-12-16 DIAGNOSIS — J41.0 SIMPLE CHRONIC BRONCHITIS (HCC): Primary | ICD-10-CM

## 2024-12-16 PROCEDURE — 99213 OFFICE O/P EST LOW 20 MIN: CPT | Performed by: PHYSICIAN ASSISTANT

## 2024-12-16 PROCEDURE — G2211 COMPLEX E/M VISIT ADD ON: HCPCS | Performed by: PHYSICIAN ASSISTANT

## 2024-12-16 RX ORDER — FLUTICASONE FUROATE, UMECLIDINIUM BROMIDE AND VILANTEROL TRIFENATATE 100; 62.5; 25 UG/1; UG/1; UG/1
1 POWDER RESPIRATORY (INHALATION) DAILY
Qty: 60 BLISTER | Refills: 0 | Status: SHIPPED | OUTPATIENT
Start: 2024-12-16 | End: 2024-12-26

## 2024-12-16 NOTE — PROGRESS NOTES
"Assessment/Plan:   Diagnoses and all orders for this visit:    Simple chronic bronchitis (HCC)  -     fluticasone-umeclidinium-vilanterol (Trelegy Ellipta) 100-62.5-25 mcg/actuation inhaler; Inhale 1 puff daily Rinse mouth after use.        Patient is here today for follow-up.  He is overall doing well with his breathing.  Finds that the inhalers are becoming too expensive for him, currently he is on Anoro and Arnuity.  Will try to simplify and lessen the cost for him but switching him onto Trelegy.  He will continue with the Singulair, taking Daliresp every other day which seems to be working for him.  Daliresp was too expensive for him to take on a daily basis.  They no longer offer a financial assistance program for that medication.  Has not had any exacerbations in a few years, rarely needs his rescue inhaler.    He will follow-up with us in about 6 months or sooner if necessary.  Return in about 6 months (around 6/16/2025).  All questions are answered to the patient's satisfaction and understanding.  He verbalizes understanding.  He is encouraged to call with any further questions or concerns.    Portions of the record may have been created with voice recognition software.  Occasional wrong word or \"sound a like\" substitutions may have occurred due to the inherent limitations of voice recognition software.  Read the chart carefully and recognize, using context, where substitutions have occurred.    Electronically Signed by Emmett Mendieta PA-C    ______________________________________________________________________    Chief Complaint:   Chief Complaint   Patient presents with    Follow-up       Patient ID: Patricio is a 89 y.o. y.o. male has a past medical history of Allergic (april 1989), COPD (chronic obstructive pulmonary disease) (HCC), Diabetes mellitus (HCC), Essential hypertension, Heart failure (HCC), Hyperlipidemia, Hypertension, Impetigo, Left inguinal hernia, Lung nodule, and Malignant melanoma of " skin (HCC).    12/16/2024  Patient presents today for follow-up visit.  Patient is a 89 yo male former smoker with PMH of COPD, HTN, HLD, MDS.    He is here today for follow up. He is overall doing well with his breathing. He is using Anoro, Arnuity, Singulair, Daliresp every other day. He does find that the inhalers are very expensive.         Review of Systems   Constitutional: Negative.    HENT: Negative.     Respiratory: Negative.     Cardiovascular: Negative.    Gastrointestinal: Negative.    Genitourinary: Negative.    Musculoskeletal: Negative.    Skin: Negative.    Allergic/Immunologic: Negative.    Neurological: Negative.    Psychiatric/Behavioral: Negative.         Smoking history: He reports that he quit smoking about 65 years ago. His smoking use included cigarettes. He started smoking about 76 years ago. He has a 11 pack-year smoking history. He has been exposed to tobacco smoke. He has never used smokeless tobacco.    The following portions of the patient's history were reviewed and updated as appropriate: allergies, current medications, past family history, past medical history, past social history, past surgical history, and problem list.    Immunization History   Administered Date(s) Administered    COVID-19 MODERNA VACC 0.5 ML IM 01/26/2021, 03/02/2021, 10/25/2021, 04/21/2022    COVID-19 Moderna Vac BIVALENT 12 Yr+ IM 0.5 ML 11/16/2022    H1N1, All Formulations 01/26/2010    INFLUENZA 10/01/2014, 10/05/2015, 09/28/2018, 10/04/2021, 09/22/2022, 09/29/2023    Influenza Split High Dose Preservative Free IM 10/18/2013, 10/05/2015, 09/30/2016, 10/20/2017    Influenza, high dose seasonal 0.7 mL 10/12/2020    Influenza, seasonal, injectable 10/13/2011, 10/01/2014    Pneumococcal Conjugate 13-Valent 11/22/2017    Pneumococcal Conjugate Vaccine 20-valent (Pcv20), Polysace 09/30/2022    Pneumococcal Polysaccharide PPV23 01/01/1998, 10/19/2005, 11/30/2020    Respiratory Syncytial Virus Vaccine (Recombinant,  Adjuvanted) 05/07/2024    Tdap 03/30/2022, 10/06/2022    Zoster 10/01/2010    Zoster Vaccine Recombinant 01/03/2020, 10/12/2020     Current Outpatient Medications   Medication Sig Dispense Refill    acetaminophen (TYLENOL) 325 mg tablet Take 2 tablets (650 mg total) by mouth every 6 (six) hours as needed for mild pain 30 tablet 0    albuterol (2.5 mg/3 mL) 0.083 % nebulizer solution INHALE THE CONTENTS OF ONE AMPULE BY NEBULIZATION EVERY SIX HOURS AS NEEDED FOR WHEEZING OR SHORTNESS OF BREATH 360 mL 1    Ascorbic Acid (VITAMIN C) 1000 MG tablet Take 1 tablet by mouth daily      BD PEN NEEDLE DON U/F 32G X 4 MM MISC        cholecalciferol (VITAMIN D3) 1,000 units tablet Take 1,000 Units by mouth daily      cholestyramine (QUESTRAN) 4 g packet DISSOLVE 1 PACKET IN 8 OZ OF LIQUID AND TAKE BY MOUTH ONCE DAILY 30 each 4    Cinnamon 500 MG TABS Take 1 tablet by mouth daily        Dapagliflozin Propanediol 5 MG TABS Take 5 mg by mouth every other day      fluorouracil (EFUDEX) 5 % cream Apply topically 2 (two) times a day To the area of the left chest that was biopsied for a total of 4 weeks. 40 g 0    fluticasone (FLONASE) 50 mcg/act nasal spray USE TWO SPRAYS IN EACH NOSTRIL DAILY 16 g 0    fluticasone-umeclidinium-vilanterol (Trelegy Ellipta) 100-62.5-25 mcg/actuation inhaler Inhale 1 puff daily Rinse mouth after use. 60 blister 0    gabapentin (NEURONTIN) 300 mg capsule TAKE ONE CAPSULE BY MOUTH ONCE DAILY 90 capsule 1    glucose blood test strip Use daily      hydrOXYzine pamoate (VISTARIL) 25 mg capsule TAKE ONE CAPSULE BY MOUTH THREE TIMES DAILY AS NEEDED FOR ITCHING 90 capsule 1    Insulin Glargine Solostar 100 UNIT/ML SOPN Inject 14 Units under the skin daily      ipratropium (ATROVENT) 0.03 % nasal spray 2 sprays into each nostril every 12 (twelve) hours 30 mL 0    metoprolol succinate (TOPROL-XL) 50 mg 24 hr tablet Take 1 tablet (50 mg total) by mouth daily 90 tablet 3    montelukast (SINGULAIR) 10 mg tablet  TAKE ONE TABLET BY MOUTH NIGHTLY AT BEDTIME 90 tablet 1    Multiple Vitamins-Minerals (OCUVITE ADULT 50+ PO) Take 1 tablet by mouth daily      NOVOLOG FLEXPEN 100 units/mL injection pen Inject 4 Units under the skin 2 (two) times a day with meals 4u in am,  and 8u at dinner      ONE TOUCH ULTRA TEST test strip        ONETOUCH DELICA LANCETS FINE MISC        roflumilast (DALIRESP) 500 mcg tablet TAKE ONE TABLET BY MOUTH ONCE DAILY 30 tablet 5    sacubitril-valsartan (Entresto) 24-26 MG TABS Take 1 tablet by mouth 2 (two) times a day 180 tablet 1    simvastatin (ZOCOR) 10 mg tablet TAKE ONE TABLET BY MOUTH ONCE DAILY 90 tablet 0    Ventolin  (90 Base) MCG/ACT inhaler INHALE TWO PUFFS BY MOUTH EVERY 6 HOURS AS NEEDED FOR WHEEZING 18 g 0    betamethasone, augmented, (DIPROLENE-AF) 0.05 % cream  (Patient not taking: Reported on 2/22/2024)      Levemir FlexTouch 100 units/mL injection pen 10 Units daily at bedtime (Patient not taking: Reported on 6/17/2024)       No current facility-administered medications for this visit.     Allergies: Patient has no known allergies.    Objective:  Vitals:    12/16/24 1400   BP: 124/60   Pulse: 69   Temp: 98 °F (36.7 °C)   SpO2: 94%   Weight: 73.9 kg (163 lb)   Height: 6' (1.829 m)   Oxygen Therapy  SpO2: 94 %    Wt Readings from Last 3 Encounters:   12/16/24 73.9 kg (163 lb)   10/17/24 73 kg (161 lb)   10/03/24 73 kg (161 lb)     Body mass index is 22.11 kg/m².    Physical Exam  Constitutional:       General: He is not in acute distress.     Appearance: Normal appearance. He is well-developed. He is not ill-appearing.   HENT:      Head: Normocephalic and atraumatic.      Mouth/Throat:      Pharynx: Oropharynx is clear.   Eyes:      Pupils: Pupils are equal, round, and reactive to light.   Cardiovascular:      Rate and Rhythm: Normal rate and regular rhythm.   Pulmonary:      Effort: Pulmonary effort is normal. No respiratory distress.      Breath sounds: Normal breath sounds. No  decreased breath sounds, wheezing, rhonchi or rales.   Abdominal:      General: Abdomen is flat. There is no distension.   Musculoskeletal:         General: Normal range of motion.      Cervical back: Normal range of motion.      Right lower leg: No edema.      Left lower leg: No edema.   Skin:     General: Skin is warm and dry.      Findings: No rash.   Neurological:      Mental Status: He is alert and oriented to person, place, and time.   Psychiatric:         Mood and Affect: Mood normal.         Behavior: Behavior normal.         Lab Review:   Lab Results   Component Value Date     05/08/2017    K 5.2 10/24/2024    K 5.0 02/07/2022     10/24/2024     02/07/2022    CO2 27 10/24/2024    CO2 28 02/07/2022    BUN 33 (H) 10/24/2024    BUN 32 (H) 02/07/2022    BUN 40 (H) 09/24/2021    CREATININE 1.47 (H) 11/12/2024    CALCIUM 9.8 10/24/2024    CALCIUM 9.6 02/07/2022     Lab Results   Component Value Date    WBC 8.0 07/01/2024    WBC 6.38 11/15/2018    WBC 5.5 05/08/2017    HGB 11.2 (L) 07/01/2024    HGB 13.7 11/15/2018    HGB 11.0 (L) 05/08/2017    HCT 35.4 (L) 07/01/2024    HCT 41.7 11/15/2018    HCT 34.9 (L) 05/08/2017    MCV 86.1 07/01/2024    MCV 93 11/15/2018    MCV 96.6 05/08/2017     07/01/2024     (L) 11/15/2018     05/08/2017       Diagnostics:    none pertinent  Office Spirometry Results:     ESS:    No results found.

## 2024-12-17 RX ORDER — FLUTICASONE PROPIONATE 50 MCG
2 SPRAY, SUSPENSION (ML) NASAL DAILY
Qty: 16 G | Refills: 1 | Status: SHIPPED | OUTPATIENT
Start: 2024-12-17

## 2024-12-18 ENCOUNTER — TELEPHONE (OUTPATIENT)
Age: 89
End: 2024-12-18

## 2024-12-18 NOTE — TELEPHONE ENCOUNTER
Pt calls in and states that he went to the pharmacy this morning and his co payment for Trelegy  was over $100. He is wondering if provider advises any other inhaler that may be cheaper in cost and or if there is any pt assistant programs that he may be able to apply for. Please advise

## 2024-12-26 DIAGNOSIS — J44.9 CHRONIC OBSTRUCTIVE PULMONARY DISEASE, UNSPECIFIED COPD TYPE (HCC): Primary | ICD-10-CM

## 2024-12-26 RX ORDER — BUDESONIDE, GLYCOPYRROLATE, AND FORMOTEROL FUMARATE 160; 9; 4.8 UG/1; UG/1; UG/1
2 AEROSOL, METERED RESPIRATORY (INHALATION) 2 TIMES DAILY
Qty: 10.7 G | Refills: 1 | Status: SHIPPED | OUTPATIENT
Start: 2024-12-26

## 2024-12-30 DIAGNOSIS — E78.01 FAMILIAL HYPERCHOLESTEROLEMIA: ICD-10-CM

## 2024-12-31 RX ORDER — SIMVASTATIN 10 MG
10 TABLET ORAL DAILY
Qty: 90 TABLET | Refills: 0 | Status: SHIPPED | OUTPATIENT
Start: 2024-12-31

## 2025-01-04 DIAGNOSIS — G62.9 NEUROPATHY: ICD-10-CM

## 2025-01-06 RX ORDER — GABAPENTIN 300 MG/1
300 CAPSULE ORAL DAILY
Qty: 90 CAPSULE | Refills: 1 | Status: SHIPPED | OUTPATIENT
Start: 2025-01-06

## 2025-01-13 ENCOUNTER — TELEPHONE (OUTPATIENT)
Dept: HEMATOLOGY ONCOLOGY | Facility: CLINIC | Age: OVER 89
End: 2025-01-13

## 2025-01-13 NOTE — TELEPHONE ENCOUNTER
Confirmed with patient due to a change in the provider's schedule, the appt. On 3/14/25 with Pamella Florentino has been rescheduled to 4/21/25.

## 2025-01-15 ENCOUNTER — NURSE TRIAGE (OUTPATIENT)
Age: OVER 89
End: 2025-01-15

## 2025-01-15 DIAGNOSIS — J44.9 CHRONIC OBSTRUCTIVE PULMONARY DISEASE, UNSPECIFIED COPD TYPE (HCC): ICD-10-CM

## 2025-01-15 RX ORDER — BUDESONIDE, GLYCOPYRROLATE, AND FORMOTEROL FUMARATE 160; 9; 4.8 UG/1; UG/1; UG/1
2 AEROSOL, METERED RESPIRATORY (INHALATION) 2 TIMES DAILY
Qty: 10.7 G | Refills: 1 | Status: SHIPPED | OUTPATIENT
Start: 2025-01-15 | End: 2025-01-21

## 2025-01-15 NOTE — TELEPHONE ENCOUNTER
"Incoming call:    Patricio is requesting the Breztri be sent to the [facility] pharmacy on file. Done.     Reason for Disposition   Prescription prescribed recently is not at pharmacy and triager has access to patient's EMR and prescription is recorded in the EMR    Answer Assessment - Initial Assessment Questions  1. NAME of MEDICINE: \"What medicine(s) are you calling about?\"      Breztri  2. QUESTION: \"What is your question?\" (e.g., double dose of medicine, side effect)      Pharmacy change request to [facility]    Protocols used: Medication Question Call-Adult-[place]    "

## 2025-01-21 ENCOUNTER — TELEPHONE (OUTPATIENT)
Age: OVER 89
End: 2025-01-21

## 2025-01-21 DIAGNOSIS — J44.9 COPD WITHOUT EXACERBATION (HCC): Primary | ICD-10-CM

## 2025-01-21 RX ORDER — FLUTICASONE FUROATE, UMECLIDINIUM BROMIDE AND VILANTEROL TRIFENATATE 100; 62.5; 25 UG/1; UG/1; UG/1
1 POWDER RESPIRATORY (INHALATION) DAILY
Qty: 60 BLISTER | Refills: 3 | Status: SHIPPED | OUTPATIENT
Start: 2025-01-21 | End: 2025-02-20

## 2025-01-21 NOTE — TELEPHONE ENCOUNTER
Bretzi    Medication cost him $700, this med is more expensive than Trelegy.     Advised he will need to call him insurance company and ask for Breztri's Formulary Tier 1 Alternative. Then call us back with name of medication.     Patient acknowledged instructions and had no further questions and/or concerns at this time.

## 2025-01-21 NOTE — TELEPHONE ENCOUNTER
Taylor calls from [place] and states she see three active inhalers all from different providers she states if pt is sticking with Trelegy  then she is requesting we discontinue he others please advise

## 2025-01-21 NOTE — TELEPHONE ENCOUNTER
Pt calling back and states that he just got off insurance and they informed him that [employer] informed him that he can take Trelegy, that one is least expensive and that would be to the [facility] in [place]. He states that he doesn't need it right away because he just picked up the Breztri, but can still send the script.

## 2025-01-22 NOTE — TELEPHONE ENCOUNTER
[facility]    Called to confirm which inhaler pt is on, multiple prescriptions. Advised to DC Breztri and Anoro. Pt is on Trelegy.    Pharmacist acknowledged instructions.

## 2025-01-29 ENCOUNTER — OFFICE VISIT (OUTPATIENT)
Dept: CARDIOLOGY CLINIC | Facility: MEDICAL CENTER | Age: OVER 89
End: 2025-01-29
Payer: COMMERCIAL

## 2025-01-29 VITALS
OXYGEN SATURATION: 95 % | BODY MASS INDEX: 22.21 KG/M2 | HEIGHT: 72 IN | HEART RATE: 69 BPM | WEIGHT: 164 LBS | DIASTOLIC BLOOD PRESSURE: 60 MMHG | SYSTOLIC BLOOD PRESSURE: 110 MMHG

## 2025-01-29 DIAGNOSIS — I13.0 HYPERTENSIVE HEART AND KIDNEY DISEASE WITH HF AND CKD (HCC): Primary | ICD-10-CM

## 2025-01-29 DIAGNOSIS — I42.0 DILATED CARDIOMYOPATHY (HCC): ICD-10-CM

## 2025-01-29 DIAGNOSIS — I50.42 CHRONIC COMBINED SYSTOLIC AND DIASTOLIC CONGESTIVE HEART FAILURE (HCC): ICD-10-CM

## 2025-01-29 DIAGNOSIS — N18.32 STAGE 3B CHRONIC KIDNEY DISEASE (HCC): ICD-10-CM

## 2025-01-29 DIAGNOSIS — E78.49 OTHER HYPERLIPIDEMIA: ICD-10-CM

## 2025-01-29 PROCEDURE — 99214 OFFICE O/P EST MOD 30 MIN: CPT | Performed by: INTERNAL MEDICINE

## 2025-01-29 PROCEDURE — 93000 ELECTROCARDIOGRAM COMPLETE: CPT | Performed by: INTERNAL MEDICINE

## 2025-01-29 NOTE — PROGRESS NOTES
Cardiology Follow Up    Patricio US Arturo  3/26/1933  [id number]  [facility] [place]  [address]  [address]  [place] [address] [phone]    1. 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)        2. Dilated cardiomyopathy (HCC)        3. Chronic combined systolic and diastolic congestive heart failure (HCC)        4. Stage 3b chronic kidney disease (HCC)        5. 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     Interval History: None    History of the presenting illness, Discussion/Summary and My Plan are as follows:::    89-year-old without any prior cardiac history-including CAD, MI, bypass, stents or valvular heart disease or family history of cardiomyopathy or coronary artery disease but with a left bundle-branch block since 2013, also with a nonischemic cardiomyopathy (negative coronary angiography-May 2018)-diagnosed in May 2018 in the setting of acute congestive heart failure-probably viral-or originally with an ejection fraction of 35-40%, subsequently on ACE-inhibitor and beta-blocker, decreased to 20%, admitted with another episode of acute CHF-November 2018, initiated on Entresto, after which ejection fraction normalized to 55%-February 2019.     He is currently on an excellent regimen and has stayed out of the hospital for almost 5.5 years.     He denies any orthopnea or dyspnea with exertion or edema.   He he was golfing in the summer in the fall., does yd work, lives in a 4 story house and climbs 3 stories and walks.  No symptoms.  His weight has been around 160 at home,  today at 164 pounds in the office.  He is not on a diuretic.  COVID in February 2024, no recent hospitalizations or ER visits.     Exam demonstrates NO e/o CHF.  Has an ejection systolic murmur    ECGs unchanged with sinus rhythm, first-degree AV block and left bundle     Plan:     Nonischemic Cardiomyopathy and chronic systolic congestive heart failure with 1 recurrence, now normalized:  Nonischemic (negative coronary angiography-May 2018), no alcohol use or chemotherapy.  No family history.  Possibly left bundle related or viral cardiomyopathy-was  treated for symptoms of bronchitis for few months prior to May 2018.   Now on an excellent medical regimen including a beta-blocker, Entresto (was on spironolactone-stopped for hyperkalemia) and dapagliflozin but only on 5 q. OD (also with CKD)     Echo-ejection fraction decreased from 37%-May 2018 to 20-25%-September 2018-while on a good dose of ACE-inhibitor and beta-blocker, admitted with acute CHF-November 2018, then initiated on Entresto-ejection fraction improved to normal-55%-February 2019.  Since he has not been symptomatic, did not check an echo since.  No e/o Vol Ol on Exam. Also has COPD  Not on any diuretics  Dry weight-160 at home and 164 in the office and stable, not on a diuretic  Has low-grade MDS as well    He asked me about his potential risk for left knee replacement, based on the Zelaya perioperative risk for-1.9% risk of perioperative MI or cardiac arrest, 6.6% risk of MI, pulmonary edema, ventricular fibrillation cardiac arrest or complete heart block based on RCRI, if he decides to proceed, will check an echocardiogram prior to proceeding if there is none recently.    He has not had an evaluation of his ejection fraction since 2019, will check an echocardiogram.,  Also has ejection systolic murmur, likely of aortic stenosis and an early diastolic murmur-?  Aortic regurgitation    Hypertension:  Controlled     CKD: Baseline creat around upto 1.6,  follows with Dr. Estrada (Nephrology).  1.5-Nov 2024    Chronic left bundle, negative coronary angiography in May 2018  EF had improved to 55% in 2019    Dyslipidemia:  On low-dose statin.  He is also diabetic.     Diabetes:  Followed by an endocrinologist at [facility]. Now on Farxiga, A1C of 7.1 most recently,   \   Follow-up in 9 months   Latest Reference Range & Units Most Recent   BUN 7 - 25 mg/dL 33 (H)  10/24/24 09:28   Creatinine 0.70 - 1.22 mg/dL 1.47 (H) (E)  11/12/24 08:32   (H): Data is abnormally high  (E): External lab result   Latest Reference Range & Units 10/12/23 09:02 02/02/24 09:51 03/05/24 08:23   BUN 7 - 25 mg/dL 42 (H)  34 (H)   Creatinine 0.70 - 1.22 mg/dL 1.65 (H) 1.33 (H) (E) 1.46 (H)   (H): Data is abnormally high  (E): External lab result   Latest Reference Range & Units 12/07/20 10:51 04/20/23 09:17   Cholesterol <200 mg/dL 112 101   Triglycerides <150 mg/dL 52 34   HDL > OR = 40 mg/dL 39 (L) 53   Non-HDL Cholesterol <130 mg/dL (calc) 73 48   LDL Calculated mg/dL (calc) 60 38   Chol/HDL Ratio <5.0 (calc) 2.9 1.9   (L): Data is abnormally low  (E): External lab result3  Patient Active Problem List   Diagnosis    COPD without exacerbation (HCC)    Hyperlipidemia    Neuropathy, idiopathic    Multiple nodules of lung    Simple chronic bronchitis (HCC)    Type 2 diabetes mellitus with diabetic neuropathy, with long-term current use of insulin (HCC)    Dilated cardiomyopathy (HCC)    Chronic combined systolic and diastolic congestive heart failure (HCC)    Abnormal chest CT    Arthritis    Paresthesia of both feet    Finger pain, left    Urinary retention    Benign prostatic hyperplasia (BPH) with straining on urination    Anemia of chronic kidney failure, stage 3 (moderate)  (HCC)    Chronic kidney disease-mineral and bone disorder    Other proteinuria    MDS (myelodysplastic syndrome), low grade (HCC)    Primary osteoarthritis of left knee    Baker's cyst of knee, left    Irritable  bowel syndrome with diarrhea    Lightheadedness    Diabetes mellitus with chronic kidney disease (HCC)    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)    Stage 3b chronic kidney disease (HCC)     Past Medical History:   Diagnosis Date    Allergic 1989    COPD (chronic obstructive pulmonary disease) (HCC)     Diabetes mellitus (HCC)     Type 2    Essential hypertension     Heart failure (HCC)     Hyperlipidemia     Hypertension     Impetigo     Left inguinal hernia     Lung nodule     Malignant melanoma of skin (HCC)      Social History     Socioeconomic History    Marital status:      Spouse name: Not on file    Number of children: 2    Years of education: Not on file    Highest education level: Not on file   Occupational History    Occupation: rtired   Tobacco Use    Smoking status: Former     Current packs/day: 0.00     Average packs/day: 1 pack/day for 11.0 years (11.0 ttl pk-yrs)     Types: Cigarettes     Start date: 1949     Quit date: 1960     Years since quittin.1     Passive exposure: Past    Smokeless tobacco: Never   Vaping Use    Vaping status: Never Used   Substance and Sexual Activity    Alcohol use: Not Currently     Alcohol/week: 52.0 standard drinks of alcohol     Types: 50 Glasses of wine, 2 Standard drinks or equivalent per week    Drug use: No    Sexual activity: Not Currently     Partners: Female     Birth control/protection: Sponge   Other Topics Concern    Not on file   Social History Narrative    Caffeine use, active     Social Drivers of Health     Financial Resource Strain: Low Risk  (2024)    Overall Financial Resource Strain (CARDIA)     Difficulty of Paying Living Expenses: Not hard at all   Food Insecurity: Not on file   Transportation Needs: No Transportation Needs (2024)    PRAPARE - Transportation     Lack of Transportation (Medical): No     Lack of Transportation (Non-Medical): No   Physical Activity: Not on file   Stress: Not on  file   Social Connections: Not on file   Intimate Partner Violence: Not on file   Housing Stability: Not on file      Family History   Problem Relation Age of Onset    Diabetes Mother     Heart attack Neg Hx     Stroke Neg Hx     Aneurysm Neg Hx     Clotting disorder Neg Hx     Arrhythmia Neg Hx     Hypertension Neg Hx     Hyperlipidemia Neg Hx         pt unsure      Past Surgical History:   Procedure Laterality Date    APPENDECTOMY      CATARACT EXTRACTION, BILATERAL      CHOLECYSTECTOMY      CHOLECYSTECTOMY      COLONOSCOPY  2014    Fiberoptic    HERNIA REPAIR      KNEE ARTHROSCOPY Bilateral     Therapeutic    IA CYSTO INSERTION TRANSPROSTATIC IMPLANT SINGLE N/A 3/22/2019    Procedure: CYSTOSCOPY WITH INSERTION UROLIFT;  Surgeon: Bird Clinton MD;  Location: [facility];  Service: Urology       Current Outpatient Medications:     acetaminophen (TYLENOL) 325 mg tablet, Take 2 tablets (650 mg total) by mouth every 6 (six) hours as needed for mild pain, Disp: 30 tablet, Rfl: 0    albuterol (2.5 mg/3 mL) 0.083 % nebulizer solution, INHALE THE CONTENTS OF ONE AMPULE BY NEBULIZATION EVERY SIX HOURS AS NEEDED FOR WHEEZING OR SHORTNESS OF BREATH, Disp: 360 mL, Rfl: 1    Ascorbic Acid (VITAMIN C) 1000 MG tablet, Take 1 tablet by mouth daily, Disp: , Rfl:     BD PEN NEEDLE DON U/F 32G X 4 MM MISC,  , Disp: , Rfl:     cholecalciferol (VITAMIN D3) 1,000 units tablet, Take 1,000 Units by mouth daily, Disp: , Rfl:     cholestyramine (QUESTRAN) 4 g packet, DISSOLVE 1 PACKET IN 8 OZ OF LIQUID AND TAKE BY MOUTH ONCE DAILY, Disp: 30 each, Rfl: 4    Cinnamon 500 MG TABS, Take 1 tablet by mouth daily  , Disp: , Rfl:     Dapagliflozin Propanediol 5 MG TABS, Take 5 mg by mouth every other day, Disp: , Rfl:     fluorouracil (EFUDEX) 5 % cream, Apply topically 2 (two) times a day To the area of the left chest that was biopsied for a total of 4 weeks., Disp: 40 g, Rfl: 0    fluticasone (FLONASE) 50 mcg/act nasal spray, USE TWO  SPRAYS IN EACH NOSTRIL DAILY, Disp: 16 g, Rfl: 1    fluticasone-umeclidinium-vilanterol (Trelegy Ellipta) 100-62.5-25 mcg/actuation inhaler, Inhale 1 puff daily Rinse mouth after use., Disp: 60 blister, Rfl: 3    gabapentin (NEURONTIN) 300 mg capsule, TAKE ONE CAPSULE BY MOUTH ONCE DAILY, Disp: 90 capsule, Rfl: 1    glucose blood test strip, Use daily, Disp: , Rfl:     hydrOXYzine pamoate (VISTARIL) 25 mg capsule, TAKE ONE CAPSULE BY MOUTH THREE TIMES DAILY AS NEEDED FOR ITCHING, Disp: 90 capsule, Rfl: 1    Insulin Glargine Solostar 100 UNIT/ML SOPN, Inject 14 Units under the skin daily, Disp: , Rfl:     ipratropium (ATROVENT) 0.03 % nasal spray, 2 sprays into each nostril every 12 (twelve) hours, Disp: 30 mL, Rfl: 0    metoprolol succinate (TOPROL-XL) 50 mg 24 hr tablet, Take 1 tablet (50 mg total) by mouth daily, Disp: 90 tablet, Rfl: 3    montelukast (SINGULAIR) 10 mg tablet, TAKE ONE TABLET BY MOUTH NIGHTLY AT BEDTIME, Disp: 90 tablet, Rfl: 1    Multiple Vitamins-Minerals (OCUVITE ADULT 50+ PO), Take 1 tablet by mouth daily, Disp: , Rfl:     NOVOLOG FLEXPEN 100 units/mL injection pen, Inject 4 Units under the skin 2 (two) times a day with meals 4u in am,  and 8u at dinner, Disp: , Rfl:     ONE TOUCH ULTRA TEST test strip,  , Disp: , Rfl:     ONETOUCH DELICA LANCETS FINE MISC,  , Disp: , Rfl:     roflumilast (DALIRESP) 500 mcg tablet, TAKE ONE TABLET BY MOUTH ONCE DAILY, Disp: 30 tablet, Rfl: 5    sacubitril-valsartan (Entresto) 24-26 MG TABS, Take 1 tablet by mouth 2 (two) times a day, Disp: 180 tablet, Rfl: 1    simvastatin (ZOCOR) 10 mg tablet, TAKE ONE TABLET BY MOUTH ONCE DAILY, Disp: 90 tablet, Rfl: 0    Ventolin  (90 Base) MCG/ACT inhaler, INHALE TWO PUFFS BY MOUTH EVERY 6 HOURS AS NEEDED FOR WHEEZING, Disp: 18 g, Rfl: 0    betamethasone, augmented, (DIPROLENE-AF) 0.05 % cream, , Disp: , Rfl:     Levemir FlexTouch 100 units/mL injection pen, 10 Units daily at bedtime (Patient not taking: Reported on  6/17/2024), Disp: , Rfl:   No Known Allergies    Imaging: No results found.    Review of Systems:  Review of Systems   Constitutional: Negative.    HENT: Negative.     Eyes: Negative.    Respiratory: Negative.     Cardiovascular: Negative.    Endocrine: Negative.    Musculoskeletal: Negative.    Hematological: Negative.        Physical Exam:  /60 (BP Location: Left arm, Patient Position: Sitting, Cuff Size: Adult)   Pulse 69   Ht 6' (1.829 m)   Wt 74.4 kg (164 lb)   SpO2 95%   BMI 22.24 kg/m²   Physical Exam  Constitutional:       General: He is not in acute distress.     Appearance: He is well-developed. He is not diaphoretic.   HENT:      Head: Normocephalic and atraumatic.   Eyes:      General: No scleral icterus.        Right eye: No discharge.         Left eye: No discharge.      Conjunctiva/sclera: Conjunctivae normal.      Pupils: Pupils are equal, round, and reactive to light.   Neck:      Thyroid: No thyromegaly.      Vascular: No JVD.      Trachea: No tracheal deviation.   Cardiovascular:      Rate and Rhythm: Normal rate and regular rhythm.      Heart sounds: No murmur (Short systolic murmur-TR) heard.     No friction rub. No gallop.      Comments: Ejection systolic murmur, early diastolic murmur ?  Aortic stenosis and aortic regurgitation  Pulmonary:      Effort: Pulmonary effort is normal. No respiratory distress.      Breath sounds: Normal breath sounds. No stridor. No wheezing.   Abdominal:      General: Bowel sounds are normal. There is no distension.      Palpations: Abdomen is soft.      Tenderness: There is no abdominal tenderness.   Musculoskeletal:         General: No tenderness or deformity. Normal range of motion.      Cervical back: Normal range of motion.   Skin:     General: Skin is warm.      Coloration: Skin is not pale.      Findings: No erythema or rash.

## 2025-01-31 LAB
ALBUMIN SERPL-MCNC: 4.6 G/DL (ref 3.6–5.1)
ALBUMIN/CREAT UR: 13 MG/G CREAT
BUN SERPL-MCNC: 27 MG/DL (ref 7–25)
BUN/CREAT SERPL: 21 (CALC) (ref 6–22)
CALCIUM SERPL-MCNC: 9.4 MG/DL (ref 8.6–10.3)
CHLORIDE SERPL-SCNC: 106 MMOL/L (ref 98–110)
CO2 SERPL-SCNC: 28 MMOL/L (ref 20–32)
CREAT SERPL-MCNC: 1.3 MG/DL (ref 0.7–1.22)
CREAT UR-MCNC: 80 MG/DL (ref 20–320)
ERYTHROCYTE [DISTWIDTH] IN BLOOD BY AUTOMATED COUNT: 25.2 % (ref 11–15)
GFR/BSA.PRED SERPLBLD CYS-BASED-ARV: 52 ML/MIN/1.73M2
GLUCOSE SERPL-MCNC: 116 MG/DL (ref 65–99)
HCT VFR BLD AUTO: 34 % (ref 38.5–50)
HGB BLD-MCNC: 10.3 G/DL (ref 13.2–17.1)
MAGNESIUM SERPL-MCNC: 2.1 MG/DL (ref 1.5–2.5)
MCH RBC QN AUTO: 26.8 PG (ref 27–33)
MCHC RBC AUTO-ENTMCNC: 30.3 G/DL (ref 32–36)
MCV RBC AUTO: 88.3 FL (ref 80–100)
MICROALBUMIN UR-MCNC: 1 MG/DL
PHOSPHATE SERPL-MCNC: 4 MG/DL (ref 2.1–4.3)
PLATELET # BLD AUTO: 228 THOUSAND/UL (ref 140–400)
POTASSIUM SERPL-SCNC: 5.1 MMOL/L (ref 3.5–5.3)
RBC # BLD AUTO: 3.85 MILLION/UL (ref 4.2–5.8)
SODIUM SERPL-SCNC: 139 MMOL/L (ref 135–146)
WBC # BLD AUTO: 8.6 THOUSAND/UL (ref 3.8–10.8)

## 2025-02-03 ENCOUNTER — TELEPHONE (OUTPATIENT)
Age: OVER 89
End: 2025-02-03

## 2025-02-03 NOTE — TELEPHONE ENCOUNTER
Group Therapy Documentation    PATIENT'S NAME: Mainor Madsen  MRN:   [id number]  :   2009  ACCT. NUMBER: [id number]  DATE OF SERVICE: 23  START TIME:  3:30 PM  END TIME:  4:00 PM  FACILITATOR(S): Becky Grossman Elaine K, LP  TOPIC: BEH Group Therapy  Number of patients attending the group: 6  Group Length:  0.5 Hours    Dimensions addressed 3 and 6    Summary of Group / Topics Discussed:    Mindfulness:  Clients participated in emotion check in and mindful exercise movements designed to serve as a mid-day break. The clients were encouraged to focus on how their bodies felt and the emotions that the exercises brought forth.    Patient Session Goals / Objectives:                *  Demonstrated breathing and stretching exercises                *  Identified emotions                *  Practiced meditation skills of deep breathing, mindful movements, and emotional regulation        Group Attendance:  Attended group session  Interactive Complexity: No    Patient's response to the group topic/interactions:  cooperative with task    Patient appeared to be Actively participating.       Client specific details: Client participated in check in before and after the group exercises. Client was engaged fully in all exercises and provided a couple redirections to her peers.          Pt aware he will get xrays at visit on 2/6

## 2025-02-03 NOTE — TELEPHONE ENCOUNTER
Caller: patient    Doctor: Dr. mendes    Reason for call: patient had a fall on Litzy day and his L knee has been in pain since then, pain is going down his foot. Patient is scheduled for 2/6 and would like to know if he should get an x-ray done before 2/6 appointment  Please advise    Call back#: [phone]

## 2025-02-04 ENCOUNTER — OFFICE VISIT (OUTPATIENT)
Dept: NEPHROLOGY | Facility: CLINIC | Age: OVER 89
End: 2025-02-04
Payer: COMMERCIAL

## 2025-02-04 ENCOUNTER — TELEPHONE (OUTPATIENT)
Dept: FAMILY MEDICINE CLINIC | Facility: MEDICAL CENTER | Age: OVER 89
End: 2025-02-04

## 2025-02-04 VITALS
WEIGHT: 169 LBS | SYSTOLIC BLOOD PRESSURE: 118 MMHG | BODY MASS INDEX: 22.89 KG/M2 | HEIGHT: 72 IN | OXYGEN SATURATION: 97 % | DIASTOLIC BLOOD PRESSURE: 64 MMHG | HEART RATE: 82 BPM

## 2025-02-04 DIAGNOSIS — N18.32 STAGE 3B CHRONIC KIDNEY DISEASE (HCC): Primary | ICD-10-CM

## 2025-02-04 DIAGNOSIS — I12.9 BENIGN HYPERTENSION WITH CHRONIC KIDNEY DISEASE: ICD-10-CM

## 2025-02-04 DIAGNOSIS — N18.9 CHRONIC KIDNEY DISEASE-MINERAL AND BONE DISORDER: ICD-10-CM

## 2025-02-04 DIAGNOSIS — E11.22 TYPE 2 DIABETES MELLITUS WITH STAGE 3B CHRONIC KIDNEY DISEASE, WITHOUT LONG-TERM CURRENT USE OF INSULIN (HCC): ICD-10-CM

## 2025-02-04 DIAGNOSIS — E87.5 HYPERKALEMIA: ICD-10-CM

## 2025-02-04 DIAGNOSIS — D63.1 ANEMIA OF CHRONIC KIDNEY FAILURE, UNSPECIFIED STAGE: ICD-10-CM

## 2025-02-04 DIAGNOSIS — E83.9 CHRONIC KIDNEY DISEASE-MINERAL AND BONE DISORDER: ICD-10-CM

## 2025-02-04 DIAGNOSIS — N18.9 ANEMIA OF CHRONIC KIDNEY FAILURE, UNSPECIFIED STAGE: ICD-10-CM

## 2025-02-04 DIAGNOSIS — I50.9 CHRONIC CONGESTIVE HEART FAILURE, UNSPECIFIED HEART FAILURE TYPE (HCC): ICD-10-CM

## 2025-02-04 DIAGNOSIS — N18.32 TYPE 2 DIABETES MELLITUS WITH STAGE 3B CHRONIC KIDNEY DISEASE, WITHOUT LONG-TERM CURRENT USE OF INSULIN (HCC): ICD-10-CM

## 2025-02-04 DIAGNOSIS — M89.9 CHRONIC KIDNEY DISEASE-MINERAL AND BONE DISORDER: ICD-10-CM

## 2025-02-04 PROCEDURE — 99214 OFFICE O/P EST MOD 30 MIN: CPT | Performed by: INTERNAL MEDICINE

## 2025-02-04 NOTE — PATIENT INSTRUCTIONS
You have chronic kidney disease (CKD) and your kidney function is stable.   I recommend no changes to your medications today.   Follow up in 6 months - please obtain blood work 1-2 weeks prior to the appointment.   Call me if you are scheduled for surgery.     Please avoid taking NSAIDs (Ibuprofen, Motrin, Aleve, Advil, Naproxen, Celebrex, etc.)  Make sure you are eating healthy and have adequate exercise.

## 2025-02-04 NOTE — TELEPHONE ENCOUNTER
Pt dropped off letter from insurance that his Hyfroxyzine needs a prior auth.  Do you want to do the auth or switch to another med?    See letter scanned under media

## 2025-02-04 NOTE — ASSESSMENT & PLAN NOTE
Renal function is stable. SCr is 1.30.   Continue with good BP, glycemic and lipid control.     Orders:    CBC; Future    Renal function panel; Future

## 2025-02-04 NOTE — ASSESSMENT & PLAN NOTE
PTH and Vitamin D at goal.   Ca, Mg, Phos at goal.     Orders:    Magnesium; Future    PTH, intact; Future    Vitamin D 25 hydroxy; Future

## 2025-02-04 NOTE — PROGRESS NOTES
NEPHROLOGY OFFICE PROGRESS NOTE   Patricio Morris 89 y.o. male MRN: [id number]  DATE: 02/04/25  Reason for visit: Continued evaluation and management of CKD    Assessment & Plan  Stage 3b chronic kidney disease (HCC)  Renal function is stable. SCr is 1.30.   Continue with good BP, glycemic and lipid control.     Orders:    CBC; Future    Renal function panel; Future    Benign hypertension with chronic kidney disease  BP is at goal (<130/80)  Continue current BP medications.     Chronic congestive heart failure, unspecified heart failure type (HCC)  Currently compensated.   Continue with current regimen.   Not on diuretics.     Anemia of chronic kidney failure, unspecified stage  Hgb level is at goal.      Hyperkalemia  K is at goal.     Chronic kidney disease-mineral and bone disorder  PTH and Vitamin D at goal.   Ca, Mg, Phos at goal.     Orders:    Magnesium; Future    PTH, intact; Future    Vitamin D 25 hydroxy; Future    Type 2 diabetes mellitus with stage 3b chronic kidney disease, without long-term current use of insulin ([facility])  Defer to Dr. Cruz.   On Farxiga.         RENAL PERTINENT HISTORY:  Chronic kidney disease, stage IIIB:  Baseline serum creatinine is around 1.3 to 1.6.   The etiology of his CKD is felt to be multifactorial from hypertensive nephrosclerosis and possible element of diabetic nephropathy complicated by hemodynamic effect of cardiac meds.     Hypertension:  Current regimen: Metoprolol succinate 50 mg OD.     Congestive heart failure:  Current Rx: Entresto 24/26 mg BID, Farxiga 5 mg QOD vs OD  Follow up with Dr. Zapata.   Not on loop diuretics.    Hyperkalemia  On low K diet.      Anemia of chronic disease:  Hgb stable at 11.2.     Proteinuria:  On Valsartan in Entresto.     Mineral and bone disease  Rx: Vitamin D 1000 units daily.    Diabetes mellitus:   Managed by Dr. Cruz.   On Insulin and Farxiga.     Hyperlipidemia:   Managed by PCP.   On Simvastatin.     Patient Instructions    You have chronic kidney disease (CKD) and your kidney function is stable.   I recommend no changes to your medications today.   Follow up in 6 months - please obtain blood work 1-2 weeks prior to the appointment.   Call me if you are scheduled for surgery.     Please avoid taking NSAIDs (Ibuprofen, Motrin, Aleve, Advil, Naproxen, Celebrex, etc.)  Make sure you are eating healthy and have adequate exercise.     SUBJECTIVE / INTERVAL HISTORY:  Patricio was last seen in the office in Aug 2024.   Over the past 6 months, there have been no unplanned hospitalizations or ER visits.  He continues to see Dr. Zapata and Dr. Cruz  His biggest issue right now is severe left knee pain which is interfering with his mobility even at home.  Last time he played 9 holes of golf was in October 2024.  No other acute issues.   Not checking home BP.   Denies any new complaints.     PMH/PSH: HTN, DM, HLP, CKD, hyperkalemia, CHF, COPD, back pain, gallstone pancreatitis s/p cholecystectomy, melanoma s/p excision, lung nodules, anemia, BPH s/p urolift.     Previous work up:   5/15/20 Renal US: R 10.1 cm, L 10.2 cm, no significant PVR.     ALLERGIES: No Known Allergies    REVIEW OF SYSTEMS:  Review of Systems  All the pertinent systems were reviewed and were negative except as documented in the HPI.   General: No fevers, chills.   CV: No chest pain, leg edema.  Pulm: No cough, shortness of breath.  GI: No nausea, vomiting, diarrhea.  : No hematuria.  Neuro: No lightheadedness    OBJECTIVE:  /64 (BP Location: Left arm, Patient Position: Sitting, Cuff Size: Standard)   Pulse 82   Ht 6' (1.829 m)   Wt 76.7 kg (169 lb)   SpO2 97%   BMI 22.92 kg/m²   Current Weight:   Body mass index is 22.92 kg/m².  Physical Exam  General: conscious, cooperative, no distress  Skin: dry  Eyes: pink conjunctivae  ENT: moist mucous membranes  Respiratory: equal chest expansion, clear breath sounds.  Cardiovascular: distinct heart sounds, normal  rate, regular rhythm, + 3/6 murmur in base.   Abdomen: soft, non tender, non distended, normal bowel sounds  Extremities: no edema.   Genitourinary: no moncada catheter.   Neuro: awake, alert.   Psych: appropriate affect    Medications:  Current Outpatient Medications:     acetaminophen (TYLENOL) 325 mg tablet, Take 2 tablets (650 mg total) by mouth every 6 (six) hours as needed for mild pain, Disp: 30 tablet, Rfl: 0    albuterol (2.5 mg/3 mL) 0.083 % nebulizer solution, INHALE THE CONTENTS OF ONE AMPULE BY NEBULIZATION EVERY SIX HOURS AS NEEDED FOR WHEEZING OR SHORTNESS OF BREATH, Disp: 360 mL, Rfl: 1    Ascorbic Acid (VITAMIN C) 1000 MG tablet, Take 1 tablet by mouth daily, Disp: , Rfl:     BD PEN NEEDLE DON U/F 32G X 4 MM MISC,  , Disp: , Rfl:     betamethasone, augmented, (DIPROLENE-AF) 0.05 % cream, , Disp: , Rfl:     cholecalciferol (VITAMIN D3) 1,000 units tablet, Take 1,000 Units by mouth daily, Disp: , Rfl:     cholestyramine (QUESTRAN) 4 g packet, DISSOLVE 1 PACKET IN 8 OZ OF LIQUID AND TAKE BY MOUTH ONCE DAILY, Disp: 30 each, Rfl: 4    Cinnamon 500 MG TABS, Take 1 tablet by mouth daily  , Disp: , Rfl:     Dapagliflozin Propanediol 5 MG TABS, Take 5 mg by mouth every other day, Disp: , Rfl:     fluorouracil (EFUDEX) 5 % cream, Apply topically 2 (two) times a day To the area of the left chest that was biopsied for a total of 4 weeks., Disp: 40 g, Rfl: 0    fluticasone (FLONASE) 50 mcg/act nasal spray, USE TWO SPRAYS IN EACH NOSTRIL DAILY, Disp: 16 g, Rfl: 1    fluticasone-umeclidinium-vilanterol (Trelegy Ellipta) 100-62.5-25 mcg/actuation inhaler, Inhale 1 puff daily Rinse mouth after use., Disp: 60 blister, Rfl: 3    gabapentin (NEURONTIN) 300 mg capsule, TAKE ONE CAPSULE BY MOUTH ONCE DAILY, Disp: 90 capsule, Rfl: 1    glucose blood test strip, Use daily, Disp: , Rfl:     hydrOXYzine pamoate (VISTARIL) 25 mg capsule, TAKE ONE CAPSULE BY MOUTH THREE TIMES DAILY AS NEEDED FOR ITCHING, Disp: 90 capsule, Rfl:  1    Insulin Glargine Solostar 100 UNIT/ML SOPN, Inject 14 Units under the skin daily, Disp: , Rfl:     ipratropium (ATROVENT) 0.03 % nasal spray, 2 sprays into each nostril every 12 (twelve) hours, Disp: 30 mL, Rfl: 0    Levemir FlexTouch 100 units/mL injection pen, 10 Units daily at bedtime (Patient not taking: Reported on 6/17/2024), Disp: , Rfl:     metoprolol succinate (TOPROL-XL) 50 mg 24 hr tablet, Take 1 tablet (50 mg total) by mouth daily, Disp: 90 tablet, Rfl: 3    montelukast (SINGULAIR) 10 mg tablet, TAKE ONE TABLET BY MOUTH NIGHTLY AT BEDTIME, Disp: 90 tablet, Rfl: 1    Multiple Vitamins-Minerals (OCUVITE ADULT 50+ PO), Take 1 tablet by mouth daily, Disp: , Rfl:     NOVOLOG FLEXPEN 100 units/mL injection pen, Inject 4 Units under the skin 2 (two) times a day with meals 4u in am,  and 8u at dinner, Disp: , Rfl:     ONE TOUCH ULTRA TEST test strip,  , Disp: , Rfl:     ONETOUCH DELICA LANCETS FINE MISC,  , Disp: , Rfl:     roflumilast (DALIRESP) 500 mcg tablet, TAKE ONE TABLET BY MOUTH ONCE DAILY, Disp: 30 tablet, Rfl: 5    sacubitril-valsartan (Entresto) 24-26 MG TABS, Take 1 tablet by mouth 2 (two) times a day, Disp: 180 tablet, Rfl: 1    simvastatin (ZOCOR) 10 mg tablet, TAKE ONE TABLET BY MOUTH ONCE DAILY, Disp: 90 tablet, Rfl: 0    Ventolin  (90 Base) MCG/ACT inhaler, INHALE TWO PUFFS BY MOUTH EVERY 6 HOURS AS NEEDED FOR WHEEZING, Disp: 18 g, Rfl: 0    Laboratory Results:  Results for orders placed or performed in visit on 01/30/25   Microalbumin, Random Urine (W/Creatinine)    Collection Time: 01/30/25  7:50 AM   Result Value Ref Range    Creatinine, Urine 80 20 - 320 mg/dL    Albumin,U,Random 1.0 See Note: mg/dL    Microalb/Creat Ratio 13 <30 mg/g creat   Magnesium    Collection Time: 01/30/25  7:50 AM   Result Value Ref Range    Magnesium, Serum 2.1 1.5 - 2.5 mg/dL   Renal function panel    Collection Time: 01/30/25  7:50 AM   Result Value Ref Range    Glucose, Random 116 (H) 65 - 99 mg/dL     BUN 27 (H) 7 - 25 mg/dL    Creatinine 1.30 (H) 0.70 - 1.22 mg/dL    eGFR 52 (L) > OR = 60 mL/min/1.73m2    SL AMB BUN/CREATININE RATIO 21 6 - 22 (calc)    Sodium 139 135 - 146 mmol/L    Potassium 5.1 3.5 - 5.3 mmol/L    Chloride 106 98 - 110 mmol/L    CO2 28 20 - 32 mmol/L    Calcium 9.4 8.6 - 10.3 mg/dL    Phosphorus, Serum 4.0 2.1 - 4.3 mg/dL    Albumin 4.6 3.6 - 5.1 g/dL   CBC    Collection Time: 01/30/25  7:50 AM   Result Value Ref Range    White Blood Cell Count 8.6 3.8 - 10.8 Thousand/uL    Red Blood Cell Count 3.85 (L) 4.20 - 5.80 Million/uL    Hemoglobin 10.3 (L) 13.2 - 17.1 g/dL    HCT 34.0 (L) 38.5 - 50.0 %    MCV 88.3 80.0 - 100.0 fL    MCH 26.8 (L) 27.0 - 33.0 pg    MCHC 30.3 (L) 32.0 - 36.0 g/dL    RDW 25.2 (H) 11.0 - 15.0 %    Platelet Count 228 140 - 400 Thousand/uL    SL AMB MPV  7.5 - 12.5 fL     *Note: Due to a large number of results and/or encounters for the requested time period, some results have not been displayed. A complete set of results can be found in Results Review.

## 2025-02-05 ENCOUNTER — APPOINTMENT (OUTPATIENT)
Dept: RADIOLOGY | Facility: MEDICAL CENTER | Age: OVER 89
End: 2025-02-05
Payer: COMMERCIAL

## 2025-02-05 ENCOUNTER — OFFICE VISIT (OUTPATIENT)
Dept: OBGYN CLINIC | Facility: MEDICAL CENTER | Age: OVER 89
End: 2025-02-05
Payer: COMMERCIAL

## 2025-02-05 ENCOUNTER — HOSPITAL ENCOUNTER (OUTPATIENT)
Dept: NON INVASIVE DIAGNOSTICS | Facility: MEDICAL CENTER | Age: OVER 89
Discharge: HOME | End: 2025-02-05
Payer: COMMERCIAL

## 2025-02-05 VITALS
WEIGHT: 169 LBS | DIASTOLIC BLOOD PRESSURE: 64 MMHG | HEART RATE: 82 BPM | SYSTOLIC BLOOD PRESSURE: 118 MMHG | HEIGHT: 72 IN | BODY MASS INDEX: 22.89 KG/M2

## 2025-02-05 DIAGNOSIS — M17.12 PRIMARY OSTEOARTHRITIS OF LEFT KNEE: Primary | ICD-10-CM

## 2025-02-05 DIAGNOSIS — M71.22 BAKER'S CYST OF KNEE, LEFT: ICD-10-CM

## 2025-02-05 DIAGNOSIS — M17.12 PRIMARY OSTEOARTHRITIS OF LEFT KNEE: ICD-10-CM

## 2025-02-05 DIAGNOSIS — I42.0 DILATED CARDIOMYOPATHY (HCC): ICD-10-CM

## 2025-02-05 DIAGNOSIS — I50.42 CHRONIC COMBINED SYSTOLIC AND DIASTOLIC CONGESTIVE HEART FAILURE (HCC): ICD-10-CM

## 2025-02-05 LAB
AORTIC ROOT: 3.4 CM
AORTIC VALVE MEAN VELOCITY: 20.2 M/S
ASCENDING AORTA: 3.4 CM
AV AREA BY CONTINUOUS VTI: 0.7 CM2
AV AREA PEAK VELOCITY: 0.9 CM2
AV LVOT MEAN GRADIENT: 1 MMHG
AV LVOT PEAK GRADIENT: 2 MMHG
AV MEAN PRESS GRAD SYS DOP V1V2: 18 MMHG
AV ORIFICE AREA US: 0.73 CM2
AV PEAK GRADIENT: 31 MMHG
AV VELOCITY RATIO: 0.21
AV VMAX SYS DOP: 2.8 M/S
BSA FOR ECHO PROCEDURE: 1.98 M2
DOP CALC AO VTI: 73.96 CM
DOP CALC LVOT AREA: 3.46 CM2
DOP CALC LVOT CARDIAC INDEX: 1.88 L/MIN/M2
DOP CALC LVOT CARDIAC OUTPUT: 3.73 L/MIN
DOP CALC LVOT DIAMETER: 2.1 CM
DOP CALC LVOT PEAK VEL VTI: 15.5 CM
DOP CALC LVOT PEAK VEL: 0.7 M/S
DOP CALC LVOT STROKE INDEX: 27.3 ML/M2
DOP CALC LVOT STROKE VOLUME: 54
E WAVE DECELERATION TIME: 177 MS
E/A RATIO: 1.16
FRACTIONAL SHORTENING: 30 (ref 28–44)
INTERVENTRICULAR SEPTUM IN DIASTOLE (PARASTERNAL SHORT AXIS VIEW): 1.2 CM
INTERVENTRICULAR SEPTUM: 1.2 CM (ref 0.6–1.1)
LAAS-AP2: 18.9 CM2
LAAS-AP4: 23.4 CM2
LEFT ATRIUM SIZE: 4.3 CM
LEFT ATRIUM VOLUME (MOD BIPLANE): 71 ML
LEFT ATRIUM VOLUME INDEX (MOD BIPLANE): 35.9 ML/M2
LEFT INTERNAL DIMENSION IN SYSTOLE: 3.2 CM (ref 2.1–4)
LEFT VENTRICULAR INTERNAL DIMENSION IN DIASTOLE: 4.6 CM (ref 3.5–6)
LEFT VENTRICULAR POSTERIOR WALL IN END DIASTOLE: 1 CM
LEFT VENTRICULAR STROKE VOLUME: 59 ML
LV EF US.2D.A4C+ESTIMATED: 60 %
LVSV (TEICH): 59 ML
MV E'TISSUE VEL-LAT: 5 CM/S
MV E'TISSUE VEL-SEP: 10 CM/S
MV PEAK A VEL: 0.81 M/S
MV PEAK E VEL: 94 CM/S
MV STENOSIS PRESSURE HALF TIME: 51 MS
MV VALVE AREA P 1/2 METHOD: 4.3
RA PRESSURE ESTIMATED: 10 MMHG
RIGHT ATRIUM AREA SYSTOLE A4C: 22.3 CM2
RIGHT VENTRICLE ID DIMENSION: 4.5 CM
RV PSP: 74 MMHG
SL CV LEFT ATRIUM LENGTH A2C: 4.6 CM
SL CV LV EF: 55
SL CV PED ECHO LEFT VENTRICLE DIASTOLIC VOLUME (MOD BIPLANE) 2D: 99 ML
SL CV PED ECHO LEFT VENTRICLE SYSTOLIC VOLUME (MOD BIPLANE) 2D: 41 ML
TR MAX PG: 64 MMHG
TR PEAK VELOCITY: 4 M/S
TRICUSPID ANNULAR PLANE SYSTOLIC EXCURSION: 2.2 CM
TRICUSPID VALVE PEAK REGURGITATION VELOCITY: 3.98 M/S

## 2025-02-05 PROCEDURE — 73562 X-RAY EXAM OF KNEE 3: CPT

## 2025-02-05 PROCEDURE — 20610 DRAIN/INJ JOINT/BURSA W/O US: CPT | Performed by: PHYSICAL MEDICINE & REHABILITATION

## 2025-02-05 PROCEDURE — 99214 OFFICE O/P EST MOD 30 MIN: CPT | Performed by: PHYSICAL MEDICINE & REHABILITATION

## 2025-02-05 PROCEDURE — 93306 TTE W/DOPPLER COMPLETE: CPT

## 2025-02-05 PROCEDURE — 93306 TTE W/DOPPLER COMPLETE: CPT | Performed by: INTERNAL MEDICINE

## 2025-02-05 RX ORDER — TRIAMCINOLONE ACETONIDE 40 MG/ML
80 INJECTION, SUSPENSION INTRA-ARTICULAR; INTRAMUSCULAR
Status: COMPLETED | OUTPATIENT
Start: 2025-02-05 | End: 2025-02-05

## 2025-02-05 RX ORDER — ROPIVACAINE HYDROCHLORIDE 5 MG/ML
10 INJECTION, SOLUTION EPIDURAL; INFILTRATION; PERINEURAL
Status: COMPLETED | OUTPATIENT
Start: 2025-02-05 | End: 2025-02-05

## 2025-02-05 RX ADMIN — TRIAMCINOLONE ACETONIDE 80 MG: 40 INJECTION, SUSPENSION INTRA-ARTICULAR; INTRAMUSCULAR at 14:15

## 2025-02-05 RX ADMIN — ROPIVACAINE HYDROCHLORIDE 10 ML: 5 INJECTION, SOLUTION EPIDURAL; INFILTRATION; PERINEURAL at 14:15

## 2025-02-05 NOTE — PROGRESS NOTES
1. Primary osteoarthritis of left knee  XR knee 3 vw left non injury    Large joint arthrocentesis: L knee      2. Baker's cyst of knee, left          Orders Placed This Encounter   Procedures    Large joint arthrocentesis: L knee    XR knee 3 vw left non injury      Impression:  Patient is here in follow up of left knee pain likely secondary to severe medial tibiofemoral osteoarthritis leading to Baker's cyst.  Treatment has included intra-articular steroid injection, HA injection, brace and Baker's cyst aspiration.  Patient recently had a fall so we obtained updated x-rays as below.  Repeat steroid injection given today.     Can consider referral for genicular nerve procedure with pain management. We discussed that knee replacement is not a great option due to his comorbidities.     Imaging Studies (I personally reviewed images in PACS and report):  Left knee x-rays obtained on 2/5/2025.  These images show severe medial compartment OA.  There is a joint effusion.  Calcifications in the posterior knee.     Ultrasound of the soft tissue in the left posterior knee showed a Baker's cyst.  This was performed by radiology.    No follow-ups on file.    Patient is in agreement with the above plan.    HPI:  Patricio Morris is a 89 y.o. male  who presents in follow up.  Here for   Chief Complaint   Patient presents with    Left Knee - Pain, Follow-up     Pt presents with worsening left knee pain after a fall on Litzy day. Requesting repeat xray today        Since last visit: See above.    Following history reviewed and updated:  Past Medical History:   Diagnosis Date    Allergic april 1989    COPD (chronic obstructive pulmonary disease) (HCC)     Diabetes mellitus (HCC)     Type 2    Essential hypertension     Heart failure (HCC)     Hyperlipidemia     Hypertension     Impetigo     Left inguinal hernia     Lung nodule     Malignant melanoma of skin (HCC)      Past Surgical History:   Procedure Laterality Date     APPENDECTOMY      CATARACT EXTRACTION, BILATERAL      CHOLECYSTECTOMY      CHOLECYSTECTOMY      COLONOSCOPY      Fiberoptic    HERNIA REPAIR      KNEE ARTHROSCOPY Bilateral     Therapeutic    LA CYSTO INSERTION TRANSPROSTATIC IMPLANT SINGLE N/A 3/22/2019    Procedure: CYSTOSCOPY WITH INSERTION UROLIFT;  Surgeon: Bird Clinton MD;  Location: [facility];  Service: Urology     Social History   Social History     Substance and Sexual Activity   Alcohol Use Not Currently    Alcohol/week: 52.0 standard drinks of alcohol    Types: 50 Glasses of wine, 2 Standard drinks or equivalent per week     Social History     Substance and Sexual Activity   Drug Use No     Social History     Tobacco Use   Smoking Status Former    Current packs/day: 0.00    Average packs/day: 1 pack/day for 11.0 years (11.0 ttl pk-yrs)    Types: Cigarettes    Start date: 1949    Quit date: 1960    Years since quittin.1    Passive exposure: Past   Smokeless Tobacco Never     Family History   Problem Relation Age of Onset    Diabetes Mother     Heart attack Neg Hx     Stroke Neg Hx     Aneurysm Neg Hx     Clotting disorder Neg Hx     Arrhythmia Neg Hx     Hypertension Neg Hx     Hyperlipidemia Neg Hx         pt unsure      No Known Allergies     Constitutional:  There were no vitals taken for this visit.   General: NAD.  Eyes: Clear sclerae.  ENT: No inflammation, lesion, or mass of lips.  No tracheal deviation.  Musculoskeletal: As mentioned below.  Integumentary: No visible rashes or skin lesions.  Pulmonary/Chest: Effort normal. No respiratory distress.   Neuro: CN's grossly intact, LOPEZ.  Psych: Normal affect and judgement.  Vascular: WWP.    Left Knee Exam     Other   Erythema: absent  Scars: absent  Sensation: normal  Pulse: present  Swelling: mild  Effusion: effusion present             Large joint arthrocentesis: L knee  Universal Protocol:  procedure performed by consultantConsent: Verbal consent obtained. Written  consent not obtained.  Risks and benefits: risks, benefits and alternatives were discussed  Consent given by: patient  Timeout called at: 2/5/2025 2:29 PM.  Patient understanding: patient states understanding of the procedure being performed  Patient consent: the patient's understanding of the procedure matches consent given  Site marked: the operative site was marked  Radiology Images displayed and confirmed. If images not available, report reviewed: imaging studies available  Patient identity confirmed: verbally with patient  Supporting Documentation  Indications: pain   Procedure Details  Location: knee - L knee  Needle size: 22 G  Ultrasound guidance: no  Approach: Inferolateral to patella.  Medications administered: 80 mg triamcinolone acetonide 40 mg/mL; 10 mL ropivacaine 0.5 %    Patient tolerance: patient tolerated the procedure well with no immediate complications  Dressing:  Sterile dressing applied    There was little to no resistance encountered during the injection.    Risks of this procedure include:    - Risk of bleeding since a needle is involved.  - Risk of infection (1/10,000 chance as per recent studies).  Signs/symptoms were discussed and they would prompt an urgent evaluation at an emergency department.  - Risk of pigmentation or skin dimpling in the skin (2-3% chance as per recent studies) from the steroid.  - Risk of increased pain from steroid flare (1% chance as per recent studies) that typically lasts 24-48 hours.  - Risk of increased blood sugars from the steroid medication that can last for a few weeks.  If the patient is a diabetic or pre-diabetic, they were encouraged to closely monitor their blood sugars and discuss with PCP if elevated more than usual or if having symptoms.    The benefits outweigh the risks and so the procedure was completed.

## 2025-02-07 ENCOUNTER — RESULTS FOLLOW-UP (OUTPATIENT)
Dept: CARDIOLOGY CLINIC | Facility: CLINIC | Age: OVER 89
End: 2025-02-07

## 2025-02-12 ENCOUNTER — RA CDI HCC (OUTPATIENT)
Dept: OTHER | Facility: HOSPITAL | Age: OVER 89
End: 2025-02-12

## 2025-02-12 ENCOUNTER — RESULTS FOLLOW-UP (OUTPATIENT)
Dept: FAMILY MEDICINE CLINIC | Facility: MEDICAL CENTER | Age: OVER 89
End: 2025-02-12

## 2025-02-12 DIAGNOSIS — J41.0 SIMPLE CHRONIC BRONCHITIS (HCC): ICD-10-CM

## 2025-02-12 LAB
CHOLEST SERPL-MCNC: 82 MG/DL
CHOLEST/HDLC SERPL: 1.9 (CALC)
HDLC SERPL-MCNC: 44 MG/DL
LDLC SERPL CALC-MCNC: 27 MG/DL (CALC)
NONHDLC SERPL-MCNC: 38 MG/DL (CALC)
TRIGL SERPL-MCNC: 38 MG/DL

## 2025-02-12 RX ORDER — ALBUTEROL SULFATE 0.83 MG/ML
2.5 SOLUTION RESPIRATORY (INHALATION) EVERY 4 HOURS PRN
Qty: 360 ML | Refills: 8 | Status: SHIPPED | OUTPATIENT
Start: 2025-02-12

## 2025-02-12 NOTE — TELEPHONE ENCOUNTER
Refill must be reviewed and completed by the office or provider. The refill is unable to be approved or denied by the medication management team.    Albuterol as needed for wheezing or shortness of breath, last refill 08.2024 - Please review to see if the refill is appropriate.

## 2025-02-13 ENCOUNTER — OFFICE VISIT (OUTPATIENT)
Dept: FAMILY MEDICINE CLINIC | Facility: MEDICAL CENTER | Age: OVER 89
End: 2025-02-13
Payer: MEDICARE

## 2025-02-13 VITALS
HEIGHT: 72 IN | HEART RATE: 77 BPM | DIASTOLIC BLOOD PRESSURE: 60 MMHG | BODY MASS INDEX: 22.86 KG/M2 | TEMPERATURE: 97.8 F | SYSTOLIC BLOOD PRESSURE: 102 MMHG | OXYGEN SATURATION: 98 % | WEIGHT: 168.8 LBS | RESPIRATION RATE: 18 BRPM

## 2025-02-13 DIAGNOSIS — J40 BRONCHITIS: Primary | ICD-10-CM

## 2025-02-13 DIAGNOSIS — R06.2 WHEEZES: ICD-10-CM

## 2025-02-13 PROBLEM — I35.0 AORTIC STENOSIS: Status: ACTIVE | Noted: 2025-02-13

## 2025-02-13 PROCEDURE — 99213 OFFICE O/P EST LOW 20 MIN: CPT | Performed by: STUDENT IN AN ORGANIZED HEALTH CARE EDUCATION/TRAINING PROGRAM

## 2025-02-13 PROCEDURE — G2211 COMPLEX E/M VISIT ADD ON: HCPCS | Performed by: STUDENT IN AN ORGANIZED HEALTH CARE EDUCATION/TRAINING PROGRAM

## 2025-02-13 RX ORDER — PREDNISONE 20 MG/1
TABLET ORAL
Qty: 15 TABLET | Refills: 0 | Status: SHIPPED | OUTPATIENT
Start: 2025-02-13 | End: 2025-02-23

## 2025-02-13 RX ORDER — AZITHROMYCIN 250 MG/1
TABLET, FILM COATED ORAL
Qty: 6 TABLET | Refills: 0 | Status: SHIPPED | OUTPATIENT
Start: 2025-02-13 | End: 2025-02-18

## 2025-02-13 NOTE — PROGRESS NOTES
Name: Patricio Morris      : 3/26/1933      MRN: [id number]  Encounter Provider: Tino Heard DO  Encounter Date: 2025   Encounter department: [facility] [place]  :  Assessment & Plan  Bronchitis  Call if any new or worsening symptoms or symptoms not improving  Keep appointment in 1 week for recheck and sooner as needed  Orders:    azithromycin (Zithromax) 250 mg tablet; Take 2 tablets (500 mg total) by mouth daily for 1 day, THEN 1 tablet (250 mg total) daily for 4 days.    Wheezes    Orders:    predniSONE 20 mg tablet; Take 2 tablets (40 mg total) by mouth daily for 3 days, THEN 1.5 tablets (30 mg total) daily for 3 days, THEN 1 tablet (20 mg total) daily for 4 days.        Worsening signs and symptoms discussed.  Rest, fluids, acetaminophen, and humidification.  Follow up as needed for persistent, worsening cough, or appearance of new symptoms.     History of Present Illness   HPI    Patricio Morris is a 89 y.o. male who presents for evaluation of nasal congestion, productive cough with sputum described as yellow, and rhinorrhea. Symptoms began 2 days ago. Symptoms have been gradually worsening since that time. Past history is significant for COPD.      Review of Systems  As noted in HPI     Objective   /60 (BP Location: Left arm, Patient Position: Sitting, Cuff Size: Standard)   Pulse 77   Temp 97.8 °F (36.6 °C) (Temporal)   Resp 18   Ht 6' (1.829 m)   Wt 76.6 kg (168 lb 12.8 oz)   SpO2 98%   BMI 22.89 kg/m²      Physical Exam  Vitals reviewed.   Constitutional:       General: He is not in acute distress.     Appearance: Normal appearance.   HENT:      Head: Normocephalic and atraumatic.      Right Ear: External ear normal.      Left Ear: External ear normal.      Nose: Nose normal.      Mouth/Throat:      Mouth: Mucous membranes are moist.      Pharynx: Oropharynx is clear.   Eyes:      Extraocular Movements: Extraocular movements intact.      Conjunctiva/sclera:  Conjunctivae normal.      Pupils: Pupils are equal, round, and reactive to light.   Cardiovascular:      Rate and Rhythm: Normal rate and regular rhythm.      Heart sounds: Murmur heard.   Pulmonary:      Effort: Pulmonary effort is normal. No respiratory distress.      Breath sounds: Wheezing present.   Musculoskeletal:      Cervical back: Neck supple.   Lymphadenopathy:      Cervical: No cervical adenopathy.   Skin:     General: Skin is warm and dry.   Neurological:      Mental Status: He is alert and oriented to person, place, and time.   Psychiatric:         Mood and Affect: Mood normal.         Behavior: Behavior normal.         Thought Content: Thought content normal.

## 2025-02-15 ENCOUNTER — NURSE TRIAGE (OUTPATIENT)
Dept: OTHER | Facility: OTHER | Age: OVER 89
End: 2025-02-15

## 2025-02-15 NOTE — TELEPHONE ENCOUNTER
"Reason for Disposition  • Blood glucose > 400 mg/dL (22.2 mmol/L)    Answer Assessment - Initial Assessment Questions  1. BLOOD GLUCOSE: \"What is your blood glucose level?\"         This AM glucose meter is reading \"high\"   Last night confirmed high reading with finger stick and it was 406 last night     2. ONSET: \"When did you check the blood glucose?\"        Yesterday blood sugar started elevating, z-pack and prednisone started on Thursday     3. USUAL RANGE: \"What is your glucose level usually?\" (e.g., usual fasting morning value, usual evening value)        Fastin-140  Evenin's    4. KETONES: \"Do you check for ketones (urine or blood test strips)?\" If Yes, ask: \"What does the test show now?\"         N/A    5. TYPE 1 or 2:  \"Do you know what type of diabetes you have?\"  (e.g., Type 1, Type 2, Gestational; doesn't know)         Type 2     6. INSULIN: \"Do you take insulin?\" \"What type of insulin(s) do you use? What is the mode of delivery? (syringe, pen; injection or pump)?\"         Novolog with meals/bedtime  Long acting insulin at bedtime     7. DIABETES PILLS: \"Do you take any pills for your diabetes?\" If Yes, ask: \"Have you missed taking any pills recently?\"        Farxiga     8. OTHER SYMPTOMS: \"Do you have any symptoms?\" (e.g., fever, frequent urination, difficulty breathing, dizziness, weakness, vomiting)        Denies    Protocols used: Diabetes - High Blood Sugar-Adult-AH    " After reviewing chart, RN noted that pt is currently in urgent care and being evaluated for concerns. There is signed documentation from RN in urgent care as well as vital signs documented. Per charge JENISE TOTH, no call is needed at this time.    Reason for Disposition  • Patient already left for the hospital/clinic.    Protocols used: NO CONTACT OR DUPLICATE CONTACT CALL-A-AH

## 2025-02-15 NOTE — TELEPHONE ENCOUNTER
Discussed with on call provider who advised patient to increase insulin regimen by 2 units. Patient currently takes novolog 4 units with breakfast, 2 units with lunch, and 8 units with dinner. Advised to increase doses by 2 units to: 6 with breakfast, 4 with lunch, and 10 units with dinner. Patient also advised to increase glargine from 16 units at bedtime to 18 units. Advise to make sure patient is checking sugars 3 times a day, patient has continuous monitor so checks multiple times/day. Patient and daughter made aware and expressed understanding. Advised to call back with any questions/symptoms/or concerns.

## 2025-02-15 NOTE — TELEPHONE ENCOUNTER
"Regarding: High sugar  ----- Message from Tracey PRYOR sent at 2/15/2025  8:10 AM EST -----  Pt stated, \" I was prescribed prednisone, a z pack and prolax from my doctor for a cold. My sugar has been through the roof last night it was 406.\"    "

## 2025-02-20 ENCOUNTER — OFFICE VISIT (OUTPATIENT)
Dept: FAMILY MEDICINE CLINIC | Facility: MEDICAL CENTER | Age: OVER 89
End: 2025-02-20
Payer: COMMERCIAL

## 2025-02-20 VITALS
SYSTOLIC BLOOD PRESSURE: 120 MMHG | HEIGHT: 72 IN | TEMPERATURE: 97.5 F | WEIGHT: 164.8 LBS | BODY MASS INDEX: 22.32 KG/M2 | HEART RATE: 70 BPM | DIASTOLIC BLOOD PRESSURE: 70 MMHG | OXYGEN SATURATION: 94 % | RESPIRATION RATE: 18 BRPM

## 2025-02-20 DIAGNOSIS — Z09 FOLLOW-UP EXAM AFTER TREATMENT: ICD-10-CM

## 2025-02-20 DIAGNOSIS — G89.29 CHRONIC PAIN OF LEFT KNEE: ICD-10-CM

## 2025-02-20 DIAGNOSIS — M25.562 CHRONIC PAIN OF LEFT KNEE: ICD-10-CM

## 2025-02-20 DIAGNOSIS — Z00.00 MEDICARE ANNUAL WELLNESS VISIT, SUBSEQUENT: Primary | ICD-10-CM

## 2025-02-20 PROCEDURE — 99214 OFFICE O/P EST MOD 30 MIN: CPT | Performed by: STUDENT IN AN ORGANIZED HEALTH CARE EDUCATION/TRAINING PROGRAM

## 2025-02-20 PROCEDURE — G0439 PPPS, SUBSEQ VISIT: HCPCS | Performed by: STUDENT IN AN ORGANIZED HEALTH CARE EDUCATION/TRAINING PROGRAM

## 2025-02-20 NOTE — PROGRESS NOTES
Diabetic Foot Exam    Patient's shoes and socks removed.    Right Foot/Ankle   Right Foot Inspection  Skin Exam: skin normal. Skin not intact, no dry skin, no warmth, no callus, no erythema, no maceration, no abnormal color, no pre-ulcer, no ulcer and no callus.     Toe Exam: ROM and strength within normal limits and swelling.     Sensory   Monofilament testing: intact    Vascular  Capillary refills: < 3 seconds  The right DP pulse is 2+. The right PT pulse is 2+.     Left Foot/Ankle  Left Foot Inspection  Skin Exam: skin normal. Skin not intact, no dry skin, no warmth, no erythema, no maceration, normal color, no pre-ulcer, no ulcer and no callus.     Toe Exam: ROM and strength within normal limits and swelling.     Sensory   Monofilament testing: intact    Vascular  Capillary refills: < 3 seconds  The left DP pulse is 2+. The left PT pulse is 2+.     Assign Risk Category  No deformity present  No loss of protective sensation  No weak pulses  Risk: 0  Name: Patricio Morris      : 3/26/1933      MRN: [id number]  Encounter Provider: Tino Heard DO  Encounter Date: 2025   Encounter department: [facility]    Assessment & Plan  Medicare annual wellness visit, subsequent  Informed about update with COVID-19 vaccine  Immunizations otherwise up-to-date including RSV vaccine       Follow-up exam after treatment  Recheck after treatment for bronchitis  Completed Z-Jeffrey  Completing prednisone taper  Much improved       Chronic pain of left knee  Most recent knee x-ray reviewed  Follows with orthopedics but, not optimal surgical candidate  Referral to pain management for oral pain management         Depression Screening and Follow-up Plan: Patient was screened for depression during today's encounter. They screened negative with a PHQ-2 score of 0.        Preventive health issues were discussed with patient, and age appropriate screening tests were ordered as noted in patient's After Visit  Summary. Personalized health advice and appropriate referrals for health education or preventive services given if needed, as noted in patient's After Visit Summary.    History of Present Illness     HPI     Patient presents for AWV and recheck after recent treatment for bronchitis.  Patient states that he is improving.  Patient mentions prednisone is also helped his left knee pain.  Patient Care Team:  Tino Heard DO as PCP - General (Family Medicine)  MD Merlene Koenig MD Sheila Borick, MD James Finegan, MD Devang Dave, MD Benjamin Joseph Quintana, MD as Consulting Physician (Endocrinology)  Sage Estrada MD (Nephrology)    Review of Systems    As noted in HPI   Medical History Reviewed by provider this encounter:  Tobacco  Allergies  Meds  Problems  Med Hx  Surg Hx  Fam Hx       Annual Wellness Visit Questionnaire   Patricio is here for his Subsequent Wellness visit.     Health Risk Assessment:   Patient rates overall health as good. Patient feels that their physical health rating is same. Patient is satisfied with their life. Eyesight was rated as same. Hearing was rated as same. Patient feels that their emotional and mental health rating is same. Patients states they are never, rarely angry. Patient states they are sometimes unusually tired/fatigued. Pain experienced in the last 7 days has been none. Patient states that he has experienced no weight loss or gain in last 6 months.     Depression Screening:   PHQ-2 Score: 0      Fall Risk Screening:   In the past year, patient has experienced: history of falling in past year    Number of falls: 1  Injured during fall?: No    Feels unsteady when standing or walking?: No    Worried about falling?: Yes      Home Safety:  Patient does not have trouble with stairs inside or outside of their home. Patient has working smoke alarms and has no working carbon monoxide detector. Home safety hazards include: none.     Nutrition:   Current  diet is Diabetic.     Medications:   Patient is currently taking over-the-counter supplements. OTC medications include: see medication list. Patient is able to manage medications.     Activities of Daily Living (ADLs)/Instrumental Activities of Daily Living (IADLs):   Walk and transfer into and out of bed and chair?: Yes  Dress and groom yourself?: Yes    Bathe or shower yourself?: Yes    Feed yourself? Yes  Do your laundry/housekeeping?: Yes  Manage your money, pay your bills and track your expenses?: Yes  Make your own meals?: Yes    Do your own shopping?: Yes    Previous Hospitalizations:   Any hospitalizations or ED visits within the last 12 months?: No      Advance Care Planning:   Living will: Yes    Durable POA for healthcare: Yes    Advanced directive: Yes      Cognitive Screening:   Provider or family/friend/caregiver concerned regarding cognition?: No    PREVENTIVE SCREENINGS      Cardiovascular Screening:    General: History Lipid Disorder and Screening Current      Diabetes Screening:     General: History Diabetes and Screening Current      Colorectal Cancer Screening:     General: Screening Not Indicated      Prostate Cancer Screening:    General: Screening Not Indicated      Osteoporosis Screening:    General: Screening Not Indicated      Abdominal Aortic Aneurysm (AAA) Screening:    Risk factors include: tobacco use        General: Screening Not Indicated      Lung Cancer Screening:     General: Screening Not Indicated      Hepatitis C Screening:    General: Screening Current    Screening, Brief Intervention, and Referral to Treatment (SBIRT)     Screening  Typical number of drinks in a day: 0  Typical number of drinks in a week: 0  Interpretation: Low risk drinking behavior.    Single Item Drug Screening:  How often have you used an illegal drug (including marijuana) or a prescription medication for non-medical reasons in the past year? never    Single Item Drug Screen Score: 0  Interpretation:  Negative screen for possible drug use disorder    Other Counseling Topics:   Car/seat belt/driving safety and sunscreen. Sees Dermatology     Social Drivers of Health     Financial Resource Strain: Low Risk  (1/29/2024)    Overall Financial Resource Strain (CARDIA)     Difficulty of Paying Living Expenses: Not hard at all   Food Insecurity: No Food Insecurity (2/20/2025)    Hunger Vital Sign     Worried About Running Out of Food in the Last Year: Never true     Ran Out of Food in the Last Year: Never true   Transportation Needs: No Transportation Needs (2/20/2025)    PRAPARE - Transportation     Lack of Transportation (Medical): No     Lack of Transportation (Non-Medical): No   Housing Stability: Low Risk  (2/20/2025)    Housing Stability Vital Sign     Unable to Pay for Housing in the Last Year: No     Number of Times Moved in the Last Year: 0     Homeless in the Last Year: No   Utilities: Not At Risk (2/20/2025)    [facility] Utilities     Threatened with loss of utilities: No     No results found.    Objective   /70 (BP Location: Left arm, Patient Position: Sitting, Cuff Size: Standard)   Pulse 70   Temp 97.5 °F (36.4 °C) (Temporal)   Resp 18   Ht 6' (1.829 m)   Wt 74.8 kg (164 lb 12.8 oz)   SpO2 94%   BMI 22.35 kg/m²     Physical Exam  Vitals reviewed.   Constitutional:       General: He is not in acute distress.     Appearance: Normal appearance.   HENT:      Head: Normocephalic and atraumatic.   Eyes:      Conjunctiva/sclera: Conjunctivae normal.   Cardiovascular:      Rate and Rhythm: Normal rate and regular rhythm.      Pulses: no weak pulses.           Dorsalis pedis pulses are 2+ on the right side and 2+ on the left side.        Posterior tibial pulses are 2+ on the right side and 2+ on the left side.      Heart sounds: Murmur heard.   Pulmonary:      Effort: Pulmonary effort is normal. No respiratory distress.      Breath sounds: Normal breath sounds. No wheezing.   Feet:      Right foot:      Skin  integrity: No ulcer, skin breakdown, erythema, warmth, callus or dry skin.      Left foot:      Skin integrity: No ulcer, skin breakdown, erythema, warmth, callus or dry skin.   Neurological:      Mental Status: He is alert and oriented to person, place, and time.   Psychiatric:         Mood and Affect: Mood normal.         Behavior: Behavior normal.         Thought Content: Thought content normal.

## 2025-02-24 DIAGNOSIS — L29.9 PRURITUS: ICD-10-CM

## 2025-02-26 RX ORDER — HYDROXYZINE PAMOATE 25 MG/1
CAPSULE ORAL
Qty: 90 CAPSULE | Refills: 0 | OUTPATIENT
Start: 2025-02-26

## 2025-03-07 ENCOUNTER — OFFICE VISIT (OUTPATIENT)
Age: OVER 89
End: 2025-03-07
Payer: COMMERCIAL

## 2025-03-07 VITALS
DIASTOLIC BLOOD PRESSURE: 62 MMHG | HEIGHT: 72 IN | HEART RATE: 71 BPM | OXYGEN SATURATION: 98 % | WEIGHT: 162 LBS | SYSTOLIC BLOOD PRESSURE: 112 MMHG | BODY MASS INDEX: 21.94 KG/M2

## 2025-03-07 DIAGNOSIS — R14.0 ABDOMINAL BLOATING: ICD-10-CM

## 2025-03-07 DIAGNOSIS — K58.0 IRRITABLE BOWEL SYNDROME WITH DIARRHEA: Primary | ICD-10-CM

## 2025-03-07 DIAGNOSIS — J44.9 COPD WITHOUT EXACERBATION (HCC): ICD-10-CM

## 2025-03-07 DIAGNOSIS — R19.7 DIARRHEA, UNSPECIFIED TYPE: ICD-10-CM

## 2025-03-07 LAB
BASOPHILS # BLD AUTO: 488 CELLS/UL (ref 0–200)
BASOPHILS NFR BLD AUTO: 4.1 %
EOSINOPHIL # BLD AUTO: 607 CELLS/UL (ref 15–500)
EOSINOPHIL NFR BLD AUTO: 5.1 %
ERYTHROCYTE [DISTWIDTH] IN BLOOD BY AUTOMATED COUNT: 26 % (ref 11–15)
HCT VFR BLD AUTO: 37.7 % (ref 38.5–50)
HGB BLD-MCNC: 11.5 G/DL (ref 13.2–17.1)
LYMPHOCYTES # BLD MANUAL: 1583 CELLS/UL (ref 850–3900)
LYMPHOCYTES NFR BLD AUTO: 13.3 %
MCH RBC QN AUTO: 26.5 PG (ref 27–33)
MCHC RBC AUTO-ENTMCNC: 30.5 G/DL (ref 32–36)
MCV RBC AUTO: 86.9 FL (ref 80–100)
METAMYELOCYTES # BLD: 119 CELLS/UL
METAMYELOCYTES NFR BLD MANUAL: 1 %
MONOCYTES # BLD AUTO: 726 CELLS/UL (ref 200–950)
MONOCYTES NFR BLD AUTO: 6.1 %
MYELOCYTES # BLD: 488 CELLS/UL
MYELOCYTES NFR BLD MANUAL: 4.1 %
NEUTROPHILS # BLD AUTO: 7890 CELLS/UL (ref 1500–7800)
NEUTROPHILS NFR BLD AUTO: 66.3 %
PLATELET # BLD AUTO: 212 THOUSAND/UL (ref 140–400)
RBC # BLD AUTO: 4.34 MILLION/UL (ref 4.2–5.8)
WBC # BLD AUTO: 11.9 THOUSAND/UL (ref 3.8–10.8)

## 2025-03-07 PROCEDURE — 99213 OFFICE O/P EST LOW 20 MIN: CPT | Performed by: PHYSICIAN ASSISTANT

## 2025-03-07 RX ORDER — FLUTICASONE FUROATE, UMECLIDINIUM BROMIDE AND VILANTEROL TRIFENATATE 100; 62.5; 25 UG/1; UG/1; UG/1
1 POWDER RESPIRATORY (INHALATION) DAILY
Qty: 60 BLISTER | Refills: 11 | Status: SHIPPED | OUTPATIENT
Start: 2025-03-07 | End: 2026-03-02

## 2025-03-07 RX ORDER — CHOLESTYRAMINE 4 G/9G
POWDER, FOR SUSPENSION ORAL
Qty: 90 EACH | Refills: 3 | Status: SHIPPED | OUTPATIENT
Start: 2025-03-07

## 2025-03-07 NOTE — LETTER
2025     Tino Heard DO  [address] [facility]  [address]  [facility] [address]    Patient: Patricio Morris   YOB: 1933   Date of Visit: 3/7/2025       Dear Dr. Heard:    Thank you for referring Patricio Morris to me for evaluation. Below are my notes for this consultation.    If you have questions, please do not hesitate to call me. I look forward to following your patient along with you.         Sincerely,        Gustavo Hess PA-C        CC: No Recipients    Gustavo Hess PA-C  3/7/2025  2:19 PM  Incomplete  Name: Patricio Morris      : 3/26/1933      MRN: [id number]  Encounter Provider: Gustavo Hess PA-C  Encounter Date: 3/7/2025   Encounter department: [facility]  :  Assessment & Plan  Irritable bowel syndrome with diarrhea  -Stable on cholestyramine 4 g and Metamucil pills once daily       Abdominal bloating  -Recommended simethicone       Diarrhea, unspecified type    Orders:  •  cholestyramine (QUESTRAN) 4 g packet; DISSOLVE 1 PACKET IN 8 OZ OF LIQUID AND TAKE BY MOUTH ONCE DAILY        History of Present Illness  Patricio Morris is a 89 y.o. male  with past medical history of diabetes type 2 COPD, hypertension, chronic kidney disease stage III, dilated cardiomyopathy, arthritis, myelodysplastic syndrome, hyperlipidemia and irritable bowel who presents today for follow up for IBS diarrhea.  Stable currently on Questran 4 daily.   He also takes metamucil daily.     Interval History:  He reports occasional constipation but it is rare.  He denies any abdominal pain, nausea vomiting, black or bloody stool.  He feels well-controlled at this time.     Patient denies any nausea, vomiting, abdominal pain, dysphagia, unexpected weight loss, diarrhea, constipation, blood in stool, or black tarry stools.      Endoscopy History:  EGD -none per patient  Colonoscopy - 3/24/22 adequate bowel prep and  diverticulosis internal hemorrhoids noted.  Random colon biopsies negative for microscopic colitis    Medication Refill  Pertinent negatives include no abdominal pain, arthralgias, chest pain, chills, coughing, diaphoresis, fatigue, headaches, joint swelling, myalgias, nausea, numbness, rash, sore throat, vomiting or weakness.   Constipation  Pertinent negatives include no abdominal pain, back pain, diarrhea, difficulty urinating, nausea, rectal pain or vomiting.     History obtained from: patient  Review of Systems   Constitutional:  Negative for activity change, appetite change, chills, diaphoresis, fatigue and unexpected weight change.   HENT:  Negative for mouth sores, rhinorrhea, sore throat and trouble swallowing.    Eyes:  Negative for discharge, redness and visual disturbance.   Respiratory:  Negative for apnea, cough, choking, chest tightness and shortness of breath.    Cardiovascular:  Negative for chest pain, palpitations and leg swelling.   Gastrointestinal:  Positive for constipation (Occasional). Negative for abdominal distention, abdominal pain, anal bleeding, blood in stool, diarrhea, nausea, rectal pain and vomiting.   Genitourinary:  Negative for difficulty urinating, dysuria, frequency and hematuria.   Musculoskeletal:  Negative for arthralgias, back pain, gait problem, joint swelling and myalgias.   Skin:  Negative for color change, pallor and rash.   Allergic/Immunologic: Negative for environmental allergies and food allergies.   Neurological:  Negative for dizziness, tremors, weakness, light-headedness, numbness and headaches.   Psychiatric/Behavioral:  Negative for agitation, behavioral problems, confusion and sleep disturbance. The patient is not nervous/anxious.     A complete review of systems is negative other than that noted above in the HPI.    Past Medical History  Past Medical History:   Diagnosis Date   • Allergic april 1989   • Anemia    • COPD (chronic obstructive pulmonary disease)  (HCC)    • Diabetes mellitus (HCC)     Type 2   • Diabetic nephropathy (HCC)    • Essential hypertension    • Heart failure (HCC)    • Hyperlipidemia    • Hypertension    • Impetigo    • Left inguinal hernia    • Lung nodule    • Malignant melanoma of skin (HCC)      Past Surgical History:   Procedure Laterality Date   • APPENDECTOMY     • BLADDER SURGERY     • CATARACT EXTRACTION, BILATERAL     • CHOLECYSTECTOMY     • CHOLECYSTECTOMY     • COLONOSCOPY  2014    Fiberoptic   • HERNIA REPAIR     • KNEE ARTHROSCOPY Bilateral     Therapeutic   • [place] CYSTO INSERTION TRANSPROSTATIC IMPLANT SINGLE N/A 03/22/2019    Procedure: CYSTOSCOPY WITH INSERTION UROLIFT;  Surgeon: Bird Clinton MD;  Location: [facility];  Service: Urology     Family History   Problem Relation Age of Onset   • Diabetes Mother    • Heart attack Neg Hx    • Stroke Neg Hx    • Aneurysm Neg Hx    • Clotting disorder Neg Hx    • Arrhythmia Neg Hx    • Hypertension Neg Hx    • Hyperlipidemia Neg Hx         pt unsure       reports that he quit smoking about 65 years ago. His smoking use included cigarettes. He started smoking about 76 years ago. He has a 11 pack-year smoking history. He has been exposed to tobacco smoke. He has never used smokeless tobacco. He reports that he does not currently use alcohol after a past usage of about 52.0 standard drinks of alcohol per week. He reports that he does not use drugs.  Current Outpatient Medications   Medication Instructions   • acetaminophen (TYLENOL) 650 mg, Oral, Every 6 hours PRN   • albuterol 2.5 mg, Nebulization, Every 4 hours PRN   • Ascorbic Acid (VITAMIN C) 1000 MG tablet 1 tablet, Daily   • BD PEN NEEDLE DON U/F 32G X 4 MM MISC     • betamethasone, augmented, (DIPROLENE-AF) 0.05 % cream No dose, route, or frequency recorded.   • cholecalciferol (VITAMIN D3) 1,000 Units, Daily   • cholestyramine (QUESTRAN) 4 g packet DISSOLVE 1 PACKET IN 8 OZ OF LIQUID AND TAKE BY MOUTH ONCE DAILY   • Cinnamon  500 MG TABS 1 tablet, Daily   • dapagliflozin 5 mg, Every other day   • fluorouracil (EFUDEX) 5 % cream Topical, 2 times daily, To the area of the left chest that was biopsied for a total of 4 weeks.   • fluticasone (FLONASE) 50 mcg/act nasal spray 2 sprays, Daily   • fluticasone-umeclidinium-vilanterol (Trelegy Ellipta) 100-62.5-25 mcg/actuation inhaler 1 puff, Inhalation, Daily, Rinse mouth after use.   • gabapentin (NEURONTIN) 300 mg, Oral, Daily   • glucose blood test strip Daily   • hydrOXYzine pamoate (VISTARIL) 25 mg capsule TAKE ONE CAPSULE BY MOUTH THREE TIMES DAILY AS NEEDED FOR ITCHING   • Insulin Glargine Solostar 16 Units, Daily at bedtime   • ipratropium (ATROVENT) 0.03 % nasal spray 2 sprays, Nasal, Every 12 hours   • Levemir FlexTouch 10 Units, Daily at bedtime   • metoprolol succinate (TOPROL-XL) 50 mg, Oral, Daily   • montelukast (SINGULAIR) 10 mg, Oral, Daily at bedtime   • Multiple Vitamins-Minerals (OCUVITE ADULT 50+ PO) 1 tablet, Daily   • NovoLOG FlexPen 4 Units, 2 times daily with meals   • ONE TOUCH ULTRA TEST test strip     • ONETOUCH DELICA LANCETS FINE MISC     • psyllium (METAMUCIL) 58.6 % powder 1 packet, Daily   • roflumilast (DALIRESP) 500 mcg, Oral, Daily   • sacubitril-valsartan (Entresto) 24-26 MG TABS 1 tablet, Oral, 2 times daily   • simvastatin (ZOCOR) 10 mg, Oral, Daily   • Ventolin  (90 Base) MCG/ACT inhaler 2 puffs, Inhalation, Every 6 hours PRN   No Known Allergies   Current Outpatient Medications on File Prior to Visit   Medication Sig Dispense Refill   • acetaminophen (TYLENOL) 325 mg tablet Take 2 tablets (650 mg total) by mouth every 6 (six) hours as needed for mild pain 30 tablet 0   • albuterol (2.5 mg/3 mL) 0.083 % nebulizer solution Take 3 mL (2.5 mg total) by nebulization every 4 (four) hours as needed for wheezing or shortness of breath 360 mL 8   • Ascorbic Acid (VITAMIN C) 1000 MG tablet Take 1 tablet by mouth daily     • BD PEN NEEDLE DON U/F 32G X 4 MM  MISC       • cholecalciferol (VITAMIN D3) 1,000 units tablet Take 1,000 Units by mouth daily     • Cinnamon 500 MG TABS Take 1 tablet by mouth daily       • Dapagliflozin Propanediol 5 MG TABS Take 5 mg by mouth every other day     • fluorouracil (EFUDEX) 5 % cream Apply topically 2 (two) times a day To the area of the left chest that was biopsied for a total of 4 weeks. 40 g 0   • fluticasone (FLONASE) 50 mcg/act nasal spray USE TWO SPRAYS IN EACH NOSTRIL DAILY 16 g 1   • fluticasone-umeclidinium-vilanterol (Trelegy Ellipta) 100-62.5-25 mcg/actuation inhaler Inhale 1 puff daily Rinse mouth after use. 60 blister 3   • gabapentin (NEURONTIN) 300 mg capsule TAKE ONE CAPSULE BY MOUTH ONCE DAILY 90 capsule 1   • glucose blood test strip Use daily     • hydrOXYzine pamoate (VISTARIL) 25 mg capsule TAKE ONE CAPSULE BY MOUTH THREE TIMES DAILY AS NEEDED FOR ITCHING 90 capsule 1   • Insulin Glargine Solostar 100 UNIT/ML SOPN Inject 16 Units under the skin daily at bedtime     • ipratropium (ATROVENT) 0.03 % nasal spray 2 sprays into each nostril every 12 (twelve) hours 30 mL 0   • metoprolol succinate (TOPROL-XL) 50 mg 24 hr tablet Take 1 tablet (50 mg total) by mouth daily 90 tablet 3   • montelukast (SINGULAIR) 10 mg tablet TAKE ONE TABLET BY MOUTH NIGHTLY AT BEDTIME 90 tablet 1   • Multiple Vitamins-Minerals (OCUVITE ADULT 50+ PO) Take 1 tablet by mouth daily     • NOVOLOG FLEXPEN 100 units/mL injection pen Inject 4 Units under the skin 2 (two) times a day with meals 4u in am,  2 units at lunch and 8u at dinner     • ONE TOUCH ULTRA TEST test strip       • ONETOUCH DELICA LANCETS FINE MISC       • psyllium (METAMUCIL) 58.6 % powder Take 1 packet by mouth daily     • roflumilast (DALIRESP) 500 mcg tablet TAKE ONE TABLET BY MOUTH ONCE DAILY 30 tablet 5   • sacubitril-valsartan (Entresto) 24-26 MG TABS Take 1 tablet by mouth 2 (two) times a day 180 tablet 1   • simvastatin (ZOCOR) 10 mg tablet TAKE ONE TABLET BY MOUTH ONCE  DAILY 90 tablet 0   • Ventolin  (90 Base) MCG/ACT inhaler INHALE TWO PUFFS BY MOUTH EVERY 6 HOURS AS NEEDED FOR WHEEZING 18 g 0   • [DISCONTINUED] cholestyramine (QUESTRAN) 4 g packet DISSOLVE 1 PACKET IN 8 OZ OF LIQUID AND TAKE BY MOUTH ONCE DAILY 30 each 4   • betamethasone, augmented, (DIPROLENE-AF) 0.05 % cream  (Patient not taking: Reported on 2024)     • Levemir FlexTouch 100 units/mL injection pen 10 Units daily at bedtime (Patient not taking: Reported on 2024)       No current facility-administered medications on file prior to visit.      Social History     Tobacco Use   • Smoking status: Former     Current packs/day: 0.00     Average packs/day: 1 pack/day for 11.0 years (11.0 ttl pk-yrs)     Types: Cigarettes     Start date: 1949     Quit date: 1960     Years since quittin.2     Passive exposure: Past   • Smokeless tobacco: Never   Vaping Use   • Vaping status: Never Used   Substance and Sexual Activity   • Alcohol use: Not Currently     Alcohol/week: 52.0 standard drinks of alcohol     Types: 50 Glasses of wine, 2 Standard drinks or equivalent per week   • Drug use: No   • Sexual activity: Not Currently     Partners: Female     Birth control/protection: Sponge     Current Outpatient Medications   Medication Sig Dispense Refill   • acetaminophen (TYLENOL) 325 mg tablet Take 2 tablets (650 mg total) by mouth every 6 (six) hours as needed for mild pain 30 tablet 0   • albuterol (2.5 mg/3 mL) 0.083 % nebulizer solution Take 3 mL (2.5 mg total) by nebulization every 4 (four) hours as needed for wheezing or shortness of breath 360 mL 8   • Ascorbic Acid (VITAMIN C) 1000 MG tablet Take 1 tablet by mouth daily     • BD PEN NEEDLE DON U/F 32G X 4 MM MISC       • cholecalciferol (VITAMIN D3) 1,000 units tablet Take 1,000 Units by mouth daily     • cholestyramine (QUESTRAN) 4 g packet DISSOLVE 1 PACKET IN 8 OZ OF LIQUID AND TAKE BY MOUTH ONCE DAILY 90 each 3   • Cinnamon 500 MG TABS  Take 1 tablet by mouth daily       • Dapagliflozin Propanediol 5 MG TABS Take 5 mg by mouth every other day     • fluorouracil (EFUDEX) 5 % cream Apply topically 2 (two) times a day To the area of the left chest that was biopsied for a total of 4 weeks. 40 g 0   • fluticasone (FLONASE) 50 mcg/act nasal spray USE TWO SPRAYS IN EACH NOSTRIL DAILY 16 g 1   • fluticasone-umeclidinium-vilanterol (Trelegy Ellipta) 100-62.5-25 mcg/actuation inhaler Inhale 1 puff daily Rinse mouth after use. 60 blister 3   • gabapentin (NEURONTIN) 300 mg capsule TAKE ONE CAPSULE BY MOUTH ONCE DAILY 90 capsule 1   • glucose blood test strip Use daily     • hydrOXYzine pamoate (VISTARIL) 25 mg capsule TAKE ONE CAPSULE BY MOUTH THREE TIMES DAILY AS NEEDED FOR ITCHING 90 capsule 1   • Insulin Glargine Solostar 100 UNIT/ML SOPN Inject 16 Units under the skin daily at bedtime     • ipratropium (ATROVENT) 0.03 % nasal spray 2 sprays into each nostril every 12 (twelve) hours 30 mL 0   • metoprolol succinate (TOPROL-XL) 50 mg 24 hr tablet Take 1 tablet (50 mg total) by mouth daily 90 tablet 3   • montelukast (SINGULAIR) 10 mg tablet TAKE ONE TABLET BY MOUTH NIGHTLY AT BEDTIME 90 tablet 1   • Multiple Vitamins-Minerals (OCUVITE ADULT 50+ PO) Take 1 tablet by mouth daily     • NOVOLOG FLEXPEN 100 units/mL injection pen Inject 4 Units under the skin 2 (two) times a day with meals 4u in am,  2 units at lunch and 8u at dinner     • ONE TOUCH ULTRA TEST test strip       • ONETOUCH DELICA LANCETS FINE MISC       • psyllium (METAMUCIL) 58.6 % powder Take 1 packet by mouth daily     • roflumilast (DALIRESP) 500 mcg tablet TAKE ONE TABLET BY MOUTH ONCE DAILY 30 tablet 5   • sacubitril-valsartan (Entresto) 24-26 MG TABS Take 1 tablet by mouth 2 (two) times a day 180 tablet 1   • simvastatin (ZOCOR) 10 mg tablet TAKE ONE TABLET BY MOUTH ONCE DAILY 90 tablet 0   • Ventolin  (90 Base) MCG/ACT inhaler INHALE TWO PUFFS BY MOUTH EVERY 6 HOURS AS NEEDED FOR  WHEEZING 18 g 0   • betamethasone, augmented, (DIPROLENE-AF) 0.05 % cream  (Patient not taking: Reported on 2/22/2024)     • Levemir FlexTouch 100 units/mL injection pen 10 Units daily at bedtime (Patient not taking: Reported on 6/17/2024)       No current facility-administered medications for this visit.     Objective  /62 (BP Location: Left arm, Patient Position: Sitting, Cuff Size: Standard)   Pulse 71   Ht 6' (1.829 m)   Wt 73.5 kg (162 lb)   SpO2 98%   BMI 21.97 kg/m²     Physical Exam  Constitutional:       General: He is not in acute distress.     Appearance: Normal appearance. He is not ill-appearing.   HENT:      Head: Normocephalic and atraumatic.   Eyes:      General: No scleral icterus.     Conjunctiva/sclera: Conjunctivae normal.   Cardiovascular:      Rate and Rhythm: Normal rate and regular rhythm.   Pulmonary:      Effort: Pulmonary effort is normal. No respiratory distress.      Breath sounds: Normal breath sounds.   Abdominal:      General: Bowel sounds are normal. There is no distension.      Palpations: Abdomen is soft.      Tenderness: There is no abdominal tenderness. There is no guarding.   Skin:     General: Skin is warm and dry.   Neurological:      Mental Status: He is alert and oriented to person, place, and time.   Psychiatric:         Mood and Affect: Mood normal.         Behavior: Behavior normal.            Lab Results: I personally reviewed relevant lab results. CBC/BMP: No new results in last 24 hours. , LFTs: No new results in last 24 hours. , PTT/INR:No new results in last 24 hours.       Results for orders placed during the hospital encounter of 03/24/22    Colonoscopy    Impression  Normal colonoscopy to the terminal ileum  Mild sigmoid diverticulosis next mild internal hemorrhoids  Random biopsies taken throughout the colon    RECOMMENDATION:  Await pathology results .  Discharge home  Resume regular diet  Continue daily fiber supplement  Follow up biopsy results, if  biopsies are negative for microscopic colitis, recommended trial of cholestyramine powder  Call with any abdominal pain, bleeding, fevers      MD Gustavo Coello PA-C  [facility] - Gastro[facility]      Gustavo Hess PA-C  3/7/2025  2:09 PM  Sign when Signing Visit  Name: Patricio Morris      : 3/26/1933      MRN: [id number]  Encounter Provider: Gustavo Hess PA-C  Encounter Date: 3/7/2025   Encounter department: [facility]  :  Assessment & Plan  Irritable bowel syndrome with diarrhea  -Stable on cholestyramine 4 g and Metamucil pills once daily       Abdominal bloating  -Recommended simethicone       Diarrhea, unspecified type    Orders:  •  cholestyramine (QUESTRAN) 4 g packet; DISSOLVE 1 PACKET IN 8 OZ OF LIQUID AND TAKE BY MOUTH ONCE DAILY        History of Present Illness  Patricio Morris is a 89 y.o. male  with past medical history of diabetes type 2 COPD, hypertension, chronic kidney disease stage III, dilated cardiomyopathy, arthritis, myelodysplastic syndrome, hyperlipidemia and irritable bowel who presents today for follow up for IBS diarrhea.  Stable currently on Questran 4 daily.   He also takes metamucil daily.     Interval History:  He reports     Patient denies any nausea, vomiting, abdominal pain, dysphagia, unexpected weight loss, diarrhea, constipation, blood in stool, or black tarry stools.      Endoscopy History:  EGD -none per patient  Colonoscopy - 3/24/22 adequate bowel prep and diverticulosis internal hemorrhoids noted.  Random colon biopsies negative for microscopic colitis    Medication Refill  Pertinent negatives include no abdominal pain, arthralgias, chest pain, chills, coughing, diaphoresis, fatigue, headaches, joint swelling, myalgias, nausea, numbness, rash, sore throat, vomiting or weakness.   Constipation  Pertinent negatives include no abdominal pain, back pain,  diarrhea, difficulty urinating, nausea, rectal pain or vomiting.     History obtained from: patient  Review of Systems   Constitutional:  Negative for activity change, appetite change, chills, diaphoresis, fatigue and unexpected weight change.   HENT:  Negative for mouth sores, rhinorrhea, sore throat and trouble swallowing.    Eyes:  Negative for discharge, redness and visual disturbance.   Respiratory:  Negative for apnea, cough, choking, chest tightness and shortness of breath.    Cardiovascular:  Negative for chest pain, palpitations and leg swelling.   Gastrointestinal:  Positive for constipation. Negative for abdominal distention, abdominal pain, anal bleeding, blood in stool, diarrhea, nausea, rectal pain and vomiting.   Genitourinary:  Negative for difficulty urinating, dysuria, frequency and hematuria.   Musculoskeletal:  Negative for arthralgias, back pain, gait problem, joint swelling and myalgias.   Skin:  Negative for color change, pallor and rash.   Allergic/Immunologic: Negative for environmental allergies and food allergies.   Neurological:  Negative for dizziness, tremors, weakness, light-headedness, numbness and headaches.   Psychiatric/Behavioral:  Negative for agitation, behavioral problems, confusion and sleep disturbance. The patient is not nervous/anxious.     A complete review of systems is negative other than that noted above in the HPI.    Past Medical History  Past Medical History:   Diagnosis Date   • Allergic april 1989   • Anemia    • COPD (chronic obstructive pulmonary disease) (HCC)    • Diabetes mellitus (HCC)     Type 2   • Diabetic nephropathy (HCC)    • Essential hypertension    • Heart failure (HCC)    • Hyperlipidemia    • Hypertension    • Impetigo    • Left inguinal hernia    • Lung nodule    • Malignant melanoma of skin (HCC)      Past Surgical History:   Procedure Laterality Date   • APPENDECTOMY     • BLADDER SURGERY     • CATARACT EXTRACTION, BILATERAL     •  CHOLECYSTECTOMY     • CHOLECYSTECTOMY     • COLONOSCOPY  2014    Fiberoptic   • HERNIA REPAIR     • KNEE ARTHROSCOPY Bilateral     Therapeutic   • [place] CYSTO INSERTION TRANSPROSTATIC IMPLANT SINGLE N/A 03/22/2019    Procedure: CYSTOSCOPY WITH INSERTION UROLIFT;  Surgeon: Bird Clinton MD;  Location: [facility];  Service: Urology     Family History   Problem Relation Age of Onset   • Diabetes Mother    • Heart attack Neg Hx    • Stroke Neg Hx    • Aneurysm Neg Hx    • Clotting disorder Neg Hx    • Arrhythmia Neg Hx    • Hypertension Neg Hx    • Hyperlipidemia Neg Hx         pt unsure       reports that he quit smoking about 65 years ago. His smoking use included cigarettes. He started smoking about 76 years ago. He has a 11 pack-year smoking history. He has been exposed to tobacco smoke. He has never used smokeless tobacco. He reports that he does not currently use alcohol after a past usage of about 52.0 standard drinks of alcohol per week. He reports that he does not use drugs.  Current Outpatient Medications   Medication Instructions   • acetaminophen (TYLENOL) 650 mg, Oral, Every 6 hours PRN   • albuterol 2.5 mg, Nebulization, Every 4 hours PRN   • Ascorbic Acid (VITAMIN C) 1000 MG tablet 1 tablet, Daily   • BD PEN NEEDLE DON U/F 32G X 4 MM MISC     • betamethasone, augmented, (DIPROLENE-AF) 0.05 % cream No dose, route, or frequency recorded.   • cholecalciferol (VITAMIN D3) 1,000 Units, Daily   • cholestyramine (QUESTRAN) 4 g packet DISSOLVE 1 PACKET IN 8 OZ OF LIQUID AND TAKE BY MOUTH ONCE DAILY   • Cinnamon 500 MG TABS 1 tablet, Daily   • dapagliflozin 5 mg, Every other day   • fluorouracil (EFUDEX) 5 % cream Topical, 2 times daily, To the area of the left chest that was biopsied for a total of 4 weeks.   • fluticasone (FLONASE) 50 mcg/act nasal spray 2 sprays, Daily   • fluticasone-umeclidinium-vilanterol (Trelegy Ellipta) 100-62.5-25 mcg/actuation inhaler 1 puff, Inhalation, Daily, Rinse mouth after  use.   • gabapentin (NEURONTIN) 300 mg, Oral, Daily   • glucose blood test strip Daily   • hydrOXYzine pamoate (VISTARIL) 25 mg capsule TAKE ONE CAPSULE BY MOUTH THREE TIMES DAILY AS NEEDED FOR ITCHING   • Insulin Glargine Solostar 16 Units, Daily at bedtime   • ipratropium (ATROVENT) 0.03 % nasal spray 2 sprays, Nasal, Every 12 hours   • Levemir FlexTouch 10 Units, Daily at bedtime   • metoprolol succinate (TOPROL-XL) 50 mg, Oral, Daily   • montelukast (SINGULAIR) 10 mg, Oral, Daily at bedtime   • Multiple Vitamins-Minerals (OCUVITE ADULT 50+ PO) 1 tablet, Daily   • NovoLOG FlexPen 4 Units, 2 times daily with meals   • ONE TOUCH ULTRA TEST test strip     • ONETOUCH DELICA LANCETS FINE MISC     • psyllium (METAMUCIL) 58.6 % powder 1 packet, Daily   • roflumilast (DALIRESP) 500 mcg, Oral, Daily   • sacubitril-valsartan (Entresto) 24-26 MG TABS 1 tablet, Oral, 2 times daily   • simvastatin (ZOCOR) 10 mg, Oral, Daily   • Ventolin  (90 Base) MCG/ACT inhaler 2 puffs, Inhalation, Every 6 hours PRN   No Known Allergies  Current Outpatient Medications on File Prior to Visit   Medication Sig Dispense Refill   • acetaminophen (TYLENOL) 325 mg tablet Take 2 tablets (650 mg total) by mouth every 6 (six) hours as needed for mild pain 30 tablet 0   • albuterol (2.5 mg/3 mL) 0.083 % nebulizer solution Take 3 mL (2.5 mg total) by nebulization every 4 (four) hours as needed for wheezing or shortness of breath 360 mL 8   • Ascorbic Acid (VITAMIN C) 1000 MG tablet Take 1 tablet by mouth daily     • BD PEN NEEDLE DON U/F 32G X 4 MM MISC       • cholecalciferol (VITAMIN D3) 1,000 units tablet Take 1,000 Units by mouth daily     • Cinnamon 500 MG TABS Take 1 tablet by mouth daily       • Dapagliflozin Propanediol 5 MG TABS Take 5 mg by mouth every other day     • fluorouracil (EFUDEX) 5 % cream Apply topically 2 (two) times a day To the area of the left chest that was biopsied for a total of 4 weeks. 40 g 0   • fluticasone  (FLONASE) 50 mcg/act nasal spray USE TWO SPRAYS IN EACH NOSTRIL DAILY 16 g 1   • fluticasone-umeclidinium-vilanterol (Trelegy Ellipta) 100-62.5-25 mcg/actuation inhaler Inhale 1 puff daily Rinse mouth after use. 60 blister 3   • gabapentin (NEURONTIN) 300 mg capsule TAKE ONE CAPSULE BY MOUTH ONCE DAILY 90 capsule 1   • glucose blood test strip Use daily     • hydrOXYzine pamoate (VISTARIL) 25 mg capsule TAKE ONE CAPSULE BY MOUTH THREE TIMES DAILY AS NEEDED FOR ITCHING 90 capsule 1   • Insulin Glargine Solostar 100 UNIT/ML SOPN Inject 16 Units under the skin daily at bedtime     • ipratropium (ATROVENT) 0.03 % nasal spray 2 sprays into each nostril every 12 (twelve) hours 30 mL 0   • metoprolol succinate (TOPROL-XL) 50 mg 24 hr tablet Take 1 tablet (50 mg total) by mouth daily 90 tablet 3   • montelukast (SINGULAIR) 10 mg tablet TAKE ONE TABLET BY MOUTH NIGHTLY AT BEDTIME 90 tablet 1   • Multiple Vitamins-Minerals (OCUVITE ADULT 50+ PO) Take 1 tablet by mouth daily     • NOVOLOG FLEXPEN 100 units/mL injection pen Inject 4 Units under the skin 2 (two) times a day with meals 4u in am,  2 units at lunch and 8u at dinner     • ONE TOUCH ULTRA TEST test strip       • ONETOUCH DELICA LANCETS FINE MISC       • psyllium (METAMUCIL) 58.6 % powder Take 1 packet by mouth daily     • roflumilast (DALIRESP) 500 mcg tablet TAKE ONE TABLET BY MOUTH ONCE DAILY 30 tablet 5   • sacubitril-valsartan (Entresto) 24-26 MG TABS Take 1 tablet by mouth 2 (two) times a day 180 tablet 1   • simvastatin (ZOCOR) 10 mg tablet TAKE ONE TABLET BY MOUTH ONCE DAILY 90 tablet 0   • Ventolin  (90 Base) MCG/ACT inhaler INHALE TWO PUFFS BY MOUTH EVERY 6 HOURS AS NEEDED FOR WHEEZING 18 g 0   • [DISCONTINUED] cholestyramine (QUESTRAN) 4 g packet DISSOLVE 1 PACKET IN 8 OZ OF LIQUID AND TAKE BY MOUTH ONCE DAILY 30 each 4   • betamethasone, augmented, (DIPROLENE-AF) 0.05 % cream  (Patient not taking: Reported on 2/22/2024)     • Levemir FlexTouch 100  units/mL injection pen 10 Units daily at bedtime (Patient not taking: Reported on 2024)       No current facility-administered medications on file prior to visit.      Social History     Tobacco Use   • Smoking status: Former     Current packs/day: 0.00     Average packs/day: 1 pack/day for 11.0 years (11.0 ttl pk-yrs)     Types: Cigarettes     Start date: 1949     Quit date: 1960     Years since quittin.2     Passive exposure: Past   • Smokeless tobacco: Never   Vaping Use   • Vaping status: Never Used   Substance and Sexual Activity   • Alcohol use: Not Currently     Alcohol/week: 52.0 standard drinks of alcohol     Types: 50 Glasses of wine, 2 Standard drinks or equivalent per week   • Drug use: No   • Sexual activity: Not Currently     Partners: Female     Birth control/protection: Sponge     Current Outpatient Medications   Medication Sig Dispense Refill   • acetaminophen (TYLENOL) 325 mg tablet Take 2 tablets (650 mg total) by mouth every 6 (six) hours as needed for mild pain 30 tablet 0   • albuterol (2.5 mg/3 mL) 0.083 % nebulizer solution Take 3 mL (2.5 mg total) by nebulization every 4 (four) hours as needed for wheezing or shortness of breath 360 mL 8   • Ascorbic Acid (VITAMIN C) 1000 MG tablet Take 1 tablet by mouth daily     • BD PEN NEEDLE DON U/F 32G X 4 MM MISC       • cholecalciferol (VITAMIN D3) 1,000 units tablet Take 1,000 Units by mouth daily     • cholestyramine (QUESTRAN) 4 g packet DISSOLVE 1 PACKET IN 8 OZ OF LIQUID AND TAKE BY MOUTH ONCE DAILY 90 each 3   • Cinnamon 500 MG TABS Take 1 tablet by mouth daily       • Dapagliflozin Propanediol 5 MG TABS Take 5 mg by mouth every other day     • fluorouracil (EFUDEX) 5 % cream Apply topically 2 (two) times a day To the area of the left chest that was biopsied for a total of 4 weeks. 40 g 0   • fluticasone (FLONASE) 50 mcg/act nasal spray USE TWO SPRAYS IN EACH NOSTRIL DAILY 16 g 1   • fluticasone-umeclidinium-vilanterol  (Trelegy Ellipta) 100-62.5-25 mcg/actuation inhaler Inhale 1 puff daily Rinse mouth after use. 60 blister 3   • gabapentin (NEURONTIN) 300 mg capsule TAKE ONE CAPSULE BY MOUTH ONCE DAILY 90 capsule 1   • glucose blood test strip Use daily     • hydrOXYzine pamoate (VISTARIL) 25 mg capsule TAKE ONE CAPSULE BY MOUTH THREE TIMES DAILY AS NEEDED FOR ITCHING 90 capsule 1   • Insulin Glargine Solostar 100 UNIT/ML SOPN Inject 16 Units under the skin daily at bedtime     • ipratropium (ATROVENT) 0.03 % nasal spray 2 sprays into each nostril every 12 (twelve) hours 30 mL 0   • metoprolol succinate (TOPROL-XL) 50 mg 24 hr tablet Take 1 tablet (50 mg total) by mouth daily 90 tablet 3   • montelukast (SINGULAIR) 10 mg tablet TAKE ONE TABLET BY MOUTH NIGHTLY AT BEDTIME 90 tablet 1   • Multiple Vitamins-Minerals (OCUVITE ADULT 50+ PO) Take 1 tablet by mouth daily     • NOVOLOG FLEXPEN 100 units/mL injection pen Inject 4 Units under the skin 2 (two) times a day with meals 4u in am,  2 units at lunch and 8u at dinner     • ONE TOUCH ULTRA TEST test strip       • ONETOUCH DELICA LANCETS FINE MISC       • psyllium (METAMUCIL) 58.6 % powder Take 1 packet by mouth daily     • roflumilast (DALIRESP) 500 mcg tablet TAKE ONE TABLET BY MOUTH ONCE DAILY 30 tablet 5   • sacubitril-valsartan (Entresto) 24-26 MG TABS Take 1 tablet by mouth 2 (two) times a day 180 tablet 1   • simvastatin (ZOCOR) 10 mg tablet TAKE ONE TABLET BY MOUTH ONCE DAILY 90 tablet 0   • Ventolin  (90 Base) MCG/ACT inhaler INHALE TWO PUFFS BY MOUTH EVERY 6 HOURS AS NEEDED FOR WHEEZING 18 g 0   • betamethasone, augmented, (DIPROLENE-AF) 0.05 % cream  (Patient not taking: Reported on 2/22/2024)     • Levemir FlexTouch 100 units/mL injection pen 10 Units daily at bedtime (Patient not taking: Reported on 6/17/2024)       No current facility-administered medications for this visit.     Objective  /62 (BP Location: Left arm, Patient Position: Sitting, Cuff Size:  Standard)   Pulse 71   Ht 6' (1.829 m)   Wt 73.5 kg (162 lb)   SpO2 98%   BMI 21.97 kg/m²     Physical Exam  Constitutional:       General: He is not in acute distress.     Appearance: Normal appearance. He is not ill-appearing.   HENT:      Head: Normocephalic and atraumatic.   Eyes:      General: No scleral icterus.     Conjunctiva/sclera: Conjunctivae normal.   Cardiovascular:      Rate and Rhythm: Normal rate and regular rhythm.   Pulmonary:      Effort: Pulmonary effort is normal. No respiratory distress.      Breath sounds: Normal breath sounds.   Abdominal:      General: Bowel sounds are normal. There is no distension.      Palpations: Abdomen is soft.      Tenderness: There is no abdominal tenderness. There is no guarding.   Skin:     General: Skin is warm and dry.   Neurological:      Mental Status: He is alert and oriented to person, place, and time.   Psychiatric:         Mood and Affect: Mood normal.         Behavior: Behavior normal.            Lab Results: I personally reviewed relevant lab results. CBC/BMP: No new results in last 24 hours. , LFTs: No new results in last 24 hours. , PTT/INR:No new results in last 24 hours.       Results for orders placed during the hospital encounter of 03/24/22    Colonoscopy    Impression  Normal colonoscopy to the terminal ileum  Mild sigmoid diverticulosis next mild internal hemorrhoids  Random biopsies taken throughout the colon    RECOMMENDATION:  Await pathology results .  Discharge home  Resume regular diet  Continue daily fiber supplement  Follow up biopsy results, if biopsies are negative for microscopic colitis, recommended trial of cholestyramine powder  Call with any abdominal pain, bleeding, fevers      MD Gustavo Coello PA-C  [facility] - Gastroentrology

## 2025-03-07 NOTE — PROGRESS NOTES
Name: Patricio Morris      : 3/26/1933      MRN: [id number]  Encounter Provider: Gustavo Hess PA-C  Encounter Date: 3/7/2025   Encounter department: [facility]  :  Assessment & Plan  Irritable bowel syndrome with diarrhea  -Stable on cholestyramine 4 g and Metamucil pills once daily       Abdominal bloating  -Recommended simethicone       Diarrhea, unspecified type    Orders:  •  cholestyramine (QUESTRAN) 4 g packet; DISSOLVE 1 PACKET IN 8 OZ OF LIQUID AND TAKE BY MOUTH ONCE DAILY        History of Present Illness   Patricio Morris is a 89 y.o. male  with past medical history of diabetes type 2 COPD, hypertension, chronic kidney disease stage III, dilated cardiomyopathy, arthritis, myelodysplastic syndrome, hyperlipidemia and irritable bowel who presents today for follow up for IBS diarrhea.  Stable currently on Questran 4 daily.   He also takes metamucil daily.     Interval History:  He reports occasional constipation but it is rare.  He denies any abdominal pain, nausea vomiting, black or bloody stool.  He feels well-controlled at this time.     Patient denies any nausea, vomiting, abdominal pain, dysphagia, unexpected weight loss, diarrhea, constipation, blood in stool, or black tarry stools.      Endoscopy History:  EGD -none per patient  Colonoscopy - 3/24/22 adequate bowel prep and diverticulosis internal hemorrhoids noted.  Random colon biopsies negative for microscopic colitis    Medication Refill  Pertinent negatives include no abdominal pain, arthralgias, chest pain, chills, coughing, diaphoresis, fatigue, headaches, joint swelling, myalgias, nausea, numbness, rash, sore throat, vomiting or weakness.   Constipation  Pertinent negatives include no abdominal pain, back pain, diarrhea, difficulty urinating, nausea, rectal pain or vomiting.     History obtained from: patient  Review of Systems   Constitutional:  Negative for activity change, appetite  change, chills, diaphoresis, fatigue and unexpected weight change.   HENT:  Negative for mouth sores, rhinorrhea, sore throat and trouble swallowing.    Eyes:  Negative for discharge, redness and visual disturbance.   Respiratory:  Negative for apnea, cough, choking, chest tightness and shortness of breath.    Cardiovascular:  Negative for chest pain, palpitations and leg swelling.   Gastrointestinal:  Positive for constipation (Occasional). Negative for abdominal distention, abdominal pain, anal bleeding, blood in stool, diarrhea, nausea, rectal pain and vomiting.   Genitourinary:  Negative for difficulty urinating, dysuria, frequency and hematuria.   Musculoskeletal:  Negative for arthralgias, back pain, gait problem, joint swelling and myalgias.   Skin:  Negative for color change, pallor and rash.   Allergic/Immunologic: Negative for environmental allergies and food allergies.   Neurological:  Negative for dizziness, tremors, weakness, light-headedness, numbness and headaches.   Psychiatric/Behavioral:  Negative for agitation, behavioral problems, confusion and sleep disturbance. The patient is not nervous/anxious.     A complete review of systems is negative other than that noted above in the HPI.    Past Medical History   Past Medical History:   Diagnosis Date   • Allergic april 1989   • Anemia    • COPD (chronic obstructive pulmonary disease) (HCC)    • Diabetes mellitus (HCC)     Type 2   • Diabetic nephropathy (HCC)    • Essential hypertension    • Heart failure (HCC)    • Hyperlipidemia    • Hypertension    • Impetigo    • Left inguinal hernia    • Lung nodule    • Malignant melanoma of skin (HCC)      Past Surgical History:   Procedure Laterality Date   • APPENDECTOMY     • BLADDER SURGERY     • CATARACT EXTRACTION, BILATERAL     • CHOLECYSTECTOMY     • CHOLECYSTECTOMY     • COLONOSCOPY  2014    Fiberoptic   • HERNIA REPAIR     • KNEE ARTHROSCOPY Bilateral     Therapeutic   • NV CYSTO INSERTION  TRANSPROSTATIC IMPLANT SINGLE N/A 03/22/2019    Procedure: CYSTOSCOPY WITH INSERTION UROLIFT;  Surgeon: Bird Clinton MD;  Location: [facility];  Service: Urology     Family History   Problem Relation Age of Onset   • Diabetes Mother    • Heart attack Neg Hx    • Stroke Neg Hx    • Aneurysm Neg Hx    • Clotting disorder Neg Hx    • Arrhythmia Neg Hx    • Hypertension Neg Hx    • Hyperlipidemia Neg Hx         pt unsure       reports that he quit smoking about 65 years ago. His smoking use included cigarettes. He started smoking about 76 years ago. He has a 11 pack-year smoking history. He has been exposed to tobacco smoke. He has never used smokeless tobacco. He reports that he does not currently use alcohol after a past usage of about 52.0 standard drinks of alcohol per week. He reports that he does not use drugs.  Current Outpatient Medications   Medication Instructions   • acetaminophen (TYLENOL) 650 mg, Oral, Every 6 hours PRN   • albuterol 2.5 mg, Nebulization, Every 4 hours PRN   • Ascorbic Acid (VITAMIN C) 1000 MG tablet 1 tablet, Daily   • BD PEN NEEDLE DON U/F 32G X 4 MM MISC     • betamethasone, augmented, (DIPROLENE-AF) 0.05 % cream No dose, route, or frequency recorded.   • cholecalciferol (VITAMIN D3) 1,000 Units, Daily   • cholestyramine (QUESTRAN) 4 g packet DISSOLVE 1 PACKET IN 8 OZ OF LIQUID AND TAKE BY MOUTH ONCE DAILY   • Cinnamon 500 MG TABS 1 tablet, Daily   • dapagliflozin 5 mg, Every other day   • fluorouracil (EFUDEX) 5 % cream Topical, 2 times daily, To the area of the left chest that was biopsied for a total of 4 weeks.   • fluticasone (FLONASE) 50 mcg/act nasal spray 2 sprays, Daily   • fluticasone-umeclidinium-vilanterol (Trelegy Ellipta) 100-62.5-25 mcg/actuation inhaler 1 puff, Inhalation, Daily, Rinse mouth after use.   • gabapentin (NEURONTIN) 300 mg, Oral, Daily   • glucose blood test strip Daily   • hydrOXYzine pamoate (VISTARIL) 25 mg capsule TAKE ONE CAPSULE BY MOUTH  THREE TIMES DAILY AS NEEDED FOR ITCHING   • Insulin Glargine Solostar 16 Units, Daily at bedtime   • ipratropium (ATROVENT) 0.03 % nasal spray 2 sprays, Nasal, Every 12 hours   • Levemir FlexTouch 10 Units, Daily at bedtime   • metoprolol succinate (TOPROL-XL) 50 mg, Oral, Daily   • montelukast (SINGULAIR) 10 mg, Oral, Daily at bedtime   • Multiple Vitamins-Minerals (OCUVITE ADULT 50+ PO) 1 tablet, Daily   • NovoLOG FlexPen 4 Units, 2 times daily with meals   • ONE TOUCH ULTRA TEST test strip     • ONETOUCH DELICA LANCETS FINE MISC     • psyllium (METAMUCIL) 58.6 % powder 1 packet, Daily   • roflumilast (DALIRESP) 500 mcg, Oral, Daily   • sacubitril-valsartan (Entresto) 24-26 MG TABS 1 tablet, Oral, 2 times daily   • simvastatin (ZOCOR) 10 mg, Oral, Daily   • Ventolin  (90 Base) MCG/ACT inhaler 2 puffs, Inhalation, Every 6 hours PRN   No Known Allergies   Current Outpatient Medications on File Prior to Visit   Medication Sig Dispense Refill   • acetaminophen (TYLENOL) 325 mg tablet Take 2 tablets (650 mg total) by mouth every 6 (six) hours as needed for mild pain 30 tablet 0   • albuterol (2.5 mg/3 mL) 0.083 % nebulizer solution Take 3 mL (2.5 mg total) by nebulization every 4 (four) hours as needed for wheezing or shortness of breath 360 mL 8   • Ascorbic Acid (VITAMIN C) 1000 MG tablet Take 1 tablet by mouth daily     • BD PEN NEEDLE DON U/F 32G X 4 MM MISC       • cholecalciferol (VITAMIN D3) 1,000 units tablet Take 1,000 Units by mouth daily     • Cinnamon 500 MG TABS Take 1 tablet by mouth daily       • Dapagliflozin Propanediol 5 MG TABS Take 5 mg by mouth every other day     • fluorouracil (EFUDEX) 5 % cream Apply topically 2 (two) times a day To the area of the left chest that was biopsied for a total of 4 weeks. 40 g 0   • fluticasone (FLONASE) 50 mcg/act nasal spray USE TWO SPRAYS IN EACH NOSTRIL DAILY 16 g 1   • fluticasone-umeclidinium-vilanterol (Trelegy Ellipta) 100-62.5-25 mcg/actuation  inhaler Inhale 1 puff daily Rinse mouth after use. 60 blister 3   • gabapentin (NEURONTIN) 300 mg capsule TAKE ONE CAPSULE BY MOUTH ONCE DAILY 90 capsule 1   • glucose blood test strip Use daily     • hydrOXYzine pamoate (VISTARIL) 25 mg capsule TAKE ONE CAPSULE BY MOUTH THREE TIMES DAILY AS NEEDED FOR ITCHING 90 capsule 1   • Insulin Glargine Solostar 100 UNIT/ML SOPN Inject 16 Units under the skin daily at bedtime     • ipratropium (ATROVENT) 0.03 % nasal spray 2 sprays into each nostril every 12 (twelve) hours 30 mL 0   • metoprolol succinate (TOPROL-XL) 50 mg 24 hr tablet Take 1 tablet (50 mg total) by mouth daily 90 tablet 3   • montelukast (SINGULAIR) 10 mg tablet TAKE ONE TABLET BY MOUTH NIGHTLY AT BEDTIME 90 tablet 1   • Multiple Vitamins-Minerals (OCUVITE ADULT 50+ PO) Take 1 tablet by mouth daily     • NOVOLOG FLEXPEN 100 units/mL injection pen Inject 4 Units under the skin 2 (two) times a day with meals 4u in am,  2 units at lunch and 8u at dinner     • ONE TOUCH ULTRA TEST test strip       • ONETOUCH DELICA LANCETS FINE MISC       • psyllium (METAMUCIL) 58.6 % powder Take 1 packet by mouth daily     • roflumilast (DALIRESP) 500 mcg tablet TAKE ONE TABLET BY MOUTH ONCE DAILY 30 tablet 5   • sacubitril-valsartan (Entresto) 24-26 MG TABS Take 1 tablet by mouth 2 (two) times a day 180 tablet 1   • simvastatin (ZOCOR) 10 mg tablet TAKE ONE TABLET BY MOUTH ONCE DAILY 90 tablet 0   • Ventolin  (90 Base) MCG/ACT inhaler INHALE TWO PUFFS BY MOUTH EVERY 6 HOURS AS NEEDED FOR WHEEZING 18 g 0   • [DISCONTINUED] cholestyramine (QUESTRAN) 4 g packet DISSOLVE 1 PACKET IN 8 OZ OF LIQUID AND TAKE BY MOUTH ONCE DAILY 30 each 4   • betamethasone, augmented, (DIPROLENE-AF) 0.05 % cream  (Patient not taking: Reported on 2/22/2024)     • Levemir FlexTouch 100 units/mL injection pen 10 Units daily at bedtime (Patient not taking: Reported on 6/17/2024)       No current facility-administered medications on file prior to  visit.      Social History     Tobacco Use   • Smoking status: Former     Current packs/day: 0.00     Average packs/day: 1 pack/day for 11.0 years (11.0 ttl pk-yrs)     Types: Cigarettes     Start date: 1949     Quit date: 1960     Years since quittin.2     Passive exposure: Past   • Smokeless tobacco: Never   Vaping Use   • Vaping status: Never Used   Substance and Sexual Activity   • Alcohol use: Not Currently     Alcohol/week: 52.0 standard drinks of alcohol     Types: 50 Glasses of wine, 2 Standard drinks or equivalent per week   • Drug use: No   • Sexual activity: Not Currently     Partners: Female     Birth control/protection: Sponge     Current Outpatient Medications   Medication Sig Dispense Refill   • acetaminophen (TYLENOL) 325 mg tablet Take 2 tablets (650 mg total) by mouth every 6 (six) hours as needed for mild pain 30 tablet 0   • albuterol (2.5 mg/3 mL) 0.083 % nebulizer solution Take 3 mL (2.5 mg total) by nebulization every 4 (four) hours as needed for wheezing or shortness of breath 360 mL 8   • Ascorbic Acid (VITAMIN C) 1000 MG tablet Take 1 tablet by mouth daily     • BD PEN NEEDLE DON U/F 32G X 4 MM MISC       • cholecalciferol (VITAMIN D3) 1,000 units tablet Take 1,000 Units by mouth daily     • cholestyramine (QUESTRAN) 4 g packet DISSOLVE 1 PACKET IN 8 OZ OF LIQUID AND TAKE BY MOUTH ONCE DAILY 90 each 3   • Cinnamon 500 MG TABS Take 1 tablet by mouth daily       • Dapagliflozin Propanediol 5 MG TABS Take 5 mg by mouth every other day     • fluorouracil (EFUDEX) 5 % cream Apply topically 2 (two) times a day To the area of the left chest that was biopsied for a total of 4 weeks. 40 g 0   • fluticasone (FLONASE) 50 mcg/act nasal spray USE TWO SPRAYS IN EACH NOSTRIL DAILY 16 g 1   • fluticasone-umeclidinium-vilanterol (Trelegy Ellipta) 100-62.5-25 mcg/actuation inhaler Inhale 1 puff daily Rinse mouth after use. 60 blister 3   • gabapentin (NEURONTIN) 300 mg capsule TAKE ONE CAPSULE  BY MOUTH ONCE DAILY 90 capsule 1   • glucose blood test strip Use daily     • hydrOXYzine pamoate (VISTARIL) 25 mg capsule TAKE ONE CAPSULE BY MOUTH THREE TIMES DAILY AS NEEDED FOR ITCHING 90 capsule 1   • Insulin Glargine Solostar 100 UNIT/ML SOPN Inject 16 Units under the skin daily at bedtime     • ipratropium (ATROVENT) 0.03 % nasal spray 2 sprays into each nostril every 12 (twelve) hours 30 mL 0   • metoprolol succinate (TOPROL-XL) 50 mg 24 hr tablet Take 1 tablet (50 mg total) by mouth daily 90 tablet 3   • montelukast (SINGULAIR) 10 mg tablet TAKE ONE TABLET BY MOUTH NIGHTLY AT BEDTIME 90 tablet 1   • Multiple Vitamins-Minerals (OCUVITE ADULT 50+ PO) Take 1 tablet by mouth daily     • NOVOLOG FLEXPEN 100 units/mL injection pen Inject 4 Units under the skin 2 (two) times a day with meals 4u in am,  2 units at lunch and 8u at dinner     • ONE TOUCH ULTRA TEST test strip       • ONETOUCH DELICA LANCETS FINE MISC       • psyllium (METAMUCIL) 58.6 % powder Take 1 packet by mouth daily     • roflumilast (DALIRESP) 500 mcg tablet TAKE ONE TABLET BY MOUTH ONCE DAILY 30 tablet 5   • sacubitril-valsartan (Entresto) 24-26 MG TABS Take 1 tablet by mouth 2 (two) times a day 180 tablet 1   • simvastatin (ZOCOR) 10 mg tablet TAKE ONE TABLET BY MOUTH ONCE DAILY 90 tablet 0   • Ventolin  (90 Base) MCG/ACT inhaler INHALE TWO PUFFS BY MOUTH EVERY 6 HOURS AS NEEDED FOR WHEEZING 18 g 0   • betamethasone, augmented, (DIPROLENE-AF) 0.05 % cream  (Patient not taking: Reported on 2/22/2024)     • Levemir FlexTouch 100 units/mL injection pen 10 Units daily at bedtime (Patient not taking: Reported on 6/17/2024)       No current facility-administered medications for this visit.     Objective   /62 (BP Location: Left arm, Patient Position: Sitting, Cuff Size: Standard)   Pulse 71   Ht 6' (1.829 m)   Wt 73.5 kg (162 lb)   SpO2 98%   BMI 21.97 kg/m²     Physical Exam  Constitutional:       General: He is not in acute  distress.     Appearance: Normal appearance. He is not ill-appearing.   HENT:      Head: Normocephalic and atraumatic.   Eyes:      General: No scleral icterus.     Conjunctiva/sclera: Conjunctivae normal.   Cardiovascular:      Rate and Rhythm: Normal rate and regular rhythm.   Pulmonary:      Effort: Pulmonary effort is normal. No respiratory distress.      Breath sounds: Normal breath sounds.   Abdominal:      General: Bowel sounds are normal. There is no distension.      Palpations: Abdomen is soft.      Tenderness: There is no abdominal tenderness. There is no guarding.   Skin:     General: Skin is warm and dry.   Neurological:      Mental Status: He is alert and oriented to person, place, and time.   Psychiatric:         Mood and Affect: Mood normal.         Behavior: Behavior normal.            Lab Results: I personally reviewed relevant lab results. CBC/BMP: No new results in last 24 hours. , LFTs: No new results in last 24 hours. , PTT/INR:No new results in last 24 hours.       Results for orders placed during the hospital encounter of 03/24/22    Colonoscopy    Impression  Normal colonoscopy to the terminal ileum  Mild sigmoid diverticulosis next mild internal hemorrhoids  Random biopsies taken throughout the colon    RECOMMENDATION:  Await pathology results .  Discharge home  Resume regular diet  Continue daily fiber supplement  Follow up biopsy results, if biopsies are negative for microscopic colitis, recommended trial of cholestyramine powder  Call with any abdominal pain, bleeding, fevers      MD Gustavo Coello PA-C  [facility] - Gastroentrology

## 2025-03-08 DIAGNOSIS — R09.81 NASAL CONGESTION: ICD-10-CM

## 2025-03-09 RX ORDER — FLUTICASONE PROPIONATE 50 MCG
2 SPRAY, SUSPENSION (ML) NASAL DAILY
Qty: 16 G | Refills: 0 | Status: SHIPPED | OUTPATIENT
Start: 2025-03-09

## 2025-03-17 ENCOUNTER — OFFICE VISIT (OUTPATIENT)
Dept: FAMILY MEDICINE CLINIC | Facility: MEDICAL CENTER | Age: OVER 89
End: 2025-03-17
Payer: COMMERCIAL

## 2025-03-17 ENCOUNTER — APPOINTMENT (OUTPATIENT)
Dept: RADIOLOGY | Facility: MEDICAL CENTER | Age: OVER 89
End: 2025-03-17
Payer: COMMERCIAL

## 2025-03-17 ENCOUNTER — TELEPHONE (OUTPATIENT)
Age: OVER 89
End: 2025-03-17

## 2025-03-17 VITALS
RESPIRATION RATE: 17 BRPM | OXYGEN SATURATION: 97 % | HEART RATE: 88 BPM | TEMPERATURE: 97.8 F | DIASTOLIC BLOOD PRESSURE: 74 MMHG | WEIGHT: 159.4 LBS | BODY MASS INDEX: 21.59 KG/M2 | SYSTOLIC BLOOD PRESSURE: 118 MMHG | HEIGHT: 72 IN

## 2025-03-17 DIAGNOSIS — G89.29 CHRONIC PAIN OF LEFT KNEE: ICD-10-CM

## 2025-03-17 DIAGNOSIS — M25.562 LEFT KNEE PAIN, UNSPECIFIED CHRONICITY: Primary | ICD-10-CM

## 2025-03-17 DIAGNOSIS — L08.9 SKIN INFECTION: ICD-10-CM

## 2025-03-17 DIAGNOSIS — M25.562 CHRONIC PAIN OF LEFT KNEE: ICD-10-CM

## 2025-03-17 PROCEDURE — G2211 COMPLEX E/M VISIT ADD ON: HCPCS | Performed by: STUDENT IN AN ORGANIZED HEALTH CARE EDUCATION/TRAINING PROGRAM

## 2025-03-17 PROCEDURE — 73562 X-RAY EXAM OF KNEE 3: CPT

## 2025-03-17 PROCEDURE — 99213 OFFICE O/P EST LOW 20 MIN: CPT | Performed by: STUDENT IN AN ORGANIZED HEALTH CARE EDUCATION/TRAINING PROGRAM

## 2025-03-17 RX ORDER — CEPHALEXIN 500 MG/1
500 CAPSULE ORAL 2 TIMES DAILY
Qty: 14 CAPSULE | Refills: 0 | Status: SHIPPED | OUTPATIENT
Start: 2025-03-17 | End: 2025-03-24

## 2025-03-17 NOTE — PROGRESS NOTES
"Name: Patricio Morris      : 3/26/1933      MRN: [id number]  Encounter Provider: Tino Heard DO  Encounter Date: 3/17/2025   Encounter department: [facility] [place]  :  Assessment & Plan  Left knee pain, unspecified chronicity    Orders:    XR knee 3 vw left non injury; Future    Skin infection    Orders:    C-reactive protein; Future    Sedimentation rate, automated; Future    CBC (Includes Diff/Plt) (Refl); Future    cephalexin (KEFLEX) 500 mg capsule; Take 1 capsule (500 mg total) by mouth 2 (two) times a day for 7 days  Advised about concurrent probiotic use  Recheck in 1 week, contact sooner if any new or worsening symptoms         History of Present Illness   HPI    Patricio Morris is a 89 y.o. male who presents for acute visit.  Patient mentions redness left knee \" this has been going on for a while\".  Patient states that his left knee leans against his car door and he sometimes hits his knee against a metal knobs in bedroom.  Patient denies any fever or chills.  Denies discharge. Patient states since onset last week \" terrible pain across the top (of knee)\".     Review of Systems    As noted in HPI     Objective   /74 (BP Location: Left arm, Patient Position: Sitting, Cuff Size: Large)   Pulse 88   Temp 97.8 °F (36.6 °C) (Temporal)   Resp 17   Ht 6' (1.829 m)   Wt 72.3 kg (159 lb 6.4 oz)   SpO2 97%   BMI 21.62 kg/m²      Physical Exam  Vitals reviewed.   Constitutional:       General: He is not in acute distress.     Appearance: He is not toxic-appearing.   HENT:      Head: Normocephalic and atraumatic.   Eyes:      Conjunctiva/sclera: Conjunctivae normal.   Cardiovascular:      Rate and Rhythm: Normal rate and regular rhythm.      Heart sounds: Murmur heard.      Comments: Negative Homans' sign left lower extremity    Decreased peripheral pulses  Pulmonary:      Effort: Pulmonary effort is normal. No respiratory distress.      Breath sounds: Normal breath sounds. "   Musculoskeletal:      Right knee: Ecchymosis present. No swelling, deformity or bony tenderness. Normal range of motion. No tenderness.      Left knee: Erythema and ecchymosis present. No swelling, deformity or bony tenderness. Normal range of motion. No tenderness.   Skin:     Findings: Bruising and erythema present.      Comments: Ecchymosis left knee    Abrasion with scab left knee and surrounding erythema with slight tactile warmth    No active drainage, no bleeding   Neurological:      Mental Status: He is alert and oriented to person, place, and time.   Psychiatric:         Thought Content: Thought content normal.

## 2025-03-17 NOTE — TELEPHONE ENCOUNTER
Pt calling regarding the dressing that Dr. Heard recommended for his knee.  He is at the pharmacy and they do not have an hydrocolloidal dressing that is the appropriate size.  RN spoke to the pharmacy. Pharmacist wants to know if Tegaderm is acceptable?  If not she can order hydrocolloidal CGF 6x6 dressing that is $80 for 5 dressings.  Which would the provider prefer?  Or is there another option?  Please call Taylor at [facility] and the pt to advise.

## 2025-03-18 ENCOUNTER — RESULTS FOLLOW-UP (OUTPATIENT)
Dept: FAMILY MEDICINE CLINIC | Facility: MEDICAL CENTER | Age: OVER 89
End: 2025-03-18

## 2025-03-18 PROBLEM — D64.9 ANEMIA: Status: ACTIVE | Noted: 2025-03-18

## 2025-03-18 LAB
BASOPHILS # BLD AUTO: 257 CELLS/UL (ref 0–200)
BASOPHILS NFR BLD AUTO: 3.3 %
CRP SERPL-MCNC: <3 MG/L
EOSINOPHIL # BLD AUTO: 382 CELLS/UL (ref 15–500)
EOSINOPHIL NFR BLD AUTO: 4.9 %
ERYTHROCYTE [DISTWIDTH] IN BLOOD BY AUTOMATED COUNT: 25.8 % (ref 11–15)
ERYTHROCYTE [SEDIMENTATION RATE] IN BLOOD BY WESTERGREN METHOD: 2 MM/H
HCT VFR BLD AUTO: 32.3 % (ref 38.5–50)
HGB BLD-MCNC: 10 G/DL (ref 13.2–17.1)
LYMPHOCYTES # BLD AUTO: 772 CELLS/UL (ref 850–3900)
LYMPHOCYTES NFR BLD AUTO: 9.9 %
MCH RBC QN AUTO: 26.7 PG (ref 27–33)
MCHC RBC AUTO-ENTMCNC: 31 G/DL (ref 32–36)
MCV RBC AUTO: 86.1 FL (ref 80–100)
MONOCYTES # BLD AUTO: 593 CELLS/UL (ref 200–950)
MONOCYTES NFR BLD AUTO: 7.6 %
NEUTROPHILS # BLD AUTO: 5795 CELLS/UL (ref 1500–7800)
NEUTROPHILS NFR BLD AUTO: 74.3 %
PLATELET # BLD AUTO: 233 THOUSAND/UL (ref 140–400)
RBC # BLD AUTO: 3.75 MILLION/UL (ref 4.2–5.8)
SERVICE CMNT-IMP: ABNORMAL
WBC # BLD AUTO: 7.8 THOUSAND/UL (ref 3.8–10.8)

## 2025-03-18 NOTE — TELEPHONE ENCOUNTER
Pharmacy called back and stated that the pt stated they were not going to use the hydrocolloidal dressing because it is too expensive and planned on using just vaseline. Pharmacy suggested maybe a medical supply company to place the order thru since pt is diabetic and maybe it could be then billed thru his medical insurance.

## 2025-03-18 NOTE — TELEPHONE ENCOUNTER
Called brendan back on the [facility] pharmacy and let them know hydrocolloidal CGF 6x6 dressing fine

## 2025-03-24 ENCOUNTER — OFFICE VISIT (OUTPATIENT)
Dept: FAMILY MEDICINE CLINIC | Facility: MEDICAL CENTER | Age: OVER 89
End: 2025-03-24
Payer: COMMERCIAL

## 2025-03-24 VITALS
RESPIRATION RATE: 17 BRPM | DIASTOLIC BLOOD PRESSURE: 72 MMHG | SYSTOLIC BLOOD PRESSURE: 110 MMHG | BODY MASS INDEX: 21.59 KG/M2 | OXYGEN SATURATION: 97 % | WEIGHT: 159.4 LBS | TEMPERATURE: 97.9 F | HEART RATE: 90 BPM | HEIGHT: 72 IN

## 2025-03-24 DIAGNOSIS — S80.212D ABRASION OF LEFT KNEE, SUBSEQUENT ENCOUNTER: Primary | ICD-10-CM

## 2025-03-24 PROCEDURE — 87205 SMEAR GRAM STAIN: CPT | Performed by: STUDENT IN AN ORGANIZED HEALTH CARE EDUCATION/TRAINING PROGRAM

## 2025-03-24 PROCEDURE — 87077 CULTURE AEROBIC IDENTIFY: CPT | Performed by: STUDENT IN AN ORGANIZED HEALTH CARE EDUCATION/TRAINING PROGRAM

## 2025-03-24 PROCEDURE — 87070 CULTURE OTHR SPECIMN AEROBIC: CPT | Performed by: STUDENT IN AN ORGANIZED HEALTH CARE EDUCATION/TRAINING PROGRAM

## 2025-03-24 PROCEDURE — 87186 SC STD MICRODIL/AGAR DIL: CPT | Performed by: STUDENT IN AN ORGANIZED HEALTH CARE EDUCATION/TRAINING PROGRAM

## 2025-03-24 PROCEDURE — G2211 COMPLEX E/M VISIT ADD ON: HCPCS | Performed by: STUDENT IN AN ORGANIZED HEALTH CARE EDUCATION/TRAINING PROGRAM

## 2025-03-24 PROCEDURE — 99213 OFFICE O/P EST LOW 20 MIN: CPT | Performed by: STUDENT IN AN ORGANIZED HEALTH CARE EDUCATION/TRAINING PROGRAM

## 2025-03-24 NOTE — PROGRESS NOTES
"Name: Patricio Morris      : 3/26/1933      MRN: [id number]  Encounter Provider: Tino Heard DO  Encounter Date: 3/24/2025   Encounter department: [facility] [place]  :  Assessment & Plan  Abrasion of left knee, subsequent encounter  Complete Keflex course as previously prescribed  Provided instruction about topical bacitracin and dressing  Reviewed recent lab work and knee x-ray  Contact if any new or worsening symptoms  Orders:  •  Wound culture and Gram stain; Future           History of Present Illness   HPI    Patricio Morris is a 89-year-old male who presents for recheck of left knee, \"it's looking better\".  He has been adherent with antibiotic course and states that pain over the top of his knee has improved.  No fever or chills.    Review of Systems    As noted in HPI     Objective   /72 (BP Location: Left arm, Patient Position: Sitting, Cuff Size: Large)   Pulse 90   Temp 97.9 °F (36.6 °C) (Temporal)   Resp 17   Ht 6' (1.829 m)   Wt 72.3 kg (159 lb 6.4 oz)   SpO2 97%   BMI 21.62 kg/m²      Physical Exam  Vitals reviewed.   Constitutional:       General: He is not in acute distress.     Appearance: Normal appearance. He is not ill-appearing or toxic-appearing.   HENT:      Head: Normocephalic and atraumatic.   Eyes:      Conjunctiva/sclera: Conjunctivae normal.   Pulmonary:      Effort: Pulmonary effort is normal.   Musculoskeletal:      Comments: Abrasion anterior aspect of left knee, very scant yellow drainage on Band-Aid, no bleeding, surrounding erythema, no excessive tactile warmth   Neurological:      Mental Status: He is alert and oriented to person, place, and time.   Psychiatric:         Mood and Affect: Mood normal.         Behavior: Behavior normal.         Thought Content: Thought content normal.       "

## 2025-03-25 DIAGNOSIS — E78.01 FAMILIAL HYPERCHOLESTEROLEMIA: ICD-10-CM

## 2025-03-26 LAB
BACTERIA WND AEROBE CULT: ABNORMAL
BACTERIA WND AEROBE CULT: ABNORMAL
GRAM STN SPEC: ABNORMAL

## 2025-03-26 RX ORDER — SIMVASTATIN 10 MG
10 TABLET ORAL DAILY
Qty: 90 TABLET | Refills: 1 | Status: SHIPPED | OUTPATIENT
Start: 2025-03-26

## 2025-03-28 ENCOUNTER — TELEPHONE (OUTPATIENT)
Dept: HEMATOLOGY ONCOLOGY | Facility: CLINIC | Age: OVER 89
End: 2025-03-28

## 2025-03-31 ENCOUNTER — TELEPHONE (OUTPATIENT)
Age: OVER 89
End: 2025-03-31

## 2025-03-31 ENCOUNTER — RESULTS FOLLOW-UP (OUTPATIENT)
Dept: FAMILY MEDICINE CLINIC | Facility: MEDICAL CENTER | Age: OVER 89
End: 2025-03-31

## 2025-03-31 NOTE — TELEPHONE ENCOUNTER
Patient called in stated left knee pain is getting worse and think infection is spreading and would like to see Dr. Heard as soon as possible. No available appointments. Please advise. Thank you   20-Dec-2021 00:22

## 2025-04-01 NOTE — TELEPHONE ENCOUNTER
Spoke to patient to schedule an appointment he said his knee has gotten a lot better and he will call back if anything changes.

## 2025-04-28 ENCOUNTER — TELEPHONE (OUTPATIENT)
Age: OVER 89
End: 2025-04-28

## 2025-04-28 NOTE — TELEPHONE ENCOUNTER
Pt. Said he has been to Dr. Orquidea johnson it was never a problem, now they are saying she is not in their network. He got 3 bills, 2 are covered but, the the last one is not.  Can you please call him and try and help him with this. He is 89 and not sure what to do.  He is expecting a return call.   [phone]  Ty

## 2025-04-29 NOTE — TELEPHONE ENCOUNTER
Spoke with patient, patient is requesting for the doctor to call the patient insurance co [employer] credentialing, please call [phone]. Please advise.

## 2025-05-07 ENCOUNTER — OFFICE VISIT (OUTPATIENT)
Dept: OBGYN CLINIC | Facility: MEDICAL CENTER | Age: OVER 89
End: 2025-05-07
Payer: COMMERCIAL

## 2025-05-07 DIAGNOSIS — L29.9 PRURITUS: ICD-10-CM

## 2025-05-07 DIAGNOSIS — M17.12 PRIMARY OSTEOARTHRITIS OF LEFT KNEE: Primary | ICD-10-CM

## 2025-05-07 PROCEDURE — 99213 OFFICE O/P EST LOW 20 MIN: CPT | Performed by: PHYSICAL MEDICINE & REHABILITATION

## 2025-05-07 NOTE — ASSESSMENT & PLAN NOTE
Patient is here in follow up of left knee pain likely secondary to severe medial tibiofemoral osteoarthritis leading to Baker's cyst.  Treatment has included intra-articular steroid injection, HA injection, brace and Baker's cyst aspiration.  Patient has been doing well recently.  He is walking without much pain.  He is content with his progress.  Will see him back if needed.     Can consider referral for genicular nerve procedure with pain management. We discussed that knee replacement is not a great option due to his comorbidities.

## 2025-05-07 NOTE — PROGRESS NOTES
Assessment & Plan  Primary osteoarthritis of left knee  Patient is here in follow up of left knee pain likely secondary to severe medial tibiofemoral osteoarthritis leading to Baker's cyst.  Treatment has included intra-articular steroid injection, HA injection, brace and Baker's cyst aspiration.  Patient has been doing well recently.  He is walking without much pain.  He is content with his progress.  Will see him back if needed.     Can consider referral for genicular nerve procedure with pain management. We discussed that knee replacement is not a great option due to his comorbidities.       No follow-ups on file.    Imaging Studies (I personally reviewed images in PACS and report):  Left knee x-rays obtained on 2/5/2025.  These images show severe medial compartment OA.  There is a joint effusion.  Calcifications in the posterior knee.     Ultrasound of the soft tissue in the left posterior knee showed a Baker's cyst.  This was performed by radiology.    Patient is in agreement with the above plan.    HPI:  Patricio Morris is a 89 y.o. male  who presents in follow up.  Here for No chief complaint on file.      Since last visit: See above.    Following history reviewed and updated:  Past Medical History:   Diagnosis Date    Allergic april 1989    Anemia     COPD (chronic obstructive pulmonary disease) (HCC)     Diabetes mellitus (HCC)     Type 2    Diabetic nephropathy (HCC)     Essential hypertension     Heart failure (HCC)     Hyperlipidemia     Hypertension     Impetigo     Left inguinal hernia     Lung nodule     Malignant melanoma of skin (HCC)      Past Surgical History:   Procedure Laterality Date    APPENDECTOMY      BLADDER SURGERY      CATARACT EXTRACTION, BILATERAL      CHOLECYSTECTOMY      CHOLECYSTECTOMY      COLONOSCOPY  2014    Fiberoptic    HERNIA REPAIR      KNEE ARTHROSCOPY Bilateral     Therapeutic    MN CYSTO INSERTION TRANSPROSTATIC IMPLANT SINGLE N/A 03/22/2019    Procedure: CYSTOSCOPY WITH  INSERTION UROLIFT;  Surgeon: Bird Clinton MD;  Location: [facility];  Service: Urology     Social History   Social History     Substance and Sexual Activity   Alcohol Use Not Currently    Alcohol/week: 52.0 standard drinks of alcohol    Types: 50 Glasses of wine, 2 Standard drinks or equivalent per week     Social History     Substance and Sexual Activity   Drug Use No     Social History     Tobacco Use   Smoking Status Former    Current packs/day: 0.00    Average packs/day: 1 pack/day for 11.0 years (11.0 ttl pk-yrs)    Types: Cigarettes    Start date: 1949    Quit date: 1960    Years since quittin.3    Passive exposure: Past   Smokeless Tobacco Never     Family History   Problem Relation Age of Onset    Diabetes Mother     Heart attack Neg Hx     Stroke Neg Hx     Aneurysm Neg Hx     Clotting disorder Neg Hx     Arrhythmia Neg Hx     Hypertension Neg Hx     Hyperlipidemia Neg Hx         pt unsure      No Known Allergies     Constitutional:  There were no vitals taken for this visit.   General: NAD.  Eyes: Clear sclerae.  ENT: No inflammation, lesion, or mass of lips.  No tracheal deviation.  Musculoskeletal: As mentioned below.  Integumentary: No visible rashes or skin lesions.  Pulmonary/Chest: Effort normal. No respiratory distress.   Neuro: CN's grossly intact, LOPEZ.  Psych: Normal affect and judgement.  Vascular: WWP.    Left Knee Exam     Tenderness   The patient is experiencing tenderness in the medial joint line.    Range of Motion   Extension:  normal     Other   Erythema: absent  Scars: absent  Sensation: normal  Pulse: present  Swelling: none  Effusion: no effusion present             Procedures

## 2025-05-09 RX ORDER — HYDROXYZINE PAMOATE 25 MG/1
25 CAPSULE ORAL 3 TIMES DAILY PRN
Qty: 90 CAPSULE | Refills: 0 | Status: SHIPPED | OUTPATIENT
Start: 2025-05-09

## 2025-05-24 DIAGNOSIS — R05.9 COUGH: ICD-10-CM

## 2025-05-26 DIAGNOSIS — R19.7 DIARRHEA, UNSPECIFIED TYPE: ICD-10-CM

## 2025-05-27 RX ORDER — MONTELUKAST SODIUM 10 MG/1
10 TABLET ORAL
Qty: 90 TABLET | Refills: 1 | Status: SHIPPED | OUTPATIENT
Start: 2025-05-27

## 2025-05-27 RX ORDER — CHOLESTYRAMINE 4 G/9G
POWDER, FOR SUSPENSION ORAL
Qty: 30 EACH | Refills: 5 | Status: SHIPPED | OUTPATIENT
Start: 2025-05-27

## 2025-06-18 ENCOUNTER — TELEPHONE (OUTPATIENT)
Dept: HEMATOLOGY ONCOLOGY | Facility: CLINIC | Age: OVER 89
End: 2025-06-18

## 2025-06-22 DIAGNOSIS — L29.9 PRURITUS: ICD-10-CM

## 2025-06-23 ENCOUNTER — TELEPHONE (OUTPATIENT)
Age: OVER 89
End: 2025-06-23

## 2025-06-23 RX ORDER — HYDROXYZINE PAMOATE 25 MG/1
25 CAPSULE ORAL 3 TIMES DAILY PRN
Qty: 90 CAPSULE | Refills: 1 | Status: SHIPPED | OUTPATIENT
Start: 2025-06-23

## 2025-06-23 NOTE — TELEPHONE ENCOUNTER
PA Hydroxyzine Pamoate 25 MG  APPROVED         Patient advised by          []MyChart Message  []Phone call   []LMOM  []L/M to call office as no active Communication consent on file  []Unable to leave detailed message as VM not approved on Communication consent       Pharmacy advised by    [x]Fax  [x]Phone call  []Secure Chat    Specialty Pharmacy    []

## 2025-06-23 NOTE — TELEPHONE ENCOUNTER
PA Hydroxyzine Pamoate 25 MG SUBMITTED    to [employer]     via    []CMM-KEY:    [x]Surescripts-Case ID # [id number]  []Availity-Auth ID #  NDC #     []Faxed to plan   []Other website    []Phone call Case ID #      []PA sent as URGENT    All office notes, labs and other pertaining documents and studies sent. Clinical questions answered. Awaiting determination from insurance company.     Turnaround time for your insurance to make a decision on your Prior Authorization can take 7-21 business days.

## 2025-06-24 ENCOUNTER — TELEPHONE (OUTPATIENT)
Dept: HEMATOLOGY ONCOLOGY | Facility: CLINIC | Age: OVER 89
End: 2025-06-24

## 2025-06-24 DIAGNOSIS — D63.1 ANEMIA OF CHRONIC KIDNEY FAILURE, STAGE 3 (MODERATE)  (HCC): ICD-10-CM

## 2025-06-24 DIAGNOSIS — N18.30 ANEMIA OF CHRONIC KIDNEY FAILURE, STAGE 3 (MODERATE)  (HCC): ICD-10-CM

## 2025-06-24 DIAGNOSIS — D46.Z MDS (MYELODYSPLASTIC SYNDROME), LOW GRADE (HCC): Primary | ICD-10-CM

## 2025-06-28 DIAGNOSIS — G62.9 NEUROPATHY: ICD-10-CM

## 2025-06-30 ENCOUNTER — TELEPHONE (OUTPATIENT)
Age: OVER 89
End: 2025-06-30

## 2025-06-30 RX ORDER — GABAPENTIN 300 MG/1
300 CAPSULE ORAL DAILY
Qty: 90 CAPSULE | Refills: 1 | Status: SHIPPED | OUTPATIENT
Start: 2025-06-30

## 2025-06-30 NOTE — TELEPHONE ENCOUNTER
Pts daughter Sandra called, stating pt fell and scraped his leg arm and one of his ankles is a  little swollen. She was hoping pt could be seen tomorrow sometime.  If patient can be squeezed in somewhere.  Please advise    Please contact Sandra: [phone]

## 2025-07-01 ENCOUNTER — OFFICE VISIT (OUTPATIENT)
Dept: FAMILY MEDICINE CLINIC | Facility: MEDICAL CENTER | Age: OVER 89
End: 2025-07-01
Payer: COMMERCIAL

## 2025-07-01 ENCOUNTER — TELEMEDICINE (OUTPATIENT)
Dept: HEMATOLOGY ONCOLOGY | Facility: CLINIC | Age: OVER 89
End: 2025-07-01
Payer: COMMERCIAL

## 2025-07-01 VITALS
SYSTOLIC BLOOD PRESSURE: 118 MMHG | DIASTOLIC BLOOD PRESSURE: 70 MMHG | RESPIRATION RATE: 18 BRPM | BODY MASS INDEX: 21.86 KG/M2 | WEIGHT: 161.4 LBS | TEMPERATURE: 97.7 F | HEIGHT: 72 IN | OXYGEN SATURATION: 93 % | HEART RATE: 72 BPM

## 2025-07-01 DIAGNOSIS — D46.Z MDS (MYELODYSPLASTIC SYNDROME), LOW GRADE (HCC): ICD-10-CM

## 2025-07-01 DIAGNOSIS — T07.XXXA MULTIPLE LACERATIONS: Primary | ICD-10-CM

## 2025-07-01 DIAGNOSIS — J41.0 SIMPLE CHRONIC BRONCHITIS (HCC): ICD-10-CM

## 2025-07-01 DIAGNOSIS — D63.1 ANEMIA OF CHRONIC KIDNEY FAILURE, STAGE 3 (MODERATE)  (HCC): ICD-10-CM

## 2025-07-01 DIAGNOSIS — N18.30 ANEMIA OF CHRONIC KIDNEY FAILURE, STAGE 3 (MODERATE)  (HCC): ICD-10-CM

## 2025-07-01 DIAGNOSIS — M25.569 KNEE PAIN, UNSPECIFIED CHRONICITY, UNSPECIFIED LATERALITY: ICD-10-CM

## 2025-07-01 DIAGNOSIS — D46.Z MDS (MYELODYSPLASTIC SYNDROME), LOW GRADE (HCC): Primary | ICD-10-CM

## 2025-07-01 PROCEDURE — 99214 OFFICE O/P EST MOD 30 MIN: CPT | Performed by: PHYSICIAN ASSISTANT

## 2025-07-01 PROCEDURE — G2211 COMPLEX E/M VISIT ADD ON: HCPCS | Performed by: INTERNAL MEDICINE

## 2025-07-01 PROCEDURE — 99213 OFFICE O/P EST LOW 20 MIN: CPT | Performed by: INTERNAL MEDICINE

## 2025-07-01 RX ORDER — IBUPROFEN 200 MG
TABLET ORAL
Start: 2025-07-01

## 2025-07-01 RX ORDER — MUPIROCIN 2 %
OINTMENT (GRAM) TOPICAL 2 TIMES DAILY
Qty: 30 G | Refills: 2 | Status: SHIPPED | OUTPATIENT
Start: 2025-07-01

## 2025-07-01 RX ORDER — ACETAMINOPHEN 500 MG
TABLET ORAL
Start: 2025-07-01

## 2025-07-01 NOTE — ASSESSMENT & PLAN NOTE
MDS dx 2016  Since 2014, he had mild normocytic normochromic anemia.      4/5/2016  BMBX-hypercellular for age bone marrow (75%), myeloblasts 2 to 3%, mildly left shifted but progressive trilineage hematopoiesis, compatible with low-grade myelodysplastic disorder.  Fibrosis 0 of 3, 46 XY    Since 2023, he has had immature cells on differential, 1% myelocytes 10/2023, 4% March 2025.  1% metamyelocytes March 2025.      3/2025 Platelet count remains normal.  WBC 12 3/2025  Hemoglobin remains 10 to 11 g/dL range    Declined to have repeat labs prior to this appointment to compare to 3/2025 labs  Orders:  •  ibuprofen (MOTRIN) 200 mg tablet; 400 mg every other day; alternates with tylenol 1000mg  •  acetaminophen (TYLENOL) 500 mg tablet; 1000mg every other day;  alternates with advil 400mg  •  CBC and differential; Future  •  Iron, TIBC and Ferritin Panel; Future  •  CBC and differential; Future  •  Comprehensive metabolic panel; Future

## 2025-07-01 NOTE — TELEPHONE ENCOUNTER
Patient's daughter called back to follow up.  Scheduled the patient for today at 10:45am with Dr. Monson.

## 2025-07-01 NOTE — PROGRESS NOTES
Name: Patricio Morris      : 3/26/1933      MRN: [id number]  Encounter Provider: Pamella Florentino PA-C  Encounter Date: 2025   Encounter department: [facility] [place]  :  Assessment & Plan  MDS (myelodysplastic syndrome), low grade (HCC)  MDS dx 2016  Since , he had mild normocytic normochromic anemia.      2016  BMBX-hypercellular for age bone marrow (75%), myeloblasts 2 to 3%, mildly left shifted but progressive trilineage hematopoiesis, compatible with low-grade myelodysplastic disorder.  Fibrosis 0 of 3, 46 XY    Since , he has had immature cells on differential, 1% myelocytes 10/2023, 4% 2025.  1% metamyelocytes 2025.      3/2025 Platelet count remains normal.  WBC 12 3/2025  Hemoglobin remains 10 to 11 g/dL range    Declined to have repeat labs prior to this appointment to compare to 3/2025 labs  Orders:  •  ibuprofen (MOTRIN) 200 mg tablet; 400 mg every other day; alternates with tylenol 1000mg  •  acetaminophen (TYLENOL) 500 mg tablet; 1000mg every other day;  alternates with advil 400mg  •  CBC and differential; Future  •  Iron, TIBC and Ferritin Panel; Future  •  CBC and differential; Future  •  Comprehensive metabolic panel; Future    Anemia of chronic kidney failure, stage 3 (moderate)  (HCC)  Lab Results   Component Value Date    EGFR 52 (L) 2025    EGFR 52 (L) 2025    EGFR 45 (L) 2024    CREATININE 1.29 (H) 2025    CREATININE 1.30 (H) 2025    CREATININE 1.47 (H) 2024       Orders:  •  ibuprofen (MOTRIN) 200 mg tablet; 400 mg every other day; alternates with tylenol 1000mg  •  acetaminophen (TYLENOL) 500 mg tablet; 1000mg every other day;  alternates with advil 400mg  •  CBC and differential; Future  •  Iron, TIBC and Ferritin Panel; Future    Knee pain, unspecified chronicity, unspecified laterality    Orders:  •  ibuprofen (MOTRIN) 200 mg tablet; 400 mg every other day; alternates with tylenol  1000mg  •  acetaminophen (TYLENOL) 500 mg tablet; 1000mg every other day;  alternates with advil 400mg    Since 2023, he has had immature cells on differential, 1% myelocytes 10/2023, 4% March 2025.  1% metamyelocytes March 2025.  Platelet count remains normal.  WBC 12 3/2025    Hemoglobin remains 10 to 11 g/dL range    No follow-ups on file.    History of Present Illness   No chief complaint on file.    Pertinent Medical History     07/01/25: Patricio Morris is a 89 year old gentleman seen initially 3/22/2016 by Dr. Bustillos regarding immature neutrophils identified on CBCD.  Ultimately, he was diagnosed with low-grade MDS    It was noted that since 2014, he had mild normocytic normochromic anemia.  At his consultation, I denied any fevers, chills, night sweats, weight loss.  He had colonoscopy 2012 2/18/2016 hemoglobin 11.9, MCV 96, WBC 6.1, 51% neutrophils, 22% lymphocytes, 10% monocytes, 11% eosinophils, 2% basophils, 3% bands    4/5/2016  BMBX-hypercellular for age bone marrow (75%), myeloblasts 2 to 3%, mildly left shifted but progressive trilineage hematopoiesis, compatible with low-grade myelodysplastic disorder.  Fibrosis 0 of 3, 46 XY    5/20/2017 hemoglobin 11, WBC 5.5, platelet count 207, 60% neutrophils, 19% lymphocytes  Anisocytosis, ovalocytes, teardrop cells, polychromasia, basophilic stippling, hypochromasia noted    As he remained asymptomatic, hgb remained stable without WBC, platelet abnormalities he was observed    2/2024 hemoglobin 10.4, MCV 85, WBC 5, platelet count 171, 59% neutrophils, 22% lymphocytes, 8% monocytes    10/2023 1% myelocytes    3/2025 hemoglobin 11.5, WBC 11.9, platelet count 212, 66% neutrophils, 13% lymphocytes, 6% monocytes, 4% myelocytes, 1% metamyelocytes    7/1/25  Virtual with daughter, Patricio Torres   Some redness on legs,  takes 2 advil every other day, alt with tylenol for knee pain.  States prior to doing this, he was walking with a walker.    Energy is  stable.  Denies any bruising or bleeding.    Will be seeing wound care later this week regarding thin skin.  He did have a fall 6/30 off his scooter.  Denies any head trauma.  Was evaluated             Review of Systems        Objective   There were no vitals taken for this visit.    Physical Exam    Labs: I have reviewed the following labs:  Results for orders placed or performed in visit on 03/24/25   Wound culture and Gram stain    Specimen: Leg, Left; Wound   Result Value Ref Range    Wound Culture 1+ Growth of Staphylococcus aureus (A)     Wound Culture 1+ Growth of     Gram Stain Result No Polys or Bacteria seen        Susceptibility    Staphylococcus aureus - CRISTHIAN     ZID Performed Yes       Cefazolin ($) <=8.00 Susceptible ug/ml     Clindamycin ($) 0.50 Resistant ug/ml     Erythromycin ($$$$) >4.00 Resistant ug/ml     Gentamicin ($$) <=4 Susceptible ug/ml     Oxacillin <=0.25 Susceptible ug/ml     Penicillin >2.000 Resistant ug/ml     Tetracycline <=4 Susceptible ug/ml     Trimethoprim + Sulfamethoxazole ($$$) <=0.5/9.5 Susceptible ug/ml     Vancomycin ($) 1.00 Susceptible ug/ml           Administrative Statements   Encounter provider Pamella Florentino PA-C    The Patient is located at Home and in the following state in which I hold an active license [place].    The patient was identified by name and date of birth. Patricio Morris was informed that this is a telemedicine visit and that the visit is being conducted through the Epic Embedded platform. He agrees to proceed..  My office door was closed. No one else was in the room.  He acknowledged consent and understanding of privacy and security of the video platform. The patient has agreed to participate and understands they can discontinue the visit at any time.    I have spent a total time of 25 minutes in caring for this patient on the day of the visit/encounter including Diagnostic results, Risks and benefits of tx options, Instructions for management,  Patient and family education, Impressions, Documenting in the medical record, Reviewing/placing orders in the medical record (including tests, medications, and/or procedures), and Obtaining or reviewing history  , not including the time spent for establishing the audio/video connection.

## 2025-07-01 NOTE — ASSESSMENT & PLAN NOTE
Lab Results   Component Value Date    EGFR 52 (L) 03/31/2025    EGFR 52 (L) 01/30/2025    EGFR 45 (L) 11/12/2024    CREATININE 1.29 (H) 03/31/2025    CREATININE 1.30 (H) 01/30/2025    CREATININE 1.47 (H) 11/12/2024       Orders:  •  ibuprofen (MOTRIN) 200 mg tablet; 400 mg every other day; alternates with tylenol 1000mg  •  acetaminophen (TYLENOL) 500 mg tablet; 1000mg every other day;  alternates with advil 400mg  •  CBC and differential; Future  •  Iron, TIBC and Ferritin Panel; Future

## 2025-07-01 NOTE — TELEPHONE ENCOUNTER
We had an opening this morning with Dr Monson-tried dtr Sandra Parker twice-vm not set up-tried pt also, and left a vm

## 2025-07-01 NOTE — PROGRESS NOTES
Name: Patricio Morris      : 3/26/1933      MRN: [id number]  Encounter Provider: Aarti Monson MD  Encounter Date: 2025   Encounter department: [facility] [place]  :  Assessment & Plan  Multiple lacerations    Orders:    mupirocin (BACTROBAN) 2 % ointment; Apply topically 2 (two) times a day    Ambulatory Referral to Wound Care; Future  The wounds on both his knees as well as on the left elbow were dressed.  He was advised to follow-up with wound care.  MDS (myelodysplastic syndrome), low grade (HCC)         Simple chronic bronchitis (HCC)               Depression Screening and Follow-up Plan: Patient was screened for depression during today's encounter. They screened negative with a PHQ-2 score of 0.      Falls Plan of Care: balance, strength, and gait training instructions were provided.       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  He has multiple skin tears and lacerations on his knees as well as the left elbow.  As he is a diabetic, he was told to follow-up with wound care to prevent any infection of the wounds.    Review of Systems   Constitutional:  Negative for chills, diaphoresis, fatigue and fever.   HENT:  Negative for congestion, ear discharge, ear pain, hearing loss, postnasal drip, rhinorrhea, sinus pressure, sinus pain, sneezing, sore throat and voice change.    Eyes:  Negative for pain, discharge, redness and visual disturbance.   Respiratory:  Negative for cough, chest tightness, shortness of breath and wheezing.    Cardiovascular:  Negative for chest pain, palpitations and leg swelling.   Gastrointestinal:  Negative for abdominal distention, abdominal pain, blood in stool, constipation, diarrhea, nausea and vomiting.   Endocrine: Negative for cold intolerance, heat intolerance, polydipsia, polyphagia and polyuria.   Genitourinary:  Negative for dysuria, flank pain, frequency, hematuria and  urgency.   Musculoskeletal:  Negative for arthralgias, back pain, gait problem, joint swelling, myalgias, neck pain and neck stiffness.   Skin:  Positive for wound. Negative for rash.   Neurological:  Negative for dizziness, tremors, syncope, facial asymmetry, speech difficulty, weakness, light-headedness, numbness and headaches.   Hematological:  Does not bruise/bleed easily.   Psychiatric/Behavioral:  Negative for behavioral problems, confusion and sleep disturbance. The patient is not nervous/anxious.        Objective   /70 (BP Location: Left arm, Patient Position: Sitting, Cuff Size: Large)   Pulse 72   Temp 97.7 °F (36.5 °C) (Temporal)   Resp 18   Ht 6' (1.829 m)   Wt 73.2 kg (161 lb 6.4 oz)   SpO2 93%   BMI 21.89 kg/m²      Physical Exam  Constitutional:       General: He is not in acute distress.     Appearance: He is well-developed. He is not diaphoretic.   HENT:      Head: Normocephalic and atraumatic.      Right Ear: External ear normal.      Left Ear: External ear normal.      Nose: Nose normal.     Eyes:      General: No scleral icterus.        Right eye: No discharge.         Left eye: No discharge.      Conjunctiva/sclera: Conjunctivae normal.       Cardiovascular:      Rate and Rhythm: Normal rate and regular rhythm.      Heart sounds: Normal heart sounds. No murmur heard.     No friction rub. No gallop.   Pulmonary:      Effort: Pulmonary effort is normal. No respiratory distress.      Breath sounds: Normal breath sounds. No wheezing or rales.   Abdominal:      General: Bowel sounds are normal. There is no distension.      Palpations: Abdomen is soft.      Tenderness: There is no abdominal tenderness. There is no guarding or rebound.     Musculoskeletal:         General: No tenderness.     Skin:     General: Skin is warm and dry.      Findings: No erythema or rash.      Comments: Lacerations on both knees and left elbow     Neurological:      Mental Status: He is alert and oriented to  person, place, and time.      Cranial Nerves: No cranial nerve deficit.      Sensory: No sensory deficit.      Motor: No abnormal muscle tone.     Psychiatric:         Behavior: Behavior normal.

## 2025-07-03 ENCOUNTER — OFFICE VISIT (OUTPATIENT)
Dept: WOUND CARE | Facility: HOSPITAL | Age: OVER 89
End: 2025-07-03
Payer: COMMERCIAL

## 2025-07-03 VITALS
BODY MASS INDEX: 20.54 KG/M2 | HEIGHT: 73 IN | HEART RATE: 65 BPM | RESPIRATION RATE: 18 BRPM | WEIGHT: 155 LBS | SYSTOLIC BLOOD PRESSURE: 119 MMHG | DIASTOLIC BLOOD PRESSURE: 68 MMHG | TEMPERATURE: 97.1 F

## 2025-07-03 DIAGNOSIS — T07.XXXA MULTIPLE OPEN WOUNDS: Primary | ICD-10-CM

## 2025-07-03 PROCEDURE — 97597 DBRDMT OPN WND 1ST 20 CM/<: CPT | Performed by: STUDENT IN AN ORGANIZED HEALTH CARE EDUCATION/TRAINING PROGRAM

## 2025-07-03 PROCEDURE — 97598 DBRDMT OPN WND ADDL 20CM/<: CPT | Performed by: STUDENT IN AN ORGANIZED HEALTH CARE EDUCATION/TRAINING PROGRAM

## 2025-07-03 PROCEDURE — 99213 OFFICE O/P EST LOW 20 MIN: CPT | Performed by: STUDENT IN AN ORGANIZED HEALTH CARE EDUCATION/TRAINING PROGRAM

## 2025-07-03 PROCEDURE — 99204 OFFICE O/P NEW MOD 45 MIN: CPT | Performed by: STUDENT IN AN ORGANIZED HEALTH CARE EDUCATION/TRAINING PROGRAM

## 2025-07-03 RX ORDER — LIDOCAINE 40 MG/G
CREAM TOPICAL ONCE
Status: COMPLETED | OUTPATIENT
Start: 2025-07-03 | End: 2025-07-03

## 2025-07-03 RX ADMIN — LIDOCAINE: 40 CREAM TOPICAL at 10:18

## 2025-07-03 NOTE — PATIENT INSTRUCTIONS
Orders Placed This Encounter   Procedures    Wound cleansing and dressings     Protein: Eat protein with each meal to promote healing.  Examples of protein are fish, meat, chicken, nuts, peanut butter, eggs, lentils, edamame or a protein shake.    Wound infection:  If you have signs of infection please call the wound center.  If the wound center is closed- please go to the Emergency department.  Some signs of infection:  fever, chills, increased redness, red streaks, increase in pain, increased drainage.  Drainage with an odor, Change in drainage color: white/milky/green/tan/yellow,  an increase in swelling, chest pain and/or shortness of breath.     Standing Status:   Future     Expiration Date:   7/10/2025    Wound cleansing and dressings     To all your open wounds on your knees and left elbow:    Wash your hands with soap and water.  Remove old dressing, discard into plastic bag and place in trash.  Cleanse the wound with soap and water or saline prior to applying a clean dressing. Do not use tissue or cotton balls. Do not scrub the wound. Pat dry using gauze.  Shower yes   Apply DERMAGRAN to the knee and elbow wounds.  Cover with gauze  Secure with rolled gauze and tape. Cover with tubifast.   Change dressing three times a week.     Standing Status:   Future     Expiration Date:   7/10/2025
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
## 2025-07-03 NOTE — ASSESSMENT & PLAN NOTE
It was a pleasure to see Patricio Morris for wound care consult today  Selective debridement performed today as above  Start plan of care as noted below with dermagran, rolled gauze   A1C results reviewed with the patient today.   No signs or symptoms of infection today. Patient understands that if any signs of infection start (such as increased redness, drainage, pain, fever, chills, diaphoresis), they should call our office or proceed to the ER or Urgent Care.  Patient should continue a high protein diet to facilitate wound healing  Patient is advised to not submerge wound or leave wound open to air.  Follow up in 1 weeks  Given the multi-factorial nature of wound care, additional time was taken to review patient's treatment plan with other specialties and most recent pertinent lab work and imaging.   All plans of care discussed with patient at bedside who verbalized understanding with treatment plan.    Orders:  •  lidocaine (LMX) 4 % cream  •  Wound cleansing and dressings; Future  •  Wound cleansing and dressings; Future  •  Debridement Anterior;Right Knee  •  Debridement Left;Lateral Knee  •  Wound Procedure Treatment  •  Debridement Right Elbow

## 2025-07-03 NOTE — PROGRESS NOTES
Wound Procedure Treatment    Performed by: Neha Coombs RN  Authorized by: Suly Lockhart MD  Associated wounds:   Wound 07/03/25 Traumatic Knee Anterior;Right  Wound 07/03/25 Traumatic Knee Left;Lateral  Wound 07/03/25 Traumatic Elbow Right    Wound cleansed with:  NSS   Applied primary dressing:  Dermagran   Applied secondary dressing:  Gauze   Dressing secured with:  Philip, Tape and Tubifast

## 2025-07-09 ENCOUNTER — OFFICE VISIT (OUTPATIENT)
Dept: WOUND CARE | Facility: HOSPITAL | Age: OVER 89
End: 2025-07-09
Payer: COMMERCIAL

## 2025-07-09 VITALS
SYSTOLIC BLOOD PRESSURE: 120 MMHG | DIASTOLIC BLOOD PRESSURE: 82 MMHG | HEART RATE: 69 BPM | TEMPERATURE: 96.5 F | RESPIRATION RATE: 18 BRPM

## 2025-07-09 DIAGNOSIS — T07.XXXA MULTIPLE OPEN WOUNDS: Primary | ICD-10-CM

## 2025-07-09 PROCEDURE — 97597 DBRDMT OPN WND 1ST 20 CM/<: CPT | Performed by: STUDENT IN AN ORGANIZED HEALTH CARE EDUCATION/TRAINING PROGRAM

## 2025-07-09 RX ORDER — LIDOCAINE 40 MG/G
CREAM TOPICAL ONCE
Status: COMPLETED | OUTPATIENT
Start: 2025-07-09 | End: 2025-07-09

## 2025-07-09 RX ADMIN — LIDOCAINE 1 APPLICATION: 40 CREAM TOPICAL at 08:41

## 2025-07-09 NOTE — ASSESSMENT & PLAN NOTE
It was a pleasure to see Patricio Morris for wound care follow up today  Selective debridement performed today as above  Wounds are improving   Continue plan of care as noted below with dermagran, rolled gauze  No signs or symptoms of infection today. Patient understands that if any signs of infection start (such as increased redness, drainage, pain, fever, chills, diaphoresis), they should call our office or proceed to the ER or Urgent Care.  Patient should continue a high protein diet to facilitate wound healing  Patient is advised to not submerge wound or leave wound open to air.  Follow up in 2 weeks  Given the multi-factorial nature of wound care, additional time was taken to review patient's treatment plan with other specialties and most recent pertinent lab work and imaging.   All plans of care discussed with patient at bedside who verbalized understanding with treatment plan.    Orders:  •  lidocaine (LMX) 4 % cream  •  Wound Procedure Treatment  •  Wound cleansing and dressings; Future  •  Debridement Anterior;Right Knee  •  Debridement Left;Lateral Knee  •  Debridement Left Elbow  •  Debridement Proximal;Right Pretibial  •  Debridement Left Pretibial

## 2025-07-09 NOTE — PATIENT INSTRUCTIONS
Orders Placed This Encounter   Procedures    Wound Procedure Treatment     This order was created via procedure documentation    Wound cleansing and dressings     To all your open wounds on your knees and left elbow:     Wash your hands with soap and water.  Remove old dressing, discard into plastic bag and place in trash.  Cleanse the wound with soap and water or saline prior to applying a clean dressing. Do not use tissue or cotton balls. Do not scrub the wound. Pat dry using gauze.    Shower yes     Apply DERMAGRAN to the knee and elbow wounds.    Cover with gauze  Secure with rolled gauze and tape.   Cover with tubifast.   Change dressing 2 times weekly     Off-loading Instructions:    Keep weight and pressure off wound at all times. Avoid kneeling on wounds and leaning on elbow.      Protein: Eat protein with each meal to promote healing.  Examples of protein are fish, meat, chicken, nuts, peanut butter, eggs, lentils, edamame or a protein shake.    Wound infection:  If you have signs of infection please call the wound center.  If the wound center is closed- please go to the Emergency department.  Some signs of infection:  fever, chills, increased redness, red streaks, increase in pain, increased drainage.  Drainage with an odor, Change in drainage color: white/milky/green/tan/yellow,  an increase in swelling, chest pain and/or shortness of breath.    Supplies to be requested from [facility]: [phone]     Standing Status:   Future     Expiration Date:   7/16/2025
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
## 2025-07-09 NOTE — PROGRESS NOTES
Wound Procedure Treatment    Performed by: Cathy Pool RN  Authorized by: Suly Lockhart  Associated wounds:   Wound 07/03/25 Traumatic Knee Anterior;Right  Wound 07/03/25 Traumatic Knee Left;Lateral  Wound 07/03/25 Traumatic Elbow Left  Wound 07/09/25 Traumatic Pretibial Proximal;Right  Wound 07/09/25 Traumatic Pretibial Left    Wound cleansed with:  NSS   Applied primary dressing:  Dermagran   Applied secondary dressing:  Gauze   Dressing secured with:  Philip, Tape and Tubifast

## 2025-07-16 ENCOUNTER — TELEPHONE (OUTPATIENT)
Dept: FAMILY MEDICINE CLINIC | Facility: MEDICAL CENTER | Age: OVER 89
End: 2025-07-16

## 2025-07-16 NOTE — TELEPHONE ENCOUNTER
Outreach made to [facility] in [place]. I faxed over a request sheet for [facility] exam results for their office to fill out and fax back to us. Pt's most recent appt. w/ them was on July 2nd, 2025.

## 2025-07-23 ENCOUNTER — OFFICE VISIT (OUTPATIENT)
Dept: WOUND CARE | Facility: HOSPITAL | Age: OVER 89
End: 2025-07-23
Payer: COMMERCIAL

## 2025-07-23 VITALS
DIASTOLIC BLOOD PRESSURE: 69 MMHG | HEART RATE: 65 BPM | SYSTOLIC BLOOD PRESSURE: 115 MMHG | RESPIRATION RATE: 15 BRPM | TEMPERATURE: 97.2 F

## 2025-07-23 DIAGNOSIS — T07.XXXA MULTIPLE OPEN WOUNDS: Primary | ICD-10-CM

## 2025-07-23 PROCEDURE — 97597 DBRDMT OPN WND 1ST 20 CM/<: CPT | Performed by: STUDENT IN AN ORGANIZED HEALTH CARE EDUCATION/TRAINING PROGRAM

## 2025-07-23 RX ORDER — LIDOCAINE 40 MG/G
CREAM TOPICAL ONCE
Status: COMPLETED | OUTPATIENT
Start: 2025-07-23 | End: 2025-07-23

## 2025-07-23 RX ADMIN — LIDOCAINE: 40 CREAM TOPICAL at 08:39

## 2025-07-23 NOTE — PROGRESS NOTES
Wound Procedure Treatment    Performed by: Chula Rodriguez RN  Authorized by: Suly Lockhart  Associated wounds:   Wound 07/03/25 Traumatic Knee Anterior;Right  Wound 07/03/25 Traumatic Knee Left;Lateral    Wound cleansed with:  NSS   Applied primary dressing:  Dermagran   Applied secondary dressing:  Gauze   Dressing secured with:  Philip and Tape   Wound Procedure Treatment Right;Lower;Lateral Arm    Performed by: Chula Rodriguez RN  Authorized by: Suly Lockhart  Associated wounds:   Wound 07/23/25 Traumatic Skin Tear Arm Right;Lower;Lateral    Wound cleansed with:  NSS   Applied primary dressing:  Acticoat   Applied secondary dressing:  Gauze   Dressing secured with:  Philip and Tape   Comments:  Acticoat 3
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
## 2025-07-23 NOTE — PATIENT INSTRUCTIONS
Orders Placed This Encounter   Procedures    Wound cleansing and dressings     RIGHT UPPER LEG WOUND (ABOVE KNEE) AND LEFT KNEE WOUND:     Wash your hands with soap and water.  Remove old dressing, discard into plastic bag and place in trash.  Cleanse the wound with soap and water or saline prior to applying a clean dressing. Do not use tissue or cotton balls. Do not scrub the wound. Pat dry using gauze.     Shower yes      Apply DERMAGRAN to the right leg and left knee wound.  Cover with gauze  Secure with rolled gauze and tape.   Cover with tubifast.   Change dressing every two days.     Off-loading Instructions:     Keep weight and pressure off wound at all times. Avoid kneeling on wounds and leaning on elbow.        Protein: Eat protein with each meal to promote healing.  Examples of protein are fish, meat, chicken, nuts, peanut butter, eggs, lentils, edamame or a protein shake.     Wound infection:  If you have signs of infection please call the wound center.  If the wound center is closed- please go to the Emergency department.  Some signs of infection:  fever, chills, increased redness, red streaks, increase in pain, increased drainage.  Drainage with an odor, Change in drainage color: white/milky/green/tan/yellow,  an increase in swelling, chest pain and/or shortness of breath.     Standing Status:   Future     Expiration Date:   7/30/2025    Wound cleansing and dressings Right;Lower;Lateral Arm     RIGHT ARM WOUND:    Wash your hands with soap and water.  Remove old dressing, discard into plastic bag and place in trash.  Cleanse the wound with soap and water prior to applying a clean dressing. Do not use tissue or cotton balls. Do not scrub the wound. Pat dry using gauze.  Shower yes on the days you are changing the wound dressing.  Apply Acticoat 3 ,cut to the size of the wound , to the right arm wound.  Cover with gauze.  Secure with rolled gauze and tape.  Change dressing every two days.    Protein: Eat  protein with each meal to promote healing.  Examples of protein are fish, meat, chicken, nuts, peanut butter, eggs, lentils, edamame or a protein shake.      Wound infection:  If you have signs of infection please call the wound center.  If the wound center is closed- please go to the Emergency department.  Some signs of infection:  fever, chills, increased redness, red streaks, increase in pain, increased drainage.  Drainage with an odor, Change in drainage color: white/milky/green/tan/yellow,  an increase in swelling, chest pain and/or shortness of breath.     Standing Status:   Future     Expiration Date:   7/30/2025

## 2025-07-23 NOTE — ASSESSMENT & PLAN NOTE
It was a pleasure to see Patricio Morris for wound care follow up today  Selective debridement performed today as above  Wounds are improving. Also with a new wound of LUE   Continue plan of care as noted below with dermagran to 2 remaining LE wounds. Actacote 3 to LUE wound  No signs or symptoms of infection today. Patient understands that if any signs of infection start (such as increased redness, drainage, pain, fever, chills, diaphoresis), they should call our office or proceed to the ER or Urgent Care.  Patient should continue a high protein diet to facilitate wound healing  Patient is advised to not submerge wound or leave wound open to air.  Follow up in 1 weeks  Given the multi-factorial nature of wound care, additional time was taken to review patient's treatment plan with other specialties and most recent pertinent lab work and imaging.   All plans of care discussed with patient at bedside who verbalized understanding with treatment plan.    Orders:  •  lidocaine (LMX) 4 % cream  •  Wound cleansing and dressings; Future  •  Wound cleansing and dressings Right;Lower;Lateral Arm; Future  •  Wound Procedure Treatment  •  Wound Procedure Treatment Right;Lower;Lateral Arm  •  Debridement Anterior;Right Knee  •  Debridement Left;Lateral Knee  •  Debridement Right;Lower;Lateral Arm

## 2025-07-28 ENCOUNTER — OFFICE VISIT (OUTPATIENT)
Dept: CARDIOLOGY CLINIC | Facility: MEDICAL CENTER | Age: OVER 89
End: 2025-07-28
Payer: COMMERCIAL

## 2025-07-28 VITALS
DIASTOLIC BLOOD PRESSURE: 70 MMHG | BODY MASS INDEX: 21.07 KG/M2 | HEIGHT: 73 IN | HEART RATE: 71 BPM | WEIGHT: 159 LBS | SYSTOLIC BLOOD PRESSURE: 118 MMHG | OXYGEN SATURATION: 95 %

## 2025-07-28 DIAGNOSIS — I50.42 CHRONIC COMBINED SYSTOLIC AND DIASTOLIC CONGESTIVE HEART FAILURE (HCC): ICD-10-CM

## 2025-07-28 DIAGNOSIS — I13.0 HYPERTENSIVE HEART AND KIDNEY DISEASE WITH HF AND CKD (HCC): Primary | ICD-10-CM

## 2025-07-28 DIAGNOSIS — I42.0 DILATED CARDIOMYOPATHY (HCC): ICD-10-CM

## 2025-07-28 DIAGNOSIS — E78.49 OTHER HYPERLIPIDEMIA: ICD-10-CM

## 2025-07-28 DIAGNOSIS — I12.9 BENIGN HYPERTENSION WITH CHRONIC KIDNEY DISEASE: ICD-10-CM

## 2025-07-28 PROCEDURE — 99214 OFFICE O/P EST MOD 30 MIN: CPT | Performed by: INTERNAL MEDICINE

## 2025-07-30 ENCOUNTER — OFFICE VISIT (OUTPATIENT)
Dept: WOUND CARE | Facility: HOSPITAL | Age: OVER 89
End: 2025-07-30
Payer: COMMERCIAL

## 2025-07-30 VITALS
DIASTOLIC BLOOD PRESSURE: 75 MMHG | RESPIRATION RATE: 15 BRPM | SYSTOLIC BLOOD PRESSURE: 138 MMHG | TEMPERATURE: 97.6 F | HEART RATE: 60 BPM

## 2025-07-30 DIAGNOSIS — T07.XXXA MULTIPLE OPEN WOUNDS: Primary | ICD-10-CM
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
## 2025-07-30 PROCEDURE — 11042 DBRDMT SUBQ TIS 1ST 20SQCM/<: CPT | Performed by: STUDENT IN AN ORGANIZED HEALTH CARE EDUCATION/TRAINING PROGRAM

## 2025-07-30 PROCEDURE — 99214 OFFICE O/P EST MOD 30 MIN: CPT | Performed by: STUDENT IN AN ORGANIZED HEALTH CARE EDUCATION/TRAINING PROGRAM

## 2025-07-30 RX ORDER — LIDOCAINE 40 MG/G
CREAM TOPICAL ONCE
Status: COMPLETED | OUTPATIENT
Start: 2025-07-30 | End: 2025-07-30

## 2025-07-30 RX ADMIN — LIDOCAINE: 40 CREAM TOPICAL at 08:17

## 2025-08-06 ENCOUNTER — OFFICE VISIT (OUTPATIENT)
Dept: WOUND CARE | Facility: HOSPITAL | Age: OVER 89
End: 2025-08-06
Payer: COMMERCIAL

## 2025-08-06 VITALS
RESPIRATION RATE: 18 BRPM | TEMPERATURE: 96.9 F | DIASTOLIC BLOOD PRESSURE: 79 MMHG | SYSTOLIC BLOOD PRESSURE: 124 MMHG | HEART RATE: 64 BPM

## 2025-08-06 DIAGNOSIS — S41.101A OPEN WOUND OF RIGHT UPPER EXTREMITY, INITIAL ENCOUNTER: Primary | ICD-10-CM

## 2025-08-06 PROCEDURE — 97597 DBRDMT OPN WND 1ST 20 CM/<: CPT | Performed by: STUDENT IN AN ORGANIZED HEALTH CARE EDUCATION/TRAINING PROGRAM

## 2025-08-06 RX ORDER — LIDOCAINE 40 MG/G
CREAM TOPICAL ONCE
Status: COMPLETED | OUTPATIENT
Start: 2025-08-06 | End: 2025-08-06

## 2025-08-06 RX ADMIN — LIDOCAINE: 40 CREAM TOPICAL at 09:07

## 2025-08-07 ENCOUNTER — TELEPHONE (OUTPATIENT)
Dept: OTHER | Facility: HOSPITAL | Age: OVER 89
End: 2025-08-07

## 2025-08-07 LAB
BASOPHILS # BLD AUTO: 549 CELLS/UL (ref 0–200)
BASOPHILS NFR BLD AUTO: 4.9 %
EOSINOPHIL # BLD AUTO: 1389 CELLS/UL (ref 15–500)
EOSINOPHIL NFR BLD AUTO: 12.4 %
ERYTHROCYTE [DISTWIDTH] IN BLOOD BY AUTOMATED COUNT: 24.9 % (ref 11–15)
FERRITIN SERPL-MCNC: 448 NG/ML (ref 24–380)
HCT VFR BLD AUTO: 37.5 % (ref 38.5–50)
HGB BLD-MCNC: 11.4 G/DL (ref 13.2–17.1)
IRON SATN MFR SERPL: 43 % (CALC) (ref 20–48)
IRON SERPL-MCNC: 118 MCG/DL (ref 50–180)
LYMPHOCYTES # BLD AUTO: 1590 CELLS/UL (ref 850–3900)
LYMPHOCYTES NFR BLD AUTO: 14.2 %
MCH RBC QN AUTO: 26.7 PG (ref 27–33)
MCHC RBC AUTO-ENTMCNC: 30.4 G/DL (ref 32–36)
MCV RBC AUTO: 87.8 FL (ref 80–100)
MONOCYTES # BLD AUTO: 885 CELLS/UL (ref 200–950)
MONOCYTES NFR BLD AUTO: 7.9 %
NEUTROPHILS # BLD AUTO: 6787 CELLS/UL (ref 1500–7800)
NEUTROPHILS NFR BLD AUTO: 60.6 %
PLATELET # BLD AUTO: 296 THOUSAND/UL (ref 140–400)
RBC # BLD AUTO: 4.27 MILLION/UL (ref 4.2–5.8)
TIBC SERPL-MCNC: 277 MCG/DL (CALC) (ref 250–425)
WBC # BLD AUTO: 11.2 THOUSAND/UL (ref 3.8–10.8)

## 2025-08-08 LAB
25(OH)D3 SERPL-MCNC: 49 NG/ML (ref 30–100)
ALBUMIN SERPL-MCNC: 4.6 G/DL (ref 3.6–5.1)
BUN SERPL-MCNC: 28 MG/DL (ref 7–25)
BUN/CREAT SERPL: 21 (CALC) (ref 6–22)
CALCIUM SERPL-MCNC: 9.7 MG/DL (ref 8.6–10.3)
CHLORIDE SERPL-SCNC: 101 MMOL/L (ref 98–110)
CO2 SERPL-SCNC: 31 MMOL/L (ref 20–32)
CREAT SERPL-MCNC: 1.31 MG/DL (ref 0.7–1.22)
GFR/BSA.PRED SERPLBLD CYS-BASED-ARV: 51 ML/MIN/1.73M2
GLUCOSE SERPL-MCNC: 160 MG/DL (ref 65–99)
MAGNESIUM SERPL-MCNC: 2 MG/DL (ref 1.5–2.5)
PHOSPHATE SERPL-MCNC: 3.9 MG/DL (ref 2.1–4.3)
POTASSIUM SERPL-SCNC: 6 MMOL/L (ref 3.5–5.3)
PTH-INTACT SERPL-MCNC: 33 PG/ML (ref 16–77)
SODIUM SERPL-SCNC: 139 MMOL/L (ref 135–146)

## 2025-08-11 ENCOUNTER — OFFICE VISIT (OUTPATIENT)
Dept: NEPHROLOGY | Facility: CLINIC | Age: OVER 89
End: 2025-08-11
Payer: COMMERCIAL

## 2025-08-14 ENCOUNTER — TELEPHONE (OUTPATIENT)
Dept: NEPHROLOGY | Facility: CLINIC | Age: OVER 89
End: 2025-08-14

## 2025-08-20 ENCOUNTER — OFFICE VISIT (OUTPATIENT)
Dept: WOUND CARE | Facility: HOSPITAL | Age: OVER 89
End: 2025-08-20
Payer: COMMERCIAL

## 2025-08-20 VITALS
DIASTOLIC BLOOD PRESSURE: 73 MMHG | RESPIRATION RATE: 18 BRPM | TEMPERATURE: 96.7 F | HEART RATE: 67 BPM | SYSTOLIC BLOOD PRESSURE: 134 MMHG

## 2025-08-20 DIAGNOSIS — S81.801A TRAUMATIC OPEN WOUND OF RIGHT LOWER LEG, INITIAL ENCOUNTER: ICD-10-CM

## 2025-08-20 DIAGNOSIS — S41.101A OPEN WOUND OF RIGHT UPPER EXTREMITY, INITIAL ENCOUNTER: Primary | ICD-10-CM

## 2025-08-20 PROCEDURE — 97597 DBRDMT OPN WND 1ST 20 CM/<: CPT | Performed by: STUDENT IN AN ORGANIZED HEALTH CARE EDUCATION/TRAINING PROGRAM

## 2025-08-20 PROCEDURE — 99213 OFFICE O/P EST LOW 20 MIN: CPT | Performed by: STUDENT IN AN ORGANIZED HEALTH CARE EDUCATION/TRAINING PROGRAM

## 2025-08-20 RX ORDER — LIDOCAINE 40 MG/G
CREAM TOPICAL ONCE
Status: COMPLETED | OUTPATIENT
Start: 2025-08-20 | End: 2025-08-20

## 2025-08-20 RX ADMIN — LIDOCAINE: 40 CREAM TOPICAL at 08:40

## (undated) DEVICE — BAG URINE DRAINAGE 2000ML ANTI RFLX LF

## (undated) DEVICE — STERILE SURGICAL LUBRICANT,  TUBE: Brand: SURGILUBE

## (undated) DEVICE — CATH FOLEY 20FR 5ML 2 WAY SILICONE ELASTIMER

## (undated) DEVICE — INVIEW CLEAR LEGGINGS: Brand: CONVERTORS

## (undated) DEVICE — PREMIUM DRY TRAY LF: Brand: MEDLINE INDUSTRIES, INC.

## (undated) DEVICE — CHLORHEXIDINE 4PCT 4 OZ

## (undated) DEVICE — UROCATCH BAG

## (undated) DEVICE — GLOVE SRG BIOGEL 7

## (undated) DEVICE — PACK TUR